# Patient Record
Sex: FEMALE | Race: WHITE | NOT HISPANIC OR LATINO | Employment: OTHER | URBAN - METROPOLITAN AREA
[De-identification: names, ages, dates, MRNs, and addresses within clinical notes are randomized per-mention and may not be internally consistent; named-entity substitution may affect disease eponyms.]

---

## 2017-01-12 ENCOUNTER — GENERIC CONVERSION - ENCOUNTER (OUTPATIENT)
Dept: OTHER | Facility: OTHER | Age: 77
End: 2017-01-12

## 2017-04-05 ENCOUNTER — ALLSCRIPTS OFFICE VISIT (OUTPATIENT)
Dept: FAMILY MEDICINE CLINIC | Facility: CLINIC | Age: 77
End: 2017-04-05
Payer: MEDICARE

## 2017-04-05 ENCOUNTER — APPOINTMENT (OUTPATIENT)
Dept: LAB | Facility: HOSPITAL | Age: 77
End: 2017-04-05
Payer: MEDICARE

## 2017-04-05 DIAGNOSIS — E78.5 HYPERLIPIDEMIA: ICD-10-CM

## 2017-04-05 DIAGNOSIS — I10 ESSENTIAL (PRIMARY) HYPERTENSION: ICD-10-CM

## 2017-04-05 DIAGNOSIS — E11.9 TYPE 2 DIABETES MELLITUS WITHOUT COMPLICATIONS (HCC): ICD-10-CM

## 2017-04-05 DIAGNOSIS — G62.9 POLYNEUROPATHY: ICD-10-CM

## 2017-04-05 LAB
ALBUMIN SERPL BCP-MCNC: 4.2 G/DL (ref 3.5–5)
ALP SERPL-CCNC: 85 U/L (ref 46–116)
ALT SERPL W P-5'-P-CCNC: 31 U/L (ref 12–78)
ANION GAP SERPL CALCULATED.3IONS-SCNC: 6 MMOL/L (ref 4–13)
AST SERPL W P-5'-P-CCNC: 12 U/L (ref 5–45)
BASOPHILS # BLD AUTO: 0.06 THOUSANDS/ΜL (ref 0–0.1)
BASOPHILS NFR BLD AUTO: 1 % (ref 0–1)
BILIRUB SERPL-MCNC: 0.56 MG/DL (ref 0.2–1)
BUN SERPL-MCNC: 14 MG/DL (ref 5–25)
CALCIUM SERPL-MCNC: 10 MG/DL (ref 8.3–10.1)
CHLORIDE SERPL-SCNC: 101 MMOL/L (ref 100–108)
CHOLEST SERPL-MCNC: 186 MG/DL (ref 50–200)
CO2 SERPL-SCNC: 30 MMOL/L (ref 21–32)
CREAT SERPL-MCNC: 0.79 MG/DL (ref 0.6–1.3)
CREAT UR-MCNC: 79.9 MG/DL
EOSINOPHIL # BLD AUTO: 0.23 THOUSAND/ΜL (ref 0–0.61)
EOSINOPHIL NFR BLD AUTO: 2 % (ref 0–6)
ERYTHROCYTE [DISTWIDTH] IN BLOOD BY AUTOMATED COUNT: 13.7 % (ref 11.6–15.1)
GFR SERPL CREATININE-BSD FRML MDRD: >60 ML/MIN/1.73SQ M
GLUCOSE P FAST SERPL-MCNC: 97 MG/DL (ref 65–99)
GLUCOSE SERPL-MCNC: 124 MG/DL
GLUCOSE SERPL-MCNC: 124 MG/DL (ref 65–140)
HBA1C MFR BLD HPLC: 6.5 %
HCT VFR BLD AUTO: 45.5 % (ref 34.8–46.1)
HDLC SERPL-MCNC: 54 MG/DL (ref 40–60)
HGB BLD-MCNC: 14.7 G/DL (ref 11.5–15.4)
LDLC SERPL CALC-MCNC: 88 MG/DL (ref 0–100)
LYMPHOCYTES # BLD AUTO: 3.41 THOUSANDS/ΜL (ref 0.6–4.47)
LYMPHOCYTES NFR BLD AUTO: 34 % (ref 14–44)
MCH RBC QN AUTO: 28.5 PG (ref 26.8–34.3)
MCHC RBC AUTO-ENTMCNC: 32.3 G/DL (ref 31.4–37.4)
MCV RBC AUTO: 88 FL (ref 82–98)
MICROALBUMIN UR-MCNC: <5 MG/L (ref 0–20)
MICROALBUMIN/CREAT 24H UR: <6 MG/G CREATININE (ref 0–30)
MONOCYTES # BLD AUTO: 0.83 THOUSAND/ΜL (ref 0.17–1.22)
MONOCYTES NFR BLD AUTO: 8 % (ref 4–12)
NEUTROPHILS # BLD AUTO: 5.5 THOUSANDS/ΜL (ref 1.85–7.62)
NEUTS SEG NFR BLD AUTO: 55 % (ref 43–75)
NRBC BLD AUTO-RTO: 0 /100 WBCS
PLATELET # BLD AUTO: 258 THOUSANDS/UL (ref 149–390)
PMV BLD AUTO: 12.1 FL (ref 8.9–12.7)
POTASSIUM SERPL-SCNC: 5 MMOL/L (ref 3.5–5.3)
PROT SERPL-MCNC: 7.5 G/DL (ref 6.4–8.2)
RBC # BLD AUTO: 5.16 MILLION/UL (ref 3.81–5.12)
SODIUM SERPL-SCNC: 137 MMOL/L (ref 136–145)
TRIGL SERPL-MCNC: 221 MG/DL
TSH SERPL DL<=0.05 MIU/L-ACNC: 2.45 UIU/ML (ref 0.36–3.74)
WBC # BLD AUTO: 10.05 THOUSAND/UL (ref 4.31–10.16)

## 2017-04-05 PROCEDURE — 82948 REAGENT STRIP/BLOOD GLUCOSE: CPT | Performed by: PHYSICIAN ASSISTANT

## 2017-04-05 PROCEDURE — 36415 COLL VENOUS BLD VENIPUNCTURE: CPT

## 2017-04-05 PROCEDURE — 83036 HEMOGLOBIN GLYCOSYLATED A1C: CPT | Performed by: PHYSICIAN ASSISTANT

## 2017-04-05 PROCEDURE — 82570 ASSAY OF URINE CREATININE: CPT

## 2017-04-05 PROCEDURE — 80061 LIPID PANEL: CPT

## 2017-04-05 PROCEDURE — 80053 COMPREHEN METABOLIC PANEL: CPT

## 2017-04-05 PROCEDURE — 84443 ASSAY THYROID STIM HORMONE: CPT

## 2017-04-05 PROCEDURE — 82043 UR ALBUMIN QUANTITATIVE: CPT

## 2017-04-05 PROCEDURE — 85025 COMPLETE CBC W/AUTO DIFF WBC: CPT

## 2017-04-05 PROCEDURE — 99213 OFFICE O/P EST LOW 20 MIN: CPT | Performed by: PHYSICIAN ASSISTANT

## 2017-06-12 ENCOUNTER — OFFICE VISIT (OUTPATIENT)
Dept: URGENT CARE | Facility: CLINIC | Age: 77
End: 2017-06-12
Payer: MEDICARE

## 2017-06-12 ENCOUNTER — OFFICE VISIT (OUTPATIENT)
Dept: URGENT CARE | Facility: CLINIC | Age: 77
End: 2017-06-12
Attending: EMERGENCY MEDICINE
Payer: MEDICARE

## 2017-06-12 ENCOUNTER — TRANSCRIBE ORDERS (OUTPATIENT)
Dept: URGENT CARE | Facility: CLINIC | Age: 77
End: 2017-06-12

## 2017-06-12 DIAGNOSIS — R42 DIZZINESS AND GIDDINESS: Primary | ICD-10-CM

## 2017-06-12 DIAGNOSIS — R42 DIZZINESS AND GIDDINESS: ICD-10-CM

## 2017-06-12 LAB
ATRIAL RATE: 87 BPM
P AXIS: 75 DEGREES
PR INTERVAL: 154 MS
QRS AXIS: 11 DEGREES
QRSD INTERVAL: 72 MS
QT INTERVAL: 378 MS
QTC INTERVAL: 454 MS
T WAVE AXIS: 68 DEGREES
VENTRICULAR RATE: 87 BPM

## 2017-06-12 PROCEDURE — 93005 ELECTROCARDIOGRAM TRACING: CPT

## 2017-06-12 PROCEDURE — G0463 HOSPITAL OUTPT CLINIC VISIT: HCPCS

## 2017-06-12 PROCEDURE — 99213 OFFICE O/P EST LOW 20 MIN: CPT

## 2017-07-13 ENCOUNTER — ALLSCRIPTS OFFICE VISIT (OUTPATIENT)
Dept: FAMILY MEDICINE CLINIC | Facility: CLINIC | Age: 77
End: 2017-07-13
Payer: MEDICARE

## 2017-07-13 LAB
GLUCOSE SERPL-MCNC: 121 MG/DL
GLUCOSE SERPL-MCNC: 121 MG/DL (ref 65–140)
HBA1C MFR BLD HPLC: 6.6 %

## 2017-07-13 PROCEDURE — 82948 REAGENT STRIP/BLOOD GLUCOSE: CPT | Performed by: PHYSICIAN ASSISTANT

## 2017-07-13 PROCEDURE — 99214 OFFICE O/P EST MOD 30 MIN: CPT | Performed by: PHYSICIAN ASSISTANT

## 2017-07-13 PROCEDURE — 83036 HEMOGLOBIN GLYCOSYLATED A1C: CPT | Performed by: PHYSICIAN ASSISTANT

## 2017-09-20 ENCOUNTER — ALLSCRIPTS OFFICE VISIT (OUTPATIENT)
Dept: FAMILY MEDICINE CLINIC | Facility: CLINIC | Age: 77
End: 2017-09-20
Payer: MEDICARE

## 2017-09-20 PROCEDURE — 99214 OFFICE O/P EST MOD 30 MIN: CPT | Performed by: PHYSICIAN ASSISTANT

## 2017-09-20 PROCEDURE — 93005 ELECTROCARDIOGRAM TRACING: CPT | Performed by: PHYSICIAN ASSISTANT

## 2017-11-30 ENCOUNTER — ALLSCRIPTS OFFICE VISIT (OUTPATIENT)
Dept: FAMILY MEDICINE CLINIC | Facility: CLINIC | Age: 77
End: 2017-11-30
Payer: MEDICARE

## 2017-11-30 ENCOUNTER — APPOINTMENT (OUTPATIENT)
Dept: LAB | Facility: HOSPITAL | Age: 77
End: 2017-11-30
Payer: MEDICARE

## 2017-11-30 DIAGNOSIS — E78.5 HYPERLIPIDEMIA: ICD-10-CM

## 2017-11-30 DIAGNOSIS — I10 ESSENTIAL (PRIMARY) HYPERTENSION: ICD-10-CM

## 2017-11-30 DIAGNOSIS — E11.9 TYPE 2 DIABETES MELLITUS WITHOUT COMPLICATIONS (HCC): ICD-10-CM

## 2017-11-30 DIAGNOSIS — E55.9 VITAMIN D DEFICIENCY: ICD-10-CM

## 2017-11-30 LAB
25(OH)D3 SERPL-MCNC: 33.5 NG/ML (ref 30–100)
ALBUMIN SERPL BCP-MCNC: 4.1 G/DL (ref 3.5–5)
ALP SERPL-CCNC: 83 U/L (ref 46–116)
ALT SERPL W P-5'-P-CCNC: 38 U/L (ref 12–78)
ANION GAP SERPL CALCULATED.3IONS-SCNC: 7 MMOL/L (ref 4–13)
AST SERPL W P-5'-P-CCNC: 20 U/L (ref 5–45)
BASOPHILS # BLD AUTO: 0.07 THOUSANDS/ΜL (ref 0–0.1)
BASOPHILS NFR BLD AUTO: 1 % (ref 0–1)
BILIRUB SERPL-MCNC: 0.71 MG/DL (ref 0.2–1)
BUN SERPL-MCNC: 10 MG/DL (ref 5–25)
CALCIUM SERPL-MCNC: 9.6 MG/DL (ref 8.3–10.1)
CHLORIDE SERPL-SCNC: 104 MMOL/L (ref 100–108)
CHOLEST SERPL-MCNC: 187 MG/DL (ref 50–200)
CO2 SERPL-SCNC: 31 MMOL/L (ref 21–32)
CREAT SERPL-MCNC: 0.81 MG/DL (ref 0.6–1.3)
CREAT UR-MCNC: 127 MG/DL
EOSINOPHIL # BLD AUTO: 0.26 THOUSAND/ΜL (ref 0–0.61)
EOSINOPHIL NFR BLD AUTO: 3 % (ref 0–6)
ERYTHROCYTE [DISTWIDTH] IN BLOOD BY AUTOMATED COUNT: 13.6 % (ref 11.6–15.1)
GFR SERPL CREATININE-BSD FRML MDRD: 70 ML/MIN/1.73SQ M
GLUCOSE P FAST SERPL-MCNC: 96 MG/DL (ref 65–99)
GLUCOSE SERPL-MCNC: 96 MG/DL
GLUCOSE SERPL-MCNC: 96 MG/DL (ref 65–140)
HBA1C MFR BLD HPLC: 6.9 %
HCT VFR BLD AUTO: 44.2 % (ref 34.8–46.1)
HDLC SERPL-MCNC: 53 MG/DL (ref 40–60)
HGB BLD-MCNC: 14.5 G/DL (ref 11.5–15.4)
LDLC SERPL CALC-MCNC: 91 MG/DL (ref 0–100)
LYMPHOCYTES # BLD AUTO: 3.1 THOUSANDS/ΜL (ref 0.6–4.47)
LYMPHOCYTES NFR BLD AUTO: 35 % (ref 14–44)
MCH RBC QN AUTO: 28.7 PG (ref 26.8–34.3)
MCHC RBC AUTO-ENTMCNC: 32.8 G/DL (ref 31.4–37.4)
MCV RBC AUTO: 88 FL (ref 82–98)
MICROALBUMIN UR-MCNC: 5.3 MG/L (ref 0–20)
MICROALBUMIN/CREAT 24H UR: 4 MG/G CREATININE (ref 0–30)
MONOCYTES # BLD AUTO: 0.8 THOUSAND/ΜL (ref 0.17–1.22)
MONOCYTES NFR BLD AUTO: 9 % (ref 4–12)
NEUTROPHILS # BLD AUTO: 4.7 THOUSANDS/ΜL (ref 1.85–7.62)
NEUTS SEG NFR BLD AUTO: 52 % (ref 43–75)
NRBC BLD AUTO-RTO: 0 /100 WBCS
PLATELET # BLD AUTO: 249 THOUSANDS/UL (ref 149–390)
PMV BLD AUTO: 11.2 FL (ref 8.9–12.7)
POTASSIUM SERPL-SCNC: 4.7 MMOL/L (ref 3.5–5.3)
PROT SERPL-MCNC: 7.4 G/DL (ref 6.4–8.2)
RBC # BLD AUTO: 5.05 MILLION/UL (ref 3.81–5.12)
SODIUM SERPL-SCNC: 142 MMOL/L (ref 136–145)
TRIGL SERPL-MCNC: 216 MG/DL
TSH SERPL DL<=0.05 MIU/L-ACNC: 2.15 UIU/ML (ref 0.36–3.74)
WBC # BLD AUTO: 8.95 THOUSAND/UL (ref 4.31–10.16)

## 2017-11-30 PROCEDURE — 82306 VITAMIN D 25 HYDROXY: CPT

## 2017-11-30 PROCEDURE — 82570 ASSAY OF URINE CREATININE: CPT

## 2017-11-30 PROCEDURE — 85025 COMPLETE CBC W/AUTO DIFF WBC: CPT

## 2017-11-30 PROCEDURE — 82043 UR ALBUMIN QUANTITATIVE: CPT

## 2017-11-30 PROCEDURE — 80053 COMPREHEN METABOLIC PANEL: CPT

## 2017-11-30 PROCEDURE — 99214 OFFICE O/P EST MOD 30 MIN: CPT | Performed by: FAMILY MEDICINE

## 2017-11-30 PROCEDURE — 83036 HEMOGLOBIN GLYCOSYLATED A1C: CPT | Performed by: FAMILY MEDICINE

## 2017-11-30 PROCEDURE — 82948 REAGENT STRIP/BLOOD GLUCOSE: CPT | Performed by: PHYSICIAN ASSISTANT

## 2017-11-30 PROCEDURE — 84443 ASSAY THYROID STIM HORMONE: CPT

## 2017-11-30 PROCEDURE — 90686 IIV4 VACC NO PRSV 0.5 ML IM: CPT

## 2017-11-30 PROCEDURE — 36415 COLL VENOUS BLD VENIPUNCTURE: CPT

## 2017-11-30 PROCEDURE — 80061 LIPID PANEL: CPT

## 2017-11-30 PROCEDURE — G0008 ADMIN INFLUENZA VIRUS VAC: HCPCS | Performed by: FAMILY MEDICINE

## 2017-12-05 NOTE — PROGRESS NOTES
Assessment    1  Vitamin D deficiency (268 9) (E55 9)   2  Diabetes mellitus (250 00) (E11 9)   3  Hyperlipidemia, unspecified (272 4) (E78 5)   4  Hypertension (401 9) (I10)    Plan  Diabetes mellitus    · (1) CBC/PLT/DIFF; Status:Active; Requested for:30Nov2017;    · Blood Glucose- POC; Status:Complete;   Done: 03IYZ5268 10:41AM  Fasting (Y/N) : Yes - Y   · Hemoglobin A1c- POC; Status:Complete;   Done: 04BQQ7666 10:45AM  Diabetes mellitus, Hyperlipidemia, unspecified    · (1) LIPID PANEL, FASTING; Status:Active; Requested for:30Nov2017;   Diabetes mellitus, Hyperlipidemia, unspecified, Hypertension    · (1) COMPREHENSIVE METABOLIC PANEL; Status:Active; Requested for:30Nov2017;    · (1) MICROALBUMIN CREATININE RATIO, RANDOM URINE; Status:Active; Requested  for:30Nov2017;    · (1) TSH; Status:Active; Requested for:30Nov2017;   Need for influenza vaccination    · Influenza  Vitamin D deficiency    · (1) VITAMIN D 25-HYDROXY; Status:Active; Requested for:30Nov2017;     Discussion/Summary    Will get labs and also flu shot and no med changes today  The patient was counseled regarding  Chief Complaint  pt here for 3 month dm check up      History of Present Illness  Pt is here for [place] and she is feeling well and appetite is good and sleeps ok and abm are ok      Review of Systems    Constitutional: No fever, no chills, feels well, no tiredness, no recent weight gain or weight loss  Eyes: No complaints of eye pain, no red eyes, no eyesight problems, no discharge, no dry eyes, no itching of eyes  ENT: no complaints of earache, no loss of hearing, no nose bleeds, no nasal discharge, no sore throat, no hoarseness  Cardiovascular: No complaints of slow heart rate, no fast heart rate, no chest pain, no palpitations, no leg claudication, no lower extremity edema  Respiratory: No complaints of shortness of breath, no wheezing, no cough, no SOB on exertion, no orthopnea, no PND     Gastrointestinal: No complaints of abdominal pain, no constipation, no nausea or vomiting, no diarrhea, no bloody stools  Genitourinary: No complaints of dysuria, no incontinence, no pelvic pain, no dysmenorrhea, no vaginal discharge or bleeding  Musculoskeletal: No complaints of arthralgias, no myalgias, no joint swelling or stiffness, no limb pain or swelling  Integumentary: No complaints of skin rash or lesions, no itching, no skin wounds, no breast pain or lump  Neurological: No complaints of headache, no confusion, no convulsions, no numbness, no dizziness or fainting, no tingling, no limb weakness, no difficulty walking  Psychiatric: Not suicidal, no sleep disturbance, no anxiety or depression, no change in personality, no emotional problems  Endocrine: No complaints of proptosis, no hot flashes, no muscle weakness, no deepening of the voice, no feelings of weakness  Hematologic/Lymphatic: No complaints of swollen glands, no swollen glands in the neck, does not bleed easily, does not bruise easily  ROS reviewed  Active Problems    1  Acid reflux disease (530 81) (K21 9)   2  Ambulatory dysfunction (719 7) (R26 2)   3  Asthma, mild intermittent (493 90) (J45 20)   4  Cataract (366 9) (H26 9)   5  Cough (786 2) (R05)   6  Diabetes mellitus (250 00) (E11 9)   7  Dizziness (780 4) (R42)   8  Hyperlipidemia, unspecified (272 4) (E78 5)   9  Hypertension (401 9) (I10)   10  Neuropathy (355 9) (G62 9)   11  Urinary frequency (788 41) (R35 0)    Surgical History    1  History of Cholecystectomy    The surgical history was reviewed and updated today  Family History  Father    1  Family history of lung cancer (V16 1) (Z80 1)    The family history was reviewed and updated today  Social History    · Daily caffeine consumption   · Former smoker (O89 88) (W00 085)   · No alcohol use  The social history was reviewed and updated today  Current Meds   1   Atorvastatin Calcium 20 MG Oral Tablet; take 1 tablet by mouth once daily; Therapy: 78SRT1333 to (Evaluate:01Mar2018)  Requested for: 01DLN3267; Last   Rx:01Nov2017 Ordered   2  Gabapentin 300 MG Oral Capsule; take 1 capsule by mouth once daily; Therapy: 99Hio6897 to (Evaluate:07Wfw7500)  Requested for: 88Nmq9360; Last   Rx:78Rlu9320 Ordered   3  Lisinopril 5 MG Oral Tablet; take 1 tablet by mouth once daily; Therapy: 19Rvm2249 to (Evaluate:40Fbu7637)  Requested for: 02Syv9597; Last   Rx:48Kwd6502 Ordered   4  Multiple Vitamin TABS; Therapy: (Recorded:08Qib2703) to Recorded   5  Omeprazole 20 MG Oral Capsule Delayed Release; TAKE 1 CAPSULE BY MOUTH   TWICE DAILY; Therapy: 45BOX5750 to (Valery Ralph)  Requested for: 45SWK9901; Last   Rx:06Nov2017 Ordered   6  Omeprazole 20 MG Oral Tablet Delayed Release; 1po bid  Requested for: 57ACD6706;   Last Rx:60Ocs5335 Ordered   7  ProAir  (90 Base) MCG/ACT Inhalation Aerosol Solution; INHALE 1-2 PUFFS   EVERY 4-6 HOURS AS NEEDED AND AS DIRECTED; Therapy: 42RYE5659 to (Last Rx:94Yrq5405) Ordered   8  Spiriva HandiHaler 18 MCG Inhalation Capsule; inhale the contents of one capsule in   the handihaler once daily; Therapy: 97Psc9936 to (Evaluate:27Mar2018)  Requested for: 96FEK2892; Last   Rx:27Nov2017 Ordered   9  Tolterodine Tartrate ER 4 MG Oral Capsule Extended Release 24 Hour; TAKE ONE   CAPSULE BY MOUTH DAILY AT BEDTIME (OVERACTIVE BLADDER); Therapy: 96KHK8220 to (Last Lyndal Sicard)  Requested for: 84SKW6044 Ordered   10  Vitamin D TABS; Therapy: (Recorded:82Vuo3236) to Recorded    The medication list was reviewed and updated today  Allergies    1   No Known Drug Allergies    Vitals  Vital Signs    Recorded: 69SLD3253 10:36AM   Temperature 97 4 F   Heart Rate 74   Respiration 18   Systolic 102   Diastolic 62   Weight 081 lb    BMI Calculated 35 54   BSA Calculated 1 79   O2 Saturation 97     Physical Exam    Constitutional   General appearance: No acute distress, well appearing and well nourished  Eyes   Conjunctiva and lids: No swelling, erythema or discharge  Pupils and irises: Equal, round and reactive to light  Ears, Nose, Mouth, and Throat   External inspection of ears and nose: Normal     Otoscopic examination: Tympanic membranes translucent with normal light reflex  Canals patent without erythema  Nasal mucosa, septum, and turbinates: Normal without edema or erythema  Oropharynx: Normal with no erythema, edema, exudate or lesions  Pulmonary   Respiratory effort: No increased work of breathing or signs of respiratory distress  Auscultation of lungs: Clear to auscultation  Cardiovascular   Palpation of heart: Normal PMI, no thrills  Auscultation of heart: Normal rate and rhythm, normal S1 and S2, without murmurs  Examination of extremities for edema and/or varicosities: Normal     Carotid pulses: Normal     Abdomen   Abdomen: Non-tender, no masses  Liver and spleen: No hepatomegaly or splenomegaly  Lymphatic   Palpation of lymph nodes in neck: No lymphadenopathy  Musculoskeletal   Gait and station: Normal     Digits and nails: Normal without clubbing or cyanosis  Inspection/palpation of joints, bones, and muscles: Normal     Skin   Skin and subcutaneous tissue: Normal without rashes or lesions  Neurologic   Cranial nerves: Cranial nerves 2-12 intact  Reflexes: 2+ and symmetric  Sensation: No sensory loss      Psychiatric   Orientation to person, place, and time: Normal     Mood and affect: Normal          Results/Data  Hemoglobin A1c- POC 42NLT9070 10:45AM Bria Nathan     Test Name Result Flag Reference   HEMOGLOBIN A1C 6 9       Blood Glucose- POC 54VAL6945 10:41AM Bria Nathan     Test Name Result Flag Reference   Glucose Finger Stick 96         Future Appointments    Date/Time Provider Specialty Site   03/14/2018 10:30 AM Bria Evans, [facility]     Signatures   Electronically signed by : Meli Mcintyre, [facility]; Nov 30 2017 10:55AM EST                       (Author)    Electronically signed by : Briseida Peoples MD; Nov 30 2017  2:15PM EST                       (Author)

## 2018-01-12 VITALS
BODY MASS INDEX: 32.24 KG/M2 | RESPIRATION RATE: 18 BRPM | DIASTOLIC BLOOD PRESSURE: 62 MMHG | TEMPERATURE: 97.4 F | HEART RATE: 74 BPM | OXYGEN SATURATION: 97 % | SYSTOLIC BLOOD PRESSURE: 118 MMHG | WEIGHT: 182 LBS

## 2018-01-13 VITALS
TEMPERATURE: 98.2 F | HEART RATE: 86 BPM | SYSTOLIC BLOOD PRESSURE: 132 MMHG | WEIGHT: 178 LBS | DIASTOLIC BLOOD PRESSURE: 82 MMHG | OXYGEN SATURATION: 92 % | HEIGHT: 63 IN | RESPIRATION RATE: 16 BRPM | BODY MASS INDEX: 31.54 KG/M2

## 2018-01-14 VITALS
BODY MASS INDEX: 35.34 KG/M2 | OXYGEN SATURATION: 95 % | HEART RATE: 80 BPM | HEIGHT: 60 IN | RESPIRATION RATE: 16 BRPM | TEMPERATURE: 97.8 F | WEIGHT: 180 LBS | DIASTOLIC BLOOD PRESSURE: 78 MMHG | SYSTOLIC BLOOD PRESSURE: 120 MMHG

## 2018-01-14 VITALS
WEIGHT: 180 LBS | OXYGEN SATURATION: 94 % | BODY MASS INDEX: 31.89 KG/M2 | RESPIRATION RATE: 16 BRPM | DIASTOLIC BLOOD PRESSURE: 84 MMHG | HEART RATE: 76 BPM | SYSTOLIC BLOOD PRESSURE: 124 MMHG | HEIGHT: 63 IN | TEMPERATURE: 98.4 F

## 2018-04-16 ENCOUNTER — OFFICE VISIT (OUTPATIENT)
Dept: FAMILY MEDICINE CLINIC | Facility: CLINIC | Age: 78
End: 2018-04-16
Payer: MEDICARE

## 2018-04-16 VITALS
HEART RATE: 63 BPM | BODY MASS INDEX: 32.78 KG/M2 | HEIGHT: 63 IN | OXYGEN SATURATION: 95 % | DIASTOLIC BLOOD PRESSURE: 80 MMHG | WEIGHT: 185 LBS | SYSTOLIC BLOOD PRESSURE: 130 MMHG

## 2018-04-16 DIAGNOSIS — E11.9 TYPE 2 DIABETES MELLITUS WITHOUT COMPLICATION, WITHOUT LONG-TERM CURRENT USE OF INSULIN (HCC): Primary | ICD-10-CM

## 2018-04-16 DIAGNOSIS — Z12.31 ENCOUNTER FOR SCREENING MAMMOGRAM FOR BREAST CANCER: ICD-10-CM

## 2018-04-16 DIAGNOSIS — E78.5 HYPERLIPIDEMIA, UNSPECIFIED HYPERLIPIDEMIA TYPE: ICD-10-CM

## 2018-04-16 DIAGNOSIS — B37.2 YEAST DERMATITIS: ICD-10-CM

## 2018-04-16 DIAGNOSIS — E55.9 VITAMIN D DEFICIENCY: ICD-10-CM

## 2018-04-16 PROBLEM — R42 DIZZINESS: Status: ACTIVE | Noted: 2017-06-12

## 2018-04-16 PROBLEM — R35.0 INCREASED FREQUENCY OF URINATION: Status: ACTIVE | Noted: 2017-07-13

## 2018-04-16 PROBLEM — H26.9 CATARACT: Status: ACTIVE | Noted: 2017-07-13

## 2018-04-16 PROBLEM — R26.2 AMBULATORY DYSFUNCTION: Status: ACTIVE | Noted: 2017-01-25

## 2018-04-16 LAB — SL AMB POCT HEMOGLOBIN AIC: 7

## 2018-04-16 PROCEDURE — 83036 HEMOGLOBIN GLYCOSYLATED A1C: CPT | Performed by: FAMILY MEDICINE

## 2018-04-16 PROCEDURE — 99213 OFFICE O/P EST LOW 20 MIN: CPT | Performed by: FAMILY MEDICINE

## 2018-04-16 PROCEDURE — 82043 UR ALBUMIN QUANTITATIVE: CPT | Performed by: FAMILY MEDICINE

## 2018-04-16 PROCEDURE — 82570 ASSAY OF URINE CREATININE: CPT | Performed by: FAMILY MEDICINE

## 2018-04-16 RX ORDER — NYSTATIN 100000 U/G
OINTMENT TOPICAL 2 TIMES DAILY
Qty: 30 G | Refills: 2 | Status: SHIPPED | OUTPATIENT
Start: 2018-04-16 | End: 2018-09-11

## 2018-04-16 RX ORDER — LISINOPRIL 5 MG/1
1 TABLET ORAL DAILY
COMMUNITY
Start: 2016-04-27 | End: 2018-05-04 | Stop reason: SDUPTHER

## 2018-04-16 RX ORDER — CETIRIZINE HYDROCHLORIDE 10 MG/1
10 TABLET ORAL
COMMUNITY
Start: 2012-06-06 | End: 2018-09-11

## 2018-04-16 RX ORDER — GABAPENTIN 300 MG/1
300 CAPSULE ORAL DAILY
Refills: 0 | COMMUNITY
Start: 2018-03-27 | End: 2018-05-04 | Stop reason: SDUPTHER

## 2018-04-16 RX ORDER — CHOLECALCIFEROL (VITAMIN D3) 125 MCG
2000 TABLET ORAL
COMMUNITY

## 2018-04-16 RX ORDER — OMEPRAZOLE 20 MG/1
1 CAPSULE, DELAYED RELEASE ORAL 2 TIMES DAILY
COMMUNITY
Start: 2017-11-06 | End: 2018-06-27 | Stop reason: SDUPTHER

## 2018-04-16 RX ORDER — ATORVASTATIN CALCIUM 20 MG/1
1 TABLET, FILM COATED ORAL DAILY
COMMUNITY
Start: 2016-03-15 | End: 2018-10-18 | Stop reason: SDUPTHER

## 2018-04-16 NOTE — PROGRESS NOTES
Assessment/Plan:    No problem-specific Assessment & Plan notes found for this encounter  Diagnoses and all orders for this visit:    Type 2 diabetes mellitus without complication, without long-term current use of insulin (HCC)  -     POCT hemoglobin A1c  -     CBC and differential; Future  -     Comprehensive metabolic panel; Future  -     Microalbumin / creatinine urine ratio    Yeast dermatitis  -     nystatin (MYCOSTATIN) ointment; Apply topically 2 (two) times a day    Vitamin D deficiency  -     Vitamin D 1,25 dihydroxy; Future    Hyperlipidemia, unspecified hyperlipidemia type  -     TSH, 3rd generation with T4 reflex; Future  -     Lipid Panel with Direct LDL reflex; Future    Encounter for screening mammogram for breast cancer  -     Mammo screening bilateral w 3d & cad; Future    Other orders  -     atorvastatin (LIPITOR) 20 mg tablet; Take 1 tablet by mouth daily  -     cetirizine (ZyrTEC) 10 mg tablet; Take 10 mg by mouth  -     Ergocalciferol (VITAMIN D2) 2000 units TABS; Take 2,000 mg by mouth  -     gabapentin (NEURONTIN) 300 mg capsule; Take 300 mg by mouth daily  -     lisinopril (ZESTRIL) 5 mg tablet; Take 1 tablet by mouth daily  -     omeprazole (PriLOSEC) 20 mg delayed release capsule; Take 1 capsule by mouth 2 (two) times a day  -     tiotropium (SPIRIVA HANDIHALER) 18 mcg inhalation capsule; Place 1 capsule into inhaler and inhale daily        Discussion/Plan:  1  Type 2 diabetes - A1C 7 0, up from 6 9 in 11/17  Advised healthy diet and exercise  Recheck A1C in 3 months  2  Yeast dermatitis - Nystatin ointment sent to pharmacy  F/U if not better  Bloodwork and mammogram ordered    F/U in 3 months for A1C and evaluation or sooner if needed  Subjective:      Patient ID: Yonas Ivory is a 66 y o  female  Patient presents to the office today for diabetic f/u and rash  She reports that her sugars are running around 130, and that she needs a new meter       She reports rash under breasts, along with vaginal itching around the outside  She states rash is itchy and hurts sometimes  The following portions of the patient's history were reviewed and updated as appropriate: allergies, current medications, past family history, past medical history, past social history, past surgical history and problem list     Review of Systems   Respiratory: Negative  Cardiovascular: Negative  Skin: Positive for rash  Neurological: Negative  Objective:      /80 (BP Location: Left arm, Patient Position: Sitting, Cuff Size: Large)   Pulse 63   Ht 5' 3" (1 6 m)   Wt 83 9 kg (185 lb)   SpO2 95%   BMI 32 77 kg/m²          Physical Exam   Constitutional: She is oriented to person, place, and time  She appears well-developed and well-nourished  HENT:   Head: Normocephalic and atraumatic  Mouth/Throat: Oropharynx is clear and moist    Eyes: Conjunctivae are normal  Pupils are equal, round, and reactive to light  Neck: Neck supple  Cardiovascular: Normal rate and regular rhythm  Pulmonary/Chest: Effort normal  She has decreased breath sounds  Neurological: She is alert and oriented to person, place, and time  Skin: Skin is warm and dry  Rash noted  (+) erythematous plaque noted under breasts bilaterally  Psychiatric: She has a normal mood and affect   Her behavior is normal

## 2018-04-27 DIAGNOSIS — E13.8 DIABETES MELLITUS OF OTHER TYPE WITH COMPLICATION, UNSPECIFIED WHETHER LONG TERM INSULIN USE: Primary | ICD-10-CM

## 2018-04-27 RX ORDER — GABAPENTIN 300 MG/1
CAPSULE ORAL
Qty: 30 CAPSULE | Refills: 3 | Status: CANCELLED | OUTPATIENT
Start: 2018-04-27

## 2018-04-27 RX ORDER — LISINOPRIL 5 MG/1
TABLET ORAL
Qty: 30 TABLET | Refills: 3 | Status: CANCELLED | OUTPATIENT
Start: 2018-04-27

## 2018-04-27 RX ORDER — BLOOD-GLUCOSE METER
EACH MISCELLANEOUS
Qty: 1 KIT | Refills: 0 | Status: SHIPPED | OUTPATIENT
Start: 2018-04-27 | End: 2019-12-17 | Stop reason: ALTCHOICE

## 2018-05-04 DIAGNOSIS — I10 HYPERTENSION, UNSPECIFIED TYPE: Primary | ICD-10-CM

## 2018-05-04 DIAGNOSIS — M79.2 NEUROPATHIC PAIN: ICD-10-CM

## 2018-05-04 RX ORDER — LISINOPRIL 5 MG/1
5 TABLET ORAL DAILY
Qty: 90 TABLET | Refills: 1 | Status: SHIPPED | OUTPATIENT
Start: 2018-05-04 | End: 2018-12-10 | Stop reason: SDUPTHER

## 2018-05-04 RX ORDER — GABAPENTIN 300 MG/1
300 CAPSULE ORAL DAILY
Qty: 90 CAPSULE | Refills: 1 | Status: SHIPPED | OUTPATIENT
Start: 2018-05-04 | End: 2018-10-18 | Stop reason: SDUPTHER

## 2018-06-12 ENCOUNTER — APPOINTMENT (OUTPATIENT)
Dept: LAB | Facility: CLINIC | Age: 78
End: 2018-06-12
Payer: MEDICARE

## 2018-06-12 DIAGNOSIS — E55.9 VITAMIN D DEFICIENCY: ICD-10-CM

## 2018-06-12 DIAGNOSIS — E78.5 HYPERLIPIDEMIA, UNSPECIFIED HYPERLIPIDEMIA TYPE: ICD-10-CM

## 2018-06-12 DIAGNOSIS — E11.9 TYPE 2 DIABETES MELLITUS WITHOUT COMPLICATION, WITHOUT LONG-TERM CURRENT USE OF INSULIN (HCC): ICD-10-CM

## 2018-06-12 LAB
ALBUMIN SERPL BCP-MCNC: 4 G/DL (ref 3.5–5)
ALP SERPL-CCNC: 78 U/L (ref 46–116)
ALT SERPL W P-5'-P-CCNC: 43 U/L (ref 12–78)
ANION GAP SERPL CALCULATED.3IONS-SCNC: 6 MMOL/L (ref 4–13)
AST SERPL W P-5'-P-CCNC: 19 U/L (ref 5–45)
BASOPHILS # BLD AUTO: 0.1 THOUSANDS/ΜL (ref 0–0.1)
BASOPHILS NFR BLD AUTO: 1 % (ref 0–1)
BILIRUB SERPL-MCNC: 0.72 MG/DL (ref 0.2–1)
BUN SERPL-MCNC: 13 MG/DL (ref 5–25)
CALCIUM SERPL-MCNC: 9.3 MG/DL (ref 8.3–10.1)
CHLORIDE SERPL-SCNC: 101 MMOL/L (ref 100–108)
CHOLEST SERPL-MCNC: 174 MG/DL (ref 50–200)
CO2 SERPL-SCNC: 30 MMOL/L (ref 21–32)
CREAT SERPL-MCNC: 0.96 MG/DL (ref 0.6–1.3)
CREAT UR-MCNC: 40.5 MG/DL
EOSINOPHIL # BLD AUTO: 0.2 THOUSAND/ΜL (ref 0–0.61)
EOSINOPHIL NFR BLD AUTO: 2 % (ref 0–6)
ERYTHROCYTE [DISTWIDTH] IN BLOOD BY AUTOMATED COUNT: 13.1 % (ref 11.6–15.1)
GFR SERPL CREATININE-BSD FRML MDRD: 57 ML/MIN/1.73SQ M
GLUCOSE P FAST SERPL-MCNC: 152 MG/DL (ref 65–99)
HCT VFR BLD AUTO: 46.4 % (ref 34.8–46.1)
HDLC SERPL-MCNC: 47 MG/DL (ref 40–60)
HGB BLD-MCNC: 14.7 G/DL (ref 11.5–15.4)
IMM GRANULOCYTES # BLD AUTO: 0.03 THOUSAND/UL (ref 0–0.2)
IMM GRANULOCYTES NFR BLD AUTO: 0 % (ref 0–2)
LDLC SERPL CALC-MCNC: 82 MG/DL (ref 0–100)
LYMPHOCYTES # BLD AUTO: 2.99 THOUSANDS/ΜL (ref 0.6–4.47)
LYMPHOCYTES NFR BLD AUTO: 32 % (ref 14–44)
MCH RBC QN AUTO: 28.8 PG (ref 26.8–34.3)
MCHC RBC AUTO-ENTMCNC: 31.7 G/DL (ref 31.4–37.4)
MCV RBC AUTO: 91 FL (ref 82–98)
MICROALBUMIN UR-MCNC: 12.6 MG/L (ref 0–20)
MICROALBUMIN/CREAT 24H UR: 31 MG/G CREATININE (ref 0–30)
MONOCYTES # BLD AUTO: 0.66 THOUSAND/ΜL (ref 0.17–1.22)
MONOCYTES NFR BLD AUTO: 7 % (ref 4–12)
NEUTROPHILS # BLD AUTO: 5.45 THOUSANDS/ΜL (ref 1.85–7.62)
NEUTS SEG NFR BLD AUTO: 58 % (ref 43–75)
NRBC BLD AUTO-RTO: 0 /100 WBCS
PLATELET # BLD AUTO: 283 THOUSANDS/UL (ref 149–390)
PMV BLD AUTO: 11.6 FL (ref 8.9–12.7)
POTASSIUM SERPL-SCNC: 4.2 MMOL/L (ref 3.5–5.3)
PROT SERPL-MCNC: 7.3 G/DL (ref 6.4–8.2)
RBC # BLD AUTO: 5.11 MILLION/UL (ref 3.81–5.12)
SODIUM SERPL-SCNC: 137 MMOL/L (ref 136–145)
TRIGL SERPL-MCNC: 225 MG/DL
TSH SERPL DL<=0.05 MIU/L-ACNC: 3.57 UIU/ML (ref 0.36–3.74)
WBC # BLD AUTO: 9.43 THOUSAND/UL (ref 4.31–10.16)

## 2018-06-12 PROCEDURE — 85025 COMPLETE CBC W/AUTO DIFF WBC: CPT

## 2018-06-12 PROCEDURE — 82652 VIT D 1 25-DIHYDROXY: CPT

## 2018-06-12 PROCEDURE — 36415 COLL VENOUS BLD VENIPUNCTURE: CPT

## 2018-06-12 PROCEDURE — 80061 LIPID PANEL: CPT

## 2018-06-12 PROCEDURE — 80053 COMPREHEN METABOLIC PANEL: CPT

## 2018-06-12 PROCEDURE — 84443 ASSAY THYROID STIM HORMONE: CPT

## 2018-06-13 LAB — 1,25(OH)2D3 SERPL-MCNC: 52.4 PG/ML (ref 19.9–79.3)

## 2018-06-18 DIAGNOSIS — E13.8 DIABETES MELLITUS OF OTHER TYPE WITH COMPLICATION, UNSPECIFIED WHETHER LONG TERM INSULIN USE: ICD-10-CM

## 2018-06-27 DIAGNOSIS — K21.9 GASTROESOPHAGEAL REFLUX DISEASE WITHOUT ESOPHAGITIS: Primary | ICD-10-CM

## 2018-06-28 RX ORDER — OMEPRAZOLE 20 MG/1
20 CAPSULE, DELAYED RELEASE ORAL 2 TIMES DAILY
Qty: 60 CAPSULE | Refills: 3 | Status: SHIPPED | OUTPATIENT
Start: 2018-06-28 | End: 2018-12-10 | Stop reason: SDUPTHER

## 2018-07-13 DIAGNOSIS — J45.909 UNCOMPLICATED ASTHMA, UNSPECIFIED ASTHMA SEVERITY, UNSPECIFIED WHETHER PERSISTENT: Primary | ICD-10-CM

## 2018-09-11 ENCOUNTER — OFFICE VISIT (OUTPATIENT)
Dept: INTERNAL MEDICINE CLINIC | Facility: CLINIC | Age: 78
End: 2018-09-11
Payer: MEDICARE

## 2018-09-11 VITALS
HEIGHT: 63 IN | SYSTOLIC BLOOD PRESSURE: 110 MMHG | BODY MASS INDEX: 33.03 KG/M2 | OXYGEN SATURATION: 94 % | DIASTOLIC BLOOD PRESSURE: 70 MMHG | HEART RATE: 110 BPM | RESPIRATION RATE: 16 BRPM | TEMPERATURE: 99.6 F | WEIGHT: 186.4 LBS

## 2018-09-11 DIAGNOSIS — Z78.0 ASYMPTOMATIC POSTMENOPAUSAL ESTROGEN DEFICIENCY: ICD-10-CM

## 2018-09-11 DIAGNOSIS — L30.4 INTERTRIGO: ICD-10-CM

## 2018-09-11 DIAGNOSIS — R05.9 COUGH: ICD-10-CM

## 2018-09-11 DIAGNOSIS — Z23 NEED FOR PNEUMOCOCCAL VACCINATION: ICD-10-CM

## 2018-09-11 DIAGNOSIS — E11.41 DIABETIC MONONEUROPATHY ASSOCIATED WITH TYPE 2 DIABETES MELLITUS (HCC): ICD-10-CM

## 2018-09-11 DIAGNOSIS — E11.65 TYPE 2 DIABETES MELLITUS WITH HYPERGLYCEMIA, WITHOUT LONG-TERM CURRENT USE OF INSULIN (HCC): Primary | ICD-10-CM

## 2018-09-11 DIAGNOSIS — I10 ESSENTIAL HYPERTENSION: ICD-10-CM

## 2018-09-11 DIAGNOSIS — Z12.11 SCREENING FOR COLON CANCER: ICD-10-CM

## 2018-09-11 DIAGNOSIS — E13.8 DIABETES MELLITUS OF OTHER TYPE WITH COMPLICATION, UNSPECIFIED WHETHER LONG TERM INSULIN USE: ICD-10-CM

## 2018-09-11 PROBLEM — R35.0 INCREASED FREQUENCY OF URINATION: Status: RESOLVED | Noted: 2017-07-13 | Resolved: 2018-09-11

## 2018-09-11 PROBLEM — H26.9 CATARACT: Status: RESOLVED | Noted: 2017-07-13 | Resolved: 2018-09-11

## 2018-09-11 PROBLEM — R26.2 AMBULATORY DYSFUNCTION: Status: RESOLVED | Noted: 2017-01-25 | Resolved: 2018-09-11

## 2018-09-11 PROBLEM — R42 DIZZINESS: Status: RESOLVED | Noted: 2017-06-12 | Resolved: 2018-09-11

## 2018-09-11 LAB — SL AMB POCT HEMOGLOBIN AIC: ABNORMAL

## 2018-09-11 PROCEDURE — 36416 COLLJ CAPILLARY BLOOD SPEC: CPT | Performed by: PHYSICIAN ASSISTANT

## 2018-09-11 PROCEDURE — G0009 ADMIN PNEUMOCOCCAL VACCINE: HCPCS | Performed by: PHYSICIAN ASSISTANT

## 2018-09-11 PROCEDURE — 83036 HEMOGLOBIN GLYCOSYLATED A1C: CPT | Performed by: PHYSICIAN ASSISTANT

## 2018-09-11 PROCEDURE — 2028F FOOT EXAM PERFORMED: CPT | Performed by: PHYSICIAN ASSISTANT

## 2018-09-11 PROCEDURE — 90670 PCV13 VACCINE IM: CPT

## 2018-09-11 PROCEDURE — 99214 OFFICE O/P EST MOD 30 MIN: CPT | Performed by: PHYSICIAN ASSISTANT

## 2018-09-11 RX ORDER — FLUCONAZOLE 150 MG/1
150 TABLET ORAL ONCE
Qty: 1 TABLET | Refills: 0 | Status: SHIPPED | OUTPATIENT
Start: 2018-09-11 | End: 2018-09-11

## 2018-09-11 RX ORDER — CLOTRIMAZOLE AND BETAMETHASONE DIPROPIONATE 10; .64 MG/G; MG/G
CREAM TOPICAL 2 TIMES DAILY
Qty: 30 G | Refills: 0 | Status: SHIPPED | OUTPATIENT
Start: 2018-09-11 | End: 2019-02-12 | Stop reason: SDUPTHER

## 2018-09-11 RX ORDER — FLUTICASONE PROPIONATE 50 MCG
1 SPRAY, SUSPENSION (ML) NASAL AS NEEDED
COMMUNITY
End: 2019-10-23 | Stop reason: ALTCHOICE

## 2018-09-11 NOTE — PROGRESS NOTES
Assessment/Plan:    Type 2 diabetes mellitus with hyperglycemia, without long-term current use of insulin ([facility])  Lab Results   Component Value Date    HGBA1C 7 6% 09/11/2018       No results for input(s): POCGLU in the last 72 hours  Blood Sugar Average: Last 72 hrs: Will start metformin to improve A1C  Continue ace and statin      Hypertension  Pts symptoms are stable with current regime  No changes at present  Intertrigo  Will give diflucan and lotrisone  The lotrisone will help treat the fungus as well as soothe the affected area     Cough  Likely secondary to allergies  Will have pt continue flonase and add claritin daily x 2 weeks       Diagnoses and all orders for this visit:    Type 2 diabetes mellitus with hyperglycemia, without long-term current use of insulin ([facility])    Diabetic mononeuropathy associated with type 2 diabetes mellitus ([facility] )  -     POCT hemoglobin A1c    Screening for colon cancer  -     Ambulatory referral to Colorectal Surgery    Asymptomatic postmenopausal estrogen deficiency  -     DXA bone density spine hip and pelvis; Future    Diabetes mellitus of other type with complication, unspecified whether long term insulin use ([facility])  -     glucose blood (ONETOUCH VERIO) test strip; Use as instructed TEST TWO TIMES DAILY  -     metFORMIN (GLUCOPHAGE) 500 mg tablet; Take 1 tablet (500 mg total) by mouth 2 (two) times a day with meals    Intertrigo  -     fluconazole (DIFLUCAN) 150 mg tablet; Take 1 tablet (150 mg total) by mouth once for 1 dose  -     clotrimazole-betamethasone (LOTRISONE) 1-0 05 % cream; Apply topically 2 (two) times a day    Need for pneumococcal vaccination  -     PNEUMOCOCCAL CONJUGATE VACCINE 13-VALENT GREATER THAN 6 MONTHS    Essential hypertension    Cough    Other orders  -     Multiple Vitamin (MULTIVITAMINS PO);  Take by mouth daily  -     fluticasone (FLONASE) 50 mcg/act nasal spray; 1 spray into each nostril daily          Subjective:      Patient ID: Camjay Olguin Awa Pat is a 66 y o  female  Pt presents to establish care as a transfer from [facility]  PMHx includes pre-diabetes, hyperlipidemia, asthma, neuropathy, htn, acid reflux and vitamin d deficiency  PSurgHx includes cholecystectomy and bilateral cataracts  NKDA  Medications are noted in the chart  She is due for dexa scan, A1C, and colonoscopy  She is also due for prevnar 13 and foot exam  Mammogram and routine labs are up to date  Depression screen is positive but pt states it is situational and defers any medications to help with this  She feels she te appropriately  She also notes a recurrent vulvar itch and irritation  She denies discharge  She notes the symptoms are all on the external genitalia and not internal  She was given rx for nystatin cream without improvement  She also complains of a dry cough x weeks  It is worse when laying flat and when she first awakens in the morning  She denies CP, wheeze, or SOB  She does have seasonal allergies  A1C in the office is 7 6  This is higher than previous of 7  We discussed that both values are consistent with diabetes and not pre diabetes  She is on an ace and statin but nothing for her sugar  The following portions of the patient's history were reviewed and updated as appropriate:   She  has a past medical history of Allergic; Asthma; COPD (chronic obstructive pulmonary disease) ([address] ); Diabetes mellitus ([address] ); GERD (gastroesophageal reflux disease); Hyperlipidemia; Hypertension; and Vitamin D deficiency    She   Patient Active Problem List    Diagnosis Date Noted    Intertrigo 09/11/2018    Cough 09/11/2018    Vitamin D deficiency 11/30/2017    Asthma, mild intermittent 05/23/2016    Neuropathy 04/27/2016    Acid reflux disease 02/18/2016    Type 2 diabetes mellitus with hyperglycemia, without long-term current use of insulin ([address] ) 01/21/2016    Hyperlipidemia, unspecified 01/21/2016    Hypertension 01/21/2016    Spinal stenosis of lumbar region without neurogenic claudication 01/13/2016    Chronic airway obstruction, not elsewhere classified 06/04/2013     She  has a past surgical history that includes Cholecystectomy (1984) and Eye surgery (Bilateral)  Her family history includes Heart disease in her mother; Lung cancer in her father  She  reports that she has quit smoking  She has never used smokeless tobacco  She reports that she does not drink alcohol or use drugs  Current Outpatient Prescriptions   Medication Sig Dispense Refill    atorvastatin (LIPITOR) 20 mg tablet Take 1 tablet by mouth daily      Blood Glucose Monitoring Suppl (ONETOUCH VERIO) w/Device KIT USE AS DIRECTED 1 kit 0    Ergocalciferol (VITAMIN D2) 2000 units TABS Take 2,000 mg by mouth      fluticasone (FLONASE) 50 mcg/act nasal spray 1 spray into each nostril daily      gabapentin (NEURONTIN) 300 mg capsule Take 1 capsule (300 mg total) by mouth daily 90 capsule 1    glucose blood (ONETOUCH VERIO) test strip Use as instructed TEST TWO TIMES DAILY 200 each 3    lisinopril (ZESTRIL) 5 mg tablet Take 1 tablet (5 mg total) by mouth daily 90 tablet 1    Multiple Vitamin (MULTIVITAMINS PO) Take by mouth daily      omeprazole (PriLOSEC) 20 mg delayed release capsule Take 1 capsule (20 mg total) by mouth 2 (two) times a day 60 capsule 3    ONETOUCH DELICA LANCETS FINE MISC USE AS DIRECTED THREE TIMES DAILY 100 each 2    tiotropium (SPIRIVA HANDIHALER) 18 mcg inhalation capsule Place 1 capsule (18 mcg total) into inhaler and inhale daily 30 capsule 5    clotrimazole-betamethasone (LOTRISONE) 1-0 05 % cream Apply topically 2 (two) times a day 30 g 0    fluconazole (DIFLUCAN) 150 mg tablet Take 1 tablet (150 mg total) by mouth once for 1 dose 1 tablet 0    metFORMIN (GLUCOPHAGE) 500 mg tablet Take 1 tablet (500 mg total) by mouth 2 (two) times a day with meals 60 tablet 3     No current facility-administered medications for this visit        Current Outpatient Prescriptions on File Prior to Visit   Medication Sig    atorvastatin (LIPITOR) 20 mg tablet Take 1 tablet by mouth daily    Blood Glucose Monitoring Suppl (ONETOUCH VERIO) w/Device KIT USE AS DIRECTED    Ergocalciferol (VITAMIN D2) 2000 units TABS Take 2,000 mg by mouth    gabapentin (NEURONTIN) 300 mg capsule Take 1 capsule (300 mg total) by mouth daily    lisinopril (ZESTRIL) 5 mg tablet Take 1 tablet (5 mg total) by mouth daily    omeprazole (PriLOSEC) 20 mg delayed release capsule Take 1 capsule (20 mg total) by mouth 2 (two) times a day    ONETOUCH DELICA LANCETS FINE MISC USE AS DIRECTED THREE TIMES DAILY    tiotropium (SPIRIVA HANDIHALER) 18 mcg inhalation capsule Place 1 capsule (18 mcg total) into inhaler and inhale daily    [DISCONTINUED] glucose blood (ONETOUCH VERIO) test strip Use as instructed TEST THREE TIMES DAILY    [DISCONTINUED] cetirizine (ZyrTEC) 10 mg tablet Take 10 mg by mouth    [DISCONTINUED] nystatin (MYCOSTATIN) ointment Apply topically 2 (two) times a day (Patient not taking: Reported on 9/11/2018 )     No current facility-administered medications on file prior to visit  She is allergic to other       Review of Systems   Constitutional: Negative for chills and fever  HENT: Negative for congestion, ear pain, hearing loss, postnasal drip, rhinorrhea, sinus pain, sinus pressure, sore throat and trouble swallowing  Eyes: Negative for pain and visual disturbance  Respiratory: Positive for cough  Negative for chest tightness, shortness of breath and wheezing  Cardiovascular: Negative  Negative for chest pain, palpitations and leg swelling  Gastrointestinal: Negative for abdominal pain, blood in stool, constipation, diarrhea, nausea and vomiting  Endocrine: Negative for cold intolerance, heat intolerance, polydipsia, polyphagia and polyuria  Genitourinary: Negative for difficulty urinating, dysuria, flank pain and urgency          Vaginal itch     Musculoskeletal: Negative for arthralgias, back pain, gait problem and myalgias  Skin: Negative for rash  Allergic/Immunologic: Negative  Neurological: Negative for dizziness, weakness, light-headedness and headaches  Hematological: Negative  Psychiatric/Behavioral: Negative for behavioral problems, dysphoric mood and sleep disturbance  The patient is not nervous/anxious  Objective:      /70 (BP Location: Left arm, Patient Position: Sitting, Cuff Size: Large)   Pulse (!) 110   Temp 99 6 °F (37 6 °C)   Resp 16   Ht 5' 3" (1 6 m)   Wt 84 6 kg (186 lb 6 4 oz)   SpO2 94%   BMI 33 02 kg/m²          Physical Exam   Constitutional: She is oriented to person, place, and time  She appears well-developed and well-nourished  No distress  HENT:   Head: Normocephalic and atraumatic  Right Ear: External ear normal    Left Ear: External ear normal    Nose: Nose normal    Mouth/Throat: Oropharynx is clear and moist  No oropharyngeal exudate  Eyes: Conjunctivae and EOM are normal  Pupils are equal, round, and reactive to light  Right eye exhibits no discharge  Left eye exhibits no discharge  No scleral icterus  Neck: Normal range of motion  Neck supple  No thyromegaly present  Cardiovascular: Normal rate, regular rhythm and normal heart sounds  Exam reveals no gallop and no friction rub  Pulses are no weak pulses  No murmur heard  Pulses:       Dorsalis pedis pulses are 2+ on the right side, and 2+ on the left side  Posterior tibial pulses are 2+ on the right side, and 2+ on the left side  Pulmonary/Chest: Effort normal and breath sounds normal  No respiratory distress  She has no wheezes  She has no rales  Abdominal: Soft  Bowel sounds are normal  She exhibits no distension  There is no tenderness  Musculoskeletal: Normal range of motion  She exhibits no edema, tenderness or deformity     Feet:   Right Foot:   Skin Integrity: Negative for ulcer, skin breakdown, erythema, warmth, callus or dry skin  Left Foot:   Skin Integrity: Negative for ulcer, skin breakdown, erythema, warmth, callus or dry skin  Neurological: She is alert and oriented to person, place, and time  No cranial nerve deficit  Skin: Skin is warm and dry  She is not diaphoretic  Psychiatric: She has a normal mood and affect  Her behavior is normal  Judgment and thought content normal        Patient's shoes and socks removed  Right Foot/Ankle   Right Foot Inspection  Skin Exam: skin normal and skin intact no dry skin, no warmth, no callus, no erythema, no maceration, no abnormal color, no pre-ulcer, no ulcer and no callus                          Toe Exam: ROM and strength within normal limits  Sensory   Vibration: intact  Proprioception: intact   Monofilament testing: intact  Vascular  Capillary refills: < 3 seconds  The right DP pulse is 2+  The right PT pulse is 2+  Left Foot/Ankle  Left Foot Inspection  Skin Exam: skin normal and skin intactno dry skin, no warmth, no erythema, no maceration, normal color, no pre-ulcer, no ulcer and no callus                         Toe Exam: ROM and strength within normal limits                   Sensory   Vibration: intact  Proprioception: intact  Monofilament: intact  Vascular  Capillary refills: < 3 seconds  The left DP pulse is 2+  The left PT pulse is 2+  Assign Risk Category:  No deformity present; No loss of protective sensation;  No weak pulses       Risk: 0

## 2018-09-27 DIAGNOSIS — J45.909 UNCOMPLICATED ASTHMA, UNSPECIFIED ASTHMA SEVERITY, UNSPECIFIED WHETHER PERSISTENT: ICD-10-CM

## 2018-09-27 DIAGNOSIS — E13.8 DIABETES MELLITUS OF OTHER TYPE WITH COMPLICATION, UNSPECIFIED WHETHER LONG TERM INSULIN USE: ICD-10-CM

## 2018-10-01 DIAGNOSIS — E13.8 DIABETES MELLITUS OF OTHER TYPE WITH COMPLICATION, UNSPECIFIED WHETHER LONG TERM INSULIN USE: ICD-10-CM

## 2018-10-18 ENCOUNTER — TELEPHONE (OUTPATIENT)
Dept: INTERNAL MEDICINE CLINIC | Facility: CLINIC | Age: 78
End: 2018-10-18

## 2018-10-18 ENCOUNTER — IMMUNIZATION (OUTPATIENT)
Dept: INTERNAL MEDICINE CLINIC | Facility: CLINIC | Age: 78
End: 2018-10-18
Payer: MEDICARE

## 2018-10-18 DIAGNOSIS — E78.5 HYPERLIPIDEMIA, UNSPECIFIED HYPERLIPIDEMIA TYPE: Primary | ICD-10-CM

## 2018-10-18 DIAGNOSIS — Z23 ENCOUNTER FOR IMMUNIZATION: ICD-10-CM

## 2018-10-18 DIAGNOSIS — E13.8 DIABETES MELLITUS OF OTHER TYPE WITH COMPLICATION, UNSPECIFIED WHETHER LONG TERM INSULIN USE: ICD-10-CM

## 2018-10-18 DIAGNOSIS — M79.2 NEUROPATHIC PAIN: ICD-10-CM

## 2018-10-18 DIAGNOSIS — E78.5 HYPERLIPIDEMIA, UNSPECIFIED HYPERLIPIDEMIA TYPE: ICD-10-CM

## 2018-10-18 PROCEDURE — 90662 IIV NO PRSV INCREASED AG IM: CPT

## 2018-10-18 PROCEDURE — G0008 ADMIN INFLUENZA VIRUS VAC: HCPCS

## 2018-10-18 RX ORDER — GABAPENTIN 300 MG/1
300 CAPSULE ORAL DAILY
Qty: 14 CAPSULE | Refills: 0 | Status: SHIPPED | OUTPATIENT
Start: 2018-10-18 | End: 2018-10-18 | Stop reason: SDUPTHER

## 2018-10-18 RX ORDER — GABAPENTIN 300 MG/1
300 CAPSULE ORAL DAILY
Qty: 90 CAPSULE | Refills: 3 | Status: SHIPPED | OUTPATIENT
Start: 2018-10-18 | End: 2019-11-21 | Stop reason: SDUPTHER

## 2018-10-18 RX ORDER — ATORVASTATIN CALCIUM 20 MG/1
20 TABLET, FILM COATED ORAL DAILY
Qty: 14 TABLET | Refills: 0 | Status: SHIPPED | OUTPATIENT
Start: 2018-10-18 | End: 2018-10-18 | Stop reason: SDUPTHER

## 2018-10-18 RX ORDER — ATORVASTATIN CALCIUM 20 MG/1
20 TABLET, FILM COATED ORAL DAILY
Qty: 90 TABLET | Refills: 3 | Status: SHIPPED | OUTPATIENT
Start: 2018-10-18 | End: 2019-09-03 | Stop reason: SDUPTHER

## 2018-10-18 NOTE — TELEPHONE ENCOUNTER
Pt needs refills on gabapentin 300mg,  Atorvastatin 20mg, needs 2 wk supply sent to [email or web address], also needs 90 day sent to mail order, also need 2 wk supply of one touch test strips, tests bid, pls send to [email or web address] also needs 90 day sent to mail order

## 2018-11-29 ENCOUNTER — OFFICE VISIT (OUTPATIENT)
Dept: URGENT CARE | Facility: CLINIC | Age: 78
End: 2018-11-29
Payer: MEDICARE

## 2018-11-29 VITALS
OXYGEN SATURATION: 93 % | HEIGHT: 60 IN | WEIGHT: 179 LBS | HEART RATE: 107 BPM | BODY MASS INDEX: 35.14 KG/M2 | RESPIRATION RATE: 18 BRPM | TEMPERATURE: 99.6 F | DIASTOLIC BLOOD PRESSURE: 80 MMHG | SYSTOLIC BLOOD PRESSURE: 139 MMHG

## 2018-11-29 DIAGNOSIS — J20.9 ACUTE BRONCHITIS, UNSPECIFIED ORGANISM: Primary | ICD-10-CM

## 2018-11-29 PROCEDURE — 99213 OFFICE O/P EST LOW 20 MIN: CPT | Performed by: PHYSICIAN ASSISTANT

## 2018-11-29 PROCEDURE — G0463 HOSPITAL OUTPT CLINIC VISIT: HCPCS | Performed by: PHYSICIAN ASSISTANT

## 2018-11-29 RX ORDER — ALBUTEROL SULFATE 90 UG/1
2 AEROSOL, METERED RESPIRATORY (INHALATION) EVERY 4 HOURS PRN
Qty: 1 INHALER | Refills: 0 | Status: SHIPPED | OUTPATIENT
Start: 2018-11-29 | End: 2018-12-29

## 2018-11-29 RX ORDER — AZITHROMYCIN 250 MG/1
TABLET, FILM COATED ORAL
Qty: 6 TABLET | Refills: 0 | Status: SHIPPED | OUTPATIENT
Start: 2018-11-29 | End: 2018-12-07 | Stop reason: HOSPADM

## 2018-11-29 NOTE — PATIENT INSTRUCTIONS

## 2018-11-29 NOTE — PROGRESS NOTES
[address]        NAME: Karthik Taylor is a 66 y o  female  : 1940    MRN: [id number]  DATE: 2018  TIME: 1:00 PM    Assessment and Plan   Acute bronchitis, unspecified organism [J20 9]  1  Acute bronchitis, unspecified organism  azithromycin (ZITHROMAX) 250 mg tablet    albuterol (PROVENTIL HFA,VENTOLIN HFA) 90 mcg/act inhaler         Patient Instructions       Follow up with PCP in 3-5 days  Proceed to  ER if symptoms worsen  Chief Complaint     Chief Complaint   Patient presents with    Cough     Pt c/o chest congestion and a productive cough x 1 week  History of Present Illness       Patient presents with sore throat chest congestion a productive cough for the past week  She states she is now running a low-grade fever  He does feel slightly winded when walking in the cough is productive of green colored mucus  Review of Systems   Review of Systems   Constitutional: Positive for fever  Negative for chills  HENT: Positive for congestion and sore throat  Negative for ear pain, postnasal drip, rhinorrhea and trouble swallowing  Eyes: Negative for redness  Respiratory: Positive for cough, chest tightness and shortness of breath (On exertion)  Negative for wheezing  Cardiovascular: Positive for chest pain (Burning in chest with coughing only)  Gastrointestinal: Negative for abdominal pain, diarrhea, nausea and vomiting  Musculoskeletal: Negative for myalgias  Skin: Negative for rash  Neurological: Negative for dizziness and headaches  Hematological: Negative for adenopathy           Current Medications       Current Outpatient Prescriptions:     albuterol (PROVENTIL HFA,VENTOLIN HFA) 90 mcg/act inhaler, Inhale 2 puffs every 4 (four) hours as needed for wheezing or shortness of breath for up to 30 days, Disp: 1 Inhaler, Rfl: 0    atorvastatin (LIPITOR) 20 mg tablet, Take 1 tablet (20 mg total) by mouth daily, Disp: 90 tablet, Rfl: 3   azithromycin (ZITHROMAX) 250 mg tablet, Take 2 tablets today then 1 tablet daily x 4 days, Disp: 6 tablet, Rfl: 0    Blood Glucose Monitoring Suppl (Glorine Finger) w/Device KIT, USE AS DIRECTED, Disp: 1 kit, Rfl: 0    clotrimazole-betamethasone (LOTRISONE) 1-0 05 % cream, Apply topically 2 (two) times a day, Disp: 30 g, Rfl: 0    Ergocalciferol (VITAMIN D2) 2000 units TABS, Take 2,000 mg by mouth, Disp: , Rfl:     fluticasone (FLONASE) 50 mcg/act nasal spray, 1 spray into each nostril daily, Disp: , Rfl:     gabapentin (NEURONTIN) 300 mg capsule, Take 1 capsule (300 mg total) by mouth daily, Disp: 90 capsule, Rfl: 3    glucose blood (ONETOUCH VERIO) test strip, Use as instructed TEST TWO TIMES DAILY e11 65, Disp: 300 each, Rfl: 3    lisinopril (ZESTRIL) 5 mg tablet, Take 1 tablet (5 mg total) by mouth daily, Disp: 90 tablet, Rfl: 1    metFORMIN (GLUCOPHAGE) 500 mg tablet, Take 1 tablet (500 mg total) by mouth 2 (two) times a day with meals, Disp: 180 tablet, Rfl: 3    Multiple Vitamin (MULTIVITAMINS PO), Take by mouth daily, Disp: , Rfl:     omeprazole (PriLOSEC) 20 mg delayed release capsule, Take 1 capsule (20 mg total) by mouth 2 (two) times a day, Disp: 60 capsule, Rfl: 3    ONETOUCH DELICA LANCETS FINE MISC, USE AS DIRECTED THREE TIMES DAILY, Disp: 100 each, Rfl: 2    tiotropium (SPIRIVA HANDIHALER) 18 mcg inhalation capsule, Place 1 capsule (18 mcg total) into inhaler and inhale daily, Disp: 3 each, Rfl: 3    Current Allergies     Allergies as of 11/29/2018 - Reviewed 11/29/2018   Allergen Reaction Noted    Other  09/11/2018            The following portions of the patient's history were reviewed and updated as appropriate: allergies, current medications, past family history, past medical history, past social history, past surgical history and problem list      Past Medical History:   Diagnosis Date    Allergic     Asthma     [facility] obstructive [facility]) [facility] [address] [facility]     Diabetes mellitus ([address] )     GERD (gastroesophageal reflux disease)     Hyperlipidemia     Hypertension     Vitamin D deficiency        Past Surgical History:   Procedure Laterality Date    CHOLECYSTECTOMY  1984    EYE SURGERY Bilateral     CATARACT       Family History   Problem Relation Age of Onset    Lung cancer Father     Heart disease Mother          Medications have been verified  Objective   /80   Pulse (!) 107   Temp 99 6 °F (37 6 °C)   Resp 18   Ht 5' (1 524 m)   Wt 81 2 kg (179 lb)   SpO2 93%   BMI 34 96 kg/m²        Physical Exam     Physical Exam   Constitutional: She is oriented to person, place, and time  She appears well-developed and well-nourished  HENT:   Head: Normocephalic and atraumatic  Right Ear: External ear normal    Left Ear: External ear normal    Nose: Nose normal    Mild posterior pharyngeal erythema no exudates airway patent  Eyes: Conjunctivae are normal    Neck: Neck supple  Cardiovascular: Normal rate, regular rhythm and normal heart sounds  Pulmonary/Chest: Effort normal and breath sounds normal    Lymphadenopathy:     She has no cervical adenopathy  Neurological: She is alert and oriented to person, place, and time  Skin: Skin is warm and dry  No rash noted  Psychiatric: She has a normal mood and affect  Her behavior is normal  Judgment and thought content normal    Nursing note and vitals reviewed

## 2018-12-03 ENCOUNTER — APPOINTMENT (INPATIENT)
Dept: NON INVASIVE DIAGNOSTICS | Facility: HOSPITAL | Age: 78
DRG: 871 | End: 2018-12-03
Payer: MEDICARE

## 2018-12-03 ENCOUNTER — HOSPITAL ENCOUNTER (INPATIENT)
Facility: HOSPITAL | Age: 78
LOS: 4 days | Discharge: HOME | DRG: 871 | End: 2018-12-07
Attending: EMERGENCY MEDICINE | Admitting: HOSPITALIST
Payer: MEDICARE

## 2018-12-03 ENCOUNTER — APPOINTMENT (EMERGENCY)
Dept: RADIOLOGY | Facility: HOSPITAL | Age: 78
DRG: 871 | End: 2018-12-03
Payer: MEDICARE

## 2018-12-03 ENCOUNTER — APPOINTMENT (INPATIENT)
Dept: CT IMAGING | Facility: HOSPITAL | Age: 78
DRG: 871 | End: 2018-12-03
Payer: MEDICARE

## 2018-12-03 DIAGNOSIS — I50.9 CONGESTIVE HEART FAILURE (CHF) (HCC): ICD-10-CM

## 2018-12-03 DIAGNOSIS — E11.65 TYPE 2 DIABETES MELLITUS WITH HYPERGLYCEMIA, WITHOUT LONG-TERM CURRENT USE OF INSULIN (HCC): ICD-10-CM

## 2018-12-03 DIAGNOSIS — J44.1 COPD WITH ACUTE EXACERBATION (HCC): Primary | ICD-10-CM

## 2018-12-03 DIAGNOSIS — R77.8 ELEVATED TROPONIN: ICD-10-CM

## 2018-12-03 DIAGNOSIS — J18.9 PNEUMONIA OF RIGHT LOWER LOBE DUE TO INFECTIOUS ORGANISM: ICD-10-CM

## 2018-12-03 DIAGNOSIS — I24.9 ACUTE ISCHEMIC HEART DISEASE (HCC): ICD-10-CM

## 2018-12-03 DIAGNOSIS — J40 BRONCHITIS: ICD-10-CM

## 2018-12-03 PROBLEM — R79.89 ELEVATED D-DIMER: Status: ACTIVE | Noted: 2018-12-03

## 2018-12-03 PROBLEM — J96.01 ACUTE RESPIRATORY FAILURE WITH HYPOXIA (HCC): Status: ACTIVE | Noted: 2018-12-03

## 2018-12-03 PROBLEM — R79.89 ELEVATED TROPONIN: Status: ACTIVE | Noted: 2018-12-03

## 2018-12-03 PROBLEM — A41.9 SEPSIS (HCC): Status: ACTIVE | Noted: 2018-12-03

## 2018-12-03 LAB
ALBUMIN SERPL BCP-MCNC: 3.8 G/DL (ref 3.5–5.7)
ALP SERPL-CCNC: 65 U/L (ref 55–165)
ALT SERPL W P-5'-P-CCNC: 46 U/L (ref 7–52)
ANION GAP SERPL CALCULATED.3IONS-SCNC: 10 MMOL/L (ref 4–13)
APTT PPP: 40 SECONDS (ref 26–38)
AST SERPL W P-5'-P-CCNC: 25 U/L (ref 13–39)
ATRIAL RATE: 105 BPM
BASOPHILS # BLD AUTO: 0.1 THOUSANDS/ΜL (ref 0–0.1)
BASOPHILS NFR BLD AUTO: 1 % (ref 0–2)
BILIRUB SERPL-MCNC: 0.6 MG/DL (ref 0.2–1)
BILIRUB UR QL STRIP: NEGATIVE
BNP SERPL-MCNC: 199 PG/ML (ref 1–100)
BUN SERPL-MCNC: 10 MG/DL (ref 7–25)
CALCIUM SERPL-MCNC: 8.9 MG/DL (ref 8.6–10.5)
CHLORIDE SERPL-SCNC: 98 MMOL/L (ref 98–107)
CLARITY UR: CLEAR
CO2 SERPL-SCNC: 31 MMOL/L (ref 21–31)
COLOR UR: YELLOW
CREAT SERPL-MCNC: 0.7 MG/DL (ref 0.6–1.2)
DEPRECATED D DIMER PPP: 276 NG/ML (FEU)
EOSINOPHIL # BLD AUTO: 0.1 THOUSAND/ΜL (ref 0–0.61)
EOSINOPHIL NFR BLD AUTO: 1 % (ref 0–5)
ERYTHROCYTE [DISTWIDTH] IN BLOOD BY AUTOMATED COUNT: 13.9 % (ref 11.5–14.5)
FLUAV AG SPEC QL IA: NEGATIVE
FLUAV AG SPEC QL: NORMAL
FLUBV AG SPEC QL IA: NEGATIVE
FLUBV AG SPEC QL: NORMAL
GFR SERPL CREATININE-BSD FRML MDRD: 83 ML/MIN/1.73SQ M
GLUCOSE SERPL-MCNC: 191 MG/DL (ref 65–99)
GLUCOSE SERPL-MCNC: 239 MG/DL (ref 65–140)
GLUCOSE SERPL-MCNC: 242 MG/DL (ref 65–140)
GLUCOSE SERPL-MCNC: 289 MG/DL (ref 65–140)
GLUCOSE UR STRIP-MCNC: NEGATIVE MG/DL
HCT VFR BLD AUTO: 37.4 % (ref 34.8–46.1)
HGB BLD-MCNC: 12.3 G/DL (ref 12–16)
HGB UR QL STRIP.AUTO: NEGATIVE
INR PPP: 1.33 (ref 0.9–1.5)
KETONES UR STRIP-MCNC: NEGATIVE MG/DL
LACTATE SERPL-SCNC: 1.2 MMOL/L (ref 0.5–2)
LEUKOCYTE ESTERASE UR QL STRIP: NEGATIVE
LYMPHOCYTES # BLD AUTO: 1.3 THOUSANDS/ΜL (ref 0.6–4.47)
LYMPHOCYTES NFR BLD AUTO: 9 % (ref 21–51)
MCH RBC QN AUTO: 27.8 PG (ref 26–34)
MCHC RBC AUTO-ENTMCNC: 33 G/DL (ref 31–37)
MCV RBC AUTO: 84 FL (ref 81–99)
MONOCYTES # BLD AUTO: 1.2 THOUSAND/ΜL (ref 0.17–1.22)
MONOCYTES NFR BLD AUTO: 8 % (ref 2–12)
NEUTROPHILS # BLD AUTO: 12.2 THOUSANDS/ΜL (ref 1.4–6.5)
NEUTS SEG NFR BLD AUTO: 82 % (ref 42–75)
NITRITE UR QL STRIP: NEGATIVE
NRBC BLD AUTO-RTO: 0 /100 WBCS
P AXIS: 62 DEGREES
PH UR STRIP.AUTO: 5.5 [PH] (ref 5–8)
PLATELET # BLD AUTO: 323 THOUSANDS/UL (ref 149–390)
PMV BLD AUTO: 8.6 FL (ref 8.6–11.7)
POTASSIUM SERPL-SCNC: 3.6 MMOL/L (ref 3.5–5.5)
PR INTERVAL: 142 MS
PROT SERPL-MCNC: 6.9 G/DL (ref 6.4–8.9)
PROT UR STRIP-MCNC: NEGATIVE MG/DL
PROTHROMBIN TIME: 15.5 SECONDS (ref 10.2–13)
QRS AXIS: 10 DEGREES
QRSD INTERVAL: 80 MS
QT INTERVAL: 348 MS
QTC INTERVAL: 459 MS
RBC # BLD AUTO: 4.43 MILLION/UL (ref 3.9–5.2)
RSV B RNA SPEC QL NAA+PROBE: NORMAL
SODIUM SERPL-SCNC: 139 MMOL/L (ref 134–143)
SP GR UR STRIP.AUTO: 1.02 (ref 1–1.03)
T WAVE AXIS: 70 DEGREES
TROPONIN I SERPL-MCNC: 0.06 NG/ML
TROPONIN I SERPL-MCNC: 0.08 NG/ML
TROPONIN I SERPL-MCNC: 0.12 NG/ML
TROPONIN I SERPL-MCNC: 0.15 NG/ML
UROBILINOGEN UR QL STRIP.AUTO: 0.2 E.U./DL
VENTRICULAR RATE: 105 BPM
WBC # BLD AUTO: 14.9 THOUSAND/UL (ref 4.8–10.8)

## 2018-12-03 PROCEDURE — 93306 TTE W/DOPPLER COMPLETE: CPT

## 2018-12-03 PROCEDURE — 36415 COLL VENOUS BLD VENIPUNCTURE: CPT | Performed by: EMERGENCY MEDICINE

## 2018-12-03 PROCEDURE — 85610 PROTHROMBIN TIME: CPT | Performed by: EMERGENCY MEDICINE

## 2018-12-03 PROCEDURE — 99222 1ST HOSP IP/OBS MODERATE 55: CPT | Performed by: NURSE PRACTITIONER

## 2018-12-03 PROCEDURE — 85025 COMPLETE CBC W/AUTO DIFF WBC: CPT | Performed by: EMERGENCY MEDICINE

## 2018-12-03 PROCEDURE — 81003 URINALYSIS AUTO W/O SCOPE: CPT | Performed by: EMERGENCY MEDICINE

## 2018-12-03 PROCEDURE — 82948 REAGENT STRIP/BLOOD GLUCOSE: CPT

## 2018-12-03 PROCEDURE — 94640 AIRWAY INHALATION TREATMENT: CPT

## 2018-12-03 PROCEDURE — 96374 THER/PROPH/DIAG INJ IV PUSH: CPT

## 2018-12-03 PROCEDURE — 87040 BLOOD CULTURE FOR BACTERIA: CPT | Performed by: EMERGENCY MEDICINE

## 2018-12-03 PROCEDURE — 83880 ASSAY OF NATRIURETIC PEPTIDE: CPT | Performed by: EMERGENCY MEDICINE

## 2018-12-03 PROCEDURE — 94664 DEMO&/EVAL PT USE INHALER: CPT

## 2018-12-03 PROCEDURE — 85379 FIBRIN DEGRADATION QUANT: CPT | Performed by: EMERGENCY MEDICINE

## 2018-12-03 PROCEDURE — 94760 N-INVAS EAR/PLS OXIMETRY 1: CPT

## 2018-12-03 PROCEDURE — 93306 TTE W/DOPPLER COMPLETE: CPT | Performed by: INTERNAL MEDICINE

## 2018-12-03 PROCEDURE — 96375 TX/PRO/DX INJ NEW DRUG ADDON: CPT

## 2018-12-03 PROCEDURE — 87631 RESP VIRUS 3-5 TARGETS: CPT | Performed by: EMERGENCY MEDICINE

## 2018-12-03 PROCEDURE — 80053 COMPREHEN METABOLIC PANEL: CPT | Performed by: EMERGENCY MEDICINE

## 2018-12-03 PROCEDURE — 96365 THER/PROPH/DIAG IV INF INIT: CPT

## 2018-12-03 PROCEDURE — 87070 CULTURE OTHR SPECIMN AEROBIC: CPT | Performed by: NURSE PRACTITIONER

## 2018-12-03 PROCEDURE — 1124F ACP DISCUSS-NO DSCNMKR DOCD: CPT | Performed by: INTERNAL MEDICINE

## 2018-12-03 PROCEDURE — 93005 ELECTROCARDIOGRAM TRACING: CPT

## 2018-12-03 PROCEDURE — 87205 SMEAR GRAM STAIN: CPT | Performed by: NURSE PRACTITIONER

## 2018-12-03 PROCEDURE — 71045 X-RAY EXAM CHEST 1 VIEW: CPT

## 2018-12-03 PROCEDURE — 85730 THROMBOPLASTIN TIME PARTIAL: CPT | Performed by: EMERGENCY MEDICINE

## 2018-12-03 PROCEDURE — 99285 EMERGENCY DEPT VISIT HI MDM: CPT

## 2018-12-03 PROCEDURE — 71275 CT ANGIOGRAPHY CHEST: CPT

## 2018-12-03 PROCEDURE — 84484 ASSAY OF TROPONIN QUANT: CPT | Performed by: EMERGENCY MEDICINE

## 2018-12-03 PROCEDURE — 94644 CONT INHLJ TX 1ST HOUR: CPT

## 2018-12-03 PROCEDURE — 93010 ELECTROCARDIOGRAM REPORT: CPT | Performed by: INTERNAL MEDICINE

## 2018-12-03 PROCEDURE — 84484 ASSAY OF TROPONIN QUANT: CPT | Performed by: NURSE PRACTITIONER

## 2018-12-03 PROCEDURE — 83605 ASSAY OF LACTIC ACID: CPT | Performed by: EMERGENCY MEDICINE

## 2018-12-03 RX ORDER — LEVOFLOXACIN 5 MG/ML
750 INJECTION, SOLUTION INTRAVENOUS EVERY 24 HOURS
Status: DISCONTINUED | OUTPATIENT
Start: 2018-12-04 | End: 2018-12-05

## 2018-12-03 RX ORDER — LEVOFLOXACIN 5 MG/ML
750 INJECTION, SOLUTION INTRAVENOUS ONCE
Status: COMPLETED | OUTPATIENT
Start: 2018-12-03 | End: 2018-12-03

## 2018-12-03 RX ORDER — METHYLPREDNISOLONE SODIUM SUCCINATE 40 MG/ML
40 INJECTION, POWDER, LYOPHILIZED, FOR SOLUTION INTRAMUSCULAR; INTRAVENOUS EVERY 12 HOURS SCHEDULED
Status: DISCONTINUED | OUTPATIENT
Start: 2018-12-04 | End: 2018-12-04

## 2018-12-03 RX ORDER — GUAIFENESIN 600 MG
600 TABLET, EXTENDED RELEASE 12 HR ORAL 2 TIMES DAILY
Status: DISCONTINUED | OUTPATIENT
Start: 2018-12-03 | End: 2018-12-07 | Stop reason: HOSPADM

## 2018-12-03 RX ORDER — FLUTICASONE PROPIONATE 50 MCG
1 SPRAY, SUSPENSION (ML) NASAL DAILY
Status: DISCONTINUED | OUTPATIENT
Start: 2018-12-03 | End: 2018-12-07 | Stop reason: HOSPADM

## 2018-12-03 RX ORDER — ATORVASTATIN CALCIUM 20 MG/1
20 TABLET, FILM COATED ORAL DAILY
Status: DISCONTINUED | OUTPATIENT
Start: 2018-12-03 | End: 2018-12-07 | Stop reason: HOSPADM

## 2018-12-03 RX ORDER — ALBUTEROL SULFATE 2.5 MG/3ML
2.5 SOLUTION RESPIRATORY (INHALATION) EVERY 4 HOURS PRN
Status: DISCONTINUED | OUTPATIENT
Start: 2018-12-03 | End: 2018-12-07 | Stop reason: HOSPADM

## 2018-12-03 RX ORDER — METHYLPREDNISOLONE SODIUM SUCCINATE 125 MG/2ML
125 INJECTION, POWDER, LYOPHILIZED, FOR SOLUTION INTRAMUSCULAR; INTRAVENOUS ONCE
Status: COMPLETED | OUTPATIENT
Start: 2018-12-03 | End: 2018-12-03

## 2018-12-03 RX ORDER — GABAPENTIN 300 MG/1
300 CAPSULE ORAL DAILY
Status: DISCONTINUED | OUTPATIENT
Start: 2018-12-03 | End: 2018-12-07 | Stop reason: HOSPADM

## 2018-12-03 RX ORDER — ASPIRIN 81 MG/1
81 TABLET, CHEWABLE ORAL DAILY
Status: DISCONTINUED | OUTPATIENT
Start: 2018-12-04 | End: 2018-12-07 | Stop reason: HOSPADM

## 2018-12-03 RX ORDER — FUROSEMIDE 10 MG/ML
20 INJECTION INTRAMUSCULAR; INTRAVENOUS ONCE
Status: COMPLETED | OUTPATIENT
Start: 2018-12-03 | End: 2018-12-03

## 2018-12-03 RX ORDER — LISINOPRIL 5 MG/1
5 TABLET ORAL DAILY
Status: DISCONTINUED | OUTPATIENT
Start: 2018-12-03 | End: 2018-12-07 | Stop reason: HOSPADM

## 2018-12-03 RX ORDER — FUROSEMIDE 20 MG/1
20 TABLET ORAL DAILY
Status: DISCONTINUED | OUTPATIENT
Start: 2018-12-04 | End: 2018-12-07 | Stop reason: HOSPADM

## 2018-12-03 RX ORDER — METHYLPREDNISOLONE SODIUM SUCCINATE 40 MG/ML
40 INJECTION, POWDER, LYOPHILIZED, FOR SOLUTION INTRAMUSCULAR; INTRAVENOUS EVERY 8 HOURS SCHEDULED
Status: COMPLETED | OUTPATIENT
Start: 2018-12-03 | End: 2018-12-04

## 2018-12-03 RX ORDER — ONDANSETRON 2 MG/ML
4 INJECTION INTRAMUSCULAR; INTRAVENOUS EVERY 4 HOURS PRN
Status: DISCONTINUED | OUTPATIENT
Start: 2018-12-03 | End: 2018-12-07 | Stop reason: HOSPADM

## 2018-12-03 RX ORDER — IPRATROPIUM BROMIDE AND ALBUTEROL SULFATE 2.5; .5 MG/3ML; MG/3ML
3 SOLUTION RESPIRATORY (INHALATION)
Status: DISCONTINUED | OUTPATIENT
Start: 2018-12-03 | End: 2018-12-07 | Stop reason: HOSPADM

## 2018-12-03 RX ORDER — SODIUM CHLORIDE 9 MG/ML
75 INJECTION, SOLUTION INTRAVENOUS CONTINUOUS
Status: DISCONTINUED | OUTPATIENT
Start: 2018-12-03 | End: 2018-12-03

## 2018-12-03 RX ORDER — ASPIRIN 81 MG/1
162 TABLET, CHEWABLE ORAL ONCE
Status: COMPLETED | OUTPATIENT
Start: 2018-12-03 | End: 2018-12-03

## 2018-12-03 RX ORDER — ACETAMINOPHEN 325 MG/1
650 TABLET ORAL 4 TIMES DAILY PRN
Status: DISCONTINUED | OUTPATIENT
Start: 2018-12-03 | End: 2018-12-07 | Stop reason: HOSPADM

## 2018-12-03 RX ORDER — CLOTRIMAZOLE AND BETAMETHASONE DIPROPIONATE 10; .64 MG/G; MG/G
CREAM TOPICAL 2 TIMES DAILY
Status: DISCONTINUED | OUTPATIENT
Start: 2018-12-03 | End: 2018-12-07 | Stop reason: HOSPADM

## 2018-12-03 RX ORDER — PANTOPRAZOLE SODIUM 40 MG/1
40 TABLET, DELAYED RELEASE ORAL
Status: DISCONTINUED | OUTPATIENT
Start: 2018-12-03 | End: 2018-12-07 | Stop reason: HOSPADM

## 2018-12-03 RX ADMIN — IPRATROPIUM BROMIDE AND ALBUTEROL SULFATE 3 ML: 2.5; .5 SOLUTION RESPIRATORY (INHALATION) at 19:44

## 2018-12-03 RX ADMIN — METHYLPREDNISOLONE SODIUM SUCCINATE 125 MG: 125 INJECTION, POWDER, FOR SOLUTION INTRAMUSCULAR; INTRAVENOUS at 08:46

## 2018-12-03 RX ADMIN — FUROSEMIDE 20 MG: 10 INJECTION, SOLUTION INTRAVENOUS at 09:51

## 2018-12-03 RX ADMIN — LEVOFLOXACIN 750 MG: 5 INJECTION, SOLUTION INTRAVENOUS at 08:45

## 2018-12-03 RX ADMIN — ASPIRIN 81 MG 162 MG: 81 TABLET ORAL at 09:28

## 2018-12-03 RX ADMIN — GABAPENTIN 300 MG: 300 CAPSULE ORAL at 13:14

## 2018-12-03 RX ADMIN — GUAIFENESIN 600 MG: 600 TABLET, EXTENDED RELEASE ORAL at 13:14

## 2018-12-03 RX ADMIN — ALBUTEROL SULFATE 10 MG: 2.5 SOLUTION RESPIRATORY (INHALATION) at 08:16

## 2018-12-03 RX ADMIN — SODIUM CHLORIDE 1000 ML: 0.9 INJECTION, SOLUTION INTRAVENOUS at 08:33

## 2018-12-03 RX ADMIN — GUAIFENESIN 600 MG: 600 TABLET, EXTENDED RELEASE ORAL at 17:58

## 2018-12-03 RX ADMIN — ATORVASTATIN CALCIUM 20 MG: 20 TABLET, FILM COATED ORAL at 13:14

## 2018-12-03 RX ADMIN — METHYLPREDNISOLONE SODIUM SUCCINATE 40 MG: 40 INJECTION, POWDER, FOR SOLUTION INTRAMUSCULAR; INTRAVENOUS at 21:55

## 2018-12-03 RX ADMIN — MULTIPLE VITAMINS W/ MINERALS TAB 1 TABLET: TAB at 13:14

## 2018-12-03 RX ADMIN — IOHEXOL 85 ML: 350 INJECTION, SOLUTION INTRAVENOUS at 14:53

## 2018-12-03 RX ADMIN — IPRATROPIUM BROMIDE AND ALBUTEROL SULFATE 3 ML: 2.5; .5 SOLUTION RESPIRATORY (INHALATION) at 14:24

## 2018-12-03 RX ADMIN — INSULIN LISPRO 2 UNITS: 100 INJECTION, SOLUTION INTRAVENOUS; SUBCUTANEOUS at 21:55

## 2018-12-03 RX ADMIN — METHYLPREDNISOLONE SODIUM SUCCINATE 40 MG: 40 INJECTION, POWDER, FOR SOLUTION INTRAMUSCULAR; INTRAVENOUS at 13:16

## 2018-12-03 RX ADMIN — ENOXAPARIN SODIUM 40 MG: 40 INJECTION SUBCUTANEOUS at 13:15

## 2018-12-03 RX ADMIN — PANTOPRAZOLE SODIUM 40 MG: 40 TABLET, DELAYED RELEASE ORAL at 13:14

## 2018-12-03 RX ADMIN — LISINOPRIL 5 MG: 5 TABLET ORAL at 13:14

## 2018-12-03 NOTE — H&P
H&P- Calli Partida 1940, 66 y o  female MRN: [id number]    Unit/Bed#: -02 Encounter: [id number]    Primary Care Provider: Amol Sorto PA-C   Date and time admitted to hospital: 12/3/2018  7:58 AM        * COPD with acute exacerbation ([facility] [address] )   Assessment & Plan    · Will admit patient to med-surg tele  · Patient states that she has no prior formal diagnosis of COPD but patient has h/o smoking 1 PPD for more than 50 years  · Will treat as COPD exacerbation suspected to be secondary to bacteria bronchitis   · Influenza negative   · Will continue with Levaquin, nebulizer, solu-medrol   · Oxygen supplement to keep pulse ox greater than 92%     Sepsis ([facility] )   Assessment & Plan    · Patient meets criteria for sepsis with tachycardia, tachypnea and leukocytosis likely secondary to acute bronchitis  · Will continue treatment stated above  · Pending blood cultures   · Influenza is negative      Elevated troponin   Assessment & Plan    · Likely NSTEMI type 2 secondary to acute infection  · Asymptomatic   · Will trend troponin   · Consult cardiology   · Check ECHO   · Continue with statin  Will add ASA  Elevated d-dimer   Assessment & Plan    · Patient with remote h/o DVT  · Low-risk   · Will check CTA chest for completeness   · lovenox for DVT prophylaxis      Acute respiratory failure with hypoxia (HCC)   Assessment & Plan    · This is secondary to acute bronchitis and COPD exacerbation  · Patient was found to have pulse ox in low 80s% on RA on admission   · Will continue with treatment stated above  · Continue with oxygen supplement and titrate off as able  Keep pulse ox greater than 92%  Bronchitis   Assessment & Plan    · Suspected bacterial bronchitis   · Will continue treatment stated above  Essential hypertension   Assessment & Plan    · Continue with lisinopril        Type 2 diabetes mellitus with hyperglycemia, without long-term current use of insulin [facility])   Assessment & Plan    Lab Mental Status     Sleepy but easily arousable and moving all extremities    Safety     Fall Risk    Mobility    Uses Assistive Devices:  Wheelchair    Protocols  None      ISAR          Isolation  Contact   Results   Component Value Date    HGBA1C 7 6% 09/11/2018       Recent Labs      12/03/18   1102   POCGLU  239*       Blood Sugar Average: Last 72 hrs:  (P) 239   · Will hold off metformin  · Use insulin sliding scale while in hospital        VTE Prophylaxis: Enoxaparin (Lovenox)  Code Status: Full code   POLST: There is no POLST form on file for this patient (pre-hospital)  Discussion with family:daughter     Anticipated Length of Stay:  Patient will be admitted on an Inpatient basis with an anticipated length of stay of  > 2 midnights  Justification for Hospital Stay:  COPD exacerbation     Total Time for Visit, including Counseling / Coordination of Care: 30 minutes  Greater than 50% of this total time spent on direct patient counseling and coordination of care  Chief Complaint:   Shortness of breath and coughing     History of Present Illness:    Sandra Olvera is a 66 y o  female who presents with shortness of breath with productive cough that has been progressively worse over the past week  Patient went to an urgent care last week and was given Z-alma delia  However, patient has not felt better despite the antibiotic and inhaler  Today, she states that the shortness of breath has progressive worse with increase wheezing at home; hence, presented to ED for further evaluation  She complains of chills  No chest pain or palpitation  Patient has a h/o of smoking 1 PPD for the 50+years  She quite smoking 4 years ago  She does not use O2 at home  No recent sick contact or travelling  Review of Systems:  Review of Systems   Respiratory: Positive for cough (productive cough- greenish ), shortness of breath and wheezing  Negative for chest tightness  Cardiovascular: Negative for chest pain, palpitations and leg swelling  All other systems reviewed and are negative        Past Medical and Surgical History:   Past Medical History:   Diagnosis Date    Allergic     COPD [facility]chronic obstructive pulmonary disease) ([facility] [address] )  Diabetes mellitus ([facility] [address] )     GERD (gastroesophageal reflux disease)     Hyperlipidemia     Hypertension     Vitamin D deficiency        Past Surgical History:   Procedure Laterality Date    CHOLECYSTECTOMY  1984    EYE SURGERY Bilateral     CATARACT       Meds/Allergies:  Prior to Admission medications    Medication Sig Start Date End Date Taking?  Authorizing Provider   albuterol (PROVENTIL HFA,VENTOLIN HFA) 90 mcg/act inhaler Inhale 2 puffs every 4 (four) hours as needed for wheezing or shortness of breath for up to 30 days 11/29/18 12/29/18 Yes Destini Amezquita PA-C   atorvastatin (LIPITOR) 20 mg tablet Take 1 tablet (20 mg total) by mouth daily 10/18/18  Yes Wei Kay PA-C   azithromycin (ZITHROMAX) 250 mg tablet Take 2 tablets today then 1 tablet daily x 4 days 11/29/18 12/3/18 Yes Destini Amezquita PA-C   Blood Glucose Monitoring Suppl (ONETOUCH VERIO) w/Device KIT USE AS DIRECTED 4/27/18  Yes Deborah Tian PA-C   clotrimazole-betamethasone (LOTRISONE) 1-0 05 % cream Apply topically 2 (two) times a day 9/11/18  Yes Mary Kay Oliveira PA-C   Ergocalciferol (VITAMIN D2) 2000 units TABS Take 2,000 mg by mouth   Yes Historical Provider, MD   fluticasone (FLONASE) 50 mcg/act nasal spray 1 spray into each nostril daily   Yes Historical Provider, MD   gabapentin (NEURONTIN) 300 mg capsule Take 1 capsule (300 mg total) by mouth daily 10/18/18  Yes Wei Kay PA-C   glucose blood (ONETOUCH VERIO) test strip Use as instructed TEST TWO TIMES DAILY e11 65 10/18/18  Yes Wei Kay PA-C   lisinopril (ZESTRIL) 5 mg tablet Take 1 tablet (5 mg total) by mouth daily 5/4/18  Yes Deborah Morris PA-C   metFORMIN (GLUCOPHAGE) 500 mg tablet Take 1 tablet (500 mg total) by mouth 2 (two) times a day with meals 9/27/18  Yes Mary Kay Oliveira PA-C   Multiple Vitamin (MULTIVITAMINS PO) Take by mouth daily   Yes Historical Provider, MD   omeprazole (PriLOSEC) 20 mg delayed release capsule Take 1 capsule (20 mg total) by mouth 2 (two) times a day 6/28/18  Yes Deborah Morris PA-C   ONETOUCH DELICA LANCETS FINE MISC USE AS DIRECTED THREE TIMES DAILY 4/27/18  Yes Hiro Garcia PA-C   tiotropium (SPIRIVA HANDIHALER) 18 mcg inhalation capsule Place 1 capsule (18 mcg total) into inhaler and inhale daily 9/27/18  Yes Mary Kay Oliveira PA-C     I have reviewed home medications with patient personally  Allergies: Allergies   Allergen Reactions    Other      FLOWERS/GRASS       Social History:  Marital Status:    Occupation: retired   Patient Pre-hospital Living Situation: family   Patient Pre-hospital Level of Mobility: independence   Patient Pre-hospital Diet Restrictions: diabetic   Substance Use History:     History   Alcohol Use No     History   Smoking Status    Former Smoker    Years: 50 00    Types: Cigarettes   Smokeless Tobacco    Never Used     History   Drug Use No       Family History:  I have reviewed the patients family history    Physical Exam:   Vitals:   Blood Pressure: 121/69 (12/03/18 1059)  Pulse: (!) 108 (12/03/18 1059)  Temperature: 98 9 °F (37 2 °C) (12/03/18 1059)  Temp Source: Tympanic (12/03/18 1059)  Respirations: (!) 25 (12/03/18 1059)  Height: 5' 1" (154 9 cm) (12/03/18 1059)  Weight - Scale: 80 3 kg (177 lb) (12/03/18 1059)  SpO2: 90 % (12/03/18 1059)    Physical Exam   Constitutional: She is oriented to person, place, and time  She appears well-developed and well-nourished  No distress  HENT:   Head: Normocephalic and atraumatic  Mouth/Throat: Oropharynx is clear and moist    Eyes: Pupils are equal, round, and reactive to light  Conjunctivae and EOM are normal    Neck: Normal range of motion  Neck supple  Cardiovascular: Normal rate, regular rhythm and normal heart sounds  Exam reveals no gallop and no friction rub  No murmur heard  Pulmonary/Chest: Effort normal  She has wheezes (mild scattered expiratory wheezing in upper lobes)  She has no rales     Very decreased breath sounds throughout     Abdominal: Soft  Bowel sounds are normal  She exhibits no distension  There is no tenderness  There is no rebound  Musculoskeletal: Normal range of motion  She exhibits no edema, tenderness or deformity  Neurological: She is alert and oriented to person, place, and time  No cranial nerve deficit  Skin: Skin is warm and dry  No rash noted  No erythema  Psychiatric: She has a normal mood and affect  Her behavior is normal  Thought content normal    Vitals reviewed  Additional Data:   Lab Results: I have personally reviewed pertinent reports  Results from last 7 days  Lab Units 12/03/18  0827   WBC Thousand/uL 14 90*   HEMOGLOBIN g/dL 12 3   HEMATOCRIT % 37 4   PLATELETS Thousands/uL 323   NEUTROS PCT % 82*   LYMPHS PCT % 9*   MONOS PCT % 8   EOS PCT % 1       Results from last 7 days  Lab Units 12/03/18  0826   POTASSIUM mmol/L 3 6   CHLORIDE mmol/L 98   CO2 mmol/L 31   BUN mg/dL 10   CREATININE mg/dL 0 70   CALCIUM mg/dL 8 9   ALK PHOS U/L 65   ALT U/L 46   AST U/L 25       Results from last 7 days  Lab Units 12/03/18  0826   INR  1 33       Results from last 7 days  Lab Units 12/03/18  1102   POC GLUCOSE mg/dl 239*           Imaging: I have personally reviewed pertinent reports  XR chest 1 view portable   Final Result by Interface, Radiology Results In (12/03 1316)   Dramatically  Bibasilar vascular pulmonary edema  Evolving   fluid overload  Consider short-term reevaluation            Signed by Bea Benoit MD      CTA chest pe study    (Results Pending)       EKG, Pathology, and Other Studies Reviewed on Admission:   · EKG: sinus tachycardia  with nonspecific ST and T wave abnormality  [facility] Records Reviewed: Yes     ** Please Note: This note has been constructed using a voice recognition system   **

## 2018-12-03 NOTE — ED NOTES
Per Dr Amy Varma hold IV fluids at this time do to CXR reading        Suzanne Bowens RN  12/03/18 [id number]

## 2018-12-03 NOTE — ASSESSMENT & PLAN NOTE
· Will admit patient to med-surg tele  · Patient states that she has no prior formal diagnosis of COPD but patient has h/o smoking 1 PPD for more than 50 years  · Will treat as COPD exacerbation suspected to be secondary to bacteria bronchitis   · Influenza negative   · Will continue with Levaquin, nebulizer, solu-medrol   · Oxygen supplement to keep pulse ox greater than 92%

## 2018-12-03 NOTE — ASSESSMENT & PLAN NOTE
· Patient with remote h/o DVT  · Low-risk   · Will check CTA chest for completeness   · lovenox for DVT prophylaxis

## 2018-12-03 NOTE — ASSESSMENT & PLAN NOTE
Lab Results   Component Value Date    HGBA1C 7 6% 09/11/2018       Recent Labs      12/03/18   1102   POCGLU  239*       Blood Sugar Average: Last 72 hrs:  (P) 239   · Will hold off metformin  · Use insulin sliding scale while in hospital

## 2018-12-03 NOTE — ASSESSMENT & PLAN NOTE
· Likely NSTEMI type 2 secondary to acute infection  · Asymptomatic   · Will trend troponin   · Consult cardiology   · Check ECHO   · Continue with statin  Will add ASA

## 2018-12-03 NOTE — ASSESSMENT & PLAN NOTE
· This is secondary to acute bronchitis and COPD exacerbation  · Patient was found to have pulse ox in low 80s% on RA on admission   · Will continue with treatment stated above  · Continue with oxygen supplement and titrate off as able  Keep pulse ox greater than 92%

## 2018-12-03 NOTE — ASSESSMENT & PLAN NOTE
· Patient meets criteria for sepsis with tachycardia, tachypnea and leukocytosis likely secondary to acute bronchitis  · Will continue treatment stated above  · Pending blood cultures   · Influenza is negative

## 2018-12-03 NOTE — ED PROVIDER NOTES
History  Chief Complaint   Patient presents with    Cough     Patient presented to urgent care last weak for cough now is not improving      [de-identified] year female with complaints of cough productive greenish sputum x1 week with nasal congestion, now with shortness of breath and wheeze the past several days  No sore throat  No fever chills  No chest pain  Patient was placed on a Z-[place] without change  Patient is using her inhalers without relief  No abdominal pain, nausea vomiting or diarrhea        History provided by:  Patient  Cough   Cough characteristics:  Productive  Severity:  Moderate  Duration:  1 week  Timing:  Constant  Progression:  Worsening  Relieved by:  Nothing  Worsened by:  Nothing  Associated symptoms: rhinorrhea, shortness of breath and wheezing    Associated symptoms: no chest pain, no chills, no diaphoresis, no fever, no headaches, no myalgias, no rash and no sore throat        Prior to Admission Medications   Prescriptions Last Dose Informant Patient Reported? Taking?    Blood Glucose Monitoring Suppl Reyna Dominguez) w/Device KIT 12/3/2018 at Unknown time  No Yes   Sig: USE AS DIRECTED   Ergocalciferol (VITAMIN D2) 2000 units TABS 12/2/2018 at Unknown time  Yes Yes   Sig: Take 2,000 mg by mouth   Multiple Vitamin (MULTIVITAMINS PO) 12/2/2018 at Unknown time  Yes Yes   Sig: Take by mouth daily   ONETOUCH DELICA LANCETS FINE MISC 12/2/2018 at Unknown time  No Yes   Sig: USE AS DIRECTED THREE TIMES DAILY   albuterol (PROVENTIL HFA,VENTOLIN HFA) 90 mcg/act inhaler 12/3/2018 at Unknown time  No Yes   Sig: Inhale 2 puffs every 4 (four) hours as needed for wheezing or shortness of breath for up to 30 days   atorvastatin (LIPITOR) 20 mg tablet 12/2/2018 at Unknown time  No Yes   Sig: Take 1 tablet (20 mg total) by mouth daily   azithromycin (ZITHROMAX) 250 mg tablet 12/3/2018 at Unknown time  No Yes   Sig: Take 2 tablets today then 1 tablet daily x 4 days   clotrimazole-betamethasone (LOTRISONE) 1-0 05 % cream 2018 at Unknown time  No Yes   Sig: Apply topically 2 (two) times a day   fluticasone (FLONASE) 50 mcg/act nasal spray 2018 at Unknown time  Yes Yes   Si spray into each nostril daily   gabapentin (NEURONTIN) 300 mg capsule 2018 at Unknown time  No Yes   Sig: Take 1 capsule (300 mg total) by mouth daily   glucose blood (ONETOUCH VERIO) test strip 2018 at Unknown time  No Yes   Sig: Use as instructed TEST TWO TIMES DAILY e11 65   lisinopril (ZESTRIL) 5 mg tablet 2018 at Unknown time  No Yes   Sig: Take 1 tablet (5 mg total) by mouth daily   metFORMIN (GLUCOPHAGE) 500 mg tablet 2018 at Unknown time  No Yes   Sig: Take 1 tablet (500 mg total) by mouth 2 (two) times a day with meals   omeprazole (PriLOSEC) 20 mg delayed release capsule 2018 at Unknown time  No Yes   Sig: Take 1 capsule (20 mg total) by mouth 2 (two) times a day   tiotropium (SPIRIVA HANDIHALER) 18 mcg inhalation capsule 2018 at Unknown time  No Yes   Sig: Place 1 capsule (18 mcg total) into inhaler and inhale daily      Facility-Administered Medications: None       Past Medical History:   Diagnosis Date    Allergic     [facility]     Diabetes mellitus ([facility] )     GERD (gastroesophageal reflux disease)     Hyperlipidemia     Hypertension     Vitamin D deficiency        Past Surgical History:   Procedure Laterality Date    CHOLECYSTECTOMY  1984    EYE SURGERY Bilateral     CATARACT       Family History   Problem Relation Age of Onset    Lung cancer Father     Heart disease Mother      I have reviewed and agree with the history as documented  Social History   Substance Use Topics    Smoking status: Former Smoker    Smokeless tobacco: Never Used    Alcohol use No        Review of Systems   Constitutional: Negative for chills, diaphoresis and fever  HENT: Positive for rhinorrhea  Negative for sore throat      Eyes: Negative for visual disturbance  Respiratory: Positive for cough, shortness of breath and wheezing  Cardiovascular: Negative for chest pain and leg swelling  Gastrointestinal: Negative for abdominal pain, diarrhea, nausea and vomiting  Genitourinary: Negative for dysuria  Musculoskeletal: Negative for back pain and myalgias  Skin: Negative for rash  Neurological: Negative for dizziness and headaches  Psychiatric/Behavioral: Negative for confusion  All other systems reviewed and are negative  Physical Exam  Physical Exam   Constitutional: She is oriented to person, place, and time  She appears well-developed and well-nourished  HENT:   Nose: Nose normal    Mouth/Throat: Oropharynx is clear and moist  No oropharyngeal exudate  Eyes: Pupils are equal, round, and reactive to light  Conjunctivae and EOM are normal  No scleral icterus  Neck: Normal range of motion  Neck supple  No JVD present  No tracheal deviation present  Cardiovascular: Regular rhythm and normal heart sounds  No murmur heard  Tachycardic   Pulmonary/Chest: She is in respiratory distress (Mild tachypnea with some accessory muscles but move significant distress)  She has wheezes (Positive expiratory wheeze bilaterally with decreased breath sounds)  She has rales (Fine rales right base)  Abdominal: Soft  Bowel sounds are normal  There is no tenderness  There is no guarding  Musculoskeletal: Normal range of motion  She exhibits no edema or tenderness  Neurological: She is alert and oriented to person, place, and time  No cranial nerve deficit or sensory deficit  She exhibits normal muscle tone  5/5 motor, nl sens   Skin: Skin is warm and dry  Psychiatric: She has a normal mood and affect  Her behavior is normal    Nursing note and vitals reviewed        Vital Signs  ED Triage Vitals [12/03/18 0758]   Temperature Pulse Respirations Blood Pressure SpO2   97 8 °F (36 6 °C) (!) 116 22 158/71 (!) 82 %      Temp Source Heart Rate Source Patient Position - Orthostatic VS BP Location FiO2 (%)   Temporal Monitor Sitting Left arm --      Pain Score       No Pain           Vitals:    12/03/18 0915 12/03/18 0930 12/03/18 0945 12/03/18 1000   BP:  127/76  138/56   Pulse: (!) 111 (!) 116 (!) 117 105   Patient Position - Orthostatic VS:           Visual Acuity      ED Medications  Medications   albuterol inhalation solution 10 mg (10 mg Nebulization Given 12/3/18 0816)   methylPREDNISolone sodium succinate (Solu-MEDROL) injection 125 mg (125 mg Intravenous Given 12/3/18 0846)   levofloxacin (LEVAQUIN) IVPB (premix) 750 mg (750 mg Intravenous New Bag 12/3/18 0845)   sodium chloride 0 9 % bolus 1,000 mL (0 mL Intravenous Stopped 12/3/18 0910)   aspirin chewable tablet 162 mg (162 mg Oral Given 12/3/18 0928)   furosemide (LASIX) injection 20 mg (20 mg Intravenous Given 12/3/18 0951)       Diagnostic Studies  Results Reviewed     Procedure Component Value Units Date/Time    D-Dimer [[id number]]  (Abnormal) Collected:  12/03/18 0953    Lab Status:  Final result Specimen:  Blood from Arm, Left Updated:  12/03/18 1011     D-Dimer, Quant 276 (H) ng/ml (FEU)     B-Type Natriuretic Peptide [facility] ONLY) [[id number]]  (Abnormal) Collected:  12/03/18 0840    Lab Status:  Final result Specimen:  Blood from Arm, Left Updated:  12/03/18 0937      (H) pg/mL     Troponin I [[id number]]  (Abnormal) Collected:  12/03/18 0840    Lab Status:  Final result Specimen:  Blood from Arm, Left Updated:  12/03/18 0911     Troponin I 0 15 (H) ng/mL     Rapid Influenza Screen with Reflex PCR [[id number]]  (Normal) Collected:  12/03/18 0836    Lab Status:  Final result Specimen:  Nasopharyngeal from Nasopharyngeal Swab Updated:  12/03/18 0905     Rapid Influenza A Ag Negative     Rapid Influenza B Ag Negative    Influenza A/B and RSV by PCR [[id number]] Collected:  12/03/18 0836    Lab Status:   In process Specimen:  Nasopharyngeal from Nasopharyngeal Swab Updated:  12/03/18 4434    Lactic Acid x2 [[id number]]  (Normal) Collected:  12/03/18 0827    Lab Status:  Final result Specimen:  Blood from Arm, Right Updated:  12/03/18 0902     LACTIC ACID 1 2 mmol/L     Narrative:         Result may be elevated if tourniquet was used during collection  Protime-INR [[id number]]  (Abnormal) Collected:  12/03/18 0826    Lab Status:  Final result Specimen:  Blood from Arm, Right Updated:  12/03/18 0856     Protime 15 5 (H) seconds      INR 1 33    APTT [[id number]]  (Abnormal) Collected:  12/03/18 0826    Lab Status:  Final result Specimen:  Blood from Arm, Right Updated:  12/03/18 0856     PTT 40 (H) seconds     Comprehensive metabolic panel [[id number]]  (Abnormal) Collected:  12/03/18 0826    Lab Status:  Final result Specimen:  Blood from Arm, Right Updated:  12/03/18 0853     Sodium 139 mmol/L      Potassium 3 6 mmol/L      Chloride 98 mmol/L      CO2 31 mmol/L      ANION GAP 10 mmol/L      BUN 10 mg/dL      Creatinine 0 70 mg/dL      Glucose 191 (H) mg/dL      Calcium 8 9 mg/dL      AST 25 U/L      ALT 46 U/L      Alkaline Phosphatase 65 U/L      Total Protein 6 9 g/dL      Albumin 3 8 g/dL      Total Bilirubin 0 60 mg/dL      eGFR 83 ml/min/1 73sq m     Narrative:         National Kidney Disease Education Program recommendations are as follows:  GFR calculation is accurate only with a steady state creatinine  Chronic Kidney disease less than 60 ml/min/1 73 sq  meters  Kidney failure less than 15 ml/min/1 73 sq  meters      CBC and differential [[id number]]  (Abnormal) Collected:  12/03/18 0827    Lab Status:  Final result Specimen:  Blood from Arm, Right Updated:  12/03/18 0845     WBC 14 90 (H) Thousand/uL      RBC 4 43 Million/uL      Hemoglobin 12 3 g/dL      Hematocrit 37 4 %      MCV 84 fL      MCH 27 8 pg      MCHC 33 0 g/dL      RDW 13 9 %      MPV 8 6 fL      Platelets 283 Thousands/uL      nRBC 0 /100 WBCs      Neutrophils Relative 82 (H) %      Lymphocytes Relative 9 (L) % Monocytes Relative 8 %      Eosinophils Relative 1 %      Basophils Relative 1 %      Neutrophils Absolute 12 20 (H) Thousands/µL      Lymphocytes Absolute 1 30 Thousands/µL      Monocytes Absolute 1 20 Thousand/µL      Eosinophils Absolute 0 10 Thousand/µL      Basophils Absolute 0 10 Thousands/µL     Blood culture #1 [[id number]] Collected:  12/03/18 0839    Lab Status: In process Specimen:  Blood from Arm, Left Updated:  12/03/18 0844    Blood culture #2 [[id number]] Collected:  12/03/18 0827    Lab Status: In process Specimen:  Blood from Arm, Right Updated:  12/03/18 0837    Lactic Acid x2 [[id number]]     Lab Status:  No result Specimen:  Blood     UA w Reflex to Microscopic w Reflex to Culture [[id number]]     Lab Status:  No result Specimen:  Urine                  XR chest 1 view portable   Final Result by Interface, Radiology Results In (12/03 3290)   Dramatically  Bibasilar vascular pulmonary edema  Evolving   fluid overload  Consider short-term reevaluation            Signed by Kellie Watters MD                 Procedures  Procedures       Phone Contacts  ED Phone Contact    ED Course  ED Course as of Dec 03 1041   Mon Dec 03, 2018   6342 EKG ST 105bpm, nl QRS, NSSTTW changes    0941 Pt feels better after nebulizer Txs  CXR read as pumonary edema,  - low dose lasix given  Troponin elevated at 0 15 - ASA given  Pt denies CP except with cough    0943 D-dimer added -     0954 D/w Dr Debbi Garcia - accepts to tele inpatient      10:40am - D-dimer 276 - Pt upstairs in room    D/w Dr Yaya Membreno who will follow-up                            MDM  CritCare Time    Disposition  Final diagnoses:   COPD with acute exacerbation ([facility] )   Bronchitis   Elevated troponin   Congestive heart failure (CHF) ([facility] )     Time reflects when diagnosis was documented in both MDM as applicable and the Disposition within this note     Time User Action Codes Description Comment    12/3/2018  9:43 AM Charisse Moment A Add [J44 1] COPD with acute exacerbation ([facility] )     12/3/2018  9:43 AM Alannah Stable A Add [J40] Bronchitis     12/3/2018  9:43 AM Alannah Stable A Add [R74 8] Elevated troponin     12/3/2018  9:43 AM Alannah Stable A Add [I50 9] Congestive heart failure (CHF) [facility])       ED Disposition     ED Disposition Condition Comment    Admit  Case was discussed with Dr Lan Carrington and the patient's admission status was agreed to be Admission Status: inpatient status to the service of Dr Lan Carrington           Follow-up Information    None         Current Discharge Medication List      CONTINUE these medications which have NOT CHANGED    Details   albuterol (PROVENTIL HFA,VENTOLIN HFA) 90 mcg/act inhaler Inhale 2 puffs every 4 (four) hours as needed for wheezing or shortness of breath for up to 30 days  Qty: 1 Inhaler, Refills: 0    Associated Diagnoses: Acute bronchitis, unspecified organism      atorvastatin (LIPITOR) 20 mg tablet Take 1 tablet (20 mg total) by mouth daily  Qty: 90 tablet, Refills: 3    Associated Diagnoses: Hyperlipidemia, unspecified hyperlipidemia type      azithromycin (ZITHROMAX) 250 mg tablet Take 2 tablets today then 1 tablet daily x 4 days  Qty: 6 tablet, Refills: 0    Associated Diagnoses: Acute bronchitis, unspecified organism      Blood Glucose Monitoring Suppl (ONETOUCH VERIO) w/Device KIT USE AS DIRECTED  Qty: 1 kit, Refills: 0    Associated Diagnoses: Diabetes mellitus of other type with complication, unspecified whether long term insulin use (HCC)      clotrimazole-betamethasone (LOTRISONE) 1-0 05 % cream Apply topically 2 (two) times a day  Qty: 30 g, Refills: 0    Associated Diagnoses: Intertrigo      Ergocalciferol (VITAMIN D2) 2000 units TABS Take 2,000 mg by mouth      fluticasone (FLONASE) 50 mcg/act nasal spray 1 spray into each nostril daily      gabapentin (NEURONTIN) 300 mg capsule Take 1 capsule (300 mg total) by mouth daily  Qty: 90 capsule, Refills: 3    Associated Diagnoses: Neuropathic pain      glucose blood (ONETOUCH VERIO) test strip Use as instructed TEST TWO TIMES DAILY e11 65  Qty: 300 each, Refills: 3    Associated Diagnoses: Diabetes mellitus of other type with complication, unspecified whether long term insulin use (HCC)      lisinopril (ZESTRIL) 5 mg tablet Take 1 tablet (5 mg total) by mouth daily  Qty: 90 tablet, Refills: 1    Associated Diagnoses: Hypertension, unspecified type      metFORMIN (GLUCOPHAGE) 500 mg tablet Take 1 tablet (500 mg total) by mouth 2 (two) times a day with meals  Qty: 180 tablet, Refills: 3    Associated Diagnoses: Diabetes mellitus of other type with complication, unspecified whether long term insulin use (HCC)      Multiple Vitamin (MULTIVITAMINS PO) Take by mouth daily      omeprazole (PriLOSEC) 20 mg delayed release capsule Take 1 capsule (20 mg total) by mouth 2 (two) times a day  Qty: 60 capsule, Refills: 3    Associated Diagnoses: Gastroesophageal reflux disease without esophagitis      ONETOUCH DELICA LANCETS FINE MISC USE AS DIRECTED THREE TIMES DAILY  Qty: 100 each, Refills: 2    Associated Diagnoses: Diabetes mellitus of other type with complication, unspecified whether long term insulin use (HCC)      tiotropium (SPIRIVA HANDIHALER) 18 mcg inhalation capsule Place 1 capsule (18 mcg total) into inhaler and inhale daily  Qty: 3 each, Refills: 3    Associated Diagnoses: Uncomplicated asthma, unspecified asthma severity, unspecified whether persistent           No discharge procedures on file      ED Provider  Electronically Signed by           Maddy Salgado MD  12/03/18 9465

## 2018-12-04 PROBLEM — J18.9 PNEUMONIA OF RIGHT LOWER LOBE DUE TO INFECTIOUS ORGANISM: Status: ACTIVE | Noted: 2018-12-04

## 2018-12-04 LAB
ALBUMIN SERPL BCP-MCNC: 3.5 G/DL (ref 3.5–5.7)
ALP SERPL-CCNC: 58 U/L (ref 55–165)
ALT SERPL W P-5'-P-CCNC: 57 U/L (ref 7–52)
ANION GAP SERPL CALCULATED.3IONS-SCNC: 8 MMOL/L (ref 4–13)
AST SERPL W P-5'-P-CCNC: 32 U/L (ref 13–39)
ATRIAL RATE: 102 BPM
BILIRUB SERPL-MCNC: 0.4 MG/DL (ref 0.2–1)
BUN SERPL-MCNC: 14 MG/DL (ref 7–25)
CALCIUM SERPL-MCNC: 8.3 MG/DL (ref 8.6–10.5)
CHLORIDE SERPL-SCNC: 99 MMOL/L (ref 98–107)
CHOLEST SERPL-MCNC: 117 MG/DL (ref 0–200)
CO2 SERPL-SCNC: 32 MMOL/L (ref 21–31)
CREAT SERPL-MCNC: 0.71 MG/DL (ref 0.6–1.2)
ERYTHROCYTE [DISTWIDTH] IN BLOOD BY AUTOMATED COUNT: 14.1 % (ref 11.5–14.5)
GFR SERPL CREATININE-BSD FRML MDRD: 82 ML/MIN/1.73SQ M
GLUCOSE SERPL-MCNC: 203 MG/DL (ref 65–140)
GLUCOSE SERPL-MCNC: 215 MG/DL (ref 65–140)
GLUCOSE SERPL-MCNC: 242 MG/DL (ref 65–99)
GLUCOSE SERPL-MCNC: 275 MG/DL (ref 65–140)
GLUCOSE SERPL-MCNC: 278 MG/DL (ref 65–140)
HCT VFR BLD AUTO: 33.5 % (ref 34.8–46.1)
HDLC SERPL-MCNC: 39 MG/DL (ref 40–60)
HGB BLD-MCNC: 11.1 G/DL (ref 12–16)
LDLC SERPL CALC-MCNC: 60 MG/DL (ref 75–193)
MCH RBC QN AUTO: 28.2 PG (ref 26–34)
MCHC RBC AUTO-ENTMCNC: 33.1 G/DL (ref 31–37)
MCV RBC AUTO: 85 FL (ref 81–99)
P AXIS: 77 DEGREES
PLATELET # BLD AUTO: 278 THOUSANDS/UL (ref 149–390)
PMV BLD AUTO: 8.9 FL (ref 8.6–11.7)
POTASSIUM SERPL-SCNC: 3.7 MMOL/L (ref 3.5–5.5)
PROT SERPL-MCNC: 6.6 G/DL (ref 6.4–8.9)
QRS AXIS: 46 DEGREES
QRSD INTERVAL: 78 MS
QT INTERVAL: 354 MS
QTC INTERVAL: 461 MS
RBC # BLD AUTO: 3.94 MILLION/UL (ref 3.9–5.2)
SODIUM SERPL-SCNC: 139 MMOL/L (ref 134–143)
T WAVE AXIS: 51 DEGREES
TRIGL SERPL-MCNC: 91 MG/DL (ref 44–166)
VENTRICULAR RATE: 102 BPM
WBC # BLD AUTO: 13.4 THOUSAND/UL (ref 4.8–10.8)

## 2018-12-04 PROCEDURE — 94760 N-INVAS EAR/PLS OXIMETRY 1: CPT

## 2018-12-04 PROCEDURE — 80053 COMPREHEN METABOLIC PANEL: CPT | Performed by: NURSE PRACTITIONER

## 2018-12-04 PROCEDURE — 99221 1ST HOSP IP/OBS SF/LOW 40: CPT | Performed by: INTERNAL MEDICINE

## 2018-12-04 PROCEDURE — 80061 LIPID PANEL: CPT | Performed by: NURSE PRACTITIONER

## 2018-12-04 PROCEDURE — 93005 ELECTROCARDIOGRAM TRACING: CPT

## 2018-12-04 PROCEDURE — 82948 REAGENT STRIP/BLOOD GLUCOSE: CPT

## 2018-12-04 PROCEDURE — 99232 SBSQ HOSP IP/OBS MODERATE 35: CPT | Performed by: HOSPITALIST

## 2018-12-04 PROCEDURE — 85027 COMPLETE CBC AUTOMATED: CPT | Performed by: NURSE PRACTITIONER

## 2018-12-04 PROCEDURE — 93010 ELECTROCARDIOGRAM REPORT: CPT | Performed by: INTERNAL MEDICINE

## 2018-12-04 PROCEDURE — 94640 AIRWAY INHALATION TREATMENT: CPT

## 2018-12-04 PROCEDURE — 94664 DEMO&/EVAL PT USE INHALER: CPT

## 2018-12-04 RX ORDER — METHYLPREDNISOLONE SODIUM SUCCINATE 40 MG/ML
40 INJECTION, POWDER, LYOPHILIZED, FOR SOLUTION INTRAMUSCULAR; INTRAVENOUS EVERY 8 HOURS SCHEDULED
Status: DISCONTINUED | OUTPATIENT
Start: 2018-12-04 | End: 2018-12-05

## 2018-12-04 RX ADMIN — IPRATROPIUM BROMIDE AND ALBUTEROL SULFATE 3 ML: 2.5; .5 SOLUTION RESPIRATORY (INHALATION) at 14:31

## 2018-12-04 RX ADMIN — LEVOFLOXACIN 750 MG: 5 INJECTION, SOLUTION INTRAVENOUS at 09:11

## 2018-12-04 RX ADMIN — ASPIRIN 81 MG 81 MG: 81 TABLET ORAL at 09:11

## 2018-12-04 RX ADMIN — METHYLPREDNISOLONE SODIUM SUCCINATE 40 MG: 40 INJECTION, POWDER, FOR SOLUTION INTRAMUSCULAR; INTRAVENOUS at 21:12

## 2018-12-04 RX ADMIN — INSULIN LISPRO 2 UNITS: 100 INJECTION, SOLUTION INTRAVENOUS; SUBCUTANEOUS at 21:12

## 2018-12-04 RX ADMIN — GABAPENTIN 300 MG: 300 CAPSULE ORAL at 09:11

## 2018-12-04 RX ADMIN — GUAIFENESIN 600 MG: 600 TABLET, EXTENDED RELEASE ORAL at 18:05

## 2018-12-04 RX ADMIN — LISINOPRIL 5 MG: 5 TABLET ORAL at 09:11

## 2018-12-04 RX ADMIN — INSULIN LISPRO 4 UNITS: 100 INJECTION, SOLUTION INTRAVENOUS; SUBCUTANEOUS at 12:15

## 2018-12-04 RX ADMIN — ENOXAPARIN SODIUM 40 MG: 40 INJECTION SUBCUTANEOUS at 09:10

## 2018-12-04 RX ADMIN — IPRATROPIUM BROMIDE AND ALBUTEROL SULFATE 3 ML: 2.5; .5 SOLUTION RESPIRATORY (INHALATION) at 07:19

## 2018-12-04 RX ADMIN — FLUTICASONE PROPIONATE 1 SPRAY: 50 SPRAY, METERED NASAL at 09:13

## 2018-12-04 RX ADMIN — INSULIN LISPRO 2 UNITS: 100 INJECTION, SOLUTION INTRAVENOUS; SUBCUTANEOUS at 09:10

## 2018-12-04 RX ADMIN — GUAIFENESIN 600 MG: 600 TABLET, EXTENDED RELEASE ORAL at 09:11

## 2018-12-04 RX ADMIN — INSULIN LISPRO 2 UNITS: 100 INJECTION, SOLUTION INTRAVENOUS; SUBCUTANEOUS at 17:07

## 2018-12-04 RX ADMIN — IPRATROPIUM BROMIDE AND ALBUTEROL SULFATE 3 ML: 2.5; .5 SOLUTION RESPIRATORY (INHALATION) at 20:08

## 2018-12-04 RX ADMIN — PANTOPRAZOLE SODIUM 40 MG: 40 TABLET, DELAYED RELEASE ORAL at 05:33

## 2018-12-04 RX ADMIN — METHYLPREDNISOLONE SODIUM SUCCINATE 40 MG: 40 INJECTION, POWDER, FOR SOLUTION INTRAMUSCULAR; INTRAVENOUS at 14:22

## 2018-12-04 RX ADMIN — MULTIPLE VITAMINS W/ MINERALS TAB 1 TABLET: TAB at 09:11

## 2018-12-04 RX ADMIN — METHYLPREDNISOLONE SODIUM SUCCINATE 40 MG: 40 INJECTION, POWDER, FOR SOLUTION INTRAMUSCULAR; INTRAVENOUS at 05:33

## 2018-12-04 RX ADMIN — ATORVASTATIN CALCIUM 20 MG: 20 TABLET, FILM COATED ORAL at 09:11

## 2018-12-04 RX ADMIN — FUROSEMIDE 20 MG: 20 TABLET ORAL at 09:11

## 2018-12-04 NOTE — UTILIZATION REVIEW
Initial Clinical Review    Admission: Date/Time/Statement: 12/3/18 @ 0956     Orders Placed This Encounter   Procedures    Inpatient Admission (expected length of stay for this patient is greater than two midnights)     Standing Status:   Standing     Number of Occurrences:   1     Order Specific Question:   Admitting Physician     Answer:   Miguel Drummond [[id number]]     Order Specific Question:   Level of Care     Answer:   Med Surg [[id number]]     Order Specific Question:   Bed request comments     Answer:   tele     Order Specific Question:   Estimated length of stay     Answer:   More than 2 Midnights     Order Specific Question:   Certification     Answer:   I certify that inpatient services are medically necessary for this patient for a duration of greater than two midnights  See H&P and MD Progress Notes for additional information about the patient's course of treatment  ED: Date/Time/Mode of Arrival:   ED Arrival Information     Expected Arrival Acuity Means of Arrival Escorted By Service Admission Type    - 12/3/2018 07:54 Emergent Wheelchair Family Member General Medicine Emergency    Arrival Complaint    shortness of breath          Chief Complaint:   Chief Complaint   Patient presents with    Cough     Patient presented to urgent care last weak for cough now is not improving        History of Illness:  66 y o  female who presents with shortness of breath with productive cough that has been progressively worse over the past week  Patient went to an urgent care last week and was given Z-alma delia  However, patient has not felt better despite the antibiotic and inhaler  Today, she states that the shortness of breath has progressive worse with increase wheezing at home; hence, presented to ED for further evaluation        ED Vital Signs:   ED Triage Vitals [12/03/18 0758]   Temperature Pulse Respirations Blood Pressure SpO2   97 8 °F (36 6 °C) (!) 116 22 158/71 (!) 82 %      Temp Source Heart Rate Source Patient Position - Orthostatic VS BP Location FiO2 (%)   Temporal Monitor Sitting Left arm --      Pain Score       No Pain        Wt Readings from Last 1 Encounters:   12/04/18 81 1 kg (178 lb 12 7 oz)       Vital Signs (abnormal):   Date and Time Temp Pulse SpO2 Resp BP   12/03/18 1000 -- 105 91 %  28 138/56   12/03/18 0945 --  117 90 % 22 --   12/03/18 0930 --  116 93 % 22 127/76   12/03/18 0915 --  111 95 %  33 --   12/03/18 0900 --  106 93 %  23 145/76   12/03/18 0845 -- 103 95 %  29 --   12/03/18 0830 -- 104 93 %  28 131/60   12/03/18 0815 --  107 93 %  34 128/84   12/03/18 0800 --  112 91 %  43 --         Abnormal Labs/Diagnostic Test Results: D-Dimer 276, , Troponin 0 15, Glucose 191, WBC 14 90, Neutro 82%    CXR: Bibasilar vascular pulmonary edema   Evolving fluid overload       EKG: Sinus Tachycardia, Nonspecific ST and T wave abnormality     ED Treatment:   Medication Administration from 12/03/2018 0754 to 12/03/2018 1020       Date/Time Order Dose Route Action Action by Comments     12/03/2018 0816 albuterol inhalation solution 10 mg 10 mg Nebulization Given Quyen Kenney, RT      12/03/2018 0846 methylPREDNISolone sodium succinate (Solu-MEDROL) injection 125 mg 125 mg Intravenous Given Aislinn Escalante RN      12/03/2018 0845 levofloxacin (LEVAQUIN) IVPB (premix) 750 mg 750 mg Intravenous [address] Aislinn Escalante RN      12/03/2018 6695 sodium chloride 0 9 % bolus 1,000 mL 1,000 mL Intravenous Barbara Escalante RN      12/03/2018 0520 aspirin chewable tablet 162 mg 162 mg Oral Given Aislinn Escalante RN      12/03/2018 0951 furosemide (LASIX) injection 20 mg 20 mg Intravenous Given Meryle Mass McArdle, RN           Past Medical/Surgical History:   Diagnosis    Allergic    [email or web address]    Diabetes mellitus ([email or web address]    GERD (gastroesophageal reflux disease)    Hyperlipidemia    Hypertension    Vitamin D deficiency       Admitting Diagnosis: Cough [R05]  Bronchitis [J40]  Elevated troponin [R74 8]  COPD with acute exacerbation (HCC) [J44 1]  Congestive heart failure (CHF) (HCC) [I50 9]    Age/Sex: 66 y o  female    Assessment/Plan:     * COPD with acute exacerbation (HCC)   Assessment & Plan     · Will admit patient to med-surg tele  · Patient states that she has no prior formal diagnosis of COPD but patient has h/o smoking 1 PPD for more than 50 years  · Will treat as COPD exacerbation suspected to be secondary to bacteria bronchitis   ? Influenza negative   · Will continue with Levaquin, nebulizer, solu-medrol   · Oxygen supplement to keep pulse ox greater than 92%      Sepsis ([facility] )   Assessment & Plan     · Patient meets criteria for sepsis with tachycardia, tachypnea and leukocytosis likely secondary to acute bronchitis  · Will continue treatment stated above  · Pending blood cultures   · Influenza is negative       Elevated troponin   Assessment & Plan     · Likely NSTEMI type 2 secondary to acute infection  · Asymptomatic   · Will trend troponin   · Consult cardiology   · Check ECHO   · Continue with statin  Will add ASA        Elevated d-dimer   Assessment & Plan     · Patient with remote h/o DVT  · Low-risk   · Will check CTA chest for completeness   · lovenox for DVT prophylaxis       Acute respiratory failure with hypoxia (HCC)   Assessment & Plan     · This is secondary to acute bronchitis and COPD exacerbation  · Patient was found to have pulse ox in low 80s% on RA on admission   · Will continue with treatment stated above  · Continue with oxygen supplement and titrate off as able   Keep pulse ox greater than 92%        Bronchitis   Assessment & Plan     · Suspected bacterial bronchitis   · Will continue treatment stated above        Essential hypertension   Assessment & Plan     · Continue with lisinopril        Type 2 diabetes mellitus with hyperglycemia, without long-term current use of insulin (HCC)   Assessment & Plan             Lab Results   Component Value Date     HGBA1C 7 6% 09/11/2018             Recent Labs      12/03/18   1102   POCGLU  239*         Blood Sugar Average: Last 72 hrs:  (P) 239   · Will hold off metformin  · Use insulin sliding scale while in hospital          VTE Prophylaxis: Enoxaparin (Lovenox)  Code Status: Full code   POLST: There is no POLST form on file for this patient (pre-hospital)  Discussion with family:daughter      Anticipated Length of Stay:  Patient will be admitted on an Inpatient basis with an anticipated length of stay of  > 2 midnights     Justification for Hospital Stay:  COPD exacerbation        Admission Orders:  Inpatient/Tele  Continuous Cardiac Monitoring  Serial Cardiac Enzymes q3h x 3  Consult Cardiology r/e elevated troponin  Echocardiogram  Myocardial Perfusion Stress Test   Bilateral Sequential Compression Device  Blood Cultures Pending  Nasal O2 @ 3L  SSI    Scheduled Meds:   Current Facility-Administered Medications:  aspirin 81 mg Oral Daily   atorvastatin 20 mg Oral Daily   clotrimazole-betamethasone  Topical BID   enoxaparin 40 mg Subcutaneous Daily   fluticasone 1 spray Nasal Daily   furosemide 20 mg Oral Daily   gabapentin 300 mg Oral Daily   guaiFENesin 600 mg Oral BID   insulin lispro 1-5 Units Subcutaneous HS   insulin lispro 1-6 Units Subcutaneous TID AC   ipratropium-albuterol 3 mL Nebulization TID   levofloxacin 750 mg Intravenous Q24H   lisinopril 5 mg Oral Daily   methylPREDNISolone sodium succinate 40 mg Intravenous Q12H [address]   multivitamin-minerals 1 tablet Oral Daily   pantoprazole 40 mg Oral Early Morning     PRN Meds:   acetaminophen    albuterol    ondansetron

## 2018-12-04 NOTE — ASSESSMENT & PLAN NOTE
Lab Results   Component Value Date    HGBA1C 7 6% 09/11/2018       Recent Labs      12/03/18   1556  12/03/18 2027  12/04/18   0611  12/04/18   1112   POCGLU  242*  289*  215*  275*       Blood Sugar Average: Last 72 hrs:  (P) 252   · Will hold off metformin  · Use insulin sliding scale while in hospital

## 2018-12-04 NOTE — ASSESSMENT & PLAN NOTE
· Patient meets criteria for sepsis with tachycardia, tachypnea and leukocytosis likely secondary to acute bronchitis  · Will continue treatment stated above  · Pending blood cultures   · Influenza is negative   · White blood cell count has improved however can not entirely exclude a component of steroid induced leukocytosis now  · Monitor daily CBCs

## 2018-12-04 NOTE — CONSULTS
Consult- Calli Partida 1940, 66 y o  female MRN: [id number]    Unit/Bed#: -02 Encounter: [id number]    Primary Care Provider: Amol Sorto PA-C   Date and time admitted to hospital: 12/3/2018  7:58 AM      Inpatient consult to Cardiology  Consult performed by: Chani Rand ordered by: Jonathon John        Elevated troponin   Assessment & Plan    0 15, 0 12, 0 08, 0 06 - mild elevation and trending down  Likely due to relative hypoxia  Pulse ox was 82% on room air upon presentation to the ED  No acute ischemic changes on EKG  Essential hypertension   Assessment & Plan    Adequately controlled  Hyperlipidemia, unspecified   Assessment & Plan    Continue statin therapy  Other summary comments:   Elevated troponin unlikely representative of ACS  Most likely due to relative hypoxia secondary to respiratory infection  Still, there may be underlying CAD and outpatient assessment when pulmonary status is improved will be arranged  HPI: Calli Partida is a 66y o  year old female who presented with shortness of breath and productive cough for 1 week  She has a history of hypertension, hyperlipidemia, and diabetes type 2  No known cardiac history  She reports that this all started the weekend after Thanksgiving  Last Thursday, 11/29, She was prescribed azithromycin and an inhaler at an urgent care, but has not had any improvement  Her dyspnea has gotten progressively worse with increased wheezing, prompting her to come to the hospital  She meets sepsis criteria on the basis of leukocytosis, tachypnea, and tachycardia  Currently she states that the nebulizer treatments that she has been receiving here are helping and she feels that her cough is improving and she is wheezing less  She denies chest pain, palpitations, edema, orthopnea, near syncope, and syncope  She quit smoking 4-5 years ago, but has a history of 1ppd for >50 years      EKG:   Sinus tachycardia, nonspecific ST changes  MOST  RECENT CARDIAC IMAGING:   Echo from yesterday:  Normal LV systolic function, EF 49-01%  Mild LVH  Mild TR  Chest x-ray:  Bibasilar vascular pulmonary edema  Review of Systems: a 10 point review of systems was conducted and is negative except for as mentioned in the HPI or as below  Historical Information   Past Medical History:   Diagnosis Date    Allergic     COPD (chronic obstructive pulmonary disease) ([address] )     Diabetes mellitus (HCC)     GERD (gastroesophageal reflux disease)     Hyperlipidemia     Hypertension     Vitamin D deficiency      Past Surgical History:   Procedure Laterality Date    CHOLECYSTECTOMY      EYE SURGERY Bilateral     CATARACT     History   Alcohol Use No     History   Drug Use No     History   Smoking Status    Former Smoker    Years: 50 00    Types: Cigarettes   Smokeless Tobacco    Never Used       Family History:   No longer relevant      Meds/Allergies   all current active meds have been reviewed  Prescriptions Prior to Admission   Medication    albuterol (PROVENTIL HFA,VENTOLIN HFA) 90 mcg/act inhaler    atorvastatin (LIPITOR) 20 mg tablet    [] azithromycin (ZITHROMAX) 250 mg tablet    Blood Glucose Monitoring Suppl (ONETOUCH VERIO) w/Device KIT    clotrimazole-betamethasone (LOTRISONE) 1-0 05 % cream    Ergocalciferol (VITAMIN D2) 2000 units TABS    fluticasone (FLONASE) 50 mcg/act nasal spray    gabapentin (NEURONTIN) 300 mg capsule    glucose blood (ONETOUCH VERIO) test strip    lisinopril (ZESTRIL) 5 mg tablet    metFORMIN (GLUCOPHAGE) 500 mg tablet    Multiple Vitamin (MULTIVITAMINS PO)    omeprazole (PriLOSEC) 20 mg delayed release capsule    [facility] DELICA LANCETS FINE MISC    tiotropium (SPIRIVA HANDIHALER) 18 mcg inhalation capsule       Allergies   Allergen Reactions    Other      FLOWERS/GRASS       Objective   Vitals: Blood pressure (!) 109/45, pulse 84, temperature 98 °F (36 7 °C), temperature source Tympanic, resp  rate 18, height 5' 1" (1 549 m), weight 81 1 kg (178 lb 12 7 oz), SpO2 92 %  , Body mass index is 33 78 kg/m² ,   Orthostatic Blood Pressures      Most Recent Value   Blood Pressure   109/45 filed at 12/04/2018 9190   Patient Position - Orthostatic VS  Lying filed at 12/04/2018 9813          Systolic (91NDY), HCH:472 , Min:108 , BVE:213     Diastolic (32UNV), SLZ:76, Min:45, Max:76    Physical Exam:    General:  Normal appearance in no distress  Eyes:  Anicteric  Oral mucosa:  Moist   Neck:  No JVD  Carotid upstrokes are brisk without bruits  No masses  Chest:  Diffuse expiratory wheezes in all lung fields  Cardiac:  Normal PMI  Normal S1 and S2  No murmur gallop or rub  Abdomen:  Soft and nontender  No palpable organomegaly or aortic enlargement  Extremities:  No peripheral edema  Musculoskeletal:  Symmetric  Vascular:  Femoral pulses are brisk without bruits  Popliteal  pulses are intact bilaterally  Pedal pulses are intact  Neuro:  Grossly symmetric  Psych:  Alert and oriented x3          Lab Results:     Troponins:     Results from last 7 days  Lab Units 12/03/18  2034 12/03/18  1726 12/03/18  1320   TROPONIN I ng/mL 0 06* 0 08* 0 12*     BNP:     Results from last 6 Months  Lab Units 12/03/18  0840   BNP pg/mL 199*       CBC :     Results from last 7 days  Lab Units 12/04/18  0438 12/03/18  0827   WBC Thousand/uL 13 40* 14 90*   HEMOGLOBIN g/dL 11 1* 12 3   HEMATOCRIT % 33 5* 37 4   MCV fL 85 84   PLATELETS Thousands/uL 278 323     TSH:     CMP:     Results from last 7 days  Lab Units 12/04/18  0439 12/03/18  0826   POTASSIUM mmol/L 3 7 3 6   CHLORIDE mmol/L 99 98   CO2 mmol/L 32* 31   BUN mg/dL 14 10   CREATININE mg/dL 0 71 0 70   AST U/L 32 25   ALT U/L 57* 46   EGFR ml/min/1 73sq m 82 83     Lipid Profile:     Results from last 7 days  Lab Units 12/04/18  0439   TRIGLYCERIDES mg/dL 91   HDL mg/dL 39*     Coags:     Results from last 7 days  Lab Units 12/03/18  0826   INR  1 33

## 2018-12-04 NOTE — ASSESSMENT & PLAN NOTE
· This was confirmed in the CTA of the chest  · Initial concerns included the possibility of an acute bacterial bronchitis  · Continue Levaquin at 750 mg IV day 2  · Patient will need to complete a total of a 7 to ten-day course of antibiotics  · Will follow up on the blood and sputum cultures

## 2018-12-04 NOTE — PROGRESS NOTES
Progress Note - Jame Morrison 1940, 66 y o  female MRN: [id number]    Unit/Bed#: -02 Encounter: [id number]    Primary Care Provider: Dereje Eric PA-C   Date and time admitted to hospital: 12/3/2018  7:58 AM        Acute respiratory failure with hypoxia ([address] )   Assessment & Plan    · Has significantly improved  · This is secondary to an acute COPD exacerbation caused by a a right lower lobe community-acquired pneumonia  · Continue IV Solu-Medrol at 40 mg IV t i d   · Continue IV Levaquin at 750 mg daily  · Follow-up on blood cultures and sputum cultures     * COPD with [facility] exacerbation [facility] )   Assessment & Plan    · Once again patient does not carry a formal diagnosis of COPD as outlined in the H&P  · Patient has a greater than 50 pack year smoking history  · Continue IV steroids for now  · Will need to establish care with Pulmonary as an outpatient for formal pulmonary function testing     Elevated d-dimer   Assessment & Plan    · CTA of the chest PE protocol was completed yesterday  · PE was ruled out  · Pneumonia was ruled in  · COPD changes were also noted  · This is the final report:No evidence of pulmonary embolism  COPD changes  Right lower lobe peribronchial thickening with patchy infiltrate  Recurrent low-grade would need to be considered in this age group       Sepsis ([facility] )   Assessment & Plan    · Patient meets criteria for sepsis with tachycardia, tachypnea and leukocytosis likely secondary to acute bronchitis  · Will continue treatment stated above  · Pending blood cultures   · Influenza is negative   · White blood cell count has improved however can not entirely exclude a component of steroid induced leukocytosis now  · Monitor daily CBCs     Elevated troponin   Assessment & Plan    · Patient denies any chest pain  · Troponins are equivocal  2D echocardiogram was performed this morning with the following result:  Systolic function was normal  Ejection fraction was estimated in the range of 50 % to 70 %  Although no diagnostic regional wall motion abnormality was identified, this possibility cannot be completely excluded on the basis of this study  Wall thickness was mildly increased  There was mild concentric hypertrophy  Doppler parameters were consistent with abnormal left ventricular relaxation (grade 1 diastolic dysfunction)  · Continue current Cardiology based medications which include aspirin, lisinopril and Lipitor  ·  Patient will need an eventual cardiac stress test  · Patient is status post a Cardiology evaluation  · Further management as per them     Essential hypertension   Assessment & Plan    · Continue with lisinopril  Hyperlipidemia, unspecified   Assessment & Plan    · Continue statin     Type 2 diabetes mellitus with hyperglycemia, without long-term current use of insulin [facility])   Assessment & Plan    Lab Results   Component Value Date    HGBA1C 7 6% 09/11/2018       Recent Labs      12/03/18   1556  12/03/18   2027  12/04/18   0611  12/04/18   1112   POCGLU  242*  289*  215*  275*       Blood Sugar Average: Last 72 hrs:  (P) 252   · Will hold off metformin  · Use insulin sliding scale while in hospital      Pneumonia of right lower lobe due to infectious organism [facility])   Assessment & Plan    · This was confirmed in the CTA of the chest  · Initial concerns included the possibility of an acute bacterial bronchitis  · Continue Levaquin at 750 mg IV day 2  · Patient will need to complete a total of a 7 to ten-day course of antibiotics  · Will follow up on the blood and sputum cultures         VTE Pharmacologic Prophylaxis: Pharmacologic: Enoxaparin (Lovenox)    Patient Centered Rounds: I have performed bedside rounds with nursing staff today      Discussions with Specialists or Other Care Team Provider:   Cardiology, nursing, pharmacy and case management  Education and Discussions with Family / Patient:   Patient was brought up to par with the plan of action for today, all questions answered to her satisfaction    Time Spent for Care: 20 minutes  More than 50% of total time spent on counseling and coordination of care as described above  Current Length of Stay: 1 day(s)    Current Patient Status: Inpatient   Certification Statement: The patient will continue to require additional inpatient hospital stay due to The need for continued IV steroids    Discharge Plan:   Hopeful discharge planning in 24-48 hours    Code Status: Level 1 - Full Code    Subjective:   Patient seen and examined  Patient reports improvement overall since prior to coming in  She denies any pain or discomfort  She continues to have shortness of breath with minimal exertion such as walking to the bathroom  Objective:     Vitals:   Temp (24hrs), Av 3 °F (36 8 °C), Min:97 5 °F (36 4 °C), Max:99 3 °F (37 4 °C)    Temp:  [97 5 °F (36 4 °C)-99 3 °F (37 4 °C)] 97 5 °F (36 4 °C)  HR:  [84-94] 87  Resp:  [18-22] 18  BP: (104-127)/(40-64) 104/40  SpO2:  [91 %-98 %] 93 %  Body mass index is 33 78 kg/m²  Input and Output Summary (last 24 hours): Intake/Output Summary (Last 24 hours) at 18 1433  Last data filed at 18 0815   Gross per 24 hour   Intake              600 ml   Output                0 ml   Net              600 ml       Physical Exam:     Physical Exam   Constitutional: She is oriented to person, place, and time  She appears well-developed and well-nourished  HENT:   Head: Normocephalic and atraumatic  Nose: Nose normal    Mouth/Throat: Oropharynx is clear and moist    Eyes: Pupils are equal, round, and reactive to light  Conjunctivae and EOM are normal    Neck: Normal range of motion  Neck supple  No JVD present  No thyromegaly present  Cardiovascular: Normal rate, regular rhythm and intact distal pulses  Exam reveals no gallop and no friction rub  No murmur heard  Pulmonary/Chest: Effort normal and breath sounds normal  No respiratory distress  Decreased breath sounds bilaterally at the bases  Bilateral expiratory wheezing appreciated in all fields   Abdominal: Soft  Bowel sounds are normal  She exhibits no distension and no mass  There is no tenderness  There is no guarding  Musculoskeletal: Normal range of motion  She exhibits no edema  Lymphadenopathy:     She has no cervical adenopathy  Neurological: She is alert and oriented to person, place, and time  No cranial nerve deficit  Skin: Skin is warm  No rash noted  No erythema  Psychiatric: She has a normal mood and affect  Her behavior is normal    Vitals reviewed  Additional Data:     Labs:      Results from last 7 days  Lab Units 12/04/18  0438 12/03/18  0827   WBC Thousand/uL 13 40* 14 90*   HEMOGLOBIN g/dL 11 1* 12 3   HEMATOCRIT % 33 5* 37 4   PLATELETS Thousands/uL 278 323   NEUTROS PCT %  --  82*   LYMPHS PCT %  --  9*   MONOS PCT %  --  8   EOS PCT %  --  1       Results from last 7 days  Lab Units 12/04/18  0439   POTASSIUM mmol/L 3 7   CHLORIDE mmol/L 99   CO2 mmol/L 32*   BUN mg/dL 14   CREATININE mg/dL 0 71   CALCIUM mg/dL 8 3*   ALK PHOS U/L 58   ALT U/L 57*   AST U/L 32       Results from last 7 days  Lab Units 12/03/18  0826   INR  1 33       Results from last 7 days  Lab Units 12/04/18  1112 12/04/18  0611 12/03/18  2027 12/03/18  1556 12/03/18  1102   POC GLUCOSE mg/dl 275* 215* 289* 242* 239*           * I Have Reviewed All Lab Data Listed Above  * Additional Pertinent Lab Tests Reviewed:  [facility]  Reviewed    Imaging:  Imaging Reports Reviewed Today Include:   CTA chest PE protocol    Recent Cultures (last 7 days):       Results from last 7 days  Lab Units 12/03/18  1413 12/03/18  0836   SPUTUM CULTURE  Culture too young- will reincubate  --    GRAM STAIN RESULT  Rare Epithelial cells per low power field  2+ Polys  Rare Gram positive cocci in pairs  --    INFLUENZA B PCR   --  None Detected   RSV PCR   --  None Detected       Last 24 Hours Medication List:     Current Facility-Administered Medications:  acetaminophen 650 mg Oral 4x Daily PRN Molly Williford, CRNP    albuterol 2 5 mg Nebulization Q4H PRN Molly Williford, CRNP    aspirin 81 mg Oral Daily Molly Williford, CRNP    atorvastatin 20 mg Oral Daily Molly Williford, CRNP    clotrimazole-betamethasone  Topical BID Molly Williford, CRNP    enoxaparin 40 mg Subcutaneous Daily Molly Williford, CRNP    fluticasone 1 spray Nasal Daily Molly Williford, CRNP    furosemide 20 mg Oral Daily Molly Williford, CRNP    gabapentin 300 mg Oral Daily Molly Williford, CRNP    guaiFENesin 600 mg Oral BID Molly Williford, CRNP    insulin lispro 1-5 Units Subcutaneous HS Molly Williford, CRNP    insulin lispro 1-6 Units Subcutaneous TID AC Molly Williford, CRNP    ipratropium-albuterol 3 mL Nebulization TID Molly Williford, CRNP    levofloxacin 750 mg Intravenous Q24H Molly Carver, AMARA Last Rate: 750 mg (12/04/18 0911)   lisinopril 5 mg Oral Daily Molly Williford, CRNP    methylPREDNISolone sodium succinate 40 mg Intravenous Replaced by [facility] Sandor Umanzor MD    multivitamin-minerals 1 tablet Oral Daily Molly Williford, AMARA    ondansetron 4 mg Intravenous Q4H PRN Molly Williford, CRNP    pantoprazole 40 mg Oral Early Morning Molly Wen, CRNP         Today, Patient Was Seen By: Sandor Umanzor MD    ** Please Note: Dictation voice to text software may have been used in the creation of this document   **

## 2018-12-04 NOTE — SOCIAL WORK
Chart reviewed by case management, assessment completed at the bedside, pt lives with her daughter  In a 1 story home with 4 steps outside and 12 basement steps, pt nichols snot use the basement, pt was driving until recently she chose to give up driving,pt has a rx plan at [facility] [place] , pt denies any d/c needs, pt is on oxygen at this time and she does not have home oxygen, I spoke with her daughter @0459 today to discuss d/c needs and d/c plan, pt will need to be assessed for home oxygen before d/c , d/c plan will be discussed at care coordination rounds today, cm will continue to follow and assess for any additional d/c needs, CM reviewed d/c planning process including the following: identifying help at home, patient preference for d/c planning needs, availability of treatment team to discuss questions or concerns patient and/or family may have regarding understanding medications and recognizing signs and symptoms once discharged  CM also encouraged patient to follow up with all recommended appointments after discharge  Patient advised of importance for patient and family to participate in managing patients medical well being

## 2018-12-04 NOTE — ASSESSMENT & PLAN NOTE
· Patient denies any chest pain  · Troponins are equivocal  2D echocardiogram was performed this morning with the following result:  Systolic function was normal  Ejection fraction was estimated in the range of 50 % to 70 %  Although no diagnostic regional wall motion abnormality was identified, this possibility cannot be completely excluded on the basis of this study  Wall thickness was mildly increased  There was mild concentric hypertrophy  Doppler parameters were consistent with abnormal left ventricular relaxation (grade 1 diastolic dysfunction)    · Continue current Cardiology based medications which include aspirin, lisinopril and Lipitor  ·  Patient will need an eventual cardiac stress test  · Patient is status post a Cardiology evaluation  · Further management as per them

## 2018-12-04 NOTE — ASSESSMENT & PLAN NOTE
· CTA of the chest PE protocol was completed yesterday  · PE was ruled out  · Pneumonia was ruled in  · COPD changes were also noted  · This is the final report:No evidence of pulmonary embolism  COPD changes  Right lower lobe peribronchial thickening with patchy infiltrate  Recurrent low-grade would need to be considered in this age group

## 2018-12-04 NOTE — ASSESSMENT & PLAN NOTE
· Has significantly improved  · This is secondary to an acute COPD exacerbation caused by a a right lower lobe community-acquired pneumonia  · Continue IV Solu-Medrol at 40 mg IV t i d   · Continue IV Levaquin at 750 mg daily  · Follow-up on blood cultures and sputum cultures

## 2018-12-04 NOTE — ASSESSMENT & PLAN NOTE
· Once again patient does not carry a formal diagnosis of COPD as outlined in the H&P  · Patient has a greater than 50 pack year smoking history  · Continue IV steroids for now  · Will need to establish care with Pulmonary as an outpatient for formal pulmonary function testing

## 2018-12-04 NOTE — ASSESSMENT & PLAN NOTE
0 15, 0 12, 0 08, 0 06 - mild elevation and trending down  Likely due to relative hypoxia  Pulse ox was 82% on room air upon presentation to the ED  No acute ischemic changes on EKG

## 2018-12-05 LAB
ANION GAP SERPL CALCULATED.3IONS-SCNC: 8 MMOL/L (ref 4–13)
BACTERIA SPT RESP CULT: NORMAL
BUN SERPL-MCNC: 16 MG/DL (ref 7–25)
CALCIUM SERPL-MCNC: 9 MG/DL (ref 8.6–10.5)
CHLORIDE SERPL-SCNC: 100 MMOL/L (ref 98–107)
CO2 SERPL-SCNC: 34 MMOL/L (ref 21–31)
CREAT SERPL-MCNC: 0.65 MG/DL (ref 0.6–1.2)
ERYTHROCYTE [DISTWIDTH] IN BLOOD BY AUTOMATED COUNT: 13.9 % (ref 11.5–14.5)
GFR SERPL CREATININE-BSD FRML MDRD: 85 ML/MIN/1.73SQ M
GIANT PLATELETS BLD QL SMEAR: PRESENT
GLUCOSE SERPL-MCNC: 192 MG/DL (ref 65–140)
GLUCOSE SERPL-MCNC: 217 MG/DL (ref 65–140)
GLUCOSE SERPL-MCNC: 245 MG/DL (ref 65–99)
GLUCOSE SERPL-MCNC: 274 MG/DL (ref 65–140)
GLUCOSE SERPL-MCNC: 307 MG/DL (ref 65–140)
GRAM STN SPEC: NORMAL
HCT VFR BLD AUTO: 33.4 % (ref 34.8–46.1)
HGB BLD-MCNC: 11.3 G/DL (ref 12–16)
LYMPHOCYTES # BLD AUTO: 0.5 THOUSAND/UL (ref 0.6–4.47)
LYMPHOCYTES # BLD AUTO: 3 % (ref 20–51)
MCH RBC QN AUTO: 28.8 PG (ref 26–34)
MCHC RBC AUTO-ENTMCNC: 33.9 G/DL (ref 31–37)
MCV RBC AUTO: 85 FL (ref 81–99)
MONOCYTES # BLD AUTO: 0.5 THOUSAND/UL (ref 0–1.22)
MONOCYTES NFR BLD AUTO: 3 % (ref 4–12)
NEUTS SEG # BLD: 15.7 THOUSAND/UL (ref 1.81–6.82)
NEUTS SEG NFR BLD AUTO: 94 % (ref 42–75)
NRBC BLD AUTO-RTO: 0 /100 WBCS
PLATELET # BLD AUTO: 312 THOUSANDS/UL (ref 149–390)
PLATELET BLD QL SMEAR: ADEQUATE
PMV BLD AUTO: 8.8 FL (ref 8.6–11.7)
POTASSIUM SERPL-SCNC: 4.3 MMOL/L (ref 3.5–5.5)
RBC # BLD AUTO: 3.93 MILLION/UL (ref 3.9–5.2)
RBC MORPH BLD: NORMAL
SODIUM SERPL-SCNC: 142 MMOL/L (ref 134–143)
TOTAL CELLS COUNTED SPEC: 100
WBC # BLD AUTO: 16.7 THOUSAND/UL (ref 4.8–10.8)

## 2018-12-05 PROCEDURE — 82948 REAGENT STRIP/BLOOD GLUCOSE: CPT

## 2018-12-05 PROCEDURE — 94640 AIRWAY INHALATION TREATMENT: CPT

## 2018-12-05 PROCEDURE — 94760 N-INVAS EAR/PLS OXIMETRY 1: CPT

## 2018-12-05 PROCEDURE — 85007 BL SMEAR W/DIFF WBC COUNT: CPT | Performed by: HOSPITALIST

## 2018-12-05 PROCEDURE — 80048 BASIC METABOLIC PNL TOTAL CA: CPT | Performed by: HOSPITALIST

## 2018-12-05 PROCEDURE — 99232 SBSQ HOSP IP/OBS MODERATE 35: CPT | Performed by: HOSPITALIST

## 2018-12-05 PROCEDURE — 85027 COMPLETE CBC AUTOMATED: CPT | Performed by: HOSPITALIST

## 2018-12-05 RX ORDER — LEVOFLOXACIN 750 MG/1
750 TABLET ORAL EVERY 24 HOURS
Status: DISCONTINUED | OUTPATIENT
Start: 2018-12-06 | End: 2018-12-07 | Stop reason: HOSPADM

## 2018-12-05 RX ORDER — INSULIN GLARGINE 100 [IU]/ML
20 INJECTION, SOLUTION SUBCUTANEOUS ONCE
Status: COMPLETED | OUTPATIENT
Start: 2018-12-05 | End: 2018-12-05

## 2018-12-05 RX ADMIN — ENOXAPARIN SODIUM 40 MG: 40 INJECTION SUBCUTANEOUS at 08:47

## 2018-12-05 RX ADMIN — FUROSEMIDE 20 MG: 20 TABLET ORAL at 08:47

## 2018-12-05 RX ADMIN — INSULIN LISPRO 4 UNITS: 100 INJECTION, SOLUTION INTRAVENOUS; SUBCUTANEOUS at 11:37

## 2018-12-05 RX ADMIN — LISINOPRIL 5 MG: 5 TABLET ORAL at 08:47

## 2018-12-05 RX ADMIN — IPRATROPIUM BROMIDE AND ALBUTEROL SULFATE 3 ML: 2.5; .5 SOLUTION RESPIRATORY (INHALATION) at 20:17

## 2018-12-05 RX ADMIN — LEVOFLOXACIN 750 MG: 5 INJECTION, SOLUTION INTRAVENOUS at 10:29

## 2018-12-05 RX ADMIN — IPRATROPIUM BROMIDE AND ALBUTEROL SULFATE 3 ML: 2.5; .5 SOLUTION RESPIRATORY (INHALATION) at 08:18

## 2018-12-05 RX ADMIN — ASPIRIN 81 MG 81 MG: 81 TABLET ORAL at 08:47

## 2018-12-05 RX ADMIN — INSULIN GLARGINE 20 UNITS: 100 INJECTION, SOLUTION SUBCUTANEOUS at 14:45

## 2018-12-05 RX ADMIN — GABAPENTIN 300 MG: 300 CAPSULE ORAL at 08:47

## 2018-12-05 RX ADMIN — ATORVASTATIN CALCIUM 20 MG: 20 TABLET, FILM COATED ORAL at 08:47

## 2018-12-05 RX ADMIN — PREDNISONE 50 MG: 20 TABLET ORAL at 14:45

## 2018-12-05 RX ADMIN — GUAIFENESIN 600 MG: 600 TABLET, EXTENDED RELEASE ORAL at 17:21

## 2018-12-05 RX ADMIN — ACETAMINOPHEN 650 MG: 325 TABLET ORAL at 06:12

## 2018-12-05 RX ADMIN — INSULIN LISPRO 2 UNITS: 100 INJECTION, SOLUTION INTRAVENOUS; SUBCUTANEOUS at 21:39

## 2018-12-05 RX ADMIN — METHYLPREDNISOLONE SODIUM SUCCINATE 40 MG: 40 INJECTION, POWDER, FOR SOLUTION INTRAMUSCULAR; INTRAVENOUS at 05:55

## 2018-12-05 RX ADMIN — MULTIPLE VITAMINS W/ MINERALS TAB 1 TABLET: TAB at 08:47

## 2018-12-05 RX ADMIN — METHYLPREDNISOLONE SODIUM SUCCINATE 40 MG: 40 INJECTION, POWDER, FOR SOLUTION INTRAMUSCULAR; INTRAVENOUS at 13:00

## 2018-12-05 RX ADMIN — INSULIN LISPRO 2 UNITS: 100 INJECTION, SOLUTION INTRAVENOUS; SUBCUTANEOUS at 16:19

## 2018-12-05 RX ADMIN — PANTOPRAZOLE SODIUM 40 MG: 40 TABLET, DELAYED RELEASE ORAL at 05:55

## 2018-12-05 RX ADMIN — INSULIN LISPRO 2 UNITS: 100 INJECTION, SOLUTION INTRAVENOUS; SUBCUTANEOUS at 07:54

## 2018-12-05 RX ADMIN — GUAIFENESIN 600 MG: 600 TABLET, EXTENDED RELEASE ORAL at 08:47

## 2018-12-05 NOTE — ASSESSMENT & PLAN NOTE
· Has significantly improved  · This is secondary to an acute COPD exacerbation caused by a a right lower lobe community-acquired pneumonia  · DC IV steroids start p o  prednisone 50 mg daily p o    · DC IV antibiotics start p o  levofloxacin in anticipation of discharge planning in about 24 hrs

## 2018-12-05 NOTE — ASSESSMENT & PLAN NOTE
· Patient meets criteria for sepsis with tachycardia, tachypnea and leukocytosis likely secondary to a right lower lobe pneumonia  · Will continue treatment stated above  · Blood cultures negative thus far  · Influenza testing negative thus far  · Steroid induced leukocytosis noted  · Change IV Levaquin to PO

## 2018-12-05 NOTE — ASSESSMENT & PLAN NOTE
· Once again patient does not carry a formal diagnosis of COPD as outlined in the H&P  · Patient has a greater than 50 pack year smoking history  · Change IV steroids to p o  prednisone  · Will need to establish care with Pulmonary as an outpatient for formal pulmonary function testing

## 2018-12-05 NOTE — ASSESSMENT & PLAN NOTE
Lab Results   Component Value Date    HGBA1C 7 6% 09/11/2018       Recent Labs      12/04/18   1631  12/04/18   2043  12/05/18   0613  12/05/18   1113   POCGLU  203*  278*  192*  307*       Blood Sugar Average: Last 72 hrs:  (P) 597 [id number]   · Will hold off metformin  · Use insulin sliding scale while in hospital   · Steroid induced hyperglycemia is worsening her diabetes  · Will add basal Lantus dosing while she is in-house

## 2018-12-05 NOTE — ASSESSMENT & PLAN NOTE
· This was confirmed in the CTA of the chest  · Initial concerns included the possibility of an acute bacterial bronchitis  · Continue Levaquin at 750 mg p o  day 3  · Patient will need to complete a total of a 7 to ten-day course of antibiotics  · All cultures are negative thus far  · Sputum cultures are pending

## 2018-12-05 NOTE — PROGRESS NOTES
Progress Note Noam Miller 1940, 66 y o  female MRN: [id number]    Unit/Bed#: -02 Encounter: [id number]    Primary Care Provider: Alfredito Cardona PA-C   Date and time admitted to hospital: 12/3/2018  7:58 AM        Acute respiratory failure with hypoxia ([facility] [address] )   Assessment & Plan    · Has significantly improved  · This is secondary to an acute COPD exacerbation caused by a a right lower lobe community-acquired pneumonia  · DC IV steroids start p o  prednisone 50 mg daily p o  · DC IV antibiotics start p o  levofloxacin in anticipation of discharge planning in about 24 hrs     * COPD with acute exacerbation ([facility] [address] )   Assessment & Plan    · Once again patient does not carry a formal diagnosis of COPD as outlined in the H&P  · Patient has a greater than 50 pack year smoking history  · Change IV steroids to p o  prednisone  · Will need to establish care with Pulmonary as an outpatient for formal pulmonary function testing     Elevated d-dimer   Assessment & Plan    · CTA of the chest PE protocol was completed yesterday  · PE was ruled out  · Pneumonia was ruled in  · COPD changes were also noted  · This is the final report:No evidence of pulmonary embolism  COPD changes  Right lower lobe peribronchial thickening with patchy infiltrate  Recurrent low-grade would need to be considered in this age group       Sepsis ([facility] [address] )   Assessment & Plan    · Patient meets criteria for sepsis with tachycardia, tachypnea and leukocytosis likely secondary to a right lower lobe pneumonia  · Will continue treatment stated above  · Blood cultures negative thus far  · Influenza testing negative thus far  · Steroid induced leukocytosis noted  · Change IV Levaquin to PO     Elevated troponin   Assessment & Plan    · Patient denies any chest pain  · Troponins are equivocal  2D echocardiogram was performed this morning with the following result:  Systolic function was normal  Ejection fraction was estimated in the range of 50 % to 70 %  Although no diagnostic regional wall motion abnormality was identified, this possibility cannot be completely excluded on the basis of this study  Wall thickness was mildly increased  There was mild concentric hypertrophy  Doppler parameters were consistent with abnormal left ventricular relaxation (grade 1 diastolic dysfunction)  · Continue current Cardiology based medications which include aspirin, lisinopril and Lipitor  ·  Patient will need an eventual cardiac stress test  · Patient is status post a Cardiology evaluation  · Further management as per them     Essential hypertension   Assessment & Plan    · Continue with lisinopril  Hyperlipidemia, unspecified   Assessment & Plan    · Continue statin     Type 2 diabetes mellitus with hyperglycemia, without long-term current use of insulin [facility])   Assessment & Plan    Lab Results   Component Value Date    HGBA1C 7 6% 09/11/2018       Recent Labs      12/04/18   1631  12/04/18   2043  12/05/18   0613  12/05/18   1113   POCGLU  203*  278*  192*  307*       Blood Sugar Average: Last 72 hrs:  (P) 519 [id number]   · Will hold off metformin  · Use insulin sliding scale while in hospital   · Steroid induced hyperglycemia is worsening her diabetes  · Will add basal Lantus dosing while she is in-house     Pneumonia of right lower lobe due to infectious organism [facility])   Assessment & Plan    · This was confirmed in the CTA of the chest  · Initial concerns included the possibility of an acute bacterial bronchitis  · Continue Levaquin at 750 mg p o  day 3  · Patient will need to complete a total of a 7 to ten-day course of antibiotics  · All cultures are negative thus far  · Sputum cultures are pending         VTE Pharmacologic Prophylaxis: Pharmacologic: Enoxaparin (Lovenox)    Patient Centered Rounds: I have performed bedside rounds with nursing staff today      Discussions with Specialists or Other Care Team Provider:   Case discussed with Cardiology, case management  Education and Discussions with Family / Patient:     Patient was brought up par with the plan of care for today, all questions answered to her satisfaction    Time Spent for Care: 30 minutes  More than 50% of total time spent on counseling and coordination of care as described above  Current Length of Stay: 2 day(s)    Current Patient Status: Inpatient   Certification Statement: The patient will continue to require additional inpatient hospital stay due to The need for continued steroids and antibiotics    Discharge Plan:   Hopeful discharge planning in 24 hr    Code Status: Level 1 - Full Code    Subjective:   Patient seen and examined  Feels better  Still feels like she has no control over her cough  Breathing feels better overall  Patient denies any pain or discomfort  Objective:     Vitals:   Temp (24hrs), Av 2 °F (36 8 °C), Min:97 8 °F (36 6 °C), Max:99 1 °F (37 3 °C)    Temp:  [97 8 °F (36 6 °C)-99 1 °F (37 3 °C)] 98 °F (36 7 °C)  HR:  [] 104  Resp:  [16-19] 19  BP: (102-145)/(60-77) 127/72  SpO2:  [90 %-94 %] 91 %  Body mass index is 33 95 kg/m²  Input and Output Summary (last 24 hours): Intake/Output Summary (Last 24 hours) at 18 1414  Last data filed at 18 2101   Gross per 24 hour   Intake              300 ml   Output                0 ml   Net              300 ml       Physical Exam:     Physical Exam   Constitutional: She is oriented to person, place, and time  She appears well-developed and well-nourished  HENT:   Head: Normocephalic and atraumatic  Nose: Nose normal    Mouth/Throat: Oropharynx is clear and moist    Eyes: Pupils are equal, round, and reactive to light  Conjunctivae and EOM are normal    Neck: Normal range of motion  Neck supple  No JVD present  No thyromegaly present  Cardiovascular: Normal rate, regular rhythm and intact distal pulses  Exam reveals no gallop and no friction rub      No murmur heard   Pulmonary/Chest: Effort normal and breath sounds normal  No respiratory distress  Faint bilateral expiratory wheezes are appreciated in all fields   Abdominal: Soft  Bowel sounds are normal  She exhibits no distension and no mass  There is no tenderness  There is no guarding  Musculoskeletal: Normal range of motion  She exhibits no edema  Lymphadenopathy:     She has no cervical adenopathy  Neurological: She is alert and oriented to person, place, and time  No cranial nerve deficit  Skin: Skin is warm  No rash noted  No erythema  Psychiatric: She has a normal mood and affect  Her behavior is normal    Vitals reviewed  Additional Data:     Labs:      Results from last 7 days  Lab Units 12/05/18  0446  12/03/18  0827   WBC Thousand/uL 16 70*  < > 14 90*   HEMOGLOBIN g/dL 11 3*  < > 12 3   HEMATOCRIT % 33 4*  < > 37 4   PLATELETS Thousands/uL 312  < > 323   NEUTROS PCT %  --   --  82*   LYMPHS PCT %  --   --  9*   LYMPHO PCT % 3*  --   --    MONOS PCT %  --   --  8   MONO PCT % 3*  --   --    EOS PCT %  --   --  1   < > = values in this interval not displayed  Results from last 7 days  Lab Units 12/05/18  0446 12/04/18  0439   POTASSIUM mmol/L 4 3 3 7   CHLORIDE mmol/L 100 99   CO2 mmol/L 34* 32*   BUN mg/dL 16 14   CREATININE mg/dL 0 65 0 71   CALCIUM mg/dL 9 0 8 3*   ALK PHOS U/L  --  58   ALT U/L  --  57*   AST U/L  --  32       Results from last 7 days  Lab Units 12/03/18  0826   INR  1 33       Results from last 7 days  Lab Units 12/05/18  1113 12/05/18  0613 12/04/18  2043 12/04/18  1631 12/04/18  1112 12/04/18  0611 12/03/18  2027 12/03/18  1556 12/03/18  1102   POC GLUCOSE mg/dl 307* 192* 278* 203* 275* 215* 289* 242* 239*           * I Have Reviewed All Lab Data Listed Above  * Additional Pertinent Lab Tests Reviewed:  [facility] [address] Admission  Reviewed    Imaging:  Imaging Reports Reviewed Today Include:   None    Recent Cultures (last 7 days):       Results from last 7 days  Lab Units 12/03/18  1413 12/03/18  0839 12/03/18  0836 12/03/18  0827   BLOOD CULTURE   --  No Growth at 24 hrs   --  No Growth at 24 hrs  SPUTUM CULTURE  2+ Growth of   --   --   --    GRAM STAIN RESULT  Rare Epithelial cells per low power field  2+ Polys  Rare Gram positive cocci in pairs  --   --   --    INFLUENZA B PCR   --   --  None Detected  --    RSV PCR   --   --  None Detected  --        Last 24 Hours Medication List:     Current Facility-Administered Medications:  acetaminophen 650 mg Oral 4x Daily PRN [facility], CRNP   albuterol 2 5 mg Nebulization Q4H PRN [facility], CRNP   aspirin 81 mg Oral Daily [facility], CRNP   atorvastatin 20 mg Oral Daily [facility], CRNP   clotrimazole-betamethasone  Topical BID [facility], CRNP   enoxaparin 40 mg Subcutaneous Daily [facility], CRNP   fluticasone 1 spray Nasal Daily [facility], CRNP   furosemide 20 mg Oral Daily [facility], CRNP   gabapentin 300 mg Oral Daily [facility], CRNP   guaiFENesin 600 mg Oral BID [facility], CRNP   insulin lispro 1-5 Units Subcutaneous HS [facility], CRNP   insulin lispro 1-6 Units Subcutaneous TID AC [facility], CRNP   ipratropium-albuterol 3 mL Nebulization TID [facility], CRNP   levofloxacin 750 mg Oral Q24H Charlene Quintero MD   lisinopril 5 mg Oral Daily [facility], CRNP   multivitamin-minerals 1 tablet Oral Daily [facility], CRNP   ondansetron 4 mg Intravenous Q4H PRN [facility], CRNP   pantoprazole 40 mg Oral Early Morning [facility], CRNP   predniSONE 50 mg Oral Daily Charlene Quintero MD        Today, Patient Was Seen By: Charlene Quintero MD    ** Please Note: Dictation voice to text software may have been used in the creation of this document   **

## 2018-12-06 LAB
ANION GAP SERPL CALCULATED.3IONS-SCNC: 5 MMOL/L (ref 4–13)
BASOPHILS # BLD AUTO: 0 THOUSANDS/ΜL (ref 0–0.1)
BASOPHILS NFR BLD AUTO: 0 % (ref 0–2)
BUN SERPL-MCNC: 18 MG/DL (ref 7–25)
CALCIUM SERPL-MCNC: 9.2 MG/DL (ref 8.6–10.5)
CHLORIDE SERPL-SCNC: 99 MMOL/L (ref 98–107)
CO2 SERPL-SCNC: 37 MMOL/L (ref 21–31)
CREAT SERPL-MCNC: 0.64 MG/DL (ref 0.6–1.2)
EOSINOPHIL # BLD AUTO: 0 THOUSAND/ΜL (ref 0–0.61)
EOSINOPHIL NFR BLD AUTO: 0 % (ref 0–5)
ERYTHROCYTE [DISTWIDTH] IN BLOOD BY AUTOMATED COUNT: 13.8 % (ref 11.5–14.5)
GFR SERPL CREATININE-BSD FRML MDRD: 86 ML/MIN/1.73SQ M
GLUCOSE SERPL-MCNC: 166 MG/DL (ref 65–140)
GLUCOSE SERPL-MCNC: 203 MG/DL (ref 65–140)
GLUCOSE SERPL-MCNC: 216 MG/DL (ref 65–99)
GLUCOSE SERPL-MCNC: 276 MG/DL (ref 65–140)
GLUCOSE SERPL-MCNC: 322 MG/DL (ref 65–140)
HCT VFR BLD AUTO: 36.8 % (ref 34.8–46.1)
HGB BLD-MCNC: 11.9 G/DL (ref 12–16)
LYMPHOCYTES # BLD AUTO: 1 THOUSANDS/ΜL (ref 0.6–4.47)
LYMPHOCYTES NFR BLD AUTO: 7 % (ref 21–51)
MCH RBC QN AUTO: 27.7 PG (ref 26–34)
MCHC RBC AUTO-ENTMCNC: 32.5 G/DL (ref 31–37)
MCV RBC AUTO: 85 FL (ref 81–99)
MONOCYTES # BLD AUTO: 0.9 THOUSAND/ΜL (ref 0.17–1.22)
MONOCYTES NFR BLD AUTO: 6 % (ref 2–12)
NEUTROPHILS # BLD AUTO: 12.9 THOUSANDS/ΜL (ref 1.4–6.5)
NEUTS SEG NFR BLD AUTO: 87 % (ref 42–75)
NRBC BLD AUTO-RTO: 0 /100 WBCS
PLATELET # BLD AUTO: 319 THOUSANDS/UL (ref 149–390)
PMV BLD AUTO: 8.3 FL (ref 8.6–11.7)
POTASSIUM SERPL-SCNC: 4.2 MMOL/L (ref 3.5–5.5)
RBC # BLD AUTO: 4.3 MILLION/UL (ref 3.9–5.2)
SODIUM SERPL-SCNC: 141 MMOL/L (ref 134–143)
WBC # BLD AUTO: 14.7 THOUSAND/UL (ref 4.8–10.8)

## 2018-12-06 PROCEDURE — 99232 SBSQ HOSP IP/OBS MODERATE 35: CPT | Performed by: HOSPITALIST

## 2018-12-06 PROCEDURE — 85025 COMPLETE CBC W/AUTO DIFF WBC: CPT | Performed by: HOSPITALIST

## 2018-12-06 PROCEDURE — 80048 BASIC METABOLIC PNL TOTAL CA: CPT | Performed by: HOSPITALIST

## 2018-12-06 PROCEDURE — 94760 N-INVAS EAR/PLS OXIMETRY 1: CPT

## 2018-12-06 PROCEDURE — 82948 REAGENT STRIP/BLOOD GLUCOSE: CPT

## 2018-12-06 PROCEDURE — 94640 AIRWAY INHALATION TREATMENT: CPT

## 2018-12-06 RX ADMIN — INSULIN LISPRO 2 UNITS: 100 INJECTION, SOLUTION INTRAVENOUS; SUBCUTANEOUS at 11:41

## 2018-12-06 RX ADMIN — LEVOFLOXACIN 750 MG: 750 TABLET, FILM COATED ORAL at 10:33

## 2018-12-06 RX ADMIN — IPRATROPIUM BROMIDE AND ALBUTEROL SULFATE 3 ML: 2.5; .5 SOLUTION RESPIRATORY (INHALATION) at 13:44

## 2018-12-06 RX ADMIN — ENOXAPARIN SODIUM 40 MG: 40 INJECTION SUBCUTANEOUS at 08:33

## 2018-12-06 RX ADMIN — FUROSEMIDE 20 MG: 20 TABLET ORAL at 08:32

## 2018-12-06 RX ADMIN — LISINOPRIL 5 MG: 5 TABLET ORAL at 08:32

## 2018-12-06 RX ADMIN — GABAPENTIN 300 MG: 300 CAPSULE ORAL at 08:32

## 2018-12-06 RX ADMIN — ASPIRIN 81 MG 81 MG: 81 TABLET ORAL at 08:32

## 2018-12-06 RX ADMIN — INSULIN LISPRO 3 UNITS: 100 INJECTION, SOLUTION INTRAVENOUS; SUBCUTANEOUS at 21:33

## 2018-12-06 RX ADMIN — INSULIN LISPRO 1 UNITS: 100 INJECTION, SOLUTION INTRAVENOUS; SUBCUTANEOUS at 08:33

## 2018-12-06 RX ADMIN — PANTOPRAZOLE SODIUM 40 MG: 40 TABLET, DELAYED RELEASE ORAL at 05:14

## 2018-12-06 RX ADMIN — IPRATROPIUM BROMIDE AND ALBUTEROL SULFATE 3 ML: 2.5; .5 SOLUTION RESPIRATORY (INHALATION) at 07:39

## 2018-12-06 RX ADMIN — PREDNISONE 50 MG: 20 TABLET ORAL at 08:32

## 2018-12-06 RX ADMIN — ATORVASTATIN CALCIUM 20 MG: 20 TABLET, FILM COATED ORAL at 08:32

## 2018-12-06 RX ADMIN — MULTIPLE VITAMINS W/ MINERALS TAB 1 TABLET: TAB at 08:41

## 2018-12-06 RX ADMIN — INSULIN LISPRO 4 UNITS: 100 INJECTION, SOLUTION INTRAVENOUS; SUBCUTANEOUS at 16:44

## 2018-12-06 RX ADMIN — IPRATROPIUM BROMIDE AND ALBUTEROL SULFATE 3 ML: 2.5; .5 SOLUTION RESPIRATORY (INHALATION) at 19:42

## 2018-12-06 RX ADMIN — GUAIFENESIN 600 MG: 600 TABLET, EXTENDED RELEASE ORAL at 08:41

## 2018-12-06 RX ADMIN — GUAIFENESIN 600 MG: 600 TABLET, EXTENDED RELEASE ORAL at 18:27

## 2018-12-06 NOTE — ASSESSMENT & PLAN NOTE
· Patient meets criteria for sepsis with tachycardia, tachypnea and leukocytosis likely secondary to a right lower lobe pneumonia  · Blood cultures negative thus far  · Influenza testing negative thus far  · Steroid induced leukocytosis noted  · Change IV Levaquin to PO  · Hopeful discharge planning 1st thing tomorrow morning

## 2018-12-06 NOTE — ASSESSMENT & PLAN NOTE
Lab Results   Component Value Date    HGBA1C 7 6% 09/11/2018       Recent Labs      12/05/18   1556  12/05/18   2030  12/06/18   0632  12/06/18   1117   POCGLU  217*  274*  166*  203*       Blood Sugar Average: Last 72 hrs:  (P) 224 [id number]   · Will hold off metformin  · Use insulin sliding scale while in hospital   · Steroid induced hyperglycemia is worsening her diabetes  · Will add basal Lantus dosing while she is in-house  · Better glycemic control noted since Lantus was added

## 2018-12-06 NOTE — PROGRESS NOTES
Progress Note - Lashawn Sánchez 1940, 66 y o  female MRN: [id number]    Unit/Bed#: -02 Encounter: [id number]    Primary Care Provider: Alison Madrigal PA-C   Date and time admitted to hospital: 12/3/2018  7:58 AM        Pneumonia of right lower lobe due to infectious organism [facility])   Assessment & Plan    · This was confirmed in the CTA of the chest  · Initial concerns included the possibility of an acute bacterial bronchitis  · Continue Levaquin at 750 mg p o  day 4  · Patient will need to complete a total of a 7 to ten-day course of antibiotics  · All cultures are negative thus far  · Sputum cultures are pending     Acute respiratory failure with hypoxia ([facility] [address] )   Assessment & Plan    · Has significantly improved  · This is secondary to an acute COPD exacerbation caused by a a right lower lobe community-acquired pneumonia  · DC IV steroids start p o  prednisone 50 mg daily p o   · In view of the patient's hypoxia - will hold discharge for today and re-evaluate in the a m  · Will order desaturation study to evaluate for the need for home oxygen     * COPD with acute exacerbation ([facility] [address] )   Assessment & Plan    · Once again patient does not carry a formal diagnosis of COPD as outlined in the H&P  · Patient has a greater than 50 pack year smoking history  · Change IV steroids to p o  prednisone  · Will need to establish care with Pulmonary as an outpatient for formal pulmonary function testing  · Today on 12/06/2018 patient remains hypoxic on room air with an O2 sat of 90%  She feels short of breath with minimal exertion  Will continue present management in-house overnight and if need be will send her home on oxygen tomorrow  Elevated d-dimer   Assessment & Plan    · CTA of the chest PE protocol was completed yesterday  · PE was ruled out  · Pneumonia was ruled in  · COPD changes were also noted  · This is the final report:No evidence of pulmonary embolism  COPD changes    Right lower lobe peribronchial thickening with patchy infiltrate  Recurrent low-grade would need to be considered in this age group  Sepsis ([facility] [address] )   Assessment & Plan    · Patient meets criteria for sepsis with tachycardia, tachypnea and leukocytosis likely secondary to a right lower lobe pneumonia  · Blood cultures negative thus far  · Influenza testing negative thus far  · Steroid induced leukocytosis noted  · Change IV Levaquin to PO  · Hopeful discharge planning 1st thing tomorrow morning     Elevated troponin   Assessment & Plan    · Patient denies any chest pain  · Troponins are equivocal  2D echocardiogram was performed this morning with the following result:  Systolic function was normal  Ejection fraction was estimated in the range of 50 % to 70 %  Although no diagnostic regional wall motion abnormality was identified, this possibility cannot be completely excluded on the basis of this study  Wall thickness was mildly increased  There was mild concentric hypertrophy  Doppler parameters were consistent with abnormal left ventricular relaxation (grade 1 diastolic dysfunction)  · Continue current Cardiology based medications which include aspirin, lisinopril and Lipitor  ·  Patient will need an eventual cardiac stress test  · Patient is status post a Cardiology evaluation  · Further management as per them     Essential hypertension   Assessment & Plan    · Continue with lisinopril        Hyperlipidemia, unspecified   Assessment & Plan    · Continue statin     Type 2 diabetes mellitus with hyperglycemia, without long-term current use of insulin [facility])   Assessment & Plan    Lab Results   Component Value Date    HGBA1C 7 6% 09/11/2018       Recent Labs      12/05/18   1556  12/05/18   2030  12/06/18   0632  12/06/18   1117   POCGLU  217*  274*  166*  203*       Blood Sugar Average: Last 72 hrs:  (P) 264 [id number]   · Will hold off metformin  · Use insulin sliding scale while in hospital   · Steroid induced hyperglycemia is worsening her diabetes  · Will add basal Lantus dosing while she is in-house  · Better glycemic control noted since Lantus was added         VTE Pharmacologic Prophylaxis: Pharmacologic: Enoxaparin (Lovenox)    Patient Centered Rounds: I have performed bedside rounds with nursing staff today  Discussions with Specialists or Other Care Team Provider:   Cardiology, case management, nursing  Education and Discussions with Family / Patient:   Patient brought up par with the plan of care for today    Time Spent for Care: 20 minutes  More than 50% of total time spent on counseling and coordination of care as described above  Current Length of Stay: 3 day(s)    Current Patient Status: Inpatient   Certification Statement: The patient will continue to require additional inpatient hospital stay due to Persistent hypoxia of 90% on room air    Discharge Plan:   Hopeful discharge planning tomorrow morning    Code Status: Level 1 - Full Code    Subjective:   Patient seen examined  Feels tired  Feels short of breath with minimal exertion such as walking from her bed to the bathroom which is within the same room  Denies pain or discomfort    Objective:     Vitals:   Temp (24hrs), Av 1 °F (36 7 °C), Min:97 8 °F (36 6 °C), Max:98 3 °F (36 8 °C)    Temp:  [97 8 °F (36 6 °C)-98 3 °F (36 8 °C)] 98 3 °F (36 8 °C)  HR:  [] 84  Resp:  [18] 18  BP: (116-135)/(49-62) 116/49  SpO2:  [90 %-96 %] 90 %  Body mass index is 33 95 kg/m²  Input and Output Summary (last 24 hours):     No intake or output data in the 24 hours ending 18 5244    Physical Exam:     Physical Exam   Constitutional: She is oriented to person, place, and time  She appears well-developed and well-nourished  HENT:   Head: Normocephalic and atraumatic  Nose: Nose normal    Mouth/Throat: Oropharynx is clear and moist    Eyes: Pupils are equal, round, and reactive to light   Conjunctivae and EOM are normal    Neck: Normal range of motion  Neck supple  No JVD present  No thyromegaly present  Cardiovascular: Normal rate, regular rhythm and intact distal pulses  Exam reveals no gallop and no friction rub  No murmur heard  Pulmonary/Chest: Effort normal and breath sounds normal  No respiratory distress  Faint bilateral expiratory wheezing noted all fields   Abdominal: Soft  Bowel sounds are normal  She exhibits no distension and no mass  There is no tenderness  There is no guarding  Musculoskeletal: Normal range of motion  She exhibits no edema  Lymphadenopathy:     She has no cervical adenopathy  Neurological: She is alert and oriented to person, place, and time  No cranial nerve deficit  Skin: Skin is warm  No rash noted  No erythema  Psychiatric: She has a normal mood and affect  Her behavior is normal    Vitals reviewed  Additional Data:     Labs:      Results from last 7 days  Lab Units 12/06/18  0515   WBC Thousand/uL 14 70*   HEMOGLOBIN g/dL 11 9*   HEMATOCRIT % 36 8   PLATELETS Thousands/uL 319   NEUTROS PCT % 87*   LYMPHS PCT % 7*   MONOS PCT % 6   EOS PCT % 0       Results from last 7 days  Lab Units 12/06/18  0515  12/04/18  0439   POTASSIUM mmol/L 4 2  < > 3 7   CHLORIDE mmol/L 99  < > 99   CO2 mmol/L 37*  < > 32*   BUN mg/dL 18  < > 14   CREATININE mg/dL 0 64  < > 0 71   CALCIUM mg/dL 9 2  < > 8 3*   ALK PHOS U/L  --   --  58   ALT U/L  --   --  57*   AST U/L  --   --  32   < > = values in this interval not displayed  Results from last 7 days  Lab Units 12/03/18  0826   INR  1 33       Results from last 7 days  Lab Units 12/06/18  1117 12/06/18  0632 12/05/18  2030 12/05/18  1556 12/05/18  1113 12/05/18  0613 12/04/18  2043 12/04/18  1631 12/04/18  1112 12/04/18  0611 12/03/18  2027 12/03/18  1556   POC GLUCOSE mg/dl 203* 166* 274* 217* 307* 192* 278* 203* 275* 215* 289* 242*           * I Have Reviewed All Lab Data Listed Above  * Additional Pertinent Lab Tests Reviewed:  [facility] Admission  Reviewed    Imaging:  Imaging Reports Reviewed Today Include:   None    Recent Cultures (last 7 days):       Results from last 7 days  Lab Units 12/03/18  1413 12/03/18  0839 12/03/18  0836 12/03/18  0827   BLOOD CULTURE   --  No Growth at 48 hrs  --  No Growth at 48 hrs  SPUTUM CULTURE  2+ Growth of   --   --   --    GRAM STAIN RESULT  Rare Epithelial cells per low power field  2+ Polys  Rare Gram positive cocci in pairs  --   --   --    INFLUENZA B PCR   --   --  None Detected  --    RSV PCR   --   --  None Detected  --        Last 24 Hours Medication List:     Current Facility-Administered Medications:  acetaminophen 650 mg Oral 4x Daily PRN Lashawn Commander, CRNP   albuterol 2 5 mg Nebulization Q4H PRN Lashawn Commander, CRNP   aspirin 81 mg Oral Daily Lashawn Commander, CRNP   atorvastatin 20 mg Oral Daily Lashawn Commander, CRNP   clotrimazole-betamethasone  Topical BID Lashawn Commander, CRNP   enoxaparin 40 mg Subcutaneous Daily Lashawn Commander, CRNP   fluticasone 1 spray Nasal Daily Lashawn Commander, CRNP   furosemide 20 mg Oral Daily Lashawn Commander, CRNP   gabapentin 300 mg Oral Daily Lashawn Commander, CRNP   guaiFENesin 600 mg Oral BID Lashawn Commander, CRNP   insulin lispro 1-5 Units Subcutaneous HS Lashawn Commander, CRNP   insulin lispro 1-6 Units Subcutaneous TID AC Lashawn Commander, CRNP   ipratropium-albuterol 3 mL Nebulization TID Lashawn Commander, CRNP   levofloxacin 750 mg Oral Q24H Aki Ibarra MD   lisinopril 5 mg Oral Daily Lashawn Commander, CRNP   multivitamin-minerals 1 tablet Oral Daily Lashawn Commander, CRNP   ondansetron 4 mg Intravenous Q4H PRN Lashawn Commander, CRNP   pantoprazole 40 mg Oral Early Morning Lashawn Commander, CRNP   predniSONE 50 mg Oral Daily Aki Ibarra MD        Today, Patient Was Seen By: Aki Ibarra MD    ** Please Note: Dictation voice to text software may have been used in the creation of this document   **

## 2018-12-06 NOTE — ASSESSMENT & PLAN NOTE
· Has significantly improved  · This is secondary to an acute COPD exacerbation caused by a a right lower lobe community-acquired pneumonia  · DC IV steroids start p o  prednisone 50 mg daily p o   · In view of the patient's hypoxia - will hold discharge for today and re-evaluate in the a m    · Will order desaturation study to evaluate for the need for home oxygen

## 2018-12-06 NOTE — ASSESSMENT & PLAN NOTE
· Once again patient does not carry a formal diagnosis of COPD as outlined in the H&P  · Patient has a greater than 50 pack year smoking history  · Change IV steroids to p o  prednisone  · Will need to establish care with Pulmonary as an outpatient for formal pulmonary function testing  · Today on 12/06/2018 patient remains hypoxic on room air with an O2 sat of 90%  She feels short of breath with minimal exertion  Will continue present management in-house overnight and if need be will send her home on oxygen tomorrow

## 2018-12-07 ENCOUNTER — TRANSITIONAL CARE MANAGEMENT (OUTPATIENT)
Dept: INTERNAL MEDICINE CLINIC | Facility: CLINIC | Age: 78
End: 2018-12-07

## 2018-12-07 VITALS
SYSTOLIC BLOOD PRESSURE: 107 MMHG | TEMPERATURE: 98.7 F | BODY MASS INDEX: 33.62 KG/M2 | DIASTOLIC BLOOD PRESSURE: 60 MMHG | OXYGEN SATURATION: 94 % | HEART RATE: 109 BPM | HEIGHT: 61 IN | WEIGHT: 178.1 LBS | RESPIRATION RATE: 18 BRPM

## 2018-12-07 LAB
GLUCOSE SERPL-MCNC: 141 MG/DL (ref 65–140)
GLUCOSE SERPL-MCNC: 171 MG/DL (ref 65–140)

## 2018-12-07 PROCEDURE — 99239 HOSP IP/OBS DSCHRG MGMT >30: CPT | Performed by: HOSPITALIST

## 2018-12-07 PROCEDURE — 82948 REAGENT STRIP/BLOOD GLUCOSE: CPT

## 2018-12-07 PROCEDURE — 94640 AIRWAY INHALATION TREATMENT: CPT

## 2018-12-07 PROCEDURE — 94760 N-INVAS EAR/PLS OXIMETRY 1: CPT

## 2018-12-07 RX ORDER — PREDNISONE 20 MG/1
40 TABLET ORAL DAILY
Qty: 9 TABLET | Refills: 0 | Status: SHIPPED | OUTPATIENT
Start: 2018-12-07 | End: 2019-01-15

## 2018-12-07 RX ORDER — PREDNISONE 20 MG/1
20 TABLET ORAL DAILY
Status: DISCONTINUED | OUTPATIENT
Start: 2018-12-10 | End: 2018-12-07 | Stop reason: HOSPADM

## 2018-12-07 RX ORDER — PREDNISONE 20 MG/1
40 TABLET ORAL DAILY
Status: DISCONTINUED | OUTPATIENT
Start: 2018-12-07 | End: 2018-12-07 | Stop reason: HOSPADM

## 2018-12-07 RX ORDER — LEVOFLOXACIN 750 MG/1
750 TABLET ORAL EVERY 24 HOURS
Qty: 5 TABLET | Refills: 0 | Status: SHIPPED | OUTPATIENT
Start: 2018-12-08 | End: 2018-12-13

## 2018-12-07 RX ADMIN — GABAPENTIN 300 MG: 300 CAPSULE ORAL at 08:59

## 2018-12-07 RX ADMIN — PREDNISONE 50 MG: 20 TABLET ORAL at 08:58

## 2018-12-07 RX ADMIN — ENOXAPARIN SODIUM 40 MG: 40 INJECTION SUBCUTANEOUS at 08:59

## 2018-12-07 RX ADMIN — IPRATROPIUM BROMIDE AND ALBUTEROL SULFATE 3 ML: 2.5; .5 SOLUTION RESPIRATORY (INHALATION) at 07:23

## 2018-12-07 RX ADMIN — FUROSEMIDE 20 MG: 20 TABLET ORAL at 08:59

## 2018-12-07 RX ADMIN — LISINOPRIL 5 MG: 5 TABLET ORAL at 08:58

## 2018-12-07 RX ADMIN — FLUTICASONE PROPIONATE 1 SPRAY: 50 SPRAY, METERED NASAL at 09:06

## 2018-12-07 RX ADMIN — ALBUTEROL SULFATE 2.5 MG: 2.5 SOLUTION RESPIRATORY (INHALATION) at 10:17

## 2018-12-07 RX ADMIN — IPRATROPIUM BROMIDE AND ALBUTEROL SULFATE 3 ML: 2.5; .5 SOLUTION RESPIRATORY (INHALATION) at 13:32

## 2018-12-07 RX ADMIN — ATORVASTATIN CALCIUM 20 MG: 20 TABLET, FILM COATED ORAL at 08:59

## 2018-12-07 RX ADMIN — INSULIN LISPRO 1 UNITS: 100 INJECTION, SOLUTION INTRAVENOUS; SUBCUTANEOUS at 13:59

## 2018-12-07 RX ADMIN — MULTIPLE VITAMINS W/ MINERALS TAB 1 TABLET: TAB at 08:59

## 2018-12-07 RX ADMIN — GUAIFENESIN 600 MG: 600 TABLET, EXTENDED RELEASE ORAL at 08:59

## 2018-12-07 RX ADMIN — PANTOPRAZOLE SODIUM 40 MG: 40 TABLET, DELAYED RELEASE ORAL at 05:29

## 2018-12-07 RX ADMIN — ASPIRIN 81 MG 81 MG: 81 TABLET ORAL at 08:59

## 2018-12-07 RX ADMIN — LEVOFLOXACIN 750 MG: 750 TABLET, FILM COATED ORAL at 09:00

## 2018-12-07 NOTE — ASSESSMENT & PLAN NOTE
· Once again patient does not carry a formal diagnosis of COPD as outlined in the H&P  · Patient has a greater than 50 pack year smoking history  · DC on prednisone taper, antibiotics and supplemental oxygen as outlined above

## 2018-12-07 NOTE — NURSING NOTE
Pt alert and orientated, pleasant and coopertive  Lungs are decreased with exp wheezes, on 1L, does not use home O2  Currently resting comfortably in bed, denies pain, no s/s of distress  Call bell is within reach

## 2018-12-07 NOTE — ASSESSMENT & PLAN NOTE
· Has significantly improved  · This is secondary to an acute COPD exacerbation caused by a a right lower lobe community-acquired pneumonia  · DC home on a prednisone taper  · Prescription provided 40 mg daily by mouth then 20 mg daily by mouth, both for 3 days, and then to stop  · Prescription provided for 5 more days worth of Levaquin  · Outpatient follow-up with her PCP

## 2018-12-07 NOTE — ASSESSMENT & PLAN NOTE
· Patient meets criteria for sepsis with tachycardia, tachypnea and leukocytosis likely secondary to a right lower lobe pneumonia  · Blood cultures negative thus far  · Influenza testing negative thus far  · Steroid induced leukocytosis noted  · DC home on 5 more days of Levaquin

## 2018-12-07 NOTE — SOCIAL WORK
Pt discussed in care coordination rounds  Pt for discharge home today  Pt qualifies for home oxygen as per respiratory  Script for home oxygen signed by Dr Claudette Bloomer and sent to [facility]  CM received conformation from [employer] that they are able to deliver oxygen to pt home this evening  Pt given portable [employer] tank to go home  Tank will last between 3-4 hours at 2L  Per [facility] they can deliver this evening and within 3-4 hour timferame  Pt declined the need for home health care  Daughter to transport  No further needs

## 2018-12-07 NOTE — ASSESSMENT & PLAN NOTE
· This was confirmed in the CTA of the chest  · Initial concerns included the possibility of an acute bacterial bronchitis  · Patient is status post 5 days of Levaquin therapy will need 5 more  · Patient is stable for discharge  · Will need supplemental oxygen that case management has arranged  · All cultures negative to date thus far  · Patient will follow up with her outpatient primary care physician

## 2018-12-07 NOTE — PLAN OF CARE
DISCHARGE PLANNING     Discharge to home or other facility with appropriate resources Completed        DISCHARGE PLANNING - CARE MANAGEMENT     Discharge to post-acute care or home with appropriate resources Completed        INFECTION - ADULT     Absence or prevention of progression during hospitalization Completed     Absence of fever/infection during neutropenic period Completed        Knowledge Deficit     Patient/family/caregiver demonstrates understanding of disease process, treatment plan, medications, and discharge instructions Completed        PAIN - ADULT     Verbalizes/displays adequate comfort level or baseline comfort level Completed        Potential for Falls     Patient will remain free of falls Completed        Prexisting or High Potential for Compromised Skin Integrity     Skin integrity is maintained or improved Completed        RESPIRATORY - ADULT     Achieves optimal ventilation and oxygenation Completed        SAFETY ADULT     Maintain or return to baseline ADL function Completed     Maintain or return mobility status to optimal level Completed

## 2018-12-07 NOTE — ASSESSMENT & PLAN NOTE
Lab Results   Component Value Date    HGBA1C 7 6% 09/11/2018       Recent Labs      12/06/18   1637  12/06/18   2041  12/07/18   0622  12/07/18   1051   POCGLU  276*  322*  141*  171*       Blood Sugar Average: Last 72 hrs:  (P) 231 [id number]   · DC home on pre-admit meds at pre-admit dosages

## 2018-12-07 NOTE — NURSING NOTE
Patient DC home today, IV's and tele removed, Dc instructions reviewed with patient, patient hooked up to portable oxygen at 2 L, Patient instructed to follow up with all MD appointments and to continue to take all med's as prescribed, pt verbalized understanding

## 2018-12-07 NOTE — DISCHARGE SUMMARY
Discharge- Carina Mcduffie 1940, 66 y o  female MRN: [id number]    Unit/Bed#: -02 Encounter: [id number]    Primary Care Provider: Juan Thapa PA-C   Date and time admitted to hospital: 12/3/2018  7:58 AM        Pneumonia of right lower lobe due to infectious organism [facility])   Assessment & Plan    · This was confirmed in the CTA of the chest  · Initial concerns included the possibility of an acute bacterial bronchitis  · Patient is status post 5 days of Levaquin therapy will need 5 more  · Patient is stable for discharge  · Will need supplemental oxygen that case management has arranged  · All cultures negative to date thus far  · Patient will follow up with her outpatient primary care physician     Acute respiratory failure with hypoxia ([facility] )   Assessment & Plan    · Has significantly improved  · This is secondary to an acute COPD exacerbation caused by a a right lower lobe community-acquired pneumonia  · DC home on a prednisone taper  · Prescription provided 40 mg daily by mouth then 20 mg daily by mouth, both for 3 days, and then to stop  · Prescription provided for 5 more days worth of Levaquin  · Outpatient follow-up with her PCP     * COPD with acute exacerbation ([facility] )   Assessment & Plan    · Once again patient does not carry a formal diagnosis of COPD as outlined in the H&P  · Patient has a greater than 50 pack year smoking history  · DC on prednisone taper, antibiotics and supplemental oxygen as outlined above     Elevated d-dimer   Assessment & Plan    · CTA of the chest PE protocol was completed yesterday  · PE was ruled out  · Pneumonia was ruled in  · COPD changes were also noted  · This is the final report:No evidence of pulmonary embolism  COPD changes  Right lower lobe peribronchial thickening with patchy infiltrate  Recurrent low-grade would need to be considered in this age group       Sepsis [facility])   Assessment & Plan    · Patient meets criteria for sepsis with tachycardia, tachypnea and leukocytosis likely secondary to a right lower lobe pneumonia  · Blood cultures negative thus far  · Influenza testing negative thus far  · Steroid induced leukocytosis noted  · DC home on 5 more days of Levaquin     Elevated troponin   Assessment & Plan    · Patient denies any chest pain  · Troponins are equivocal  2D echocardiogram was performed this morning with the following result:  Systolic function was normal  Ejection fraction was estimated in the range of 50 % to 70 %  Although no diagnostic regional wall motion abnormality was identified, this possibility cannot be completely excluded on the basis of this study  Wall thickness was mildly increased  There was mild concentric hypertrophy  Doppler parameters were consistent with abnormal left ventricular relaxation (grade 1 diastolic dysfunction)  · Continue current Cardiology based medications which include aspirin, lisinopril and Lipitor  ·  Patient will need an eventual cardiac stress test  · Patient is status post a Cardiology evaluation  · Outpatient Cardiology follow-up     Essential hypertension   Assessment & Plan    · Continue with lisinopril        Hyperlipidemia, unspecified   Assessment & Plan    · Continue statin     Type 2 diabetes mellitus with hyperglycemia, without long-term current use of insulin [facility])   Assessment & Plan    Lab Results   Component Value Date    HGBA1C 7 6% 09/11/2018       Recent Labs      12/06/18   1637  12/06/18   2041  12/07/18   0622  12/07/18   1051   POCGLU  276*  322*  141*  171*       Blood Sugar Average: Last 72 hrs:  (P) 231 [id number]   · DC home on pre-admit meds at pre-admit dosages           Discharging Physician / Practitioner: Aki Ibarra MD  PCP: Dereje Eric PA-C  Admission Date:   Admission Orders     Ordered        12/03/18 0956  Inpatient Admission (expected length of stay for this patient is greater than two midnights)  Once             Discharge Date: 12/07/18    Resolved Problems  Date Reviewed: 12/3/2018    None          Consultations During Hospital Stay:  · Cardiology    Procedures Performed:   · None    Significant Findings / Test Results:   · CTA chest PE protocol-No evidence of pulmonary embolism  COPD changes  Right lower lobe peribronchial thickening with patchy infiltrate  Recurrent low-grade would need to be considered in this age group  · Chest x-ray-   Bibasilar vascular pulmonary edema   Evolving  fluid overload     · 2D echocardiogram-Systolic function was normal  Ejection fraction was estimated in the range of 50 % to 70 %  Although no diagnostic regional wall motion abnormality was identified, this possibility cannot be completely excluded on the basis of this study  Wall thickness was mildly increased  There was mild concentric hypertrophy  Doppler parameters were consistent with abnormal left ventricular relaxation (grade 1 diastolic dysfunction)       Incidental Findings:   · None     Test Results Pending at Discharge (will require follow up): · None     Outpatient Tests Requested:  · Stress test by a cardiology    Complications:  None    Reason for Admission:   Shortness of breath/pneumonia    Hospital Course:     Heladio Walker is a 66 y o  female patient who originally presented to the hospital on 12/3/2018 due to shortness of breath  Please refer to the initial history and physical examination completed by Claude Peel for the initial presenting features and complaints  In brief patient is a 43-year-old female who was admitted back on day of admission after she came with shortness of breath  She was diagnosed with an acute exacerbation of COPD  She was admitted to [facility] surge  She was started on IV steroids as well as IV antibiotics  She had a CT scan of the chest PE protocol ordered to rule out the possibility of PE    PE was ruled out  A right lower lobe pneumonia was ruled in     Additionally patient had minimally elevated troponins here on this admission  Patient was seen by Cardiology  2D echocardiogram was performed as outlined above  This was a non ST segment elevation myocardial infarction type 2  Cardiology will follow up in the outpatient with a stress test    Patient was tapered from IV steroids to p o  Prednisone  She will be discharged on p o  prednisone at 40 mg daily p  o  x3 days then 20 mg daily p  o  x3 days and then has been advised to stop  She was additionally prescribed 5 more days of levofloxacin to complete a total of a 10 day course  Patient met criteria for oxygen at time of discharge  Case management set up oxygen  Patient will need to establish care with a pulmonologist as an outpatient for formal pulmonary function testing  She will additionally follow up with her primary care physician  She was discharged home on all of her other pre-admission medications at the preadmission dosages  Please see above list of diagnoses and related plan for additional information  Condition at Discharge: fair     Discharge Day Visit / Exam:     Subjective:  Patient seen and examined, no new complaints no distress is happy about having the ability to call her daughter so that she can go home  Vitals: Blood Pressure: 107/60 (12/07/18 1101)  Pulse: (!) 109 (12/07/18 1101)  Temperature: 98 7 °F (37 1 °C) (12/07/18 1101)  Temp Source: Tympanic (12/07/18 1101)  Respirations: 18 (12/07/18 1101)  Height: 5' 1" (154 9 cm) (12/03/18 1059)  Weight - Scale: 80 8 kg (178 lb 1 6 oz) (12/07/18 0600)  SpO2: 92 % (12/07/18 1101)  Exam:   Physical Exam   Constitutional: She is oriented to person, place, and time  She appears well-developed and well-nourished  HENT:   Head: Normocephalic and atraumatic  Nose: Nose normal    Mouth/Throat: Oropharynx is clear and moist    Eyes: Pupils are equal, round, and reactive to light  Conjunctivae and EOM are normal    Neck: Normal range of motion  Neck supple   No JVD present  No thyromegaly present  Cardiovascular: Normal rate, regular rhythm and intact distal pulses  Exam reveals no gallop and no friction rub  No murmur heard  Pulmonary/Chest: Effort normal and breath sounds normal  No respiratory distress  Improved bilateral wheezing is now minimal to faint   Abdominal: Soft  Bowel sounds are normal  She exhibits no distension and no mass  There is no tenderness  There is no guarding  Musculoskeletal: Normal range of motion  She exhibits no edema  Lymphadenopathy:     She has no cervical adenopathy  Neurological: She is alert and oriented to person, place, and time  No cranial nerve deficit  Skin: Skin is warm  No rash noted  No erythema  Psychiatric: She has a normal mood and affect  Her behavior is normal    Vitals reviewed  Discussion with Family:   No family was present at the time of my discharge evaluation    Discharge instructions/Information to patient and family:   See after visit summary for information provided to patient and family  Provisions for Follow-Up Care:  See after visit summary for information related to follow-up care and any pertinent home health orders  Disposition:     Home    For Discharges to [facility]:   · Not Applicable to this Patient - Not Applicable to this Patient    Planned Readmission:   None     Discharge Statement:  I spent 40 minutes discharging the patient  This time was spent on the day of discharge  I had direct contact with the patient on the day of discharge  Greater than 50% of the total time was spent examining patient, answering all patient questions, arranging and discussing plan of care with patient as well as directly providing post-discharge instructions  Additional time then spent on discharge activities  Discharge Medications:  See after visit summary for reconciled discharge medications provided to patient and family        ** Please Note: This note has been constructed using a voice recognition system **

## 2018-12-07 NOTE — ASSESSMENT & PLAN NOTE
· Patient denies any chest pain  · Troponins are equivocal  2D echocardiogram was performed this morning with the following result:  Systolic function was normal  Ejection fraction was estimated in the range of 50 % to 70 %  Although no diagnostic regional wall motion abnormality was identified, this possibility cannot be completely excluded on the basis of this study  Wall thickness was mildly increased  There was mild concentric hypertrophy  Doppler parameters were consistent with abnormal left ventricular relaxation (grade 1 diastolic dysfunction)    · Continue current Cardiology based medications which include aspirin, lisinopril and Lipitor  ·  Patient will need an eventual cardiac stress test  · Patient is status post a Cardiology evaluation  · Outpatient Cardiology follow-up

## 2018-12-08 LAB
BACTERIA BLD CULT: NORMAL
BACTERIA BLD CULT: NORMAL

## 2018-12-10 DIAGNOSIS — E13.8 DIABETES MELLITUS OF OTHER TYPE WITH COMPLICATION, UNSPECIFIED WHETHER LONG TERM INSULIN USE: ICD-10-CM

## 2018-12-10 DIAGNOSIS — I10 HYPERTENSION, UNSPECIFIED TYPE: ICD-10-CM

## 2018-12-10 DIAGNOSIS — K21.9 GASTROESOPHAGEAL REFLUX DISEASE WITHOUT ESOPHAGITIS: ICD-10-CM

## 2018-12-10 RX ORDER — LISINOPRIL 5 MG/1
5 TABLET ORAL DAILY
Qty: 90 TABLET | Refills: 1 | Status: SHIPPED | OUTPATIENT
Start: 2018-12-10 | End: 2019-02-26 | Stop reason: SDUPTHER

## 2018-12-10 RX ORDER — OMEPRAZOLE 20 MG/1
20 CAPSULE, DELAYED RELEASE ORAL 2 TIMES DAILY
Qty: 180 CAPSULE | Refills: 1 | Status: SHIPPED | OUTPATIENT
Start: 2018-12-10 | End: 2019-02-26 | Stop reason: SDUPTHER

## 2018-12-11 DIAGNOSIS — E13.8 DIABETES MELLITUS OF OTHER TYPE WITH COMPLICATION, UNSPECIFIED WHETHER LONG TERM INSULIN USE: ICD-10-CM

## 2018-12-12 ENCOUNTER — APPOINTMENT (OUTPATIENT)
Dept: RADIOLOGY | Facility: CLINIC | Age: 78
End: 2018-12-12
Payer: MEDICARE

## 2018-12-12 ENCOUNTER — TRANSCRIBE ORDERS (OUTPATIENT)
Dept: ADMINISTRATIVE | Facility: HOSPITAL | Age: 78
End: 2018-12-12

## 2018-12-12 ENCOUNTER — OFFICE VISIT (OUTPATIENT)
Dept: INTERNAL MEDICINE CLINIC | Facility: CLINIC | Age: 78
End: 2018-12-12
Payer: MEDICARE

## 2018-12-12 ENCOUNTER — TRANSCRIBE ORDERS (OUTPATIENT)
Dept: RADIOLOGY | Facility: CLINIC | Age: 78
End: 2018-12-12

## 2018-12-12 ENCOUNTER — APPOINTMENT (OUTPATIENT)
Dept: LAB | Facility: CLINIC | Age: 78
End: 2018-12-12
Payer: MEDICARE

## 2018-12-12 VITALS
HEIGHT: 61 IN | DIASTOLIC BLOOD PRESSURE: 68 MMHG | BODY MASS INDEX: 33.42 KG/M2 | OXYGEN SATURATION: 96 % | SYSTOLIC BLOOD PRESSURE: 116 MMHG | HEART RATE: 115 BPM | WEIGHT: 177 LBS | RESPIRATION RATE: 22 BRPM | TEMPERATURE: 98.9 F

## 2018-12-12 DIAGNOSIS — J44.1 CHRONIC OBSTRUCTIVE PULMONARY DISEASE WITH ACUTE EXACERBATION (HCC): ICD-10-CM

## 2018-12-12 DIAGNOSIS — E13.8 DIABETES MELLITUS OF OTHER TYPE WITH COMPLICATION, UNSPECIFIED WHETHER LONG TERM INSULIN USE: ICD-10-CM

## 2018-12-12 DIAGNOSIS — J45.909 UNCOMPLICATED ASTHMA, UNSPECIFIED ASTHMA SEVERITY, UNSPECIFIED WHETHER PERSISTENT: ICD-10-CM

## 2018-12-12 DIAGNOSIS — E11.65 TYPE 2 DIABETES MELLITUS WITH HYPERGLYCEMIA, WITHOUT LONG-TERM CURRENT USE OF INSULIN (HCC): ICD-10-CM

## 2018-12-12 DIAGNOSIS — J18.9 PNEUMONIA OF RIGHT LOWER LOBE DUE TO INFECTIOUS ORGANISM: ICD-10-CM

## 2018-12-12 DIAGNOSIS — G62.9 NEUROPATHY: ICD-10-CM

## 2018-12-12 DIAGNOSIS — I21.4 SUBENDOCARDIAL MI SUBSEQUENT EPISODE CARE (HCC): Primary | ICD-10-CM

## 2018-12-12 DIAGNOSIS — E55.9 VITAMIN D DEFICIENCY: ICD-10-CM

## 2018-12-12 DIAGNOSIS — R74.8 ACID PHOSPHATASE ELEVATED: ICD-10-CM

## 2018-12-12 DIAGNOSIS — I10 ESSENTIAL HYPERTENSION: ICD-10-CM

## 2018-12-12 DIAGNOSIS — I24.9 ACUTE CORONARY SYNDROME (HCC): Primary | ICD-10-CM

## 2018-12-12 DIAGNOSIS — K21.9 GASTROESOPHAGEAL REFLUX DISEASE WITHOUT ESOPHAGITIS: ICD-10-CM

## 2018-12-12 DIAGNOSIS — J18.9 PNEUMONIA OF RIGHT LOWER LOBE DUE TO INFECTIOUS ORGANISM: Primary | ICD-10-CM

## 2018-12-12 LAB
25(OH)D3 SERPL-MCNC: 45.9 NG/ML (ref 30–100)
BASOPHILS # BLD AUTO: 0.04 THOUSANDS/ΜL (ref 0–0.1)
BASOPHILS NFR BLD AUTO: 0 % (ref 0–1)
CREAT UR-MCNC: 148 MG/DL
EOSINOPHIL # BLD AUTO: 0.09 THOUSAND/ΜL (ref 0–0.61)
EOSINOPHIL NFR BLD AUTO: 1 % (ref 0–6)
ERYTHROCYTE [DISTWIDTH] IN BLOOD BY AUTOMATED COUNT: 13.8 % (ref 11.6–15.1)
EST. AVERAGE GLUCOSE BLD GHB EST-MCNC: 180 MG/DL
HBA1C MFR BLD: 7.9 % (ref 4.2–6.3)
HCT VFR BLD AUTO: 46.8 % (ref 34.8–46.1)
HGB BLD-MCNC: 14 G/DL (ref 11.5–15.4)
IMM GRANULOCYTES # BLD AUTO: 0.16 THOUSAND/UL (ref 0–0.2)
IMM GRANULOCYTES NFR BLD AUTO: 1 % (ref 0–2)
LYMPHOCYTES # BLD AUTO: 1.49 THOUSANDS/ΜL (ref 0.6–4.47)
LYMPHOCYTES NFR BLD AUTO: 9 % (ref 14–44)
MCH RBC QN AUTO: 27.5 PG (ref 26.8–34.3)
MCHC RBC AUTO-ENTMCNC: 29.9 G/DL (ref 31.4–37.4)
MCV RBC AUTO: 92 FL (ref 82–98)
MICROALBUMIN UR-MCNC: 26.3 MG/L (ref 0–20)
MICROALBUMIN/CREAT 24H UR: 18 MG/G CREATININE (ref 0–30)
MONOCYTES # BLD AUTO: 0.8 THOUSAND/ΜL (ref 0.17–1.22)
MONOCYTES NFR BLD AUTO: 5 % (ref 4–12)
NEUTROPHILS # BLD AUTO: 13.45 THOUSANDS/ΜL (ref 1.85–7.62)
NEUTS SEG NFR BLD AUTO: 84 % (ref 43–75)
NRBC BLD AUTO-RTO: 0 /100 WBCS
PLATELET # BLD AUTO: 320 THOUSANDS/UL (ref 149–390)
PMV BLD AUTO: 10.8 FL (ref 8.9–12.7)
RBC # BLD AUTO: 5.09 MILLION/UL (ref 3.81–5.12)
WBC # BLD AUTO: 16.03 THOUSAND/UL (ref 4.31–10.16)

## 2018-12-12 PROCEDURE — 85025 COMPLETE CBC W/AUTO DIFF WBC: CPT | Performed by: PHYSICIAN ASSISTANT

## 2018-12-12 PROCEDURE — 83036 HEMOGLOBIN GLYCOSYLATED A1C: CPT | Performed by: PHYSICIAN ASSISTANT

## 2018-12-12 PROCEDURE — 36415 COLL VENOUS BLD VENIPUNCTURE: CPT | Performed by: PHYSICIAN ASSISTANT

## 2018-12-12 PROCEDURE — 82306 VITAMIN D 25 HYDROXY: CPT | Performed by: PHYSICIAN ASSISTANT

## 2018-12-12 PROCEDURE — 82043 UR ALBUMIN QUANTITATIVE: CPT | Performed by: PHYSICIAN ASSISTANT

## 2018-12-12 PROCEDURE — 99496 TRANSJ CARE MGMT HIGH F2F 7D: CPT | Performed by: PHYSICIAN ASSISTANT

## 2018-12-12 PROCEDURE — 71046 X-RAY EXAM CHEST 2 VIEWS: CPT

## 2018-12-12 PROCEDURE — 82570 ASSAY OF URINE CREATININE: CPT | Performed by: PHYSICIAN ASSISTANT

## 2018-12-12 NOTE — PROGRESS NOTES
Transition of Care  Follow-up After Hospitalization    Lizette Martinez 66 y o  female   Date:  12/12/2018    TCM Call (since 11/11/2018)     Date and time call was made  12/7/2018  4:43 PM    Hospital care reviewed  Records reviewed; Other diagnostic studies pending    Patient was hospitialized at  [address] [place] [address]    Date of Admission  12/03/18    Date of discharge  12/07/18    Diagnosis  copd with acute exacerbation    Disposition  Home    Were the patients medications reviewed and updated  Yes    Current Symptoms  None      TCM Call (since 11/11/2018)     Post hospital issues  None    Should patient be enrolled in anticoag monitoring? No    Scheduled for follow up? Yes    Referrals needed  pulmonologist     Did you obtain your prescribed medications  Yes    Do you need help managing your prescriptions or medications  No    Is transportation to your appointment needed  No    I have advised the patient to call PCP with any new or worsening symptoms  darrius/am    Are you recieving any outpatient services  No    Are you recieving home care services  No    Are you using any community resources  No    Current waiver services  No    Have you fallen in the last 12 months  No    Interperter language line needed  No    Counseling  Patient    Counseling topics  Prognosis; instructions for management; Activities of daily living; Risk factor reduction        Admit Date: 12/3/18  Discharge Date: 12/7/18  Diagnosis: RLL pneumonia, COPD exacerbation and NSTEMI  Location[facility] were reviewed  Medications upon discharge reviewed/updated  Medication Changes: Oral abx and steroid taper  Imaging: CTA chest, 2-D echo  Consults: Cardiology  Discharge Disposition:  home  Follow up visits with other specialists: cardiology      Assessment and Plan:    Tracie Nunez was seen today for transition of care management      Diagnoses and all orders for this visit:    Gastroesophageal reflux disease without esophagitis  - CBC and differential    Type 2 diabetes mellitus with hyperglycemia, without long-term current use of insulin (HCC)  -     HEMOGLOBIN A1C W/ EAG ESTIMATION  -     Microalbumin / creatinine urine ratio    Uncomplicated asthma, unspecified asthma severity, unspecified whether persistent  -     CBC and differential    Chronic obstructive pulmonary disease with acute exacerbation (HCC)  -     CBC and differential    Essential hypertension  -     CBC and differential  -     Microalbumin / creatinine urine ratio    Neuropathy  -     CBC and differential    Vitamin D deficiency  -     Vitamin D 25 hydroxy    Diabetes mellitus of other type with complication, unspecified whether long term insulin use (HCC)  -     glucose blood (ONETOUCH VERIO) test strip; 1 each by Other route 2 (two) times a day Test two times daily    Pneumonia of right lower lobe due to infectious organism (HCC)  -     XR chest pa & lateral; Future            HPI:  Pt presents for TASNEEM  She was evaluated in urgent care on 11/29/18 and dx with bronchitis and given inhaler and zithromax  Unfortunately her symptoms worsened so she was evaluated at [facility] ER on 12/3 and subsequently admitted  She underwent CTA chest, labs, EKG  Labs revealed mildly elevated troponins and elevated d-dimer  She then underwent CTA chest which was negative for PE but did reveal RLL pneumonia  She underwent 2-D echo and will continue with cardiology for a nuclear stress test as an outpt  She was treated with a steroid taper and levaquin for her pneumonia and discharged home on 12/7  Yesterday was her last dose of medication  She was also sent home with O2 which has helped slightly  She is feeling much improved but not yet 100%  ROS: Review of Systems   Constitutional: Negative for chills and fever  HENT: Negative for congestion, ear pain, hearing loss, postnasal drip, rhinorrhea, sinus pain, sinus pressure, sore throat and trouble swallowing      Eyes: Negative for pain and visual disturbance  Respiratory: Positive for cough and shortness of breath  Negative for chest tightness and wheezing  Cardiovascular: Negative  Negative for chest pain, palpitations and leg swelling  Gastrointestinal: Negative for abdominal pain, blood in stool, constipation, diarrhea, nausea and vomiting  Endocrine: Negative for cold intolerance, heat intolerance, polydipsia, polyphagia and polyuria  Genitourinary: Negative for difficulty urinating, dysuria, flank pain and urgency  Musculoskeletal: Negative for arthralgias, back pain, gait problem and myalgias  Skin: Negative for rash  Allergic/Immunologic: Negative  Neurological: Negative for dizziness, weakness, light-headedness and headaches  Hematological: Negative  Psychiatric/Behavioral: Negative for behavioral problems, dysphoric mood and sleep disturbance  The patient is not nervous/anxious  Past Medical History:   Diagnosis Date    Allergic     COPD [facility]ch[facility] obstructive pulmonary disease) ([facility] )     Diabetes mellitus (HCC)     GERD (gastroesophageal reflux disease)     Hyperlipidemia     Hypertension     Pneumonia     Pneumonia of right lower lobe due to infectious organism ([facility] ) 12/4/2018    Vitamin D deficiency        Past Surgical History:   Procedure Laterality Date    CHOLECYSTECTOMY  1984    EYE SURGERY Bilateral     CATARACT       Social History     Social History    Marital status:       Spouse name: N/A    Number of children: N/A    Years of education: N/A     Social History Main Topics    Smoking status: Former Smoker     Years: 50 00     Types: Cigarettes    Smokeless tobacco: Never Used    Alcohol use No    Drug use: No    Sexual activity: Not Asked     Other Topics Concern    None     Social History Narrative    Daily caffeine consumption           Family History   Problem Relation Age of Onset    Lung cancer Father     Heart disease Mother     Lung cancer Sister     Lung cancer Brother        Allergies   Allergen Reactions    Other      FLOWERS/GRASS         Current Outpatient Prescriptions:     albuterol (PROVENTIL HFA,VENTOLIN HFA) 90 mcg/act inhaler, Inhale 2 puffs every 4 (four) hours as needed for wheezing or shortness of breath for up to 30 days, Disp: 1 Inhaler, Rfl: 0    atorvastatin (LIPITOR) 20 mg tablet, Take 1 tablet (20 mg total) by mouth daily, Disp: 90 tablet, Rfl: 3    Blood Glucose Monitoring Suppl (ONETOUCH VERIO) w/Device KIT, USE AS DIRECTED, Disp: 1 kit, Rfl: 0    clotrimazole-betamethasone (LOTRISONE) 1-0 05 % cream, Apply topically 2 (two) times a day, Disp: 30 g, Rfl: 0    Ergocalciferol (VITAMIN D2) 2000 units TABS, Take 2,000 mg by mouth, Disp: , Rfl:     fluticasone (FLONASE) 50 mcg/act nasal spray, 1 spray into each nostril daily, Disp: , Rfl:     gabapentin (NEURONTIN) 300 mg capsule, Take 1 capsule (300 mg total) by mouth daily, Disp: 90 capsule, Rfl: 3    glucose blood (ONETOUCH VERIO) test strip, Use as instructed TEST TWO TIMES DAILY e11 [id number], Disp: 300 each, Rfl: 3    glucose blood (ONETOUCH VERIO) test strip, 1 each by Other route 2 (two) times a day Test two times daily, Disp: 300 each, Rfl: 3    levofloxacin (LEVAQUIN) 750 mg tablet, Take 1 tablet (750 mg total) by mouth every 24 hours for 5 days, Disp: 5 tablet, Rfl: 0    lisinopril (ZESTRIL) 5 mg tablet, Take 1 tablet (5 mg total) by mouth daily, Disp: 90 tablet, Rfl: 1    metFORMIN (GLUCOPHAGE) 500 mg tablet, Take 1 tablet (500 mg total) by mouth 2 (two) times a day with meals, Disp: 180 tablet, Rfl: 3    Multiple Vitamin (MULTIVITAMINS PO), Take by mouth daily, Disp: , Rfl:     omeprazole (PriLOSEC) 20 mg delayed release capsule, Take 1 capsule (20 mg total) by mouth 2 (two) times a day, Disp: 180 capsule, Rfl: 1    ONETOUCH DELICA LANCETS FINE MISC, USE AS DIRECTED THREE TIMES DAILY, Disp: 100 each, Rfl: 2    predniSONE 20 mg tablet, Take 2 tablets (40 mg total) by mouth daily Dispense 20 mg tablets 40 mg daily p  o  x3 days then 20 mg daily p  o  x3 days Dispense 9 tablets, Disp: 9 tablet, Rfl: 0    tiotropium (SPIRIVA HANDIHALER) 18 mcg inhalation capsule, Place 1 capsule (18 mcg total) into inhaler and inhale daily, Disp: 3 each, Rfl: 3      Physical Exam:  /68 (BP Location: Left arm, Patient Position: Sitting, Cuff Size: Adult)   Pulse (!) 115   Temp 98 9 °F (37 2 °C) (Tympanic)   Resp 22   Ht 5' 1" (1 549 m)   Wt 80 3 kg (177 lb)   SpO2 96% Comment: on 2L/M  BMI 33 44 kg/m²     Physical Exam   Constitutional: She is oriented to person, place, and time  She appears well-developed and well-nourished  No distress  HENT:   Head: Normocephalic and atraumatic  Right Ear: External ear normal    Left Ear: External ear normal    Nose: Nose normal    Mouth/Throat: Oropharynx is clear and moist  No oropharyngeal exudate  Eyes: Pupils are equal, round, and reactive to light  Conjunctivae and EOM are normal  Right eye exhibits no discharge  Left eye exhibits no discharge  No scleral icterus  Neck: Normal range of motion  Neck supple  No thyromegaly present  Cardiovascular: Normal rate, regular rhythm and normal heart sounds  Exam reveals no gallop and no friction rub  No murmur heard  Pulmonary/Chest: Effort normal  No respiratory distress  She has wheezes  She has no rales  Abdominal: Soft  Bowel sounds are normal  She exhibits no distension  There is no tenderness  Musculoskeletal: Normal range of motion  She exhibits no edema, tenderness or deformity  Neurological: She is alert and oriented to person, place, and time  No cranial nerve deficit  Skin: Skin is warm and dry  She is not diaphoretic  Psychiatric: She has a normal mood and affect   Her behavior is normal  Judgment and thought content normal            Labs:  Lab Results   Component Value Date    WBC 14 70 (H) 12/06/2018    HGB 11 9 (L) 12/06/2018    HCT 36 8 12/06/2018    MCV 85 12/06/2018    PLT 319 12/06/2018     Lab Results   Component Value Date    K 4 2 12/06/2018    CL 99 12/06/2018    CO2 37 (H) 12/06/2018    BUN 18 12/06/2018    CREATININE 0 64 12/06/2018    GLUF 152 (H) 06/12/2018    CALCIUM 9 2 12/06/2018    AST 32 12/04/2018    ALT 57 (H) 12/04/2018    ALKPHOS 58 12/04/2018    EGFR 86 12/06/2018         A/P  1  RLL pneumonia:  Pt has finished course of steroids and abx  Symptoms improved but not completely resolved  Will get repeat CXR to ensure resolution of pneumonia  Continue O2 for now but this will likely not be needed long term for her  2  NSTEMI: She should continue her aspirin, ACE, and statin  Schedule nuclear stress test that she has an order for  She denies chest pain  3  Hx of COPD: Baseline breathing is good  She is a former 50 pack year smoker and quit 5 years ago

## 2018-12-14 ENCOUNTER — PATIENT OUTREACH (OUTPATIENT)
Dept: CASE MANAGEMENT | Facility: OTHER | Age: 78
End: 2018-12-14

## 2018-12-14 ENCOUNTER — PATIENT OUTREACH (OUTPATIENT)
Dept: INTERNAL MEDICINE CLINIC | Facility: CLINIC | Age: 78
End: 2018-12-14

## 2018-12-14 NOTE — PROGRESS NOTES
Outpatient Care Management Note:  Reached out to introduce myself and my role to Sultana Vazquez  She is doing well at home  Continues to have an occasional cough, sputum os now clear  Has not needed her rescue inhaler since she has been home  Using oxygen at 2 liters  She has had her first PCP appointment and is scheduled for her nuclear stress test   Discussed what symptoms to contact myself or her PCP with  Verbalized understanding of same  She has a walker at home if needed but rarely uses it "Only when my legs are bad from the neuropathy "  Blood sugars have been around 100 since she has been home  She has completed her antibiotics and steroids  Has no additional needs at this time  Provided my contact information    Encouraged to call with any change in symptoms, questions or concerns,

## 2018-12-20 ENCOUNTER — HOSPITAL ENCOUNTER (OUTPATIENT)
Dept: NON INVASIVE DIAGNOSTICS | Facility: CLINIC | Age: 78
Discharge: HOME | End: 2018-12-20
Payer: MEDICARE

## 2018-12-20 DIAGNOSIS — R74.8 ACID PHOSPHATASE ELEVATED: ICD-10-CM

## 2018-12-20 DIAGNOSIS — I24.9 ACUTE CORONARY SYNDROME (HCC): ICD-10-CM

## 2018-12-20 PROCEDURE — 93016 CV STRESS TEST SUPVJ ONLY: CPT | Performed by: INTERNAL MEDICINE

## 2018-12-20 PROCEDURE — 93018 CV STRESS TEST I&R ONLY: CPT | Performed by: INTERNAL MEDICINE

## 2018-12-27 DIAGNOSIS — E13.8 DIABETES MELLITUS OF OTHER TYPE WITH COMPLICATION, UNSPECIFIED WHETHER LONG TERM INSULIN USE: ICD-10-CM

## 2018-12-28 ENCOUNTER — PATIENT OUTREACH (OUTPATIENT)
Dept: INTERNAL MEDICINE CLINIC | Facility: CLINIC | Age: 78
End: 2018-12-28

## 2018-12-28 NOTE — PROGRESS NOTES
Outpatient Care Management Note:  Hair Matthew is doing well at home  Continues to have a cough but it is now non productive, using her inhaler as needed  No fever or chills  Using her oxygen continuously, she did try without but her cough became worse  Encouraged to call PCP or go to urgent care  if she developed a fever or began coughing anything up  Verbalized understanding of same  Remains independent with her ADL's and has no needs at this time  Verified that she has my contact information  Encouraged to call with any change in symptoms, questions or concerns

## 2019-01-03 ENCOUNTER — HOSPITAL ENCOUNTER (OUTPATIENT)
Dept: NON INVASIVE DIAGNOSTICS | Facility: CLINIC | Age: 79
Discharge: HOME | End: 2019-01-03
Payer: MEDICARE

## 2019-01-03 PROCEDURE — 93017 CV STRESS TEST TRACING ONLY: CPT

## 2019-01-03 PROCEDURE — A9502 TC99M TETROFOSMIN: HCPCS

## 2019-01-03 PROCEDURE — 78452 HT MUSCLE IMAGE SPECT MULT: CPT | Performed by: INTERNAL MEDICINE

## 2019-01-03 PROCEDURE — 78452 HT MUSCLE IMAGE SPECT MULT: CPT

## 2019-01-03 RX ADMIN — REGADENOSON 0.4 MG: 0.08 INJECTION, SOLUTION INTRAVENOUS at 09:50

## 2019-01-04 LAB
CHEST PAIN STATEMENT: NORMAL
MAX DIASTOLIC BP: 80 MMHG
MAX HEART RATE: 122 BPM
MAX PREDICTED HEART RATE: 142 BPM
MAX. SYSTOLIC BP: 130 MMHG
PROTOCOL NAME: NORMAL
REASON FOR TERMINATION: NORMAL
TARGET HR FORMULA: NORMAL
TEST INDICATION: NORMAL
TIME IN EXERCISE PHASE: NORMAL

## 2019-01-15 ENCOUNTER — OFFICE VISIT (OUTPATIENT)
Dept: INTERNAL MEDICINE CLINIC | Facility: CLINIC | Age: 79
End: 2019-01-15
Payer: MEDICARE

## 2019-01-15 VITALS
WEIGHT: 178.6 LBS | HEART RATE: 105 BPM | HEIGHT: 61 IN | DIASTOLIC BLOOD PRESSURE: 84 MMHG | BODY MASS INDEX: 33.72 KG/M2 | SYSTOLIC BLOOD PRESSURE: 132 MMHG | OXYGEN SATURATION: 96 % | TEMPERATURE: 99.8 F

## 2019-01-15 DIAGNOSIS — I10 ESSENTIAL HYPERTENSION: ICD-10-CM

## 2019-01-15 DIAGNOSIS — Z00.00 MEDICARE ANNUAL WELLNESS VISIT, INITIAL: ICD-10-CM

## 2019-01-15 DIAGNOSIS — J18.9 PNEUMONIA OF RIGHT LOWER LOBE DUE TO INFECTIOUS ORGANISM: Primary | ICD-10-CM

## 2019-01-15 PROBLEM — J40 BRONCHITIS: Status: RESOLVED | Noted: 2018-12-03 | Resolved: 2019-01-15

## 2019-01-15 PROBLEM — R05.9 COUGH: Status: RESOLVED | Noted: 2018-09-11 | Resolved: 2019-01-15

## 2019-01-15 PROBLEM — R77.8 ELEVATED TROPONIN: Status: RESOLVED | Noted: 2018-12-03 | Resolved: 2019-01-15

## 2019-01-15 PROBLEM — A41.9 SEPSIS (HCC): Status: RESOLVED | Noted: 2018-12-03 | Resolved: 2019-01-15

## 2019-01-15 PROBLEM — R79.89 ELEVATED D-DIMER: Status: RESOLVED | Noted: 2018-12-03 | Resolved: 2019-01-15

## 2019-01-15 PROBLEM — J96.01 ACUTE RESPIRATORY FAILURE WITH HYPOXIA (HCC): Status: RESOLVED | Noted: 2018-12-03 | Resolved: 2019-01-15

## 2019-01-15 PROBLEM — R79.89 ELEVATED TROPONIN: Status: RESOLVED | Noted: 2018-12-03 | Resolved: 2019-01-15

## 2019-01-15 PROBLEM — L30.4 INTERTRIGO: Status: RESOLVED | Noted: 2018-09-11 | Resolved: 2019-01-15

## 2019-01-15 PROCEDURE — G0438 PPPS, INITIAL VISIT: HCPCS | Performed by: PHYSICIAN ASSISTANT

## 2019-01-15 PROCEDURE — 99213 OFFICE O/P EST LOW 20 MIN: CPT | Performed by: PHYSICIAN ASSISTANT

## 2019-01-15 RX ORDER — ALBUTEROL SULFATE 90 UG/1
2 AEROSOL, METERED RESPIRATORY (INHALATION) EVERY 6 HOURS PRN
COMMUNITY
End: 2019-04-05 | Stop reason: SDUPTHER

## 2019-01-15 NOTE — PROGRESS NOTES
Assessment/Plan:    Pneumonia of right lower lobe due to infectious organism ([facility] )  Resolved  Pt is much improved  Essential hypertension  Pts symptoms are stable with current regime  No changes at present  Diagnoses and all orders for this visit:    Pneumonia of right lower lobe due to infectious organism [facility])    Essential hypertension    Medicare annual wellness visit, initial    Other orders  -     albuterol (PROVENTIL HFA,VENTOLIN HFA) 90 mcg/act inhaler; Inhale 2 puffs every 6 (six) hours as needed for wheezing          Subjective:      Patient ID: Lizette Martinez is a 66 y o  female  Pt presents for routine visit  Initial AQV performed as well  She is doing well overall  She has made a nice recovery from her pneumonia late in 2018  She has some episodic SOB but again, is much improved overall  BP is well controlled  She denies any new complaints or concerns  The following portions of the patient's history were reviewed and updated as appropriate: She  has a past medical history of Allergic; COPD (chronic obstructive pulmonary disease) ([facility] ); Diabetes mellitus ([facility] ); GERD (gastroesophageal reflux disease); Hyperlipidemia; Hypertension; Pneumonia; Pneumonia of right lower lobe due to infectious organism ([facility] ) (12/4/2018); and Vitamin D deficiency    She   Patient Active Problem List    Diagnosis Date Noted    Pneumonia of right lower lobe due to infectious organism ([facility] ) 12/04/2018    Vitamin D deficiency 11/30/2017    Asthma, mild intermittent 05/23/2016    Neuropathy 04/27/2016    Acid reflux disease 02/18/2016    Type 2 diabetes mellitus with hyperglycemia, without long-term current use of insulin ([facility] ) 01/21/2016    Hyperlipidemia, unspecified 01/21/2016    Essential hypertension 01/21/2016    Spinal stenosis of lumbar region without neurogenic claudication 01/13/2016    COPD with acute exacerbation ([facility] ) 06/04/2013     She  has a past surgical history that includes Cholecystectomy (1984) and Eye surgery (Bilateral)  Her family history includes Heart disease in her mother; Lung cancer in her brother, father, and sister  She  reports that she has quit smoking  Her smoking use included Cigarettes  She quit after 50 00 years of use  She has never used smokeless tobacco  She reports that she does not drink alcohol or use drugs    Current Outpatient Prescriptions   Medication Sig Dispense Refill    albuterol (PROVENTIL HFA,VENTOLIN HFA) 90 mcg/act inhaler Inhale 2 puffs every 6 (six) hours as needed for wheezing      atorvastatin (LIPITOR) 20 mg tablet Take 1 tablet (20 mg total) by mouth daily 90 tablet 3    Blood Glucose Monitoring Suppl (ONETOUCH VERIO) w/Device KIT USE AS DIRECTED 1 kit 0    clotrimazole-betamethasone (LOTRISONE) 1-0 05 % cream Apply topically 2 (two) times a day 30 g 0    Ergocalciferol (VITAMIN D2) 2000 units TABS Take 2,000 mg by mouth      fluticasone (FLONASE) 50 mcg/act nasal spray 1 spray into each nostril as needed        gabapentin (NEURONTIN) 300 mg capsule Take 1 capsule (300 mg total) by mouth daily 90 capsule 3    glucose blood (ONETOUCH VERIO) test strip Use as instructed TEST TWO TIMES DAILY e11 65 300 each 3    glucose blood (ONETOUCH VERIO) test strip 1 each by Other route 2 (two) times a day Test two times daily 300 each 1    lisinopril (ZESTRIL) 5 mg tablet Take 1 tablet (5 mg total) by mouth daily 90 tablet 1    metFORMIN (GLUCOPHAGE) 500 mg tablet Take 1 tablet (500 mg total) by mouth 2 (two) times a day with meals 180 tablet 3    Multiple Vitamin (MULTIVITAMINS PO) Take by mouth daily      omeprazole (PriLOSEC) 20 mg delayed release capsule Take 1 capsule (20 mg total) by mouth 2 (two) times a day 180 capsule 1    ONETOUCH DELICA LANCETS FINE MISC USE AS DIRECTED THREE TIMES DAILY 100 each 2    tiotropium (SPIRIVA HANDIHALER) 18 mcg inhalation capsule Place 1 capsule (18 mcg total) into inhaler and inhale daily 3 each 3 No current facility-administered medications for this visit  Current Outpatient Prescriptions on File Prior to Visit   Medication Sig    atorvastatin (LIPITOR) 20 mg tablet Take 1 tablet (20 mg total) by mouth daily    Blood Glucose Monitoring Suppl (ONETOUCH VERIO) w/Device KIT USE AS DIRECTED    clotrimazole-betamethasone (LOTRISONE) 1-0 05 % cream Apply topically 2 (two) times a day    Ergocalciferol (VITAMIN D2) 2000 units TABS Take 2,000 mg by mouth    fluticasone (FLONASE) 50 mcg/act nasal spray 1 spray into each nostril as needed      gabapentin (NEURONTIN) 300 mg capsule Take 1 capsule (300 mg total) by mouth daily    glucose blood (ONETOUCH VERIO) test strip Use as instructed TEST TWO TIMES DAILY e11 65    glucose blood (ONETOUCH VERIO) test strip 1 each by Other route 2 (two) times a day Test two times daily    lisinopril (ZESTRIL) 5 mg tablet Take 1 tablet (5 mg total) by mouth daily    metFORMIN (GLUCOPHAGE) 500 mg tablet Take 1 tablet (500 mg total) by mouth 2 (two) times a day with meals    Multiple Vitamin (MULTIVITAMINS PO) Take by mouth daily    omeprazole (PriLOSEC) 20 mg delayed release capsule Take 1 capsule (20 mg total) by mouth 2 (two) times a day    ONETOUCH DELICA LANCETS FINE MISC USE AS DIRECTED THREE TIMES DAILY    tiotropium (SPIRIVA HANDIHALER) 18 mcg inhalation capsule Place 1 capsule (18 mcg total) into inhaler and inhale daily    [DISCONTINUED] predniSONE 20 mg tablet Take 2 tablets (40 mg total) by mouth daily Dispense 20 mg tablets  40 mg daily p  o  x3 days then 20 mg daily p  o  x3 days  Dispense 9 tablets (Patient not taking: Reported on 12/14/2018 )     No current facility-administered medications on file prior to visit  She is allergic to other       Review of Systems   Constitutional: Negative for chills and fever     HENT: Negative for congestion, ear pain, hearing loss, postnasal drip, rhinorrhea, sinus pain, sinus pressure, sore throat and trouble swallowing  Eyes: Negative for pain and visual disturbance  Respiratory: Positive for shortness of breath  Negative for cough, chest tightness and wheezing  Cardiovascular: Negative  Negative for chest pain, palpitations and leg swelling  Gastrointestinal: Negative for abdominal pain, blood in stool, constipation, diarrhea, nausea and vomiting  Endocrine: Negative for cold intolerance, heat intolerance, polydipsia, polyphagia and polyuria  Genitourinary: Negative for difficulty urinating, dysuria, flank pain and urgency  Musculoskeletal: Negative for arthralgias, back pain, gait problem and myalgias  Skin: Negative for rash  Allergic/Immunologic: Negative  Neurological: Negative for dizziness, weakness, light-headedness and headaches  Hematological: Negative  Psychiatric/Behavioral: Negative for behavioral problems, dysphoric mood and sleep disturbance  The patient is not nervous/anxious  Objective:      /84 (BP Location: Right arm, Patient Position: Sitting, Cuff Size: Standard)   Pulse 105   Temp 99 8 °F (37 7 °C)   Ht 5' 1" (1 549 m)   Wt 81 kg (178 lb 9 6 oz)   SpO2 96%   BMI 33 75 kg/m²          Physical Exam   Constitutional: She is oriented to person, place, and time  She appears well-developed and well-nourished  No distress  HENT:   Head: Normocephalic and atraumatic  Right Ear: External ear normal    Left Ear: External ear normal    Nose: Nose normal    Mouth/Throat: Oropharynx is clear and moist  No oropharyngeal exudate  Eyes: Pupils are equal, round, and reactive to light  Conjunctivae and EOM are normal  Right eye exhibits no discharge  Left eye exhibits no discharge  No scleral icterus  Neck: Normal range of motion  Neck supple  No thyromegaly present  Cardiovascular: Normal rate, regular rhythm and normal heart sounds  Exam reveals no gallop and no friction rub  No murmur heard    Pulmonary/Chest: Effort normal and breath sounds normal  No respiratory distress  She has no wheezes  She has no rales  Abdominal: Soft  Bowel sounds are normal  She exhibits no distension  There is no tenderness  Musculoskeletal: Normal range of motion  She exhibits no edema, tenderness or deformity  Neurological: She is alert and oriented to person, place, and time  No cranial nerve deficit  Skin: Skin is warm and dry  She is not diaphoretic  Psychiatric: She has a normal mood and affect   Her behavior is normal  Judgment and thought content normal

## 2019-01-15 NOTE — PROGRESS NOTES
Assessment and Plan:    Problem List Items Addressed This Visit     None        Health Maintenance Due   Topic Date Due    Medicare Annual Wellness Visit (AWV)  1940    DM Eye Exam  03/22/1950         HPI:  Jame Morrison is a 66 y o  female here for her Initial Wellness Visit      Patient Active Problem List   Diagnosis    Acid reflux disease    Asthma, mild intermittent    COPD with acute exacerbation (HCC)    Type 2 diabetes mellitus with hyperglycemia, without long-term current use of insulin ([facility] )    Hyperlipidemia, unspecified    Essential hypertension    Neuropathy    Spinal stenosis of lumbar region without neurogenic claudication    Vitamin D deficiency    Intertrigo    Cough    Elevated troponin    Bronchitis    Sepsis ([facility] )    Acute respiratory failure with hypoxia (HCC)    Elevated d-dimer    Pneumonia of right lower lobe due to infectious organism [facility])     Past Medical History:   Diagnosis Date    Allergic     COPD (chronic obstructive pulmonary disease) ([facility] )     Diabetes mellitus (HCC)     GERD (gastroesophageal reflux disease)     Hyperlipidemia     Hypertension     Pneumonia     Pneumonia of right lower lobe due to infectious organism ([facility] ) 12/4/2018    Vitamin D deficiency      Past Surgical History:   Procedure Laterality Date    CHOLECYSTECTOMY  1984    EYE SURGERY Bilateral     CATARACT     Family History   Problem Relation Age of Onset    Lung cancer Father     Heart disease Mother     Lung cancer Sister     Lung cancer Brother      History   Smoking Status    Former Smoker    Years: 50 00    Types: Cigarettes   Smokeless Tobacco    Never Used     History   Alcohol Use No      History   Drug Use No       Current Outpatient Prescriptions   Medication Sig Dispense Refill    albuterol (PROVENTIL HFA,VENTOLIN HFA) 90 mcg/act inhaler Inhale 2 puffs every 6 (six) hours as needed for wheezing      atorvastatin (LIPITOR) 20 mg tablet Take 1 tablet (20 mg total) by mouth daily 90 tablet 3    Blood Glucose Monitoring Suppl (ONETOUCH VERIO) w/Device KIT USE AS DIRECTED 1 kit 0    clotrimazole-betamethasone (LOTRISONE) 1-0 05 % cream Apply topically 2 (two) times a day 30 g 0    Ergocalciferol (VITAMIN D2) 2000 units TABS Take 2,000 mg by mouth      fluticasone (FLONASE) 50 mcg/act nasal spray 1 spray into each nostril as needed        gabapentin (NEURONTIN) 300 mg capsule Take 1 capsule (300 mg total) by mouth daily 90 capsule 3    glucose blood (ONETOUCH VERIO) test strip Use as instructed TEST TWO TIMES DAILY e11 65 300 each 3    glucose blood (ONETOUCH VERIO) test strip 1 each by Other route 2 (two) times a day Test two times daily 300 each 1    lisinopril (ZESTRIL) 5 mg tablet Take 1 tablet (5 mg total) by mouth daily 90 tablet 1    metFORMIN (GLUCOPHAGE) 500 mg tablet Take 1 tablet (500 mg total) by mouth 2 (two) times a day with meals 180 tablet 3    Multiple Vitamin (MULTIVITAMINS PO) Take by mouth daily      omeprazole (PriLOSEC) 20 mg delayed release capsule Take 1 capsule (20 mg total) by mouth 2 (two) times a day 180 capsule 1    ONETOUCH DELICA LANCETS FINE MISC USE AS DIRECTED THREE TIMES DAILY 100 each 2    tiotropium (SPIRIVA HANDIHALER) 18 mcg inhalation capsule Place 1 capsule (18 mcg total) into inhaler and inhale daily 3 each 3     No current facility-administered medications for this visit        Allergies   Allergen Reactions    Other      FLOWERS/GRASS     Immunization History   Administered Date(s) Administered    Influenza 12/05/2005, 10/15/2006, 10/15/2007, 10/05/2009, 12/06/2010, 11/21/2011, 12/03/2012, 10/07/2013, 09/10/2014, 09/17/2015, 10/12/2016    Influenza TIV (IM) 11/30/2017    Influenza, high dose seasonal 0 5 mL 10/18/2018    Pneumococcal Conjugate 13-Valent 09/11/2018    Pneumococcal Polysaccharide PPV23 11/21/2002, 10/15/2007    Tdap 09/17/2015       Patient Care Team:  Kirkwood Dandy, PA-C as PCP - General (Internal Medicine)  FELIPE Leo RN as Lead OP Care Mgr (Care Coordination)    Medicare Screening Tests and Risk Assessments:  Lisa Anna is here for her Initial Wellness visit  Health Risk Assessment:  Patient rates overall health as good  Patient feels that their physical health rating is Same  Eyesight was rated as Same  Hearing was rated as Same  Patient feels that their emotional and mental health rating is Same  Pain experienced by patient in the last 7 days has been None  Patient states that she has experienced no weight loss or gain in last 6 months  Emotional/Mental Health:  Patient has been feeling nervous/anxious  PHQ-9 Depression Screening:    Frequency of the following problems over the past two weeks:      1  Little interest or pleasure in doing things: 0 - not at all      2  Feeling down, depressed, or hopeless: 0 - not at all  PHQ-2 Score: 0          Broken Bones/Falls: Fall Risk Assessment:    In the past year, patient has experienced: No history of falling in past year          Bladder/Bowel:  Patient has leaked urine accidently in the last six months  Patient reports no loss of bowel control  Immunizations:  Patient has had a flu vaccination within the last year  Patient has received a pneumonia shot  Patient has not received a shingles shot  Patient has received tetanus/diphtheria shot  Date of tetanus/diphtheria shot: 9/17/2015    Home Safety:  Patient has trouble with stairs inside or outside of their home  Patient currently reports that there are no safety hazards present in home, working smoke alarms, working carbon monoxide detectors        Preventative Screenings:   Breast cancer screening performed, 5/17/2018  colon cancer screen completed, 2/1/2013  cholesterol screen completed, 6/12/2018  glaucoma eye exam completed,     Nutrition:  Current diet: Regular and Limited junk food with servings of the following:    Medications:  Patient is currently taking over-the-counter supplements  List of OTC medications includes: SEE MEDICATION LIST  Patient is able to manage medications  Lifestyle Choices:  Patient reports no tobacco use  Patient has not smoked or used tobacco in the past   Patient reports no alcohol use  Patient drives a vehicle  Patient wears seat belt  Current level of exercise of physical activity described by patient as: LIGHT  Activities of Daily Living:  Can get out of bed by his or her self, able to dress self, able to make own meals, able to do own shopping, able to bathe self, can do own laundry/housekeeping, can manage own money, pay bills and track expenses    Previous Hospitalizations:  Hospitalization or ED visit in past 12 months  Number of hospitalizations within the last year: 1-2  Additional Comments: PNEUMONIA    Advanced Directives:  Patient has not decided on power of   Patient has not completed advanced directive  Additional Comments: 5 WISHES GIVEN    Preventative Screening/Counseling:      Cardiovascular:      General: Screening Current          Diabetes:      General: Screening Current          Colorectal Cancer:      General: Screening Not Indicated          Breast Cancer:      General: Screening Not Indicated          Cervical Cancer:      General: Risks and Benefits Discussed          Osteoporosis:      General: Risks and Benefits Discussed          AAA:      General: Screening Not Indicated          Glaucoma:      General: Screening Current          HIV:      General: Screening Not Indicated          Hepatitis C:      Counseling: has received general HCV counseling        Advanced Directives:   Patient has no living will for healthcare, does not have durable POA for healthcare, patient does not have an advanced directive  Information on ACP and/or AD provided  5 wishes given       Immunizations:      Influenza: Influenza UTD This Year      Pneumococcal: Lifetime Vaccine Completed      Shingrix: Risks & Benefits Discussed      Hepatitis B (Low risk patients): Series Not Indicated

## 2019-01-18 ENCOUNTER — PATIENT OUTREACH (OUTPATIENT)
Dept: INTERNAL MEDICINE CLINIC | Facility: CLINIC | Age: 79
End: 2019-01-18

## 2019-01-18 NOTE — PROGRESS NOTES
Outpatient Care Management Note:  Hayley Whitfield is doing well at home, can go with out her oxygen for several hours at times but needs it if she overdoes  Very rarely has a cough now  Her blood sugars are good  She is waiting for a new date for her colonoscopy, she can't have it done at the outpatient center due to her oxygen  She is hoping to just "get it over with"  Independent with her ADL"S and has no needs at this time  Verified that she has my contact information  Encouraged to call with questions or concerns

## 2019-01-22 DIAGNOSIS — J45.909 UNCOMPLICATED ASTHMA, UNSPECIFIED ASTHMA SEVERITY, UNSPECIFIED WHETHER PERSISTENT: ICD-10-CM

## 2019-01-22 RX ORDER — TIOTROPIUM BROMIDE 18 UG/1
CAPSULE ORAL; RESPIRATORY (INHALATION)
Qty: 30 CAPSULE | Refills: 5 | OUTPATIENT
Start: 2019-01-22

## 2019-01-30 ENCOUNTER — HOSPITAL ENCOUNTER (OUTPATIENT)
Facility: HOSPITAL | Age: 79
Setting detail: OUTPATIENT SURGERY
Discharge: HOME | End: 2019-01-30
Attending: INTERNAL MEDICINE | Admitting: INTERNAL MEDICINE
Payer: MEDICARE

## 2019-01-30 ENCOUNTER — ANESTHESIA EVENT (OUTPATIENT)
Dept: PERIOP | Facility: HOSPITAL | Age: 79
End: 2019-01-30
Payer: MEDICARE

## 2019-01-30 ENCOUNTER — ANESTHESIA (OUTPATIENT)
Dept: PERIOP | Facility: HOSPITAL | Age: 79
End: 2019-01-30
Payer: MEDICARE

## 2019-01-30 VITALS
OXYGEN SATURATION: 91 % | HEART RATE: 90 BPM | RESPIRATION RATE: 18 BRPM | HEIGHT: 63 IN | TEMPERATURE: 99 F | SYSTOLIC BLOOD PRESSURE: 138 MMHG | WEIGHT: 172 LBS | DIASTOLIC BLOOD PRESSURE: 50 MMHG | BODY MASS INDEX: 30.48 KG/M2

## 2019-01-30 DIAGNOSIS — Z86.010 HISTORY OF COLONIC POLYPS: ICD-10-CM

## 2019-01-30 LAB — GLUCOSE SERPL-MCNC: 118 MG/DL (ref 65–140)

## 2019-01-30 PROCEDURE — 88305 TISSUE EXAM BY PATHOLOGIST: CPT | Performed by: PATHOLOGY

## 2019-01-30 PROCEDURE — 82948 REAGENT STRIP/BLOOD GLUCOSE: CPT

## 2019-01-30 RX ORDER — LIDOCAINE HYDROCHLORIDE 20 MG/ML
INJECTION, SOLUTION EPIDURAL; INFILTRATION; INTRACAUDAL; PERINEURAL AS NEEDED
Status: DISCONTINUED | OUTPATIENT
Start: 2019-01-30 | End: 2019-01-30 | Stop reason: SURG

## 2019-01-30 RX ORDER — PROPOFOL 10 MG/ML
INJECTION, EMULSION INTRAVENOUS AS NEEDED
Status: DISCONTINUED | OUTPATIENT
Start: 2019-01-30 | End: 2019-01-30 | Stop reason: SURG

## 2019-01-30 RX ORDER — SODIUM CHLORIDE, SODIUM LACTATE, POTASSIUM CHLORIDE, CALCIUM CHLORIDE 600; 310; 30; 20 MG/100ML; MG/100ML; MG/100ML; MG/100ML
100 INJECTION, SOLUTION INTRAVENOUS CONTINUOUS
Status: CANCELLED | OUTPATIENT
Start: 2019-01-30

## 2019-01-30 RX ORDER — SODIUM CHLORIDE, SODIUM LACTATE, POTASSIUM CHLORIDE, CALCIUM CHLORIDE 600; 310; 30; 20 MG/100ML; MG/100ML; MG/100ML; MG/100ML
125 INJECTION, SOLUTION INTRAVENOUS CONTINUOUS
Status: DISCONTINUED | OUTPATIENT
Start: 2019-01-30 | End: 2019-01-30 | Stop reason: HOSPADM

## 2019-01-30 RX ADMIN — PROPOFOL 30 MG: 10 INJECTION, EMULSION INTRAVENOUS at 11:50

## 2019-01-30 RX ADMIN — PROPOFOL 40 MG: 10 INJECTION, EMULSION INTRAVENOUS at 12:00

## 2019-01-30 RX ADMIN — SODIUM CHLORIDE, SODIUM LACTATE, POTASSIUM CHLORIDE, AND CALCIUM CHLORIDE: .6; .31; .03; .02 INJECTION, SOLUTION INTRAVENOUS at 11:39

## 2019-01-30 RX ADMIN — PROPOFOL 40 MG: 10 INJECTION, EMULSION INTRAVENOUS at 11:45

## 2019-01-30 RX ADMIN — LIDOCAINE HYDROCHLORIDE 40 MG: 20 INJECTION, SOLUTION EPIDURAL; INFILTRATION; INTRACAUDAL; PERINEURAL at 11:45

## 2019-01-30 RX ADMIN — LIDOCAINE HYDROCHLORIDE 60 MG: 20 INJECTION, SOLUTION EPIDURAL; INFILTRATION; INTRACAUDAL; PERINEURAL at 11:40

## 2019-01-30 RX ADMIN — PROPOFOL 90 MG: 10 INJECTION, EMULSION INTRAVENOUS at 11:40

## 2019-01-30 NOTE — ANESTHESIA POSTPROCEDURE EVALUATION
Post-Op Assessment Note      CV Status:  Stable    Hydration Status:  Stable    PONV Controlled:  None    Post Op Vitals Reviewed: Yes          Staff: Anesthesiologist           BP   96/46   Temp   99 2   Pulse  80   Resp   16   SpO2  95

## 2019-01-30 NOTE — OP NOTE
OPERATIVE REPORT  PATIENT NAME: Karthik Taylor    :  1940  MRN: [id number]  Pt Location: [address] [place] [address] GI ROOM 01    SURGERY DATE: 2019    Surgeon(s) and Role:     Sarabjit Barber MD - Primary    Preop Diagnosis:  History of colonic polyps [Z86 010]    Post-Op Diagnosis Codes:     * History of colonic polyps [Z86 010]    Procedure(s) (LRB):  COLONOSCOPY with multiple  polypectomies (N/A)    Specimen(s):  ID Type Source Tests Collected by Time Destination   1 : polyps x 2 Tissue Large Intestine, Sigmoid Colon TISSUE Kerry Donald MD 2019 1154    2 : polyp Tissue Large Intestine, Right/Ascending Colon TISSUE Kerry Donald MD 2019 1200    3 : polyp Tissue Rectum TISSUE EXAM Rodrigo Bashir MD 2019 1207        Estimated Blood Loss:   Minimal    Drains:       Anesthesia Type:   IV Sedation with Anesthesia    Operative Indications:  History of colonic polyps [Z86 010]  History of multiple colonic polyps and polyps around 1 cm  Last colonoscopy 6 years ago     Operative Findings:  Four colonic polyps snared ablated removed from ascending colon 1 polyp, sigmoid colon 2 polyps, and rectum 1 polyp  Extensive diverticulosis seen without diverticulitis  Complications:   None    Procedure and Technique:  The patient was identified in the endoscopy suite  Informed consent was obtained he was placed in left lateral position and a PC of 180 scope was used to examine the entire colon up to cecum  Sedation was provided by anesthesia department  The entire colon was examined upon insertion and slow removal of the scope  Colonoscopy withdrawal time was 12 min from the cecum  Prep was adequate  Findings  One ascending colon, 2 sigmoid colon, and 1 rectal polyp were snared ablated removed  Total of 4 polyps were removed  Extensive diverticulosis seen without diverticulitis    Recommendations    Check pathology  High-fiber diet  Patient does not need repeat screening colonoscopy due to her age    She will only needed if symptomatic     I was present for the entire procedure    Patient Disposition:  APU    SIGNATURE: Jose Herzog MD  DATE: January 30, 2019  TIME: 12:16 PM

## 2019-01-30 NOTE — DISCHARGE INSTRUCTIONS
Colorectal Polyps   WHAT YOU NEED TO KNOW:   Colorectal polyps are small growths of tissue in the lining of the colon and rectum  Most polyps are hyperplastic polyps and are usually benign (noncancerous)  Certain types of polyps, called adenomatous polyps, may turn into cancer  DISCHARGE INSTRUCTIONS:   Follow up with your healthcare provider or gastroenterologist as directed: You may need to return for more tests, such as another colonoscopy  Write down your questions so you remember to ask them during your visits  Reduce your risk for colorectal polyps:   · Eat a variety of healthy foods:  Healthy foods include fruit, vegetables, whole-grain breads, low-fat dairy products, beans, lean meat, and fish  Ask if you need to be on a special diet  · Maintain a healthy weight:  Ask your healthcare provider if you need to lose weight and how much you need to lose  Ask for help with a weight loss program     · Exercise:  Begin to exercise slowly and do more as you get stronger  Talk with your healthcare provider before you start an exercise program      · Limit alcohol:  Your risk for polyps increases the more you drink  · Do not smoke: If you smoke, it is never too late to quit  Ask for information about how to stop  For support and more information:   · Maxine Smith ([facility])  [address] [address]  Phone: [phone]  Web Address: www digestive  niddk nih gov  Contact your healthcare provider or gastroenterologist if:   · You have a fever  · You have chills, a cough, or feel weak and achy  · You have abdominal pain that does not go away or gets worse after you take medicine  · Your abdomen is swollen  · You are losing weight without trying  · You have questions or concerns about your condition or care  Seek care immediately or call 911 if:   · You have sudden shortness of breath       · You have a fast heart rate, fast breathing, or are too dizzy to stand up  · You have severe abdominal pain  · You see blood in your bowel movement  © 2017 [facility] Information is for End User's use only and may not be sold, redistributed or otherwise used for commercial purposes  All illustrations and images included in CareNotes® are the copyrighted property of A D A M , Inc  or [employer]  The above information is an  only  It is not intended as medical advice for individual conditions or treatments  Talk to your doctor, nurse or pharmacist before following any medical regimen to see if it is safe and effective for you

## 2019-01-30 NOTE — ANESTHESIA PREPROCEDURE EVALUATION
Review of Systems/Medical History  Patient summary reviewed  Chart reviewed      Cardiovascular  Hyperlipidemia, Hypertension ,    Pulmonary  Pneumonia (hospitalised two weeks ), COPD , Asthma ,        GI/Hepatic    GERD ,             Endo/Other  Diabetes type 2 ,      GYN       Hematology   Musculoskeletal       Neurology   Psychology           Physical Exam    Airway    Mallampati score: III  TM Distance: <3 FB  Neck ROM: full     Dental       Cardiovascular  Rhythm: regular, Rate: normal,     Pulmonary      Other Findings  Poor dentition      Anesthesia Plan  ASA Score- 3     Anesthesia Type- IV sedation with anesthesia with ASA Monitors  Additional Monitors:   Airway Plan:         Plan Factors-    Induction- intravenous  Postoperative Plan-     Informed Consent- Anesthetic plan and risks discussed with patient

## 2019-02-01 ENCOUNTER — PATIENT OUTREACH (OUTPATIENT)
Dept: INTERNAL MEDICINE CLINIC | Facility: CLINIC | Age: 79
End: 2019-02-01

## 2019-02-01 NOTE — PROGRESS NOTES
Outpatient Care Management Note:  Santiago Alvarado is doing well at home  She had her colonoscopy completed and is glad it is over  No complaints of cough or shortness of breath, no fever or chills  Blood sugars and blood pressure are good  Remains independent with her ADL's  No needs at this time  Verified that she has my contact information  Encouraged to call with questions or concerns

## 2019-02-12 DIAGNOSIS — L30.4 INTERTRIGO: ICD-10-CM

## 2019-02-12 DIAGNOSIS — J45.909 UNCOMPLICATED ASTHMA, UNSPECIFIED ASTHMA SEVERITY, UNSPECIFIED WHETHER PERSISTENT: ICD-10-CM

## 2019-02-13 RX ORDER — CLOTRIMAZOLE AND BETAMETHASONE DIPROPIONATE 10; .64 MG/G; MG/G
CREAM TOPICAL 2 TIMES DAILY
Qty: 30 G | Refills: 2 | Status: SHIPPED | OUTPATIENT
Start: 2019-02-13 | End: 2019-12-17 | Stop reason: ALTCHOICE

## 2019-02-20 ENCOUNTER — PATIENT OUTREACH (OUTPATIENT)
Dept: INTERNAL MEDICINE CLINIC | Facility: CLINIC | Age: 79
End: 2019-02-20

## 2019-02-20 NOTE — PROGRESS NOTES
Outpatient Care Management Note:  Fili Mata continues to do well at home  Denies any cough or shortness of breath  She is aware to call her PCP if symptoms or fever recur  Independent with her ADL's  Denies any needs at this time  Verified that she has my contact information  Encouraged to call with questions or concerns

## 2019-02-26 DIAGNOSIS — I10 HYPERTENSION, UNSPECIFIED TYPE: ICD-10-CM

## 2019-02-26 DIAGNOSIS — K21.9 GASTROESOPHAGEAL REFLUX DISEASE WITHOUT ESOPHAGITIS: ICD-10-CM

## 2019-02-26 RX ORDER — OMEPRAZOLE 20 MG/1
20 CAPSULE, DELAYED RELEASE ORAL 2 TIMES DAILY
Qty: 180 CAPSULE | Refills: 1 | Status: SHIPPED | OUTPATIENT
Start: 2019-02-26 | End: 2019-02-28 | Stop reason: SDUPTHER

## 2019-02-26 RX ORDER — LISINOPRIL 5 MG/1
5 TABLET ORAL DAILY
Qty: 90 TABLET | Refills: 1 | Status: SHIPPED | OUTPATIENT
Start: 2019-02-26 | End: 2019-02-28 | Stop reason: SDUPTHER

## 2019-02-28 DIAGNOSIS — I10 HYPERTENSION, UNSPECIFIED TYPE: ICD-10-CM

## 2019-02-28 DIAGNOSIS — K21.9 GASTROESOPHAGEAL REFLUX DISEASE WITHOUT ESOPHAGITIS: ICD-10-CM

## 2019-02-28 RX ORDER — OMEPRAZOLE 20 MG/1
20 CAPSULE, DELAYED RELEASE ORAL 2 TIMES DAILY
Qty: 180 CAPSULE | Refills: 3 | Status: SHIPPED | OUTPATIENT
Start: 2019-02-28 | End: 2020-01-29 | Stop reason: SDUPTHER

## 2019-02-28 RX ORDER — LISINOPRIL 5 MG/1
5 TABLET ORAL DAILY
Qty: 90 TABLET | Refills: 3 | Status: SHIPPED | OUTPATIENT
Start: 2019-02-28 | End: 2020-02-24 | Stop reason: SDUPTHER

## 2019-03-06 ENCOUNTER — PATIENT OUTREACH (OUTPATIENT)
Dept: INTERNAL MEDICINE CLINIC | Facility: CLINIC | Age: 79
End: 2019-03-06

## 2019-03-06 NOTE — PROGRESS NOTES
Outpatient Care Management Note:  Anna Barber continues to do well  She is independent with her ADL's and has no needs  Aware that her follow up is ending but encouraged to call if she has any needs in the future

## 2019-04-05 DIAGNOSIS — J45.20 MILD INTERMITTENT ASTHMA, UNSPECIFIED WHETHER COMPLICATED: Primary | ICD-10-CM

## 2019-04-05 RX ORDER — ALBUTEROL SULFATE 90 UG/1
2 AEROSOL, METERED RESPIRATORY (INHALATION) EVERY 6 HOURS PRN
Qty: 1 INHALER | Refills: 5 | Status: SHIPPED | OUTPATIENT
Start: 2019-04-05 | End: 2019-12-17 | Stop reason: SDUPTHER

## 2019-04-22 ENCOUNTER — OFFICE VISIT (OUTPATIENT)
Dept: URGENT CARE | Facility: CLINIC | Age: 79
End: 2019-04-22
Payer: MEDICARE

## 2019-04-22 VITALS
SYSTOLIC BLOOD PRESSURE: 129 MMHG | OXYGEN SATURATION: 94 % | BODY MASS INDEX: 32.2 KG/M2 | DIASTOLIC BLOOD PRESSURE: 61 MMHG | WEIGHT: 175 LBS | TEMPERATURE: 98.3 F | HEART RATE: 98 BPM | RESPIRATION RATE: 18 BRPM | HEIGHT: 62 IN

## 2019-04-22 DIAGNOSIS — B96.89 ACUTE BACTERIAL BRONCHITIS: Primary | ICD-10-CM

## 2019-04-22 DIAGNOSIS — J20.8 ACUTE BACTERIAL BRONCHITIS: Primary | ICD-10-CM

## 2019-04-22 PROCEDURE — G0463 HOSPITAL OUTPT CLINIC VISIT: HCPCS | Performed by: PHYSICIAN ASSISTANT

## 2019-04-22 PROCEDURE — 99213 OFFICE O/P EST LOW 20 MIN: CPT | Performed by: PHYSICIAN ASSISTANT

## 2019-04-22 RX ORDER — AZITHROMYCIN 250 MG/1
TABLET, FILM COATED ORAL
Qty: 6 TABLET | Refills: 0 | Status: SHIPPED | OUTPATIENT
Start: 2019-04-22 | End: 2019-04-27

## 2019-04-22 RX ORDER — BENZONATATE 100 MG/1
100 CAPSULE ORAL 3 TIMES DAILY PRN
Qty: 30 CAPSULE | Refills: 0 | Status: SHIPPED | OUTPATIENT
Start: 2019-04-22 | End: 2019-07-11

## 2019-04-22 RX ORDER — METHYLPREDNISOLONE 4 MG/1
TABLET ORAL
Qty: 1 EACH | Refills: 0 | Status: SHIPPED | OUTPATIENT
Start: 2019-04-22 | End: 2019-07-11

## 2019-06-01 LAB
LEFT EYE DIABETIC RETINOPATHY: NORMAL
RIGHT EYE DIABETIC RETINOPATHY: NORMAL

## 2019-07-11 ENCOUNTER — OFFICE VISIT (OUTPATIENT)
Dept: INTERNAL MEDICINE CLINIC | Facility: CLINIC | Age: 79
End: 2019-07-11
Payer: MEDICARE

## 2019-07-11 VITALS
HEIGHT: 62 IN | SYSTOLIC BLOOD PRESSURE: 114 MMHG | HEART RATE: 101 BPM | DIASTOLIC BLOOD PRESSURE: 68 MMHG | TEMPERATURE: 99.8 F | WEIGHT: 176.8 LBS | RESPIRATION RATE: 18 BRPM | BODY MASS INDEX: 32.54 KG/M2 | OXYGEN SATURATION: 96 %

## 2019-07-11 DIAGNOSIS — E11.65 TYPE 2 DIABETES MELLITUS WITH HYPERGLYCEMIA, WITHOUT LONG-TERM CURRENT USE OF INSULIN (HCC): ICD-10-CM

## 2019-07-11 DIAGNOSIS — J44.1 CHRONIC OBSTRUCTIVE PULMONARY DISEASE WITH ACUTE EXACERBATION (HCC): Primary | ICD-10-CM

## 2019-07-11 DIAGNOSIS — J44.9 CHRONIC OBSTRUCTIVE PULMONARY DISEASE, UNSPECIFIED COPD TYPE (HCC): ICD-10-CM

## 2019-07-11 DIAGNOSIS — I50.9 CONGESTIVE HEART FAILURE, UNSPECIFIED HF CHRONICITY, UNSPECIFIED HEART FAILURE TYPE (HCC): ICD-10-CM

## 2019-07-11 PROBLEM — J18.9 PNEUMONIA OF RIGHT LOWER LOBE DUE TO INFECTIOUS ORGANISM: Status: RESOLVED | Noted: 2018-12-04 | Resolved: 2019-07-11

## 2019-07-11 LAB — SL AMB POCT HEMOGLOBIN AIC: 6.5 (ref ?–6.5)

## 2019-07-11 PROCEDURE — 83036 HEMOGLOBIN GLYCOSYLATED A1C: CPT | Performed by: PHYSICIAN ASSISTANT

## 2019-07-11 PROCEDURE — 1124F ACP DISCUSS-NO DSCNMKR DOCD: CPT | Performed by: PHYSICIAN ASSISTANT

## 2019-07-11 PROCEDURE — 99214 OFFICE O/P EST MOD 30 MIN: CPT | Performed by: PHYSICIAN ASSISTANT

## 2019-07-11 RX ORDER — FLUTICASONE FUROATE AND VILANTEROL 100; 25 UG/1; UG/1
1 POWDER RESPIRATORY (INHALATION) DAILY
Qty: 3 INHALER | Refills: 2 | Status: SHIPPED | OUTPATIENT
Start: 2019-07-11 | End: 2019-10-23

## 2019-07-11 NOTE — PROGRESS NOTES
Assessment/Plan:  Problem List Items Addressed This Visit        Endocrine    Type 2 diabetes mellitus with hyperglycemia, without long-term current use of insulin ([facility])    Relevant Orders    POCT hemoglobin A1c (Completed)       Respiratory    COPD (chronic obstructive pulmonary disease) ([place] [address] ) - Primary     Pt currently on albuterol and spiriva and O2  Will add breo  Goal of using albuterol less than 4 times per week  Consider pulmonary evaluation if she does not respond well to breo  Oxygen condenser ordered  Relevant Medications    fluticasone-vilanterol (BREO ELLIPTA) 100-25 mcg/inh inhaler    Other Relevant Orders    Oxygen Supplies       Cardiovascular and Mediastinum    Congestive heart failure ([place] [address] )           Diagnoses and all orders for this visit:    Chronic obstructive pulmonary disease with acute exacerbation (HCC)  -     Oxygen Supplies  -     fluticasone-vilanterol (BREO ELLIPTA) 100-25 mcg/inh inhaler; Inhale 1 puff daily Rinse mouth after use  Type 2 diabetes mellitus with hyperglycemia, without long-term current use of insulin (HCC)  -     POCT hemoglobin A1c    Congestive heart failure, unspecified HF chronicity, unspecified heart failure type ([place] [address] )    Chronic obstructive pulmonary disease, unspecified COPD type ([place] [address] )    Other orders  -     Cancel: Comprehensive metabolic panel; Future  -     Cancel: Lipid Panel with Direct LDL reflex; Future        COPD (chronic obstructive pulmonary disease) ([facility])  Pt currently on albuterol and spiriva and O2  Will add breo  Goal of using albuterol less than 4 times per week  Consider pulmonary evaluation if she does not respond well to breo  Oxygen condenser ordered  Subjective:      Patient ID: Mina Carmona is a 78 y o  female  Pt presents for routine visit  She is still using O2 and still gets markedly SOB with little exertion  This has been since she had pneumonia 12/2018   Repeat imaging showed this resolved but her breathing never went back to her baseline  She desires an oxygen condenser as she has to bring a large tank to her appts  She is on albuterol and spiriva  She is compliant with these and states she has to use the albuterol approx 2 times per day  A1C completed today and normal at 6 5  The following portions of the patient's history were reviewed and updated as appropriate:   She has a past medical history of Allergic, COPD (chronic obstructive pulmonary disease) ([facility] ), Diabetes mellitus ([facility] ), GERD (gastroesophageal reflux disease), Hyperlipidemia, Hypertension, Pneumonia, Pneumonia of right lower lobe due to infectious organism ([facility] [address] ) (12/4/2018), and Vitamin D deficiency  ,  does not have any pertinent problems on file  ,   has a past surgical history that includes Cholecystectomy (1984); Eye surgery (Bilateral); and Colonoscopy (N/A, 1/30/2019)  ,  family history includes Heart disease in her mother; Lung cancer in her brother, father, and sister  ,   reports that she has quit smoking  Her smoking use included cigarettes  She quit after 50 00 years of use  She has never used smokeless tobacco  She reports that she does not drink alcohol or use drugs  ,  is allergic to other     Current Outpatient Medications   Medication Sig Dispense Refill    albuterol (PROVENTIL HFA,VENTOLIN HFA) 90 mcg/act inhaler Inhale 2 puffs every 6 (six) hours as needed for wheezing or shortness of breath 1 Inhaler 5    atorvastatin (LIPITOR) 20 mg tablet Take 1 tablet (20 mg total) by mouth daily 90 tablet 3    Blood Glucose Monitoring Suppl (ONETOUCH VERIO) w/Device KIT USE AS DIRECTED 1 kit 0    clotrimazole-betamethasone (LOTRISONE) 1-0 05 % cream Apply topically 2 (two) times a day 30 g 2    Ergocalciferol (VITAMIN D2) 2000 units TABS Take 2,000 mg by mouth      fluticasone (FLONASE) 50 mcg/act nasal spray 1 spray into each nostril as needed        gabapentin (NEURONTIN) 300 mg capsule Take 1 capsule (300 mg total) by mouth daily 90 capsule 3    glucose blood (ONETOUCH VERIO) test strip Use as instructed TEST TWO TIMES DAILY e11 [id number] 300 each 3    glucose blood (ONETOUCH VERIO) test strip 1 each by Other route 2 (two) times a day Test two times daily 300 each 1    lisinopril (ZESTRIL) 5 mg tablet Take 1 tablet (5 mg total) by mouth daily 90 tablet 3    metFORMIN (GLUCOPHAGE) 500 mg tablet Take 1 tablet (500 mg total) by mouth 2 (two) times a day with meals 180 tablet 3    Multiple Vitamin (MULTIVITAMINS PO) Take by mouth daily      omeprazole (PriLOSEC) 20 mg delayed release capsule Take 1 capsule (20 mg total) by mouth 2 (two) times a day 180 capsule 3    ONETOUCH DELICA LANCETS FINE MISC USE AS DIRECTED THREE TIMES DAILY 100 each 2    tiotropium (SPIRIVA HANDIHALER) 18 mcg inhalation capsule Place 1 capsule (18 mcg total) into inhaler and inhale daily 3 each 3    fluticasone-vilanterol (BREO ELLIPTA) 100-25 mcg/inh inhaler Inhale 1 puff daily Rinse mouth after use  3 Inhaler 2     No current facility-administered medications for this visit  Review of Systems   Constitutional: Negative for chills and fever  HENT: Negative for congestion, ear pain, hearing loss, postnasal drip, rhinorrhea, sinus pressure, sinus pain, sore throat and trouble swallowing  Eyes: Negative for pain and visual disturbance  Respiratory: Positive for shortness of breath  Negative for cough, chest tightness and wheezing  Cardiovascular: Negative  Negative for chest pain, palpitations and leg swelling  Gastrointestinal: Negative for abdominal pain, blood in stool, constipation, diarrhea, nausea and vomiting  Endocrine: Negative for cold intolerance, heat intolerance, polydipsia, polyphagia and polyuria  Genitourinary: Negative for difficulty urinating, dysuria, flank pain and urgency  Musculoskeletal: Negative for arthralgias, back pain, gait problem and myalgias  Skin: Negative for rash  Allergic/Immunologic: Negative      Neurological: Negative for dizziness, weakness, light-headedness and headaches  Hematological: Negative  Psychiatric/Behavioral: Negative for behavioral problems, dysphoric mood and sleep disturbance  The patient is not nervous/anxious  Objective:  Vitals:    07/11/19 1326   BP: 114/68   BP Location: Left arm   Patient Position: Sitting   Cuff Size: Large   Pulse: 101   Resp: 18   Temp: 99 8 °F (37 7 °C)   SpO2: 96%   Weight: 80 2 kg (176 lb 12 8 oz)   Height: 5' 2" (1 575 m)     Body mass index is 32 34 kg/m²  Physical Exam   Constitutional: She is oriented to person, place, and time  She appears well-developed and well-nourished  No distress  HENT:   Head: Normocephalic and atraumatic  Right Ear: External ear normal    Left Ear: External ear normal    Nose: Nose normal    Mouth/Throat: Oropharynx is clear and moist  No oropharyngeal exudate  Eyes: Pupils are equal, round, and reactive to light  Conjunctivae and EOM are normal  Right eye exhibits no discharge  Left eye exhibits no discharge  No scleral icterus  Neck: Normal range of motion  Neck supple  No thyromegaly present  Cardiovascular: Normal rate, regular rhythm and normal heart sounds  Exam reveals no gallop and no friction rub  No murmur heard  Pulmonary/Chest: Effort normal  No respiratory distress  She has decreased breath sounds  She has no wheezes  She has no rales  Abdominal: Soft  Bowel sounds are normal  She exhibits no distension  There is no tenderness  Musculoskeletal: Normal range of motion  She exhibits no edema, tenderness or deformity  Neurological: She is alert and oriented to person, place, and time  No cranial nerve deficit  Skin: Skin is warm and dry  She is not diaphoretic  Psychiatric: She has a normal mood and affect   Her behavior is normal  Judgment and thought content normal

## 2019-07-11 NOTE — ASSESSMENT & PLAN NOTE
Pt currently on albuterol and spiriva and O2  Will add breo  Goal of using albuterol less than 4 times per week  Consider pulmonary evaluation if she does not respond well to breo  Oxygen condenser ordered

## 2019-07-22 ENCOUNTER — EVALUATION (OUTPATIENT)
Dept: PHYSICAL THERAPY | Facility: HOME HEALTHCARE | Age: 79
End: 2019-07-22
Payer: MEDICARE

## 2019-07-22 ENCOUNTER — TRANSCRIBE ORDERS (OUTPATIENT)
Dept: PHYSICAL THERAPY | Facility: HOME HEALTHCARE | Age: 79
End: 2019-07-22

## 2019-07-22 DIAGNOSIS — M48.00 SPINAL STENOSIS, UNSPECIFIED SPINAL REGION: Primary | ICD-10-CM

## 2019-07-22 PROCEDURE — 97535 SELF CARE MNGMENT TRAINING: CPT | Performed by: PHYSICAL THERAPIST

## 2019-07-22 PROCEDURE — 97162 PT EVAL MOD COMPLEX 30 MIN: CPT | Performed by: PHYSICAL THERAPIST

## 2019-07-22 NOTE — PROGRESS NOTES
PT Evaluation     Today's date: 2019  Patient name: Treva Membreno  : 1940  MRN: [id number]  Referring provider: Luigi Li DPM  Dx:   Encounter Diagnosis     ICD-10-CM    1  Spinal stenosis, unspecified spinal region M48 00                   Assessment  Assessment details: Pt Haile King is a 78 y o  who presents to OPPT with s/s consistent with L/S spinal stenosis  PT notes pt with limitations in standing/walking < 15 minutes due to onset of pain as well as feeling unsteady on her feet  She is using rollator walker for ambulation due to fear of falling  Significant L/S ROM deficits and B LE weakness is observed and contributing to limitations with gait, stair negotiation, balance, and tolerance to ADLs  She notes that rest/sitting will help to decrease her pain  Pt would benefit from skilled therapy services to address outlined impairments, work towards goals, and restore pts PLOF  Thank you!    Impairments: abnormal or restricted ROM, activity intolerance, impaired balance, impaired physical strength, lacks appropriate home exercise program, pain with function, safety issue and poor posture   Understanding of Dx/Px/POC: good   Prognosis: good    Goals  STG: to be achieved within 4 weeks   Decrease pain 25-50%  Improve ROM by 5-10 degrees  Improve strength by 1/2 grade     LTG: to be achieved within 8 weeks   Independent with HEP   ADL function returned to PLOF  Ambulation with rollator walker > 20 minutes   Pt able to complete household chores with minimal difficulty/increase in pain     Plan  Plan details: PT provided pt with detailed HEP program; pt verbalized understanding of program    Patient would benefit from: skilled physical therapy  Planned modality interventions: cryotherapy, thermotherapy: hydrocollator packs and TENS  Planned therapy interventions: abdominal trunk stabilization, manual therapy, neuromuscular re-education, balance, home exercise program, therapeutic exercise, stretching, strengthening, self care, graded exercise, functional ROM exercises, flexibility and gait training  Frequency: 3x week  Treatment plan discussed with: patient        Subjective Evaluation    History of Present Illness  Mechanism of injury: Pt reporting that she has been having back pain for several years  She is currently see a podiatrist for swelling in the feet and mentioned the back pain and leg weakness she has been having  MD is referred to OPPT for conservative management of s/s  She has not had recent imaging completed on the L/S but does state injections tried previously with little relief  She will go back to see podiatrist again on 19     Quality of life: good    Pain  At best pain ratin  At worst pain ratin  Quality: dull ache (N/T in the R foot )  Relieving factors: rest (sitting)  Aggravating factors: lifting and walking  Progression: worsening    Social Support  Steps to enter house: no  Stairs in house: no   Lives in: apartment  Lives with: alone    Treatments  Previous treatment: injection treatment  Current treatment: physical therapy  Patient Goals  Patient goals for therapy: increased strength, independence with ADLs/IADLs, increased motion, improved balance, decreased pain and decreased edema          Objective     Postural Observations  Seated posture: fair  Standing posture: fair        Neurological Testing     Sensation     Lumbar   Left   Intact: light touch    Right   Intact: light touch    Active Range of Motion     Lumbar   Flexion:  Restriction level: moderate  Extension:  Restriction level: maximal  Left lateral flexion:  Restriction level: moderate  Right lateral flexion:  Restriction level: minimal    Additional Active Range of Motion Details  Pt unable to tolerate supine positioning for ROM assessment     Strength/Myotome Testing     Left Hip   Planes of Motion   Flexion: 3  Abduction: 3+  Adduction: 3+    Right Hip   Planes of Motion   Flexion: 3+  Abduction: 3+  Adduction: 3+    Left Knee   Flexion: 4  Extension: 4-    Right Knee   Flexion: 4  Extension: 4-    Ambulation   Weight-Bearing Status   Assistive device used: rollator walker    Additional Weight-Bearing Status Details  Use of Rollator walker for household and community ambulation   Tolerance x 15 minutes     Ambulation: Stairs   Pattern: reciprocal  Railings: one rail  Pattern: reciprocal  Railings: one rail    General Comments:      Lumbar Comments  Romberg: Firm EO x 30"     Tadem: Firm EO L back: 13"   Tadem: Firm EO R back: 5"              Precautions: None  RE Due: 8/22/19  Specialty Daily Treatment Diary     Manual         Check SpO2 prn                            Exercise Diary         NuStep         SLR x 3         HR/TR        Squats         Step-ups         SBB        PPT        PPT with marches        SLR        S/L SLR        Clamshells        Hip abd        Hip add        LTR        SKTC        Heel walk-outs        Quad alt UE        Quad alt LE         MTP/LTP            Modalities        MHP/CP prn

## 2019-07-22 NOTE — LETTER
2019    Oralia Merrill DPM  [facility] [address]    Patient: Kylie Gaines   YOB: 1940   Date of Visit: 2019     Encounter Diagnosis     ICD-10-CM    1  Spinal stenosis, unspecified spinal region M48 00        Dear Dr Mclaughlin Render:    Please review the attached Plan of Care from [address] [place] [address] recent visit  Please verify that you agree therapy should continue by signing the attached document and sending it back to our office  If you have any questions or concerns, please don't hesitate to call  Sincerely,    Nora Justice, PT      Referring Provider:      I certify that I have read the below Plan of Care and certify the need for these services furnished under this plan of treatment while under my care  Oralia Merrill DPM  [address]  [place]          PT Evaluation     Today's date: 2019  Patient name: Kylie Gaines  : 1940  MRN: [id number]  Referring provider: Manolo Kruse DPM  Dx:   Encounter Diagnosis     ICD-10-CM    1  Spinal stenosis, unspecified spinal region M48 00                   Assessment  Assessment details: Pt Tresa Casillas is a 78 y o  who presents to OPPT with s/s consistent with L/S spinal stenosis  PT notes pt with limitations in standing/walking < 15 minutes due to onset of pain as well as feeling unsteady on her feet  She is using rollator walker for ambulation due to fear of falling  Significant L/S ROM deficits and B LE weakness is observed and contributing to limitations with gait, stair negotiation, balance, and tolerance to ADLs  She notes that rest/sitting will help to decrease her pain  Pt would benefit from skilled therapy services to address outlined impairments, work towards goals, and restore pts PLOF  Thank you!    Impairments: abnormal or restricted ROM, activity intolerance, impaired balance, impaired physical strength, lacks appropriate home exercise program, pain with function, safety issue and poor posture   Understanding of Dx/Px/POC: good   Prognosis: good    Goals  STG: to be achieved within 4 weeks   Decrease pain 25-50%  Improve ROM by 5-10 degrees  Improve strength by 1/2 grade     LTG: to be achieved within 8 weeks   Independent with HEP   ADL function returned to PLOF  Ambulation with rollator walker > 20 minutes   Pt able to complete household chores with minimal difficulty/increase in pain     Plan  Plan details: PT provided pt with detailed HEP program; pt verbalized understanding of program    Patient would benefit from: skilled physical therapy  Planned modality interventions: cryotherapy, thermotherapy: hydrocollator packs and TENS  Planned therapy interventions: abdominal trunk stabilization, manual therapy, neuromuscular re-education, balance, home exercise program, therapeutic exercise, stretching, strengthening, self care, graded exercise, functional ROM exercises, flexibility and gait training  Frequency: 3x week  Treatment plan discussed with: patient        Subjective Evaluation    History of Present Illness  Mechanism of injury: Pt reporting that she has been having back pain for several years  She is currently see a podiatrist for swelling in the feet and mentioned the back pain and leg weakness she has been having  MD is referred to OPPT for conservative management of s/s  She has not had recent imaging completed on the L/S but does state injections tried previously with little relief  She will go back to see podiatrist again on 19     Quality of life: good    Pain  At best pain ratin  At worst pain ratin  Quality: dull ache (N/T in the R foot )  Relieving factors: rest (sitting)  Aggravating factors: lifting and walking  Progression: worsening    Social Support  Steps to enter house: no  Stairs in house: no   Lives in: apartment  Lives with: alone    Treatments  Previous treatment: injection treatment  Current treatment: physical therapy  Patient Goals  Patient goals for therapy: increased strength, independence with ADLs/IADLs, increased motion, improved balance, decreased pain and decreased edema          Objective     Postural Observations  Seated posture: fair  Standing posture: fair        Neurological Testing     Sensation     Lumbar   Left   Intact: light touch    Right   Intact: light touch    Active Range of Motion     Lumbar   Flexion:  Restriction level: moderate  Extension:  Restriction level: maximal  Left lateral flexion:  Restriction level: moderate  Right lateral flexion:  Restriction level: minimal    Additional Active Range of Motion Details  Pt unable to tolerate supine positioning for ROM assessment     Strength/Myotome Testing     Left Hip   Planes of Motion   Flexion: 3  Abduction: 3+  Adduction: 3+    Right Hip   Planes of Motion   Flexion: 3+  Abduction: 3+  Adduction: 3+    Left Knee   Flexion: 4  Extension: 4-    Right Knee   Flexion: 4  Extension: 4-    Ambulation   Weight-Bearing Status   Assistive device used: rollator walker    Additional Weight-Bearing Status Details  Use of Rollator walker for household and community ambulation   Tolerance x 15 minutes     Ambulation: Stairs   Pattern: reciprocal  Railings: one rail  Pattern: reciprocal  Railings: one rail    General Comments:      Lumbar Comments  Romberg: Firm EO x 30"     Tadem: Firm EO L back: 13"   Tadem: Firm EO R back: 5"              Precautions: None  RE Due: 8/22/19  Specialty Daily Treatment Diary     Manual         Check SpO2 prn                            Exercise Diary         NuStep         SLR x 3         HR/TR        Squats         Step-ups         SBB        PPT        PPT with marches        SLR        S/L SLR        Clamshells        Hip abd        Hip add        LTR        SKTC        Heel walk-outs        Quad alt UE        Quad alt LE         MTP/LTP            Modalities        MHP/CP prn

## 2019-07-24 ENCOUNTER — OFFICE VISIT (OUTPATIENT)
Dept: PHYSICAL THERAPY | Facility: HOME HEALTHCARE | Age: 79
End: 2019-07-24
Payer: MEDICARE

## 2019-07-24 DIAGNOSIS — M48.00 SPINAL STENOSIS, UNSPECIFIED SPINAL REGION: Primary | ICD-10-CM

## 2019-07-24 PROCEDURE — 97110 THERAPEUTIC EXERCISES: CPT

## 2019-07-24 NOTE — PROGRESS NOTES
Daily Note     Today's date: 2019  Patient name: Trice Diaz  : 1940  MRN: [id number]  Referring provider: Ambika Aevlar DPM  Dx:   Encounter Diagnosis     ICD-10-CM    1  Spinal stenosis, unspecified spinal region M48 00               Subjective: Pt reports she is doing her exercises at home and doesn't feel too bad today  Objective: See treatment diary below      Assessment: Tolerated treatment fairly well  Verbal cues needed t/o TE to perform correctly  Pt reported feeling a little winded with standing TE with one seated rest break needed and symptoms subsided  Core weakness noted with exercise  No increased pain reported t/o session  Patient would benefit from continued PT      Plan: Continue per plan of care        Precautions: None  RE Due: 19  Specialty Daily Treatment Diary     Manual  19       Check SpO2 prn Pre session 93%    During Ex 93%    Post session 93%                           Exercise Diary  19       NuStep  Level 1   10 min       SLR x 3  1 x 10 ea Ward       HR/TR 1 x 10 ea        Squats  1 x 10       Step-ups         SBB 5" x 10       PPT 5"  1 x 10       PPT with marches        SLR 1 x 10 Ward       S/L SLR        Clamshells        Hip abd 1 x 10 5"       Hip add Red 1 x 10       LTR 5" x 10       SKTC 5" x 5 Ward       Heel walk-outs 1 x 5        Quad alt UE        Quad alt LE         MTP/LTP            Modalities 19       MHP/CP prn  MHP seated 10 min

## 2019-07-26 ENCOUNTER — OFFICE VISIT (OUTPATIENT)
Dept: PHYSICAL THERAPY | Facility: HOME HEALTHCARE | Age: 79
End: 2019-07-26
Payer: MEDICARE

## 2019-07-26 DIAGNOSIS — M48.00 SPINAL STENOSIS, UNSPECIFIED SPINAL REGION: Primary | ICD-10-CM

## 2019-07-26 PROCEDURE — 97110 THERAPEUTIC EXERCISES: CPT

## 2019-07-26 NOTE — PROGRESS NOTES
Daily Note     Today's date: 2019  Patient name: Guadalupe Tillman  : 1940  MRN: [id number]  Referring provider: Yohan Betts DPM  Dx:   Encounter Diagnosis     ICD-10-CM    1  Spinal stenosis, unspecified spinal region M48 00               Subjective: Pt reports her legs are sore today  Objective: See treatment diary below      Assessment: Tolerated treatment fairly well  Added clamshells to program today  Increased reps with hip add and abd  Soreness but no increased pain reported t/o session  Monitored Spo2 t/o session  Patient would benefit from continued PT      Plan: Continue per plan of care        Precautions: None  RE Due: 19  Specialty Daily Treatment Diary     Manual  19      Check SpO2 prn Pre session 93%    During Ex 93%    Post session 93% Pre session   93%    During Ex 94%    Post session 94%                          Exercise Diary  19      NuStep  Level 1   10 min Level 1   10 min      SLR x 3  1 x 10 ea Ward 1 x 10 ea Ward      HR/TR 1 x 10 ea  1 x 10 ea       Squats  1 x 10 1 x 10      Step-ups         SBB 5" x 10 5" x 10      PPT 5"  1 x 10 5" 1 x 10      PPT with marches        SLR 1 x 10 Ward 1 x 10 Ward      S/L SLR        Clamshells  1 x 10 Ward      Hip abd 1 x 10 5" 1 x 15 5"      Hip add Red 1 x 10 Red 1 x 15      LTR 5" x 10 5"x 10      SKTC 5" x 5 Ward 5"x 5 Ward      Heel walk-outs 1 x 5  1 x 5       Quad alt UE        Quad alt LE         MTP/LTP            Modalities 19      MHP/CP prn  MHP seated 10 min  MHP seated   10 min

## 2019-07-29 ENCOUNTER — APPOINTMENT (OUTPATIENT)
Dept: PHYSICAL THERAPY | Facility: HOME HEALTHCARE | Age: 79
End: 2019-07-29
Payer: MEDICARE

## 2019-07-31 ENCOUNTER — OFFICE VISIT (OUTPATIENT)
Dept: PHYSICAL THERAPY | Facility: HOME HEALTHCARE | Age: 79
End: 2019-07-31
Payer: MEDICARE

## 2019-07-31 DIAGNOSIS — M48.00 SPINAL STENOSIS, UNSPECIFIED SPINAL REGION: Primary | ICD-10-CM

## 2019-07-31 PROCEDURE — 97110 THERAPEUTIC EXERCISES: CPT

## 2019-07-31 NOTE — PROGRESS NOTES
Daily Note     Today's date: 2019  Patient name: Cory Luque  : 1940  MRN: [id number]  Referring provider: Eloy Medina DPM  Dx:   Encounter Diagnosis     ICD-10-CM    1  Spinal stenosis, unspecified spinal region M48 00                   Subjective: Pt reports her back is pretty good today, not too much pain  Objective: See treatment diary below      Assessment: Tolerated treatment well  No increased pain reported t/o session  Verbal cues needed to perform exercises correctly  Monitored Spo2  t/o session  Pt reports feeling pretty good at end of session  Patient would benefit from continued PT      Plan: Continue per plan of care        Precautions: None  RE Due: 19  Specialty Daily Treatment Diary     Manual  19     Check SpO2 prn Pre session 93%    During Ex 93%    Post session 93% Pre session   93%    During Ex 94%    Post session 94% Pre session  94%    During Ex 95%    Post session   95%                           Exercise Diary  19     NuStep  Level 1   10 min Level 1   10 min Level 1   10 min     SLR x 3  1 x 10 ea Ward 1 x 10 ea Ward 1 x 10 ea Ward     HR/TR 1 x 10 ea  1 x 10 ea  1 x 10 ea      Squats  1 x 10 1 x 10 1 x 10     Step-ups         SBB 5" x 10 5" x 10 5" x 10     PPT 5"  1 x 10 5" 1 x 10 5" x 15     PPT with marches        SLR 1 x 10 Ward 1 x 10 Ward 1 x 15 Ward     S/L SLR        Clamshells  1 x 10 Ward 1 x 10 Ward     Hip abd 1 x 10 5" 1 x 15 5" 1 x 15 5"     Hip add Red 1 x 10 Red 1 x 15 Red 1 x 15     LTR 5" x 10 5"x 10 5" x 10     SKTC 5" x 5 Ward 5"x 5 Ward 5" x 5 Ward     Heel walk-outs 1 x 5  1 x 5  1 x 5      Quad alt UE        Quad alt LE         MTP/LTP            Modalities 19     MHP/CP prn  MHP seated 10 min  MHP seated   10 min MHP seated   10 min

## 2019-08-02 ENCOUNTER — OFFICE VISIT (OUTPATIENT)
Dept: PHYSICAL THERAPY | Facility: HOME HEALTHCARE | Age: 79
End: 2019-08-02
Payer: MEDICARE

## 2019-08-02 DIAGNOSIS — M48.00 SPINAL STENOSIS, UNSPECIFIED SPINAL REGION: Primary | ICD-10-CM

## 2019-08-02 PROCEDURE — 97110 THERAPEUTIC EXERCISES: CPT

## 2019-08-02 NOTE — PROGRESS NOTES
Daily Note     Today's date: 2019  Patient name: Cory Luque  : 1940  MRN: [id number]  Referring provider: Eloy Medina DPM  Dx:   Encounter Diagnosis     ICD-10-CM    1  Spinal stenosis, unspecified spinal region M48 00                   Subjective: Pain of 7/10 at LB  I think I over did it yesterday  I added 5 reps to HEP and did wash and some cleaning  My L knee always bothers me also  Objective: See treatment diary below    Assessment: Tolerated treatment well  No advancements with program today 2* report of soreness from yesterdays HEP and ADL's  Patient would benefit from continued PT    Plan: Continue per plan of care        Precautions: None  RE Due: 19  Specialty Daily Treatment Diary     Manual  19    Check SpO2 prn Pre session 93%    During Ex 93%    Post session 93% Pre session   93%    During Ex 94%    Post session 94% Pre session  94%    During Ex 95%    Post session   95%   Pre session  93%    During ex  94%    Post session  97%                        Exercise Diary  19    NuStep  Level 1   10 min Level 1   10 min Level 1   10 min L 1  10'    SLR x 3  1 x 10 ea Ward 1 x 10 ea Ward 1 x 10 ea Ward 1 x 10 ea Ward    HR/TR 1 x 10 ea  1 x 10 ea  1 x 10 ea  1 x 10    Squats  1 x 10 1 x 10 1 x 10 1 x 10    Step-ups         SBB 5" x 10 5" x 10 5" x 10 5" x 10    PPT 5"  1 x 10 5" 1 x 10 5" x 15 5" x 15    PPT with marches        SLR 1 x 10 Ward 1 x 10 Ward 1 x 15 Ward 1 x 15 Ward    S/L SLR        Clamshells  1 x 10 Ward 1 x 10 Ward 1 x 10 Ward    Hip abd 1 x 10 5" 1 x 15 5" 1 x 15 5" 1 x 15 5"    Hip add Red 1 x 10 Red 1 x 15 Red 1 x 15 Red 1 x 15    LTR 5" x 10 5"x 10 5" x 10 5" x 10    SKTC 5" x 5 Ward 5"x 5 Ward 5" x 5 Ward 5" x 5 Ward    Heel walk-outs 1 x 5  1 x 5  1 x 5  1 x 5    Quad alt UE        Quad alt LE         MTP/LTP            Modalities 19    MHP/CP prn  MHP seated 10 min  MHP seated   10 min MHP seated   10 min P seated 10'

## 2019-08-05 ENCOUNTER — OFFICE VISIT (OUTPATIENT)
Dept: PHYSICAL THERAPY | Facility: HOME HEALTHCARE | Age: 79
End: 2019-08-05
Payer: MEDICARE

## 2019-08-05 DIAGNOSIS — M48.00 SPINAL STENOSIS, UNSPECIFIED SPINAL REGION: Primary | ICD-10-CM

## 2019-08-05 PROCEDURE — 97110 THERAPEUTIC EXERCISES: CPT

## 2019-08-05 NOTE — PROGRESS NOTES
Daily Note     Today's date: 2019  Patient name: mEely Peck  : 1940  MRN: [id number]  Referring provider: Ayaan Gage DPM  Dx:   Encounter Diagnosis     ICD-10-CM    1  Spinal stenosis, unspecified spinal region M48 00               Subjective: Pt reports she feels better today than Saturday  Pt reports she was sore on Saturday  Objective: See treatment diary below      Assessment: Tolerated treatment fairly well  Verbal cues needed t/o TE to perform correctly  Pt reports some soreness in her back with supine position  Pt reports some relief at end of session after MHP  Patient would benefit from continued PT      Plan: Continue per plan of care        Precautions: None  RE Due: 19  Specialty Daily Treatment Diary     Manual  19   Check SpO2 prn Pre session 93%    During Ex 93%    Post session 93% Pre session   93%    During Ex 94%    Post session 94% Pre session  94%    During Ex 95%    Post session   95%   Pre session  93%    During ex  94%    Post session  97% Pre session 93%    During Ex 95%    Post session 96%                       Exercise Diary  19   NuStep  Level 1   10 min Level 1   10 min Level 1   10 min L 1  10' Level 1 10 min    SLR x 3  1 x 10 ea Ward 1 x 10 ea Ward 1 x 10 ea Ward 1 x 10 ea Ward 1 x 10 ea Ward   HR/TR 1 x 10 ea  1 x 10 ea  1 x 10 ea  1 x 10 1 x 10 ea    Squats  1 x 10 1 x 10 1 x 10 1 x 10 1 x 10   Step-ups         SBB 5" x 10 5" x 10 5" x 10 5" x 10 5" x 10   PPT 5"  1 x 10 5" 1 x 10 5" x 15 5" x 15 5" x 15   PPT with marches        SLR 1 x 10 Ward 1 x 10 Ward 1 x 15 Ward 1 x 15 Ward 1 x 15 Ward   S/L SLR        Clamshells  1 x 10 Ward 1 x 10 Ward 1 x 10 Ward 1 x 10 Ward   Hip abd 1 x 10 5" 1 x 15 5" 1 x 15 5" 1 x 15 5" 1 x 15 5"   Hip add Red 1 x 10 Red 1 x 15 Red 1 x 15 Red 1 x 15 Red 1 x 15   LTR 5" x 10 5"x 10 5" x 10 5" x 10 5" x 10   SKTC 5" x 5 Ward 5"x 5 Ward 5" x 5 Ward 5" x 5 Ward 5" x 5 Ward Heel walk-outs 1 x 5  1 x 5  1 x 5  1 x 5 1 x 5    Quad alt UE        Quad alt LE         MTP/LTP            Modalities 7/24/19 7/26/19 7/31/19 8-2-19 8/5/19   MHP/CP prn  MHP seated 10 min  MHP seated   10 min MHP seated   10 min MHP seated 10' MHP seated 10 min

## 2019-08-07 ENCOUNTER — OFFICE VISIT (OUTPATIENT)
Dept: PHYSICAL THERAPY | Facility: HOME HEALTHCARE | Age: 79
End: 2019-08-07
Payer: MEDICARE

## 2019-08-07 DIAGNOSIS — M48.00 SPINAL STENOSIS, UNSPECIFIED SPINAL REGION: Primary | ICD-10-CM

## 2019-08-07 PROCEDURE — 97110 THERAPEUTIC EXERCISES: CPT

## 2019-08-07 NOTE — PROGRESS NOTES
Daily Note     Today's date: 2019  Patient name: Jace Lenz  : 1940  MRN: [id number]  Referring provider: Jose Manuel Patel DPM  Dx:   Encounter Diagnosis     ICD-10-CM    1  Spinal stenosis, unspecified spinal region M48 00               Subjective: Pt reports she is ok right now, just tired  Objective: See treatment diary below      Assessment: Tolerated treatment fairly well  Verbal cues needed t/o TE to perform correctly  Added MTP/LTP to program today  Increased reps with most TE today  Mild fatigue noted with standing TE  Patient would benefit from continued PT      Plan: Continue per plan of care        Precautions: None  RE Due: 19  Specialty Daily Treatment Diary     Manual  19   Check SpO2 prn Pre session 94%    During Ex 93%    Post session 96% Pre session   93%    During Ex 94%    Post session 94% Pre session  94%    During Ex 95%    Post session   95%   Pre session  93%    During ex  94%    Post session  97% Pre session 93%    During Ex 95%    Post session 96%                       Exercise Diary  19   NuStep  Level 1   10 min Level 1   10 min Level 1   10 min L 1  10' Level 1 10 min    SLR x 3  1 x 15 ea Ward 1 x 10 ea Ward 1 x 10 ea Ward 1 x 10 ea Ward 1 x 10 ea Ward   HR/TR 1 x 15 ea  1 x 10 ea  1 x 10 ea  1 x 10 1 x 10 ea    Squats  1 x 15 1 x 10 1 x 10 1 x 10 1 x 10   Step-ups         SBB 5" x 10 5" x 10 5" x 10 5" x 10 5" x 10   PPT 5"  1 x 15 5" 1 x 10 5" x 15 5" x 15 5" x 15   PPT with marches        SLR 1 x 15 Ward 1 x 10 Ward 1 x 15 Ward 1 x 15 Ward 1 x 15 Ward   S/L SLR        Clamshells 1 x 15 Ward 1 x 10 Ward 1 x 10 Ward 1 x 10 Ward 1 x 10 Ward   Hip abd 1 x 15 5" 1 x 15 5" 1 x 15 5" 1 x 15 5" 1 x 15 5"   Hip add Red 1 x 15 Red 1 x 15 Red 1 x 15 Red 1 x 15 Red 1 x 15   LTR 5" x 10 5"x 10 5" x 10 5" x 10 5" x 10   SKTC 5" x 5 Ward 5"x 5 Ward 5" x 5 Ward 5" x 5 Ward 5" x 5 Ward   Heel walk-outs 1 x 5  1 x 5  1 x 5  1 x 5 1 x 5    Quad alt UE        Quad alt LE         MTP/LTP Red 1 x 15 ea            Modalities 8/7/19 7/26/19 7/31/19 8-2-19 8/5/19   MHP/CP prn  MHP seated 10 min  MHP seated   10 min MHP seated   10 min MHP seated 10' MHP seated 10 min

## 2019-08-09 ENCOUNTER — OFFICE VISIT (OUTPATIENT)
Dept: PHYSICAL THERAPY | Facility: HOME HEALTHCARE | Age: 79
End: 2019-08-09
Payer: MEDICARE

## 2019-08-09 DIAGNOSIS — M48.00 SPINAL STENOSIS, UNSPECIFIED SPINAL REGION: Primary | ICD-10-CM

## 2019-08-09 PROCEDURE — 97110 THERAPEUTIC EXERCISES: CPT

## 2019-08-09 NOTE — PROGRESS NOTES
Daily Note     Today's date: 2019  Patient name: Mina Carmona  : 1940  MRN: [id number]  Referring provider: Leticia Lazar DPM  Dx:   Encounter Diagnosis     ICD-10-CM    1  Spinal stenosis, unspecified spinal region M48 00               Subjective: Pt reports she feels pretty good today, no pain in her back  Objective: See treatment diary below      Assessment: Tolerated treatment fairly well  Verbal cues needed t/o TE to perform correctly  Added step ups and sidelying abd  to program  Pt reports some pain in her back with supine TE  Patient would benefit from continued PT      Plan: Continue per plan of care        Precautions: None  RE Due: 19  Specialty Daily Treatment Diary     Manual  19   Check SpO2 prn Pre session 94%    During Ex 93%    Post session 96% Pre session   95%    During Ex 94%    Post session 96% Pre session  94%    During Ex 95%    Post session   95%   Pre session  93%    During ex  94%    Post session  97% Pre session 93%    During Ex 95%    Post session 96%                       Exercise Diary  19   NuStep  Level 1   10 min Level 1   10 min Level 1   10 min L 1  10' Level 1 10 min    SLR x 3  1 x 15 ea Ward 1 x 15 ea Ward 1 x 10 ea Ward 1 x 10 ea Ward 1 x 10 ea Ward   HR/TR 1 x 15 ea  1 x 15 ea  1 x 10 ea  1 x 10 1 x 10 ea    Squats  1 x 15 1 x 15 1 x 10 1 x 10 1 x 10   Step-ups   Fwd C 1 x 10      SBB 5" x 10 5" x 10 5" x 10 5" x 10 5" x 10   PPT 5"  1 x 15 5" 1 x 15 5" x 15 5" x 15 5" x 15   PPT with marches        SLR 1 x 15 Ward 1 x 15 Ward 1 x 15 Ward 1 x 15 Ward 1 x 15 Ward   S/L SLR        Clamshells 1 x 15 Ward 1 x 15 Ward 1 x 10 Ward 1 x 10 Ward 1 x 10 Ward   Hip abd 1 x 15 5" 1 x 15 5" 1 x 15 5" 1 x 15 5" 1 x 15 5"   Hip add Red 1 x 15 Red 1 x 15 Red 1 x 15 Red 1 x 15 Red 1 x 15   LTR 5" x 10 5"x 10 5" x 10 5" x 10 5" x 10   SKTC 5" x 5 Ward 5"x 5 Ward 5" x 5 Ward 5" x 5 Ward 5" x 5 Ward   Heel walk-outs 1 x 5 1 x 5  1 x 5  1 x 5 1 x 5    Quad alt UE        Quad alt LE         MTP/LTP Red 1 x 15 ea  Red 1 x 15 ea           Modalities 8/7/19 8/9/19 7/31/19 8-2-19 8/5/19   MHP/CP prn  MHP seated 10 min  MHP seated   10 min MHP seated   10 min MHP seated 10' MHP seated 10 min

## 2019-08-13 ENCOUNTER — OFFICE VISIT (OUTPATIENT)
Dept: PHYSICAL THERAPY | Facility: HOME HEALTHCARE | Age: 79
End: 2019-08-13
Payer: MEDICARE

## 2019-08-13 DIAGNOSIS — M48.00 SPINAL STENOSIS, UNSPECIFIED SPINAL REGION: Primary | ICD-10-CM

## 2019-08-13 PROCEDURE — 97150 GROUP THERAPEUTIC PROCEDURES: CPT

## 2019-08-13 PROCEDURE — 97110 THERAPEUTIC EXERCISES: CPT

## 2019-08-13 NOTE — PROGRESS NOTES
Daily Note     Today's date: 2019  Patient name: Sheila Hallman  : 1940  MRN: [id number]  Referring provider: Levon Anderson DPM  Dx:   Encounter Diagnosis     ICD-10-CM    1  Spinal stenosis, unspecified spinal region M48 00               Subjective: Pt reports she doesn't really have any pain in her back but her R leg is bothering her today  Objective: See treatment diary below      Assessment: Tolerated treatment fairly well  Verbal cues needed t/o TE to perform correctly  Increased to green theraband today  Pt reports soreness in her back with TE  Some relief noted at end of session after MHP  Patient would benefit from continued PT      Plan: Continue per plan of care        Precautions: None  RE Due: 19  Specialty Daily Treatment Diary     Manual  19   Check SpO2 prn Pre session 94%    During Ex 93%    Post session 96% Pre session   95%    During Ex 94%    Post session 96% Pre session  93%    During Ex 93%    Post session   94%   Pre session  93%    During ex  94%    Post session  97% Pre session 93%    During Ex 95%    Post session 96%                       Exercise Diary  19   NuStep  Level 1   10 min Level 1   10 min Level 1   10 min L 1  10' Level 1 10 min    SLR x 3  1 x 15 ea Ward 1 x 15 ea Ward 1 x 15 ea Ward 1 x 10 ea Ward 1 x 10 ea Ward   HR/TR 1 x 15 ea  1 x 15 ea  1 x 15ea  1 x 10 1 x 10 ea    Squats  1 x 15 1 x 15 1 x 15 1 x 10 1 x 10   Step-ups   Fwd C 1 x 10 Fwd C 1 x 10     SBB 5" x 10 5" x 10 5" x 10 5" x 10 5" x 10   PPT 5"  1 x 15 5" 1 x 15 5" x 15 5" x 15 5" x 15   PPT with marches        SLR 1 x 15 Ward 1 x 15 Ward 1 x 15 Ward 1 x 15 Ward 1 x 15 Ward   S/L SLR        Clamshells 1 x 15 Ward 1 x 15 Ward 1 x 15 Ward 1 x 10 Ward 1 x 10 Ward   Hip abd 1 x 15 5" 1 x 15 5" 1 x 15 5" 1 x 15 5" 1 x 15 5"   Hip add Red 1 x 15 Red 1 x 15 Red 1 x 15 Red 1 x 15 Red 1 x 15   LTR 5" x 10 5"x 10 5" x 10 5" x 10 5" x 10   SKTC 5" x 5 Ward 5"x 5 Ward 5" x 5 Ward 5" x 5 Ward 5" x 5 Ward   Heel walk-outs 1 x 5  1 x 5  1 x 5  1 x 5 1 x 5    Quad alt UE        Quad alt LE         MTP/LTP Red 1 x 15 ea  Red 1 x 15 ea  Red 1 x 15 ea          Modalities 8/7/19 8/9/19 8/13/19 8-2-19 8/5/19   MHP/CP prn  MHP seated 10 min  MHP seated   10 min MHP seated   10 min MHP seated 10' MHP seated 10 min

## 2019-08-14 ENCOUNTER — OFFICE VISIT (OUTPATIENT)
Dept: PHYSICAL THERAPY | Facility: HOME HEALTHCARE | Age: 79
End: 2019-08-14
Payer: MEDICARE

## 2019-08-14 DIAGNOSIS — M48.00 SPINAL STENOSIS, UNSPECIFIED SPINAL REGION: Primary | ICD-10-CM

## 2019-08-14 PROCEDURE — 97110 THERAPEUTIC EXERCISES: CPT

## 2019-08-14 NOTE — PROGRESS NOTES
Daily Note     Today's date: 2019  Patient name: Ana Monreal  : 1940  MRN: [id number]  Referring provider: Kody Womack DPM  Dx:   Encounter Diagnosis     ICD-10-CM    1  Spinal stenosis, unspecified spinal region M48 00               Subjective: Pt reports she has a little pain and soreness in her LB today  Objective: See treatment diary below      Assessment: Tolerated treatment fairly well  Verbal cues needed t/o TE to perform correctly  Increased to level 2 on Nustep today  Pt reports no increased pain t/o session  Some relief noted at end of session after MHP  Patient would benefit from continued PT      Plan: Continue per plan of care        Precautions: None  RE Due: 19  Specialty Daily Treatment Diary     Manual  19   Check SpO2 prn Pre session 94%    During Ex 93%    Post session 96% Pre session   95%    During Ex 94%    Post session 96% Pre session  93%    During Ex 93%    Post session   94%   Pre session  93%    During ex  93%    Post session  95% Pre session 93%    During Ex 95%    Post session 96%                       Exercise Diary  19   NuStep  Level 1   10 min Level 1   10 min Level 1   10 min L 2  10 min Level 1 10 min    SLR x 3  1 x 15 ea Ward 1 x 15 ea Ward 1 x 15 ea Ward 1 x 15 ea Ward 1 x 10 ea Ward   HR/TR 1 x 15 ea  1 x 15 ea  1 x 15ea  1 x 15 ea  1 x 10 ea    Squats  1 x 15 1 x 15 1 x 15 1 x 15 1 x 10   Step-ups   Fwd C 1 x 10 Fwd C 1 x 10 Fwd C 1 x 15    SBB 5" x 10 5" x 10 5" x 10 5" x 10 5" x 10   PPT 5"  1 x 15 5" 1 x 15 5" x 15 5" x 15 5" x 15   PPT with marches        SLR 1 x 15 Ward 1 x 15 Ward 1 x 15 Ward 1 x 15 Ward 1 x 15 Ward   S/L SLR        Clamshells 1 x 15 Ward 1 x 15 Ward 1 x 15 Ward 1 x 15 Ward 1 x 10 Ward   Hip abd 1 x 15 5" 1 x 15 5" 1 x 15 5" 1 x 15 5" 1 x 15 5"   Hip add Red 1 x 15 Red 1 x 15 Red 1 x 15 Green  1 x 15 Red 1 x 15   LTR 5" x 10 5"x 10 5" x 10 5" x 10 5" x 10   SKTC 5" x 5 Ward 5"x 5 Ward 5" x 5 Ward 5" x 5 Ward 5" x 5 Ward   Heel walk-outs 1 x 5  1 x 5  1 x 5  1 x 5 1 x 5    Quad alt UE        Quad alt LE         MTP/LTP Red 1 x 15 ea  Red 1 x 15 ea  Red 1 x 15 ea  Red 1 x 15 ea         Modalities 8/7/19 8/9/19 8/13/19 8/14/19 8/5/19   MHP/CP prn  MHP seated 10 min  MHP seated   10 min MHP seated   10 min MHP seated 10' MHP seated 10 min

## 2019-08-16 ENCOUNTER — OFFICE VISIT (OUTPATIENT)
Dept: PHYSICAL THERAPY | Facility: HOME HEALTHCARE | Age: 79
End: 2019-08-16
Payer: MEDICARE

## 2019-08-16 DIAGNOSIS — M48.00 SPINAL STENOSIS, UNSPECIFIED SPINAL REGION: Primary | ICD-10-CM

## 2019-08-16 PROCEDURE — 97110 THERAPEUTIC EXERCISES: CPT | Performed by: PHYSICAL THERAPIST

## 2019-08-16 NOTE — PROGRESS NOTES
Daily Note     Today's date: 2019  Patient name: Cory Luque  : 1940  MRN: [id number]  Referring provider: Eloy Medina DPM  Dx:   Encounter Diagnosis     ICD-10-CM    1  Spinal stenosis, unspecified spinal region M48 00                   Subjective: "My back hurts today"       Objective: See treatment diary below      Assessment: Pt appeared more SOB this session with SpO2 consistently at 91% with frequent checks  She was given rest periods as needed and recovered from any SOB quickly  SpO2 improved to 94% after sitting with MHP  No changes in LBP with program, mild decrease in stiffness with MHP to end  Plan: Continue per plan of care        Precautions: None  RE Due: 19  Specialty Daily Treatment Diary     Manual  19   Check SpO2 prn Pre session 94%    During Ex 93%    Post session 96% Pre session   95%    During Ex 94%    Post session 96% Pre session  93%    During Ex 93%    Post session   94%   Pre session  93%    During ex  93%    Post session  95% Pre session 91%    During Ex 91%    Post session 94%                       Exercise Diary  19   NuStep  Level 1   10 min Level 1   10 min Level 1   10 min L 2  10 min Level 1  10 min    SLR x 3  1 x 15 ea Ward 1 x 15 ea Ward 1 x 15 ea Ward 1 x 15 ea Ward 1 x 15 ea Ward   HR/TR 1 x 15 ea  1 x 15 ea  1 x 15ea  1 x 15 ea  1 x 15 ea    Squats  1 x 15 1 x 15 1 x 15 1 x 15 1 x 15   Step-ups   Fwd C 1 x 10 Fwd C 1 x 10 Fwd C 1 x 15 Fwd C x 15    SBB 5" x 10 5" x 10 5" x 10 5" x 10 5" x 10   PPT 5"  1 x 15 5" 1 x 15 5" x 15 5" x 15 5" x 15   PPT with marches        SLR 1 x 15 Ward 1 x 15 Ward 1 x 15 Ward 1 x 15 Ward 1 x 15 Ward   S/L SLR        Clamshells 1 x 15 Ward 1 x 15 Ward 1 x 15 Ward 1 x 15 Ward 1 x 15 Ward   Hip abd 1 x 15 5" 1 x 15 5" 1 x 15 5" 1 x 15 5" 1 x 15 5"   Hip add Red 1 x 15 Red 1 x 15 Red 1 x 15 Green  1 x 15 Green 1 x 15   LTR 5" x 10 5"x 10 5" x 10 5" x 10 5" x 10 SKTC 5" x 5 Ward 5"x 5 Ward 5" x 5 Ward 5" x 5 Ward 5" x 5 Ward   Heel walk-outs 1 x 5  1 x 5  1 x 5  1 x 5 1 x 5    Quad alt UE        Quad alt LE         MTP/LTP Red 1 x 15 ea  Red 1 x 15 ea  Red 1 x 15 ea  Red 1 x 15 ea  Red x 15 ea        Modalities 8/7/19 8/9/19 8/13/19 8/14/19 8/16/19   MHP/CP prn  MHP seated 10 min  MHP seated   10 min MHP seated   10 min MHP seated 10' MHP seated 10 min

## 2019-08-19 ENCOUNTER — APPOINTMENT (OUTPATIENT)
Dept: PHYSICAL THERAPY | Facility: HOME HEALTHCARE | Age: 79
End: 2019-08-19
Payer: MEDICARE

## 2019-08-21 ENCOUNTER — EVALUATION (OUTPATIENT)
Dept: PHYSICAL THERAPY | Facility: HOME HEALTHCARE | Age: 79
End: 2019-08-21
Payer: MEDICARE

## 2019-08-21 ENCOUNTER — TRANSCRIBE ORDERS (OUTPATIENT)
Dept: PHYSICAL THERAPY | Facility: HOME HEALTHCARE | Age: 79
End: 2019-08-21

## 2019-08-21 DIAGNOSIS — M48.00 SPINAL STENOSIS, UNSPECIFIED SPINAL REGION: Primary | ICD-10-CM

## 2019-08-21 PROCEDURE — 97110 THERAPEUTIC EXERCISES: CPT | Performed by: PHYSICAL THERAPIST

## 2019-08-21 PROCEDURE — 97164 PT RE-EVAL EST PLAN CARE: CPT | Performed by: PHYSICAL THERAPIST

## 2019-08-21 NOTE — LETTER
2019    Krissy Kat DPM  [facility] [id number]    Patient: Ana Monreal   YOB: 1940   Date of Visit: 2019     Encounter Diagnosis     ICD-10-CM    1  Spinal stenosis, unspecified spinal region M48 00        Dear Dr Jeff Pierson: Thank you for your recent referral of Ana Monreal  Please review the attached evaluation summary from Cr's recent visit  Please verify that you agree with the plan of care by signing the attached order  If you have any questions or concerns, please do not hesitate to call  I sincerely appreciate the opportunity to share in the care of one of your patients and hope to have another opportunity to work with you in the near future  Sincerely,    Burgess Rocha, PT      Referring Provider:      I certify that I have read the below Plan of Care and certify the need for these services furnished under this plan of treatment while under my care  [place] [id number]  [facility] [place]          PT Re-Evaluation     Today's date: 2019  Patient name: Ana Monreal  : 1940  MRN: [id number]  Referring provider: Kody Womack DPM  Dx:   Encounter Diagnosis     ICD-10-CM    1  Spinal stenosis, unspecified spinal region M48 00                   Assessment  Assessment details: Pt Asiya Glaser is a 78 y o  who presents to OPPT with s/s consistent with L/S spinal stenosis  PT notes that pt continues to have episodes of severe pain with over activity (ie scrubbing her kitchen floor), however frequency of higher pain is much decreased since SOC  Pt is now able to ambulate x 20 minutes, using a shopping cart while out at the grocery store  She continues to use rollator walker for longer distance in apartment building and uses elevator as stairs remain challenging   Overall, pt is showing gradual improvement in mobility and strength but with remaining deficits that would benefit from continued therapy to address  Pt will return to see MD again next week  PT to continue POC with progression as tolerable to address remaining impairments  Thank you! Impairments: abnormal or restricted ROM, activity intolerance, impaired balance, impaired physical strength, pain with function and poor posture   Understanding of Dx/Px/POC: good   Prognosis: good    Goals  STG: to be achieved within 4 weeks   Decrease pain 25-50% - partially met  Improve ROM by 5-10 degrees - partially met  Improve strength by 1/2 grade - partially met    LTG: to be achieved within 8 weeks   Independent with HEP   ADL function returned to PLOF  Ambulation with rollator walker > 20 minutes   Pt able to complete household chores with minimal difficulty/increase in pain     Plan  Plan details: PT provided pt with detailed HEP program; pt verbalized understanding of program    Patient would benefit from: skilled physical therapy  Planned modality interventions: cryotherapy, thermotherapy: hydrocollator packs and TENS  Planned therapy interventions: abdominal trunk stabilization, manual therapy, neuromuscular re-education, balance, home exercise program, therapeutic exercise, stretching, strengthening, self care, graded exercise, functional ROM exercises, flexibility and gait training  Frequency: 3x week  Treatment plan discussed with: patient        Subjective Evaluation    History of Present Illness  Mechanism of injury: Pt states that she feels like the therapy is helping and would like to continue  She will see the Dr Tashia Hernandez next week, 19     Quality of life: good    Pain  At best pain ratin  At worst pain rating: 10  Quality: dull ache (N/T in the R foot )  Relieving factors: rest (sitting)  Aggravating factors: lifting and walking  Progression: worsening    Social Support  Steps to enter house: no  Stairs in house: no   Lives in: apartment  Lives with: alone    Treatments  Previous treatment: injection treatment  Current treatment: physical therapy  Patient Goals  Patient goals for therapy: increased strength, independence with ADLs/IADLs, increased motion, improved balance, decreased pain and decreased edema          Objective     Postural Observations  Seated posture: fair  Standing posture: fair        Neurological Testing     Sensation     Lumbar   Left   Intact: light touch    Right   Intact: light touch    Active Range of Motion     Lumbar   Flexion:  Restriction level: moderate  Extension:  Restriction level: moderate  Left lateral flexion:  Restriction level: moderate  Right lateral flexion:  Restriction level: minimal    Additional Active Range of Motion Details  Pt unable to tolerate supine positioning for ROM assessment     Strength/Myotome Testing     Left Hip   Planes of Motion   Flexion: 3+  Abduction: 3+  Adduction: 3+    Right Hip   Planes of Motion   Flexion: 4-  Abduction: 4-  Adduction: 4-    Left Knee   Flexion: 4  Extension: 4-    Right Knee   Flexion: 4  Extension: 4-    Ambulation   Weight-Bearing Status   Assistive device used: rollator walker    Additional Weight-Bearing Status Details  Use of Rollator walker for community ambulation   Tolerance x 20 minutes   Can now walk around the grocery store using the cart to hold onto; previously using electric scooter     Ambulation: Stairs   Pattern: reciprocal  Railings: one rail  Pattern: reciprocal  Railings: one rail    General Comments:      Lumbar Comments  Romberg: Firm EO x 30"     Tadem: Firm EO L back: 13"   Tadem: Firm EO R back: 5"             Precautions: None  RE Due: 9/21/19  Specialty Daily Treatment Diary     Manual  8/21/19 8/9/19 8/13/19 8/14/19 8/16/19   Check SpO2 prn Pre session 92%    During Ex 88%    Post session 94% Pre session   95%    During Ex 94%    Post session 96% Pre session  93%    During Ex 93%    Post session   94%   Pre session  93%    During ex  93%    Post session  95% Pre session 91%    During Ex 91%    Post session 94%                       Exercise Diary  8/21/19 8/9/19 8/13/19 8/14/19 8/16/19   NuStep  Level 2  10 min Level 1   10 min Level 1   10 min L 2  10 min Level 1  10 min    SLR x 3  1 x 20 ea Ward 1 x 15 ea Ward 1 x 15 ea Ward 1 x 15 ea Ward 1 x 15 ea Ward   HR/TR 1 x 20 ea  1 x 15 ea  1 x 15ea  1 x 15 ea  1 x 15 ea    Squats  1 x 15 1 x 15 1 x 15 1 x 15 1 x 15   Step-ups  C Fwd x 15  Fwd C 1 x 10 Fwd C 1 x 10 Fwd C 1 x 15 Fwd C x 15    SBB 5" x 10 5" x 10 5" x 10 5" x 10 5" x 10   PPT 5"  1 x 20 5" 1 x 15 5" x 15 5" x 15 5" x 15   PPT with marches        SLR 1 x 15 Ward 1 x 15 Ward 1 x 15 Ward 1 x 15 Ward 1 x 15 Ward   S/L SLR        Clamshells Red  x 15 Ward 1 x 15 Ward 1 x 15 Ward 1 x 15 Ward 1 x 15 Ward   Hip abd 1 x 20 5" 1 x 15 5" 1 x 15 5" 1 x 15 5" 1 x 15 5"   Hip add Green x 20  Red 1 x 15 Red 1 x 15 Green  1 x 15 Green 1 x 15   LTR 10" x 10 5"x 10 5" x 10 5" x 10 5" x 10   SKTC 5" x 5 Ward 5"x 5 Ward 5" x 5 Ward 5" x 5 Ward 5" x 5 Ward   Heel walk-outs 1 x 10 1 x 5  1 x 5  1 x 5 1 x 5    Quad alt UE        Quad alt LE         MTP/LTP Red 1 x 20 ea   Red 1 x 15 ea  Red 1 x 15 ea  Red 1 x 15 ea  Red x 15 ea        Modalities 8/21/19 8/9/19 8/13/19 8/14/19 8/16/19   MHP/CP prn  MHP seated 10 min  MHP seated   10 min MHP seated   10 min MHP seated 10' MHP seated 10 min

## 2019-08-21 NOTE — PROGRESS NOTES
PT Re-Evaluation     Today's date: 2019  Patient name: Jennifer Haro  : 1940  MRN: [id number]  Referring provider: Brit Burnette DPM  Dx:   Encounter Diagnosis     ICD-10-CM    1  Spinal stenosis, unspecified spinal region M48 00                   Assessment  Assessment details: Pt Marina Rossi is a 78 y o  who presents to OPPT with s/s consistent with L/S spinal stenosis  PT notes that pt continues to have episodes of severe pain with over activity (ie scrubbing her kitchen floor), however frequency of higher pain is much decreased since SOC  Pt is now able to ambulate x 20 minutes, using a shopping cart while out at the grocery store  She continues to use rollator walker for longer distance in apartment building and uses elevator as stairs remain challenging  Overall, pt is showing gradual improvement in mobility and strength but with remaining deficits that would benefit from continued therapy to address  Pt will return to see MD again next week  PT to continue POC with progression as tolerable to address remaining impairments  Thank you!    Impairments: abnormal or restricted ROM, activity intolerance, impaired balance, impaired physical strength, pain with function and poor posture   Understanding of Dx/Px/POC: good   Prognosis: good    Goals  STG: to be achieved within 4 weeks   Decrease pain 25-50% - partially met  Improve ROM by 5-10 degrees - partially met  Improve strength by 1/2 grade - partially met    LTG: to be achieved within 8 weeks   Independent with HEP   ADL function returned to PLOF  Ambulation with rollator walker > 20 minutes   Pt able to complete household chores with minimal difficulty/increase in pain     Plan  Plan details: PT provided pt with detailed HEP program; pt verbalized understanding of program    Patient would benefit from: skilled physical therapy  Planned modality interventions: cryotherapy, thermotherapy: hydrocollator packs and TENS  Planned therapy interventions: abdominal trunk stabilization, manual therapy, neuromuscular re-education, balance, home exercise program, therapeutic exercise, stretching, strengthening, self care, graded exercise, functional ROM exercises, flexibility and gait training  Frequency: 3x week  Treatment plan discussed with: patient        Subjective Evaluation    History of Present Illness  Mechanism of injury: Pt states that she feels like the therapy is helping and would like to continue  She will see the Dr Alexandra Hudson next week, 19     Quality of life: good    Pain  At best pain ratin  At worst pain rating: 10  Quality: dull ache (N/T in the R foot )  Relieving factors: rest (sitting)  Aggravating factors: lifting and walking  Progression: worsening    Social Support  Steps to enter house: no  Stairs in house: no   Lives in: apartment  Lives with: alone    Treatments  Previous treatment: injection treatment  Current treatment: physical therapy  Patient Goals  Patient goals for therapy: increased strength, independence with ADLs/IADLs, increased motion, improved balance, decreased pain and decreased edema          Objective     Postural Observations  Seated posture: fair  Standing posture: fair        Neurological Testing     Sensation     Lumbar   Left   Intact: light touch    Right   Intact: light touch    Active Range of Motion     Lumbar   Flexion:  Restriction level: moderate  Extension:  Restriction level: moderate  Left lateral flexion:  Restriction level: moderate  Right lateral flexion:  Restriction level: minimal    Additional Active Range of Motion Details  Pt unable to tolerate supine positioning for ROM assessment     Strength/Myotome Testing     Left Hip   Planes of Motion   Flexion: 3+  Abduction: 3+  Adduction: 3+    Right Hip   Planes of Motion   Flexion: 4-  Abduction: 4-  Adduction: 4-    Left Knee   Flexion: 4  Extension: 4-    Right Knee   Flexion: 4  Extension: 4-    Ambulation   Weight-Bearing Status Assistive device used: rollator walker    Additional Weight-Bearing Status Details  Use of Rollator walker for community ambulation   Tolerance x 20 minutes   Can now walk around the grocery store using the cart to hold onto; previously using electric scooter     Ambulation: Stairs   Pattern: reciprocal  Railings: one rail  Pattern: reciprocal  Railings: one rail    General Comments:      Lumbar Comments  Romberg: Firm EO x 30"     Tadem: Firm EO L back: 13"   Tadem: Firm EO R back: 5"             Precautions: None  RE Due: 9/21/19  Specialty Daily Treatment Diary     Manual  8/21/19 8/9/19 8/13/19 8/14/19 8/16/19   Check SpO2 prn Pre session 92%    During Ex 88%    Post session 94% Pre session   95%    During Ex 94%    Post session 96% Pre session  93%    During Ex 93%    Post session   94%   Pre session  93%    During ex  93%    Post session  95% Pre session 91%    During Ex 91%    Post session 94%                       Exercise Diary  8/21/19 8/9/19 8/13/19 8/14/19 8/16/19   NuStep  Level 2  10 min Level 1   10 min Level 1   10 min L 2  10 min Level 1  10 min    SLR x 3  1 x 20 ea Ward 1 x 15 ea Ward 1 x 15 ea Ward 1 x 15 ea Ward 1 x 15 ea Ward   HR/TR 1 x 20 ea  1 x 15 ea  1 x 15ea  1 x 15 ea  1 x 15 ea    Squats  1 x 15 1 x 15 1 x 15 1 x 15 1 x 15   Step-ups  C Fwd x 15  Fwd C 1 x 10 Fwd C 1 x 10 Fwd C 1 x 15 Fwd C x 15    SBB 5" x 10 5" x 10 5" x 10 5" x 10 5" x 10   PPT 5"  1 x 20 5" 1 x 15 5" x 15 5" x 15 5" x 15   PPT with marches        SLR 1 x 15 Ward 1 x 15 Ward 1 x 15 Ward 1 x 15 Ward 1 x 15 Ward   S/L SLR        Clamshells Red  x 15 Ward 1 x 15 Ward 1 x 15 Ward 1 x 15 Ward 1 x 15 Ward   Hip abd 1 x 20 5" 1 x 15 5" 1 x 15 5" 1 x 15 5" 1 x 15 5"   Hip add Green x 20  Red 1 x 15 Red 1 x 15 Green  1 x 15 Green 1 x 15   LTR 10" x 10 5"x 10 5" x 10 5" x 10 5" x 10   SKTC 5" x 5 Ward 5"x 5 Ward 5" x 5 Ward 5" x 5 Ward 5" x 5 Ward   Heel walk-outs 1 x 10 1 x 5  1 x 5  1 x 5 1 x 5    Quad alt UE        Quad alt LE MTP/LTP Red 1 x 20 ea   Red 1 x 15 ea  Red 1 x 15 ea  Red 1 x 15 ea  Red x 15 ea        Modalities 8/21/19 8/9/19 8/13/19 8/14/19 8/16/19   MHP/CP prn  MHP seated 10 min  MHP seated   10 min MHP seated   10 min MHP seated 10' MHP seated 10 min

## 2019-08-23 ENCOUNTER — OFFICE VISIT (OUTPATIENT)
Dept: PHYSICAL THERAPY | Facility: HOME HEALTHCARE | Age: 79
End: 2019-08-23
Payer: MEDICARE

## 2019-08-23 DIAGNOSIS — M48.00 SPINAL STENOSIS, UNSPECIFIED SPINAL REGION: Primary | ICD-10-CM

## 2019-08-23 PROCEDURE — 97110 THERAPEUTIC EXERCISES: CPT | Performed by: PHYSICAL THERAPIST

## 2019-08-23 PROCEDURE — 97150 GROUP THERAPEUTIC PROCEDURES: CPT | Performed by: PHYSICAL THERAPIST

## 2019-08-23 NOTE — PROGRESS NOTES
Daily Note     Today's date: 2019  Patient name: Allie Knight  : 1940  MRN: [id number]  Referring provider: Emma Hatch DPM  Dx:   Encounter Diagnosis     ICD-10-CM    1  Spinal stenosis, unspecified spinal region M48 00                   Subjective: Pt reporting that she is tired because she was up late last night; otherwise feeling okay  Objective: See treatment diary below      Assessment: PT had to modify standing program back down to lower reps due to pt feeling more pain in the back of her knees, but pt able to maintain full supine program        Plan: Continue per plan of care        Precautions: None  RE Due: 19  Specialty Daily Treatment Diary     Manual  19   Check SpO2 prn Pre session 92%    During Ex 88%    Post session 94% Pre session   93%    During Ex 91%    Post session 95% Pre session  93%    During Ex 93%    Post session   94%   Pre session  93%    During ex  93%    Post session  95% Pre session 91%    During Ex 91%    Post session 94%                       Exercise Diary  19   NuStep  Level 2  10 min Level 2  10 min Level 1   10 min L 2  10 min Level 1  10 min    SLR x 3  1 x 20 ea Ward 1 x 15 ea Ward 1 x 15 ea Ward 1 x 15 ea Ward 1 x 15 ea Ward   HR/TR 1 x 20 ea  1 x 20 ea  1 x 15ea  1 x 15 ea  1 x 15 ea    Squats  1 x 15 1 x 15 1 x 15 1 x 15 1 x 15   Step-ups  C Fwd x 15  Fwd C 1 x 10 Fwd C 1 x 10 Fwd C 1 x 15 Fwd C x 15    SBB 5" x 10 NP 5" x 10 5" x 10 5" x 10   PPT 5"  1 x 20 5" 1 x 15 5" x 15 5" x 15 5" x 15   PPT with marches        SLR 1 x 15 Ward 1 x 15 Ward 1 x 15 Ward 1 x 15 Ward 1 x 15 Ward   S/L SLR        Clamshells Red  x 15 Ward Red 1 x 15 Ward 1 x 15 Ward 1 x 15 Ward 1 x 15 Ward   Hip abd 1 x 20 5" 1 x 10 5" 1 x 15 5" 1 x 15 5" 1 x 15 5"   Hip add Green x 20  Green 1 x 10 Red 1 x 15 Green  1 x 15 Green 1 x 15   LTR 10" x 10 10" x 10 5" x 10 5" x 10 5" x 10   SKTC 5" x 5 Ward 5"x 5 Ward 5" x 5 Ward 5" x 5 Ward 5" x 5 Ward   Heel walk-outs 1 x 10 1 x 10 1 x 5  1 x 5 1 x 5    Quad alt UE        Quad alt LE         MTP/LTP Red 1 x 20 ea   Red 1 x 20 ea  Red 1 x 15 ea  Red 1 x 15 ea  Red x 15 ea        Modalities 8/21/19 8/23/19 8/13/19 8/14/19 8/16/19   MHP/CP prn  MHP seated 10 min  MHP seated   10 min MHP seated   10 min MHP seated 10' MHP seated 10 min Island Pedicle Flap With Canthal Suspension Text: The defect edges were debeveled with a #15 scalpel blade.  Given the location of the defect, shape of the defect and the proximity to free margins an island pedicle advancement flap was deemed most appropriate.  Using a sterile surgical marker, an appropriate advancement flap was drawn incorporating the defect, outlining the appropriate donor tissue and placing the expected incisions within the relaxed skin tension lines where possible. The area thus outlined was incised deep to adipose tissue with a #15 scalpel blade.  The skin margins were undermined to an appropriate distance in all directions around the primary defect and laterally outward around the island pedicle utilizing iris scissors.  There was minimal undermining beneath the pedicle flap. A suspension suture was placed in the canthal tendon to prevent tension and prevent ectropion.

## 2019-08-26 ENCOUNTER — OFFICE VISIT (OUTPATIENT)
Dept: PHYSICAL THERAPY | Facility: HOME HEALTHCARE | Age: 79
End: 2019-08-26
Payer: MEDICARE

## 2019-08-26 DIAGNOSIS — M48.00 SPINAL STENOSIS, UNSPECIFIED SPINAL REGION: Primary | ICD-10-CM

## 2019-08-26 PROCEDURE — 97110 THERAPEUTIC EXERCISES: CPT | Performed by: PHYSICAL THERAPIST

## 2019-08-26 NOTE — PROGRESS NOTES
Daily Note     Today's date: 2019  Patient name: Dany Chirinos  : 1940  MRN: [id number]  Referring provider: Steve Vega DPM  Dx:   Encounter Diagnosis     ICD-10-CM    1  Spinal stenosis, unspecified spinal region M48 00                   Subjective: Pt reporting that she had a lot of pain over the weekend and could hardly get out of bed  She states that she didn't do anything different than usual so unsure why she had the increase  Objective: See treatment diary below      Assessment: Pt unable to complete supine PPT and standing program remained modified due to higher pain in standing position vs supine  Pt with mild increase in pain in the back but states program was tolerable  Plan: Continue per plan of care        Precautions: None  RE Due: 19  Specialty Daily Treatment Diary     Manual  19   Check SpO2 prn Pre session 92%    During Ex 88%    Post session 94% Pre session   93%    During Ex 91%    Post session 95% Pre session  96%    During Ex 93%    Post session   94%   Pre session  93%    During ex  93%    Post session  95% Pre session 91%    During Ex 91%    Post session 94%                       Exercise Diary  19   NuStep  Level 2  10 min Level 2  10 min Level 1   10 min L 2  10 min Level 1  10 min    SLR x 3  1 x 20 ea Ward 1 x 15 ea Ward 1 x 10 ea Ward 1 x 15 ea Ward 1 x 15 ea Ward   HR/TR 1 x 20 ea  1 x 20 ea  1 x 15 ea  1 x 15 ea  1 x 15 ea    Squats  1 x 15 1 x 15 1 x 15 1 x 15 1 x 15   Step-ups  C Fwd x 15  Fwd C 1 x 10 Held Fwd C 1 x 15 Fwd C x 15    SBB 5" x 10 NP 5" x 10 5" x 10 5" x 10   PPT 5"  1 x 20 5" 1 x 15 Pt unable  5" x 15 5" x 15   PPT with marches        SLR 1 x 15 Ward 1 x 15 Ward 1 x 20 Ward 1 x 15 Ward 1 x 15 Ward   S/L SLR        Clamshells Red  x 15 Ward Red 1 x 15 Ward Green x 20  1 x 15 Ward 1 x 15 Ward   Hip abd 1 x 20 5" 1 x 10 5" 1 x 20 5" 1 x 15 5" 1 x 15 5"   Hip add Green x 20 Green 1 x 10 Green x 20  Green  1 x 15 Green 1 x 15   LTR 10" x 10 10" x 10 10" x 10 5" x 10 5" x 10   SKTC 5" x 5 Ward 5"x 5 Ward 5" x 5 Ward 5" x 5 Ward 5" x 5 Ward   Heel walk-outs 1 x 10 1 x 10 1 x 10 1 x 5 1 x 5    Quad alt UE        Quad alt LE         MTP/LTP Red 1 x 20 ea   Red 1 x 20 ea  Red 1 x 20 ea  Red 1 x 15 ea  Red x 15 ea        Modalities 8/21/19 8/23/19 8/26/19 8/14/19 8/16/19   MHP/CP prn  MHP seated 10 min  MHP seated   10 min MHP seated   10 min MHP seated 10' MHP seated 10 min

## 2019-08-28 ENCOUNTER — OFFICE VISIT (OUTPATIENT)
Dept: PHYSICAL THERAPY | Facility: HOME HEALTHCARE | Age: 79
End: 2019-08-28
Payer: MEDICARE

## 2019-08-28 DIAGNOSIS — M48.00 SPINAL STENOSIS, UNSPECIFIED SPINAL REGION: Primary | ICD-10-CM

## 2019-08-28 PROCEDURE — 97110 THERAPEUTIC EXERCISES: CPT | Performed by: PHYSICAL THERAPIST

## 2019-08-28 NOTE — PROGRESS NOTES
Daily Note     Today's date: 2019  Patient name: Sofie Booker  : 1940  MRN: [id number]  Referring provider: Judge Riley DPM  Dx:   Encounter Diagnosis     ICD-10-CM    1  Spinal stenosis, unspecified spinal region M48 00                   Subjective: Pt reporting that she will go back to see the Dr Minna Perea today  Objective: See treatment diary below      Assessment: Pt with good tolerance to program until completing supine to sit transfer from mat table  She noted significant increase in her back and declined Tband activities standing  Mild relief with MHP in seated position to end  Plan: Continue per plan of care        Precautions: None  RE Due: 19  Specialty Daily Treatment Diary     Manual  19   Check SpO2 prn Pre session 92%    During Ex 88%    Post session 94% Pre session   93%    During Ex 91%    Post session 95% Pre session  96%    During Ex 93%    Post session   94%   Pre session  93%    During ex  94%    Post session  95% Pre session 91%    During Ex 91%    Post session 94%                       Exercise Diary  19   NuStep  Level 2  10 min Level 2  10 min Level 1   10 min L 2  10 min Level 1  10 min    SLR x 3  1 x 20 ea Ward 1 x 15 ea Ward 1 x 10 ea Ward 1 x 10 ea Ward 1 x 15 ea Ward   HR/TR 1 x 20 ea  1 x 20 ea  1 x 15 ea  1 x 15 ea  1 x 15 ea    Squats  1 x 15 1 x 15 1 x 15 1 x 15 1 x 15   Step-ups  C Fwd x 15  Fwd C 1 x 10 Held Held Fwd C x 15    SBB 5" x 10 NP 5" x 10 5" x 10 5" x 10   PPT 5"  1 x 20 5" 1 x 15 Pt unable  5" x 20 5" x 15   PPT with marches        SLR 1 x 15 Ward 1 x 15 Ward 1 x 20 Ward 1 x 20 Ward 1 x 15 Ward   S/L SLR        Clamshells Red  x 15 Ward Red 1 x 15 Ward Green x 20  Green x 20  1 x 15 Ward   Hip abd 1 x 20 5" 1 x 10 5" 1 x 20 5" 1 x 20 5" 1 x 15 5"   Hip add Green x 20  Green 1 x 10 Green x 20  Green  1 x 20 Green 1 x 15   LTR 10" x 10 10" x 10 10" x 10 5" x 10 5" x 10   SKTC 5" x 5 Ward 5"x 5 Ward 5" x 5 Ward 5" x 5 Ward 5" x 5 Ward   Heel walk-outs 1 x 10 1 x 10 1 x 10 1 x 10 1 x 5    Quad alt UE        Quad alt LE         MTP/LTP Red 1 x 20 ea   Red 1 x 20 ea  Red 1 x 20 ea  Held Red x 15 ea        Modalities 8/21/19 8/23/19 8/26/19 8/28/19 8/16/19   MHP/CP prn  MHP seated 10 min  MHP seated   10 min MHP seated   10 min MHP seated 10' MHP seated 10 min

## 2019-08-30 ENCOUNTER — OFFICE VISIT (OUTPATIENT)
Dept: PHYSICAL THERAPY | Facility: HOME HEALTHCARE | Age: 79
End: 2019-08-30
Payer: MEDICARE

## 2019-08-30 DIAGNOSIS — M48.00 SPINAL STENOSIS, UNSPECIFIED SPINAL REGION: Primary | ICD-10-CM

## 2019-08-30 PROCEDURE — 97150 GROUP THERAPEUTIC PROCEDURES: CPT

## 2019-08-30 NOTE — PROGRESS NOTES
Daily Note     Today's date: 2019  Patient name: Emely Peck  : 1940  MRN: [id number]  Referring provider: Ayaan Gage DPM  Dx:   Encounter Diagnosis     ICD-10-CM    1  Spinal stenosis, unspecified spinal region M48 00               Subjective: Pt reports she has pain in in her LB  Objective: See treatment diary below      Assessment: Tolerated treatment fairly well  Verbal cues needed t/o TE to perform correctly  No increased pain reported t/o session  Pt able to perform MTP/LTP this session  Pt reports pain level same at end of session after MHP  Patient would benefit from continued PT      Plan: Continue per plan of care        Precautions: None  RE Due: 19  Specialty Daily Treatment Diary     Manual  19   Check SpO2 prn Pre session 92%    During Ex 88%    Post session 94% Pre session   93%    During Ex 91%    Post session 95% Pre session  96%    During Ex 93%    Post session   94%   Pre session  93%    During ex  94%    Post session  95% Pre session 95%    During Ex 93%    Post session 95%                       Exercise Diary  19   NuStep  Level 2  10 min Level 2  10 min Level 1   10 min L 2  10 min Level 1  10 min    SLR x 3  1 x 20 ea Ward 1 x 15 ea Ward 1 x 10 ea Ward 1 x 10 ea Ward 1 x 15 ea Ward   HR/TR 1 x 20 ea  1 x 20 ea  1 x 15 ea  1 x 15 ea  1 x 15 ea    Squats  1 x 15 1 x 15 1 x 15 1 x 15 1 x 15   Step-ups  C Fwd x 15  Fwd C 1 x 10 Held Held Held    SBB 5" x 10 NP 5" x 10 5" x 10 5" x 10   PPT 5"  1 x 20 5" 1 x 15 Pt unable  5" x 20 5" x 20   PPT with marches        SLR 1 x 15 Ward 1 x 15 Ward 1 x 20 Ward 1 x 20 Ward 1 x 20 Ward   S/L SLR        Clamshells Red  x 15 Ward Red 1 x 15 Ward Green x 20  Green x 20  Green 1 x 20 Ward   Hip abd 1 x 20 5" 1 x 10 5" 1 x 20 5" 1 x 20 5" 1 x 20 5"   Hip add Green x 20  Green 1 x 10 Green x 20  Green  1 x 20 Green 1 x 20   LTR 10" x 10 10" x 10 10" x 10 5" x 10 5" x 10 SKTC 5" x 5 Ward 5"x 5 Ward 5" x 5 Ward 5" x 5 Ward 5" x 5 Ward   Heel walk-outs 1 x 10 1 x 10 1 x 10 1 x 10 1 x 10   Quad alt UE        Quad alt LE         MTP/LTP Red 1 x 20 ea   Red 1 x 20 ea  Red 1 x 20 ea  Held Red x 20 ea        Modalities 8/21/19 8/23/19 8/26/19 8/28/19 8/30/19   MHP/CP prn  MHP seated 10 min  MHP seated   10 min MHP seated   10 min MHP seated 10' MHP seated 10 min

## 2019-09-03 DIAGNOSIS — E78.5 HYPERLIPIDEMIA, UNSPECIFIED HYPERLIPIDEMIA TYPE: ICD-10-CM

## 2019-09-03 RX ORDER — ATORVASTATIN CALCIUM 20 MG/1
TABLET, FILM COATED ORAL
Qty: 90 TABLET | Refills: 3 | Status: SHIPPED | OUTPATIENT
Start: 2019-09-03 | End: 2020-01-29 | Stop reason: SDUPTHER

## 2019-09-04 ENCOUNTER — OFFICE VISIT (OUTPATIENT)
Dept: PHYSICAL THERAPY | Facility: HOME HEALTHCARE | Age: 79
End: 2019-09-04
Payer: MEDICARE

## 2019-09-04 DIAGNOSIS — M48.00 SPINAL STENOSIS, UNSPECIFIED SPINAL REGION: Primary | ICD-10-CM

## 2019-09-04 PROCEDURE — 97110 THERAPEUTIC EXERCISES: CPT | Performed by: PHYSICAL THERAPIST

## 2019-09-04 NOTE — PROGRESS NOTES
Daily Note     Today's date: 2019  Patient name: Ana Monreal  : 1940  MRN: [id number]  Referring provider: Kody Womack DPM  Dx:   Encounter Diagnosis     ICD-10-CM    1  Spinal stenosis, unspecified spinal region M48 00                   Subjective: Pt reporting that she is feeling a little bit better than last week but still has the back pain  Objective: See treatment diary below      Assessment: Pt with good tolerance to current level of program; PT spoke to pt about slower progression with standing TE reps to address remaining strength deficits  Pt was agreeable as long as pain does not increase like previous attempt to progress program        Plan: Continue per plan of care        Precautions: None  RE Due: 19  Specialty Daily Treatment Diary     Manual  19   Check SpO2 prn Pre session 93%    During Ex 90%    Post session 94% Pre session   93%    During Ex 91%    Post session 95% Pre session  96%    During Ex 93%    Post session   94%   Pre session  93%    During ex  94%    Post session  95% Pre session 95%    During Ex 93%    Post session 95%                       Exercise Diary  19   NuStep  Level 2  10 min Level 2  10 min Level 1   10 min L 2  10 min Level 1  10 min    SLR x 3  1 x 15ea Ward 1 x 15 ea Ward 1 x 10 ea Ward 1 x 10 ea Ward 1 x 15 ea Ward   HR/TR 1 x 15 ea  1 x 20 ea  1 x 15 ea  1 x 15 ea  1 x 15 ea    Squats  1 x 15 1 x 15 1 x 15 1 x 15 1 x 15   Step-ups  C Fwd x 15  Fwd C 1 x 10 Held Held Held    SBB 5" x 10 NP 5" x 10 5" x 10 5" x 10   PPT 5"  1 x 20 5" 1 x 15 Pt unable  5" x 20 5" x 20   PPT with marches        SLR 1 x 15 Ward 1 x 15 Ward 1 x 20 Ward 1 x 20 Ward 1 x 20 Ward   S/L SLR        Clamshells Green x 20  Red 1 x 15 Ward Green x 20  Green x 20  Green 1 x 20 Wrad   Hip abd 1 x 20 5" 1 x 10 5" 1 x 20 5" 1 x 20 5" 1 x 20 5"   Hip add Green x 20  Green 1 x 10 Green x 20  Green  1 x 20 Green 1 x 20 LTR 10" x 10 10" x 10 10" x 10 5" x 10 5" x 10   SKTC 5" x 5 Ward 5"x 5 Ward 5" x 5 Ward 5" x 5 Ward 5" x 5 Ward   Heel walk-outs 1 x 10 1 x 10 1 x 10 1 x 10 1 x 10   Quad alt UE        Quad alt LE         MTP/LTP Red 1 x 20 ea   Red 1 x 20 ea  Red 1 x 20 ea  Held Red x 20 ea        Modalities 9/4/19 8/23/19 8/26/19 8/28/19 8/30/19   MHP/CP prn  MHP seated 10 min  MHP seated   10 min MHP seated   10 min MHP seated 10' MHP seated 10 min

## 2019-09-06 ENCOUNTER — OFFICE VISIT (OUTPATIENT)
Dept: PHYSICAL THERAPY | Facility: HOME HEALTHCARE | Age: 79
End: 2019-09-06
Payer: MEDICARE

## 2019-09-06 DIAGNOSIS — M48.00 SPINAL STENOSIS, UNSPECIFIED SPINAL REGION: Primary | ICD-10-CM

## 2019-09-06 PROCEDURE — 97110 THERAPEUTIC EXERCISES: CPT | Performed by: PHYSICAL THERAPIST

## 2019-09-06 NOTE — PROGRESS NOTES
Daily Note     Today's date: 2019  Patient name: Mina Carmona  : 1940  MRN: [id number]  Referring provider: Leticia Lazar DPM  Dx:   Encounter Diagnosis     ICD-10-CM    1  Spinal stenosis, unspecified spinal region M48 00                   Subjective: Pt states that she is feeling about the same  Objective: See treatment diary below      Assessment: Standing SLR increased to x20 reps today; only progression in program in order to slowly increase tolerance to TE/Strengthening without increase in pain  Pt with return to baseline pain with MHP to end session  Need for continued therapy to address remaining limitations  Plan: Continue per plan of care        Precautions: None  RE Due: 19  Specialty Daily Treatment Diary     Manual  19   Check SpO2 prn Pre session 93%    During Ex 90%    Post session 94% Pre session   93%    During Ex 92%    Post session 94% Pre session  96%    During Ex 93%    Post session   94%   Pre session  93%    During ex  94%    Post session  95% Pre session 95%    During Ex 93%    Post session 95%                       Exercise Diary  19   NuStep  Level 2  10 min Level 2  10 min Level 1   10 min L 2  10 min Level 1  10 min    SLR x 3  1 x 15ea Ward 1 x 20ea Ward 1 x 10 ea Ward 1 x 10 ea Ward 1 x 15 ea Ward   HR/TR 1 x 15 ea  1 x 15 ea  1 x 15 ea  1 x 15 ea  1 x 15 ea    Squats  1 x 15 1 x 15 1 x 15 1 x 15 1 x 15   Step-ups  C Fwd x 15  Fwd C 1 x 15 Held Held Held    SBB 5" x 10 5" x 10  5" x 10 5" x 10 5" x 10   PPT 5"  1 x 20 5" 1 x 20 Pt unable  5" x 20 5" x 20   PPT with marches        SLR 1 x 15 Ward 1 x 15 Ward 1 x 20 Ward 1 x 20 Ward 1 x 20 Ward   S/L SLR        Clamshells Green x 20  Green 1 x 20 Ward Green x 20  Green x 20  Green 1 x 20 Ward   Hip abd 1 x 20 5" 1 x 20 5" 1 x 20 5" 1 x 20 5" 1 x 20 5"   Hip add Green x 20  Green 1 x 20 Green x 20  Green  1 x 20 Green 1 x 20   LTR 10" x 10 10" x 10 10" x 10 5" x 10 5" x 10   SKTC 5" x 5 Ward 5"x 5 Ward 5" x 5 Ward 5" x 5 Ward 5" x 5 Ward   Heel walk-outs 1 x 10 1 x 10 1 x 10 1 x 10 1 x 10   Quad alt UE        Quad alt LE         MTP/LTP Red 1 x 20 ea   Red 1 x 20 ea  Red 1 x 20 ea  Held Red x 20 ea        Modalities 9/4/19 9/6/19 8/26/19 8/28/19 8/30/19   MHP/CP prn  MHP seated 10 min  MHP seated   10 min MHP seated   10 min MHP seated 10' MHP seated 10 min

## 2019-09-09 ENCOUNTER — OFFICE VISIT (OUTPATIENT)
Dept: PHYSICAL THERAPY | Facility: HOME HEALTHCARE | Age: 79
End: 2019-09-09
Payer: MEDICARE

## 2019-09-09 DIAGNOSIS — M48.00 SPINAL STENOSIS, UNSPECIFIED SPINAL REGION: Primary | ICD-10-CM

## 2019-09-09 PROCEDURE — 97110 THERAPEUTIC EXERCISES: CPT

## 2019-09-09 NOTE — PROGRESS NOTES
Daily Note     Today's date: 2019  Patient name: Elvia Farris  : 1940  MRN: [id number]  Referring provider: Jacques Patino DPM  Dx:   Encounter Diagnosis     ICD-10-CM    1  Spinal stenosis, unspecified spinal region M48 00               Subjective: Pt reports she has no pain right now  Objective: See treatment diary below      Assessment: Tolerated treatment well  A few verbal cues needed t/o TE to perform correctly  No increased pain reported t/o session  Pt reported feeling good at end of session  Patient would benefit from continued PT      Plan: Continue per plan of care        Precautions: None  RE Due: 19  Specialty Daily Treatment Diary     Manual  19   Check SpO2 prn Pre session 93%    During Ex 90%    Post session 94% Pre session   93%    During Ex 92%    Post session 94% Pre session  95%    During Ex 94%    Post session   93%   Pre session  93%    During ex  94%    Post session  95% Pre session 95%    During Ex 93%    Post session 95%                       Exercise Diary  19   NuStep  Level 2  10 min Level 2  10 min Level 2  10 min L 2  10 min Level 1  10 min    SLR x 3  1 x 15ea Ward 1 x 20ea Ward 1 x 20 ea Ward 1 x 10 ea Ward 1 x 15 ea Ward   HR/TR 1 x 15 ea  1 x 15 ea  1 x 20 ea  1 x 15 ea  1 x 15 ea    Squats  1 x 15 1 x 15 1 x 15 1 x 15 1 x 15   Step-ups  C Fwd x 15  Fwd C 1 x 15 For C 1 x 15 Held Held    SBB 5" x 10 5" x 10  5" x 10 5" x 10 5" x 10   PPT 5"  1 x 20 5" 1 x 20 5" 1 x 20 5" x 20 5" x 20   PPT with marches        SLR 1 x 15 Ward 1 x 15 Ward 1 x 20 Ward 1 x 20 Ward 1 x 20 Ward   S/L SLR        Clamshells Green x 20  Green 1 x 20 Ward Green x 20  Green x 20  Green 1 x 20 Ward   Hip abd 1 x 20 5" 1 x 20 5" 1 x 20 5" 1 x 20 5" 1 x 20 5"   Hip add Green x 20  Green 1 x 20 Green x 20  Green  1 x 20 Green 1 x 20   LTR 10" x 10 10" x 10 10" x 10 5" x 10 5" x 10   SKTC 5" x 5 Ward 5"x 5 Ward 5" x 5 Ward 5" x 5 Ward 5" x 5 Ward   Heel walk-outs 1 x 10 1 x 10 1 x 10 1 x 10 1 x 10   Quad alt UE        Quad alt LE         MTP/LTP Red 1 x 20 ea   Red 1 x 20 ea  Red 1 x 20 ea  Held Red x 20 ea        Modalities 9/4/19 9/6/19 9/9/19 8/28/19 8/30/19   MHP/CP prn  MHP seated 10 min  MHP seated   10 min MHP seated   10 min MHP seated 10' MHP seated 10 min

## 2019-09-11 ENCOUNTER — OFFICE VISIT (OUTPATIENT)
Dept: PHYSICAL THERAPY | Facility: HOME HEALTHCARE | Age: 79
End: 2019-09-11
Payer: MEDICARE

## 2019-09-11 DIAGNOSIS — M48.00 SPINAL STENOSIS, UNSPECIFIED SPINAL REGION: Primary | ICD-10-CM

## 2019-09-11 PROCEDURE — 97110 THERAPEUTIC EXERCISES: CPT | Performed by: PHYSICAL THERAPIST

## 2019-09-11 NOTE — PROGRESS NOTES
Daily Note     Today's date: 2019  Patient name: Mina Carmona  : 1940  MRN: [id number]  Referring provider: Leticia Lazar DPM  Dx:   Encounter Diagnosis     ICD-10-CM    1  Spinal stenosis, unspecified spinal region M48 00                   Subjective: The patient states that she went grocery shopping yesterday and sat on the bus for 2 5 hours  She notes that she is more sore from sitting and rates pain 5/10 at start of session today  Objective: See treatment diary below      Assessment: The patient with good tolerance to TE today  She needs some verbal cues for correct form with completing TE  Despite being more sore today from sitting on the bus she was able to complete all TE as outlined below  HP x 10 minutes to end session and she reports decreased pain after this  Plan: Continue per plan of care        Precautions: None  RE Due: 19  Specialty Daily Treatment Diary     Manual  19   Check SpO2 prn Pre session 93%    During Ex 90%    Post session 94% Pre session   93%    During Ex 92%    Post session 94% Pre session  95%    During Ex 94%    Post session   93%   Pre session  92%    During ex  91%    Post session  96% Pre session 95%    During Ex 93%    Post session 95%                       Exercise Diary  19   NuStep  Level 2  10 min Level 2  10 min Level 2  10 min Level 2  10 minutes Level 1  10 min    SLR x 3  1 x 15ea Ward 1 x 20ea Ward 1 x 20 ea Ward 1 x 20 ea Ward 1 x 15 ea Ward   HR/TR 1 x 15 ea  1 x 15 ea  1 x 20 ea  1 x 20 each  1 x 15 ea    Squats  1 x 15 1 x 15 1 x 15 1 x 15 1 x 15   Step-ups  C Fwd x 15  Fwd C 1 x 15 For C 1 x 15 For C 1 x 15 Held    SBB 5" x 10 5" x 10  5" x 10 5" x 10 5" x 10   PPT 5"  1 x 20 5" 1 x 20 5" 1 x 20 5" x 20 5" x 20   PPT with sloane        SLR 1 x 15 Ward 1 x 15 Ward 1 x 20 Ward 1 x 20 Ward 1 x 20 Ward   S/L SLR        Clamshells Green x 20  Green 1 x 20 Ward Green x 20 Green x 20  Green 1 x 20 Ward   Hip abd 1 x 20 5" 1 x 20 5" 1 x 20 5" 1 x 20 Green 1 x 20 5"   Hip add Green x 20  Green 1 x 20 Green x 20  5" 1 x 20 Green 1 x 20   LTR 10" x 10 10" x 10 10" x 10 10" x 10 5" x 10   SKTC 5" x 5 Ward 5"x 5 Ward 5" x 5 Ward 5" x 5 Ward 5" x 5 Ward   Heel walk-outs 1 x 10 1 x 10 1 x 10 1 x 10 1 x 10   Quad alt UE        Quad alt LE         MTP/LTP Red 1 x 20 ea   Red 1 x 20 ea  Red 1 x 20 ea  Green 1 x 20 ea Red x 20 ea        Modalities 9/4/19 9/6/19 9/9/19 9/11/19 8/30/19   MHP/CP prn  MHP seated 10 min  MHP seated   10 min MHP seated   10 min MHP seated 10 minutes MHP seated 10 min

## 2019-09-13 ENCOUNTER — APPOINTMENT (OUTPATIENT)
Dept: PHYSICAL THERAPY | Facility: HOME HEALTHCARE | Age: 79
End: 2019-09-13
Payer: MEDICARE

## 2019-09-16 ENCOUNTER — OFFICE VISIT (OUTPATIENT)
Dept: PHYSICAL THERAPY | Facility: HOME HEALTHCARE | Age: 79
End: 2019-09-16
Payer: MEDICARE

## 2019-09-16 DIAGNOSIS — M48.00 SPINAL STENOSIS, UNSPECIFIED SPINAL REGION: Primary | ICD-10-CM

## 2019-09-16 PROCEDURE — 97110 THERAPEUTIC EXERCISES: CPT

## 2019-09-16 NOTE — PROGRESS NOTES
Daily Note     Today's date: 2019  Patient name: Jessica Hummel  : 1940  MRN: [id number]  Referring provider: Jose Antonio Evans DPM  Dx:   Encounter Diagnosis     ICD-10-CM    1  Spinal stenosis, unspecified spinal region M48 00                   Subjective: Pt reports she doesn't have any pain this morning  Pt also reports she doesn't feel the best because she feels like she is getting a cold  Pt reports she wants to try PT today  Objective: See treatment diary below      Assessment: Tolerated treatment fairly well  Pt with no c/o pain t/o session  A few verbal cues needed t/o TE to perform correctly  Pt reports relief at end of session  Patient would benefit from continued PT      Plan: Continue per plan of care        Precautions: None  RE Due: 19  Specialty Daily Treatment Diary     Manual  19   Check SpO2 prn Pre session 93%    During Ex 90%    Post session 94% Pre session   93%    During Ex 92%    Post session 94% Pre session  95%    During Ex 94%    Post session   93%   Pre session  92%    During ex  91%    Post session  96% Pre session 93%    During Ex 94%    Post session 93%                       Exercise Diary  19   NuStep  Level 2  10 min Level 2  10 min Level 2  10 min Level 2  10 minutes Level 2  10 min    SLR x 3  1 x 15ea Ward 1 x 20ea Ward 1 x 20 ea Ward 1 x 20 ea Ward 1 x 20 ea Ward   HR/TR 1 x 15 ea  1 x 15 ea  1 x 20 ea  1 x 20 each  1 x 20 ea    Squats  1 x 15 1 x 15 1 x 15 1 x 15 1 x 15   Step-ups  C Fwd x 15  Fwd C 1 x 15 For C 1 x 15 For C 1 x 15 For C 1 x 15    SBB 5" x 10 5" x 10  5" x 10 5" x 10 5" x 10   PPT 5"  1 x 20 5" 1 x 20 5" 1 x 20 5" x 20 5" x 20   PPT with marches        SLR 1 x 15 Ward 1 x 15 Ward 1 x 20 Ward 1 x 20 Ward 1 x 20 Ward   S/L SLR        Clamshells Green x 20  Green 1 x 20 Ward Green x 20  Green x 20  Green 1 x 20 Ward   Hip abd 1 x 20 5" 1 x 20 5" 1 x 20 5" 1 x 20 Green 1 x 20 Green   Hip add Green x 20  Green 1 x 20 Green x 20  5" 1 x 20  1 x 20   LTR 10" x 10 10" x 10 10" x 10 10" x 10 10" x 10   SKTC 5" x 5 Ward 5"x 5 Ward 5" x 5 Ward 5" x 5 Ward 5" x 5 Ward   Heel walk-outs 1 x 10 1 x 10 1 x 10 1 x 10 1 x 10   Quad alt UE        Quad alt LE         MTP/LTP Red 1 x 20 ea   Red 1 x 20 ea  Red 1 x 20 ea  Green 1 x 20 ea Green  x 20 ea        Modalities 9/4/19 9/6/19 9/9/19 9/11/19 9/16/19   MHP/CP prn  MHP seated 10 min  MHP seated   10 min MHP seated   10 min MHP seated 10 minutes MHP seated 10 min

## 2019-09-18 ENCOUNTER — OFFICE VISIT (OUTPATIENT)
Dept: PHYSICAL THERAPY | Facility: HOME HEALTHCARE | Age: 79
End: 2019-09-18
Payer: MEDICARE

## 2019-09-18 DIAGNOSIS — M48.00 SPINAL STENOSIS, UNSPECIFIED SPINAL REGION: Primary | ICD-10-CM

## 2019-09-18 PROCEDURE — 97110 THERAPEUTIC EXERCISES: CPT

## 2019-09-18 NOTE — PROGRESS NOTES
Daily Note     Today's date: 2019  Patient name: Sheila Hallman  : 1940  MRN: [id number]  Referring provider: Levon Anderson DPM  Dx:   Encounter Diagnosis     ICD-10-CM    1  Spinal stenosis, unspecified spinal region M48 00               Subjective: I had a bad cramp at R calf when putting on my shoe this morning  My calf is still sore  A little LBP but nothing unusual     Objective: See treatment diary below    Assessment: Tolerated treatment well  Pt reports less overall pain since [facility] and denied increased LBP  She did report some mild soreness at LB during PPT  Patient would benefit from continued PT    Plan: Continue per plan of care        Precautions: None  RE Due: 19  Specialty Daily Treatment Diary     Manual  19   Check SpO2 prn Pre session 93%    During Ex 96%    Post session 96% Pre session   93%    During Ex 92%    Post session 94% Pre session  95%    During Ex 94%    Post session   93%   Pre session  92%    During ex  91%    Post session  96% Pre session 93%    During Ex 94%    Post session 93%                       Exercise Diary  19   NuStep  L 2 10' Level 2  10 min Level 2  10 min Level 2  10 minutes Level 2  10 min    SLR x 3  1 x 20ea Ward 1 x 20ea Ward 1 x 20 ea Ward 1 x 20 ea Ward 1 x 20 ea Ward   HR/TR 1 x 20 ea  1 x 15 ea  1 x 20 ea  1 x 20 each  1 x 20 ea    Squats  1 x 15 1 x 15 1 x 15 1 x 15 1 x 15   Step-ups  C Fwd x 15  Fwd C 1 x 15 For C 1 x 15 For C 1 x 15 For C 1 x 15    SBB 5" x 10 5" x 10  5" x 10 5" x 10 5" x 10   PPT 5"  1 x 20 5" 1 x 20 5" 1 x 20 5" x 20 5" x 20   PPT with marches        SLR 1 x 20 Ward 1 x 15 Ward 1 x 20 Ward 1 x 20 Ward 1 x 20 Ward   S/L SLR        Clamshells Green x 20  Green 1 x 20 Ward Green x 20  Green x 20  Green 1 x 20 Ward   Hip abd 1 x 20 5" 1 x 20 5" 1 x 20 5" 1 x 20 Green 1 x 20 Green   Hip add Green x 20  Green 1 x 20 Green x 20  5" 1 x 20  1 x 20   LTR 10" x 10 10" x 10 10" x 10 10" x 10 10" x 10   SKTC 5" x 5 Ward 5"x 5 Ward 5" x 5 Ward 5" x 5 Ward 5" x 5 Ward   Heel walk-outs 1 x 10 1 x 10 1 x 10 1 x 10 1 x 10   Quad alt UE        Quad alt LE         MTP/LTP Green 1 x 20 ea   Red 1 x 20 ea  Red 1 x 20 ea  Green 1 x 20 ea Green  x 20 ea        Modalities 9-18-19 9/6/19 9/9/19 9/11/19 9/16/19   MHP/CP prn  MHP seated 10 min  MHP seated   10 min MHP seated   10 min MHP seated 10 minutes MHP seated 10 min

## 2019-09-20 ENCOUNTER — OFFICE VISIT (OUTPATIENT)
Dept: PHYSICAL THERAPY | Facility: HOME HEALTHCARE | Age: 79
End: 2019-09-20
Payer: MEDICARE

## 2019-09-20 DIAGNOSIS — M48.00 SPINAL STENOSIS, UNSPECIFIED SPINAL REGION: Primary | ICD-10-CM

## 2019-09-20 PROCEDURE — 97110 THERAPEUTIC EXERCISES: CPT

## 2019-09-20 NOTE — PROGRESS NOTES
Daily Note     Today's date: 2019  Patient name: Bennett Arevalo  : 1940  MRN: [id number]  Referring provider: Celeste Ridley DPM  Dx:   Encounter Diagnosis     ICD-10-CM    1  Spinal stenosis, unspecified spinal region M48 00               Subjective: Pt reports she hurts all over  Objective: See treatment diary below      Assessment: Tolerated treatment fairly well  No increased pain reported t/o session but did reports some soreness in her back  Some relief noted at end of session after MHP  Patient would benefit from continued PT      Plan: Continue per plan of care  Re-eval next session         Precautions: None  RE Due: 19  Specialty Daily Treatment Diary     Manual  19   Check SpO2 prn Pre session 93%    During Ex 96%    Post session 96% Pre session   93%    During Ex 93%    Post session 95% Pre session  95%    During Ex 94%    Post session   93%   Pre session  92%    During ex  91%    Post session  96% Pre session 93%    During Ex 94%    Post session 93%                       Exercise Diary  19   NuStep  L 2 10' Level 2  10 min Level 2  10 min Level 2  10 minutes Level 2  10 min    SLR x 3  1 x 20ea Ward 1 x 20ea Ward 1 x 20 ea Ward 1 x 20 ea Ward 1 x 20 ea Ward   HR/TR 1 x 20 ea  1 x 20 ea  1 x 20 ea  1 x 20 each  1 x 20 ea    Squats  1 x 15 1 x 15 1 x 15 1 x 15 1 x 15   Step-ups  C Fwd x 15  Fwd C 1 x 15 For C 1 x 15 For C 1 x 15 For C 1 x 15    SBB 5" x 10 5" x 10  5" x 10 5" x 10 5" x 10   PPT 5"  1 x 20 5" 1 x 20 5" 1 x 20 5" x 20 5" x 20   PPT with marches        SLR 1 x 20 Ward 1 x 20 Ward 1 x 20 Ward 1 x 20 Ward 1 x 20 Ward   S/L SLR        Clamshells Green x 20  Green 1 x 20 Ward Green x 20  Green x 20  Green 1 x 20 Ward   Hip abd 1 x 20 5" 1 x 20 5" 1 x 20 5" 1 x 20 Green 1 x 20 Green   Hip add Green x 20  Green 1 x 20 Green x 20  5" 1 x 20  1 x 20   LTR 10" x 10 10" x 10 10" x 10 10" x 10 10" x 10   SKTC 5" x 5 Ward 5"x 5 Ward 5" x 5 Ward 5" x 5 Ward 5" x 5 Ward   Heel walk-outs 1 x 10 1 x 10 1 x 10 1 x 10 1 x 10   Quad alt UE        Quad alt LE         MTP/LTP Green 1 x 20 ea   Green  1 x 20 ea  Red 1 x 20 ea  Green 1 x 20 ea Green  x 20 ea        Modalities 9-18-19 9/20/19 9/9/19 9/11/19 9/16/19   MHP/CP prn  MHP seated 10 min  MHP seated   10 min MHP seated   10 min MHP seated 10 minutes MHP seated 10 min

## 2019-09-23 ENCOUNTER — EVALUATION (OUTPATIENT)
Dept: PHYSICAL THERAPY | Facility: HOME HEALTHCARE | Age: 79
End: 2019-09-23
Payer: MEDICARE

## 2019-09-23 DIAGNOSIS — M48.00 SPINAL STENOSIS, UNSPECIFIED SPINAL REGION: Primary | ICD-10-CM

## 2019-09-23 PROCEDURE — 97164 PT RE-EVAL EST PLAN CARE: CPT | Performed by: PHYSICAL THERAPIST

## 2019-09-23 PROCEDURE — 97110 THERAPEUTIC EXERCISES: CPT | Performed by: PHYSICAL THERAPIST

## 2019-09-23 NOTE — PROGRESS NOTES
PT Discharge    Today's date: 2019  Patient name: Sheila Hallman  : 1940  MRN: [id number]  Referring provider: Levon Anderson DPM  Dx:   Encounter Diagnosis     ICD-10-CM    1  Spinal stenosis, unspecified spinal region M48 00                   Assessment  Assessment details: Pt Marco Foy is a 78 y o  who presents to OPPT with s/s consistent with L/S spinal stenosis  PT notes that pt has completed 2 months of OPPT, attended therapy consistently throughout POC  Pt was initially showing slight decrease in pain and improvement in mobility, however with progression of TE program pt began to exhibit increased symptoms again  PT returned to conservative program which was more agreeable to pt but was never able to progress further due to pt limitations with pain  PT to discharge pt from  [facility] as she is unable to progress past current point; she was advised to continue with HEP as tolerable to maintain gains made in therapy  PT also instructed pt to speak to MD at next appt  Re: remaining pain in the lower back  Pt to be D/C from OPPT  Thank you!    Impairments: abnormal or restricted ROM, activity intolerance, impaired balance, impaired physical strength, pain with function and poor posture   Understanding of Dx/Px/POC: good   Prognosis: good    Goals  STG: to be achieved within 4 weeks   Decrease pain 25-50% - partially met  Improve ROM by 5-10 degrees - partially met  Improve strength by 1/2 grade - partially met    LTG: to be achieved within 8 weeks   Independent with HEP - met  ADL function returned to PLOF - partially met  Ambulation with rollator walker > 20 minutes - not met  Pt able to complete household chores with minimal difficulty/increase in pain - partially met     Plan  Plan details: Pt to be D/C from OPPT   Treatment plan discussed with: patient        Subjective Evaluation    History of Present Illness  Mechanism of injury: Pt states that she originally felt like therapy was helping but isn't seeing much difference anymore     Quality of life: good    Pain  At best pain ratin  At worst pain ratin  Quality: dull ache (N/T in the R foot )  Relieving factors: rest (sitting)  Aggravating factors: lifting and walking  Progression: worsening    Social Support  Steps to enter house: no  Stairs in house: no   Lives in: apartment  Lives with: alone    Treatments  Previous treatment: injection treatment  Current treatment: physical therapy  Patient Goals  Patient goals for therapy: increased strength, independence with ADLs/IADLs, increased motion, improved balance, decreased pain and decreased edema          Objective     Postural Observations  Seated posture: fair  Standing posture: fair        Neurological Testing     Sensation     Lumbar   Left   Intact: light touch    Right   Intact: light touch    Active Range of Motion     Lumbar   Flexion:  Restriction level: moderate  Extension:  Restriction level: moderate  Left lateral flexion:  Restriction level: moderate  Right lateral flexion:  Restriction level: minimal    Additional Active Range of Motion Details  Pt unable to tolerate supine positioning for ROM assessment     Strength/Myotome Testing     Left Hip   Planes of Motion   Flexion: 3+  Abduction: 3+  Adduction: 3+    Right Hip   Planes of Motion   Flexion: 4-  Abduction: 4-  Adduction: 4-    Left Knee   Flexion: 4  Extension: 4-    Right Knee   Flexion: 4  Extension: 4-    Ambulation   Weight-Bearing Status   Assistive device used: rollator walker    Additional Weight-Bearing Status Details  Use of Rollator walker for community ambulation   Tolerance x 20 minutes   Can now walk around the grocery store using the cart to hold onto; previously using electric scooter     Ambulation: Stairs   Pattern: reciprocal  Railings: one rail  Pattern: reciprocal  Railings: one rail    General Comments:      Lumbar Comments  Romberg: Firm EO x 30"     Tadem: Firm EO L back: 13"   Tadem: Firm EO R back: 5" Precautions: None  RE Due: 9/21/19  Specialty Daily Treatment Diary     Manual  9-18-19 9/20/19 9/23/19 9/11/19 9/16/19   Check SpO2 prn Pre session 93%    During Ex 96%    Post session 96% Pre session   93%    During Ex 93%    Post session 95%  Pre session  92%    During ex  91%    Post session  96% Pre session 93%    During Ex 94%    Post session 93%                       Exercise Diary  9-18-19 9/20/19 9/23/19 9/11/19 9/16/19   NuStep  L 2 10' Level 2  10 min  Level 2  10 minutes Level 2  10 min    SLR x 3  1 x 20ea Ward 1 x 20ea Ward 1 x 20 ea Ward 1 x 20 ea Ward 1 x 20 ea Ward   HR/TR 1 x 20 ea  1 x 20 ea  1 x 20 ea  1 x 20 each  1 x 20 ea    Squats  1 x 15 1 x 15 1 x 15 1 x 15 1 x 15   Step-ups  C Fwd x 15  Fwd C 1 x 15 For C 1 x 15 For C 1 x 15 For C 1 x 15    SBB 5" x 10 5" x 10  5" x 10 5" x 10 5" x 10   PPT 5"  1 x 20 5" 1 x 20  5" x 20 5" x 20   PPT with marches        SLR 1 x 20 Ward 1 x 20 Ward  1 x 20 Ward 1 x 20 Ward   S/L SLR        Clamshells Green x 20  Green 1 x 20 Ward  Green x 20  Green 1 x 20 Ward   Hip abd 1 x 20 5" 1 x 20 5"  1 x 20 Green 1 x 20 Green   Hip add Green x 20  Green 1 x 20  5" 1 x 20  1 x 20   LTR 10" x 10 10" x 10  10" x 10 10" x 10   SKTC 5" x 5 Ward 5"x 5 Ward  5" x 5 Ward 5" x 5 Ward   Heel walk-outs 1 x 10 1 x 10  1 x 10 1 x 10   Quad alt UE        Quad alt LE         MTP/LTP Green 1 x 20 ea   Green  1 x 20 ea   Green 1 x 20 ea Green  x 20 ea        Modalities 9-18-19 9/20/19 9/23/19 9/11/19 9/16/19   MHP/CP prn  MHP seated 10 min  MHP seated   10 min  MHP seated 10 minutes MHP seated 10 min

## 2019-09-25 ENCOUNTER — APPOINTMENT (OUTPATIENT)
Dept: PHYSICAL THERAPY | Facility: HOME HEALTHCARE | Age: 79
End: 2019-09-25
Payer: MEDICARE

## 2019-09-27 ENCOUNTER — APPOINTMENT (OUTPATIENT)
Dept: PHYSICAL THERAPY | Facility: HOME HEALTHCARE | Age: 79
End: 2019-09-27
Payer: MEDICARE

## 2019-10-23 ENCOUNTER — OFFICE VISIT (OUTPATIENT)
Dept: INTERNAL MEDICINE CLINIC | Facility: CLINIC | Age: 79
End: 2019-10-23
Payer: MEDICARE

## 2019-10-23 VITALS
DIASTOLIC BLOOD PRESSURE: 80 MMHG | WEIGHT: 175.2 LBS | BODY MASS INDEX: 32.24 KG/M2 | OXYGEN SATURATION: 96 % | TEMPERATURE: 99.2 F | HEIGHT: 62 IN | SYSTOLIC BLOOD PRESSURE: 142 MMHG | HEART RATE: 87 BPM | RESPIRATION RATE: 18 BRPM

## 2019-10-23 DIAGNOSIS — E55.9 VITAMIN D DEFICIENCY: ICD-10-CM

## 2019-10-23 DIAGNOSIS — J45.20 MILD INTERMITTENT ASTHMA, UNSPECIFIED WHETHER COMPLICATED: ICD-10-CM

## 2019-10-23 DIAGNOSIS — E11.65 TYPE 2 DIABETES MELLITUS WITH HYPERGLYCEMIA, WITHOUT LONG-TERM CURRENT USE OF INSULIN (HCC): Primary | ICD-10-CM

## 2019-10-23 DIAGNOSIS — Z13.29 SCREENING FOR THYROID DISORDER: ICD-10-CM

## 2019-10-23 DIAGNOSIS — K21.9 GASTROESOPHAGEAL REFLUX DISEASE WITHOUT ESOPHAGITIS: ICD-10-CM

## 2019-10-23 DIAGNOSIS — E78.5 HYPERLIPIDEMIA, UNSPECIFIED HYPERLIPIDEMIA TYPE: ICD-10-CM

## 2019-10-23 DIAGNOSIS — Z23 NEED FOR INFLUENZA VACCINATION: ICD-10-CM

## 2019-10-23 DIAGNOSIS — J45.909 UNCOMPLICATED ASTHMA, UNSPECIFIED ASTHMA SEVERITY, UNSPECIFIED WHETHER PERSISTENT: ICD-10-CM

## 2019-10-23 DIAGNOSIS — I50.9 CONGESTIVE HEART FAILURE, UNSPECIFIED HF CHRONICITY, UNSPECIFIED HEART FAILURE TYPE (HCC): ICD-10-CM

## 2019-10-23 DIAGNOSIS — G62.9 NEUROPATHY: ICD-10-CM

## 2019-10-23 DIAGNOSIS — J44.1 CHRONIC OBSTRUCTIVE PULMONARY DISEASE WITH ACUTE EXACERBATION (HCC): ICD-10-CM

## 2019-10-23 DIAGNOSIS — N32.81 OAB (OVERACTIVE BLADDER): ICD-10-CM

## 2019-10-23 DIAGNOSIS — I10 HYPERTENSION, UNSPECIFIED TYPE: ICD-10-CM

## 2019-10-23 LAB — SL AMB POCT HEMOGLOBIN AIC: 6.7 (ref ?–6.5)

## 2019-10-23 PROCEDURE — 90662 IIV NO PRSV INCREASED AG IM: CPT | Performed by: PHYSICIAN ASSISTANT

## 2019-10-23 PROCEDURE — G0008 ADMIN INFLUENZA VIRUS VAC: HCPCS | Performed by: PHYSICIAN ASSISTANT

## 2019-10-23 PROCEDURE — 2028F FOOT EXAM PERFORMED: CPT | Performed by: PHYSICIAN ASSISTANT

## 2019-10-23 PROCEDURE — 99214 OFFICE O/P EST MOD 30 MIN: CPT | Performed by: PHYSICIAN ASSISTANT

## 2019-10-23 PROCEDURE — 83036 HEMOGLOBIN GLYCOSYLATED A1C: CPT | Performed by: PHYSICIAN ASSISTANT

## 2019-10-23 RX ORDER — OXYBUTYNIN CHLORIDE 5 MG/1
5 TABLET, EXTENDED RELEASE ORAL DAILY
Qty: 90 TABLET | Refills: 2 | Status: SHIPPED | OUTPATIENT
Start: 2019-10-23 | End: 2020-01-29 | Stop reason: SDUPTHER

## 2019-10-23 NOTE — PROGRESS NOTES
Assessment/Plan:  Problem List Items Addressed This Visit        Digestive    Acid reflux disease    Relevant Orders    CBC and differential    Comprehensive metabolic panel       Endocrine    Type 2 diabetes mellitus with hyperglycemia, without long-term current use of insulin (HCC) - Primary    Relevant Orders    CBC and differential    Comprehensive metabolic panel    POCT hemoglobin A1c (Completed)       Respiratory    Asthma, mild intermittent    Relevant Medications    fluticasone-salmeterol (ADVAIR, WIXELA) 250-50 mcg/dose inhaler    tiotropium (SPIRIVA HANDIHALER) 18 mcg inhalation capsule    Other Relevant Orders    CBC and differential    Comprehensive metabolic panel    COPD (chronic obstructive pulmonary disease) ([facility] [address] )     Pulmonary referral provided  Restart spiriva  Replace Breo with Advair  Alpha 1 test performed  Relevant Medications    fluticasone-salmeterol (ADVAIR, WIXELA) 250-50 mcg/dose inhaler    tiotropium (SPIRIVA HANDIHALER) 18 mcg inhalation capsule    Other Relevant Orders    CBC and differential    Comprehensive metabolic panel    Ambulatory referral to Pulmonology       Cardiovascular and Mediastinum    Congestive heart failure (HCC)    Relevant Orders    CBC and differential    Comprehensive metabolic panel       Nervous and Auditory    Neuropathy    Relevant Orders    CBC and differential    Comprehensive metabolic panel       Genitourinary    OAB (overactive bladder)     Start ditropan XL daily  Directions for use and possible side effects discussed and patient verbalized understanding of these             Relevant Medications    oxybutynin (DITROPAN-XL) 5 mg 24 hr tablet       Other    Hyperlipidemia, unspecified    Relevant Orders    Lipid Panel with Direct LDL reflex    Vitamin D deficiency    Relevant Orders    Vitamin D 25 hydroxy      Other Visit Diagnoses     Hypertension, unspecified type        Relevant Orders    CBC and differential    Comprehensive metabolic panel Screening for thyroid disorder        Relevant Orders    TSH, 3rd generation with Free T4 reflex    Uncomplicated asthma, unspecified asthma severity, unspecified whether persistent        Relevant Medications    fluticasone-salmeterol (ADVAIR, WIXELA) 250-50 mcg/dose inhaler    tiotropium (SPIRIVA HANDIHALER) 18 mcg inhalation capsule    Need for influenza vaccination        Relevant Orders    influenza vaccine, [id number], high-dose, PF 0 5 mL (FLUZONE HIGH-DOSE)           Diagnoses and all orders for this visit:    Type 2 diabetes mellitus with hyperglycemia, without long-term current use of insulin (HCC)  -     CBC and differential  -     Comprehensive metabolic panel  -     POCT hemoglobin A1c    Hyperlipidemia, unspecified hyperlipidemia type  -     Lipid Panel with Direct LDL reflex    Congestive heart failure, unspecified HF chronicity, unspecified heart failure type (HCC)  -     CBC and differential  -     Comprehensive metabolic panel    Chronic obstructive pulmonary disease with acute exacerbation (HCC)  -     CBC and differential  -     Comprehensive metabolic panel  -     fluticasone-salmeterol (ADVAIR, WIXELA) 250-50 mcg/dose inhaler; Inhale 1 puff 2 (two) times a day Rinse mouth after use  -     Ambulatory referral to Pulmonology;  Future    Mild intermittent asthma, unspecified whether complicated  -     CBC and differential  -     Comprehensive metabolic panel    Hypertension, unspecified type  -     CBC and differential  -     Comprehensive metabolic panel    Gastroesophageal reflux disease without esophagitis  -     CBC and differential  -     Comprehensive metabolic panel    Neuropathy  -     CBC and differential  -     Comprehensive metabolic panel    Vitamin D deficiency  -     Vitamin D 25 hydroxy    Screening for thyroid disorder  -     TSH, 3rd generation with Free T4 reflex    Uncomplicated asthma, unspecified asthma severity, unspecified whether persistent  -     tiotropium (SPIRIVA HANDIHALER) 18 mcg inhalation capsule; Place 1 capsule (18 mcg total) into inhaler and inhale daily    OAB (overactive bladder)  -     oxybutynin (DITROPAN-XL) 5 mg 24 hr tablet; Take 1 tablet (5 mg total) by mouth daily    Need for influenza vaccination  -     influenza vaccine, [id number], high-dose, PF 0 5 mL (FLUZONE HIGH-DOSE)        COPD (chronic obstructive pulmonary disease) ([facility] [address] )  Pulmonary referral provided  Restart spiriva  Replace Breo with Advair  Alpha 1 test performed  OAB (overactive bladder)  Start ditropan XL daily  Directions for use and possible side effects discussed and patient verbalized understanding of these  Subjective:      Patient ID: Gabby Pappas is a 78 y o  female  Pt presents for routine diabetic visit  She is due for A1C and foot exam  She is also due for routine labs  A1C in the office is 6 7  She complains of ongoing SOB  At the last visit she was given breo but felt it was causing her headaches  She had been using this in place of spiriva instead of along with it  Will restart spiriva and start alternative for Breo  Alpha 1 test will also be performed and pulmonary referral provided per pt request  She also notes some tinnitus  She feels anxious this month as she had many family members die in October  She does not wish to try anything for this at this time  She also notes urinary urgency and frequency with occasional incontinence  She denies painful urination or blood in her urine  The following portions of the patient's history were reviewed and updated as appropriate:   She has a past medical history of Allergic, COPD (chronic obstructive pulmonary disease) ([facility] [address] ), Diabetes mellitus ([facility] [address] ), GERD (gastroesophageal reflux disease), Hyperlipidemia, Hypertension, Pneumonia, Pneumonia of right lower lobe due to infectious organism ([facility] [address] ) (12/4/2018), and Vitamin D deficiency  ,  does not have any pertinent problems on file  ,   has a past surgical history that includes Cholecystectomy (1984); Eye surgery (Bilateral); and Colonoscopy (N/A, 1/30/2019)  ,  family history includes Heart disease in her mother; Lung cancer in her brother, father, and sister  ,   reports that she has quit smoking  Her smoking use included cigarettes  She quit after 50 00 years of use  She has never used smokeless tobacco  She reports that she does not drink alcohol or use drugs  ,  is allergic to other     Current Outpatient Medications   Medication Sig Dispense Refill    albuterol (PROVENTIL HFA,VENTOLIN HFA) 90 mcg/act inhaler Inhale 2 puffs every 6 (six) hours as needed for wheezing or shortness of breath 1 Inhaler 5    atorvastatin (LIPITOR) 20 mg tablet TAKE 1 TABLET BY MOUTH  DAILY 90 tablet 3    Blood Glucose Monitoring Suppl (ONETOUCH VERIO) w/Device KIT USE AS DIRECTED 1 kit 0    clotrimazole-betamethasone (LOTRISONE) 1-0 05 % cream Apply topically 2 (two) times a day 30 g 2    Ergocalciferol (VITAMIN D2) 2000 units TABS Take 2,000 mg by mouth      gabapentin (NEURONTIN) 300 mg capsule Take 1 capsule (300 mg total) by mouth daily 90 capsule 3    glucose blood (ONETOUCH VERIO) test strip Use as instructed TEST TWO TIMES DAILY e11 [id number] 300 each 3    glucose blood (ONETOUCH VERIO) test strip 1 each by Other route 2 (two) times a day Test two times daily 300 each 1    lisinopril (ZESTRIL) 5 mg tablet Take 1 tablet (5 mg total) by mouth daily 90 tablet 3    metFORMIN (GLUCOPHAGE) 500 mg tablet Take 1 tablet (500 mg total) by mouth 2 (two) times a day with meals 180 tablet 3    Multiple Vitamin (MULTIVITAMINS PO) Take by mouth daily      omeprazole (PriLOSEC) 20 mg delayed release capsule Take 1 capsule (20 mg total) by mouth 2 (two) times a day 180 capsule 3    ONETOUCH DELICA LANCETS FINE MISC USE AS DIRECTED THREE TIMES DAILY 100 each 2    fluticasone-salmeterol (ADVAIR, WIXELA) 250-50 mcg/dose inhaler Inhale 1 puff 2 (two) times a day Rinse mouth after use   3 Inhaler 2    oxybutynin (DITROPAN-XL) 5 mg 24 hr tablet Take 1 tablet (5 mg total) by mouth daily 90 tablet 2    tiotropium (SPIRIVA HANDIHALER) 18 mcg inhalation capsule Place 1 capsule (18 mcg total) into inhaler and inhale daily 3 each 3     No current facility-administered medications for this visit  Review of Systems   Constitutional: Negative for chills and fever  HENT: Positive for tinnitus  Negative for congestion, ear pain, hearing loss, postnasal drip, rhinorrhea, sinus pressure, sinus pain, sore throat and trouble swallowing  Eyes: Negative for pain and visual disturbance  Respiratory: Positive for shortness of breath  Negative for cough, chest tightness and wheezing  Cardiovascular: Negative  Negative for chest pain, palpitations and leg swelling  Gastrointestinal: Negative for abdominal pain, blood in stool, constipation, diarrhea, nausea and vomiting  Endocrine: Negative for cold intolerance, heat intolerance, polydipsia, polyphagia and polyuria  Genitourinary: Positive for frequency and urgency  Negative for difficulty urinating, dysuria and flank pain  Musculoskeletal: Negative for arthralgias, back pain, gait problem and myalgias  Skin: Negative for rash  Allergic/Immunologic: Negative  Neurological: Negative for dizziness, weakness, light-headedness and headaches  Hematological: Negative  Psychiatric/Behavioral: Negative for behavioral problems, dysphoric mood and sleep disturbance  The patient is nervous/anxious            PHQ-9 Depression Screening    PHQ-9:    Frequency of the following problems over the past two weeks:       Little interest or pleasure in doing things:  0 - not at all  Feeling down, depressed, or hopeless:  0 - not at all  PHQ-2 Score:  0          Objective:  Vitals:    10/23/19 0901   BP: 142/80   BP Location: Left arm   Patient Position: Sitting   Cuff Size: Adult   Pulse: 87   Resp: 18   Temp: 99 2 °F (37 3 °C)   TempSrc: Tympanic   SpO2: 96% Weight: 79 5 kg (175 lb 3 2 oz)   Height: 5' 2" (1 575 m)     Body mass index is 32 04 kg/m²  Physical Exam   Constitutional: She is oriented to person, place, and time  She appears well-developed and well-nourished  No distress  HENT:   Head: Normocephalic and atraumatic  Right Ear: External ear normal    Left Ear: External ear normal    Nose: Nose normal    Mouth/Throat: Oropharynx is clear and moist  No oropharyngeal exudate  Eyes: Pupils are equal, round, and reactive to light  Conjunctivae and EOM are normal  Right eye exhibits no discharge  Left eye exhibits no discharge  No scleral icterus  Neck: Normal range of motion  Neck supple  No thyromegaly present  Cardiovascular: Normal rate, regular rhythm and normal heart sounds  Exam reveals no gallop and no friction rub  Pulses are no weak pulses  No murmur heard  Pulses:       Dorsalis pedis pulses are 2+ on the right side, and 2+ on the left side  Posterior tibial pulses are 2+ on the right side, and 2+ on the left side  Pulmonary/Chest: Effort normal  No respiratory distress  She has decreased breath sounds  She has no wheezes  She has no rales  Abdominal: Soft  Bowel sounds are normal  She exhibits no distension  There is no tenderness  Musculoskeletal: Normal range of motion  She exhibits no edema, tenderness or deformity  Feet:   Right Foot:   Skin Integrity: Negative for ulcer, skin breakdown, erythema, warmth, callus or dry skin  Left Foot:   Skin Integrity: Negative for ulcer, skin breakdown, erythema, warmth, callus or dry skin  Neurological: She is alert and oriented to person, place, and time  No cranial nerve deficit  Skin: Skin is warm and dry  She is not diaphoretic  Psychiatric: She has a normal mood and affect  Her behavior is normal  Judgment and thought content normal          Patient's shoes and socks removed  Right Foot/Ankle   Right Foot Inspection  Skin Exam: skin normal and skin intact no dry skin, no warmth, no callus, no erythema, no maceration, no abnormal color, no pre-ulcer, no ulcer and no callus                          Toe Exam: ROM and strength within normal limits  Sensory   Vibration: intact  Proprioception: intact   Monofilament testing: intact  Vascular  Capillary refills: < 3 seconds  The right DP pulse is 2+  The right PT pulse is 2+  Left Foot/Ankle  Left Foot Inspection  Skin Exam: skin normal and skin intactno dry skin, no warmth, no erythema, no maceration, normal color, no pre-ulcer, no ulcer and no callus                         Toe Exam: ROM and strength within normal limits                   Sensory   Vibration: intact  Proprioception: intact  Monofilament: intact  Vascular  Capillary refills: < 3 seconds  The left DP pulse is 2+  The left PT pulse is 2+  Assign Risk Category:  No deformity present; No loss of protective sensation;  No weak pulses       Risk: 0

## 2019-10-23 NOTE — ASSESSMENT & PLAN NOTE
Start ditropan XL daily  Directions for use and possible side effects discussed and patient verbalized understanding of these

## 2019-11-01 ENCOUNTER — APPOINTMENT (OUTPATIENT)
Dept: LAB | Facility: CLINIC | Age: 79
End: 2019-11-01
Payer: MEDICARE

## 2019-11-01 LAB
25(OH)D3 SERPL-MCNC: 63.2 NG/ML (ref 30–100)
ALBUMIN SERPL BCP-MCNC: 3.7 G/DL (ref 3.5–5)
ALP SERPL-CCNC: 68 U/L (ref 46–116)
ALT SERPL W P-5'-P-CCNC: 32 U/L (ref 12–78)
ANION GAP SERPL CALCULATED.3IONS-SCNC: 4 MMOL/L (ref 4–13)
AST SERPL W P-5'-P-CCNC: 16 U/L (ref 5–45)
BASOPHILS # BLD AUTO: 0.07 THOUSANDS/ΜL (ref 0–0.1)
BASOPHILS NFR BLD AUTO: 1 % (ref 0–1)
BILIRUB SERPL-MCNC: 0.42 MG/DL (ref 0.2–1)
BUN SERPL-MCNC: 10 MG/DL (ref 5–25)
CALCIUM SERPL-MCNC: 9.4 MG/DL (ref 8.3–10.1)
CHLORIDE SERPL-SCNC: 106 MMOL/L (ref 100–108)
CHOLEST SERPL-MCNC: 172 MG/DL (ref 50–200)
CO2 SERPL-SCNC: 31 MMOL/L (ref 21–32)
CREAT SERPL-MCNC: 0.77 MG/DL (ref 0.6–1.3)
EOSINOPHIL # BLD AUTO: 0.2 THOUSAND/ΜL (ref 0–0.61)
EOSINOPHIL NFR BLD AUTO: 3 % (ref 0–6)
ERYTHROCYTE [DISTWIDTH] IN BLOOD BY AUTOMATED COUNT: 13.1 % (ref 11.6–15.1)
GFR SERPL CREATININE-BSD FRML MDRD: 74 ML/MIN/1.73SQ M
GLUCOSE P FAST SERPL-MCNC: 114 MG/DL (ref 65–99)
HCT VFR BLD AUTO: 39.4 % (ref 34.8–46.1)
HDLC SERPL-MCNC: 51 MG/DL
HGB BLD-MCNC: 12.3 G/DL (ref 11.5–15.4)
IMM GRANULOCYTES # BLD AUTO: 0.03 THOUSAND/UL (ref 0–0.2)
IMM GRANULOCYTES NFR BLD AUTO: 0 % (ref 0–2)
LDLC SERPL CALC-MCNC: 91 MG/DL (ref 0–100)
LYMPHOCYTES # BLD AUTO: 2.04 THOUSANDS/ΜL (ref 0.6–4.47)
LYMPHOCYTES NFR BLD AUTO: 29 % (ref 14–44)
MCH RBC QN AUTO: 28.1 PG (ref 26.8–34.3)
MCHC RBC AUTO-ENTMCNC: 31.2 G/DL (ref 31.4–37.4)
MCV RBC AUTO: 90 FL (ref 82–98)
MONOCYTES # BLD AUTO: 0.63 THOUSAND/ΜL (ref 0.17–1.22)
MONOCYTES NFR BLD AUTO: 9 % (ref 4–12)
NEUTROPHILS # BLD AUTO: 4.09 THOUSANDS/ΜL (ref 1.85–7.62)
NEUTS SEG NFR BLD AUTO: 58 % (ref 43–75)
NRBC BLD AUTO-RTO: 0 /100 WBCS
PLATELET # BLD AUTO: 226 THOUSANDS/UL (ref 149–390)
PMV BLD AUTO: 11.4 FL (ref 8.9–12.7)
POTASSIUM SERPL-SCNC: 3.9 MMOL/L (ref 3.5–5.3)
PROT SERPL-MCNC: 6.9 G/DL (ref 6.4–8.2)
RBC # BLD AUTO: 4.37 MILLION/UL (ref 3.81–5.12)
SODIUM SERPL-SCNC: 141 MMOL/L (ref 136–145)
TRIGL SERPL-MCNC: 150 MG/DL
TSH SERPL DL<=0.05 MIU/L-ACNC: 2.29 UIU/ML (ref 0.36–3.74)
WBC # BLD AUTO: 7.06 THOUSAND/UL (ref 4.31–10.16)

## 2019-11-01 PROCEDURE — 36415 COLL VENOUS BLD VENIPUNCTURE: CPT | Performed by: PHYSICIAN ASSISTANT

## 2019-11-01 PROCEDURE — 80061 LIPID PANEL: CPT | Performed by: PHYSICIAN ASSISTANT

## 2019-11-01 PROCEDURE — 82306 VITAMIN D 25 HYDROXY: CPT | Performed by: PHYSICIAN ASSISTANT

## 2019-11-01 PROCEDURE — 85025 COMPLETE CBC W/AUTO DIFF WBC: CPT | Performed by: PHYSICIAN ASSISTANT

## 2019-11-01 PROCEDURE — 84443 ASSAY THYROID STIM HORMONE: CPT | Performed by: PHYSICIAN ASSISTANT

## 2019-11-01 PROCEDURE — 80053 COMPREHEN METABOLIC PANEL: CPT | Performed by: PHYSICIAN ASSISTANT

## 2019-11-21 ENCOUNTER — TELEPHONE (OUTPATIENT)
Dept: INTERNAL MEDICINE CLINIC | Facility: CLINIC | Age: 79
End: 2019-11-21

## 2019-11-21 DIAGNOSIS — M79.2 NEUROPATHIC PAIN: ICD-10-CM

## 2019-11-21 DIAGNOSIS — J44.1 CHRONIC OBSTRUCTIVE PULMONARY DISEASE WITH ACUTE EXACERBATION (HCC): Primary | ICD-10-CM

## 2019-11-21 DIAGNOSIS — J45.20 MILD INTERMITTENT ASTHMA, UNSPECIFIED WHETHER COMPLICATED: ICD-10-CM

## 2019-11-21 DIAGNOSIS — I50.9 CONGESTIVE HEART FAILURE, UNSPECIFIED HF CHRONICITY, UNSPECIFIED HEART FAILURE TYPE (HCC): ICD-10-CM

## 2019-11-21 RX ORDER — GABAPENTIN 300 MG/1
300 CAPSULE ORAL DAILY
Qty: 90 CAPSULE | Refills: 3 | Status: SHIPPED | OUTPATIENT
Start: 2019-11-21 | End: 2020-02-24 | Stop reason: SDUPTHER

## 2019-11-26 ENCOUNTER — HOSPITAL ENCOUNTER (INPATIENT)
Facility: HOSPITAL | Age: 79
LOS: 1 days | Discharge: HOME | DRG: 149 | End: 2019-11-27
Attending: EMERGENCY MEDICINE | Admitting: INTERNAL MEDICINE
Payer: MEDICARE

## 2019-11-26 ENCOUNTER — APPOINTMENT (EMERGENCY)
Dept: CT IMAGING | Facility: HOSPITAL | Age: 79
DRG: 149 | End: 2019-11-26
Payer: MEDICARE

## 2019-11-26 ENCOUNTER — APPOINTMENT (EMERGENCY)
Dept: RADIOLOGY | Facility: HOSPITAL | Age: 79
DRG: 149 | End: 2019-11-26
Payer: MEDICARE

## 2019-11-26 ENCOUNTER — APPOINTMENT (INPATIENT)
Dept: MRI IMAGING | Facility: HOSPITAL | Age: 79
DRG: 149 | End: 2019-11-26
Payer: MEDICARE

## 2019-11-26 DIAGNOSIS — R51.9 HEADACHE: ICD-10-CM

## 2019-11-26 DIAGNOSIS — R93.0 ABNORMAL CT SCAN OF HEAD: ICD-10-CM

## 2019-11-26 DIAGNOSIS — H65.192 OTHER NON-RECURRENT ACUTE NONSUPPURATIVE OTITIS MEDIA OF LEFT EAR: ICD-10-CM

## 2019-11-26 DIAGNOSIS — R42 DIZZINESS: ICD-10-CM

## 2019-11-26 DIAGNOSIS — E87.6 HYPOKALEMIA: ICD-10-CM

## 2019-11-26 DIAGNOSIS — R11.2 NAUSEA & VOMITING: Primary | ICD-10-CM

## 2019-11-26 DIAGNOSIS — E83.42 HYPOMAGNESEMIA: ICD-10-CM

## 2019-11-26 PROBLEM — R93.89 ABNORMAL COMPUTED TOMOGRAPHY ANGIOGRAPHY (CTA): Status: ACTIVE | Noted: 2019-11-26

## 2019-11-26 PROBLEM — I50.32 CHRONIC DIASTOLIC CONGESTIVE HEART FAILURE (HCC): Status: ACTIVE | Noted: 2019-07-11

## 2019-11-26 PROBLEM — E87.0 HYPERNATREMIA: Status: ACTIVE | Noted: 2019-11-26

## 2019-11-26 LAB
ALBUMIN SERPL BCP-MCNC: 2.9 G/DL (ref 3.5–5)
ALP SERPL-CCNC: 54 U/L (ref 46–116)
ALT SERPL W P-5'-P-CCNC: 27 U/L (ref 12–78)
ANION GAP SERPL CALCULATED.3IONS-SCNC: 11 MMOL/L (ref 4–13)
APTT PPP: 33 SECONDS (ref 23–37)
AST SERPL W P-5'-P-CCNC: 12 U/L (ref 5–45)
ATRIAL RATE: 73 BPM
BASOPHILS # BLD AUTO: 0.06 THOUSANDS/ΜL (ref 0–0.1)
BASOPHILS NFR BLD AUTO: 1 % (ref 0–1)
BILIRUB SERPL-MCNC: 0.4 MG/DL (ref 0.2–1)
BILIRUB UR QL STRIP: NEGATIVE
BUN SERPL-MCNC: 8 MG/DL (ref 5–25)
CALCIUM SERPL-MCNC: 6.9 MG/DL (ref 8.3–10.1)
CHLORIDE SERPL-SCNC: 111 MMOL/L (ref 100–108)
CLARITY UR: CLEAR
CO2 SERPL-SCNC: 24 MMOL/L (ref 21–32)
COLOR UR: ABNORMAL
CREAT SERPL-MCNC: 0.49 MG/DL (ref 0.6–1.3)
EOSINOPHIL # BLD AUTO: 0.07 THOUSAND/ΜL (ref 0–0.61)
EOSINOPHIL NFR BLD AUTO: 1 % (ref 0–6)
ERYTHROCYTE [DISTWIDTH] IN BLOOD BY AUTOMATED COUNT: 13.1 % (ref 11.6–15.1)
GFR SERPL CREATININE-BSD FRML MDRD: 93 ML/MIN/1.73SQ M
GLUCOSE SERPL-MCNC: 121 MG/DL (ref 65–140)
GLUCOSE SERPL-MCNC: 143 MG/DL (ref 65–140)
GLUCOSE SERPL-MCNC: 211 MG/DL (ref 65–140)
GLUCOSE UR STRIP-MCNC: ABNORMAL MG/DL
HCT VFR BLD AUTO: 42.8 % (ref 34.8–46.1)
HGB BLD-MCNC: 13.5 G/DL (ref 11.5–15.4)
HGB UR QL STRIP.AUTO: NEGATIVE
IMM GRANULOCYTES # BLD AUTO: 0.04 THOUSAND/UL (ref 0–0.2)
IMM GRANULOCYTES NFR BLD AUTO: 0 % (ref 0–2)
INR PPP: 1.02 (ref 0.84–1.19)
KETONES UR STRIP-MCNC: NEGATIVE MG/DL
LEUKOCYTE ESTERASE UR QL STRIP: NEGATIVE
LIPASE SERPL-CCNC: 135 U/L (ref 73–393)
LYMPHOCYTES # BLD AUTO: 2.63 THOUSANDS/ΜL (ref 0.6–4.47)
LYMPHOCYTES NFR BLD AUTO: 26 % (ref 14–44)
MAGNESIUM SERPL-MCNC: 1.1 MG/DL (ref 1.6–2.6)
MCH RBC QN AUTO: 28.1 PG (ref 26.8–34.3)
MCHC RBC AUTO-ENTMCNC: 31.5 G/DL (ref 31.4–37.4)
MCV RBC AUTO: 89 FL (ref 82–98)
MONOCYTES # BLD AUTO: 0.64 THOUSAND/ΜL (ref 0.17–1.22)
MONOCYTES NFR BLD AUTO: 6 % (ref 4–12)
NEUTROPHILS # BLD AUTO: 6.69 THOUSANDS/ΜL (ref 1.85–7.62)
NEUTS SEG NFR BLD AUTO: 66 % (ref 43–75)
NITRITE UR QL STRIP: NEGATIVE
NRBC BLD AUTO-RTO: 0 /100 WBCS
P AXIS: 39 DEGREES
PH UR STRIP.AUTO: 7 [PH]
PLATELET # BLD AUTO: 277 THOUSANDS/UL (ref 149–390)
PMV BLD AUTO: 10.7 FL (ref 8.9–12.7)
POTASSIUM SERPL-SCNC: 3.1 MMOL/L (ref 3.5–5.3)
PR INTERVAL: 174 MS
PROT SERPL-MCNC: 5.1 G/DL (ref 6.4–8.2)
PROT UR STRIP-MCNC: NEGATIVE MG/DL
PROTHROMBIN TIME: 13.4 SECONDS (ref 11.6–14.5)
QRS AXIS: 33 DEGREES
QRSD INTERVAL: 84 MS
QT INTERVAL: 414 MS
QTC INTERVAL: 456 MS
RBC # BLD AUTO: 4.8 MILLION/UL (ref 3.81–5.12)
SODIUM SERPL-SCNC: 146 MMOL/L (ref 136–145)
SP GR UR STRIP.AUTO: 1.01 (ref 1–1.03)
T WAVE AXIS: 59 DEGREES
TROPONIN I SERPL-MCNC: <0.02 NG/ML
UROBILINOGEN UR QL STRIP.AUTO: 0.2 E.U./DL
VENTRICULAR RATE: 73 BPM
WBC # BLD AUTO: 10.13 THOUSAND/UL (ref 4.31–10.16)

## 2019-11-26 PROCEDURE — 99285 EMERGENCY DEPT VISIT HI MDM: CPT | Performed by: PHYSICIAN ASSISTANT

## 2019-11-26 PROCEDURE — 96361 HYDRATE IV INFUSION ADD-ON: CPT

## 2019-11-26 PROCEDURE — 83735 ASSAY OF MAGNESIUM: CPT | Performed by: PHYSICIAN ASSISTANT

## 2019-11-26 PROCEDURE — 70551 MRI BRAIN STEM W/O DYE: CPT

## 2019-11-26 PROCEDURE — 82948 REAGENT STRIP/BLOOD GLUCOSE: CPT

## 2019-11-26 PROCEDURE — 70544 MR ANGIOGRAPHY HEAD W/O DYE: CPT

## 2019-11-26 PROCEDURE — 96366 THER/PROPH/DIAG IV INF ADDON: CPT

## 2019-11-26 PROCEDURE — 36415 COLL VENOUS BLD VENIPUNCTURE: CPT | Performed by: PHYSICIAN ASSISTANT

## 2019-11-26 PROCEDURE — 80053 COMPREHEN METABOLIC PANEL: CPT | Performed by: PHYSICIAN ASSISTANT

## 2019-11-26 PROCEDURE — 85610 PROTHROMBIN TIME: CPT | Performed by: PHYSICIAN ASSISTANT

## 2019-11-26 PROCEDURE — 70496 CT ANGIOGRAPHY HEAD: CPT

## 2019-11-26 PROCEDURE — 83690 ASSAY OF LIPASE: CPT | Performed by: PHYSICIAN ASSISTANT

## 2019-11-26 PROCEDURE — 70498 CT ANGIOGRAPHY NECK: CPT

## 2019-11-26 PROCEDURE — 93010 ELECTROCARDIOGRAM REPORT: CPT | Performed by: INTERNAL MEDICINE

## 2019-11-26 PROCEDURE — 96365 THER/PROPH/DIAG IV INF INIT: CPT

## 2019-11-26 PROCEDURE — 99223 1ST HOSP IP/OBS HIGH 75: CPT | Performed by: PHYSICIAN ASSISTANT

## 2019-11-26 PROCEDURE — 81003 URINALYSIS AUTO W/O SCOPE: CPT | Performed by: PHYSICIAN ASSISTANT

## 2019-11-26 PROCEDURE — 99285 EMERGENCY DEPT VISIT HI MDM: CPT

## 2019-11-26 PROCEDURE — 71046 X-RAY EXAM CHEST 2 VIEWS: CPT

## 2019-11-26 PROCEDURE — 85025 COMPLETE CBC W/AUTO DIFF WBC: CPT | Performed by: PHYSICIAN ASSISTANT

## 2019-11-26 PROCEDURE — 93005 ELECTROCARDIOGRAM TRACING: CPT

## 2019-11-26 PROCEDURE — 84484 ASSAY OF TROPONIN QUANT: CPT | Performed by: PHYSICIAN ASSISTANT

## 2019-11-26 PROCEDURE — 96375 TX/PRO/DX INJ NEW DRUG ADDON: CPT

## 2019-11-26 PROCEDURE — 85730 THROMBOPLASTIN TIME PARTIAL: CPT | Performed by: PHYSICIAN ASSISTANT

## 2019-11-26 RX ORDER — MECLIZINE HCL 12.5 MG/1
25 TABLET ORAL ONCE
Status: COMPLETED | OUTPATIENT
Start: 2019-11-26 | End: 2019-11-26

## 2019-11-26 RX ORDER — MAGNESIUM SULFATE HEPTAHYDRATE 40 MG/ML
2 INJECTION, SOLUTION INTRAVENOUS ONCE
Status: COMPLETED | OUTPATIENT
Start: 2019-11-26 | End: 2019-11-26

## 2019-11-26 RX ORDER — ATORVASTATIN CALCIUM 40 MG/1
40 TABLET, FILM COATED ORAL EVERY EVENING
Status: DISCONTINUED | OUTPATIENT
Start: 2019-11-26 | End: 2019-11-27 | Stop reason: HOSPADM

## 2019-11-26 RX ORDER — OXYBUTYNIN CHLORIDE 5 MG/1
5 TABLET, EXTENDED RELEASE ORAL DAILY
Status: DISCONTINUED | OUTPATIENT
Start: 2019-11-27 | End: 2019-11-27 | Stop reason: HOSPADM

## 2019-11-26 RX ORDER — ONDANSETRON 2 MG/ML
4 INJECTION INTRAMUSCULAR; INTRAVENOUS ONCE
Status: COMPLETED | OUTPATIENT
Start: 2019-11-26 | End: 2019-11-26

## 2019-11-26 RX ORDER — GABAPENTIN 300 MG/1
300 CAPSULE ORAL DAILY
Status: DISCONTINUED | OUTPATIENT
Start: 2019-11-27 | End: 2019-11-27 | Stop reason: HOSPADM

## 2019-11-26 RX ORDER — ASPIRIN 81 MG/1
81 TABLET, CHEWABLE ORAL ONCE
Status: COMPLETED | OUTPATIENT
Start: 2019-11-26 | End: 2019-11-26

## 2019-11-26 RX ORDER — ASPIRIN 81 MG/1
81 TABLET, CHEWABLE ORAL DAILY
Status: DISCONTINUED | OUTPATIENT
Start: 2019-11-27 | End: 2019-11-27 | Stop reason: HOSPADM

## 2019-11-26 RX ORDER — POTASSIUM CHLORIDE 20MEQ/15ML
40 LIQUID (ML) ORAL ONCE
Status: COMPLETED | OUTPATIENT
Start: 2019-11-26 | End: 2019-11-26

## 2019-11-26 RX ORDER — SODIUM CHLORIDE 9 MG/ML
75 INJECTION, SOLUTION INTRAVENOUS CONTINUOUS
Status: DISCONTINUED | OUTPATIENT
Start: 2019-11-26 | End: 2019-11-27 | Stop reason: HOSPADM

## 2019-11-26 RX ORDER — ONDANSETRON 2 MG/ML
1 INJECTION INTRAMUSCULAR; INTRAVENOUS ONCE
Status: COMPLETED | OUTPATIENT
Start: 2019-11-26 | End: 2019-11-26

## 2019-11-26 RX ORDER — FLUTICASONE FUROATE AND VILANTEROL 200; 25 UG/1; UG/1
1 POWDER RESPIRATORY (INHALATION) DAILY
Status: DISCONTINUED | OUTPATIENT
Start: 2019-11-27 | End: 2019-11-27 | Stop reason: HOSPADM

## 2019-11-26 RX ORDER — PANTOPRAZOLE SODIUM 20 MG/1
20 TABLET, DELAYED RELEASE ORAL
Status: DISCONTINUED | OUTPATIENT
Start: 2019-11-27 | End: 2019-11-27 | Stop reason: HOSPADM

## 2019-11-26 RX ADMIN — IOHEXOL 85 ML: 350 INJECTION, SOLUTION INTRAVENOUS at 11:33

## 2019-11-26 RX ADMIN — SODIUM CHLORIDE 500 ML: 0.9 INJECTION, SOLUTION INTRAVENOUS at 11:02

## 2019-11-26 RX ADMIN — SODIUM CHLORIDE 75 ML/HR: 0.9 INJECTION, SOLUTION INTRAVENOUS at 18:17

## 2019-11-26 RX ADMIN — POTASSIUM CHLORIDE 40 MEQ: 20 SOLUTION ORAL at 11:49

## 2019-11-26 RX ADMIN — ONDANSETRON 4 MG: 2 INJECTION INTRAMUSCULAR; INTRAVENOUS at 11:02

## 2019-11-26 RX ADMIN — INSULIN LISPRO 2 UNITS: 100 INJECTION, SOLUTION INTRAVENOUS; SUBCUTANEOUS at 18:19

## 2019-11-26 RX ADMIN — ASPIRIN 81 MG 81 MG: 81 TABLET ORAL at 15:58

## 2019-11-26 RX ADMIN — MAGNESIUM SULFATE HEPTAHYDRATE 2 G: 40 INJECTION, SOLUTION INTRAVENOUS at 18:18

## 2019-11-26 RX ADMIN — ATORVASTATIN CALCIUM 40 MG: 40 TABLET, FILM COATED ORAL at 18:18

## 2019-11-26 RX ADMIN — MAGNESIUM SULFATE HEPTAHYDRATE 2 G: 40 INJECTION, SOLUTION INTRAVENOUS at 11:54

## 2019-11-26 RX ADMIN — MECLIZINE 25 MG: 12.5 TABLET ORAL at 11:11

## 2019-11-26 NOTE — ED NOTES
Patient returned for xray and CT  Regan Morrison, case management at bedside        Jacquie Green RN  11/26/19 8199

## 2019-11-26 NOTE — H&P
H&P- Leelee Jaramillo 1940, 78 y o  female MRN: [id number]    Unit/Bed#: 425-01 Encounter: [id number]    Primary Care Provider: Kan Nolasco PA-C   Date and time admitted to hospital: 11/26/2019 10:41 AM        Dizziness  Assessment & Plan  Patient presents with acute onset of dizziness and vertigo, which lead to nausea and vomiting  She also notes a headache  All symptoms have since improved with treatment in the ER  CTA head and neck showed possible high grade stenosis of the left vertebral artery  Will place on observation status  Telemetry monitoring  Follow stroke pathway with frequent neurologic assessments  High grade statin therapy  Maintain tight diabetic control  Echocardiogram   Obtain MRI/MRA head to further visualize CTA abnormality  Consult tele-neurology  Continue meclizine as needed and give IV fluid hydration  Hypokalemia  Assessment & Plan  Potassium is 3 1 on admission, likely due to dehydration from vomiting  Will give jazlyn IVF hydration and given 1 time dose of potassium supplementation in ER  Hypernatremia  Assessment & Plan  Mild, likely hypovolemic, hyponatremia due to GI losses with nausea and vomiting  Will give IV fluid hydration with normal saline for now, encourage PO diet and fluid intake if tolerated  Follow BMP  Abnormal computed tomography angiography (CTA)  Assessment & Plan  Abnormal CTA of the head and neck:      Mild cervical atherosclerotic disease without hemodynamically significant carotid stenosis by NASCET criteria  Poor visualization of the left vertebral artery origin raising the question of focal high-grade stenosis  Remaining cervical vertebral circulation is unremarkable, bilaterally  Correlate for signs and symptoms of vertebrobasilar insufficiency  No large vessel occlusion, dissection or aneurysm  Centrilobular emphysema      Possible high grade vetebral artery stenosis - will obtain more detailed view with MRI / MRA of the head  Chronic diastolic congestive heart failure (HCC)  Assessment & Plan  Wt Readings from Last 3 Encounters:   11/26/19 77 8 kg (171 lb 8 3 oz)   10/23/19 79 5 kg (175 lb 3 2 oz)   07/11/19 80 2 kg (176 lb 12 8 oz)       CHF noted in medical record, likely diastolic based on prior echo from 2018 showing mild diastolic dysfunction  Essential hypertension  Assessment & Plan  Hold lisinopril in the setting of dehydration  Follow BP closely and allow for permissive hypertension overnight while undergoing stroke / TIA workup  Hyperlipidemia, unspecified  Assessment & Plan  Increase dose of lipitor from 20mg to high intensity dose of 40mg daily  Type 2 diabetes mellitus with hyperglycemia, without long-term current use of insulin ([facility])  Assessment & Plan  Lab Results   Component Value Date    HGBA1C 6 7 (A) 10/23/2019       No results for input(s): POCGLU in the last 72 hours  Blood Sugar Average: Last 72 hrs:     A1c recently checked at 6 7  Will hold metformin in the setting of nausea and vomiting  Maintain accuchecks with insulin sliding scale coverage as needed  Diabetic diet      COPD (chronic obstructive pulmonary disease) ([facility])  Assessment & Plan  Stable, no acute exacerbation  Continue advair  Respiratory protocol  Acid reflux disease  Assessment & Plan  Continue PPI  VTE Prophylaxis: Enoxaparin (Lovenox)  / sequential compression device   Code Status: full code      Anticipated Length of Stay:  Patient will be admitted on an Inpatient basis with an anticipated length of stay of  Greater than 2 midnights  Justification for Hospital Stay: need for further stroke workup including MRI/MRI brain and echocardiogram , telemetry monitoring, neurology consultation  Total Time for Visit, including Counseling / Coordination of Care: 1 hour  Greater than 50% of this total time spent on direct patient counseling and coordination of care      Chief Complaint: dizziness    History of Present Illness:  Katalina Man is a 78 y o  female who presents with acute severe dizziness she describes as the room is spinning  This occurred is exam when she awoke from sleep  The patient also noticed mild headache at the time with transient blurred vision  Dizziness quickly became quite severe and she became very nauseous and started vomiting several times  Patient has never experienced symptoms like this in the past   Alleviating factors include sitting still wears aggravating factors include any movement of the head especially but also getting up walking around  She does admit some intermittent palpitations but no chest pain no shortness of breath  She denies any visual changes, difficulty swallowing, weakness tingling or numbness of extremities  Symptoms are currently improved after treatment in the ER with IV fluid hydration as well as meclizine  Review of Systems:  Review of Systems   All other systems reviewed and are negative  Past Medical and Surgical History:   Past Medical History:   Diagnosis Date    Allergic     COPD [facility]chronic obstructive [facility]) [facility] [address] [facility]     [facility]     GERD (gastroesophageal reflux disease)     Hyperlipidemia     Hypertension     Pneumonia     Pneumonia of right lower lobe due to infectious organism ([facility] ) 12/4/2018    Vitamin D deficiency        Past Surgical History:   Procedure Laterality Date    CHOLECYSTECTOMY  1984    COLONOSCOPY N/A 1/30/2019    Procedure: COLONOSCOPY with multiple  polypectomies;  Surgeon: Chidi Malin MD;  Location: [facility]; Service: Gastroenterology    EYE SURGERY Bilateral     CATARACT       Meds/Allergies:    Prior to Admission medications    Medication Sig Start Date End Date Taking?  Authorizing Provider   albuterol (PROVENTIL HFA,VENTOLIN HFA) 90 mcg/act inhaler Inhale 2 puffs every 6 (six) hours as needed for wheezing or shortness of breath 4/5/19  Yes Berl Manisha, FELIPE   atorvastatin (LIPITOR) 20 mg tablet TAKE 1 TABLET BY MOUTH  DAILY 9/3/19  Yes Mary Kay Oliveira PA-C   Blood Glucose Monitoring Suppl (ONETOUCH VERIO) w/Device KIT USE AS DIRECTED 4/27/18  Yes Deborah Bee PA-C   clotrimazole-betamethasone (LOTRISONE) 1-0 05 % cream Apply topically 2 (two) times a day 2/13/19  Yes Mary Kay Oliveira PA-C   Ergocalciferol (VITAMIN D2) 2000 units TABS Take 2,000 mg by mouth   Yes Historical Provider, MD   fluticasone-salmeterol (ADVAIR, WIXELA) 250-50 mcg/dose inhaler Inhale 1 puff 2 (two) times a day Rinse mouth after use   10/23/19  Yes Mary Kay Oliveira PA-C   gabapentin (NEURONTIN) 300 mg capsule Take 1 capsule (300 mg total) by mouth daily 11/21/19  Yes Salome Farnsworth PA-C   glucose blood (ONETOUCH VERIO) test strip Use as instructed TEST TWO TIMES DAILY e11 65 10/18/18  Yes Salome Farnsworth PA-C   glucose blood (ONETOUCH VERIO) test strip 1 each by Other route 2 (two) times a day Test two times daily 12/27/18  Yes Mary Kay Oliveira PA-C   lisinopril (ZESTRIL) 5 mg tablet Take 1 tablet (5 mg total) by mouth daily 2/28/19  Yes Mary Kay Oliveira PA-C   metFORMIN (GLUCOPHAGE) 500 mg tablet Take 1 tablet (500 mg total) by mouth 2 (two) times a day with meals 9/27/18  Yes Mary Kay Oliveira PA-C   Multiple Vitamin (MULTIVITAMINS PO) Take by mouth daily   Yes Historical Provider, MD   omeprazole (PriLOSEC) 20 mg delayed release capsule Take 1 capsule (20 mg total) by mouth 2 (two) times a day 2/28/19  Yes Mary Kay Oliveira PA-C   ONETOUCH DELICA LANCETS FINE MISC USE AS DIRECTED THREE TIMES DAILY 4/27/18  Yes Bernard Swann PA-C   oxybutynin (DITROPAN-XL) 5 mg 24 hr tablet Take 1 tablet (5 mg total) by mouth daily 10/23/19  Yes Mary Kay Oliveira PA-C   tiotropium (SPIRIVA HANDIHALER) 18 mcg inhalation capsule Place 1 capsule (18 mcg total) into inhaler and inhale daily  Patient not taking: Reported on 11/26/2019 10/23/19   Salome Farnsworth PA-C     I have reviewed home medications with patient personally  Allergies: Allergies   Allergen Reactions    Other      FLOWERS/GRASS       Social History:  Marital Status:      Substance Use History:   Social History     Substance and Sexual Activity   Alcohol Use No    Frequency: Never    Binge frequency: Never     Social History     Tobacco Use   Smoking Status Former Smoker    Years: 50 00    Types: Cigarettes   Smokeless Tobacco Never Used     Social History     Substance and Sexual Activity   Drug Use Never       Family History:  non-contributory      Physical Exam:   Vitals:   Blood Pressure: 154/66 (11/26/19 1644)  Pulse: 67 (11/26/19 1644)  Temperature: 97 8 °F (36 6 °C) (11/26/19 1644)  Temp Source: Temporal (11/26/19 1046)  Respirations: 16 (11/26/19 1644)  Height: 5' 2" (157 5 cm) (11/26/19 1629)  Weight - Scale: 77 8 kg (171 lb 8 3 oz) (11/26/19 1629)  SpO2: 99 % (11/26/19 1644)    Physical Exam   Constitutional: She is oriented to person, place, and time  She appears well-developed and well-nourished  No distress  Wears glasses  HENT:   Head: Normocephalic  Mouth/Throat: Oropharynx is clear and moist    Neck: Neck supple  Cardiovascular: Normal rate, regular rhythm, normal heart sounds and intact distal pulses  No murmur heard  Pulmonary/Chest: Effort normal and breath sounds normal    Abdominal: Soft  There is no tenderness  Bowel sounds hyperactive  Musculoskeletal: She exhibits no edema  Neurological: She is alert and oriented to person, place, and time  No cranial nerve deficit or sensory deficit  She exhibits normal muscle tone  Coordination normal    Skin: Skin is warm  She is not diaphoretic  Psychiatric: She has a normal mood and affect  Nursing note and vitals reviewed  Additional Data:   Lab Results: I have personally reviewed pertinent reports        Results from last 7 days   Lab Units 11/26/19  1057   WBC Thousand/uL 10 13   HEMOGLOBIN g/dL 13 5   HEMATOCRIT % 42 8 PLATELETS Thousands/uL 277   NEUTROS PCT % 66   LYMPHS PCT % 26   MONOS PCT % 6   EOS PCT % 1     Results from last 7 days   Lab Units 11/26/19  1057   SODIUM mmol/L 146*   POTASSIUM mmol/L 3 1*   CHLORIDE mmol/L 111*   CO2 mmol/L 24   BUN mg/dL 8   CREATININE mg/dL 0 49*   ANION GAP mmol/L 11   CALCIUM mg/dL 6 9*   ALBUMIN g/dL 2 9*   TOTAL BILIRUBIN mg/dL 0 40   ALK PHOS U/L 54   ALT U/L 27   AST U/L 12   GLUCOSE RANDOM mg/dL 143*     Results from last 7 days   Lab Units 11/26/19  1057   INR  1 02                   Imaging: I have personally reviewed pertinent reports  XR chest 2 views   Final Result by Agatha Diana MD (11/26 4117)      No acute cardiopulmonary disease  Workstation performed: [id number]         CTA head and neck with and without contrast   Final Result by Magaly Giang MD (11/26 8554)      Mild cervical atherosclerotic disease without hemodynamically significant carotid stenosis by NASCET criteria  Poor visualization of the left vertebral artery origin raising the question of focal high-grade stenosis  Remaining cervical vertebral circulation is unremarkable, bilaterally  Correlate for signs and symptoms of vertebrobasilar insufficiency  No large vessel occlusion, dissection or aneurysm  Centrilobular emphysema  Workstation performed: [id number]         MRA head wo contrast    (Results Pending)   MRI brain wo contrast    (Results Pending)       EKG, Pathology, and Other Studies Reviewed on Admission:   · EKG: NSR, rate 73, nonspecific T wave changes but no signs of acute ischemia  Allscripts / Epic Records Reviewed: Yes     ** Please Note: This note has been constructed using a voice recognition system   **

## 2019-11-26 NOTE — CASE MANAGEMENT
I self referred the patient to discuss resources that might be available to her  She is interested in meals on wheels  I will contact [employer] when the patient is discharged  The patient lives alone in a second floor apartment  There is an elevator in the apartment building  She has a walker and O2 2 5 lpm prn but she says she uses it continuously  She gets her O2 from [employer]  She does not receive home health or meals on wheels  She takes care of her own ADL's  She does not drive she uses [employer] or her family will drive her to where she needs to go  She uses [employer] in [facility]  She has no trouble getting or paying for her medications  She has Medicare and [employer]  She did not call her PCP prior to coming to the ED today

## 2019-11-26 NOTE — ASSESSMENT & PLAN NOTE
Severe at 1 1  In the setting of nausea and vomiting  Given 2g IV magnesium supplementation, will give an additional 2g IV once now

## 2019-11-26 NOTE — ASSESSMENT & PLAN NOTE
Lab Results   Component Value Date    HGBA1C 6 7 (A) 10/23/2019       No results for input(s): POCGLU in the last 72 hours  Blood Sugar Average: Last 72 hrs:     A1c recently checked at 6 7  Will hold metformin in the setting of nausea and vomiting  Maintain accuchecks with insulin sliding scale coverage as needed    Diabetic diet

## 2019-11-26 NOTE — ASSESSMENT & PLAN NOTE
Wt Readings from Last 3 Encounters:   11/26/19 77 8 kg (171 lb 8 3 oz)   10/23/19 79 5 kg (175 lb 3 2 oz)   07/11/19 80 2 kg (176 lb 12 8 oz)       CHF noted in medical record, likely diastolic based on prior echo from 2018 showing mild diastolic dysfunction

## 2019-11-26 NOTE — ED PROVIDER NOTES
History  Chief Complaint   Patient presents with    Vomiting     Pt states she has had several episodes of vomiting since this morning, 1 episode of diarrhea, and dizziness when she stands up  78year old female presents via EMS from home for evaluation of nausea and vomiting  She notes this started acutely when she woke up today  Has had several episodes of vomiting  Was given zofran by EMS which help temporarily  She had one episode of diarrhea with this  Denies abdominal pain  Denies fever, chills  Denies chest pain or SOB  She also complains of dizziness when standing  She feels like the room is spinning and notes symptoms are triggered by moving her head and standing up  Denies past history of vertigo  She notes with onset of symptoms she had a mild headache and felt like her vision was blurred  Visual disturbance has since resolved  Denies neck pain  Denies chest pain or SOB  Denies recent falls, head injury or trauma  Denies recent illness  Denies recent change in medications  History provided by:  Patient and EMS personnel   used: No    Vomiting   Quality:  Bilious material  Chronicity:  New  Associated symptoms: diarrhea and headaches    Associated symptoms: no abdominal pain, no chills, no cough, no fever, no myalgias, no sore throat and no URI    Risk factors: prior abdominal surgery    Risk factors: no sick contacts, no suspect food intake and no travel to endemic areas        Prior to Admission Medications   Prescriptions Last Dose Informant Patient Reported? Taking?    Blood Glucose Monitoring Suppl Johnson James) w/Device KIT 11/26/2019 at Unknown time  No Yes   Sig: USE AS DIRECTED   Ergocalciferol (VITAMIN D2) 2000 units TABS 11/25/2019 at Unknown time  Yes Yes   Sig: Take 2,000 mg by mouth   Multiple Vitamin (MULTIVITAMINS PO) 11/25/2019 at Unknown time  Yes Yes   Sig: Take by mouth daily   ONETOUCH DELICA LANCETS FINE MISC 11/26/2019 at Unknown time  No Yes   Sig: USE AS DIRECTED THREE TIMES DAILY   albuterol (PROVENTIL HFA,VENTOLIN HFA) 90 mcg/act inhaler 2019 at Unknown time  No Yes   Sig: Inhale 2 puffs every 6 (six) hours as needed for wheezing or shortness of breath   atorvastatin (LIPITOR) 20 mg tablet 2019 at Unknown time  No Yes   Sig: TAKE 1 TABLET BY MOUTH  DAILY   clotrimazole-betamethasone (LOTRISONE) 1-0 05 % cream 2019 at Unknown time  No Yes   Sig: Apply topically 2 (two) times a day   fluticasone-salmeterol (ADVAIR, WIXELA) 250-50 mcg/dose inhaler 2019 at Unknown time  No Yes   Sig: Inhale 1 puff 2 (two) times a day Rinse mouth after use    gabapentin (NEURONTIN) 300 mg capsule Past Week at Unknown time  No Yes   Sig: Take 1 capsule (300 mg total) by mouth daily   glucose blood (ONETOUCH VERIO) test strip 2019 at Unknown time  No Yes   Sig: Use as instructed TEST TWO TIMES DAILY e11 65   glucose blood (ONETOUCH VERIO) test strip 2019 at Unknown time  No Yes   Si each by Other route 2 (two) times a day Test two times daily   lisinopril (ZESTRIL) 5 mg tablet 2019 at Unknown time  No Yes   Sig: Take 1 tablet (5 mg total) by mouth daily   metFORMIN (GLUCOPHAGE) 500 mg tablet 2019 at Unknown time  No Yes   Sig: Take 1 tablet (500 mg total) by mouth 2 (two) times a day with meals   omeprazole (PriLOSEC) 20 mg delayed release capsule 2019 at Unknown time  No Yes   Sig: Take 1 capsule (20 mg total) by mouth 2 (two) times a day   oxybutynin (DITROPAN-XL) 5 mg 24 hr tablet 2019 at Unknown time  No Yes   Sig: Take 1 tablet (5 mg total) by mouth daily   tiotropium (SPIRIVA HANDIHALER) 18 mcg inhalation capsule Not Taking at Unknown time  No No   Sig: Place 1 capsule (18 mcg total) into inhaler and inhale daily   Patient not taking: Reported on 2019      Facility-Administered Medications: None       Past Medical History:   Diagnosis Date    Allergic     COPD [facility]chronic obstructive pulmonary disease) ([facility] )     Diabetes mellitus ([facility] )     GERD (gastroesophageal reflux disease)     Hyperlipidemia     Hypertension     Pneumonia     Pneumonia of right lower lobe due to infectious organism ([place] [id number] ) 12/4/2018    Vitamin D deficiency        Past Surgical History:   Procedure Laterality Date    CHOLECYSTECTOMY  1984    COLONOSCOPY N/A 1/30/2019    Procedure: COLONOSCOPY with multiple  polypectomies;  Surgeon: Gregg Rand MD;  Location: [address] [place] [address] GI LAB; Service: Gastroenterology    EYE SURGERY Bilateral     CATARACT       Family History   Problem Relation Age of Onset    Lung cancer Father     Heart disease Mother     Lung cancer Sister     Lung cancer Brother      I have reviewed and agree with the history as documented  Social History     Tobacco Use    Smoking status: Former Smoker     Years: 50 00     Types: Cigarettes    Smokeless tobacco: Never Used   Substance Use Topics    Alcohol use: No     Frequency: Never     Binge frequency: Never    Drug use: Never        Review of Systems   Constitutional: Negative  Negative for chills, fatigue and fever  HENT: Positive for tinnitus  Negative for congestion, ear pain, rhinorrhea and sore throat  Eyes: Positive for visual disturbance  Respiratory: Negative  Negative for cough, shortness of breath and wheezing  Cardiovascular: Negative  Negative for chest pain, palpitations and leg swelling  Gastrointestinal: Positive for diarrhea, nausea and vomiting  Negative for abdominal pain and constipation  Genitourinary: Negative  Negative for dysuria, flank pain, frequency and hematuria  Musculoskeletal: Negative  Negative for back pain, myalgias and neck pain  Skin: Negative  Negative for rash  Neurological: Positive for dizziness and headaches  Negative for tremors, seizures, syncope, speech difficulty, light-headedness and numbness  Psychiatric/Behavioral: Negative  Negative for confusion     All other systems reviewed and are negative  Physical Exam  Physical Exam   Constitutional: She is oriented to person, place, and time  She appears well-developed and well-nourished  Non-toxic appearance  No distress  Elderly female   HENT:   Head: Normocephalic and atraumatic  Right Ear: Hearing, tympanic membrane, external ear and ear canal normal    Left Ear: Hearing, tympanic membrane, external ear and ear canal normal    Nose: Nose normal    Mouth/Throat: Uvula is midline, oropharynx is clear and moist and mucous membranes are normal  No oropharyngeal exudate  Eyes: Pupils are equal, round, and reactive to light  Conjunctivae, EOM and lids are normal  No scleral icterus  Neck: Trachea normal, full passive range of motion without pain and phonation normal  Neck supple  No tracheal deviation present  Cardiovascular: Normal rate, regular rhythm, normal heart sounds, intact distal pulses and normal pulses  No murmur heard  Pulmonary/Chest: Effort normal and breath sounds normal  No tachypnea  No respiratory distress  She has no wheezes  She has no rhonchi  She has no rales  Abdominal: Soft  Bowel sounds are normal  She exhibits no distension  There is no hepatosplenomegaly  There is no tenderness  There is no rigidity, no rebound, no guarding and no CVA tenderness  No hernia  Musculoskeletal: Normal range of motion  She exhibits no edema or tenderness  Neurological: She is alert and oriented to person, place, and time  She has normal strength and normal reflexes  No cranial nerve deficit or sensory deficit  She exhibits normal muscle tone  Coordination normal  GCS eye subscore is 4  GCS verbal subscore is 5  GCS motor subscore is 6  Skin: Skin is warm and dry  Capillary refill takes less than 2 seconds  No rash noted  Psychiatric: She has a normal mood and affect  Her speech is normal and behavior is normal    Nursing note and vitals reviewed        Vital Signs  ED Triage Vitals [11/26/19 1046]   Temperature Pulse Respirations Blood Pressure SpO2   97 8 °F (36 6 °C) 84 20 (!) 178/82 93 %      Temp Source Heart Rate Source Patient Position - Orthostatic VS BP Location FiO2 (%)   Temporal Monitor Lying Right arm --      Pain Score       No Pain           Vitals:    11/26/19 1300 11/26/19 1400 11/26/19 1500 11/26/19 1644   BP: 113/57 115/56 169/78 154/66   Pulse: 68 65 69 67   Patient Position - Orthostatic VS: Lying Lying Sitting          Visual Acuity  Visual Acuity      Most Recent Value   L Pupil Size (mm)  3   R Pupil Size (mm)  3   L Pupil Shape  Round   R Pupil Shape  Round          ED Medications  Medications   fluticasone-vilanterol (BREO ELLIPTA) 200-25 MCG/INH inhaler 1 puff (has no administration in time range)   gabapentin (NEURONTIN) capsule 300 mg (has no administration in time range)   pantoprazole (PROTONIX) EC tablet 20 mg (has no administration in time range)   oxybutynin (DITROPAN-XL) 24 hr tablet 5 mg (has no administration in time range)   atorvastatin (LIPITOR) tablet 40 mg (has no administration in time range)   aspirin chewable tablet 81 mg (has no administration in time range)   enoxaparin (LOVENOX) subcutaneous injection 40 mg (has no administration in time range)   sodium chloride 0 9 % infusion (has no administration in time range)   insulin lispro (HumaLOG) 100 units/mL subcutaneous injection 1-5 Units (has no administration in time range)   insulin lispro (HumaLOG) 100 units/mL subcutaneous injection 1-5 Units (has no administration in time range)   magnesium sulfate 2 g/50 mL IVPB (premix) 2 g (has no administration in time range)   ondansetron (FOR EMS ONLY) (ZOFRAN) 4 mg/2 mL injection 4 mg (0 mg Does not apply Given to EMS 11/26/19 1045)   ondansetron (ZOFRAN) injection 4 mg (4 mg Intravenous Given 11/26/19 1102)   sodium chloride 0 9 % bolus 500 mL (0 mL Intravenous Stopped 11/26/19 1149)   meclizine (ANTIVERT) tablet 25 mg (25 mg Oral Given 11/26/19 1111)   magnesium sulfate 2 g/50 mL IVPB (premix) 2 g (0 g Intravenous Stopped 11/26/19 1347)   potassium chloride 10 % oral solution 40 mEq (40 mEq Oral Given 11/26/19 1149)   iohexol (OMNIPAQUE) 350 MG/ML injection (SINGLE-DOSE) 85 mL (85 mL Intravenous Given 11/26/19 1133)   aspirin chewable tablet 81 mg (81 mg Oral Given 11/26/19 5918)       Diagnostic Studies  Results Reviewed     Procedure Component Value Units Date/Time    UA (URINE) with reflex to Scope [[id number]]  (Abnormal) Collected:  11/26/19 1233    Lab Status:  Final result Specimen:  Urine, Clean Catch Updated:  11/26/19 1240     Color, UA Light Yellow     Clarity, UA Clear     Specific Gravity, UA 1 010     pH, UA 7 0     Leukocytes, UA Negative     Nitrite, UA Negative     Protein, UA Negative mg/dl      Glucose,  (1/10%) mg/dl      Ketones, UA Negative mg/dl      Urobilinogen, UA 0 2 E U /dl      Bilirubin, UA Negative     Blood, UA Negative    Troponin I [[id number]]  (Normal) Collected:  11/26/19 1057    Lab Status:  Final result Specimen:  Blood from Arm, Left Updated:  11/26/19 1121     Troponin I <0 02 ng/mL     Comprehensive metabolic panel [[id number]]  (Abnormal) Collected:  11/26/19 1057    Lab Status:  Final result Specimen:  Blood from Arm, Left Updated:  11/26/19 1117     Sodium 146 mmol/L      Potassium 3 1 mmol/L      Chloride 111 mmol/L      CO2 24 mmol/L      ANION GAP 11 mmol/L      BUN 8 mg/dL      Creatinine 0 49 mg/dL      Glucose 143 mg/dL      Calcium 6 9 mg/dL      AST 12 U/L      ALT 27 U/L      Alkaline Phosphatase 54 U/L      Total Protein 5 1 g/dL      Albumin 2 9 g/dL      Total Bilirubin 0 40 mg/dL      eGFR 93 ml/min/1 73sq m     Narrative:       [place] guidelines for Chronic Kidney Disease (CKD):     Stage 1 with normal or high GFR (GFR > 90 mL/min/1 73 square meters)    Stage 2 Mild CKD (GFR = 60-89 mL/min/1 73 square meters)    Stage 3A Moderate CKD (GFR = 45-59 mL/min/1 73 square meters)    Stage 3B Moderate CKD (GFR = 30-44 mL/min/1 73 square meters)    Stage 4 Severe CKD (GFR = 15-29 mL/min/1 73 square meters)    Stage 5 End Stage CKD (GFR <15 mL/min/1 73 square meters)  Note: GFR calculation is accurate only with a steady state creatinine    Magnesium [[id number]]  (Abnormal) Collected:  11/26/19 1057    Lab Status:  Final result Specimen:  Blood from Arm, Left Updated:  11/26/19 1114     Magnesium 1 1 mg/dL     Lipase [[id number]]  (Normal) Collected:  11/26/19 1057    Lab Status:  Final result Specimen:  Blood from Arm, Left Updated:  11/26/19 1114     Lipase 135 u/L     Protime-INR [[id number]]  (Normal) Collected:  11/26/19 1057    Lab Status:  Final result Specimen:  Blood from Arm, Left Updated:  11/26/19 1112     Protime 13 4 seconds      INR 1 02    APTT [[id number]]  (Normal) Collected:  11/26/19 1057    Lab Status:  Final result Specimen:  Blood from Arm, Left Updated:  11/26/19 1112     PTT 33 seconds     CBC and differential [[id number]] Collected:  11/26/19 1057    Lab Status:  Final result Specimen:  Blood from Arm, Left Updated:  11/26/19 1103     WBC 10 13 Thousand/uL      RBC 4 80 Million/uL      Hemoglobin 13 5 g/dL      Hematocrit 42 8 %      MCV 89 fL      MCH 28 1 pg      MCHC 31 5 g/dL      RDW 13 1 %      MPV 10 7 fL      Platelets 158 Thousands/uL      nRBC 0 /100 WBCs      Neutrophils Relative 66 %      Immat GRANS % 0 %      Lymphocytes Relative 26 %      Monocytes Relative 6 %      Eosinophils Relative 1 %      Basophils Relative 1 %      Neutrophils Absolute 6 69 Thousands/µL      Immature Grans Absolute 0 04 Thousand/uL      Lymphocytes Absolute 2 63 Thousands/µL      Monocytes Absolute 0 64 Thousand/µL      Eosinophils Absolute 0 07 Thousand/µL      Basophils Absolute 0 06 Thousands/µL                  XR chest 2 views   Final Result by Robert Villaseñor MD (11/26 1255)      No acute cardiopulmonary disease              Workstation performed: [id number]         CTA head and neck with and without contrast Final Result by Parish Thomas MD (11/26 1158)      Mild cervical atherosclerotic disease without hemodynamically significant carotid stenosis by NASCET criteria  Poor visualization of the left vertebral artery origin raising the question of focal high-grade stenosis  Remaining cervical vertebral circulation is unremarkable, bilaterally  Correlate for signs and symptoms of vertebrobasilar insufficiency  No large vessel occlusion, dissection or aneurysm  Centrilobular emphysema  Workstation performed: [id number]         MRA head wo contrast    (Results Pending)   MRI brain wo contrast    (Results Pending)              Procedures  ECG 12 Lead Documentation Only  Date/Time: 11/26/2019 10:56 AM  Performed by: Paulett Duane, PA-C  Authorized by: Paulett Duane, PA-C     Indications / Diagnosis:  Nausea/vomiting/dizziness  ECG reviewed by me, the ED Provider: yes    Patient location:  ED  Previous ECG:     Previous ECG:  Compared to current    Comparison ECG info:  12/4/18    Similarity:  Changes noted    Comparison to cardiac monitor: Yes    Interpretation:     Interpretation: non-specific    Rate:     ECG rate:  73    ECG rate assessment: normal    Rhythm:     Rhythm: sinus rhythm    Ectopy:     Ectopy: none    QRS:     QRS axis:  Normal    QRS intervals:  Normal  Conduction:     Conduction: normal    ST segments:     ST segments:  Normal  T waves:     T waves: non-specific      T waves comment:  Inverted T v1, flattening T waves V2  Comments:      , QRS 84, QT/QTc 414/456; no acute changes or evidence of acute ischemia             ED Course  ED Course as of Nov 26 1741   Tue Nov 26, 2019   1108 WBC: 10 13   1108 Hemoglobin: 13 5   1108 Platelet Count: 002   1113 INR: 1 02   1113 Protime: 13 4   1113 PTT: 33   1117 Lipase: 135   1117 Will replete with IV magnesium   Magnesium(!): 1 1   1119 Glucose, Random(!): 143   1119 Creatinine(!): 0 49   1119 BUN: 8   1119 Sodium(!): 146 1119 Will replete orally; replete magnesium as well  Potassium(!): 3 1   1119 Chloride(!): 111   1119 CO2: 24   1119 Anion Gap: 11   1119 Calcium(!): 6 9   1120 AST: 12   1120 ALT: 27   1120 Alkaline Phosphatase: 54   1120 Total Protein(!): 5 1   1120 Albumin(!): 2 9   1120 TOTAL BILIRUBIN: 0 40   1120 eGFR: 93   1130 Troponin I: <0 02   1204    IMPRESSION:     Mild cervical atherosclerotic disease without hemodynamically significant carotid stenosis by NASCET criteria      Poor visualization of the left vertebral artery origin raising the question of focal high-grade stenosis  Remaining cervical vertebral circulation is unremarkable, bilaterally  Correlate for signs and symptoms of vertebrobasilar insufficiency      No large vessel occlusion, dissection or aneurysm      Centrilobular emphysema  CTA head and neck with and without contrast   1221 Findings improved with pt  She is feeling improved  Nausea better  No further emesis  1236 Case was discussed with attending Dr Kirill Rahman  Will consult neurology  [place] Text sent to Dr Sukhi Ocasio to discuss further  1251 Color, UA: Light Yellow   1251 Clarity, UA: Clear   1251 SL AMB SPECIFIC GRAVITY_URINE: 1 010   1251 pH, UA: 7 0   1251 Leukocytes, UA: Negative   1251 Nitrite, UA: Negative   1251 POCT URINE PROTEIN: Negative   1251 Glucose, UA(!): 100 (1/10%)   1251 Ketones, UA: Negative   1251 SL AMB POCT UROBILINOGEN: 0 2   1251 Bilirubin, UA: Negative   1251 Blood, UA: Negative   1319 IMPRESSION:     No acute cardiopulmonary disease  XR chest 2 views   1335 Spoke to on call neurology Dr Sukhi Ocasio regarding pt  She advised admission for MRI and stroke pathway and initiation of baby aspirin if not already taking as well as statin therapy  1527 Findings reviewed with pt and daughter who is now at bedside  Recommended admission and discussed my conservation with neurologist   They are agreeable to treatment plan as outlined        [address] Text sent to on call SLSHEY Fernandes to discuss admission  MARCUS [address] 23 Per Dr Rita Smith, okay to admit inpatient  Pt afebrile and hemodynamically stable  Symptomatically has improved here with treatment  Pt and daughter in agreeance with proposed admission and treatment plan  HEART Risk Score      Most Recent Value   History  0 Filed at: 11/26/2019 1055   ECG  0 Filed at: 11/26/2019 1055   Age  2 Filed at: 11/26/2019 1055   Risk Factors  2 Filed at: 11/26/2019 1055   Troponin  0 Filed at: 11/26/2019 1055   Heart Score Risk Calculator   History  0 Filed at: 11/26/2019 1055   ECG  0 Filed at: 11/26/2019 1055   Age  2 Filed at: 11/26/2019 1055   Risk Factors  2 Filed at: 11/26/2019 1055   Troponin  0 Filed at: 11/26/2019 1055   HEART Score  4 Filed at: 11/26/2019 1055   HEART Score  4 Filed at: 11/26/2019 1055          Stroke Assessment     Row Name 11/26/19 1055             NIH Stroke Scale    Interval  Baseline      Level of Consciousness (1a )  0      LOC Questions (1b )  0      LOC Commands (1c )  0      Best Gaze (2 )  0      Visual (3 )  0      Facial Palsy (4 )  0      Motor Arm, Left (5a )  0      Motor Arm, Right (5b )  0      Motor Leg, Left (6a )  0      Motor Leg, Right (6b )  0      Limb Ataxia (7 )  0      Sensory (8 )  0      Best Language (9 )  0      Dysarthria (10 )  0      Extinction and Inattention (11 ) (Formerly Neglect)  0      Total  0          First Filed Value   TPA Decision  Patient not a TPA candidate  Patient is not a candidate options  Unclear time of onset outside appropriate time window , Symptoms resolved/clearly non disabling                          MDM  Number of Diagnoses or Management Options  Abnormal CT scan of head: new and requires workup  Dizziness: new and requires workup  Headache: new and requires workup  Hypokalemia: new and requires workup  Hypomagnesemia: new and requires workup  Nausea & vomiting: new and requires workup     Amount and/or Complexity of Data Reviewed  Clinical lab tests: ordered and reviewed  Tests in the radiology section of CPT®: ordered and reviewed  Decide to obtain previous medical records or to obtain history from someone other than the patient: yes  Obtain history from someone other than the patient: yes  Review and summarize past medical records: yes  Discuss the patient with other providers: yes (Attending, neurology, SL[facility])  Independent visualization of images, tracings, or specimens: yes    Patient Progress  Patient progress: improved      Disposition  Final diagnoses:   Nausea & vomiting   Headache   Dizziness   Abnormal CT scan of head   Hypokalemia   Hypomagnesemia     Time reflects when diagnosis was documented in both MDM as applicable and the Disposition within this note     Time User Action Codes Description Comment    11/26/2019  3:50 PM Rhetta Angelica Add [R11 2] Nausea & vomiting     11/26/2019  3:50 PM Rhetta Angelica Add [R51] Headache     11/26/2019  3:50 PM Rhetta Angelica Add [R42] Dizziness     11/26/2019  3:50 PM Rhetta Angelica Add [R93 0] Abnormal CT scan of head     11/26/2019  3:52 PM Rhetta Angelica Add [E87 6] Hypokalemia     11/26/2019  3:52 PM Rhetta Angelica Add [E83 42] Hypomagnesemia       ED Disposition     ED Disposition Condition Date/Time Comment    Admit Stable Tue Nov 26, 2019  3:50 PM Case was discussed with Dr Naresh Metz and the patient's admission status was agreed to be Admission Status: inpatient status to the service of Dr Naresh Metz          Follow-up Information    None         Current Discharge Medication List      CONTINUE these medications which have NOT CHANGED    Details   albuterol (PROVENTIL HFA,VENTOLIN HFA) 90 mcg/act inhaler Inhale 2 puffs every 6 (six) hours as needed for wheezing or shortness of breath  Qty: 1 Inhaler, Refills: 5    Associated Diagnoses: Mild intermittent asthma, unspecified whether complicated      atorvastatin (LIPITOR) 20 mg tablet TAKE 1 TABLET BY MOUTH  DAILY  Qty: 90 tablet, Refills: 3    Associated Diagnoses: Hyperlipidemia, unspecified hyperlipidemia type      Blood Glucose Monitoring Suppl (Paris Fortune) w/Device KIT USE AS DIRECTED  Qty: 1 kit, Refills: 0    Associated Diagnoses: Diabetes mellitus of other type with complication, unspecified whether long term insulin use      clotrimazole-betamethasone (LOTRISONE) 1-0 05 % cream Apply topically 2 (two) times a day  Qty: 30 g, Refills: 2    Associated Diagnoses: Intertrigo      Ergocalciferol (VITAMIN D2) 2000 units TABS Take 2,000 mg by mouth      fluticasone-salmeterol (ADVAIR, WIXELA) 250-50 mcg/dose inhaler Inhale 1 puff 2 (two) times a day Rinse mouth after use  Qty: 3 Inhaler, Refills: 2    Comments: Substitution to a formulary equivalent within the same pharmaceutical class is authorized    Associated Diagnoses: Chronic obstructive pulmonary disease with acute exacerbation (HCC)      gabapentin (NEURONTIN) 300 mg capsule Take 1 capsule (300 mg total) by mouth daily  Qty: 90 capsule, Refills: 3    Associated Diagnoses: Neuropathic pain      !! glucose blood (ONETOUCH VERIO) test strip Use as instructed TEST TWO TIMES DAILY e11 65  Qty: 300 each, Refills: 3    Associated Diagnoses: Diabetes mellitus of other type with complication, unspecified whether long term insulin use      !! glucose blood (ONETOUCH VERIO) test strip 1 each by Other route 2 (two) times a day Test two times daily  Qty: 300 each, Refills: 1    Associated Diagnoses: Diabetes mellitus of other type with complication, unspecified whether long term insulin use      lisinopril (ZESTRIL) 5 mg tablet Take 1 tablet (5 mg total) by mouth daily  Qty: 90 tablet, Refills: 3    Associated Diagnoses: Hypertension, unspecified type      metFORMIN (GLUCOPHAGE) 500 mg tablet Take 1 tablet (500 mg total) by mouth 2 (two) times a day with meals  Qty: 180 tablet, Refills: 3    Associated Diagnoses: Diabetes mellitus of other type with complication, unspecified whether long term insulin use      Multiple Vitamin (MULTIVITAMINS PO) Take by mouth daily      omeprazole (PriLOSEC) 20 mg delayed release capsule Take 1 capsule (20 mg total) by mouth 2 (two) times a day  Qty: 180 capsule, Refills: 3    Associated Diagnoses: Gastroesophageal reflux disease without esophagitis      ONETOUCH DELICA LANCETS FINE MISC USE AS DIRECTED THREE TIMES DAILY  Qty: 100 each, Refills: 2    Associated Diagnoses: Diabetes mellitus of other type with complication, unspecified whether long term insulin use      oxybutynin (DITROPAN-XL) 5 mg 24 hr tablet Take 1 tablet (5 mg total) by mouth daily  Qty: 90 tablet, Refills: 2    Associated Diagnoses: OAB (overactive bladder)      tiotropium (SPIRIVA HANDIHALER) 18 mcg inhalation capsule Place 1 capsule (18 mcg total) into inhaler and inhale daily  Qty: 3 each, Refills: 3    Associated Diagnoses: Uncomplicated asthma, unspecified asthma severity, unspecified whether persistent       !! - Potential duplicate medications found  Please discuss with provider  No discharge procedures on file      ED Provider  Electronically Signed by           Tabitha Roth PA-C  11/26/19 [id number]

## 2019-11-26 NOTE — ASSESSMENT & PLAN NOTE
Potassium is 3 1 on admission, likely due to dehydration from vomiting  Will give jazlyn IVF hydration and given 1 time dose of potassium supplementation in ER

## 2019-11-26 NOTE — ASSESSMENT & PLAN NOTE
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

## 2019-11-26 NOTE — ASSESSMENT & PLAN NOTE
Patient presents with acute onset of dizziness and vertigo, which lead to nausea and vomiting  She also notes a headache  All symptoms have since improved with treatment in the ER  CTA head and neck showed possible high grade stenosis of the left vertebral artery  Will place on observation status  Telemetry monitoring  Follow stroke pathway with frequent neurologic assessments  High grade statin therapy  Maintain tight diabetic control  Echocardiogram   Obtain MRI/MRA head to further visualize CTA abnormality  Consult tele-neurology  Continue meclizine as needed and give IV fluid hydration

## 2019-11-26 NOTE — ASSESSMENT & PLAN NOTE
Hold lisinopril in the setting of dehydration  Follow BP closely and allow for permissive hypertension overnight while undergoing stroke / TIA workup

## 2019-11-26 NOTE — ED NOTES
Patients daughter left and states that she will be coming back  Call bell within reach       Lazaro Green RN  11/26/19 0261

## 2019-11-26 NOTE — ASSESSMENT & PLAN NOTE
Mild, likely hypovolemic, hyponatremia due to GI losses with nausea and vomiting  Will give IV fluid hydration with normal saline for now, encourage PO diet and fluid intake if tolerated  Follow BMP

## 2019-11-26 NOTE — PLAN OF CARE
Problem: Potential for Falls  Goal: Patient will remain free of falls  Description  INTERVENTIONS:  - Assess patient frequently for physical needs  -  Identify cognitive and physical deficits and behaviors that affect risk of falls    -  [place] fall precautions as indicated by assessment   - Educate patient/family on patient safety including physical limitations  - Instruct patient to call for assistance with activity based on assessment  - Modify environment to reduce risk of injury  - Consider OT/PT consult to assist with strengthening/mobility  Outcome: Progressing     Problem: PAIN - ADULT  Goal: Verbalizes/displays adequate comfort level or baseline comfort level  Description  Interventions:  - Encourage patient to monitor pain and request assistance  - Assess pain using appropriate pain scale  - Administer analgesics based on type and severity of pain and evaluate response  - Implement non-pharmacological measures as appropriate and evaluate response  - Consider cultural and social influences on pain and pain management  - Notify physician/advanced practitioner if interventions unsuccessful or patient reports new pain  Outcome: Progressing     Problem: INFECTION - ADULT  Goal: Absence or prevention of progression during hospitalization  Description  INTERVENTIONS:  - Assess and monitor for signs and symptoms of infection  - Monitor lab/diagnostic results  - Monitor all insertion sites, i e  indwelling lines, tubes, and drains  - Monitor endotracheal if appropriate and nasal secretions for changes in amount and color  - [place] appropriate cooling/warming therapies per order  - Administer medications as ordered  - Instruct and encourage patient and family to use good hand hygiene technique  - Identify and instruct in appropriate isolation precautions for identified infection/condition  Outcome: Progressing     Problem: SAFETY ADULT  Goal: Patient will remain free of falls  Description  INTERVENTIONS:  - Assess patient frequently for physical needs  -  Identify cognitive and physical deficits and behaviors that affect risk of falls    -  [place] fall precautions as indicated by assessment   - Educate patient/family on patient safety including physical limitations  - Instruct patient to call for assistance with activity based on assessment  - Modify environment to reduce risk of injury  - Consider OT/PT consult to assist with strengthening/mobility  Outcome: Progressing  Goal: Maintain or return to baseline ADL function  Description  INTERVENTIONS:  -  Assess patient's ability to carry out ADLs; assess patient's baseline for ADL function and identify physical deficits which impact ability to perform ADLs (bathing, care of mouth/teeth, toileting, grooming, dressing, etc )  - Assess/evaluate cause of self-care deficits   - Assess range of motion  - Assess patient's mobility; develop plan if impaired  - Assess patient's need for assistive devices and provide as appropriate  - Encourage maximum independence but intervene and supervise when necessary  - Involve family in performance of ADLs  - Assess for home care needs following discharge   - Consider OT consult to assist with ADL evaluation and planning for discharge  - Provide patient education as appropriate  Outcome: Progressing  Goal: Maintain or return mobility status to optimal level  Description  INTERVENTIONS:  - Assess patient's baseline mobility status (ambulation, transfers, stairs, etc )    - Identify cognitive and physical deficits and behaviors that affect mobility  - Identify mobility aids required to assist with transfers and/or ambulation (gait belt, sit-to-stand, lift, walker, cane, etc )  - [place] fall precautions as indicated by assessment  - Record patient progress and toleration of activity level on Mobility SBAR; progress patient to next Phase/Stage  - Instruct patient to call for assistance with activity based on assessment  - Consider rehabilitation consult to assist with strengthening/weightbearing, etc   Outcome: Progressing     Problem: DISCHARGE PLANNING  Goal: Discharge to home or other facility with appropriate resources  Description  INTERVENTIONS:  - Identify barriers to discharge w/patient and caregiver  - Arrange for needed discharge resources and transportation as appropriate  - Identify discharge learning needs (meds, wound care, etc )  - Arrange for interpretive services to assist at discharge as needed  - Refer to Case Management Department for coordinating discharge planning if the patient needs post-hospital services based on physician/advanced practitioner order or complex needs related to functional status, cognitive ability, or social support system  Outcome: Progressing     Problem: Knowledge Deficit  Goal: Patient/family/caregiver demonstrates understanding of disease process, treatment plan, medications, and discharge instructions  Description  Complete learning assessment and assess knowledge base    Interventions:  - Provide teaching at level of understanding  - Provide teaching via preferred learning methods  Outcome: Progressing     Problem: NEUROSENSORY - ADULT  Goal: Achieves stable or improved neurological status  Description  INTERVENTIONS  - Monitor and report changes in neurological status  - Monitor vital signs such as temperature, blood pressure, glucose, and any other labs ordered   - Initiate measures to prevent increased intracranial pressure  - Monitor for seizure activity and implement precautions if appropriate      Outcome: Progressing  Goal: Remains free of injury related to seizures activity  Description  INTERVENTIONS  - Maintain airway, patient safety  and administer oxygen as ordered  - Monitor patient for seizure activity, document and report duration and description of seizure to physician/advanced practitioner  - If seizure occurs,  ensure patient safety during seizure  - Reorient patient post seizure  - Seizure pads on all 4 side rails  - Instruct patient/family to notify RN of any seizure activity including if an aura is experienced  - Instruct patient/family to call for assistance with activity based on nursing assessment  - Administer anti-seizure medications if ordered    Outcome: Progressing  Goal: Achieves maximal functionality and self care  Description  INTERVENTIONS  - Monitor swallowing and airway patency with patient fatigue and changes in neurological status  - Encourage and assist patient to increase activity and self care     - Encourage visually impaired, hearing impaired and aphasic patients to use assistive/communication devices  Outcome: Progressing     Problem: CARDIOVASCULAR - ADULT  Goal: Maintains optimal cardiac output and hemodynamic stability  Description  INTERVENTIONS:  - Monitor I/O, vital signs and rhythm  - Monitor for S/S and trends of decreased cardiac output  - Administer and titrate ordered vasoactive medications to optimize hemodynamic stability  - Assess quality of pulses, skin color and temperature  - Assess for signs of decreased coronary artery perfusion  - Instruct patient to report change in severity of symptoms  Outcome: Progressing  Goal: Absence of cardiac dysrhythmias or at baseline rhythm  Description  INTERVENTIONS:  - Continuous cardiac monitoring, vital signs, obtain 12 lead EKG if ordered  - Administer antiarrhythmic and heart rate control medications as ordered  - Monitor electrolytes and administer replacement therapy as ordered  Outcome: Progressing     Problem: GASTROINTESTINAL - ADULT  Goal: Minimal or absence of nausea and/or vomiting  Description  INTERVENTIONS:  - Administer IV fluids if ordered to ensure adequate hydration  - Maintain NPO status until nausea and vomiting are resolved  - Nasogastric tube if ordered  - Administer ordered antiemetic medications as needed  - Provide nonpharmacologic comfort measures as appropriate  - Advance diet as tolerated, if ordered  - Consider nutrition services referral to assist patient with adequate nutrition and appropriate food choices  Outcome: Progressing  Goal: Maintains or returns to baseline bowel function  Description  INTERVENTIONS:  - Assess bowel function  - Encourage oral fluids to ensure adequate hydration  - Administer IV fluids if ordered to ensure adequate hydration  - Administer ordered medications as needed  - Encourage mobilization and activity  - Consider nutritional services referral to assist patient with adequate nutrition and appropriate food choices  Outcome: Progressing  Goal: Maintains adequate nutritional intake  Description  INTERVENTIONS:  - Monitor percentage of each meal consumed  - Identify factors contributing to decreased intake, treat as appropriate  - Assist with meals as needed  - Monitor I&O, weight, and lab values if indicated  - Obtain nutrition services referral as needed  Outcome: Progressing

## 2019-11-27 ENCOUNTER — APPOINTMENT (INPATIENT)
Dept: NON INVASIVE DIAGNOSTICS | Facility: HOSPITAL | Age: 79
DRG: 149 | End: 2019-11-27
Payer: MEDICARE

## 2019-11-27 VITALS
OXYGEN SATURATION: 93 % | BODY MASS INDEX: 31.56 KG/M2 | WEIGHT: 171.52 LBS | HEIGHT: 62 IN | TEMPERATURE: 98 F | DIASTOLIC BLOOD PRESSURE: 69 MMHG | SYSTOLIC BLOOD PRESSURE: 126 MMHG | HEART RATE: 71 BPM | RESPIRATION RATE: 15 BRPM

## 2019-11-27 PROBLEM — H65.92 LEFT NON-SUPPURATIVE OTITIS MEDIA: Status: ACTIVE | Noted: 2019-11-27

## 2019-11-27 LAB
ANION GAP SERPL CALCULATED.3IONS-SCNC: 5 MMOL/L (ref 4–13)
BUN SERPL-MCNC: 9 MG/DL (ref 5–25)
CALCIUM SERPL-MCNC: 8.6 MG/DL (ref 8.3–10.1)
CHLORIDE SERPL-SCNC: 107 MMOL/L (ref 100–108)
CHOLEST SERPL-MCNC: 169 MG/DL (ref 50–200)
CO2 SERPL-SCNC: 32 MMOL/L (ref 21–32)
CREAT SERPL-MCNC: 0.76 MG/DL (ref 0.6–1.3)
ERYTHROCYTE [DISTWIDTH] IN BLOOD BY AUTOMATED COUNT: 13.1 % (ref 11.6–15.1)
EST. AVERAGE GLUCOSE BLD GHB EST-MCNC: 151 MG/DL
GFR SERPL CREATININE-BSD FRML MDRD: 75 ML/MIN/1.73SQ M
GLUCOSE SERPL-MCNC: 110 MG/DL (ref 65–140)
GLUCOSE SERPL-MCNC: 114 MG/DL (ref 65–140)
GLUCOSE SERPL-MCNC: 152 MG/DL (ref 65–140)
HBA1C MFR BLD: 6.9 % (ref 4.2–6.3)
HCT VFR BLD AUTO: 39.7 % (ref 34.8–46.1)
HDLC SERPL-MCNC: 52 MG/DL
HGB BLD-MCNC: 12.4 G/DL (ref 11.5–15.4)
LDLC SERPL CALC-MCNC: 99 MG/DL (ref 0–100)
MAGNESIUM SERPL-MCNC: 2.2 MG/DL (ref 1.6–2.6)
MCH RBC QN AUTO: 28.5 PG (ref 26.8–34.3)
MCHC RBC AUTO-ENTMCNC: 31.2 G/DL (ref 31.4–37.4)
MCV RBC AUTO: 91 FL (ref 82–98)
PLATELET # BLD AUTO: 234 THOUSANDS/UL (ref 149–390)
PMV BLD AUTO: 10.8 FL (ref 8.9–12.7)
POTASSIUM SERPL-SCNC: 4.1 MMOL/L (ref 3.5–5.3)
RBC # BLD AUTO: 4.35 MILLION/UL (ref 3.81–5.12)
SODIUM SERPL-SCNC: 144 MMOL/L (ref 136–145)
TRIGL SERPL-MCNC: 92 MG/DL
WBC # BLD AUTO: 8.43 THOUSAND/UL (ref 4.31–10.16)

## 2019-11-27 PROCEDURE — 97163 PT EVAL HIGH COMPLEX 45 MIN: CPT

## 2019-11-27 PROCEDURE — G8979 MOBILITY GOAL STATUS: HCPCS

## 2019-11-27 PROCEDURE — G8978 MOBILITY CURRENT STATUS: HCPCS

## 2019-11-27 PROCEDURE — 99239 HOSP IP/OBS DSCHRG MGMT >30: CPT | Performed by: PHYSICIAN ASSISTANT

## 2019-11-27 PROCEDURE — 94664 DEMO&/EVAL PT USE INHALER: CPT

## 2019-11-27 PROCEDURE — 94760 N-INVAS EAR/PLS OXIMETRY 1: CPT

## 2019-11-27 PROCEDURE — 97530 THERAPEUTIC ACTIVITIES: CPT

## 2019-11-27 PROCEDURE — 80061 LIPID PANEL: CPT | Performed by: PHYSICIAN ASSISTANT

## 2019-11-27 PROCEDURE — 93306 TTE W/DOPPLER COMPLETE: CPT | Performed by: INTERNAL MEDICINE

## 2019-11-27 PROCEDURE — 97166 OT EVAL MOD COMPLEX 45 MIN: CPT

## 2019-11-27 PROCEDURE — 82948 REAGENT STRIP/BLOOD GLUCOSE: CPT

## 2019-11-27 PROCEDURE — G8988 SELF CARE GOAL STATUS: HCPCS

## 2019-11-27 PROCEDURE — 85027 COMPLETE CBC AUTOMATED: CPT | Performed by: PHYSICIAN ASSISTANT

## 2019-11-27 PROCEDURE — 93306 TTE W/DOPPLER COMPLETE: CPT

## 2019-11-27 PROCEDURE — 97112 NEUROMUSCULAR REEDUCATION: CPT

## 2019-11-27 PROCEDURE — G8987 SELF CARE CURRENT STATUS: HCPCS

## 2019-11-27 PROCEDURE — 83036 HEMOGLOBIN GLYCOSYLATED A1C: CPT | Performed by: PHYSICIAN ASSISTANT

## 2019-11-27 PROCEDURE — 80048 BASIC METABOLIC PNL TOTAL CA: CPT | Performed by: PHYSICIAN ASSISTANT

## 2019-11-27 PROCEDURE — 83735 ASSAY OF MAGNESIUM: CPT | Performed by: PHYSICIAN ASSISTANT

## 2019-11-27 RX ORDER — AMOXICILLIN AND CLAVULANATE POTASSIUM 875; 125 MG/1; MG/1
1 TABLET, FILM COATED ORAL EVERY 12 HOURS SCHEDULED
Status: DISCONTINUED | OUTPATIENT
Start: 2019-11-27 | End: 2019-11-27 | Stop reason: HOSPADM

## 2019-11-27 RX ORDER — ALBUTEROL SULFATE 2.5 MG/3ML
2.5 SOLUTION RESPIRATORY (INHALATION) EVERY 4 HOURS PRN
Status: DISCONTINUED | OUTPATIENT
Start: 2019-11-27 | End: 2019-11-27 | Stop reason: HOSPADM

## 2019-11-27 RX ORDER — AMOXICILLIN AND CLAVULANATE POTASSIUM 875; 125 MG/1; MG/1
1 TABLET, FILM COATED ORAL EVERY 12 HOURS SCHEDULED
Qty: 12 TABLET | Refills: 0 | Status: SHIPPED | OUTPATIENT
Start: 2019-11-27 | End: 2019-12-03

## 2019-11-27 RX ORDER — FLUTICASONE PROPIONATE 50 MCG
1 SPRAY, SUSPENSION (ML) NASAL DAILY
Status: DISCONTINUED | OUTPATIENT
Start: 2019-11-27 | End: 2019-11-27 | Stop reason: HOSPADM

## 2019-11-27 RX ORDER — MECLIZINE HYDROCHLORIDE 25 MG/1
25 TABLET ORAL EVERY 8 HOURS PRN
Status: DISCONTINUED | OUTPATIENT
Start: 2019-11-27 | End: 2019-11-27 | Stop reason: HOSPADM

## 2019-11-27 RX ORDER — FLUTICASONE PROPIONATE 50 MCG
1 SPRAY, SUSPENSION (ML) NASAL DAILY
Qty: 1 BOTTLE | Refills: 0 | Status: SHIPPED | OUTPATIENT
Start: 2019-11-27 | End: 2021-04-26

## 2019-11-27 RX ORDER — MECLIZINE HYDROCHLORIDE 25 MG/1
25 TABLET ORAL EVERY 8 HOURS PRN
Qty: 12 TABLET | Refills: 0 | Status: SHIPPED | OUTPATIENT
Start: 2019-11-27 | End: 2019-12-03 | Stop reason: SDUPTHER

## 2019-11-27 RX ADMIN — OXYBUTYNIN CHLORIDE 5 MG: 5 TABLET, EXTENDED RELEASE ORAL at 08:50

## 2019-11-27 RX ADMIN — FLUTICASONE PROPIONATE 1 SPRAY: 50 SPRAY, METERED NASAL at 16:26

## 2019-11-27 RX ADMIN — AMOXICILLIN AND CLAVULANATE POTASSIUM 1 TABLET: 875; 125 TABLET, FILM COATED ORAL at 16:26

## 2019-11-27 RX ADMIN — ASPIRIN 81 MG 81 MG: 81 TABLET ORAL at 08:49

## 2019-11-27 RX ADMIN — PANTOPRAZOLE SODIUM 20 MG: 20 TABLET, DELAYED RELEASE ORAL at 05:01

## 2019-11-27 RX ADMIN — GABAPENTIN 300 MG: 300 CAPSULE ORAL at 08:49

## 2019-11-27 RX ADMIN — ENOXAPARIN SODIUM 40 MG: 40 INJECTION SUBCUTANEOUS at 08:49

## 2019-11-27 RX ADMIN — MECLIZINE HYDROCHLORIDE 25 MG: 25 TABLET ORAL at 11:26

## 2019-11-27 RX ADMIN — FLUTICASONE FUROATE AND VILANTEROL TRIFENATATE 1 PUFF: 200; 25 POWDER RESPIRATORY (INHALATION) at 08:50

## 2019-11-27 RX ADMIN — INSULIN LISPRO 1 UNITS: 100 INJECTION, SOLUTION INTRAVENOUS; SUBCUTANEOUS at 11:29

## 2019-11-27 RX ADMIN — SODIUM CHLORIDE 75 ML/HR: 0.9 INJECTION, SOLUTION INTRAVENOUS at 08:55

## 2019-11-27 NOTE — PLAN OF CARE
Problem: PHYSICAL THERAPY ADULT  Goal: Performs mobility at highest level of function for planned discharge setting  See evaluation for individualized goals  Description  Treatment/Interventions: Functional transfer training, LE strengthening/ROM, Therapeutic exercise, Endurance training, Bed mobility, Gait training(Neuro re-ed)          See flowsheet documentation for full assessment, interventions and recommendations  Note:   Prognosis: Good  Problem List: Decreased strength, Decreased endurance, Impaired balance, Impaired vision, Decreased mobility  Assessment: Patient is a 78 y o  female evaluated by Physical Therapy s/p admit to [address] [place] [address],[address] on 11/26/2019 with admitting diagnosis of: Hypokalemia, Hypomagnesemia, Dizziness, Vomiting, Nausea & vomiting, Abnormal CT scan of head, Headache, and principal problem of: Dizziness  PT was consulted to assess patient's functional mobility and discharge needs  Ordered are PT Evaluation and treatment with activity level of: up with assistance  Comorbidities affecting patient's physical performance at time of assessment include: COPD, CM, hyperlipidemia, HTN, chronic diastolic congestive heart failure, spinal stenosis, neuropathy  Personal factors affecting the patient at time of IE include: ambulating with assistive device and inability/difficulty performing IADLs  Please locate objective findings from PT assessment regarding body systems outlined above  Upon evaluation, pt severely limited by dizziness  Pt reports feeling dizzy for the past few days  The dizziness is described as spinning, rather than lightheadedness  At rest, pt does demonstrate both L and R beating nystagmus  Left [place] positive and pt tolerated Epley maneuver fairly well, however pt unable to tolerate right [employer]  Pt did not experience any relief or change in symptoms from Epley maneuver and nystagmus continued   Pt would benefit from continued PT intervention during LOS to monitor vestibular status, maintain safe functional mobility, and facilitate safe d/c home when medically appropriate  D/c recommendation is outpatient vestibular PT  Barriers to Discharge: None     Recommendation: Outpatient PT(Vestibular)          See flowsheet documentation for full assessment

## 2019-11-27 NOTE — PLAN OF CARE
Problem: OCCUPATIONAL THERAPY ADULT  Goal: Performs self-care activities at highest level of function for planned discharge setting  See evaluation for individualized goals  Description  Treatment Interventions: ADL retraining, Functional transfer training, UE strengthening/ROM, Endurance training, Patient/family training, Activityengagement          See flowsheet documentation for full assessment, interventions and recommendations  Note:   Limitation: Decreased ADL status, Decreased UE strength, Decreased Safe judgement during ADL, Decreased endurance, Decreased self-care trans, Decreased high-level ADLs     Assessment: Pt is a 78 y o  female seen for OT evaluation s/p admit to [facility] on 11/26/2019 w/ Dizziness  Comorbidities affecting pt's functional performance at time of assessment include: DM, HTN, obesity, COPD and pneumonia, hyperlipidemia, allergies  Personal factors affecting pt at time of IE include:steps to enter environment, difficulty performing ADLS, difficulty performing IADLS  and health management   Prior to admission, pt was (I) with ADLs and (A) with IADLs with use of no device during functional mobility  Upon evaluation: Pt requires (S)-min (A) with no device during functional mobility 2* the following deficits impacting occupational performance: weakness, decreased strength, decreased balance, decreased tolerance, impaired initiation and impaired interpersonal skills  Pt to benefit from continued skilled OT tx while in the hospital to address deficits as defined above and maximize level of functional independence w ADL's and functional mobility  Occupational Performance areas to address include: grooming, bathing/shower, toilet hygiene, dressing, functional mobility, community mobility and clothing management  From OT standpoint, recommendation at time of d/c would be home with OP PT follow up due to vestibular symptoms        OT Discharge Recommendation: Other (Comment)(OP PT)

## 2019-11-27 NOTE — DISCHARGE SUMMARY
Discharge- Karli Avon 1940, 78 y o  female MRN: [id number]    Unit/Bed#: 425-01 Encounter: [id number]    Primary Care Provider: Ilan Camacho PA-C   Date and time admitted to hospital: 11/26/2019 10:41 AM        * Dizziness  Assessment & Plan  Patient presents with acute onset of dizziness, which appears to be vertigo/BPPV, which lead to nausea and vomiting  She also notes a headache  All symptoms have since improved with treatment in the ER  CTA head and neck showed possible high grade stenosis of the left vertebral artery  Placed on observation status  Telemetry monitoring unremarkable  Follow stroke pathway with frequent neurologic assessments  High grade statin therapy  Maintain tight diabetic control  Echocardiogram unremarkable  MRI/MRA showed no stroke or vascular abnormalities  Symptoms greatly improved with meclizine and IV fluid hydration  Will continue meclizine as needed on outpatient basis  More thorough examination revealed patient also has a left otitis media  This is likely contributing to her vertigo and should improve with treatment  Hypokalemia  Assessment & Plan  Potassium is 3 1 on admission, now resolved,  likely due to dehydration from vomiting  Given jazlyn IVF hydration and given 1 time dose of potassium supplementation in ER  Hypernatremia  Assessment & Plan  Resolved  Mild, likely hypovolemic, hyponatremia due to GI losses with nausea and vomiting  Given IV fluid hydration with normal saline for now, encourage PO diet and fluid intake if tolerated  Abnormal computed tomography angiography (CTA)  Assessment & Plan  Abnormal CTA of the head and neck:      Mild cervical atherosclerotic disease without hemodynamically significant carotid stenosis by NASCET criteria  Poor visualization of the left vertebral artery origin raising the question of focal high-grade stenosis    Remaining cervical vertebral circulation is unremarkable, bilaterally  Correlate for signs and symptoms of vertebrobasilar insufficiency  No large vessel occlusion, dissection or aneurysm  Centrilobular emphysema  Due to Possible high grade vetebral artery stenosis - obtaind more detailed view with MRI / MRA of the head - the results showed no vascular abnormalities  No further inpatient workup warranted  Left non-suppurative otitis media  Assessment & Plan  Will treat with PO augmentin BID for 7 day course  Add flonase due to sinus congestion  May benefit from outpatient ENT referral pending re-evaluation and resolution of vertigo and tinnitus  Hypomagnesemia  Assessment & Plan  Severe at 1 1  In the setting of nausea and vomiting  Repeat level this AM was 2 2 following IV magnesium  Chronic diastolic congestive heart failure (HCC)  Assessment & Plan  Wt Readings from Last 3 Encounters:   11/26/19 77 8 kg (171 lb 8 3 oz)   10/23/19 79 5 kg (175 lb 3 2 oz)   07/11/19 80 2 kg (176 lb 12 8 oz)       CHF noted in medical record, likely diastolic based on prior echo from 2018 showing mild diastolic dysfunction  Essential hypertension  Assessment & Plan  Hold lisinopril in the setting of dehydration, now resolved  Ok to restart lisinopril on discharge  Hyperlipidemia, unspecified  Assessment & Plan  Increased dose of lipitor from 20mg to high intensity dose of 40mg daily temporarily while following stroke pathway  Patient's lipid panel however shows controlled LDL of 99 therefore okay to continue prior dose of 20 mg daily  Type 2 diabetes mellitus with hyperglycemia, without long-term current use of insulin [facility])  Assessment & Plan  Lab Results   Component Value Date    HGBA1C 6 9 (H) 11/27/2019       Recent Labs     11/26/19  1819 11/26/19  2155 11/27/19  0735 11/27/19  1128   POCGLU 211* 121 114 152*       Blood Sugar Average: Last 72 hrs:  (P) 149 5   A1c recently checked at 6 7     Held metformin in the setting of nausea and vomiting, now ok to continue on discharge  Diabetic diet      COPD (chronic obstructive pulmonary disease) ([facility])  Assessment & Plan  Stable, no acute exacerbation  Continue advair  Respiratory protocol  Acid reflux disease  Assessment & Plan  Continue PPI  Discharging Physician / Practitioner: Brandon Barnhart PA-C  PCP: Luiz Reyes PA-C  Admission Date:   Admission Orders (From admission, onward)     Ordered        11/26/19 1551  Inpatient Admission  Once                   Discharge Date: 11/27/19    Resolved Problems  Date Reviewed: 11/27/2019    None          Consultations During Hospital Stay:  · PT/OT - noted findings consistent with severe BPPV  Procedures Performed:   · none    Significant Findings / Test Results:   Cta Head And Neck With And Without Contrast  Result Date: 11/26/2019  Impression: Mild cervical atherosclerotic disease without hemodynamically significant carotid stenosis by NASCET criteria  Poor visualization of the left vertebral artery origin raising the question of focal high-grade stenosis  Remaining cervical vertebral circulation is unremarkable, bilaterally  Correlate for signs and symptoms of vertebrobasilar insufficiency  No large vessel occlusion, dissection or aneurysm  Centrilobular emphysema  Workstation performed: [id number]     Xr Chest 2 Views  Result Date: 11/26/2019  Impression: No acute cardiopulmonary disease  Workstation performed: [id number]     Mra Head Wo Contrast  Result Date: 11/26/2019  Impression: No stenosis or occlusion of the major vessels of the [place]  Workstation performed: [id number]     Mri Brain Wo Contrast  Result Date: 11/26/2019  Impression: Mild microangiopathic disease  No evidence of acute infarct, hemorrhage or mass   Workstation performed: [id number]       Incidental Findings:   · none    Test Results Pending at Discharge (will require follow up):   · none     Outpatient Tests Requested:  · none    Complications:  none    Reason for Admission: Dizziness    Hospital Course:     Delilah Varma is a 78 y o  female patient who originally presented to the hospital on 11/26/2019 due to Vomiting (Pt states she has had several episodes of vomiting since this morning, 1 episode of diarrhea, and dizziness when she stands up  )    The patient was placed observation status and stroke protocol was initiated  MRI and MRA of the brain was obtained due to abnormal CTA  This was unremarkable  Echocardiogram was unremarkable as well  Patient had stable telemetry monitoring throughout her stay  Overall her vertigo symptoms greatly improved with the administration of meclizine, she noted about 90% symptom improvement  On further examination, a discovered exam findings consistent with a left otitis media  The patient will therefore be discharged home on prescription for meclizine, Augmentin, and Flonase  Please see above list of diagnoses and related plan for additional information  Condition at Discharge: good     Discharge Day Visit / Exam:     Subjective:  Patient is feeling better today following meclizine administration  Symptoms are about 90% improved  No further episodes of vomiting  She is tolerating her diet well  Vitals: Blood Pressure: 126/68 (11/27/19 1148)  Pulse: 66 (11/27/19 1148)  Temperature: 98 °F (36 7 °C) (11/27/19 0726)  Temp Source: Oral (11/27/19 0726)  Respirations: 18 (11/27/19 1148)  Height: 5' 2" (157 5 cm) (11/26/19 1629)  Weight - Scale: 77 8 kg (171 lb 8 3 oz) (11/26/19 1629)  SpO2: 98 % (11/27/19 1148)  Exam:   Physical Exam   Constitutional: She is oriented to person, place, and time  She appears well-developed and well-nourished  HENT:   Head: Normocephalic  Right Ear: Tympanic membrane and ear canal normal    Left Ear: Ear canal normal  Tympanic membrane is injected (with mild bulging of TM and dull appearance  )     Mouth/Throat: Oropharynx is clear and moist    Cardiovascular: Normal rate, regular rhythm, normal heart sounds and intact distal pulses  No murmur heard  Pulmonary/Chest: Effort normal and breath sounds normal  No respiratory distress  Abdominal: Soft  Bowel sounds are normal    Musculoskeletal: She exhibits no edema  Neurological: She is alert and oriented to person, place, and time  No cranial nerve deficit  Psychiatric: She has a normal mood and affect  Her behavior is normal    Nursing note and vitals reviewed  Discharge instructions/Information to patient and family:   See after visit summary for information provided to patient and family  Provisions for Follow-Up Care:  See after visit summary for information related to follow-up care and any pertinent home health orders  Disposition:     Home        Planned Readmission: no     Discharge Statement:  I spent 45 minutes discharging the patient  This time was spent on the day of discharge  I had direct contact with the patient on the day of discharge  Greater than 50% of the total time was spent examining patient, answering all patient questions, arranging and discussing plan of care with patient as well as directly providing post-discharge instructions  Additional time then spent on discharge activities  Discharge Medications:  See after visit summary for reconciled discharge medications provided to patient and family        ** Please Note: This note has been constructed using a voice recognition system **

## 2019-11-27 NOTE — NURSING NOTE
Patient wheeled off unit via wheelchair by RN  Discharge instructions reviewed, verbal understanding of same  Patient in no acute distress

## 2019-11-27 NOTE — PLAN OF CARE
Problem: Potential for Falls  Goal: Patient will remain free of falls  Description  INTERVENTIONS:  - Assess patient frequently for physical needs  -  Identify cognitive and physical deficits and behaviors that affect risk of falls    -  [place] fall precautions as indicated by assessment   - Educate patient/family on patient safety including physical limitations  - Instruct patient to call for assistance with activity based on assessment  - Modify environment to reduce risk of injury  - Consider OT/PT consult to assist with strengthening/mobility  Outcome: Progressing     Problem: PAIN - ADULT  Goal: Verbalizes/displays adequate comfort level or baseline comfort level  Description  Interventions:  - Encourage patient to monitor pain and request assistance  - Assess pain using appropriate pain scale  - Administer analgesics based on type and severity of pain and evaluate response  - Implement non-pharmacological measures as appropriate and evaluate response  - Consider cultural and social influences on pain and pain management  - Notify physician/advanced practitioner if interventions unsuccessful or patient reports new pain  Outcome: Progressing     Problem: INFECTION - ADULT  Goal: Absence or prevention of progression during hospitalization  Description  INTERVENTIONS:  - Assess and monitor for signs and symptoms of infection  - Monitor lab/diagnostic results  - Monitor all insertion sites, i e  indwelling lines, tubes, and drains  - Monitor endotracheal if appropriate and nasal secretions for changes in amount and color  - [place] appropriate cooling/warming therapies per order  - Administer medications as ordered  - Instruct and encourage patient and family to use good hand hygiene technique  - Identify and instruct in appropriate isolation precautions for identified infection/condition  Outcome: Progressing     Problem: SAFETY ADULT  Goal: Patient will remain free of falls  Description  INTERVENTIONS:  - Assess patient frequently for physical needs  -  Identify cognitive and physical deficits and behaviors that affect risk of falls    -  [place] fall precautions as indicated by assessment   - Educate patient/family on patient safety including physical limitations  - Instruct patient to call for assistance with activity based on assessment  - Modify environment to reduce risk of injury  - Consider OT/PT consult to assist with strengthening/mobility  Outcome: Progressing  Goal: Maintain or return to baseline ADL function  Description  INTERVENTIONS:  -  Assess patient's ability to carry out ADLs; assess patient's baseline for ADL function and identify physical deficits which impact ability to perform ADLs (bathing, care of mouth/teeth, toileting, grooming, dressing, etc )  - Assess/evaluate cause of self-care deficits   - Assess range of motion  - Assess patient's mobility; develop plan if impaired  - Assess patient's need for assistive devices and provide as appropriate  - Encourage maximum independence but intervene and supervise when necessary  - Involve family in performance of ADLs  - Assess for home care needs following discharge   - Consider OT consult to assist with ADL evaluation and planning for discharge  - Provide patient education as appropriate  Outcome: Progressing  Goal: Maintain or return mobility status to optimal level  Description  INTERVENTIONS:  - Assess patient's baseline mobility status (ambulation, transfers, stairs, etc )    - Identify cognitive and physical deficits and behaviors that affect mobility  - Identify mobility aids required to assist with transfers and/or ambulation (gait belt, sit-to-stand, lift, walker, cane, etc )  - [place] fall precautions as indicated by assessment  - Record patient progress and toleration of activity level on Mobility SBAR; progress patient to next Phase/Stage  - Instruct patient to call for assistance with activity based on assessment  - Consider rehabilitation consult to assist with strengthening/weightbearing, etc   Outcome: Progressing     Problem: DISCHARGE PLANNING  Goal: Discharge to home or other facility with appropriate resources  Description  INTERVENTIONS:  - Identify barriers to discharge w/patient and caregiver  - Arrange for needed discharge resources and transportation as appropriate  - Identify discharge learning needs (meds, wound care, etc )  - Arrange for interpretive services to assist at discharge as needed  - Refer to Case Management Department for coordinating discharge planning if the patient needs post-hospital services based on physician/advanced practitioner order or complex needs related to functional status, cognitive ability, or social support system  Outcome: Progressing     Problem: Knowledge Deficit  Goal: Patient/family/caregiver demonstrates understanding of disease process, treatment plan, medications, and discharge instructions  Description  Complete learning assessment and assess knowledge base    Interventions:  - Provide teaching at level of understanding  - Provide teaching via preferred learning methods  Outcome: Progressing     Problem: NEUROSENSORY - ADULT  Goal: Achieves stable or improved neurological status  Description  INTERVENTIONS  - Monitor and report changes in neurological status  - Monitor vital signs such as temperature, blood pressure, glucose, and any other labs ordered   - Initiate measures to prevent increased intracranial pressure  - Monitor for seizure activity and implement precautions if appropriate      Outcome: Progressing  Goal: Remains free of injury related to seizures activity  Description  INTERVENTIONS  - Maintain airway, patient safety  and administer oxygen as ordered  - Monitor patient for seizure activity, document and report duration and description of seizure to physician/advanced practitioner  - If seizure occurs,  ensure patient safety during seizure  - Reorient patient post seizure  - Seizure pads on all 4 side rails  - Instruct patient/family to notify RN of any seizure activity including if an aura is experienced  - Instruct patient/family to call for assistance with activity based on nursing assessment  - Administer anti-seizure medications if ordered    Outcome: Progressing  Goal: Achieves maximal functionality and self care  Description  INTERVENTIONS  - Monitor swallowing and airway patency with patient fatigue and changes in neurological status  - Encourage and assist patient to increase activity and self care     - Encourage visually impaired, hearing impaired and aphasic patients to use assistive/communication devices  Outcome: Progressing     Problem: CARDIOVASCULAR - ADULT  Goal: Maintains optimal cardiac output and hemodynamic stability  Description  INTERVENTIONS:  - Monitor I/O, vital signs and rhythm  - Monitor for S/S and trends of decreased cardiac output  - Administer and titrate ordered vasoactive medications to optimize hemodynamic stability  - Assess quality of pulses, skin color and temperature  - Assess for signs of decreased coronary artery perfusion  - Instruct patient to report change in severity of symptoms  Outcome: Progressing  Goal: Absence of cardiac dysrhythmias or at baseline rhythm  Description  INTERVENTIONS:  - Continuous cardiac monitoring, vital signs, obtain 12 lead EKG if ordered  - Administer antiarrhythmic and heart rate control medications as ordered  - Monitor electrolytes and administer replacement therapy as ordered  Outcome: Progressing     Problem: GASTROINTESTINAL - ADULT  Goal: Minimal or absence of nausea and/or vomiting  Description  INTERVENTIONS:  - Administer IV fluids if ordered to ensure adequate hydration  - Maintain NPO status until nausea and vomiting are resolved  - Nasogastric tube if ordered  - Administer ordered antiemetic medications as needed  - Provide nonpharmacologic comfort measures as appropriate  - Advance diet as tolerated, if ordered  - Consider nutrition services referral to assist patient with adequate nutrition and appropriate food choices  Outcome: Progressing  Goal: Maintains or returns to baseline bowel function  Description  INTERVENTIONS:  - Assess bowel function  - Encourage oral fluids to ensure adequate hydration  - Administer IV fluids if ordered to ensure adequate hydration  - Administer ordered medications as needed  - Encourage mobilization and activity  - Consider nutritional services referral to assist patient with adequate nutrition and appropriate food choices  Outcome: Progressing  Goal: Maintains adequate nutritional intake  Description  INTERVENTIONS:  - Monitor percentage of each meal consumed  - Identify factors contributing to decreased intake, treat as appropriate  - Assist with meals as needed  - Monitor I&O, weight, and lab values if indicated  - Obtain nutrition services referral as needed  Outcome: Progressing

## 2019-11-27 NOTE — OCCUPATIONAL THERAPY NOTE
Occupational Therapy Evaluation     Patient Name: Leelee GONZÁLESDDABRIL Date: 11/27/2019  Problem List  Principal Problem:    Dizziness  Active Problems:    Acid reflux disease    COPD (chronic obstructive pulmonary disease) ([facility])    Type 2 diabetes mellitus with hyperglycemia, without long-term current use of insulin ([facility])    Hyperlipidemia, unspecified    Essential hypertension    Chronic diastolic congestive heart failure ([facility])    Abnormal computed tomography angiography (CTA)    Hypernatremia    Hypokalemia    Hypomagnesemia    Past Medical History  Past Medical History:   Diagnosis Date    Allergic     [facility]ch[facility]) [facility] )     Diabetes mellitus ([facility] )     GERD (gastroesophageal reflux disease)     Hyperlipidemia     Hypertension     Pneumonia     Pneumonia of right lower lobe due to infectious organism ([facility] ) 12/4/2018    Vitamin D deficiency      Past Surgical History  Past Surgical History:   Procedure Laterality Date    CHOLECYSTECTOMY  1984    COLONOSCOPY N/A 1/30/2019    Procedure: COLONOSCOPY with multiple  polypectomies;  Surgeon: Taylor Marie MD;  Location: [address] [place] [address] GI LAB; Service: Gastroenterology    EYE SURGERY Bilateral     CATARACT             11/27/19 1010   Note Type   Note type Eval/Treat   Restrictions/Precautions   Weight Bearing Precautions Per Order No   Other Precautions Fall Risk;O2;Multiple lines;Telemetry   Pain Assessment   Pain Assessment No/denies pain   Home Living   Type of Home Apartment   Home Layout One level;Performs ADLs on one level; Able to live on main level with bedroom/bathroom; [address] Equipment Walker;Cane;Grab bars   Additional Comments pt reports no device during functional mobility; wears O2 "occasionally"   Prior Function   Level of [place] Independent with ADLs and functional mobility; Needs assistance with IADLs   Lives With Alone   Receives Help From Family   ADL Assistance Independent   IADLs Needs assistance   Falls in the last 6 months 0   Vocational Retired   Comments pt's family drives   Psychosocial   Psychosocial (WDL) WDL   Subjective   Subjective "this is the worst feeling"   ADL   Where Assessed Edge of bed   LB Dressing Assistance 2  Maximal Assistance   LB Dressing Deficit Don/doff R sock; Don/doff L sock   Additional Comments pt limited due to forward bend resulting in increased dizziness   Bed Mobility   Supine to Sit 4  Minimal assistance   Additional items Assist x 1;Bedrails; Increased time required;LE management   Sit to Supine 4  Minimal assistance   Additional items Assist x 1;LE management   Additional Comments pt on 1 5L O2 initially; SpO2 96-98%; trialed off O2, no drop in O2 and placed on O2 at end of sesison as pt was on O2 upon entering room; no complaints of SOB   Transfers   Sit to Stand 4  Minimal assistance   Additional items Assist x 1; Increased time required;Verbal cues   Stand to Sit 4  Minimal assistance   Additional items Assist x 1;Verbal cues   Additional Comments pt utilizes handheld (A) for functional transfers this date; increased dizziness present during session with all movement with note to the R side worse than L side   Functional Mobility   Functional Mobility 4  Minimal assistance   Additional Comments x1 with use of handheld (A) due to increased dizziness and decreased stability during session; no complaints of SOB or LOB   Additional items Hand hold assistance   Balance   Static Sitting Fair +   Dynamic Sitting Fair +   Static Standing Fair   Dynamic Standing Fair   Ambulatory Fair -   Activity Tolerance   Activity Tolerance Patient limited by fatigue; Other (Comment)  (dizziness, spinning)   RUE Assessment   RUE Assessment WFL   LUE Assessment   LUE Assessment WFL   Hand Function   Gross Motor Coordination Functional   Fine Motor Coordination Functional   Sensation   Light Touch No apparent deficits   Sharp/Dull No apparent deficits Proprioception   Proprioception No apparent deficits   Vision-Basic Assessment   Current Vision Wears glasses all the time   Patient Visual Report Nausea/blurring vision with head movement   Vision - Complex Assessment   Ocular Range of Motion WFL   Tracking   (dizziness durnig tracking bilaterally increases)   Visual Screen Results Nystagmus in primary gaze   Additional Comments pt with bilateral nystagmus during session; see PT note for vestibular assessment   Perception   Inattention/Neglect Appears intact   Cognition   Overall Cognitive Status WFL   Arousal/Participation Alert   Attention Within functional limits   Orientation Level Oriented X4   Memory Within functional limits   Following Commands Follows all commands and directions without difficulty   Assessment   Limitation Decreased ADL status; Decreased UE strength;Decreased Safe judgement during ADL;Decreased endurance;Decreased self-care trans;Decreased high-level ADLs   Assessment Pt is a 78 y o  female seen for OT evaluation s/p admit to [facility] on 11/26/2019 w/ Dizziness  Comorbidities affecting pt's functional performance at time of assessment include: DM, HTN, obesity, COPD and pneumonia, hyperlipidemia, allergies  Personal factors affecting pt at time of IE include:steps to enter environment, difficulty performing ADLS, difficulty performing IADLS  and health management   Prior to admission, pt was (I) with ADLs and (A) with IADLs with use of no device during functional mobility  Upon evaluation: Pt requires (S)-min (A) with no device during functional mobility 2* the following deficits impacting occupational performance: weakness, decreased strength, decreased balance, decreased tolerance, impaired initiation and impaired interpersonal skills  Pt to benefit from continued skilled OT tx while in the hospital to address deficits as defined above and maximize level of functional independence w ADL's and functional mobility   Occupational Performance areas to address include: grooming, bathing/shower, toilet hygiene, dressing, functional mobility, community mobility and clothing management  From OT standpoint, recommendation at time of d/c would be home with OP PT follow up due to vestibular symptoms  Goals   Patient Goals to not be dizzy   Short Term Goal  pt will perform toilet transfers and hygiene at (I) level    Long Term Goal #1 pt will increase independence with functional transfers and mobility to (I) level wiht no LOB   Long Term Goal #2 pt will demonstrate UB/LB bathing and grooming tasks at (I) level seated in chair    Plan   Treatment Interventions ADL retraining;Functional transfer training;UE strengthening/ROM; Endurance training;Patient/family training; Activityengagement   Goal Expiration Date 01/10/20   OT Frequency 3-5x/wk   Recommendation   OT Discharge Recommendation Other (Comment)  (OP PT)   Barthel Index   Feeding 10   Bathing 0   Grooming Score 0   Dressing Score 5   Bladder Score 10   Bowels Score 10   Toilet Use Score 5   Transfers (Bed/Chair) Score 10   Mobility (Level Surface) Score 10   Stairs Score 0   Barthel Index Score 60     Pt will benefit from continued OT services in order to maximize (I) c ADL performance, FM c no device, and improve overall endurance/strength required to complete functional tasks in preparation for d/c  Pt left supine in bed at end of session; all needs within reach; all lines intact

## 2019-11-27 NOTE — PHYSICAL THERAPY NOTE
Physical Therapy Evaluation    Patient Name: Lucy URIBEICJ Date: 11/27/2019     Problem List  Principal Problem:    Dizziness  Active Problems:    Acid reflux disease    COPD (chronic obstructive pulmonary disease) ([facility])    Type 2 diabetes mellitus with hyperglycemia, without long-term current use of insulin ([facility])    Hyperlipidemia, unspecified    Essential hypertension    Chronic diastolic congestive heart failure ([facility])    Abnormal computed tomography angiography (CTA)    Hypernatremia    Hypokalemia    Hypomagnesemia       Past Medical History  Past Medical History:   Diagnosis Date    Allergic     [facility]     [facility] mellitus ([facility] )     GERD (gastroesophageal reflux disease)     Hyperlipidemia     Hypertension     Pneumonia     Pneumonia of right lower lobe due to infectious organism ([facility] ) 12/4/2018    Vitamin D deficiency         Past Surgical History  Past Surgical History:   Procedure Laterality Date    CHOLECYSTECTOMY  1984    COLONOSCOPY N/A 1/30/2019    Procedure: COLONOSCOPY with multiple  polypectomies;  Surgeon: Hesham Escobar MD;  Location: [facility] GI LAB; Service: Gastroenterology    EYE SURGERY Bilateral     CATARACT           11/27/19 1016   Note Type   Note type Eval/Treat   Pain Assessment   Pain Assessment No/denies pain   Pain Score No Pain   Home Living   Type of Home Apartment   Home Layout Able to live on main level with bedroom/bathroom; Elevator   Home Equipment Walker;Cane   Prior Function   Level of [place] Independent with ADLs and functional mobility; Needs assistance with IADLs   Lives With Alone   Receives Help From Family   ADL Assistance Independent   IADLs Needs assistance   Vocational Retired   Comments family provides transportations; pt also takes bus   Restrictions/Precautions   Wells Viktoria Bearing Precautions Per Order No   Other Precautions Fall Risk;O2;Multiple lines   Cognition   Overall Cognitive Status WFL   Arousal/Participation Alert   Orientation Level Oriented X4   RLE Assessment   RLE Assessment WFL  (tested functionally)   LLE Assessment   LLE Assessment WFL  (tested functionally)   Coordination   Movements are Fluid and Coordinated 1   Sensation WFL   Bed Mobility   Supine to Sit 4  Minimal assistance   Additional items Increased time required;Verbal cues;LE management   Sit to Supine 4  Minimal assistance   Additional items Increased time required;LE management;Verbal cues   Transfers   Sit to Stand 4  Minimal assistance   Additional items Assist x 1; Increased time required;Verbal cues   Stand to Sit 4  Minimal assistance   Additional items Assist x 1; Increased time required;Verbal cues   Ambulation/Elevation   Gait pattern Not appropriate; Not tested  (2* to severe dizziness)   Balance   Static Sitting Fair +   Dynamic Sitting Fair +   Static Standing Fair   Dynamic Standing Fair   Endurance Deficit   Endurance Deficit Yes   Activity Tolerance   Activity Tolerance   (limited by dizziness)   Assessment   Prognosis Good   Problem List Decreased strength;Decreased endurance; Impaired balance; Impaired vision;Decreased mobility   Assessment Patient is a 78 y o  female evaluated by Physical Therapy s/p admit to [address] [place] [address] on 11/26/2019 with admitting diagnosis of: Hypokalemia, Hypomagnesemia, Dizziness, Vomiting, Nausea & vomiting, Abnormal CT scan of head, Headache, and principal problem of: Dizziness  PT was consulted to assess patient's functional mobility and discharge needs  Ordered are PT Evaluation and treatment with activity level of: up with assistance  Comorbidities affecting patient's physical performance at time of assessment include: COPD, CM, hyperlipidemia, HTN, chronic diastolic congestive heart failure, spinal stenosis, neuropathy   Personal factors affecting the patient at time of IE include: ambulating with assistive device and inability/difficulty performing IADLs  Please locate objective findings from PT assessment regarding body systems outlined above  Upon evaluation, pt severely limited by dizziness  Pt reports feeling dizzy for the past few days  The dizziness is described as spinning, rather than lightheadedness  At rest, pt does demonstrate both L and R beating nystagmus  Left Tammi Hallpike positive and pt tolerated Epley maneuver fairly well, however pt unable to tolerate right [employer]  Pt did not experience any relief or change in symptoms from Epley maneuver and nystagmus continued  Pt would benefit from continued PT intervention during LOS to monitor vestibular status, maintain safe functional mobility, and facilitate safe d/c home when medically appropriate  D/c recommendation is outpatient vestibular PT  Barriers to Discharge None   Goals   Patient Goals to make the dizziness stop   LTG Expiration Date 12/11/19   Long Term Goal #1 Pt will perform all functional transfers and ambulation mod(I) with decreased postural sway  Long Term Goal #2 Pt will ambulate 100' with RW and SUP with functional dynamic balance to reflect increased LOF  Plan   Treatment/Interventions Functional transfer training;LE strengthening/ROM; Therapeutic exercise; Endurance training;Bed mobility;Gait training  (Neuro re-ed)   PT Frequency   (3-5x/week)   Recommendation   Recommendation Outpatient PT  (Vestibular)     Pt supine in bed at end of session with all needs in reach

## 2019-11-27 NOTE — SOCIAL WORK
Met with pt to discuss role as  in helping pt to develop discharge plan and to help pt carry out their plan  Pt lives in a 2nd floor apartment alone  PT has an elevator on the outside  Pt has a walker at home which she uses to ambulate  Pt has 02 at 2 5 liters which she uses  continuously from [place] Son  Pt is independent with ADL's   Pt's does the cooking and cleaning  Pt is interested in getting more information on [employer]   I gave her info on same and the phone number  Pt's family drives her to appointments or she takes the [employer] bus  Pt has never had home care or any services in the home  Pt's PCP is Janki Mcneal  Pt uses the [facility] in [facility]  Pt was given a discharge check list and Meds to [facility]  Both reviewed with a good understanding  Will continue to follow for any Case Management needs  Pt might benefit from Home care

## 2019-11-27 NOTE — RESPIRATORY THERAPY NOTE
RT Protocol Note  Christelle Carballo 78 y o  female MRN: [id number]  Unit/Bed#: 425-01 Encounter: [id number]    Assessment    Active Problems:    Acid reflux disease    COPD (chronic obstructive pulmonary disease) ([facility])    Type 2 diabetes mellitus with hyperglycemia, without long-term current use of insulin ([facility])    Dizziness    Hyperlipidemia, unspecified    Essential hypertension    Chronic diastolic congestive heart failure ([facility])    Abnormal computed tomography angiography (CTA)    Hypernatremia    Hypokalemia    Hypomagnesemia      Home Pulmonary Medications:  Pt  Is non compliant with home medications  Past Medical History:   Diagnosis Date    Allergic     COPD [facility]ch[facility]) [facility] )     Diabetes mellitus ([facility])     GERD (gastroesophageal reflux disease)     Hyperlipidemia     Hypertension     Pneumonia     Pneumonia of right lower lobe due to infectious organism ([place] [address] ) 12/4/2018    Vitamin D deficiency      Social History     Socioeconomic History    Marital status:       Spouse name: None    Number of children: None    Years of education: None    Highest education level: None   Occupational History    Occupation: RETIRED   Social Needs    Financial resource strain: None    Food insecurity:     Worry: None     Inability: None    Transportation needs:     Medical: None     Non-medical: None   Tobacco Use    Smoking status: Former Smoker     Years: 50 00     Types: Cigarettes    Smokeless tobacco: Never Used   Substance and Sexual Activity    Alcohol use: Never     Frequency: Never     Binge frequency: Never    Drug use: Never    Sexual activity: None   Lifestyle    Physical activity:     Days per week: None     Minutes per session: None    Stress: None   Relationships    Social connections:     Talks on phone: None     Gets together: None     Attends Mandaen service: None     Active member of club or organization: None     Attends meetings of clubs or organizations: None     Relationship status: None    Intimate partner violence:     Fear of current or ex partner: None     Emotionally abused: None     Physically abused: None     Forced sexual activity: None   Other Topics Concern    None   Social History Narrative    Daily caffeine consumption       Subjective         Objective    Physical Exam:   Assessment Type: Assess only  General Appearance: Alert, Awake  Respiratory Pattern: Normal  Chest Assessment: Chest expansion symmetrical  Bilateral Breath Sounds: Diminished, Clear  Cough: None  O2 Device: [email or web address]    Vitals:  Blood pressure 101/51, pulse 60, temperature 97 8 °F (36 6 °C), resp  rate 18, height 5' 2" (1 575 m), weight 77 8 kg (171 lb 8 3 oz), SpO2 99 %  Imaging and other studies: I have personally reviewed pertinent reports        O2 Device: [email or web address]     Plan    Respiratory Plan: Home Bronchodilator Patient pathway       SVN 2 5mg Albuterol Q4PRN

## 2019-11-27 NOTE — SOCIAL WORK
Discussed with patient preferences on discharge;understanding how to manage health at home; purpose of taking medications; importance of follow up care/appointments; and symptoms to watch out for once discharged home  Pt is being dc'd home on this date and will be following up with pt's PCP: Lou Sorenson as well as [facility]  Called pt's PCP office to get pt a sooner appointment as she doesn't have one until January   Pt's appointment will be on December 20th at [address] [place] [address]

## 2019-11-27 NOTE — ASSESSMENT & PLAN NOTE
Patient presents with acute onset of dizziness, which appears to be vertigo/BPPV, which lead to nausea and vomiting  She also notes a headache  All symptoms have since improved with treatment in the ER  CTA head and neck showed possible high grade stenosis of the left vertebral artery  Placed on observation status  Telemetry monitoring unremarkable  Follow stroke pathway with frequent neurologic assessments  High grade statin therapy  Maintain tight diabetic control  Echocardiogram unremarkable  MRI/MRA showed no stroke or vascular abnormalities  Symptoms greatly improved with meclizine and IV fluid hydration  Will continue meclizine as needed on outpatient basis  More thorough examination revealed patient also has a left otitis media  This is likely contributing to her vertigo and should improve with treatment

## 2019-11-28 NOTE — ASSESSMENT & PLAN NOTE
Potassium is 3 1 on admission, now resolved,  likely due to dehydration from vomiting  Given jazlyn IVF hydration and given 1 time dose of potassium supplementation in ER

## 2019-11-28 NOTE — ASSESSMENT & PLAN NOTE
Abnormal CTA of the head and neck:      Mild cervical atherosclerotic disease without hemodynamically significant carotid stenosis by NASCET criteria  Poor visualization of the left vertebral artery origin raising the question of focal high-grade stenosis  Remaining cervical vertebral circulation is unremarkable, bilaterally  Correlate for signs and symptoms of vertebrobasilar insufficiency  No large vessel occlusion, dissection or aneurysm  Centrilobular emphysema  Due to Possible high grade vetebral artery stenosis - obtaind more detailed view with MRI / MRA of the head - the results showed no vascular abnormalities  No further inpatient workup warranted

## 2019-11-28 NOTE — ASSESSMENT & PLAN NOTE
Severe at 1 1  In the setting of nausea and vomiting  Repeat level this AM was 2 2 following IV magnesium

## 2019-11-28 NOTE — ASSESSMENT & PLAN NOTE
Will treat with PO augmentin BID for 7 day course  Add flonase due to sinus congestion  May benefit from outpatient ENT referral pending re-evaluation and resolution of vertigo and tinnitus

## 2019-11-28 NOTE — ASSESSMENT & PLAN NOTE
Increased dose of lipitor from 20mg to high intensity dose of 40mg daily temporarily while following stroke pathway  Patient's lipid panel however shows controlled LDL of 99 therefore okay to continue prior dose of 20 mg daily

## 2019-11-28 NOTE — ASSESSMENT & PLAN NOTE
Lab Results   Component Value Date    HGBA1C 6 9 (H) 11/27/2019       Recent Labs     11/26/19  1819 11/26/19  2155 11/27/19  0735 11/27/19  1128   POCGLU 211* 121 114 152*       Blood Sugar Average: Last 72 hrs:  (P) 149 5   A1c recently checked at 6 7  Held metformin in the setting of nausea and vomiting, now ok to continue on discharge    Diabetic diet

## 2019-11-28 NOTE — ASSESSMENT & PLAN NOTE
Resolved  Mild, likely hypovolemic, hyponatremia due to GI losses with nausea and vomiting  Given IV fluid hydration with normal saline for now, encourage PO diet and fluid intake if tolerated

## 2019-12-02 ENCOUNTER — DOCUMENTATION (OUTPATIENT)
Dept: CASE MANAGEMENT | Facility: OTHER | Age: 79
End: 2019-12-02

## 2019-12-02 ENCOUNTER — TRANSITIONAL CARE MANAGEMENT (OUTPATIENT)
Dept: INTERNAL MEDICINE CLINIC | Facility: CLINIC | Age: 79
End: 2019-12-02

## 2019-12-02 NOTE — CASE MANAGEMENT
I put a referral in to [facility] on behalf of the patient  [facility] said they would be in contact with the patient

## 2019-12-03 ENCOUNTER — OFFICE VISIT (OUTPATIENT)
Dept: INTERNAL MEDICINE CLINIC | Facility: CLINIC | Age: 79
End: 2019-12-03
Payer: MEDICARE

## 2019-12-03 VITALS
SYSTOLIC BLOOD PRESSURE: 100 MMHG | RESPIRATION RATE: 16 BRPM | HEART RATE: 74 BPM | WEIGHT: 173 LBS | TEMPERATURE: 98.6 F | HEIGHT: 62 IN | BODY MASS INDEX: 31.83 KG/M2 | DIASTOLIC BLOOD PRESSURE: 68 MMHG

## 2019-12-03 DIAGNOSIS — R42 DIZZINESS: ICD-10-CM

## 2019-12-03 DIAGNOSIS — H81.12 BENIGN PAROXYSMAL POSITIONAL VERTIGO OF LEFT EAR: Primary | ICD-10-CM

## 2019-12-03 DIAGNOSIS — E11.9 TYPE 2 DIABETES MELLITUS WITHOUT COMPLICATION, UNSPECIFIED WHETHER LONG TERM INSULIN USE (HCC): ICD-10-CM

## 2019-12-03 DIAGNOSIS — E11.9 TYPE 2 DIABETES MELLITUS WITHOUT COMPLICATION, WITHOUT LONG-TERM CURRENT USE OF INSULIN (HCC): ICD-10-CM

## 2019-12-03 PROBLEM — R93.89 ABNORMAL COMPUTED TOMOGRAPHY ANGIOGRAPHY (CTA): Status: RESOLVED | Noted: 2019-11-26 | Resolved: 2019-12-03

## 2019-12-03 PROBLEM — E87.6 HYPOKALEMIA: Status: RESOLVED | Noted: 2019-11-26 | Resolved: 2019-12-03

## 2019-12-03 PROBLEM — E87.0 HYPERNATREMIA: Status: RESOLVED | Noted: 2019-11-26 | Resolved: 2019-12-03

## 2019-12-03 PROBLEM — E83.42 HYPOMAGNESEMIA: Status: RESOLVED | Noted: 2019-11-26 | Resolved: 2019-12-03

## 2019-12-03 PROCEDURE — 99495 TRANSJ CARE MGMT MOD F2F 14D: CPT | Performed by: PHYSICIAN ASSISTANT

## 2019-12-03 RX ORDER — MECLIZINE HYDROCHLORIDE 25 MG/1
25 TABLET ORAL EVERY 8 HOURS PRN
Qty: 12 TABLET | Refills: 0 | Status: SHIPPED | OUTPATIENT
Start: 2019-12-03 | End: 2019-12-17 | Stop reason: ALTCHOICE

## 2019-12-03 NOTE — PROGRESS NOTES
Transition of Care  Follow-up After Hospitalization    Lulu Cameron 78 y o  female   Date:  12/3/2019    TCM Call (since 11/2/2019)     Date and time call was made  12/2/2019 11:36 AM    Hospital care reviewed  Records reviewed    Patient was hospitialized at  [address] [place] [address]    Date of Admission  11/26/19    Date of discharge  11/27/19    Diagnosis  vertigo    Disposition  Home    Were the patients medications reviewed and updated  Yes    Current Symptoms  None      TCM Call (since 11/2/2019)     Post hospital issues  None    Should patient be enrolled in anticoag monitoring? No    Scheduled for follow up? Yes    Did you obtain your prescribed medications  Yes    Do you need help managing your prescriptions or medications  No    Is transportation to your appointment needed  No    I have advised the patient to call PCP with any new or worsening symptoms  Donna/sharee    Counseling  Patient    Counseling topics  Diagnostic results; instructions for management; Risk factor reduction; patient and family education; Prognosis; Activities of daily living; Importance of RX compliance        Admit Date: 11/26  Discharge Date: 11/27  Diagnosis: vertigo and left otitis media  Location: [facility] records were reviewed  Medications upon discharge reviewed/updated  Medication Changes: started on augmentin and meclizine  Imaging:MRI/MRA brain, CTA head, Echo  Consults: none  Discharge Disposition:  home  Follow up visits with other specialists: none      Assessment and Plan:    Rhonda Hudson was seen today for follow-up  Diagnoses and all orders for this visit:    Benign paroxysmal positional vertigo of left ear    Type 2 diabetes mellitus without complication, without long-term current use of insulin (HCC)  -     metFORMIN (GLUCOPHAGE) 500 mg tablet; Take 1 tablet (500 mg total) by mouth 2 (two) times a day with meals    Dizziness  -     meclizine (ANTIVERT) 25 mg tablet;  Take 1 tablet (25 mg total) by mouth every 8 (eight) hours as needed for dizziness or nausea    Type 2 diabetes mellitus without complication, unspecified whether long term insulin use (HCC)  -     glucose blood (ONETOUCH VERIO) test strip; Use as instructed TEST TWO TIMES DAILY e11 65            HPI:  Pt presents for TASNEEM  [facility] after being brought to the ER by EMS for nausea, vomiting and dizziness  She underwent CTA which showed possible vertebral artery stenosis but this was dismissed after normal MRI/MRA  She had a normal echo as well  PE revealed left AOM  She was started on meclizine and augmentin and symptoms dramatically improved  She was discharged home the following day  She feels well presently  She has some mild dizziness if she changes position too quickly  She has been using a walker for added stability until it resolves completely  ROS: Review of Systems   Constitutional: Negative for chills and fever  HENT: Negative for congestion, ear pain, hearing loss, postnasal drip, rhinorrhea, sinus pressure, sinus pain, sore throat and trouble swallowing  Eyes: Negative for pain and visual disturbance  Respiratory: Negative for cough, chest tightness, shortness of breath and wheezing  Cardiovascular: Negative  Negative for chest pain, palpitations and leg swelling  Gastrointestinal: Negative for abdominal pain, blood in stool, constipation, diarrhea, nausea and vomiting  Endocrine: Negative for cold intolerance, heat intolerance, polydipsia, polyphagia and polyuria  Genitourinary: Negative for difficulty urinating, dysuria, flank pain and urgency  Musculoskeletal: Negative for arthralgias, back pain, gait problem and myalgias  Skin: Negative for rash  Allergic/Immunologic: Negative  Neurological: Positive for dizziness  Negative for weakness, light-headedness and headaches  Hematological: Negative      Psychiatric/Behavioral: Negative for behavioral problems, dysphoric mood and sleep disturbance  The patient is not nervous/anxious  Past Medical History:   Diagnosis Date    Allergic     [facility]ch[facility] disease) [facility]     Diabetes mellitus ([facility] )     GERD (gastroesophageal reflux disease)     Hyperlipidemia     Hypertension     Left non-suppurative otitis media 11/27/2019    Pneumonia     Pneumonia of right lower lobe due to infectious organism ([facility] ) 12/4/2018    Vitamin D deficiency        Past Surgical History:   Procedure Laterality Date    CHOLECYSTECTOMY  1984    COLONOSCOPY N/A 1/30/2019    Procedure: COLONOSCOPY with multiple  polypectomies;  Surgeon: Brittney Squires MD;  Location: [address] [place] [address] GI LAB; Service: Gastroenterology    EYE SURGERY Bilateral     CATARACT       Social History     Socioeconomic History    Marital status:       Spouse name: None    Number of children: None    Years of education: None    Highest education level: None   Occupational History    Occupation: RETIRED   Social Needs    Financial resource strain: None    Food insecurity:     Worry: None     Inability: None    Transportation needs:     Medical: None     Non-medical: None   Tobacco Use    Smoking status: Former Smoker     Years: 50 00     Types: Cigarettes    Smokeless tobacco: Never Used   Substance and Sexual Activity    Alcohol use: Never     Frequency: Never     Binge frequency: Never    Drug use: Never    Sexual activity: None   Lifestyle    Physical activity:     Days per week: None     Minutes per session: None    Stress: None   Relationships    Social connections:     Talks on phone: None     Gets together: None     Attends L[employer] day service: None     Active member of club or organization: None     Attends meetings of clubs or organizations: None     Relationship status: None    Intimate partner violence:     Fear of current or ex partner: None     Emotionally abused: None     Physically abused: None     Forced sexual activity: None   Other Topics Concern    None   Social History Narrative    Daily caffeine consumption       Family History   Problem Relation Age of Onset    Lung cancer Father     Heart disease Mother     Lung cancer Sister     Lung cancer Brother        Allergies   Allergen Reactions    Other      FLOWERS/GRASS         Current Outpatient Medications:     albuterol (PROVENTIL HFA,VENTOLIN HFA) 90 mcg/act inhaler, Inhale 2 puffs every 6 (six) hours as needed for wheezing or shortness of breath, Disp: 1 Inhaler, Rfl: 5    atorvastatin (LIPITOR) 20 mg tablet, TAKE 1 TABLET BY MOUTH  DAILY, Disp: 90 tablet, Rfl: 3    Blood Glucose Monitoring Suppl (ONETOUCH VERIO) w/Device KIT, USE AS DIRECTED, Disp: 1 kit, Rfl: 0    clotrimazole-betamethasone (LOTRISONE) 1-0 05 % cream, Apply topically 2 (two) times a day, Disp: 30 g, Rfl: 2    Ergocalciferol (VITAMIN D2) 2000 units TABS, Take 2,000 mg by mouth, Disp: , Rfl:     fluticasone (FLONASE) 50 mcg/act nasal spray, 1 spray into each nostril daily for 7 days, Disp: 1 Bottle, Rfl: 0    fluticasone-salmeterol (ADVAIR, WIXELA) 250-50 mcg/dose inhaler, Inhale 1 puff 2 (two) times a day Rinse mouth after use , Disp: 3 Inhaler, Rfl: 2    gabapentin (NEURONTIN) 300 mg capsule, Take 1 capsule (300 mg total) by mouth daily, Disp: 90 capsule, Rfl: 3    glucose blood (ONETOUCH VERIO) test strip, Use as instructed TEST TWO TIMES DAILY e11 65, Disp: 300 each, Rfl: 3    lisinopril (ZESTRIL) 5 mg tablet, Take 1 tablet (5 mg total) by mouth daily, Disp: 90 tablet, Rfl: 3    meclizine (ANTIVERT) 25 mg tablet, Take 1 tablet (25 mg total) by mouth every 8 (eight) hours as needed for dizziness or nausea, Disp: 12 tablet, Rfl: 0    metFORMIN (GLUCOPHAGE) 500 mg tablet, Take 1 tablet (500 mg total) by mouth 2 (two) times a day with meals, Disp: 180 tablet, Rfl: 3    Multiple Vitamin (MULTIVITAMINS PO), Take by mouth daily, Disp: , Rfl:     omeprazole (PriLOSEC) 20 mg delayed release capsule, Take 1 capsule (20 mg total) by mouth 2 (two) times a day, Disp: 180 capsule, Rfl: 3    ONETOUCH DELICA LANCETS FINE MISC, USE AS DIRECTED THREE TIMES DAILY, Disp: 100 each, Rfl: 2    oxybutynin (DITROPAN-XL) 5 mg 24 hr tablet, Take 1 tablet (5 mg total) by mouth daily, Disp: 90 tablet, Rfl: 2    tiotropium (SPIRIVA HANDIHALER) 18 mcg inhalation capsule, Place 1 capsule (18 mcg total) into inhaler and inhale daily (Patient not taking: Reported on 11/26/2019), Disp: 3 each, Rfl: 3      Physical Exam:  /68   Pulse 74   Temp 98 6 °F (37 °C)   Resp 16   Ht 5' 2" (1 575 m)   Wt 78 5 kg (173 lb)   BMI 31 64 kg/m²     Physical Exam   Constitutional: She is oriented to person, place, and time  She appears well-developed and well-nourished  No distress  HENT:   Head: Normocephalic and atraumatic  Right Ear: External ear normal    Left Ear: External ear normal    Nose: Nose normal    Mouth/Throat: Oropharynx is clear and moist  No oropharyngeal exudate  Eyes: Pupils are equal, round, and reactive to light  Conjunctivae and EOM are normal  Right eye exhibits no discharge  Left eye exhibits no discharge  No scleral icterus  Neck: Normal range of motion  Neck supple  No thyromegaly present  Cardiovascular: Normal rate, regular rhythm and normal heart sounds  Exam reveals no gallop and no friction rub  No murmur heard  Pulmonary/Chest: Effort normal and breath sounds normal  No respiratory distress  She has no wheezes  She has no rales  Abdominal: Soft  Bowel sounds are normal  She exhibits no distension  There is no tenderness  Musculoskeletal: Normal range of motion  She exhibits no edema, tenderness or deformity  Neurological: She is alert and oriented to person, place, and time  No cranial nerve deficit  Skin: Skin is warm and dry  She is not diaphoretic  Psychiatric: She has a normal mood and affect   Her behavior is normal  Judgment and thought content normal            Labs:  Lab Results Component Value Date    WBC 8 43 11/27/2019    HGB 12 4 11/27/2019    HCT 39 7 11/27/2019    MCV 91 11/27/2019     11/27/2019     Lab Results   Component Value Date    K 4 1 11/27/2019     11/27/2019    CO2 32 11/27/2019    BUN 9 11/27/2019    CREATININE 0 76 11/27/2019    GLUF 114 (H) 11/01/2019    CALCIUM 8 6 11/27/2019    AST 12 11/26/2019    ALT 27 11/26/2019    ALKPHOS 54 11/26/2019    EGFR 75 11/27/2019           A/P    1  Dizziness, nausea and vomiting secondary to BPPV and left AOM  Symptoms dramatically improved  She has completed augmentin  Will refill meclizine for prn use for any lingering vertigo symptoms

## 2019-12-04 NOTE — PHYSICIAN ADVISOR
Current patient class: Inpatient  The patient is currently on Hospital Day: 2 at [address]      The patient was admitted to the hospital  on 11/26/19 at [phone] for the following diagnosis:  Hypokalemia [E87 6]  Hypomagnesemia [E83 42]  Dizziness [R42]  Vomiting [R11 10]  Nausea & vomiting [R11 2]  Abnormal CT scan of head [R93 0]  Headache [R51]     After review of the relevant documentation, labs, vital signs and test results, the patient is a provider liable case and is most appropriate for OBSERVATION STATUS  In this particular case the patient was admitted to the hospital as an inpatient  The patient however fails to satisfy the 2 midnight benchmark and closer scrutiny of the case is warranted  After review of the patient presentation and relevant labs the patient was most appropriate for observation status on admission  Given that this patient has already been discharged prior to this review they become a provider liable case  Rationale is as follows: The patient is a 78 yrs   Female who presented to the ED at 11/26/2019 10:41 AM with a chief complaint of Vomiting (Pt states she has had several episodes of vomiting since this morning, 1 episode of diarrhea, and dizziness when she stands up  )  Patient was noted to be slightly hypokalemic and slightly hypernatremic on admission likely secondary to dehydration  The patient also had an abnormal CT scan for which they wanted to rule out any underlying central neurological process such as a stroke and the patient was put on the stroke pathway  The patient had an abnormal CT scan however an MRI and MRA were otherwise unremarkable and stroke workup was negative  As per documentation as well the patient's symptoms did resolve while she was in the emergency department after she received treatment there    Given the quick resolution of symptoms even before the patient was admitted she should have been observation appropriate from the beginning of the admission which was also confirmed with negative workup  This case is provider liable  The patients vitals on arrival were ED Triage Vitals [11/26/19 1046]   Temperature Pulse Respirations Blood Pressure SpO2   97 8 °F (36 6 °C) 84 20 (!) 178/82 93 %      Temp Source Heart Rate Source Patient Position - Orthostatic VS BP Location FiO2 (%)   Temporal Monitor Lying Right arm --      Pain Score       No Pain           Past Medical History:   Diagnosis Date    Allergic     COPD (chronic obstructive pulmonary disease) (HCC)     Diabetes mellitus (HCC)     GERD (gastroesophageal reflux disease)     Hyperlipidemia     Hypertension     Left non-suppurative otitis media 11/27/2019    Pneumonia     Pneumonia of right lower lobe due to infectious organism ([facility] ) 12/4/2018    Vitamin D deficiency      Past Surgical History:   Procedure Laterality Date    CHOLECYSTECTOMY  1984    COLONOSCOPY N/A 1/30/2019    Procedure: COLONOSCOPY with multiple  polypectomies;  Surgeon: Rafa Grant MD;  Location: [facility] GI LAB; Service: Gastroenterology    EYE SURGERY Bilateral     CATARACT           Consults have been placed to:   None    Vitals:    11/27/19 0332 11/27/19 0726 11/27/19 1148 11/27/19 1627   BP: 114/61 123/67 126/68 126/69   BP Location:  Left arm Left arm    Pulse: 71 69 66 71   Resp: 19 18 18 15   Temp: 98 2 °F (36 8 °C) 98 °F (36 7 °C)     TempSrc: Oral Oral     SpO2: 100% 99% 98% 93%   Weight:       Height:           Most recent labs:    No results for input(s): WBC, HGB, HCT, PLT, K, NA, CALCIUM, BUN, CREATININE, LIPASE, AMYLASE, INR, TROPONINI, CKTOTAL, AST, ALT, ALKPHOS, BILITOT in the last 72 hours  Scheduled Meds:  Continuous Infusions:  No current facility-administered medications for this encounter  PRN Meds:      Surgical procedures (if appropriate):

## 2019-12-04 NOTE — PHYSICIAN ADVISOR
Current patient class: Inpatient  The patient is currently on Hospital Day: 2 at [address]      The patient was admitted to the hospital  on 11/26/19 at [phone] for the following diagnosis:  Hypokalemia [E87 6]  Hypomagnesemia [E83 42]  Dizziness [R42]  Vomiting [R11 10]  Nausea & vomiting [R11 2]  Abnormal CT scan of head [R93 0]  Headache [R51]     After review of the relevant documentation, labs, vital signs and test results, the patient is a provider liable case and is most appropriate for OBSERVATION STATUS  In this particular case the patient was admitted to the hospital as an inpatient  The patient however fails to satisfy the 2 midnight benchmark and closer scrutiny of the case is warranted  After review of the patient presentation and relevant labs the patient was most appropriate for observation status on admission  Given that this patient has already been discharged prior to this review they become a provider liable case  Rationale is as follows: The patient is a 77-year-old female who presented with acute severe dizziness  She became nauseous and vomited several times  The provider wish to exclude stroke and according to the H&P, she was placed under observation status  An inpatient order however had been placed by the emergency department  The discharge summary indicates that her symptoms improved with treatment in the emergency department  It was determined that left otitis media was likely contributing to her vertigo and should improve with treatment  She did not have acute stroke  Again, it indicates that she was under observation class while hospitalized  Based on the plan for hospital observation and her need for only a one midnight stay in the hospital with exclusion of stroke, I feel that observation was most appropriate      The patients vitals on arrival were ED Triage Vitals [11/26/19 1046]   Temperature Pulse Respirations Blood Pressure SpO2 97 8 °F (36 6 °C) 84 20 (!) 178/82 93 %      Temp Source Heart Rate Source Patient Position - Orthostatic VS BP Location FiO2 (%)   Temporal Monitor Lying Right arm --      Pain Score       No Pain           Past Medical History:   Diagnosis Date    Allergic     COPD (chronic obstructive pulmonary disease) (HCC)     Diabetes mellitus (HCC)     GERD (gastroesophageal reflux disease)     Hyperlipidemia     Hypertension     Left non-suppurative otitis media 11/27/2019    Pneumonia     Pneumonia of right lower lobe due to infectious organism ([facility] ) 12/4/2018    Vitamin D deficiency      Past Surgical History:   Procedure Laterality Date    CHOLECYSTECTOMY  1984    COLONOSCOPY N/A 1/30/2019    Procedure: COLONOSCOPY with multiple  polypectomies;  Surgeon: Brittney Squires MD;  Location: [facility] GI LAB; Service: Gastroenterology    EYE SURGERY Bilateral     CATARACT           Consults have been placed to:   None    Vitals:    11/27/19 0332 11/27/19 0726 11/27/19 1148 11/27/19 1627   BP: 114/61 123/67 126/68 126/69   BP Location:  Left arm Left arm    Pulse: 71 69 66 71   Resp: 19 18 18 15   Temp: 98 2 °F (36 8 °C) 98 °F (36 7 °C)     TempSrc: Oral Oral     SpO2: 100% 99% 98% 93%   Weight:       Height:           Most recent labs:    No results for input(s): WBC, HGB, HCT, PLT, K, NA, CALCIUM, BUN, CREATININE, LIPASE, AMYLASE, INR, TROPONINI, CKTOTAL, AST, ALT, ALKPHOS, BILITOT in the last 72 hours  Scheduled Meds:  Continuous Infusions:  No current facility-administered medications for this encounter  PRN Meds:      Surgical procedures (if appropriate):

## 2019-12-17 ENCOUNTER — OFFICE VISIT (OUTPATIENT)
Dept: PULMONOLOGY | Facility: CLINIC | Age: 79
End: 2019-12-17
Payer: MEDICARE

## 2019-12-17 VITALS
SYSTOLIC BLOOD PRESSURE: 118 MMHG | HEART RATE: 87 BPM | WEIGHT: 172 LBS | DIASTOLIC BLOOD PRESSURE: 80 MMHG | HEIGHT: 62 IN | OXYGEN SATURATION: 93 % | BODY MASS INDEX: 31.65 KG/M2

## 2019-12-17 DIAGNOSIS — J43.1 PANLOBULAR EMPHYSEMA (HCC): Primary | ICD-10-CM

## 2019-12-17 PROCEDURE — 99214 OFFICE O/P EST MOD 30 MIN: CPT | Performed by: INTERNAL MEDICINE

## 2019-12-17 RX ORDER — CETIRIZINE HYDROCHLORIDE 10 MG/1
10 TABLET ORAL DAILY
COMMUNITY

## 2019-12-17 RX ORDER — ASPIRIN 81 MG/1
81 TABLET, CHEWABLE ORAL DAILY
COMMUNITY

## 2019-12-17 NOTE — PROGRESS NOTES
Patient:  Eliu Kitchen   :  1940    MRN:  [id number]    SSN:  [id number]    Date of Service:  2019    Primary Care Provider:  Christina Bettencourt PA-C    Eliu Kitchen is a 78 y o  female who presents for evaluation of shortness of breath  She was hospitalized in 2018 and feels that her breathing has never been back to baseline  She was told that she had pneumonia at that time although a CT of the chest was very unimpressive concerning infiltrates although the amount of emphysema was quite impressive  She has never had a pulmonary function study  She was on home oxygen for a few months but is no longer using that  She has no daily cough  She does have a fair amount of nasal congestion though  Past medical history:    A cholecystectomy as her only surgery  Her chronic medical problems include COPD, hyperlipidemia, hypertension, diabetes, acid reflux disease, and peripheral neuropathy  Medications:  Reviewed    Socially:  She has about 50 pack years of smoking stopping in     Family history:  She has 3 children that are alive and well 1 is  from suicide    Living situation:  She has always lived in [place]    Employment history:  She worked in [employer] for many years    Review of systems:  She is up-to-date on pneumonia vaccinations and flu shots  She has lost a little weight in the past year to after a change in diet  All other systems were negative  Labs: On 2019 an echocardiogram showed a left ventricular ejection fraction of 60% mild mitral regurgitation  2019 a chest x-ray was clear/normal   In 2018 a CT of the chest showed significant emphysema        Physical Exam:  /80 (BP Location: Left arm, Patient Position: Sitting)   Pulse 87   Ht 5' 2" (1 575 m)   Wt 78 kg (172 lb)   SpO2 93%   BMI 31 46 kg/m²   In general:  Average weight a mildly obese elderly lady in no respiratory distress  Oropharynx:  No evidence of thrush moist mucosa  Neck:  Supple no lymphadenopathy  Lung fields:  Clear and equal bilaterally with decreased excursion throughout but no wheezes rales or rhonchi  Heart tones:  Distant but no murmur gallop lift rub or heave was noted  Abdomen:  Obese and soft nontender no masses  No ankle edema no clubbing of the digits  Skin, warm dry no rash  Psychological:  Pleasant, appropriate  Neurological:  Alert and oriented no focal neurologic deficits        Assessment/Plan:  1  Emphysema:  I reviewed her CT of the chest from December 6998 and she certainly does have significant emphysema  I strongly suspect that this is the cause of her complaints of dyspnea  I have switched her Advair over to Trelegy  I gave her a sample of this and demonstrated how to use it  I ordered a full pulmonary function study and will see her back again in a month to see how she responded to the trilogy and go over pulmonary functions with her  2  Ex-smoker:  She stopped in 2013 after 50 pack years    3  Hyperlipidemia    4  Hypertension    5  Diabetes    6  Acid reflux disease    7  Neuropathy    8  Obesity:  Her weight today was 172 lb with a body mass index of 31 5  The plan was discussed with the patient and/or family      Electronically signed by: Sagar Roach MD, 12/17/2019 10:58 AM

## 2019-12-20 ENCOUNTER — APPOINTMENT (OUTPATIENT)
Dept: LAB | Facility: CLINIC | Age: 79
End: 2019-12-20
Payer: MEDICARE

## 2019-12-20 ENCOUNTER — OFFICE VISIT (OUTPATIENT)
Dept: INTERNAL MEDICINE CLINIC | Facility: CLINIC | Age: 79
End: 2019-12-20
Payer: MEDICARE

## 2019-12-20 VITALS
HEART RATE: 78 BPM | HEIGHT: 62 IN | WEIGHT: 172 LBS | DIASTOLIC BLOOD PRESSURE: 78 MMHG | RESPIRATION RATE: 16 BRPM | TEMPERATURE: 98 F | BODY MASS INDEX: 31.65 KG/M2 | SYSTOLIC BLOOD PRESSURE: 122 MMHG

## 2019-12-20 DIAGNOSIS — J44.9 CHRONIC OBSTRUCTIVE PULMONARY DISEASE, UNSPECIFIED COPD TYPE (HCC): Primary | ICD-10-CM

## 2019-12-20 DIAGNOSIS — E11.65 TYPE 2 DIABETES MELLITUS WITH HYPERGLYCEMIA, WITHOUT LONG-TERM CURRENT USE OF INSULIN (HCC): ICD-10-CM

## 2019-12-20 DIAGNOSIS — R42 DIZZINESS: ICD-10-CM

## 2019-12-20 PROBLEM — H65.92 LEFT NON-SUPPURATIVE OTITIS MEDIA: Status: RESOLVED | Noted: 2019-11-27 | Resolved: 2019-12-20

## 2019-12-20 LAB
CREAT UR-MCNC: 29.1 MG/DL
MICROALBUMIN UR-MCNC: <5 MG/L (ref 0–20)
MICROALBUMIN/CREAT 24H UR: <17 MG/G CREATININE (ref 0–30)

## 2019-12-20 PROCEDURE — 82570 ASSAY OF URINE CREATININE: CPT | Performed by: PHYSICIAN ASSISTANT

## 2019-12-20 PROCEDURE — 99213 OFFICE O/P EST LOW 20 MIN: CPT | Performed by: PHYSICIAN ASSISTANT

## 2019-12-20 PROCEDURE — 82043 UR ALBUMIN QUANTITATIVE: CPT | Performed by: PHYSICIAN ASSISTANT

## 2019-12-20 NOTE — PROGRESS NOTES
Assessment/Plan:  Problem List Items Addressed This Visit        Endocrine    Type 2 diabetes mellitus with hyperglycemia, without long-term current use of insulin ([facility])    Relevant Orders    Microalbumin / creatinine urine ratio       Respiratory    COPD (chronic obstructive pulmonary disease) ([facility] [address] ) - Primary     Improved with Trelegy use  Keep scheduled appt for PFTs on 1/2/20            Other    Dizziness     Nearly completely resolved  Pt defers vestibular PT for this                Diagnoses and all orders for this visit:    Chronic obstructive pulmonary disease, unspecified COPD type ([facility] [address] )    Type 2 diabetes mellitus with hyperglycemia, without long-term current use of insulin ([facility])  -     Microalbumin / creatinine urine ratio    Dizziness        Dizziness  Nearly completely resolved  Pt defers vestibular PT for this    COPD (chronic obstructive pulmonary disease) ([facility] [address] )  Improved with Trelegy use  Keep scheduled appt for PFTs on 1/2/20      Subjective:      Patient ID: Shivani Cedillo is a 78 y o  female  Pt presents for follow up  Her vertigo symptoms are nearly completely resolved  She saw pulmonary regarding her dyspnea and was diagnosed with emphysema and started on Trelegy which has been working nicely for her  Vitals are all stable  She denies any new complaints or concerns  The following portions of the patient's history were reviewed and updated as appropriate:   She has a past medical history of Allergic, COPD (chronic obstructive pulmonary disease) ([facility] [address] ), Diabetes mellitus ([facility] [address] ), GERD (gastroesophageal reflux disease), Hyperlipidemia, Hypertension, Left non-suppurative otitis media (11/27/2019), Pneumonia, Pneumonia of right lower lobe due to infectious organism ([address] ) (12/4/2018), and Vitamin D deficiency  ,  does not have any pertinent problems on file  ,   has a past surgical history that includes Cholecystectomy (1984); Eye surgery (Bilateral); and Colonoscopy (N/A, 1/30/2019)  ,  family history includes Heart disease in her mother; Lung cancer in her brother, father, and sister  ,   reports that she quit smoking about 6 years ago  Her smoking use included cigarettes  She started smoking about 56 years ago  She has a 50 00 pack-year smoking history  She has never used smokeless tobacco  She reports that she does not drink alcohol or use drugs  ,  is allergic to other     Current Outpatient Medications   Medication Sig Dispense Refill    aspirin 81 mg chewable tablet Chew 81 mg daily      atorvastatin (LIPITOR) 20 mg tablet TAKE 1 TABLET BY MOUTH  DAILY 90 tablet 3    cetirizine (ZyrTEC) 10 mg tablet Take 10 mg by mouth daily      Ergocalciferol (VITAMIN D2) 2000 units TABS Take 2,000 mg by mouth      fluticasone (FLONASE) 50 mcg/act nasal spray 1 spray into each nostril daily for 7 days 1 Bottle 0    fluticasone-salmeterol (ADVAIR, WIXELA) 250-50 mcg/dose inhaler Inhale 1 puff 2 (two) times a day Rinse mouth after use  3 Inhaler 2    gabapentin (NEURONTIN) 300 mg capsule Take 1 capsule (300 mg total) by mouth daily 90 capsule 3    glucose blood (ONETOUCH VERIO) test strip Use as instructed TEST TWO TIMES DAILY e11 65 300 each 3    lisinopril (ZESTRIL) 5 mg tablet Take 1 tablet (5 mg total) by mouth daily 90 tablet 3    metFORMIN (GLUCOPHAGE) 500 mg tablet Take 1 tablet (500 mg total) by mouth 2 (two) times a day with meals 180 tablet 3    Multiple Vitamin (MULTIVITAMINS PO) Take by mouth daily      omeprazole (PriLOSEC) 20 mg delayed release capsule Take 1 capsule (20 mg total) by mouth 2 (two) times a day 180 capsule 3    oxybutynin (DITROPAN-XL) 5 mg 24 hr tablet Take 1 tablet (5 mg total) by mouth daily 90 tablet 2     No current facility-administered medications for this visit  Review of Systems   Constitutional: Negative for chills and fever     HENT: Negative for congestion, ear pain, hearing loss, postnasal drip, rhinorrhea, sinus pressure, sinus pain, sore throat and trouble swallowing  Eyes: Negative for pain and visual disturbance  Respiratory: Negative for cough, chest tightness, shortness of breath and wheezing  Cardiovascular: Negative  Negative for chest pain, palpitations and leg swelling  Gastrointestinal: Negative for abdominal pain, blood in stool, constipation, diarrhea, nausea and vomiting  Endocrine: Negative for cold intolerance, heat intolerance, polydipsia, polyphagia and polyuria  Genitourinary: Negative for difficulty urinating, dysuria, flank pain and urgency  Musculoskeletal: Negative for arthralgias, back pain, gait problem and myalgias  Skin: Negative for rash  Allergic/Immunologic: Negative  Neurological: Negative for dizziness, weakness, light-headedness and headaches  Hematological: Negative  Psychiatric/Behavioral: Negative for behavioral problems, dysphoric mood and sleep disturbance  The patient is not nervous/anxious  PHQ-9 Depression Screening    PHQ-9:    Frequency of the following problems over the past two weeks:               Objective:  Vitals:    12/20/19 0846   BP: 122/78   Pulse: 78   Resp: 16   Temp: 98 °F (36 7 °C)   TempSrc: Tympanic   Weight: 78 kg (172 lb)   Height: 5' 2" (1 575 m)     Body mass index is 31 46 kg/m²  Physical Exam   Constitutional: She is oriented to person, place, and time  She appears well-developed and well-nourished  No distress  HENT:   Head: Normocephalic and atraumatic  Right Ear: External ear normal    Left Ear: External ear normal    Nose: Nose normal    Mouth/Throat: Oropharynx is clear and moist  No oropharyngeal exudate  Eyes: Pupils are equal, round, and reactive to light  Conjunctivae and EOM are normal  Right eye exhibits no discharge  Left eye exhibits no discharge  No scleral icterus  Neck: Normal range of motion  Neck supple  No thyromegaly present  Cardiovascular: Normal rate, regular rhythm and normal heart sounds   Exam reveals no gallop and no friction rub    No murmur heard  Pulmonary/Chest: Effort normal and breath sounds normal  No respiratory distress  She has no wheezes  She has no rales  Abdominal: Soft  Bowel sounds are normal  She exhibits no distension  There is no tenderness  Musculoskeletal: Normal range of motion  She exhibits no edema, tenderness or deformity  Neurological: She is alert and oriented to person, place, and time  No cranial nerve deficit  Skin: Skin is warm and dry  She is not diaphoretic  Psychiatric: She has a normal mood and affect  Her behavior is normal  Judgment and thought content normal      BMI Counseling: Body mass index is 31 46 kg/m²  The BMI is above normal  Nutrition recommendations include decreasing portion sizes, consuming healthier snacks and limiting drinks that contain sugar

## 2019-12-31 DIAGNOSIS — J44.9 CHRONIC OBSTRUCTIVE PULMONARY DISEASE, UNSPECIFIED COPD TYPE (HCC): Primary | ICD-10-CM

## 2020-01-02 ENCOUNTER — HOSPITAL ENCOUNTER (OUTPATIENT)
Dept: PULMONOLOGY | Facility: HOSPITAL | Age: 80
Discharge: HOME | End: 2020-01-02
Attending: INTERNAL MEDICINE
Payer: MEDICARE

## 2020-01-02 DIAGNOSIS — J43.1 PANLOBULAR EMPHYSEMA (HCC): ICD-10-CM

## 2020-01-02 PROCEDURE — 94060 EVALUATION OF WHEEZING: CPT | Performed by: INTERNAL MEDICINE

## 2020-01-02 PROCEDURE — 94729 DIFFUSING CAPACITY: CPT

## 2020-01-02 PROCEDURE — 94726 PLETHYSMOGRAPHY LUNG VOLUMES: CPT | Performed by: INTERNAL MEDICINE

## 2020-01-02 PROCEDURE — 94727 GAS DIL/WSHOT DETER LNG VOL: CPT

## 2020-01-02 PROCEDURE — 94760 N-INVAS EAR/PLS OXIMETRY 1: CPT

## 2020-01-02 PROCEDURE — 94060 EVALUATION OF WHEEZING: CPT

## 2020-01-02 PROCEDURE — 94729 DIFFUSING CAPACITY: CPT | Performed by: INTERNAL MEDICINE

## 2020-01-02 RX ORDER — ALBUTEROL SULFATE 2.5 MG/3ML
2.5 SOLUTION RESPIRATORY (INHALATION) ONCE AS NEEDED
Status: COMPLETED | OUTPATIENT
Start: 2020-01-02 | End: 2020-01-02

## 2020-01-02 RX ADMIN — ALBUTEROL SULFATE 2.5 MG: 2.5 SOLUTION RESPIRATORY (INHALATION) at 09:55

## 2020-01-13 ENCOUNTER — TELEPHONE (OUTPATIENT)
Dept: OTHER | Facility: HOSPITAL | Age: 80
End: 2020-01-13

## 2020-01-13 NOTE — TELEPHONE ENCOUNTER
Called patient and reviewed PFTs with her  She has moderate obstruction and increase RV indicative of air trapping and moderately reduced diffusion capacity  She verbalized understanding and offered no questions  I recommend that she continue Trelegy and follow with Dr June Claudio as previously scheduled  Patient agrees to plan

## 2020-01-20 ENCOUNTER — OFFICE VISIT (OUTPATIENT)
Dept: PULMONOLOGY | Facility: CLINIC | Age: 80
End: 2020-01-20
Payer: MEDICARE

## 2020-01-20 VITALS
SYSTOLIC BLOOD PRESSURE: 110 MMHG | HEART RATE: 95 BPM | OXYGEN SATURATION: 91 % | WEIGHT: 173 LBS | BODY MASS INDEX: 31.83 KG/M2 | DIASTOLIC BLOOD PRESSURE: 70 MMHG | HEIGHT: 62 IN

## 2020-01-20 DIAGNOSIS — J43.1 PANLOBULAR EMPHYSEMA (HCC): Primary | ICD-10-CM

## 2020-01-20 PROCEDURE — 99212 OFFICE O/P EST SF 10 MIN: CPT | Performed by: INTERNAL MEDICINE

## 2020-01-20 NOTE — PROGRESS NOTES
Patient:  Eliu Kitchen   :  1940    MRN:  [id number]    SSN:  [id number]    Date of Service:  2020    Primary Care Provider:  Christina Bettencourt PA-C    Eliu Kitchen is a 78 y o  female who presents for follow-up after her original visit with me on 2019  She has had pulmonary function studies done  She tells me that switching her to Trelegy from Advair has been helpful  Physical Exam:  /70 (BP Location: Left arm, Patient Position: Sitting)   Pulse 95   Ht 5' 2" (1 575 m)   Wt 78 5 kg (173 lb)   SpO2 91%   BMI 31 64 kg/m²   In general:  Mildly obese elderly lady in no respiratory distress  Oropharynx:  No thrush moist mucosa  Neck:  Supple no lymphadenopathy  Lung fields:  Clear and equal bilaterally with overall decreased excursion but no wheezes rales or rhonchi equal breath sounds bilaterally  Heart tones:  Rather distant but regular no murmur gallop  No ankle edema  Alert and oriented no focal deficits  Skin:  Warm dry no rash  Psychological:  Pleasant appropriate        Pulmonary function studies:  FEV1 of 0 90 L (56% predicted)  She had no significant bronchodilator response  Her residual volume was 147% predicted her DLCO was 58% predicted but her DLCO/VA was 101% predicted  All this is compatible with moderate obstructive airway disease, air trapping, and a diffusion capacity that is normal after correction for alveolar volume  Assessment/Plan:  1  Emphysema:  I reviewed her CT of the chest from 2018 on her original visit with me and she certainly does have significant emphysema  Her pulmonary function studies are compatible with moderate emphysema and no evidence of bronchodilator response with a significant amount of air trapping  If she was younger we would discuss the possibility of transplant  For now we are going to stay on Trelegy indefinitely    I encouraged her to call us any time otherwise we will plan to see her on a yearly basis      2  Ex-smoker:  She stopped in 2013 after 50 pack years     3  Hyperlipidemia     4  Hypertension     5  Diabetes     6   Acid reflux disease     7  Neuropathy     8  Obesity:  Her weight today was 173 lb which is up 1 lb in the past month  with a body mass index of 31 6         The plan was discussed with the patient and/or family      Electronically signed by: Ryland Chin MD, 1/20/2020 8:50 AM

## 2020-01-29 DIAGNOSIS — E78.5 HYPERLIPIDEMIA, UNSPECIFIED HYPERLIPIDEMIA TYPE: ICD-10-CM

## 2020-01-29 DIAGNOSIS — N32.81 OAB (OVERACTIVE BLADDER): ICD-10-CM

## 2020-01-29 DIAGNOSIS — K21.9 GASTROESOPHAGEAL REFLUX DISEASE WITHOUT ESOPHAGITIS: ICD-10-CM

## 2020-01-29 RX ORDER — OMEPRAZOLE 20 MG/1
20 CAPSULE, DELAYED RELEASE ORAL 2 TIMES DAILY
Qty: 180 CAPSULE | Refills: 3 | Status: SHIPPED | OUTPATIENT
Start: 2020-01-29 | End: 2021-01-21

## 2020-01-29 RX ORDER — OXYBUTYNIN CHLORIDE 5 MG/1
5 TABLET, EXTENDED RELEASE ORAL DAILY
Qty: 90 TABLET | Refills: 2 | Status: SHIPPED | OUTPATIENT
Start: 2020-01-29 | End: 2020-10-19

## 2020-01-29 RX ORDER — ATORVASTATIN CALCIUM 20 MG/1
20 TABLET, FILM COATED ORAL DAILY
Qty: 90 TABLET | Refills: 3 | Status: SHIPPED | OUTPATIENT
Start: 2020-01-29 | End: 2021-01-21

## 2020-01-29 NOTE — TELEPHONE ENCOUNTER
Pt needs a refill on atovastatin 20 mg -1 daily #30 , omeprazole 20 mg - bid, and oxybutynin ER tabs 5 mg-1 daily  [facility]

## 2020-02-24 DIAGNOSIS — I10 HYPERTENSION, UNSPECIFIED TYPE: ICD-10-CM

## 2020-02-24 DIAGNOSIS — M79.2 NEUROPATHIC PAIN: ICD-10-CM

## 2020-02-25 RX ORDER — LISINOPRIL 5 MG/1
5 TABLET ORAL DAILY
Qty: 90 TABLET | Refills: 1 | Status: SHIPPED | OUTPATIENT
Start: 2020-02-25 | End: 2020-08-17 | Stop reason: SDUPTHER

## 2020-02-25 RX ORDER — GABAPENTIN 300 MG/1
300 CAPSULE ORAL DAILY
Qty: 90 CAPSULE | Refills: 1 | Status: SHIPPED | OUTPATIENT
Start: 2020-02-25 | End: 2020-08-17 | Stop reason: SDUPTHER

## 2020-03-02 DIAGNOSIS — J43.1 PANLOBULAR EMPHYSEMA (HCC): Primary | ICD-10-CM

## 2020-03-23 DIAGNOSIS — E11.9 TYPE 2 DIABETES MELLITUS WITHOUT COMPLICATION, WITHOUT LONG-TERM CURRENT USE OF INSULIN (HCC): ICD-10-CM

## 2020-05-28 ENCOUNTER — OFFICE VISIT (OUTPATIENT)
Dept: INTERNAL MEDICINE CLINIC | Facility: CLINIC | Age: 80
End: 2020-05-28
Payer: MEDICARE

## 2020-05-28 VITALS
HEIGHT: 62 IN | RESPIRATION RATE: 18 BRPM | OXYGEN SATURATION: 98 % | SYSTOLIC BLOOD PRESSURE: 124 MMHG | HEART RATE: 82 BPM | TEMPERATURE: 100.1 F | DIASTOLIC BLOOD PRESSURE: 68 MMHG | BODY MASS INDEX: 32.2 KG/M2 | WEIGHT: 175 LBS

## 2020-05-28 DIAGNOSIS — Z00.00 MEDICARE ANNUAL WELLNESS VISIT, SUBSEQUENT: ICD-10-CM

## 2020-05-28 DIAGNOSIS — E11.65 TYPE 2 DIABETES MELLITUS WITH HYPERGLYCEMIA, WITHOUT LONG-TERM CURRENT USE OF INSULIN (HCC): Primary | ICD-10-CM

## 2020-05-28 PROBLEM — R42 DIZZINESS: Status: RESOLVED | Noted: 2017-06-12 | Resolved: 2020-05-28

## 2020-05-28 LAB — SL AMB POCT HEMOGLOBIN AIC: 7 (ref ?–6.5)

## 2020-05-28 PROCEDURE — 1036F TOBACCO NON-USER: CPT | Performed by: PHYSICIAN ASSISTANT

## 2020-05-28 PROCEDURE — 4040F PNEUMOC VAC/ADMIN/RCVD: CPT | Performed by: PHYSICIAN ASSISTANT

## 2020-05-28 PROCEDURE — 1160F RVW MEDS BY RX/DR IN RCRD: CPT | Performed by: PHYSICIAN ASSISTANT

## 2020-05-28 PROCEDURE — 83036 HEMOGLOBIN GLYCOSYLATED A1C: CPT | Performed by: PHYSICIAN ASSISTANT

## 2020-05-28 PROCEDURE — 1125F AMNT PAIN NOTED PAIN PRSNT: CPT | Performed by: PHYSICIAN ASSISTANT

## 2020-05-28 PROCEDURE — G0439 PPPS, SUBSEQ VISIT: HCPCS | Performed by: PHYSICIAN ASSISTANT

## 2020-05-28 PROCEDURE — 99213 OFFICE O/P EST LOW 20 MIN: CPT | Performed by: PHYSICIAN ASSISTANT

## 2020-05-28 PROCEDURE — 3074F SYST BP LT 130 MM HG: CPT | Performed by: PHYSICIAN ASSISTANT

## 2020-05-28 PROCEDURE — 3008F BODY MASS INDEX DOCD: CPT | Performed by: PHYSICIAN ASSISTANT

## 2020-05-28 PROCEDURE — 1170F FXNL STATUS ASSESSED: CPT | Performed by: PHYSICIAN ASSISTANT

## 2020-05-28 PROCEDURE — 3051F HG A1C>EQUAL 7.0%<8.0%: CPT | Performed by: PHYSICIAN ASSISTANT

## 2020-05-28 PROCEDURE — 2022F DILAT RTA XM EVC RTNOPTHY: CPT | Performed by: PHYSICIAN ASSISTANT

## 2020-05-28 PROCEDURE — 3078F DIAST BP <80 MM HG: CPT | Performed by: PHYSICIAN ASSISTANT

## 2020-06-23 DIAGNOSIS — R21 RASH: Primary | ICD-10-CM

## 2020-06-23 RX ORDER — CLOTRIMAZOLE AND BETAMETHASONE DIPROPIONATE 10; .64 MG/G; MG/G
CREAM TOPICAL 2 TIMES DAILY
Qty: 45 G | Refills: 5 | Status: SHIPPED | OUTPATIENT
Start: 2020-06-23 | End: 2021-08-04

## 2020-06-23 RX ORDER — CLOTRIMAZOLE AND BETAMETHASONE DIPROPIONATE 10; .64 MG/G; MG/G
CREAM TOPICAL 2 TIMES DAILY
COMMUNITY
End: 2020-06-23 | Stop reason: SDUPTHER

## 2020-06-30 DIAGNOSIS — J44.9 CHRONIC OBSTRUCTIVE PULMONARY DISEASE, UNSPECIFIED COPD TYPE (HCC): ICD-10-CM

## 2020-08-17 DIAGNOSIS — M79.2 NEUROPATHIC PAIN: ICD-10-CM

## 2020-08-17 DIAGNOSIS — I10 HYPERTENSION, UNSPECIFIED TYPE: ICD-10-CM

## 2020-08-17 RX ORDER — LISINOPRIL 5 MG/1
5 TABLET ORAL DAILY
Qty: 90 TABLET | Refills: 1 | Status: SHIPPED | OUTPATIENT
Start: 2020-08-17 | End: 2020-08-18

## 2020-08-17 RX ORDER — GABAPENTIN 300 MG/1
300 CAPSULE ORAL DAILY
Qty: 90 CAPSULE | Refills: 1 | Status: SHIPPED | OUTPATIENT
Start: 2020-08-17 | End: 2020-08-18

## 2020-08-18 DIAGNOSIS — I10 HYPERTENSION, UNSPECIFIED TYPE: ICD-10-CM

## 2020-08-18 DIAGNOSIS — M79.2 NEUROPATHIC PAIN: ICD-10-CM

## 2020-08-18 RX ORDER — GABAPENTIN 300 MG/1
CAPSULE ORAL
Qty: 90 CAPSULE | Refills: 1 | Status: SHIPPED | OUTPATIENT
Start: 2020-08-18 | End: 2021-02-15 | Stop reason: SDUPTHER

## 2020-08-18 RX ORDER — LISINOPRIL 5 MG/1
TABLET ORAL
Qty: 90 TABLET | Refills: 1 | Status: SHIPPED | OUTPATIENT
Start: 2020-08-18 | End: 2021-03-01 | Stop reason: SDUPTHER

## 2020-09-11 ENCOUNTER — PREPPED CHART (OUTPATIENT)
Dept: URBAN - METROPOLITAN AREA CLINIC 6 | Facility: CLINIC | Age: 80
End: 2020-09-11

## 2020-09-15 DIAGNOSIS — E11.9 TYPE 2 DIABETES MELLITUS WITHOUT COMPLICATION, WITHOUT LONG-TERM CURRENT USE OF INSULIN (HCC): ICD-10-CM

## 2020-10-19 DIAGNOSIS — N32.81 OAB (OVERACTIVE BLADDER): ICD-10-CM

## 2020-10-19 RX ORDER — OXYBUTYNIN CHLORIDE 5 MG/1
TABLET, EXTENDED RELEASE ORAL
Qty: 90 TABLET | Refills: 2 | Status: SHIPPED | OUTPATIENT
Start: 2020-10-19 | End: 2021-02-15 | Stop reason: SDUPTHER

## 2021-01-13 ENCOUNTER — TELEPHONE (OUTPATIENT)
Dept: PULMONOLOGY | Facility: CLINIC | Age: 81
End: 2021-01-13

## 2021-01-13 DIAGNOSIS — J44.9 CHRONIC OBSTRUCTIVE PULMONARY DISEASE, UNSPECIFIED COPD TYPE (HCC): ICD-10-CM

## 2021-01-13 NOTE — TELEPHONE ENCOUNTER
Pt called  She needs refills on Trelegy Inhaler  Send to [facility]  Pt made a follow up for February

## 2021-01-21 DIAGNOSIS — E78.5 HYPERLIPIDEMIA, UNSPECIFIED HYPERLIPIDEMIA TYPE: ICD-10-CM

## 2021-01-21 DIAGNOSIS — K21.9 GASTROESOPHAGEAL REFLUX DISEASE WITHOUT ESOPHAGITIS: ICD-10-CM

## 2021-01-21 RX ORDER — OMEPRAZOLE 20 MG/1
CAPSULE, DELAYED RELEASE ORAL
Qty: 180 CAPSULE | Refills: 3 | Status: SHIPPED | OUTPATIENT
Start: 2021-01-21 | End: 2021-03-01 | Stop reason: SDUPTHER

## 2021-01-21 RX ORDER — ATORVASTATIN CALCIUM 20 MG/1
TABLET, FILM COATED ORAL
Qty: 90 TABLET | Refills: 3 | Status: SHIPPED | OUTPATIENT
Start: 2021-01-21 | End: 2021-02-15 | Stop reason: SDUPTHER

## 2021-02-10 ENCOUNTER — TELEPHONE (OUTPATIENT)
Dept: PULMONOLOGY | Facility: CLINIC | Age: 81
End: 2021-02-10

## 2021-02-10 DIAGNOSIS — J44.9 CHRONIC OBSTRUCTIVE PULMONARY DISEASE, UNSPECIFIED COPD TYPE (HCC): ICD-10-CM

## 2021-02-15 DIAGNOSIS — E78.5 HYPERLIPIDEMIA, UNSPECIFIED HYPERLIPIDEMIA TYPE: ICD-10-CM

## 2021-02-15 DIAGNOSIS — M79.2 NEUROPATHIC PAIN: ICD-10-CM

## 2021-02-15 DIAGNOSIS — N32.81 OAB (OVERACTIVE BLADDER): ICD-10-CM

## 2021-02-15 RX ORDER — OXYBUTYNIN CHLORIDE 5 MG/1
5 TABLET, EXTENDED RELEASE ORAL DAILY
Qty: 90 TABLET | Refills: 1 | Status: SHIPPED | OUTPATIENT
Start: 2021-02-15 | End: 2021-08-23 | Stop reason: SDUPTHER

## 2021-02-15 RX ORDER — GABAPENTIN 300 MG/1
300 CAPSULE ORAL DAILY
Qty: 90 CAPSULE | Refills: 1 | Status: SHIPPED | OUTPATIENT
Start: 2021-02-15 | End: 2021-08-23 | Stop reason: SDUPTHER

## 2021-02-15 RX ORDER — ATORVASTATIN CALCIUM 20 MG/1
20 TABLET, FILM COATED ORAL DAILY
Qty: 90 TABLET | Refills: 1 | Status: SHIPPED | OUTPATIENT
Start: 2021-02-15 | End: 2021-08-04 | Stop reason: SDUPTHER

## 2021-02-24 ENCOUNTER — OFFICE VISIT (OUTPATIENT)
Dept: PULMONOLOGY | Facility: CLINIC | Age: 81
End: 2021-02-24
Payer: COMMERCIAL

## 2021-02-24 VITALS
OXYGEN SATURATION: 95 % | TEMPERATURE: 98.7 F | HEIGHT: 62 IN | DIASTOLIC BLOOD PRESSURE: 78 MMHG | WEIGHT: 167.6 LBS | HEART RATE: 62 BPM | BODY MASS INDEX: 30.84 KG/M2 | SYSTOLIC BLOOD PRESSURE: 122 MMHG

## 2021-02-24 DIAGNOSIS — J44.9 CHRONIC OBSTRUCTIVE PULMONARY DISEASE, UNSPECIFIED COPD TYPE (HCC): Primary | ICD-10-CM

## 2021-02-24 PROCEDURE — 1160F RVW MEDS BY RX/DR IN RCRD: CPT | Performed by: INTERNAL MEDICINE

## 2021-02-24 PROCEDURE — 99213 OFFICE O/P EST LOW 20 MIN: CPT | Performed by: INTERNAL MEDICINE

## 2021-02-24 PROCEDURE — 94618 PULMONARY STRESS TESTING: CPT | Performed by: INTERNAL MEDICINE

## 2021-02-24 PROCEDURE — 1036F TOBACCO NON-USER: CPT | Performed by: INTERNAL MEDICINE

## 2021-02-24 RX ORDER — ALBUTEROL SULFATE 90 UG/1
2 AEROSOL, METERED RESPIRATORY (INHALATION) EVERY 6 HOURS PRN
Qty: 18 G | Refills: 3 | Status: SHIPPED | OUTPATIENT
Start: 2021-02-24 | End: 2021-12-13 | Stop reason: SDUPTHER

## 2021-02-25 NOTE — PROGRESS NOTES
Pulmonary Follow Up Note   Shivani Cedillo [de-identified] y o  female MRN: [id number]  2/24/2021      Assessment/Plan:    Chronic obstructive pulmonary disease, unspecified COPD type ([address] )  -Remains on Trelegy inhaler as maintenance which she is compliant with  -Has been using PRN albuterol a few times a week  -Recommend to continue inhaler therapy with Trelegy  -6MW performed in the office today to assess for hypoxemia, patient had leg pain that caused her to stop walking  -Will follow up in 6 months    Return in about 6 months (around 8/24/2021)  History of Present Illness   HPI:  Shivani Cedillo is a [de-identified] y o  female who has a history of COPD who presents today for follow up  Marlyn Luque was last see in our clinic one year ago  She has been maintained on Trelegy which she has taken every day  She also has a rescue inhaler but has not used that frequently  She says she may need to use it a few times a week when she is doing any exertional activity  She does still have a cough occasionally which is mostly dry  She denies any chest pain, palpitations, nausea, vomiting, recent fever  She has not had any exacerbations, no need for prednisone and no exacerbations  Review of Systems   Constitutional: Negative for activity change, chills, fever and unexpected weight change  HENT: Negative for congestion, rhinorrhea and voice change  Respiratory: Negative for cough, chest tightness, shortness of breath and wheezing  Cardiovascular: Negative for chest pain and palpitations  Gastrointestinal: Negative for abdominal distention, constipation, diarrhea, nausea and vomiting  Endocrine: Negative for cold intolerance and heat intolerance  Genitourinary: Negative for dysuria, frequency and urgency  Musculoskeletal: Negative for arthralgias, joint swelling and myalgias  Skin: Negative for color change, pallor and rash  Neurological: Negative for dizziness, weakness and numbness     Psychiatric/Behavioral: Negative for agitation and confusion  The patient is not nervous/anxious  Historical Information   Past Medical History:   Diagnosis Date    Allergic     COPD (chronic obstructive pulmonary disease) ([facility] )     Diabetes mellitus ([facility] [address] )     GERD (gastroesophageal reflux disease)     Hyperlipidemia     Hypertension     Left non-suppurative otitis media 11/27/2019    Pneumonia     Pneumonia of right lower lobe due to infectious organism 12/4/2018    Vitamin D deficiency      Past Surgical History:   Procedure Laterality Date    CHOLECYSTECTOMY  1984    COLONOSCOPY N/A 1/30/2019    Procedure: COLONOSCOPY with multiple  polypectomies;  Surgeon: Mamie Thayer MD;  Location: [address] [place] [address] GI LAB;   Service: Gastroenterology    EYE SURGERY Bilateral     CATARACT     Family History   Problem Relation Age of Onset    Lung cancer Father     Heart disease Mother     Lung cancer Sister     Lung cancer Brother        Meds/Allergies     Current Outpatient Medications:     aspirin 81 mg chewable tablet, Chew 81 mg daily, Disp: , Rfl:     atorvastatin (LIPITOR) 20 mg tablet, Take 1 tablet (20 mg total) by mouth daily, Disp: 90 tablet, Rfl: 1    cetirizine (ZyrTEC) 10 mg tablet, Take 10 mg by mouth daily, Disp: , Rfl:     clotrimazole-betamethasone (LOTRISONE) 1-0 05 % cream, Apply topically 2 (two) times a day, Disp: 45 g, Rfl: 5    Ergocalciferol (VITAMIN D2) 2000 units TABS, Take 2,000 mg by mouth, Disp: , Rfl:     fluticasone-umeclidinium-vilanterol (TRELEGY) 100-62 5-25 MCG/INH inhaler, Inhale 1 puff daily Rinse mouth after use , Disp: 1 Inhaler, Rfl: 5    gabapentin (NEURONTIN) 300 mg capsule, Take 1 capsule (300 mg total) by mouth daily, Disp: 90 capsule, Rfl: 1    glucose blood (ONETOUCH VERIO) test strip, Use as instructed TEST TWO TIMES DAILY e11 65, Disp: 300 each, Rfl: 3    lisinopril (ZESTRIL) 5 mg tablet, take 1 tablet by mouth once daily, Disp: 90 tablet, Rfl: 1    metFORMIN (GLUCOPHAGE) 500 mg tablet, take 1 tablet by mouth twice a day with meals, Disp: 180 tablet, Rfl: 1    Multiple Vitamin (MULTIVITAMINS PO), Take by mouth daily, Disp: , Rfl:     omeprazole (PriLOSEC) 20 mg delayed release capsule, take 1 capsule by mouth twice a day, Disp: 180 capsule, Rfl: 3    oxybutynin (DITROPAN-XL) 5 mg 24 hr tablet, Take 1 tablet (5 mg total) by mouth daily, Disp: 90 tablet, Rfl: 1    albuterol (Ventolin HFA) 90 mcg/act inhaler, Inhale 2 puffs every 6 (six) hours as needed for wheezing, Disp: 18 g, Rfl: 3    fluticasone (FLONASE) 50 mcg/act nasal spray, 1 spray into each nostril daily for 7 days (Patient not taking: Reported on 2/24/2021), Disp: 1 Bottle, Rfl: 0  Allergies   Allergen Reactions    Bee Venom Anaphylaxis    Other      FLOWERS/GRASS       Vitals: Blood pressure 122/78, pulse 62, temperature 98 7 °F (37 1 °C), temperature source Tympanic, height 5' 2" (1 575 m), weight 76 kg (167 lb 9 6 oz), SpO2 95 %  Body mass index is 30 65 kg/m²  Oxygen Therapy  SpO2: 95 %      Physical Exam  Vitals signs reviewed  Constitutional:       Appearance: Normal appearance  She is not ill-appearing or toxic-appearing  HENT:      Head: Normocephalic and atraumatic  Nose: Nose normal  No congestion or rhinorrhea  Mouth/Throat:      Mouth: Mucous membranes are moist       Pharynx: Oropharynx is clear  Neck:      Musculoskeletal: Normal range of motion and neck supple  Cardiovascular:      Rate and Rhythm: Normal rate and regular rhythm  Pulses: Normal pulses  Heart sounds: Normal heart sounds  Pulmonary:      Effort: Pulmonary effort is normal  No respiratory distress  Breath sounds: Normal breath sounds  No wheezing, rhonchi or rales  Abdominal:      General: Abdomen is flat  Bowel sounds are normal  There is no distension  Palpations: Abdomen is soft  Tenderness: There is no abdominal tenderness  There is no guarding  Musculoskeletal: Normal range of motion           General: No swelling or tenderness  Right lower leg: No edema  Left lower leg: No edema  Skin:     General: Skin is warm and dry  Findings: No rash  Neurological:      General: No focal deficit present  Mental Status: She is alert and oriented to person, place, and time  Mental status is at baseline  Psychiatric:         Mood and Affect: Mood normal          Behavior: Behavior normal          Labs: I have personally reviewed pertinent lab results  Lab Results   Component Value Date    WBC 8 43 11/27/2019    HGB 12 4 11/27/2019    HCT 39 7 11/27/2019    MCV 91 11/27/2019     11/27/2019     Lab Results   Component Value Date    CALCIUM 8 6 11/27/2019    K 4 1 11/27/2019    CO2 32 11/27/2019     11/27/2019    BUN 9 11/27/2019    CREATININE 0 76 11/27/2019     No results found for: IGE  Lab Results   Component Value Date    ALT 27 11/26/2019    AST 12 11/26/2019    ALKPHOS 54 11/26/2019       Imaging and other studies: I have personally reviewed pertinent reports  and I have personally reviewed pertinent films in PACS     CXR 10/2019: No acute cardiopulmonary disease  Pulmonary function testing:   FEV1/FVC Ratio:  47 %  Forced Vital Capacity:  1 91 L   81 % predicted  FEV1:  0 9 L      56 % predicted     Lung volumes by body plethysmography:   Total Lung Capacity 4 57 L, 106 % predicted   Residual volume 2 73 L, 147 % predicted     DLCO corrected for patients hemoglobin level:  58 %    EKG, Pathology, and Other Studies: I have personally reviewed pertinent reports  and I have personally reviewed pertinent films in PACS      [facility]

## 2021-03-01 DIAGNOSIS — K21.9 GASTROESOPHAGEAL REFLUX DISEASE WITHOUT ESOPHAGITIS: ICD-10-CM

## 2021-03-01 DIAGNOSIS — I10 HYPERTENSION, UNSPECIFIED TYPE: ICD-10-CM

## 2021-03-01 RX ORDER — LISINOPRIL 5 MG/1
5 TABLET ORAL DAILY
Qty: 90 TABLET | Refills: 1 | Status: SHIPPED | OUTPATIENT
Start: 2021-03-01 | End: 2021-08-23 | Stop reason: SDUPTHER

## 2021-03-01 RX ORDER — OMEPRAZOLE 20 MG/1
20 CAPSULE, DELAYED RELEASE ORAL 2 TIMES DAILY
Qty: 180 CAPSULE | Refills: 3 | Status: SHIPPED | OUTPATIENT
Start: 2021-03-01 | End: 2021-08-19

## 2021-03-11 DIAGNOSIS — E11.9 TYPE 2 DIABETES MELLITUS WITHOUT COMPLICATION, WITHOUT LONG-TERM CURRENT USE OF INSULIN (HCC): ICD-10-CM

## 2021-04-19 ENCOUNTER — RA CDI HCC (OUTPATIENT)
Dept: OTHER | Facility: HOSPITAL | Age: 81
End: 2021-04-19

## 2021-04-19 NOTE — PROGRESS NOTES
[place] [id number]  coding opportunities             Chart reviewed, (number of) suggestions sent to provider: 2      DX used     Patients insurance company: [address]  (Medicare Advantage and Commercial)     Visit status: Patient arrived for their scheduled appointment        [place] [id number]  coding opportunities             Chart reviewed, (number of) suggestions sent to provider: 2      DX: E11 9 Type 2 diabetes mellitus without complications  DX: I84 23 Chronic diastolic (congestive) heart failure       Patients insurance company: [address]  (Medicare Advantage and Commercial)

## 2021-04-26 ENCOUNTER — OFFICE VISIT (OUTPATIENT)
Dept: INTERNAL MEDICINE CLINIC | Facility: CLINIC | Age: 81
End: 2021-04-26
Payer: COMMERCIAL

## 2021-04-26 VITALS
HEIGHT: 62 IN | RESPIRATION RATE: 18 BRPM | WEIGHT: 168 LBS | TEMPERATURE: 97.9 F | SYSTOLIC BLOOD PRESSURE: 130 MMHG | OXYGEN SATURATION: 93 % | HEART RATE: 74 BPM | DIASTOLIC BLOOD PRESSURE: 80 MMHG | BODY MASS INDEX: 30.91 KG/M2

## 2021-04-26 DIAGNOSIS — Z13.29 SCREENING FOR THYROID DISORDER: ICD-10-CM

## 2021-04-26 DIAGNOSIS — E11.9 TYPE 2 DIABETES MELLITUS WITHOUT COMPLICATION, UNSPECIFIED WHETHER LONG TERM INSULIN USE (HCC): ICD-10-CM

## 2021-04-26 DIAGNOSIS — E55.9 VITAMIN D DEFICIENCY: ICD-10-CM

## 2021-04-26 DIAGNOSIS — I10 ESSENTIAL HYPERTENSION: ICD-10-CM

## 2021-04-26 DIAGNOSIS — E78.2 MIXED HYPERLIPIDEMIA: ICD-10-CM

## 2021-04-26 DIAGNOSIS — M17.10 ARTHRITIS OF KNEE: ICD-10-CM

## 2021-04-26 DIAGNOSIS — E11.9 TYPE 2 DIABETES MELLITUS WITHOUT COMPLICATION, WITHOUT LONG-TERM CURRENT USE OF INSULIN (HCC): ICD-10-CM

## 2021-04-26 DIAGNOSIS — I50.32 CHRONIC DIASTOLIC (CONGESTIVE) HEART FAILURE (HCC): ICD-10-CM

## 2021-04-26 DIAGNOSIS — E11.65 TYPE 2 DIABETES MELLITUS WITH HYPERGLYCEMIA, WITHOUT LONG-TERM CURRENT USE OF INSULIN (HCC): Primary | ICD-10-CM

## 2021-04-26 LAB — SL AMB POCT HEMOGLOBIN AIC: 6.5 (ref ?–6.5)

## 2021-04-26 PROCEDURE — 2028F FOOT EXAM PERFORMED: CPT | Performed by: PHYSICIAN ASSISTANT

## 2021-04-26 PROCEDURE — 3725F SCREEN DEPRESSION PERFORMED: CPT | Performed by: PHYSICIAN ASSISTANT

## 2021-04-26 PROCEDURE — 1160F RVW MEDS BY RX/DR IN RCRD: CPT | Performed by: PHYSICIAN ASSISTANT

## 2021-04-26 PROCEDURE — 3288F FALL RISK ASSESSMENT DOCD: CPT | Performed by: PHYSICIAN ASSISTANT

## 2021-04-26 PROCEDURE — 99214 OFFICE O/P EST MOD 30 MIN: CPT | Performed by: PHYSICIAN ASSISTANT

## 2021-04-26 PROCEDURE — 83036 HEMOGLOBIN GLYCOSYLATED A1C: CPT | Performed by: PHYSICIAN ASSISTANT

## 2021-04-26 PROCEDURE — 1101F PT FALLS ASSESS-DOCD LE1/YR: CPT | Performed by: PHYSICIAN ASSISTANT

## 2021-04-26 PROCEDURE — 1036F TOBACCO NON-USER: CPT | Performed by: PHYSICIAN ASSISTANT

## 2021-04-26 RX ORDER — BLOOD SUGAR DIAGNOSTIC
STRIP MISCELLANEOUS
Qty: 300 EACH | Refills: 5 | Status: SHIPPED | OUTPATIENT
Start: 2021-04-26

## 2021-04-26 NOTE — ASSESSMENT & PLAN NOTE
Lab Results   Component Value Date    HGBA1C 6 5 04/26/2021       Pts symptoms are stable with current regime  No changes at present

## 2021-04-26 NOTE — PROGRESS NOTES
Assessment/Plan:  Problem List Items Addressed This Visit        Endocrine    Type 2 diabetes mellitus with hyperglycemia, without long-term current use of insulin ([facility] [address] ) - Primary       Lab Results   Component Value Date    HGBA1C 6 5 04/26/2021       Pts symptoms are stable with current regime  No changes at present  Relevant Medications    metFORMIN (GLUCOPHAGE) 500 mg tablet    Other Relevant Orders    POCT hemoglobin A1c (Completed)    Comprehensive metabolic panel    Microalbumin / creatinine urine ratio       Cardiovascular and Mediastinum    Essential hypertension     Pts symptoms are stable with current regime  No changes at present  Relevant Orders    CBC and differential       Other    Hyperlipidemia, unspecified    Relevant Orders    Lipid panel    Vitamin D deficiency    Relevant Orders    Vitamin D 25 hydroxy      Other Visit Diagnoses     Type 2 diabetes mellitus without complication, without long-term current use of insulin (HCC)        Relevant Medications    metFORMIN (GLUCOPHAGE) 500 mg tablet    Type 2 diabetes mellitus without complication, unspecified whether long term insulin use (HCC)        Relevant Medications    metFORMIN (GLUCOPHAGE) 500 mg tablet    glucose blood (OneTouch Verio) test strip    Chronic diastolic (congestive) heart failure (HCC)        Screening for thyroid disorder        Relevant Orders    TSH, 3rd generation with Free T4 reflex    Arthritis of knee        Relevant Medications    Diclofenac Sodium (VOLTAREN) 1 %    Other Relevant Orders    Ambulatory referral to Orthopedic Surgery           Diagnoses and all orders for this visit:    Type 2 diabetes mellitus with hyperglycemia, without long-term current use of insulin (HCC)  -     POCT hemoglobin A1c  -     Comprehensive metabolic panel;  Future  -     Microalbumin / creatinine urine ratio    Type 2 diabetes mellitus without complication, without long-term current use of insulin (HCC)  -     metFORMIN (GLUCOPHAGE) 500 mg tablet; Take 1 tablet (500 mg total) by mouth 2 (two) times a day with meals    Type 2 diabetes mellitus without complication, unspecified whether long term insulin use (HCC)  -     glucose blood (OneTouch Verio) test strip; Use as instructed TEST TWO TIMES DAILY e11 65    Chronic diastolic (congestive) heart failure (HCC)    Essential hypertension  -     CBC and differential; Future    Mixed hyperlipidemia  -     Lipid panel; Future    Vitamin D deficiency  -     Vitamin D 25 hydroxy; Future    Screening for thyroid disorder  -     TSH, 3rd generation with Free T4 reflex; Future    Arthritis of knee  -     Diclofenac Sodium (VOLTAREN) 1 %; Apply 2 g topically 4 (four) times a day  -     Ambulatory referral to Orthopedic Surgery; Future        Type 2 diabetes mellitus with hyperglycemia, without long-term current use of insulin ([facility] )    Lab Results   Component Value Date    HGBA1C 6 5 04/26/2021       Pts symptoms are stable with current regime  No changes at present  Essential hypertension  Pts symptoms are stable with current regime  No changes at present  Subjective:      Patient ID: Ilan Uriarte is a 80 y o  female  Pt presents for routine visit  She is due for A1C and foot exam  She is due for labs  She has not been covid vaccinated but is interested in this if she can get Moderna  Will help pt get scheduled  BP is normal  A1C today is nicely controlled at 6 5  She complains of bilateral knee pain that has worsened over time  The following portions of the patient's history were reviewed and updated as appropriate:   She has a past medical history of Allergic, COPD (chronic obstructive pulmonary disease) ([facility] [address] ), Diabetes mellitus ([facility] [address] ), GERD (gastroesophageal reflux disease), Hyperlipidemia, Hypertension, Left non-suppurative otitis media (11/27/2019), Pneumonia, Pneumonia of right lower lobe due to infectious organism (12/4/2018), and Vitamin D deficiency  ,  does not have any pertinent problems on file  ,   has a past surgical history that includes Cholecystectomy (1984); Eye surgery (Bilateral); and Colonoscopy (N/A, 1/30/2019)  ,  family history includes Heart disease in her mother; Lung cancer in her brother, father, and sister  ,   reports that she quit smoking about 7 years ago  Her smoking use included cigarettes  She started smoking about 57 years ago  She has a 50 00 pack-year smoking history  She has never used smokeless tobacco  She reports that she does not drink alcohol or use drugs  ,  is allergic to bee venom and other     Current Outpatient Medications   Medication Sig Dispense Refill    albuterol (Ventolin HFA) 90 mcg/act inhaler Inhale 2 puffs every 6 (six) hours as needed for wheezing 18 g 3    aspirin 81 mg chewable tablet Chew 81 mg daily      atorvastatin (LIPITOR) 20 mg tablet Take 1 tablet (20 mg total) by mouth daily 90 tablet 1    cetirizine (ZyrTEC) 10 mg tablet Take 10 mg by mouth daily      clotrimazole-betamethasone (LOTRISONE) 1-0 05 % cream Apply topically 2 (two) times a day 45 g 5    Ergocalciferol (VITAMIN D2) 2000 units TABS Take 2,000 mg by mouth      fluticasone-umeclidinium-vilanterol (TRELEGY) 100-62 5-25 MCG/INH inhaler Inhale 1 puff daily Rinse mouth after use   1 Inhaler 5    gabapentin (NEURONTIN) 300 mg capsule Take 1 capsule (300 mg total) by mouth daily 90 capsule 1    glucose blood (OneTouch Verio) test strip Use as instructed TEST TWO TIMES DAILY e11 65 300 each 5    lisinopril (ZESTRIL) 5 mg tablet Take 1 tablet (5 mg total) by mouth daily 90 tablet 1    metFORMIN (GLUCOPHAGE) 500 mg tablet Take 1 tablet (500 mg total) by mouth 2 (two) times a day with meals 180 tablet 1    Multiple Vitamin (MULTIVITAMINS PO) Take by mouth daily      omeprazole (PriLOSEC) 20 mg delayed release capsule Take 1 capsule (20 mg total) by mouth 2 (two) times a day 180 capsule 3    oxybutynin (DITROPAN-XL) 5 mg 24 hr tablet Take 1 tablet (5 mg total) by mouth daily 90 tablet 1    Diclofenac Sodium (VOLTAREN) 1 % Apply 2 g topically 4 (four) times a day 100 g 2     No current facility-administered medications for this visit  Review of Systems   Constitutional: Negative for chills and fever  HENT: Negative for congestion, ear pain, hearing loss, postnasal drip, rhinorrhea, sinus pressure, sinus pain, sore throat and trouble swallowing  Eyes: Negative for pain and visual disturbance  Respiratory: Negative for cough, chest tightness, shortness of breath and wheezing  Cardiovascular: Negative  Negative for chest pain, palpitations and leg swelling  Gastrointestinal: Negative for abdominal pain, blood in stool, constipation, diarrhea, nausea and vomiting  Endocrine: Negative for cold intolerance, heat intolerance, polydipsia, polyphagia and polyuria  Genitourinary: Negative for difficulty urinating, dysuria, flank pain and urgency  Musculoskeletal: Positive for arthralgias  Negative for back pain, gait problem and myalgias  Skin: Negative for rash  Allergic/Immunologic: Negative  Neurological: Negative for dizziness, weakness, light-headedness and headaches  Hematological: Negative  Psychiatric/Behavioral: Negative for behavioral problems, dysphoric mood and sleep disturbance  The patient is not nervous/anxious  PHQ-9 Depression Screening    PHQ-9:   Frequency of the following problems over the past two weeks:      Little interest or pleasure in doing things: 0 - not at all  Feeling down, depressed, or hopeless: 1 - several days  PHQ-2 Score: 1          Objective:  Vitals:    04/26/21 0923   BP: 130/80   Pulse: 74   Resp: 18   Temp: 97 9 °F (36 6 °C)   TempSrc: Tympanic   SpO2: 93%   Weight: 76 2 kg (168 lb)   Height: 5' 2" (1 575 m)     Body mass index is 30 73 kg/m²  Physical Exam  Constitutional:       General: She is not in acute distress  Appearance: She is well-developed  She is not diaphoretic  HENT:      Head: Normocephalic and atraumatic  Right Ear: External ear normal       Left Ear: External ear normal       Nose: Nose normal       Mouth/Throat:      Pharynx: No oropharyngeal exudate  Eyes:      General: No scleral icterus  Right eye: No discharge  Left eye: No discharge  Conjunctiva/sclera: Conjunctivae normal       Pupils: Pupils are equal, round, and reactive to light  Neck:      Musculoskeletal: Normal range of motion and neck supple  Thyroid: No thyromegaly  Cardiovascular:      Rate and Rhythm: Normal rate and regular rhythm  Pulses: no weak pulses          Dorsalis pedis pulses are 2+ on the right side and 2+ on the left side  Posterior tibial pulses are 2+ on the right side and 2+ on the left side  Heart sounds: Normal heart sounds  No murmur  No friction rub  No gallop  Pulmonary:      Effort: Pulmonary effort is normal  No respiratory distress  Breath sounds: Normal breath sounds  No wheezing or rales  Abdominal:      General: Bowel sounds are normal  There is no distension  Palpations: Abdomen is soft  Tenderness: There is no abdominal tenderness  Musculoskeletal: Normal range of motion  General: No deformity  Right knee: Tenderness found  Left knee: Tenderness found  Feet:      Right foot:      Skin integrity: No ulcer, skin breakdown, erythema, warmth, callus or dry skin  Left foot:      Skin integrity: No ulcer, skin breakdown, erythema, warmth, callus or dry skin  Skin:     General: Skin is warm and dry  Neurological:      Mental Status: She is alert and oriented to person, place, and time  Cranial Nerves: No cranial nerve deficit  Psychiatric:         Behavior: Behavior normal          Thought Content: Thought content normal          Judgment: Judgment normal        BMI Counseling: Body mass index is 30 73 kg/m²   The BMI is above normal  Nutrition recommendations include decreasing portion sizes, consuming healthier snacks and limiting drinks that contain sugar  Patient's shoes and socks removed  Right Foot/Ankle   Right Foot Inspection  Skin Exam: skin normal and skin intact no dry skin, no warmth, no callus, no erythema, no maceration, no abnormal color, no pre-ulcer, no ulcer and no callus                          Toe Exam: ROM and strength within normal limits  Sensory   Vibration: intact  Proprioception: intact   Monofilament testing: intact  Vascular  Capillary refills: < 3 seconds  The right DP pulse is 2+  The right PT pulse is 2+  Left Foot/Ankle  Left Foot Inspection  Skin Exam: skin normal and skin intactno dry skin, no warmth, no erythema, no maceration, normal color, no pre-ulcer, no ulcer and no callus                         Toe Exam: ROM and strength within normal limits                   Sensory   Vibration: intact  Proprioception: intact  Monofilament: intact  Vascular  Capillary refills: < 3 seconds  The left DP pulse is 2+  The left PT pulse is 2+  Assign Risk Category:  No deformity present; No loss of protective sensation;  No weak pulses       Risk: 0

## 2021-04-29 ENCOUNTER — IMMUNIZATIONS (OUTPATIENT)
Dept: FAMILY MEDICINE CLINIC | Facility: HOSPITAL | Age: 81
End: 2021-04-29
Payer: COMMERCIAL

## 2021-04-29 ENCOUNTER — APPOINTMENT (OUTPATIENT)
Dept: LAB | Facility: HOSPITAL | Age: 81
End: 2021-04-29
Payer: COMMERCIAL

## 2021-04-29 DIAGNOSIS — I10 ESSENTIAL HYPERTENSION: ICD-10-CM

## 2021-04-29 DIAGNOSIS — E55.9 VITAMIN D DEFICIENCY: ICD-10-CM

## 2021-04-29 DIAGNOSIS — Z13.29 SCREENING FOR THYROID DISORDER: ICD-10-CM

## 2021-04-29 DIAGNOSIS — Z23 ENCOUNTER FOR IMMUNIZATION: Primary | ICD-10-CM

## 2021-04-29 DIAGNOSIS — E78.2 MIXED HYPERLIPIDEMIA: ICD-10-CM

## 2021-04-29 DIAGNOSIS — E11.65 TYPE 2 DIABETES MELLITUS WITH HYPERGLYCEMIA, WITHOUT LONG-TERM CURRENT USE OF INSULIN (HCC): ICD-10-CM

## 2021-04-29 LAB
25(OH)D3 SERPL-MCNC: 54.3 NG/ML (ref 30–100)
ALBUMIN SERPL BCP-MCNC: 4 G/DL (ref 3.5–5)
ALP SERPL-CCNC: 75 U/L (ref 46–116)
ALT SERPL W P-5'-P-CCNC: 41 U/L (ref 12–78)
ANION GAP SERPL CALCULATED.3IONS-SCNC: 9 MMOL/L (ref 4–13)
AST SERPL W P-5'-P-CCNC: 18 U/L (ref 5–45)
BASOPHILS # BLD AUTO: 0.07 THOUSANDS/ΜL (ref 0–0.1)
BASOPHILS NFR BLD AUTO: 1 % (ref 0–1)
BILIRUB SERPL-MCNC: 0.63 MG/DL (ref 0.2–1)
BUN SERPL-MCNC: 14 MG/DL (ref 5–25)
CALCIUM SERPL-MCNC: 9.5 MG/DL (ref 8.3–10.1)
CHLORIDE SERPL-SCNC: 104 MMOL/L (ref 100–108)
CHOLEST SERPL-MCNC: 177 MG/DL (ref 50–200)
CO2 SERPL-SCNC: 28 MMOL/L (ref 21–32)
CREAT SERPL-MCNC: 0.99 MG/DL (ref 0.6–1.3)
CREAT UR-MCNC: 69.2 MG/DL
EOSINOPHIL # BLD AUTO: 0.25 THOUSAND/ΜL (ref 0–0.61)
EOSINOPHIL NFR BLD AUTO: 3 % (ref 0–6)
ERYTHROCYTE [DISTWIDTH] IN BLOOD BY AUTOMATED COUNT: 14.1 % (ref 11.6–15.1)
GFR SERPL CREATININE-BSD FRML MDRD: 54 ML/MIN/1.73SQ M
GLUCOSE P FAST SERPL-MCNC: 112 MG/DL (ref 65–99)
HCT VFR BLD AUTO: 42.9 % (ref 34.8–46.1)
HDLC SERPL-MCNC: 53 MG/DL
HGB BLD-MCNC: 13.8 G/DL (ref 11.5–15.4)
IMM GRANULOCYTES # BLD AUTO: 0.03 THOUSAND/UL (ref 0–0.2)
IMM GRANULOCYTES NFR BLD AUTO: 0 % (ref 0–2)
LDLC SERPL CALC-MCNC: 90 MG/DL (ref 0–100)
LYMPHOCYTES # BLD AUTO: 3.12 THOUSANDS/ΜL (ref 0.6–4.47)
LYMPHOCYTES NFR BLD AUTO: 41 % (ref 14–44)
MCH RBC QN AUTO: 28.1 PG (ref 26.8–34.3)
MCHC RBC AUTO-ENTMCNC: 32.2 G/DL (ref 31.4–37.4)
MCV RBC AUTO: 87 FL (ref 82–98)
MICROALBUMIN UR-MCNC: 5.4 MG/L (ref 0–20)
MICROALBUMIN/CREAT 24H UR: 8 MG/G CREATININE (ref 0–30)
MONOCYTES # BLD AUTO: 0.64 THOUSAND/ΜL (ref 0.17–1.22)
MONOCYTES NFR BLD AUTO: 8 % (ref 4–12)
NEUTROPHILS # BLD AUTO: 3.59 THOUSANDS/ΜL (ref 1.85–7.62)
NEUTS SEG NFR BLD AUTO: 47 % (ref 43–75)
NONHDLC SERPL-MCNC: 124 MG/DL
NRBC BLD AUTO-RTO: 0 /100 WBCS
PLATELET # BLD AUTO: 274 THOUSANDS/UL (ref 149–390)
PMV BLD AUTO: 11.1 FL (ref 8.9–12.7)
POTASSIUM SERPL-SCNC: 4.1 MMOL/L (ref 3.5–5.3)
PROT SERPL-MCNC: 7.1 G/DL (ref 6.4–8.2)
RBC # BLD AUTO: 4.91 MILLION/UL (ref 3.81–5.12)
SODIUM SERPL-SCNC: 141 MMOL/L (ref 136–145)
TRIGL SERPL-MCNC: 172 MG/DL
TSH SERPL DL<=0.05 MIU/L-ACNC: 1.92 UIU/ML (ref 0.36–3.74)
WBC # BLD AUTO: 7.7 THOUSAND/UL (ref 4.31–10.16)

## 2021-04-29 PROCEDURE — 0011A SARS-COV-2 / COVID-19 MRNA VACCINE (MODERNA) 100 MCG: CPT

## 2021-04-29 PROCEDURE — 91301 SARS-COV-2 / COVID-19 MRNA VACCINE (MODERNA) 100 MCG: CPT

## 2021-04-29 PROCEDURE — 82043 UR ALBUMIN QUANTITATIVE: CPT | Performed by: PHYSICIAN ASSISTANT

## 2021-04-29 PROCEDURE — 82570 ASSAY OF URINE CREATININE: CPT | Performed by: PHYSICIAN ASSISTANT

## 2021-04-29 PROCEDURE — 84443 ASSAY THYROID STIM HORMONE: CPT

## 2021-04-29 PROCEDURE — 82306 VITAMIN D 25 HYDROXY: CPT

## 2021-04-29 PROCEDURE — 85025 COMPLETE CBC W/AUTO DIFF WBC: CPT

## 2021-04-29 PROCEDURE — 36415 COLL VENOUS BLD VENIPUNCTURE: CPT

## 2021-04-29 PROCEDURE — 80053 COMPREHEN METABOLIC PANEL: CPT

## 2021-04-29 PROCEDURE — 80061 LIPID PANEL: CPT

## 2021-05-27 ENCOUNTER — IMMUNIZATIONS (OUTPATIENT)
Dept: FAMILY MEDICINE CLINIC | Facility: HOSPITAL | Age: 81
End: 2021-05-27

## 2021-05-27 DIAGNOSIS — Z23 ENCOUNTER FOR IMMUNIZATION: Primary | ICD-10-CM

## 2021-05-27 PROCEDURE — 0012A SARS-COV-2 / COVID-19 MRNA VACCINE (MODERNA) 100 MCG: CPT

## 2021-05-27 PROCEDURE — 91301 SARS-COV-2 / COVID-19 MRNA VACCINE (MODERNA) 100 MCG: CPT

## 2021-07-03 ENCOUNTER — HOSPITAL ENCOUNTER (EMERGENCY)
Facility: HOSPITAL | Age: 81
End: 2021-07-04
Attending: EMERGENCY MEDICINE | Admitting: EMERGENCY MEDICINE
Payer: COMMERCIAL

## 2021-07-03 ENCOUNTER — APPOINTMENT (EMERGENCY)
Dept: CT IMAGING | Facility: HOSPITAL | Age: 81
End: 2021-07-03
Payer: COMMERCIAL

## 2021-07-03 DIAGNOSIS — I48.91 ATRIAL FIBRILLATION, NEW ONSET (HCC): ICD-10-CM

## 2021-07-03 DIAGNOSIS — R77.8 ELEVATED TROPONIN: ICD-10-CM

## 2021-07-03 DIAGNOSIS — N12 PYELONEPHRITIS: Primary | ICD-10-CM

## 2021-07-03 LAB
ALBUMIN SERPL BCP-MCNC: 4.1 G/DL (ref 3.5–5)
ALP SERPL-CCNC: 70 U/L (ref 46–116)
ALT SERPL W P-5'-P-CCNC: 42 U/L (ref 12–78)
ANION GAP SERPL CALCULATED.3IONS-SCNC: 10 MMOL/L (ref 4–13)
APTT PPP: 31 SECONDS (ref 23–37)
AST SERPL W P-5'-P-CCNC: 29 U/L (ref 5–45)
BACTERIA UR QL AUTO: ABNORMAL /HPF
BASOPHILS # BLD AUTO: 0.08 THOUSANDS/ΜL (ref 0–0.1)
BASOPHILS NFR BLD AUTO: 1 % (ref 0–1)
BILIRUB SERPL-MCNC: 0.73 MG/DL (ref 0.2–1)
BILIRUB UR QL STRIP: NEGATIVE
BUN SERPL-MCNC: 14 MG/DL (ref 5–25)
CALCIUM SERPL-MCNC: 9.3 MG/DL (ref 8.3–10.1)
CHLORIDE SERPL-SCNC: 100 MMOL/L (ref 100–108)
CLARITY UR: ABNORMAL
CO2 SERPL-SCNC: 27 MMOL/L (ref 21–32)
COLOR UR: YELLOW
CREAT SERPL-MCNC: 0.94 MG/DL (ref 0.6–1.3)
D DIMER PPP FEU-MCNC: 0.42 UG/ML FEU
EOSINOPHIL # BLD AUTO: 0.11 THOUSAND/ΜL (ref 0–0.61)
EOSINOPHIL NFR BLD AUTO: 1 % (ref 0–6)
ERYTHROCYTE [DISTWIDTH] IN BLOOD BY AUTOMATED COUNT: 13.2 % (ref 11.6–15.1)
GFR SERPL CREATININE-BSD FRML MDRD: 57 ML/MIN/1.73SQ M
GLUCOSE SERPL-MCNC: 113 MG/DL (ref 65–140)
GLUCOSE UR STRIP-MCNC: NEGATIVE MG/DL
HCT VFR BLD AUTO: 45.7 % (ref 34.8–46.1)
HGB BLD-MCNC: 14.7 G/DL (ref 11.5–15.4)
HGB UR QL STRIP.AUTO: ABNORMAL
IMM GRANULOCYTES # BLD AUTO: 0.04 THOUSAND/UL (ref 0–0.2)
IMM GRANULOCYTES NFR BLD AUTO: 0 % (ref 0–2)
INR PPP: 1.06 (ref 0.84–1.19)
KETONES UR STRIP-MCNC: NEGATIVE MG/DL
LACTATE SERPL-SCNC: 1.8 MMOL/L (ref 0.5–2)
LEUKOCYTE ESTERASE UR QL STRIP: ABNORMAL
LIPASE SERPL-CCNC: 124 U/L (ref 73–393)
LYMPHOCYTES # BLD AUTO: 3.71 THOUSANDS/ΜL (ref 0.6–4.47)
LYMPHOCYTES NFR BLD AUTO: 30 % (ref 14–44)
MAGNESIUM SERPL-MCNC: 1.6 MG/DL (ref 1.6–2.6)
MCH RBC QN AUTO: 28.1 PG (ref 26.8–34.3)
MCHC RBC AUTO-ENTMCNC: 32.2 G/DL (ref 31.4–37.4)
MCV RBC AUTO: 87 FL (ref 82–98)
MONOCYTES # BLD AUTO: 1.45 THOUSAND/ΜL (ref 0.17–1.22)
MONOCYTES NFR BLD AUTO: 12 % (ref 4–12)
NEUTROPHILS # BLD AUTO: 7.08 THOUSANDS/ΜL (ref 1.85–7.62)
NEUTS SEG NFR BLD AUTO: 56 % (ref 43–75)
NITRITE UR QL STRIP: NEGATIVE
NON-SQ EPI CELLS URNS QL MICRO: ABNORMAL /HPF
NRBC BLD AUTO-RTO: 0 /100 WBCS
PH UR STRIP.AUTO: 5.5 [PH]
PLATELET # BLD AUTO: 311 THOUSANDS/UL (ref 149–390)
PMV BLD AUTO: 10.7 FL (ref 8.9–12.7)
POTASSIUM SERPL-SCNC: 4.2 MMOL/L (ref 3.5–5.3)
PROT SERPL-MCNC: 7.6 G/DL (ref 6.4–8.2)
PROT UR STRIP-MCNC: NEGATIVE MG/DL
PROTHROMBIN TIME: 13.6 SECONDS (ref 11.6–14.5)
RBC # BLD AUTO: 5.24 MILLION/UL (ref 3.81–5.12)
RBC #/AREA URNS AUTO: ABNORMAL /HPF
SODIUM SERPL-SCNC: 137 MMOL/L (ref 136–145)
SP GR UR STRIP.AUTO: 1.02 (ref 1–1.03)
TROPONIN I SERPL-MCNC: 0.06 NG/ML
TROPONIN I SERPL-MCNC: 0.51 NG/ML
TSH SERPL DL<=0.05 MIU/L-ACNC: 3.14 UIU/ML (ref 0.36–3.74)
UROBILINOGEN UR QL STRIP.AUTO: 0.2 E.U./DL
WBC # BLD AUTO: 12.47 THOUSAND/UL (ref 4.31–10.16)
WBC #/AREA URNS AUTO: ABNORMAL /HPF

## 2021-07-03 PROCEDURE — 87077 CULTURE AEROBIC IDENTIFY: CPT | Performed by: EMERGENCY MEDICINE

## 2021-07-03 PROCEDURE — 74177 CT ABD & PELVIS W/CONTRAST: CPT

## 2021-07-03 PROCEDURE — 83690 ASSAY OF LIPASE: CPT | Performed by: EMERGENCY MEDICINE

## 2021-07-03 PROCEDURE — 85025 COMPLETE CBC W/AUTO DIFF WBC: CPT | Performed by: EMERGENCY MEDICINE

## 2021-07-03 PROCEDURE — 85730 THROMBOPLASTIN TIME PARTIAL: CPT | Performed by: EMERGENCY MEDICINE

## 2021-07-03 PROCEDURE — 96361 HYDRATE IV INFUSION ADD-ON: CPT

## 2021-07-03 PROCEDURE — 36415 COLL VENOUS BLD VENIPUNCTURE: CPT | Performed by: EMERGENCY MEDICINE

## 2021-07-03 PROCEDURE — 80053 COMPREHEN METABOLIC PANEL: CPT | Performed by: EMERGENCY MEDICINE

## 2021-07-03 PROCEDURE — 83605 ASSAY OF LACTIC ACID: CPT | Performed by: EMERGENCY MEDICINE

## 2021-07-03 PROCEDURE — 85610 PROTHROMBIN TIME: CPT | Performed by: EMERGENCY MEDICINE

## 2021-07-03 PROCEDURE — 96365 THER/PROPH/DIAG IV INF INIT: CPT

## 2021-07-03 PROCEDURE — 87040 BLOOD CULTURE FOR BACTERIA: CPT | Performed by: EMERGENCY MEDICINE

## 2021-07-03 PROCEDURE — 96375 TX/PRO/DX INJ NEW DRUG ADDON: CPT

## 2021-07-03 PROCEDURE — 99285 EMERGENCY DEPT VISIT HI MDM: CPT | Performed by: EMERGENCY MEDICINE

## 2021-07-03 PROCEDURE — 99285 EMERGENCY DEPT VISIT HI MDM: CPT

## 2021-07-03 PROCEDURE — 87086 URINE CULTURE/COLONY COUNT: CPT | Performed by: EMERGENCY MEDICINE

## 2021-07-03 PROCEDURE — 84443 ASSAY THYROID STIM HORMONE: CPT | Performed by: PHYSICIAN ASSISTANT

## 2021-07-03 PROCEDURE — 84443 ASSAY THYROID STIM HORMONE: CPT | Performed by: EMERGENCY MEDICINE

## 2021-07-03 PROCEDURE — G1004 CDSM NDSC: HCPCS

## 2021-07-03 PROCEDURE — 87186 SC STD MICRODIL/AGAR DIL: CPT | Performed by: EMERGENCY MEDICINE

## 2021-07-03 PROCEDURE — 83036 HEMOGLOBIN GLYCOSYLATED A1C: CPT | Performed by: PHYSICIAN ASSISTANT

## 2021-07-03 PROCEDURE — 93005 ELECTROCARDIOGRAM TRACING: CPT

## 2021-07-03 PROCEDURE — 83735 ASSAY OF MAGNESIUM: CPT | Performed by: EMERGENCY MEDICINE

## 2021-07-03 PROCEDURE — 81001 URINALYSIS AUTO W/SCOPE: CPT | Performed by: EMERGENCY MEDICINE

## 2021-07-03 PROCEDURE — 85379 FIBRIN DEGRADATION QUANT: CPT | Performed by: EMERGENCY MEDICINE

## 2021-07-03 PROCEDURE — 84484 ASSAY OF TROPONIN QUANT: CPT | Performed by: EMERGENCY MEDICINE

## 2021-07-03 RX ORDER — ONDANSETRON 2 MG/ML
4 INJECTION INTRAMUSCULAR; INTRAVENOUS ONCE
Status: COMPLETED | OUTPATIENT
Start: 2021-07-03 | End: 2021-07-03

## 2021-07-03 RX ORDER — FENTANYL CITRATE 50 UG/ML
25 INJECTION, SOLUTION INTRAMUSCULAR; INTRAVENOUS ONCE
Status: COMPLETED | OUTPATIENT
Start: 2021-07-03 | End: 2021-07-03

## 2021-07-03 RX ORDER — CEFTRIAXONE 1 G/50ML
1000 INJECTION, SOLUTION INTRAVENOUS ONCE
Status: COMPLETED | OUTPATIENT
Start: 2021-07-03 | End: 2021-07-04

## 2021-07-03 RX ADMIN — ONDANSETRON 4 MG: 2 INJECTION INTRAMUSCULAR; INTRAVENOUS at 19:35

## 2021-07-03 RX ADMIN — FENTANYL CITRATE 25 MCG: 50 INJECTION, SOLUTION INTRAMUSCULAR; INTRAVENOUS at 19:37

## 2021-07-03 RX ADMIN — SODIUM CHLORIDE 1000 ML: 0.9 INJECTION, SOLUTION INTRAVENOUS at 19:34

## 2021-07-03 RX ADMIN — CEFTRIAXONE 1000 MG: 1 INJECTION, SOLUTION INTRAVENOUS at 23:30

## 2021-07-03 RX ADMIN — IOHEXOL 100 ML: 350 INJECTION, SOLUTION INTRAVENOUS at 21:35

## 2021-07-03 NOTE — ED PROVIDER NOTES
History  Chief Complaint   Patient presents with    Flank Pain     pt reports left side flank pain; nausea vomitting;     Constipation     last bm 5 days ago     35-year-old female presents with 3 day history of left flank pain  She describes it as sharp  It radiates around to the left mid abdomen  She denies prior history of this type of pain  Pain is worse with movment  She denies prior history of kidney stones she has had increased urinary frequency but that is her baseline she denies any dysuria she has had no gross hematuria she is denying any trauma or falls  She has no chest pain no pleuritic pain no prior history of DVT or PE  She does not feel short of breath  Last bowel movement was 5 days ago she feels constipated her abdomen is bloated  She was throwing up for period of 2 days none today however she main she has a occasionally intermittently nauseated  She denies fever chills she has felt hot and cold  No occasionally lightheaded  No new medications  Prior to Admission Medications   Prescriptions Last Dose Informant Patient Reported? Taking? Diclofenac Sodium (VOLTAREN) 1 %   No No   Sig: Apply 2 g topically 4 (four) times a day   Ergocalciferol (VITAMIN D2) 2000 units TABS   Yes No   Sig: Take 2,000 mg by mouth   Multiple Vitamin (MULTIVITAMINS PO)   Yes No   Sig: Take by mouth daily   albuterol (Ventolin HFA) 90 mcg/act inhaler   No No   Sig: Inhale 2 puffs every 6 (six) hours as needed for wheezing   aspirin 81 mg chewable tablet  Self Yes No   Sig: Chew 81 mg daily   atorvastatin (LIPITOR) 20 mg tablet   No No   Sig: Take 1 tablet (20 mg total) by mouth daily   cetirizine (ZyrTEC) 10 mg tablet   Yes No   Sig: Take 10 mg by mouth daily   clotrimazole-betamethasone (LOTRISONE) 1-0 05 % cream   No No   Sig: Apply topically 2 (two) times a day   fluticasone-umeclidinium-vilanterol (TRELEGY) 100-62 5-25 MCG/INH inhaler   No No   Sig: Inhale 1 puff daily Rinse mouth after use  gabapentin (NEURONTIN) 300 mg capsule   No No   Sig: Take 1 capsule (300 mg total) by mouth daily   glucose blood (OneTouch Verio) test strip   No No   Sig: Use as instructed TEST TWO TIMES DAILY e11 65   lisinopril (ZESTRIL) 5 mg tablet   No No   Sig: Take 1 tablet (5 mg total) by mouth daily   metFORMIN (GLUCOPHAGE) 500 mg tablet   No No   Sig: Take 1 tablet (500 mg total) by mouth 2 (two) times a day with meals   omeprazole (PriLOSEC) 20 mg delayed release capsule   No No   Sig: Take 1 capsule (20 mg total) by mouth 2 (two) times a day   oxybutynin (DITROPAN-XL) 5 mg 24 hr tablet   No No   Sig: Take 1 tablet (5 mg total) by mouth daily      Facility-Administered Medications: None       Past Medical History:   Diagnosis Date    Allergic     COPD (chronic obstructive pulmonary disease) (HCC)     Diabetes mellitus (HCC)     GERD (gastroesophageal reflux disease)     Hyperlipidemia     Hypertension     Left non-suppurative otitis media 11/27/2019    Pneumonia     Pneumonia of right lower lobe due to infectious organism 12/4/2018    Vitamin D deficiency        Past Surgical History:   Procedure Laterality Date    CHOLECYSTECTOMY  1984    COLONOSCOPY N/A 1/30/2019    Procedure: COLONOSCOPY with multiple  polypectomies;  Surgeon: Clari Wong MD;  Location: [address] [place] [address] GI LAB; Service: Gastroenterology    EYE SURGERY Bilateral     CATARACT       Family History   Problem Relation Age of Onset    Lung cancer Father     Heart disease Mother     Lung cancer Sister     Lung cancer Brother      I have reviewed and agree with the history as documented      E-Cigarette/Vaping    E-Cigarette Use Never User      E-Cigarette/Vaping Substances    Nicotine No     THC No     CBD No     Flavoring No     Other No     Unknown No      Social History     Tobacco Use    Smoking status: Former Smoker     Packs/day: 1 00     Years: 50 00     Pack years: 50 00     Types: Cigarettes     Start date: 12/17/1963     Quit date: 2013     Years since quittin 5    Smokeless tobacco: Never Used   Vaping Use    Vaping Use: Never used   Substance Use Topics    Alcohol use: Never    Drug use: Never       Review of Systems   Constitutional: Negative for activity change, appetite change, chills, diaphoresis and fever  HENT: Negative for congestion, ear pain, rhinorrhea, sneezing and sore throat  Eyes: Negative for discharge  Respiratory: Negative for cough and shortness of breath  Cardiovascular: Negative for chest pain and leg swelling  Gastrointestinal: Positive for abdominal distention, abdominal pain (left mid), constipation, nausea and vomiting  Negative for blood in stool and diarrhea  Endocrine: Negative for polyuria  Genitourinary: Positive for flank pain (left flank) and frequency (baseline)  Negative for decreased urine volume, difficulty urinating, dysuria and urgency  Musculoskeletal: Negative for back pain and myalgias  Skin: Negative for rash  Neurological: Negative for dizziness, weakness, light-headedness and numbness  Hematological: Negative for adenopathy  Psychiatric/Behavioral: Negative for confusion  All other systems reviewed and are negative  Physical Exam  Physical Exam  Vitals reviewed  Constitutional:       General: She is not in acute distress  Appearance: She is well-developed  She is not diaphoretic  HENT:      Head: Normocephalic and atraumatic  Right Ear: Tympanic membrane and external ear normal       Left Ear: Tympanic membrane and external ear normal       Nose: Nose normal       Mouth/Throat:      Mouth: Mucous membranes are moist       Pharynx: No oropharyngeal exudate  Comments: Edentulous  Eyes:      General:         Right eye: No discharge  Left eye: No discharge  Conjunctiva/sclera: Conjunctivae normal       Pupils: Pupils are equal, round, and reactive to light     Neck:      Comments: No midline or paraspinous tenderness  Cardiovascular:      Rate and Rhythm: Normal rate  Rhythm irregular  Pulses: Normal pulses  Heart sounds: Normal heart sounds  Pulmonary:      Effort: Pulmonary effort is normal  No respiratory distress  Breath sounds: Normal breath sounds  Abdominal:      General: Bowel sounds are normal  There is distension  Palpations: Abdomen is soft  Tenderness: There is abdominal tenderness (Left mid abdomen)  There is no right CVA tenderness, left CVA tenderness, guarding or rebound  Comments: Back no midline T or L-spine tenderness patient has discomfort inferior to the CVA region  Genitourinary:     Rectum: Guaiac result negative  Comments: Rectal is normal tone no evidence of fecal impaction brown stool on glove heme-negative controls intact  Musculoskeletal:         General: No tenderness or deformity  Normal range of motion  Cervical back: Normal range of motion and neck supple  No rigidity or tenderness  Right lower leg: No edema  Left lower leg: No edema  Comments: 2+ anterior tibial pulses  Lymphadenopathy:      Cervical: No cervical adenopathy  Skin:     General: Skin is warm and dry  Neurological:      Mental Status: She is alert and oriented to person, place, and time  Cranial Nerves: No cranial nerve deficit  Sensory: No sensory deficit  Motor: No weakness or abnormal muscle tone        Coordination: Coordination normal       Comments: Gait steady   Psychiatric:         Mood and Affect: Mood normal          Vital Signs  ED Triage Vitals [07/03/21 1810]   Temperature Pulse Respirations Blood Pressure SpO2   98 6 °F (37 °C) 87 18 (!) 173/97 98 %      Temp Source Heart Rate Source Patient Position - Orthostatic VS BP Location FiO2 (%)   Temporal Monitor Sitting Right arm --      Pain Score       Worst Possible Pain           Vitals:    07/04/21 0700 07/04/21 0730 07/04/21 0800 07/04/21 0830   BP: 153/77 140/61 133/59 121/64 Pulse: 86 72 72 79   Patient Position - Orthostatic VS: Lying Lying Lying Lying         Visual Acuity      ED Medications  Medications   ondansetron (ZOFRAN) injection 4 mg (4 mg Intravenous Given 7/3/21 1935)   fentanyl citrate (PF) 100 MCG/2ML 25 mcg (25 mcg Intravenous Given 7/3/21 1937)   sodium chloride 0 9 % bolus 1,000 mL (0 mL Intravenous Stopped 7/4/21 0005)   iohexol (OMNIPAQUE) 350 MG/ML injection (SINGLE-DOSE) 100 mL (100 mL Intravenous Given 7/3/21 2135)   cefTRIAXone (ROCEPHIN) IVPB (premix in dextrose) 1,000 mg 50 mL (0 mg Intravenous Stopped 7/4/21 0305)       Diagnostic Studies  Results Reviewed     Procedure Component Value Units Date/Time    Troponin I [[id number]]  (Abnormal) Collected: 07/04/21 0302    Lab Status: Final result Specimen: Blood from Arm, Right Updated: 07/04/21 0338     Troponin I 0 77 ng/mL     Troponin I [[id number]]  (Abnormal) Collected: 07/03/21 2328    Lab Status: Final result Specimen: Blood from Arm, Right Updated: 07/03/21 2351     Troponin I 0 51 ng/mL     Blood culture #1 [[id number]] Collected: 07/03/21 2328    Lab Status: In process Specimen: Blood from Arm, Right Updated: 07/03/21 2333    Blood culture #2 [[id number]] Collected: 07/03/21 2328    Lab Status: In process Specimen: Blood from Arm, Right Updated: 07/03/21 2333    Urine Microscopic [[id number]]  (Abnormal) Collected: 07/03/21 2109    Lab Status: Final result Specimen: Urine, Clean Catch Updated: 07/03/21 2131     RBC, UA 2-4 /hpf      WBC, UA 10-20 /hpf      Epithelial Cells Occasional /hpf      Bacteria, UA Innumerable /hpf     Urine culture [[id number]] Collected: 07/03/21 2109    Lab Status:  In process Specimen: Urine, Clean Catch Updated: 07/03/21 2131    UA w Reflex to Microscopic w Reflex to Culture [[id number]]  (Abnormal) Collected: 07/03/21 2109    Lab Status: Final result Specimen: Urine, Clean Catch Updated: 07/03/21 2121     Color, UA Yellow     Clarity, UA Cloudy     Specific [place], UA 1 020 pH, UA 5 5     Leukocytes, UA Small     Nitrite, UA Negative     Protein, UA Negative mg/dl      Glucose, UA Negative mg/dl      Ketones, UA Negative mg/dl      Urobilinogen, UA 0 2 E U /dl      Bilirubin, UA Negative     Blood, UA Trace-Intact    Magnesium [[id number]]  (Normal) Collected: 07/03/21 1943    Lab Status: Final result Specimen: Blood Updated: 07/03/21 2002     Magnesium 1 6 mg/dL     Troponin I [[id number]]  (Abnormal) Collected: 07/03/21 1943    Lab Status: Final result Specimen: Blood Updated: 07/03/21 2001     Troponin I 0 06 ng/mL     Lipase [[id number]]  (Normal) Collected: 07/03/21 1929    Lab Status: Final result Specimen: Blood from Arm, Right Updated: 07/03/21 1959     Lipase 124 u/L     Lactic acid [[id number]]  (Normal) Collected: 07/03/21 1929    Lab Status: Final result Specimen: Blood from Arm, Right Updated: 07/03/21 1959     LACTIC ACID 1 8 mmol/L     Narrative:      Result may be elevated if tourniquet was used during collection  TSH, 3rd generation with Free T4 reflex [[id number]]  (Normal) Collected: 07/03/21 1929    Lab Status: Final result Specimen: Blood from Arm, Right Updated: 07/03/21 1959     TSH 3RD GENERATON 3 140 uIU/mL     Narrative:      Patients undergoing fluorescein dye angiography may retain small amounts of fluorescein in the body for 48-72 hours post procedure  Samples containing fluorescein can produce falsely depressed TSH values  If the patient had this procedure,a specimen should be resubmitted post fluorescein clearance        Comprehensive metabolic panel [[id number]] Collected: 07/03/21 1929    Lab Status: Final result Specimen: Blood from Arm, Right Updated: 07/03/21 1952     Sodium 137 mmol/L      Potassium 4 2 mmol/L      Chloride 100 mmol/L      CO2 27 mmol/L      ANION GAP 10 mmol/L      BUN 14 mg/dL      Creatinine 0 94 mg/dL      Glucose 113 mg/dL      Calcium 9 3 mg/dL      AST 29 U/L      ALT 42 U/L      Alkaline Phosphatase 70 U/L      Total Protein 7 6 g/dL      Albumin 4 1 g/dL      Total Bilirubin 0 73 mg/dL      eGFR 57 ml/min/1 73sq m     Narrative:      [place] guidelines for Chronic Kidney Disease (CKD):     Stage 1 with normal or high GFR (GFR > 90 mL/min/1 73 square meters)    Stage 2 Mild CKD (GFR = 60-89 mL/min/1 73 square meters)    Stage 3A Moderate CKD (GFR = 45-59 mL/min/1 73 square meters)    Stage 3B Moderate CKD (GFR = 30-44 mL/min/1 73 square meters)    Stage 4 Severe CKD (GFR = 15-29 mL/min/1 73 square meters)    Stage 5 End Stage CKD (GFR <15 mL/min/1 73 square meters)  Note: GFR calculation is accurate only with a steady state creatinine    D-Dimer [[id number]]  (Normal) Collected: 07/03/21 1929    Lab Status: Final result Specimen: Blood from Arm, Right Updated: 07/03/21 1949     D-Dimer, Quant 0 42 ug/ml FEU     Protime-INR [[id number]]  (Normal) Collected: 07/03/21 1929    Lab Status: Final result Specimen: Blood from Arm, Right Updated: 07/03/21 1947     Protime 13 6 seconds      INR 1 06    APTT [[id number]]  (Normal) Collected: 07/03/21 1929    Lab Status: Final result Specimen: Blood from Arm, Right Updated: 07/03/21 1947     PTT 31 seconds     CBC and differential [[id number]]  (Abnormal) Collected: 07/03/21 1929    Lab Status: Final result Specimen: Blood from Arm, Right Updated: 07/03/21 1935     WBC 12 47 Thousand/uL      RBC 5 24 Million/uL      Hemoglobin 14 7 g/dL      Hematocrit 45 7 %      MCV 87 fL      MCH 28 1 pg      MCHC 32 2 g/dL      RDW 13 2 %      MPV 10 7 fL      Platelets 256 Thousands/uL      nRBC 0 /100 WBCs      Neutrophils Relative 56 %      Immat GRANS % 0 %      Lymphocytes Relative 30 %      Monocytes Relative 12 %      Eosinophils Relative 1 %      Basophils Relative 1 %      Neutrophils Absolute 7 08 Thousands/µL      Immature Grans Absolute 0 04 Thousand/uL      Lymphocytes Absolute 3 71 Thousands/µL      Monocytes Absolute 1 45 Thousand/µL      Eosinophils Absolute 0 11 Thousand/µL      Basophils Absolute 0 08 Thousands/µL                  CT abdomen pelvis with contrast   Final Result by Sinan Sawyer MD (07/03 2239)      No acute intra-abdominal abnormality  No free air or free fluid  Scattered colonic diverticulosis with no inflammatory changes present to suggest acute diverticulitis  Small sliding hiatal hernia  Workstation performed: [id number]                    Procedures  ECG 12 Lead Documentation Only    Date/Time: 7/3/2021 7:39 PM  Performed by: Mary Barker MD  Authorized by: Mary Barker MD     Indications / Diagnosis:  Abdm pain  ECG reviewed by me, the ED Provider: yes    Patient location:  ED  Previous ECG:     Previous ECG:  Compared to current    Comparison ECG info:  11/26/2019 10:53    Similarity:  Changes noted  Rate:     ECG rate:  101    ECG rate assessment: tachycardic    Rhythm:     Rhythm: atrial fibrillation    QRS:     QRS axis:  Left  Comments:      Nonspecific ST-T changes; afib is new;     ECG 12 Lead Documentation Only    Date/Time: 7/3/2021 11:35 PM  Performed by: Mary Barker MD  Authorized by: Mary Barker MD     Indications / Diagnosis:  Recheck afib  ECG reviewed by me, the ED Provider: yes    Patient location:  ED  Previous ECG:     Previous ECG:  Compared to current    Comparison ECG info:  7/3/21 18:45    Similarity:  Changes noted  Rate:     ECG rate:  94    ECG rate assessment: normal    Rhythm:     Rhythm: sinus rhythm    QRS:     QRS axis:  Left  Comments:      PACs; no acute ischemic changes             ED Course  ED Course as of Jul 04 1306   Sat Jul 03, 2021   2320 No prior urine cultures available will rx rocephin  Will recheck ekg and trop and contact [facility]      2328 HR controlled with Afib 80's to low 100's rechecking ekg and trop   Anticipating hospitalization will contact [facility] after 2nd trop results      2355 Repeat EKG demonstrating NSR recheck trop is elevated 0 51      Sun Jul 04, 2021 0037 TC to Dr Roxann Early cardiology to review and determine if appropriate for miners      0206 Case reviewed with Luis Felipe Martinez, PAC recommends full admit to Dr Wade Hatch service pending cards consult send prior TC message no response will go thru [facility] for pacs referral      0221 Desats with sleeping to mid 80s with awakening will spontaneously increased to 93%; placed 2L oxygen for sleeping; d-dimer lower chest unremarkable on CT      0235 Pending cardiology consult signed out to Dr Jackie Brady   History  1 Filed at: 07/03/2021 2106   ECG  1 Filed at: 07/03/2021 2106   Age  2 Filed at: 07/03/2021 2106   Risk Factors  2 Filed at: 07/03/2021 2106   Troponin  1 Filed at: 07/03/2021 2106   HEART Score  7 Filed at: 07/03/2021 2106                      SBIRT 20yo+      Most Recent Value   SBIRT (23 yo +)   In order to provide better care to our patients, we are screening all of our patients for alcohol and drug use  Would it be okay to ask you these screening questions? Yes Filed at: 07/03/2021 1813   Initial Alcohol Screen: US AUDIT-C    1  How often do you have a drink containing alcohol?  0 Filed at: 07/03/2021 1813   2  How many drinks containing alcohol do you have on a typical day you are drinking? 0 Filed at: 07/03/2021 1813   3a  Male UNDER 65: How often do you have five or more drinks on one occasion? 0 Filed at: 07/03/2021 1813   3b  FEMALE Any Age, or MALE 65+: How often do you have 4 or more drinks on one occassion? 0 Filed at: 07/03/2021 1813   Audit-C Score  0 Filed at: 07/03/2021 1813   MERA: How many times in the past year have you    Used an illegal drug or used a prescription medication for non-medical reasons?   Never Filed at: 07/03/2021 1813                    MDM  Number of Diagnoses or Management Options  Diagnosis management comments: Mdm:  Patient presents in new onset atrial fib rate is 101 and then improved into the 80s  Will evaluate with electrolytes TSH D-dimer  Patient has left flank pain radiating to the abdomen with complaints of constipation anticipating need for imaging awaiting D-dimer result will check for lipase UA result  Patient is otherwise neurovascularly intact  Symptomatic management initiated; ddx included pyelonephritis kidney stone diverticulitis and less likely aneurysm      Disposition  Final diagnoses:   Pyelonephritis   Atrial fibrillation, new onset ([address] ) - paroxysmal   Elevated troponin     Time reflects when diagnosis was documented in both MDM as applicable and the Disposition within this note     Time User Action Codes Description Comment    7/3/2021 11:25 PM Omar Headings Add [N12] Pyelonephritis     7/3/2021 11:25 PM Omar Headings Add [I48 91] Atrial fibrillation, new onset ([facility] [address] )     7/3/2021 11:25 PM Omar Headings Add [R77 8] Elevated troponin     7/3/2021 11:56 PM Omar Headings Modify [I48 91] Atrial fibrillation, new onset [facility]) paroxysmal      ED Disposition     ED Disposition Condition Date/Time Comment    Transfer to Another Facility-In Network  [place] Jul 4, 2021  4:22 AM Trice Diaz should be transferred out to [address] [facility]  MD Documentation      Most Recent Value   Patient Condition  The patient has been stabilized such that within reasonable medical probability, no material deterioration of the patient condition or the condition of the unborn child(sangeeta) is likely to result from the transfer   Reason for Transfer  Level of Care needed not available at this facility   Benefits of Transfer  Specialized equipment and/or services available at the receiving facility (Include comment)________________________   Risks of Transfer  Potential for delay in receiving treatment, Potential deterioration of medical condition, Loss of IV, Increased discomfort during transfer   Accepting Physician  DR Fernanda Laureano Name, [address] Sending MD POPE   [facility]   Provider Certification  General risk, such as traffic hazards, adverse weather conditions, rough terrain or turbulence, possible failure of equipment (including vehicle or aircraft), or consequences of actions of persons outside the control of the transport personnel      RN Documentation      [place] [address] Bia Meraz Name, [address]   [facility]   Bed Assignment  214   Report Given to  [address] [place] [address] RN      Follow-up Information    None         Discharge Medication List as of 7/4/2021  8:46 AM      CONTINUE these medications which have NOT CHANGED    Details   albuterol (Ventolin HFA) 90 mcg/act inhaler Inhale 2 puffs every 6 (six) hours as needed for wheezing, Starting Wed 2/24/2021, Normal      aspirin 81 mg chewable tablet Chew 81 mg daily, Historical Med      atorvastatin (LIPITOR) 20 mg tablet Take 1 tablet (20 mg total) by mouth daily, Starting Mon 2/15/2021, Normal      cetirizine (ZyrTEC) 10 mg tablet Take 10 mg by mouth daily, Historical Med      clotrimazole-betamethasone (LOTRISONE) 1-0 05 % cream Apply topically 2 (two) times a day, Starting Tue 6/23/2020, Normal      Diclofenac Sodium (VOLTAREN) 1 % Apply 2 g topically 4 (four) times a day, Starting Mon 4/26/2021, Normal      Ergocalciferol (VITAMIN D2) 2000 units TABS Take 2,000 mg by mouth, Historical Med      fluticasone-umeclidinium-vilanterol (TRELEGY) 100-62 5-25 MCG/INH inhaler Inhale 1 puff daily Rinse mouth after use , Starting Wed 2/10/2021, Normal      gabapentin (NEURONTIN) 300 mg capsule Take 1 capsule (300 mg total) by mouth daily, Starting Mon 2/15/2021, Normal      glucose blood (OneTouch Verio) test strip Use as instructed TEST TWO TIMES DAILY e11 65, Normal      lisinopril (ZESTRIL) 5 mg tablet Take 1 tablet (5 mg total) by mouth daily, Starting Mon 3/1/2021, Normal      metFORMIN (GLUCOPHAGE) 500 mg tablet Take 1 tablet (500 mg total) by mouth 2 (two) times a day with meals, Starting Mon 4/26/2021, Normal      Multiple Vitamin (MULTIVITAMINS PO) Take by mouth daily, Historical Med      omeprazole (PriLOSEC) 20 mg delayed release capsule Take 1 capsule (20 mg total) by mouth 2 (two) times a day, Starting Mon 3/1/2021, Normal      oxybutynin (DITROPAN-XL) 5 mg 24 hr tablet Take 1 tablet (5 mg total) by mouth daily, Starting Mon 2/15/2021, Normal           No discharge procedures on file      PDMP Review     None          ED Provider  Electronically Signed by           Song Cohen MD  07/04/21 7373

## 2021-07-03 NOTE — Clinical Note
Case was discussed with JOSE Orr  and the patient's admission status was agreed to be Admission Status: inpatient status to the service of Dr Doc Mohan

## 2021-07-04 ENCOUNTER — HOSPITAL ENCOUNTER (INPATIENT)
Facility: HOSPITAL | Age: 81
LOS: 2 days | Discharge: HOME | DRG: 392 | End: 2021-07-06
Attending: INTERNAL MEDICINE | Admitting: INTERNAL MEDICINE
Payer: COMMERCIAL

## 2021-07-04 ENCOUNTER — APPOINTMENT (INPATIENT)
Dept: NON INVASIVE DIAGNOSTICS | Facility: HOSPITAL | Age: 81
DRG: 392 | End: 2021-07-04
Payer: COMMERCIAL

## 2021-07-04 VITALS
HEART RATE: 79 BPM | BODY MASS INDEX: 30.91 KG/M2 | OXYGEN SATURATION: 98 % | SYSTOLIC BLOOD PRESSURE: 121 MMHG | HEIGHT: 62 IN | TEMPERATURE: 98.6 F | WEIGHT: 168 LBS | RESPIRATION RATE: 21 BRPM | DIASTOLIC BLOOD PRESSURE: 64 MMHG

## 2021-07-04 DIAGNOSIS — R77.8 ELEVATED TROPONIN: Primary | ICD-10-CM

## 2021-07-04 DIAGNOSIS — N30.00 ACUTE CYSTITIS WITHOUT HEMATURIA: ICD-10-CM

## 2021-07-04 PROBLEM — N12 PYELONEPHRITIS: Status: ACTIVE | Noted: 2021-07-04

## 2021-07-04 PROBLEM — R94.31 ABNORMAL EKG: Status: ACTIVE | Noted: 2021-07-04

## 2021-07-04 PROBLEM — A41.9 SEPSIS DUE TO UNDETERMINED ORGANISM (HCC): Status: ACTIVE | Noted: 2021-07-04

## 2021-07-04 PROBLEM — R10.32 LEFT LOWER QUADRANT ABDOMINAL PAIN: Status: ACTIVE | Noted: 2021-07-04

## 2021-07-04 LAB
APTT PPP: 32 SECONDS (ref 23–37)
APTT PPP: 70 SECONDS (ref 23–37)
ERYTHROCYTE [DISTWIDTH] IN BLOOD BY AUTOMATED COUNT: 13.3 % (ref 11.6–15.1)
EST. AVERAGE GLUCOSE BLD GHB EST-MCNC: 140 MG/DL
GLUCOSE SERPL-MCNC: 100 MG/DL (ref 65–140)
GLUCOSE SERPL-MCNC: 158 MG/DL (ref 65–140)
HBA1C MFR BLD: 6.5 %
HCT VFR BLD AUTO: 40.5 % (ref 34.8–46.1)
HGB BLD-MCNC: 13.3 G/DL (ref 11.5–15.4)
INR PPP: 1.07 (ref 0.84–1.19)
MCH RBC QN AUTO: 28.7 PG (ref 26.8–34.3)
MCHC RBC AUTO-ENTMCNC: 32.8 G/DL (ref 31.4–37.4)
MCV RBC AUTO: 87 FL (ref 82–98)
PLATELET # BLD AUTO: 237 THOUSANDS/UL (ref 149–390)
PMV BLD AUTO: 10.3 FL (ref 8.9–12.7)
PROTHROMBIN TIME: 13.9 SECONDS (ref 11.6–14.5)
RBC # BLD AUTO: 4.64 MILLION/UL (ref 3.81–5.12)
TROPONIN I SERPL-MCNC: 0.71 NG/ML
TROPONIN I SERPL-MCNC: 0.77 NG/ML
TROPONIN I SERPL-MCNC: 0.84 NG/ML
TROPONIN I SERPL-MCNC: 1 NG/ML
TSH SERPL DL<=0.05 MIU/L-ACNC: 3.17 UIU/ML (ref 0.36–3.74)
WBC # BLD AUTO: 9.55 THOUSAND/UL (ref 4.31–10.16)

## 2021-07-04 PROCEDURE — 85730 THROMBOPLASTIN TIME PARTIAL: CPT | Performed by: PHYSICIAN ASSISTANT

## 2021-07-04 PROCEDURE — 93306 TTE W/DOPPLER COMPLETE: CPT | Performed by: INTERNAL MEDICINE

## 2021-07-04 PROCEDURE — 85027 COMPLETE CBC AUTOMATED: CPT | Performed by: PHYSICIAN ASSISTANT

## 2021-07-04 PROCEDURE — 36415 COLL VENOUS BLD VENIPUNCTURE: CPT | Performed by: EMERGENCY MEDICINE

## 2021-07-04 PROCEDURE — 82948 REAGENT STRIP/BLOOD GLUCOSE: CPT

## 2021-07-04 PROCEDURE — 84484 ASSAY OF TROPONIN QUANT: CPT | Performed by: PHYSICIAN ASSISTANT

## 2021-07-04 PROCEDURE — 85730 THROMBOPLASTIN TIME PARTIAL: CPT | Performed by: INTERNAL MEDICINE

## 2021-07-04 PROCEDURE — 85610 PROTHROMBIN TIME: CPT | Performed by: PHYSICIAN ASSISTANT

## 2021-07-04 PROCEDURE — 96366 THER/PROPH/DIAG IV INF ADDON: CPT

## 2021-07-04 PROCEDURE — 93005 ELECTROCARDIOGRAM TRACING: CPT

## 2021-07-04 PROCEDURE — 99223 1ST HOSP IP/OBS HIGH 75: CPT | Performed by: PHYSICIAN ASSISTANT

## 2021-07-04 PROCEDURE — 84484 ASSAY OF TROPONIN QUANT: CPT | Performed by: EMERGENCY MEDICINE

## 2021-07-04 PROCEDURE — 93306 TTE W/DOPPLER COMPLETE: CPT

## 2021-07-04 RX ORDER — GABAPENTIN 300 MG/1
300 CAPSULE ORAL DAILY
Status: DISCONTINUED | OUTPATIENT
Start: 2021-07-04 | End: 2021-07-06 | Stop reason: HOSPADM

## 2021-07-04 RX ORDER — ASPIRIN 81 MG/1
81 TABLET, CHEWABLE ORAL DAILY
Status: DISCONTINUED | OUTPATIENT
Start: 2021-07-04 | End: 2021-07-06 | Stop reason: HOSPADM

## 2021-07-04 RX ORDER — LISINOPRIL 2.5 MG/1
5 TABLET ORAL DAILY
Status: DISCONTINUED | OUTPATIENT
Start: 2021-07-04 | End: 2021-07-06 | Stop reason: HOSPADM

## 2021-07-04 RX ORDER — ATORVASTATIN CALCIUM 20 MG/1
20 TABLET, FILM COATED ORAL DAILY
Status: DISCONTINUED | OUTPATIENT
Start: 2021-07-04 | End: 2021-07-05

## 2021-07-04 RX ORDER — ONDANSETRON 2 MG/ML
4 INJECTION INTRAMUSCULAR; INTRAVENOUS EVERY 6 HOURS PRN
Status: DISCONTINUED | OUTPATIENT
Start: 2021-07-04 | End: 2021-07-06 | Stop reason: HOSPADM

## 2021-07-04 RX ORDER — ACETAMINOPHEN 325 MG/1
650 TABLET ORAL EVERY 6 HOURS PRN
Status: DISCONTINUED | OUTPATIENT
Start: 2021-07-04 | End: 2021-07-06 | Stop reason: HOSPADM

## 2021-07-04 RX ORDER — SENNOSIDES 8.6 MG
1 TABLET ORAL 2 TIMES DAILY
Status: DISCONTINUED | OUTPATIENT
Start: 2021-07-04 | End: 2021-07-06 | Stop reason: HOSPADM

## 2021-07-04 RX ORDER — HEPARIN SODIUM 10000 [USP'U]/100ML
3-20 INJECTION, SOLUTION INTRAVENOUS
Status: DISCONTINUED | OUTPATIENT
Start: 2021-07-04 | End: 2021-07-06

## 2021-07-04 RX ADMIN — HEPARIN SODIUM 12 UNITS/KG/HR: 10000 INJECTION, SOLUTION INTRAVENOUS at 15:26

## 2021-07-04 RX ADMIN — LISINOPRIL 5 MG: 2.5 TABLET ORAL at 12:51

## 2021-07-04 RX ADMIN — ENOXAPARIN SODIUM 40 MG: 40 INJECTION SUBCUTANEOUS at 14:21

## 2021-07-04 RX ADMIN — ATORVASTATIN CALCIUM 20 MG: 20 TABLET, FILM COATED ORAL at 12:51

## 2021-07-04 RX ADMIN — SENNOSIDES 8.6 MG: 8.6 TABLET ORAL at 17:39

## 2021-07-04 RX ADMIN — GABAPENTIN 300 MG: 300 CAPSULE ORAL at 12:51

## 2021-07-04 RX ADMIN — ASPIRIN 81 MG CHEWABLE TABLET 81 MG: 81 TABLET CHEWABLE at 12:51

## 2021-07-04 RX ADMIN — Medication 12.5 MG: at 21:05

## 2021-07-04 NOTE — ASSESSMENT & PLAN NOTE
· Initial EKG possible ectopic atrial rhythm versus sinus rhythm with PACs, since improved    Patient otherwise asymptomatic  · Cardiology input will be appreciated

## 2021-07-04 NOTE — ASSESSMENT & PLAN NOTE
· Elevated troponin with increase [facility] evaluation    Patient was without chest pain or shortness of breath at any point  · Troponin 0 06 -->0 51 --> 0 77  · May be demand ischemia in setting of constipation/dehydration, however abnormalities on initial EKG noted  · check echocardiogram, consult Cardiology  · Continue telemetry monitoring for now

## 2021-07-04 NOTE — ED NOTES
Patient left emergency department at 8:30 am  Troponin unable to be collected at 8:31 from provider order  Provider made aware       Nancy Dowling RN  07/04/21 8985

## 2021-07-04 NOTE — ED NOTES
Pt de-sats when sleeping into mid 80's  Nasal 02 applied 2l/m  Continue to monitor        Sidra Mckeon RN  07/04/21 7318

## 2021-07-04 NOTE — H&P
[facility]  H&P- Melissa Mariano 1940, 80 y o  female MRN: [id number]  Unit/Bed#: CW2 214-01 Encounter: [id number]  Primary Care Provider: Benjamín Cadet PA-C   Date and time admitted to hospital: 7/4/2021 10:02 AM    * Elevated troponin  Assessment & Plan  · Elevated troponin with increase noted throughout [facility] evaluation  Patient was without chest pain or shortness of breath at any point  · Troponin 0 06 -->0 51 --> 0 77  · May be demand ischemia in setting of constipation/dehydration, however abnormalities on initial EKG noted  · check echocardiogram, consult Cardiology  · Continue telemetry monitoring for now    Abnormal EKG  Assessment & Plan  · Initial EKG possible ectopic atrial rhythm versus sinus rhythm with PACs, since improved  Patient otherwise asymptomatic  · Cardiology input will be appreciated    Left lower quadrant abdominal pain  Assessment & Plan  · 5 day history of abdominal pain, constipation, poor oral intake, vomiting  CT abdomen/pelvis is without any abnormalities  UA with pyuria  Constipation vs UTI  · Patient now feeling better after bowel movement this morning, so suspecting constipation  · Will continue with ceftriaxone pending results of urine culture  · Trend WBC, temperature curve, hemodynamics    COPD (chronic obstructive pulmonary disease) ([facility])  Assessment & Plan  · Not in acute exacerbation, continue home inhalers  · Noted to de-sat into 80's while sleeping  · Will check overnight pulse ox    Type 2 diabetes mellitus with hyperglycemia, without long-term current use of insulin ([facility])  Assessment & Plan  Lab Results   Component Value Date    HGBA1C 6 5 04/26/2021       No results for input(s): POCGLU in the last 72 hours      Blood Sugar Average: Last 72 hrs:  ·  well controlled as outpatient  · Hold metformin while inpatient, initiate SSI with Accu-Cheks and carb controlled diet  · Initiate hypoglycemia protocol    Essential hypertension  Assessment & Plan  · Continue home lisinopril 5 mg daily with hold parameters, monitor blood pressure per protocol    VTE Pharmacologic Prophylaxis: VTE Score: 5 High Risk (Score >/= 5) - Pharmacological DVT Prophylaxis Ordered: enoxaparin (Lovenox)  Sequential Compression Devices Ordered  Code Status: Prior DNR/DNI 3  Discussion with family: Patient declined call to   Anticipated Length of Stay: Patient will be admitted on an inpatient basis with an anticipated length of stay of greater than 2 midnights secondary to cardiac workup  Total Time for Visit, including Counseling / Coordination of Care: 60 minutes Greater than 50% of this total time spent on direct patient counseling and coordination of care  Chief Complaint: abdominal pain    History of Present Illness:  Yeimy Damon is a 80 y o  female with a PMH of COPD, DM type 2, HTN who presents from the [address] [place] [address] with a 5 day history of left sided abdominal pain, poor oral intake, a few episodes of vomiting, and constipation  Patient was noted to have troponin elevation 0 06 -->0 77 with non specific EKG abnormalities  She was transferred to [address] [place] [address] for Cardiology evaluation  Currently, patient is feeling better after having a bowel movement this morning  Her pain has subsided  She denies any chest pain  Patient has chronic SOB from her COPD and states that it is at her baseline  Telemetry reviewed and is showing NSR with PACs vs a flutter  Review of Systems:  Review of Systems   Constitutional: Negative for activity change, appetite change, chills, diaphoresis, fatigue and fever  HENT: Negative for rhinorrhea, sinus pressure, sneezing and sore throat  Eyes: Negative for photophobia, redness and visual disturbance  Respiratory: Negative for cough, chest tightness, shortness of breath and wheezing  Cardiovascular: Negative for chest pain, palpitations and leg swelling     Gastrointestinal: Positive for abdominal pain  Negative for abdominal distention, anal bleeding, blood in stool, constipation, diarrhea, nausea and vomiting  Genitourinary: Negative for difficulty urinating, dysuria, flank pain, frequency, hematuria and urgency  Musculoskeletal: Negative for back pain and gait problem  Skin: Negative for rash and wound  Allergic/Immunologic: Negative for immunocompromised state  Neurological: Negative for dizziness, tremors, seizures, syncope, facial asymmetry, speech difficulty, weakness, light-headedness, numbness and headaches  Hematological: Negative for adenopathy  Psychiatric/Behavioral: Negative for agitation, confusion and hallucinations  The patient is not nervous/anxious  Past Medical and Surgical History:   Past Medical History:   Diagnosis Date    Allergic     COPD [facility]chronic obstructive [facility] disease) [facility] )     Diabetes mellitus ([facility] )     GERD (gastroesophageal reflux disease)     Hyperlipidemia     Hypertension     Left non-suppurative otitis media 11/27/2019    Pneumonia     Pneumonia of right lower lobe due to infectious organism 12/4/2018    Vitamin D deficiency        Past Surgical History:   Procedure Laterality Date    CHOLECYSTECTOMY  1984    COLONOSCOPY N/A 1/30/2019    Procedure: COLONOSCOPY with multiple  polypectomies;  Surgeon: Bladimir Fisher MD;  Location: [facility]; Service: Gastroenterology    EYE SURGERY Bilateral     CATARACT       Meds/Allergies:  Prior to Admission medications    Medication Sig Start Date End Date Taking?  Authorizing Provider   albuterol (Ventolin HFA) 90 mcg/act inhaler Inhale 2 puffs every 6 (six) hours as needed for wheezing 2/24/21   Sanjay Betancur MD   aspirin 81 mg chewable tablet Chew 81 mg daily    Historical Provider, MD   atorvastatin (LIPITOR) 20 mg tablet Take 1 tablet (20 mg total) by mouth daily 2/15/21   Mary Kay Oliveira PA-C   cetirizine (ZyrTEC) 10 mg tablet Take 10 mg by mouth daily    Historical Provider, MD clotrimazole-betamethasone (LOTRISONE) 1-0 05 % cream Apply topically 2 (two) times a day 6/23/20   Mary Kay Oliveira PA-C   Diclofenac Sodium (VOLTAREN) 1 % Apply 2 g topically 4 (four) times a day 4/26/21   Mary Kay Oliveira PA-C   Ergocalciferol (VITAMIN D2) 2000 units TABS Take 2,000 mg by mouth    Historical Provider, MD   fluticasone-umeclidinium-vilanterol (TRELEGY) 100-62 5-25 MCG/INH inhaler Inhale 1 puff daily Rinse mouth after use  2/10/21   Edward Strange PA-C   gabapentin (NEURONTIN) 300 mg capsule Take 1 capsule (300 mg total) by mouth daily 2/15/21   Mary Kay Oliveira PA-C   glucose blood (OneTouch Verio) test strip Use as instructed TEST TWO TIMES DAILY e11 65 4/26/21   Mary Kay Oliveira PA-C   lisinopril (ZESTRIL) 5 mg tablet Take 1 tablet (5 mg total) by mouth daily 3/1/21   Mary Kay Oliveira PA-C   metFORMIN (GLUCOPHAGE) 500 mg tablet Take 1 tablet (500 mg total) by mouth 2 (two) times a day with meals 4/26/21   Mary Kay Oliveira PA-C   Multiple Vitamin (MULTIVITAMINS PO) Take by mouth daily    Historical Provider, MD   omeprazole (PriLOSEC) 20 mg delayed release capsule Take 1 capsule (20 mg total) by mouth 2 (two) times a day 3/1/21   Mary Kay Oliveira PA-C   oxybutynin (DITROPAN-XL) 5 mg 24 hr tablet Take 1 tablet (5 mg total) by mouth daily 2/15/21   Mary Kay Oliveira PA-C     I have reviewed home medications with patient personally  Allergies: Allergies   Allergen Reactions    Bee Venom Anaphylaxis    Other      FLOWERS/GRASS       Social History:  Marital Status:     Occupation: retired  Patient Pre-hospital Living Situation: Home  Patient Pre-hospital Level of Mobility: walks  Patient Pre-hospital Diet Restrictions: none  Substance Use History:   Social History     Substance and Sexual Activity   Alcohol Use Never     Social History     Tobacco Use   Smoking Status Former Smoker    Packs/day: 1 00    Years: 50 00    Pack years: 50 00    Types: Cigarettes    Start date: 1963    Quit date: 2013    Years since quittin 5   Smokeless Tobacco Never Used     Social History     Substance and Sexual Activity   Drug Use Never       Family History:  Family History   Problem Relation Age of Onset    Lung cancer Father     Heart disease Mother     Lung cancer Sister     Lung cancer Brother        Physical Exam:     Vitals:   Blood Pressure: 137/75 (21 1032)  Pulse: 83 (21 1032)  Temperature: 98 4 °F (36 9 °C) (21 1032)  SpO2: 94 % (21 1032)    Physical Exam  Constitutional:       Appearance: Normal appearance  She is obese  Cardiovascular:      Rate and Rhythm: Normal rate and regular rhythm  Heart sounds: No murmur heard  Pulmonary:      Effort: Pulmonary effort is normal       Breath sounds: Normal breath sounds  Abdominal:      General: Bowel sounds are normal  There is no distension  Palpations: Abdomen is soft  Tenderness: There is no abdominal tenderness  Skin:     General: Skin is warm and dry  Neurological:      General: No focal deficit present  Mental Status: She is alert and oriented to person, place, and time     Psychiatric:         Mood and Affect: Mood normal          Additional Data:     Lab Results:  Results from last 7 days   Lab Units 21   WBC Thousand/uL 12 47*   HEMOGLOBIN g/dL 14 7   HEMATOCRIT % 45 7   PLATELETS Thousands/uL 311   NEUTROS PCT % 56   LYMPHS PCT % 30   MONOS PCT % 12   EOS PCT % 1     Results from last 7 days   Lab Units 21   SODIUM mmol/L 137   POTASSIUM mmol/L 4 2   CHLORIDE mmol/L 100   CO2 mmol/L 27   BUN mg/dL 14   CREATININE mg/dL 0 94   ANION GAP mmol/L 10   CALCIUM mg/dL 9 3   ALBUMIN g/dL 4 1   TOTAL BILIRUBIN mg/dL 0 73   ALK PHOS U/L 70   ALT U/L 42   AST U/L 29   GLUCOSE RANDOM mg/dL 113     Results from last 7 days   Lab Units 21   INR  1 06             Results from last 7 days   Lab Units 21   LACTIC ACID mmol/L 1 8 Imaging: Reviewed radiology reports from this admission including: abdominal/pelvic CT  No orders to display       EKG and Other Studies Reviewed on Admission:   · EKG: will obtain EKG  Telemetry with possible PACs  ** Please Note: This note has been constructed using a voice recognition system   **

## 2021-07-04 NOTE — ASSESSMENT & PLAN NOTE
Lab Results   Component Value Date    HGBA1C 6 5 04/26/2021       No results for input(s): POCGLU in the last 72 hours      Blood Sugar Average: Last 72 hrs:  ·  well controlled as outpatient  · Hold metformin while inpatient, initiate SSI with Accu-Cheks and carb controlled diet  · Initiate hypoglycemia protocol

## 2021-07-04 NOTE — EMTALA/ACUTE CARE TRANSFER
[facility] EMERGENCY DEPARTMENT  [address] [facility] [address]  Dept: [phone]      EMTALA TRANSFER CONSENT    NAME Dany RICO 1940                              MRN [id number]    I have been informed of my rights regarding examination, treatment, and transfer   by Dr Jacoby Solomon MD    Benefits: Specialized equipment and/or services available at the receiving facility (Include comment)________________________    Risks: Potential for delay in receiving treatment, Potential deterioration of medical condition, Loss of IV, Increased discomfort during transfer      Consent for Transfer:  I acknowledge that my medical condition has been evaluated and explained to me by the emergency department physician or other qualified medical person and/or my attending physician, who has recommended that I be transferred to the service of  Accepting Physician: Dr Mony Cannon at [facility] Name, [address] : [place]  The above potential benefits of such transfer, the potential risks associated with such transfer, and the probable risks of not being transferred have been explained to me, and I fully understand them  The doctor has explained that, in my case, the benefits of transfer outweigh the risks  I agree to be transferred  I authorize the performance of emergency medical procedures and treatments upon me in both transit and upon arrival at the receiving facility  Additionally, I authorize the release of any and all medical records to the receiving facility and request they be transported with me, if possible  I understand that the safest mode of transportation during a medical emergency is an ambulance and that the Hospital advocates the use of this mode of transport   Risks of traveling to the receiving facility by car, including absence of medical control, life sustaining equipment, such as oxygen, and medical personnel has been explained to me and I fully understand them  (DAMEON CORRECT BOX BELOW)  [  ]  I consent to the stated transfer and to be transported by ambulance/helicopter  [  ]  I consent to the stated transfer, but refuse transportation by ambulance and accept full responsibility for my transportation by car  I understand the risks of non-ambulance transfers and I exonerate the Hospital and its staff from any deterioration in my condition that results from this refusal     X___________________________________________    DATE  21  TIME________  Signature of patient or legally responsible individual signing on patient behalf           RELATIONSHIP TO PATIENT_________________________          Provider Certification    NAME Teresa Brower                                         1940                              MRN [id number]    A medical screening exam was performed on the above named patient  Based on the examination:    Condition Necessitating Transfer The primary encounter diagnosis was Pyelonephritis  Diagnoses of Atrial fibrillation, new onset ([address] ) and Elevated troponin were also pertinent to this visit      Patient Condition: The patient has been stabilized such that within reasonable medical probability, no material deterioration of the patient condition or the condition of the unborn child(sangeeta) is likely to result from the transfer    Reason for Transfer: Level of Care needed not available at this facility    [facility]   · Space available and qualified personnel available for treatment as acknowledged by    · Agreed to accept transfer and to provide appropriate medical treatment as acknowledged by       Dr Kaleigh Dawkins  · Appropriate medical records of the examination and treatment of the patient are provided at the time of transfer   [address] _______  · Transfer will be performed by qualified personnel from    and appropriate transfer equipment as required, including the use of necessary and appropriate life support measures  Provider Certification: I have examined the patient and explained the following risks and benefits of being transferred/refusing transfer to the patient/family:  General risk, such as traffic hazards, adverse weather conditions, rough terrain or turbulence, possible failure of equipment (including vehicle or aircraft), or consequences of actions of persons outside the control of the transport personnel      Based on these reasonable risks and benefits to the patient and/or the unborn child(sangeeta), and based upon the information available at the time of the patients examination, I certify that the medical benefits reasonably to be expected from the provision of appropriate medical treatments at another medical facility outweigh the increasing risks, if any, to the individuals medical condition, and in the case of labor to the unborn child, from effecting the transfer      X____________________________________________ DATE 07/04/21        TIME_______      ORIGINAL - SEND TO MEDICAL RECORDS   COPY - SEND WITH PATIENT DURING TRANSFER

## 2021-07-04 NOTE — ASSESSMENT & PLAN NOTE
· 5 day history of abdominal pain, constipation, poor oral intake, vomiting  CT abdomen/pelvis is without any abnormalities  UA with pyuria   Constipation vs UTI  · Patient now feeling better after bowel movement this morning, so suspecting constipation  · Will continue with ceftriaxone pending results of urine culture  · Trend WBC, temperature curve, hemodynamics

## 2021-07-04 NOTE — ED NOTES
Patient placed on bedpan  Patient pleasant, alert, and oriented  Vitals WNL  Patient remains on 2 liters nasal cannula  Patient was to be picked up at 0630 for transfer to [facility]  Transfer packet ready        Jazmin Calix RN  07/04/21 8657

## 2021-07-04 NOTE — ASSESSMENT & PLAN NOTE
· Not in acute exacerbation, continue home inhalers  · Noted to de-sat into 80's while sleeping  · Will check overnight pulse ox

## 2021-07-04 NOTE — QUICK NOTE
Spoke with Cardiology fellow  Given troponin now up to 1, will start heparin drip  Continue ASA and statin  Was also contacted later to start metoprolol tartrate 12 5mg BID and continue to trend troponins  They will see in consultation tomorrow   Echo pending negative Affect and characteristics of appearance, verbalizations, behaviors are appropriate

## 2021-07-04 NOTE — ED CARE HANDOFF
Emergency Department Sign Out Note        Sign out and transfer of care from Dr Yaritza Le  See Separate Emergency Department note  The patient, Aureliano Ackerman, was evaluated by the previous provider for left-sided flank pain over the past 3 days  Workup Completed:  Labs including CMP, lipase, lactate, troponin, CBC, TSH, D-dimer, and urinalysis  Patient's 1st troponin was 0 06  Initial EKG was concerning for atrial fibrillation  Repeat EKG revealed normal sinus rhythm, premature atrial contractions, possible ectopic atrial rhythm due to varying P-wave morphology  Patient had no chest pain and no shortness of breath, chest the left flank pain  Patient is receiving ceftriaxone for presumed left-sided pyelonephritis  Patient's repeat troponin 0 51  ED Course / Workup Pending (followup): At the time of hand off, plan is to reach out to Dr Luba Garcia who is on-call with cardiology, to discuss with the patient would be safe to admit at [facility] versus with the patient would need to be transferred to high level of care due to unavailability of Cardiology at [facility] over the holiday weekend  At present, PACS is attempting to reach Dr Luba Garcia  HEART Risk Score      Most Recent Value   Heart Score Risk Calculator   History  1 Filed at: 07/03/2021 2106   ECG  1 Filed at: 07/03/2021 2106   Age  2 Filed at: 07/03/2021 2106   Risk Factors  2 Filed at: 07/03/2021 2106   Troponin  1 Filed at: 07/03/2021 2106   HEART Score  7 Filed at: 07/03/2021 2106              ED Course as of Jul 04 0843   Sun Jul 04, 2021   0404 Case discussed with Dr Luba Garcia with Cardiology  Given the holiday weekend, there will not be a cardiologist on-site at [facility] until 7/6  Since the patient's troponin is rising and there is a question of arrhythmia (ectopic atrial rhythm? A-fib)?, Dr Luba Garcia recommends transfer to [facility] to expedite cardiac evaluation        2026 I discussed Cardiology recommendations with the patient and she is agreeable to being transferred to [facility]  She reports that left flank pain is ok  She does not have chest pain or shortness of breath at present  No needs at present  1309 Dr Gloria Roblero kindly accepts patient for admission at [facility]  ECG 12 Lead Documentation Only    Date/Time: 7/4/2021 3:00 AM  Performed by: Naeem Carl MD  Authorized by: Naeem Carl MD     Comments:      Normal sinus rhythm, ventricular rate 72, DE interval 176, QRS 74, , T-wave inversions in lead aVL, no ST/T-wave changes to suggest ischemia otherwise, no STEMI, premature atrial contractions are present no significant change from prior EKG obtained on 07/03 at 11:24 p m  MDM  Number of Diagnoses or Management Options      Disposition  Final diagnoses:   Pyelonephritis   Atrial fibrillation, new onset ([facility] ) - paroxysmal   Elevated troponin     Time reflects when diagnosis was documented in both MDM as applicable and the Disposition within this note     Time User Action Codes Description Comment    7/3/2021 11:25 PM Sandro Fossa Add [N12] Pyelonephritis     7/3/2021 11:25 PM Sandro Fossa Add [I48 91] Atrial fibrillation, new onset ([facility] )     7/3/2021 11:25 PM Sandro Fossa Add [R77 8] Elevated troponin     7/3/2021 11:56 PM Sandro Fossa Modify [I48 91] Atrial fibrillation, new onset [facility]) paroxysmal      ED Disposition     ED Disposition Condition Date/Time Comment    Transfer to Another Facility-In Network  [place] Jul 4, 2021  4:22 AM Carlos Body should be transferred out to [facility]          MD Documentation      Most Recent Value   Patient Condition  The patient has been stabilized such that within reasonable medical probability, no material deterioration of the patient condition or the condition of the unborn child(sangeeta) is likely to result from the transfer   Reason for Transfer  Level of Care needed not available at this facility Benefits of Transfer  Specialized equipment and/or services available at the receiving facility (Include comment)________________________   Risks of Transfer  Potential for delay in receiving treatment, Potential deterioration of medical condition, Loss of IV, Increased discomfort during transfer   Accepting Physician  DR Fernanda Laureano Name, Dara Cabrales MD, DR  [facility]   Provider Certification  General risk, such as traffic hazards, adverse weather conditions, rough terrain or turbulence, possible failure of equipment (including vehicle or aircraft), or consequences of actions of persons outside the control of the transport personnel      RN Documentation      [address] Name, [address]   SL   Bed Assignment  214   Report Given to  SONAM HER      Follow-up Information    None       Patient's Medications   Discharge Prescriptions    No medications on file     No discharge procedures on file         ED Provider  Electronically Signed by     Yousuf Benitez MD  07/04/21 3302

## 2021-07-05 PROBLEM — I47.2 NSVT (NONSUSTAINED VENTRICULAR TACHYCARDIA) (HCC): Status: ACTIVE | Noted: 2021-07-05

## 2021-07-05 PROBLEM — I47.29 NSVT (NONSUSTAINED VENTRICULAR TACHYCARDIA): Status: ACTIVE | Noted: 2021-07-05

## 2021-07-05 PROBLEM — I21.4 TYPE 1 NON-ST ELEVATION MYOCARDIAL INFARCTION (NSTEMI) (HCC): Status: ACTIVE | Noted: 2018-12-03

## 2021-07-05 LAB
ALBUMIN SERPL BCP-MCNC: 3 G/DL (ref 3.5–5)
ALP SERPL-CCNC: 58 U/L (ref 46–116)
ALT SERPL W P-5'-P-CCNC: 32 U/L (ref 12–78)
ANION GAP SERPL CALCULATED.3IONS-SCNC: 4 MMOL/L (ref 4–13)
APTT PPP: 74 SECONDS (ref 23–37)
AST SERPL W P-5'-P-CCNC: 23 U/L (ref 5–45)
ATRIAL RATE: 109 BPM
BILIRUB SERPL-MCNC: 0.56 MG/DL (ref 0.2–1)
BUN SERPL-MCNC: 13 MG/DL (ref 5–25)
CALCIUM ALBUM COR SERPL-MCNC: 9.3 MG/DL (ref 8.3–10.1)
CALCIUM SERPL-MCNC: 8.5 MG/DL (ref 8.3–10.1)
CHLORIDE SERPL-SCNC: 108 MMOL/L (ref 100–108)
CO2 SERPL-SCNC: 28 MMOL/L (ref 21–32)
CREAT SERPL-MCNC: 0.64 MG/DL (ref 0.6–1.3)
ERYTHROCYTE [DISTWIDTH] IN BLOOD BY AUTOMATED COUNT: 13.3 % (ref 11.6–15.1)
GFR SERPL CREATININE-BSD FRML MDRD: 84 ML/MIN/1.73SQ M
GLUCOSE SERPL-MCNC: 106 MG/DL (ref 65–140)
GLUCOSE SERPL-MCNC: 109 MG/DL (ref 65–140)
GLUCOSE SERPL-MCNC: 110 MG/DL (ref 65–140)
GLUCOSE SERPL-MCNC: 121 MG/DL (ref 65–140)
GLUCOSE SERPL-MCNC: 123 MG/DL (ref 65–140)
HCT VFR BLD AUTO: 37.2 % (ref 34.8–46.1)
HGB BLD-MCNC: 12.3 G/DL (ref 11.5–15.4)
MCH RBC QN AUTO: 28.9 PG (ref 26.8–34.3)
MCHC RBC AUTO-ENTMCNC: 33.1 G/DL (ref 31.4–37.4)
MCV RBC AUTO: 88 FL (ref 82–98)
P AXIS: 58 DEGREES
PLATELET # BLD AUTO: 182 THOUSANDS/UL (ref 149–390)
PMV BLD AUTO: 10.9 FL (ref 8.9–12.7)
POTASSIUM SERPL-SCNC: 4 MMOL/L (ref 3.5–5.3)
PR INTERVAL: 160 MS
PROT SERPL-MCNC: 5.5 G/DL (ref 6.4–8.2)
QRS AXIS: 4 DEGREES
QRSD INTERVAL: 80 MS
QT INTERVAL: 348 MS
QTC INTERVAL: 468 MS
RBC # BLD AUTO: 4.25 MILLION/UL (ref 3.81–5.12)
SODIUM SERPL-SCNC: 140 MMOL/L (ref 136–145)
T WAVE AXIS: 63 DEGREES
TROPONIN I SERPL-MCNC: 0.81 NG/ML
VENTRICULAR RATE: 109 BPM
WBC # BLD AUTO: 7.38 THOUSAND/UL (ref 4.31–10.16)

## 2021-07-05 PROCEDURE — 82948 REAGENT STRIP/BLOOD GLUCOSE: CPT

## 2021-07-05 PROCEDURE — 84484 ASSAY OF TROPONIN QUANT: CPT | Performed by: PHYSICIAN ASSISTANT

## 2021-07-05 PROCEDURE — 93010 ELECTROCARDIOGRAM REPORT: CPT | Performed by: INTERNAL MEDICINE

## 2021-07-05 PROCEDURE — 99232 SBSQ HOSP IP/OBS MODERATE 35: CPT | Performed by: PHYSICIAN ASSISTANT

## 2021-07-05 PROCEDURE — 99223 1ST HOSP IP/OBS HIGH 75: CPT | Performed by: INTERNAL MEDICINE

## 2021-07-05 PROCEDURE — 80053 COMPREHEN METABOLIC PANEL: CPT | Performed by: INTERNAL MEDICINE

## 2021-07-05 PROCEDURE — 85027 COMPLETE CBC AUTOMATED: CPT | Performed by: INTERNAL MEDICINE

## 2021-07-05 PROCEDURE — 85730 THROMBOPLASTIN TIME PARTIAL: CPT | Performed by: INTERNAL MEDICINE

## 2021-07-05 RX ORDER — ATORVASTATIN CALCIUM 40 MG/1
40 TABLET, FILM COATED ORAL DAILY
Status: DISCONTINUED | OUTPATIENT
Start: 2021-07-06 | End: 2021-07-06 | Stop reason: HOSPADM

## 2021-07-05 RX ADMIN — ATORVASTATIN CALCIUM 20 MG: 20 TABLET, FILM COATED ORAL at 09:18

## 2021-07-05 RX ADMIN — HEPARIN SODIUM 12 UNITS/KG/HR: 10000 INJECTION, SOLUTION INTRAVENOUS at 17:12

## 2021-07-05 RX ADMIN — TICAGRELOR 180 MG: 90 TABLET ORAL at 15:44

## 2021-07-05 RX ADMIN — SENNOSIDES 8.6 MG: 8.6 TABLET ORAL at 17:10

## 2021-07-05 RX ADMIN — ASPIRIN 81 MG CHEWABLE TABLET 81 MG: 81 TABLET CHEWABLE at 09:17

## 2021-07-05 RX ADMIN — SENNOSIDES 8.6 MG: 8.6 TABLET ORAL at 09:17

## 2021-07-05 RX ADMIN — GABAPENTIN 300 MG: 300 CAPSULE ORAL at 09:18

## 2021-07-05 RX ADMIN — CEFTRIAXONE 1000 MG: 1 INJECTION, POWDER, FOR SOLUTION INTRAMUSCULAR; INTRAVENOUS at 00:12

## 2021-07-05 RX ADMIN — Medication 12.5 MG: at 09:16

## 2021-07-05 RX ADMIN — LISINOPRIL 5 MG: 2.5 TABLET ORAL at 09:17

## 2021-07-05 NOTE — ASSESSMENT & PLAN NOTE
· Not in acute exacerbation, continue home inhalers  · Noted to de-sat into 80's while sleeping  · Will check overnight pulse ox - pending

## 2021-07-05 NOTE — ASSESSMENT & PLAN NOTE
· Initial EKG possible ectopic atrial rhythm versus sinus rhythm with PACs  Repeat EKG showed NSR with PACs   Patient otherwise asymptomatic  · Cardiology input will be appreciated  · Metoprolol started

## 2021-07-05 NOTE — ASSESSMENT & PLAN NOTE
Lab Results   Component Value Date    HGBA1C 6 5 (H) 07/03/2021       Recent Labs     07/04/21  1615 07/04/21  2103 07/05/21  0559 07/05/21  1030   POCGLU 158* 100 121 106       Blood Sugar Average: Last 72 hrs:  · (P) 121 25 well controlled as outpatient  · Hold metformin while inpatient, initiate SSI with Accu-Cheks and carb controlled diet  · Initiate hypoglycemia protocol

## 2021-07-05 NOTE — PLAN OF CARE
Problem: MOBILITY - ADULT  Goal: Maintain or return to baseline ADL function  Description: INTERVENTIONS:  -  Assess patient's ability to carry out ADLs; assess patient's baseline for ADL function and identify physical deficits which impact ability to perform ADLs (bathing, care of mouth/teeth, toileting, grooming, dressing, etc )  - Assess/evaluate cause of self-care deficits   - Assess range of motion  - Assess patient's mobility; develop plan if impaired  - Assess patient's need for assistive devices and provide as appropriate  - Encourage maximum independence but intervene and supervise when necessary  - Involve family in performance of ADLs  - Assess for home care needs following discharge   - Consider OT consult to assist with ADL evaluation and planning for discharge  - Provide patient education as appropriate  Outcome: Progressing  Goal: Maintains/Returns to pre admission functional level  Description: INTERVENTIONS:  - Perform BMAT or MOVE assessment daily    - Set and communicate daily mobility goal to care team and patient/family/caregiver  - Collaborate with rehabilitation services on mobility goals if consulted  - Perform Range of Motion 4 times a day  - Reposition patient every 2 hours    - Dangle patient 2 times a day  - Stand patient 8 times a day  - Ambulate patient 4 times a day  - Out of bed to chair 3 times a day   - Out of bed for meals 3 times a day  - Out of bed for toileting  - Record patient progress and toleration of activity level   Outcome: Progressing

## 2021-07-05 NOTE — PROGRESS NOTES
[address] [facility]  Progress Note Bita Posada 1940, 80 y o  female MRN: [id number]  Unit/Bed#: CW2 214-01 Encounter: [id number]  Primary Care Provider: Hayes Jacome PA-C   Date and time admitted to hospital: 7/4/2021 10:02 AM    * Elevated troponin  Assessment & Plan  · Elevated troponin with increase noted throughout [facility] evaluation  Patient was without chest pain or shortness of breath at any point  · Troponin 0 06 -->0 51 --> 0 77 --> 1 0 --> 0 81  · check echocardiogram, consult Cardiology  · Continue telemetry monitoring for now  · Continue heparin drip, ASA, statin, BB  · Patient denies chest pain    NSVT (nonsustained ventricular tachycardia) (formerly [facility])  Assessment & Plan  · 8 beat run of NSVT noted on telemetry last evening  · Continue metoprolol     Abnormal EKG  Assessment & Plan  · Initial EKG possible ectopic atrial rhythm versus sinus rhythm with PACs  Repeat EKG showed NSR with PACs  Patient otherwise asymptomatic  · Cardiology input will be appreciated  · Metoprolol started    Left lower quadrant abdominal pain  Assessment & Plan  · 5 day history of abdominal pain, constipation, poor oral intake, vomiting  CT abdomen/pelvis is without any abnormalities  UA with pyuria   Constipation vs UTI  · Patient now feeling better after bowel movement, so suspecting constipation  · Will continue with ceftriaxone x 3 doses pending results of urine culture  · Trend WBC, temperature curve, hemodynamics    COPD (chronic obstructive pulmonary disease) ([address] )  Assessment & Plan  · Not in acute exacerbation, continue home inhalers  · Noted to de-sat into 80's while sleeping  · Will check overnight pulse ox - pending    Type 2 diabetes mellitus with hyperglycemia, without long-term current use of insulin [facility])  Assessment & Plan  Lab Results   Component Value Date    HGBA1C 6 5 (H) 07/03/2021       Recent Labs     07/04/21  1615 07/04/21  2103 07/05/21  0559 07/05/21  1030 POCGLU 158* 100 121 106       Blood Sugar Average: Last 72 hrs:  · (P) 121 25 well controlled as outpatient  · Hold metformin while inpatient, initiate SSI with Accu-Cheks and carb controlled diet  · Initiate hypoglycemia protocol    Essential hypertension  Assessment & Plan  · Continue home lisinopril 5 mg daily with hold parameters, monitor blood pressure per protocol  · Monitor with addition of metoprolol          VTE Pharmacologic Prophylaxis: VTE Score: 5 High Risk (Score >/= 5) - Pharmacological DVT Prophylaxis Ordered: heparin drip  Sequential Compression Devices Ordered  Patient Centered Rounds: I performed bedside rounds with nursing staff today  Discussions with Specialists or Other Care Team Provider: case management    Education and Discussions with Family / Patient: Patient declined call to   Time Spent for Care: 30 minutes  More than 50% of total time spent on counseling and coordination of care as described above  Current Length of Stay: 1 day(s)  Current Patient Status: Inpatient   Certification Statement: The patient will continue to require additional inpatient hospital stay due to cardiac workup  Discharge Plan: await plan per Cardiology    Code Status: Level 3 - DNAR and DNI    Subjective:   No events overnight  Denies any CP or SOB  Abdominal pain resolved  Objective:     Vitals:   Temp (24hrs), Av 4 °F (36 9 °C), Min:98 3 °F (36 8 °C), Max:98 5 °F (36 9 °C)    Temp:  [98 3 °F (36 8 °C)-98 5 °F (36 9 °C)] 98 3 °F (36 8 °C)  HR:  [72-87] 87  Resp:  [18] 18  BP: (119-124)/(59-77) 119/60  SpO2:  [92 %-95 %] 94 %  There is no height or weight on file to calculate BMI  Input and Output Summary (last 24 hours): Intake/Output Summary (Last 24 hours) at 2021 1037  Last data filed at 2021 0901  Gross per 24 hour   Intake 300 ml   Output 200 ml   Net 100 ml       Physical Exam:   Physical Exam  Constitutional:       Appearance: Normal appearance  Cardiovascular:      Rate and Rhythm: Normal rate and regular rhythm  Heart sounds: No murmur heard  Pulmonary:      Effort: Pulmonary effort is normal       Breath sounds: Normal breath sounds  Abdominal:      General: Bowel sounds are normal  There is no distension  Palpations: Abdomen is soft  Tenderness: There is no abdominal tenderness  Skin:     General: Skin is warm and dry  Neurological:      General: No focal deficit present  Mental Status: She is alert and oriented to person, place, and time     Psychiatric:         Mood and Affect: Mood normal          Additional Data:     Labs:  Results from last 7 days   Lab Units 07/05/21  0400 07/03/21  1929   WBC Thousand/uL 7 38 12 47*   HEMOGLOBIN g/dL 12 3 14 7   HEMATOCRIT % 37 2 45 7   PLATELETS Thousands/uL 182 311   NEUTROS PCT %  --  56   LYMPHS PCT %  --  30   MONOS PCT %  --  12   EOS PCT %  --  1     Results from last 7 days   Lab Units 07/05/21  0439   SODIUM mmol/L 140   POTASSIUM mmol/L 4 0   CHLORIDE mmol/L 108   CO2 mmol/L 28   BUN mg/dL 13   CREATININE mg/dL 0 64   ANION GAP mmol/L 4   CALCIUM mg/dL 8 5   ALBUMIN g/dL 3 0*   TOTAL BILIRUBIN mg/dL 0 56   ALK PHOS U/L 58   ALT U/L 32   AST U/L 23   GLUCOSE RANDOM mg/dL 109     Results from last 7 days   Lab Units 07/04/21  1523   INR  1 07     Results from last 7 days   Lab Units 07/05/21  1030 07/05/21  0559 07/04/21  2103 07/04/21  1615   POC GLUCOSE mg/dl 106 121 100 158*     Results from last 7 days   Lab Units 07/03/21  1926   HEMOGLOBIN A1C % 6 5*     Results from last 7 days   Lab Units 07/03/21  1929   LACTIC ACID mmol/L 1 8       Lines/Drains:  Invasive Devices     Peripheral Intravenous Line            Peripheral IV 07/03/21 Right Antecubital 1 day    Peripheral IV 07/05/21 Distal;Left;Ventral (anterior) Forearm <1 day                  Telemetry:  Telemetry Orders (From admission, onward)             48 Hour Telemetry Monitoring  Continuous x 48 hours Question:  Reason for 48 Hour Telemetry  Answer:  Arrhythmias Requiring Medical Therapy (eg  SVT, Vtach/fib, Bradycardia, Uncontrolled A-fib)                 Telemetry Reviewed: Normal Sinus Rhythm  Indication for Continued Telemetry Use: Arrthymias requiring medical therapy           Imaging: No pertinent imaging reviewed  Recent Cultures (last 7 days):   Results from last 7 days   Lab Units 07/03/21  2328   BLOOD CULTURE  Received in Microbiology Lab  Culture in Progress  Received in Microbiology Lab  Culture in Progress  Last 24 Hours Medication List:   Current Facility-Administered Medications   Medication Dose Route Frequency Provider Last Rate    acetaminophen  650 mg Oral Q6H PRN Zoila Ferrer PA-C      aspirin  81 mg Oral Daily Sharon Valerio PA-C      atorvastatin  20 mg Oral Daily Lola Browne      cefTRIAXone  1,000 mg Intravenous Q24H Zoila Ferrer PA-C 1,000 mg (07/05/21 0012)    fluticasone-umeclidinium-vilanterol  1 puff Inhalation Daily Zoila Ferrer PA-C      gabapentin  300 mg Oral Daily Zoila Ferrer PA-C      heparin (porcine)  3-20 Units/kg/hr (Order-Specific) Intravenous Titrated Zoila Ferrer PA-C 12 Units/kg/hr (07/05/21 0305)    insulin lispro  1-5 Units Subcutaneous TID AC Sharon Valerio PA-C      insulin lispro  1-5 Units Subcutaneous HS Sharon Valerio PA-C      lisinopril  5 mg Oral Daily Zoila Ferrer PA-C      metoprolol tartrate  12 5 mg Oral Q12H [facility] Sharon LAGUNAS PA-C      ondansetron  4 mg Intravenous Q6H PRN Zoial Ferrer PA-C      senna  1 tablet Oral BID Zoila Ferrer PA-C          Today, Patient Was Seen By: Zoila Ferrer PA-C    **Please Note: This note may have been constructed using a voice recognition system  **

## 2021-07-05 NOTE — ASSESSMENT & PLAN NOTE
· 5 day history of abdominal pain, constipation, poor oral intake, vomiting  CT abdomen/pelvis is without any abnormalities  UA with pyuria   Constipation vs UTI  · Patient now feeling better after bowel movement, so suspecting constipation  · Will continue with ceftriaxone x 3 doses pending results of urine culture  · Trend WBC, temperature curve, hemodynamics

## 2021-07-05 NOTE — ASSESSMENT & PLAN NOTE
· Continue home lisinopril 5 mg daily with hold parameters, monitor blood pressure per protocol  · Monitor with addition of metoprolol

## 2021-07-05 NOTE — ASSESSMENT & PLAN NOTE
· Elevated troponin with increase noted throughout [facility] evaluation    Patient was without chest pain or shortness of breath at any point  · Troponin 0 06 -->0 51 --> 0 77 --> 1 0 --> 0 81  · check echocardiogram, consult Cardiology  · Continue telemetry monitoring for now  · Continue heparin drip, ASA, statin, BB  · Patient denies chest pain

## 2021-07-05 NOTE — CONSULTS
Consultation - Cardiology  Radha Dempsey 80 y o  female MRN: [id number]  Unit/Bed#: CW2 214-01 Encounter: [id number]      Inpatient consult to Cardiology  Consult performed by: Che Ramey PA-C  Consult ordered by: Oxana Richards PA-C          History of Present Illness   Physician Requesting Consult: Sandrine Cevallos DO  Reason for Consult / Principal Problem: Elevated troponin    Assessment/Plan   Assessment:  1  Elevated troponin concerning for NSTEMI  - troponin trend: 0 06 - 0 51 - 0 77 - 1 00 - 0 71 - 0 84 - 0 81  - preserved LVEF per echo 11/2019 / repeat pending  - prior stress test 1/2019 showing very small zone of ischemia by perfusion imaging  - maintained on aspirin and statin as an outpatient / currently started on beta-blocker therapy inpatient  2  Essential hypertension  - maintained on lisinopril 5mg daily as an outpatient   3  Hyperlipidemia  - maintained on atorvastatin 20mg daily  4  Type II diabetes mellitus  5  Left lower quadrant pain  - constipation relieved with bowel movement  - still currently being treated with ceftriaxone    Plan:  Patient does have elevated troponins concerning for NSTEMI  She is currently be maintained on heparin drip which should be continued and is already on aspirin, statin, and beta-blocker therapy now  We did discuss cardiac catheterization to which the patient is agreeable  Will be made NPO after midnight for this procedure  Will follow up on repeat echo as well to determine any changes in her ejection fraction or any wall motion abnormalities  HPI: Radha Dempsey is a 80y o  year old female with essential hypertension, hyperlipidemia, and diabetes  She presented to [facility] on 07/03 due to left flank pain x3  She reports that this pain was sharp in nature and worse with movement and radiated to her left mid abdomen  She also admitted to some constipation over the last 5 days and nausea with vomiting    Through further workup in the emergency room, first troponin was 0 06 and EKG showed possibly atrial fibrillation  Serial troponin showed continue elevation of troponins and it was felt the patient should be transferred to [place] for further cardiology care  On arrival, her troponins continue to be trended and magno as high as 1 00  Therefore, she was started on heparin drip along with beta-blocker  Repeat echo was ordered  In terms of her cardiac history, patient does not follow with a cardiologist as an outpatient but was seen in house in 2018 for elevated troponins as well that had likely been in the setting hypoxia  She has been followed with serial echoes and stress tests  Last echo in 2019 showed preserved LVEF and no significant valvular disease  Nuclear stress test that same year did show a very small zone of ischemia by perfusing imaging  Patient had been maintained on aspirin and statin as an outpatient  She reports that her mother did have heart issues but the nature of them were never dye vault to her, she just found that she was on cardiac meds after her death  Cancer does run in her family but she denies any other cardiac diagnoses  Patient was a smoker in the past, about 50 years she smoked probably less than half a pack a day and quit 9 years ago  When she recounted to me her abdominal discomfort, she reports that it began on Wednesday and she just did not feel right  Her daughter had wanted to take her to a campground and in the car she felt not herself  She first felt that it might be due to being car sick after having not driven a car for a while  Then she also developed the flank pain that she reported to the emergency room that was worse with movement  This remained constant in nature until the emergency room  She reports that now that pain has resolved with 2 bowel movements yesterday      TELE: sinus with PAC's and PVC's     EKG:       Historical Information   Past Medical History:   Diagnosis Date    Allergic     COPD (chronic obstructive pulmonary disease) (HCC)     Diabetes mellitus ([address] )     GERD (gastroesophageal reflux disease)     Hyperlipidemia     Hypertension     Left non-suppurative otitis media 2019    Pneumonia     Pneumonia of right lower lobe due to infectious organism 2018    Vitamin D deficiency      Past Surgical History:   Procedure Laterality Date    CHOLECYSTECTOMY  1984    COLONOSCOPY N/A 2019    Procedure: COLONOSCOPY with multiple  polypectomies;  Surgeon: Joselo Turner MD;  Location: [facility] GI LAB;   Service: Gastroenterology    EYE SURGERY Bilateral     CATARACT     Social History     Substance and Sexual Activity   Alcohol Use Never     Social History     Substance and Sexual Activity   Drug Use Never     Social History     Tobacco Use   Smoking Status Former Smoker    Packs/day: 1 00    Years: 50 00    Pack years: 50 00    Types: Cigarettes    Start date: 1963   Hanna Bennett Quit date: 2013    Years since quittin 5   Smokeless Tobacco Never Used     Family History:   Family History   Problem Relation Age of Onset    Lung cancer Father     Heart disease Mother     Lung cancer Sister     Lung cancer Brother        Meds/Allergies   Hospital Medications:   Current Facility-Administered Medications   Medication Dose Route Frequency    acetaminophen (TYLENOL) tablet 650 mg  650 mg Oral Q6H PRN    aspirin chewable tablet 81 mg  81 mg Oral Daily    atorvastatin (LIPITOR) tablet 20 mg  20 mg Oral Daily    cefTRIAXone (ROCEPHIN) 1,000 mg in dextrose 5 % 50 mL IVPB  1,000 mg Intravenous Q24H    fluticasone-umeclidinium-vilanterol (TRELEGY) 100-62 5-25 MCG/INH inhaler 1 puff  1 puff Inhalation Daily    gabapentin (NEURONTIN) capsule 300 mg  300 mg Oral Daily    heparin (porcine) 25,000 units in 0 45% NaCl 250 mL infusion (premix)  3-20 Units/kg/hr (Order-Specific) Intravenous Titrated    insulin lispro (HumaLOG) 100 units/mL subcutaneous injection 1-5 Units  1-5 Units Subcutaneous TID AC    insulin lispro (HumaLOG) 100 units/mL subcutaneous injection 1-5 Units  1-5 Units Subcutaneous HS    lisinopril (ZESTRIL) tablet 5 mg  5 mg Oral Daily    metoprolol tartrate (LOPRESSOR) partial tablet 12 5 mg  12 5 mg Oral Q12H [address]    ondansetron (ZOFRAN) injection 4 mg  4 mg Intravenous Q6H PRN    senna (SENOKOT) tablet 8 6 mg  1 tablet Oral BID     Home Medications:   Medications Prior to Admission   Medication    albuterol (Ventolin HFA) 90 mcg/act inhaler    aspirin 81 mg chewable tablet    atorvastatin (LIPITOR) 20 mg tablet    cetirizine (ZyrTEC) 10 mg tablet    clotrimazole-betamethasone (LOTRISONE) 1-0 05 % cream    Diclofenac Sodium (VOLTAREN) 1 %    Ergocalciferol (VITAMIN D2) 2000 units TABS    fluticasone-umeclidinium-vilanterol (TRELEGY) 100-62 5-25 MCG/INH inhaler    gabapentin (NEURONTIN) 300 mg capsule    glucose blood (OneTouch Verio) test strip    lisinopril (ZESTRIL) 5 mg tablet    metFORMIN (GLUCOPHAGE) 500 mg tablet    Multiple Vitamin (MULTIVITAMINS PO)    omeprazole (PriLOSEC) 20 mg delayed release capsule    oxybutynin (DITROPAN-XL) 5 mg 24 hr tablet       Allergies   Allergen Reactions    Bee Venom Anaphylaxis    Other      FLOWERS/GRASS       Objective   Vitals: Blood pressure 119/60, pulse 87, temperature 98 3 °F (36 8 °C), temperature source Oral, resp  rate 18, SpO2 94 %    Orthostatic Blood Pressures      Most Recent Value   Blood Pressure  119/60 filed at 07/05/2021 4051   Patient Position - Orthostatic VS  Lying filed at 07/05/2021 0811            Intake/Output Summary (Last 24 hours) at 7/5/2021 0939  Last data filed at 7/5/2021 0851  Gross per 24 hour   Intake 300 ml   Output --   Net 300 ml       Invasive Devices     Peripheral Intravenous Line            Peripheral IV 07/03/21 Right Antecubital 1 day    Peripheral IV 07/05/21 Distal;Left;Ventral (anterior) Forearm <1 day                Review of Systems:  ROS  ROS as noted above, otherwise 12 point review of systems was performed and is negative  Physical Exam:   Physical Exam    Lab Results: I have personally reviewed pertinent lab results  Results from last 7 days   Lab Units 21  0400 21  1523 21  1929   WBC Thousand/uL 7 38 9 55 12 47*   HEMOGLOBIN g/dL 12 3 13 3 14 7   HEMATOCRIT % 37 2 40 5 45 7   PLATELETS Thousands/uL 182 237 311     Results from last 7 days   Lab Units 21  0439 21  1929   POTASSIUM mmol/L 4 0 4 2   CHLORIDE mmol/L 108 100   CO2 mmol/L 28 27   BUN mg/dL 13 14   CREATININE mg/dL 0 64 0 94   CALCIUM mg/dL 8 5 9 3     Results from last 7 days   Lab Units 21  0307 218 21  1523 21  192   INR   --   --  1 07 1 06   PTT seconds 74* 70* 32 31     Results from last 7 days   Lab Units 21  1943   MAGNESIUM mg/dL 1 6       Imaging: I have personally reviewed pertinent reports  ECHO: Results for orders placed during the hospital encounter of 19    Echo complete with contrast if indicated    Narrative  [address] [place] [address]  [address], [facility]  [phone]    Transthoracic Echocardiogram  2D    Study date:  2019    Patient: Gely Tadeo  MR number: [id number]  Account number: [de-identified]  : 1940  Age: 78 years  Gender: Female  Status: Inpatient  Location: Bedside  Height: 66 in  Weight: 175 lb  BP: 111/ 64 mmHg    Indications: vomiting, dizzy    Diagnoses: 780 4 - DIZZINESS AND GIDDINESS    Sonographer:  Aguila Bennett RDCS, CCT  Primary Physician:  Wilma Britton PA-C  Referring Physician:  Ector Meng DO  Group:  [id number]  Interpreting Physician:  Vashti Harada, MD    SUMMARY    LEFT VENTRICLE:  Systolic function was normal  Ejection fraction was estimated to be 60 %  This study was inadequate for the evaluation of regional wall motion    Doppler parameters were consistent with abnormal left ventricular relaxation (grade 1 diastolic dysfunction)  MITRAL VALVE:  There was mild regurgitation  TRICUSPID VALVE:  There was trace regurgitation  Estimated peak PA pressure was 38 mmHg  The findings suggest mild pulmonary hypertension  HISTORY: PRIOR HISTORY: Patient has no history of cardiovascular disease  PROCEDURE: The procedure was performed at the bedside  This was a routine study  The transthoracic approach was used  The study included complete 2D imaging  The heart rate was 90 bpm, at the start of the study  This was a technically  difficult study  There were no complications during the procedure  LEFT VENTRICLE: Size was normal  Systolic function was normal  Ejection fraction was estimated to be 60 %  This study was inadequate for the evaluation of regional wall motion  Wall thickness was normal  DOPPLER: Doppler parameters were  consistent with abnormal left ventricular relaxation (grade 1 diastolic dysfunction)  RIGHT VENTRICLE: The size was normal  Systolic function was normal  Wall thickness was normal     LEFT ATRIUM: Size was normal     RIGHT ATRIUM: Size was normal     MITRAL VALVE: Valve structure was normal  There was normal leaflet separation  DOPPLER: The transmitral velocity was within the normal range  There was no evidence for stenosis  There was mild regurgitation  AORTIC VALVE: Leaflets exhibited normal thickness and normal cuspal separation  The valve was not well visualized  DOPPLER: Transaortic velocity was within the normal range  There was no evidence for stenosis  There was no significant  regurgitation  TRICUSPID VALVE: The valve structure was normal  There was normal leaflet separation  DOPPLER: The transtricuspid velocity was within the normal range  There was no evidence for stenosis  There was trace regurgitation  Estimated peak PA  pressure was 38 mmHg  The findings suggest mild pulmonary hypertension      PULMONIC VALVE: Leaflets exhibited normal thickness, no calcification, and normal cuspal separation  DOPPLER: The transpulmonic velocity was within the normal range  There was no significant regurgitation  PERICARDIUM: There was no pericardial effusion  The pericardium was normal in appearance  AORTA: The root exhibited normal size  SYSTEMIC VEINS: IVC: The inferior vena cava was normal in size      SYSTEM MEASUREMENT TABLES    2D  %FS: 26 94 %  Ao Diam: 2 86 cm  EDV(Teich): 93 7 ml  EF(Teich): 52 68 %  ESV(Teich): 44 34 ml  IVSd: 1 05 cm  LA Area: 15 39 cm2  LA Diam: 3 12 cm  LVEDV MOD A4C: 42 22 ml  LVEF MOD A4C: 68 79 %  LVESV MOD A4C: 13 18 ml  LVIDd: 4 53 cm  LVIDs: 3 31 cm  LVLd A4C: 6 47 cm  LVLs A4C: 5 6 cm  LVPWd: 0 88 cm  RA Area: 13 56 cm2  RVIDd: 2 89 cm  RWT: 0 39  SV MOD A4C: 29 05 ml  SV(Teich): 49 36 ml    CW  RAP: 0 mmHg  TR Vmax: 2 87 m/s  TR maxP 06 mmHg    MM  TAPSE: 3 cm    PW  E' Sept: 0 07 m/s  E/E' Sept: 13 52  MV A Kb: 1 12 m/s  MV Dec [place]: 5 22 m/s2  MV DecT: 182 53 ms  MV E Kb: 0 93 m/s  MV E/A Ratio: 0 83  RVSP: 33 06 mmHg    [facility] Accredited Echocardiography Laboratory    Prepared and electronically signed by    Valentino Hosteller, MD  Signed 2019 12:40:55       Cardiac testing:   ECHO:   Results for orders placed during the hospital encounter of 19    Echo complete with contrast if indicated    Narrative  [address] [facility] [address] [facility] [address], [place] [address]  [phone]    Transthoracic Echocardiogram  2D    Study date:  2019    Patient: Regina Perez  MR number: [id number]  Account number: [de-identified]  : 1940  Age: 78 years  Gender: Female  Status: Inpatient  Location: Bedside  Height: 66 in  Weight: 175 lb  BP: 111/ 64 mmHg    Indications: vomiting, dizzy    Diagnoses: 780 4 - DIZZINESS AND GIDDINESS    Sonographer:  Alie Jonas RD, CCT  Primary Physician:  Walt Tolliver PA-C  Referring Physician:  Teo Zhu DO  Group:  [facility]  Interpreting Physician:  Valentino Hosteller, MD    SUMMARY    LEFT VENTRICLE:  Systolic function was normal  Ejection fraction was estimated to be 60 %  This study was inadequate for the evaluation of regional wall motion  Doppler parameters were consistent with abnormal left ventricular relaxation (grade 1 diastolic dysfunction)  MITRAL VALVE:  There was mild regurgitation  TRICUSPID VALVE:  There was trace regurgitation  Estimated peak PA pressure was 38 mmHg  The findings suggest mild pulmonary hypertension  HISTORY: PRIOR HISTORY: Patient has no history of cardiovascular disease  PROCEDURE: The procedure was performed at the bedside  This was a routine study  The transthoracic approach was used  The study included complete 2D imaging  The heart rate was 90 bpm, at the start of the study  This was a technically  difficult study  There were no complications during the procedure  LEFT VENTRICLE: Size was normal  Systolic function was normal  Ejection fraction was estimated to be 60 %  This study was inadequate for the evaluation of regional wall motion  Wall thickness was normal  DOPPLER: Doppler parameters were  consistent with abnormal left ventricular relaxation (grade 1 diastolic dysfunction)  RIGHT VENTRICLE: The size was normal  Systolic function was normal  Wall thickness was normal     LEFT ATRIUM: Size was normal     RIGHT ATRIUM: Size was normal     MITRAL VALVE: Valve structure was normal  There was normal leaflet separation  DOPPLER: The transmitral velocity was within the normal range  There was no evidence for stenosis  There was mild regurgitation  AORTIC VALVE: Leaflets exhibited normal thickness and normal cuspal separation  The valve was not well visualized  DOPPLER: Transaortic velocity was within the normal range  There was no evidence for stenosis  There was no significant  regurgitation      TRICUSPID VALVE: The valve structure was normal  There was normal leaflet separation  DOPPLER: The transtricuspid velocity was within the normal range  There was no evidence for stenosis  There was trace regurgitation  Estimated peak PA  pressure was 38 mmHg  The findings suggest mild pulmonary hypertension  PULMONIC VALVE: Leaflets exhibited normal thickness, no calcification, and normal cuspal separation  DOPPLER: The transpulmonic velocity was within the normal range  There was no significant regurgitation  PERICARDIUM: There was no pericardial effusion  The pericardium was normal in appearance  AORTA: The root exhibited normal size  SYSTEMIC VEINS: IVC: The inferior vena cava was normal in size  SYSTEM MEASUREMENT TABLES    2D  %FS: 26 94 %  Ao Diam: 2 86 cm  EDV(Teich): 93 7 ml  EF(Teich): 52 68 %  ESV(Teich): 44 34 ml  IVSd: 1 05 cm  LA Area: 15 39 cm2  LA Diam: 3 12 cm  LVEDV MOD A4C: 42 22 ml  LVEF MOD A4C: 68 79 %  LVESV MOD A4C: 13 18 ml  LVIDd: 4 53 cm  LVIDs: 3 31 cm  LVLd A4C: 6 47 cm  LVLs A4C: 5 6 cm  LVPWd: 0 88 cm  RA Area: 13 56 cm2  RVIDd: 2 89 cm  RWT: 0 39  SV MOD A4C: 29 05 ml  SV(Teich): 49 36 ml    CW  RAP: 0 mmHg  TR Vmax: 2 87 m/s  TR maxP 06 mmHg    MM  TAPSE: 3 cm    PW  E' Sept: 0 07 m/s  E/E' Sept: 13 52  MV A Kb: 1 12 m/s  MV Dec [place]: 5 22 m/s2  MV DecT: 182 53 ms  MV E Kb: 0 93 m/s  MV E/A Ratio: 0 83  RVSP: 33 06 mmHg    [employer] Accredited Echocardiography Laboratory    Prepared and electronically signed by    Isaac Faust MD  Signed 2019 12:40:55    No results found for this or any previous visit  CATH:  No results found for this or any previous visit  STRESS TEST:  No results found for this or any previous visit        VTE Prophylaxis: Sequential compression device (Venodyne)  and Heparin

## 2021-07-06 ENCOUNTER — APPOINTMENT (INPATIENT)
Dept: NON INVASIVE DIAGNOSTICS | Facility: HOSPITAL | Age: 81
DRG: 392 | End: 2021-07-06
Payer: COMMERCIAL

## 2021-07-06 ENCOUNTER — TRANSITIONAL CARE MANAGEMENT (OUTPATIENT)
Dept: INTERNAL MEDICINE CLINIC | Facility: CLINIC | Age: 81
End: 2021-07-06

## 2021-07-06 LAB
APTT PPP: 70 SECONDS (ref 23–37)
ATRIAL RATE: 80 BPM
ATRIAL RATE: 94 BPM
BACTERIA UR CULT: ABNORMAL
BACTERIA UR CULT: ABNORMAL
GLUCOSE SERPL-MCNC: 109 MG/DL (ref 65–140)
GLUCOSE SERPL-MCNC: 111 MG/DL (ref 65–140)
GLUCOSE SERPL-MCNC: 113 MG/DL (ref 65–140)
P AXIS: 62 DEGREES
PR INTERVAL: 180 MS
QRS AXIS: -39 DEGREES
QRS AXIS: -5 DEGREES
QRSD INTERVAL: 72 MS
QRSD INTERVAL: 74 MS
QT INTERVAL: 374 MS
QT INTERVAL: 380 MS
QTC INTERVAL: 475 MS
QTC INTERVAL: 484 MS
T WAVE AXIS: 60 DEGREES
T WAVE AXIS: 66 DEGREES
VENTRICULAR RATE: 101 BPM
VENTRICULAR RATE: 94 BPM

## 2021-07-06 PROCEDURE — 93454 CORONARY ARTERY ANGIO S&I: CPT | Performed by: INTERNAL MEDICINE

## 2021-07-06 PROCEDURE — NC001 PR NO CHARGE: Performed by: PHYSICIAN ASSISTANT

## 2021-07-06 PROCEDURE — 82948 REAGENT STRIP/BLOOD GLUCOSE: CPT

## 2021-07-06 PROCEDURE — C1769 GUIDE WIRE: HCPCS

## 2021-07-06 PROCEDURE — 99153 MOD SED SAME PHYS/QHP EA: CPT

## 2021-07-06 PROCEDURE — 93454 CORONARY ARTERY ANGIO S&I: CPT

## 2021-07-06 PROCEDURE — 93010 ELECTROCARDIOGRAM REPORT: CPT | Performed by: INTERNAL MEDICINE

## 2021-07-06 PROCEDURE — 99238 HOSP IP/OBS DSCHRG MGMT 30/<: CPT | Performed by: PHYSICIAN ASSISTANT

## 2021-07-06 PROCEDURE — C1894 INTRO/SHEATH, NON-LASER: HCPCS

## 2021-07-06 PROCEDURE — 94762 N-INVAS EAR/PLS OXIMTRY CONT: CPT

## 2021-07-06 PROCEDURE — B211YZZ FLUOROSCOPY OF MULTIPLE CORONARY ARTERIES USING OTHER CONTRAST: ICD-10-PCS | Performed by: INTERNAL MEDICINE

## 2021-07-06 PROCEDURE — 99232 SBSQ HOSP IP/OBS MODERATE 35: CPT | Performed by: INTERNAL MEDICINE

## 2021-07-06 PROCEDURE — 99152 MOD SED SAME PHYS/QHP 5/>YRS: CPT

## 2021-07-06 PROCEDURE — 85730 THROMBOPLASTIN TIME PARTIAL: CPT | Performed by: INTERNAL MEDICINE

## 2021-07-06 PROCEDURE — 99152 MOD SED SAME PHYS/QHP 5/>YRS: CPT | Performed by: INTERNAL MEDICINE

## 2021-07-06 RX ORDER — MIDAZOLAM HYDROCHLORIDE 2 MG/2ML
INJECTION, SOLUTION INTRAMUSCULAR; INTRAVENOUS CODE/TRAUMA/SEDATION MEDICATION
Status: COMPLETED | OUTPATIENT
Start: 2021-07-06 | End: 2021-07-06

## 2021-07-06 RX ORDER — SODIUM CHLORIDE 9 MG/ML
INJECTION, SOLUTION INTRAVENOUS
Status: COMPLETED | OUTPATIENT
Start: 2021-07-06 | End: 2021-07-06

## 2021-07-06 RX ORDER — SODIUM CHLORIDE 9 MG/ML
75 INJECTION, SOLUTION INTRAVENOUS CONTINUOUS
Status: CANCELLED | OUTPATIENT
Start: 2021-07-06 | End: 2021-07-06

## 2021-07-06 RX ORDER — FENTANYL CITRATE 50 UG/ML
INJECTION, SOLUTION INTRAMUSCULAR; INTRAVENOUS CODE/TRAUMA/SEDATION MEDICATION
Status: COMPLETED | OUTPATIENT
Start: 2021-07-06 | End: 2021-07-06

## 2021-07-06 RX ORDER — NITROGLYCERIN 20 MG/100ML
INJECTION INTRAVENOUS CODE/TRAUMA/SEDATION MEDICATION
Status: COMPLETED | OUTPATIENT
Start: 2021-07-06 | End: 2021-07-06

## 2021-07-06 RX ORDER — HEPARIN SODIUM 1000 [USP'U]/ML
INJECTION, SOLUTION INTRAVENOUS; SUBCUTANEOUS CODE/TRAUMA/SEDATION MEDICATION
Status: COMPLETED | OUTPATIENT
Start: 2021-07-06 | End: 2021-07-06

## 2021-07-06 RX ORDER — VERAPAMIL HYDROCHLORIDE 2.5 MG/ML
INJECTION, SOLUTION INTRAVENOUS CODE/TRAUMA/SEDATION MEDICATION
Status: COMPLETED | OUTPATIENT
Start: 2021-07-06 | End: 2021-07-06

## 2021-07-06 RX ORDER — LIDOCAINE HYDROCHLORIDE 10 MG/ML
INJECTION, SOLUTION EPIDURAL; INFILTRATION; INTRACAUDAL; PERINEURAL CODE/TRAUMA/SEDATION MEDICATION
Status: COMPLETED | OUTPATIENT
Start: 2021-07-06 | End: 2021-07-06

## 2021-07-06 RX ORDER — CEPHALEXIN 500 MG/1
500 CAPSULE ORAL EVERY 6 HOURS SCHEDULED
Qty: 8 CAPSULE | Refills: 0 | Status: SHIPPED | OUTPATIENT
Start: 2021-07-06 | End: 2021-07-08

## 2021-07-06 RX ADMIN — GABAPENTIN 300 MG: 300 CAPSULE ORAL at 09:03

## 2021-07-06 RX ADMIN — SODIUM CHLORIDE 100 ML/HR: 0.9 INJECTION, SOLUTION INTRAVENOUS at 10:16

## 2021-07-06 RX ADMIN — IOHEXOL 40 ML: 350 INJECTION, SOLUTION INTRAVENOUS at 10:33

## 2021-07-06 RX ADMIN — TICAGRELOR 90 MG: 90 TABLET ORAL at 09:04

## 2021-07-06 RX ADMIN — ASPIRIN 81 MG CHEWABLE TABLET 81 MG: 81 TABLET CHEWABLE at 09:03

## 2021-07-06 RX ADMIN — ATORVASTATIN CALCIUM 40 MG: 40 TABLET, FILM COATED ORAL at 09:03

## 2021-07-06 RX ADMIN — HEPARIN SODIUM 4000 UNITS: 1000 INJECTION INTRAVENOUS; SUBCUTANEOUS at 10:25

## 2021-07-06 RX ADMIN — LISINOPRIL 5 MG: 2.5 TABLET ORAL at 09:04

## 2021-07-06 RX ADMIN — LIDOCAINE HYDROCHLORIDE 1 ML: 10 INJECTION, SOLUTION EPIDURAL; INFILTRATION; INTRACAUDAL; PERINEURAL at 10:22

## 2021-07-06 RX ADMIN — Medication 200 MCG: at 10:24

## 2021-07-06 RX ADMIN — VERAPAMIL HYDROCHLORIDE 2.5 MG: 2.5 INJECTION, SOLUTION INTRAVENOUS at 10:25

## 2021-07-06 RX ADMIN — CEFTRIAXONE 1000 MG: 1 INJECTION, POWDER, FOR SOLUTION INTRAMUSCULAR; INTRAVENOUS at 00:03

## 2021-07-06 RX ADMIN — FENTANYL CITRATE 50 MCG: 50 INJECTION INTRAMUSCULAR; INTRAVENOUS at 10:16

## 2021-07-06 RX ADMIN — SENNOSIDES 8.6 MG: 8.6 TABLET ORAL at 09:04

## 2021-07-06 RX ADMIN — MIDAZOLAM 2 MG: 1 INJECTION INTRAMUSCULAR; INTRAVENOUS at 10:17

## 2021-07-06 RX ADMIN — Medication 12.5 MG: at 09:03

## 2021-07-06 NOTE — ASSESSMENT & PLAN NOTE
· Initial EKG possible ectopic atrial rhythm versus sinus rhythm with PACs  Repeat EKG showed NSR with PACs   Patient otherwise asymptomatic  · Cardiology input will be appreciated

## 2021-07-06 NOTE — PROGRESS NOTES
General Cardiology   Progress Note -  [facility]   Wendy Love 80 y o  female MRN: [id number]    Unit/Bed#: CW2 218-02 Encounter: [id number]    Assessment:    1  Non MI troponin elevation:  Complained of nonexertional left-sided chest pain  Peak troponin 1 0  Patient loaded with Brilinta and started on beta-blocker, heparin infusion with continuation of aspirin  · Prior stress test 1/2019 showing very small zone of ischemia by perfusion imaging  · Cardiac catheterization this morning with normal coronary arteries  2  Preserved biventricular systolic function: EF 10%, no diagnostic RWMA but this cannot be excluded on the basis of this study, G1DD, no significant valvular abnormality  3  Essential hypertension:  Average /67 on lisinopril 5 mg daily and Lopressor 12 5 mg BID  4  Hyperlipidemia:  Lipid panel 04/2021 , , HDL 53, LDL 90 on atorvastatin 20mg daily  5  Type II DM:  Hemoglobin A1c 6 5  Management per primary team   6  Left lower quadrant pain: Currently being treated with ceftriaxone    Plan/Recommendations:  · Discontinue Brilinta, heparin infusion and beta-blocker  · Continue aspirin and statin  · Patient stable for discharge from cardiac standpoint with no further workup of noncardiac chest pain  __________________________________________________________________________    Interval history:  71-year-old female with essential hypertension, hyperlipidemia, type 2 DM who presented to [facility] 7/3 with left flank pain as well as nonexertional pain underneath her left breast   Troponin peaked at 1 0   Echocardiogram revealed preserved biventricular function with an EF 60%, no diagnostic RWMA but this cannot be excluded on the basis of this study, G1DD, no significant valvular abnormality  Patient was recommended for cardiac catheterization for definitive evaluation and potential intervention for NSTEMI type 1   Cardiac catheterization this morning showed normal coronary arteries  Subjective    Patient seen and examined  No acute events overnight  She denies any chest pain, shortness of breath or palpitations  Right radial band in place  Review of Systems   Constitutional: Negative  Negative for chills  Cardiovascular: Negative for chest pain, dyspnea on exertion, leg swelling, near-syncope, orthopnea, palpitations, paroxysmal nocturnal dyspnea and syncope  Respiratory: Negative  Negative for cough, shortness of breath and wheezing  Endocrine: Negative  Hematologic/Lymphatic: Negative  Skin: Negative  Musculoskeletal: Negative  Gastrointestinal: Negative  Negative for diarrhea, nausea and vomiting  Neurological: Negative for dizziness, light-headedness and weakness  Psychiatric/Behavioral: Negative  Negative for altered mental status  All other systems reviewed and are negative  Objective:   Vitals: Blood pressure 132/56, pulse 82, temperature 97 7 °F (36 5 °C), resp  rate 18, SpO2 94 %  ,     Wt Readings from Last 3 Encounters:   07/03/21 76 2 kg (168 lb)   07/04/21 76 kg (167 lb 8 8 oz)   04/26/21 76 2 kg (168 lb)        Lab Results   Component Value Date    CREATININE 0 64 07/05/2021    CREATININE 0 94 07/03/2021    CREATININE 0 99 04/29/2021         There is no height or weight on file to calculate BMI ,     Systolic (43THG), DQM:959 , Min:95 , IOK:854     Diastolic (35KJJ), DOF:88, Min:56, Max:83          Intake/Output Summary (Last 24 hours) at 7/6/2021 0924  Last data filed at 7/6/2021 7427  Gross per 24 hour   Intake 762 95 ml   Output 350 ml   Net 412 95 ml     Weight (last 2 days)     None        Telemetry Review: No significant arrhythmias seen on telemetry review  Physical Exam  Vitals and nursing note reviewed  Constitutional:       General: She is not in acute distress  Appearance: She is well-developed  Comments: On RA in NAD   HENT:      Head: Normocephalic and atraumatic     Neck:      Vascular: No JVD    Cardiovascular:      Rate and Rhythm: Normal rate and regular rhythm  Heart sounds: Normal heart sounds  No murmur heard  No friction rub  Pulmonary:      Effort: Pulmonary effort is normal  No respiratory distress  Breath sounds: Normal breath sounds  No wheezing or rales  Abdominal:      General: Bowel sounds are normal  There is no distension  Palpations: Abdomen is soft  Tenderness: There is no abdominal tenderness  Musculoskeletal:         General: No tenderness  Normal range of motion  Cervical back: Normal range of motion and neck supple  Right lower leg: No edema  Left lower leg: No edema  Skin:     General: Skin is warm and dry  Findings: No erythema  Comments: Right TR band noted   Neurological:      Mental Status: She is alert and oriented to person, place, and time  Psychiatric:         Mood and Affect: Mood normal          Behavior: Behavior normal          Thought Content:  Thought content normal          Judgment: Judgment normal          LABORATORY RESULTS  Results from last 7 days   Lab Units 07/05/21  0011 07/04/21 2058 07/04/21  1730   TROPONIN I ng/mL 0 81* 0 84* 0 71*     CBC with diff:   Results from last 7 days   Lab Units 07/05/21  0400 07/04/21  1523 07/03/21 1929   WBC Thousand/uL 7 38 9 55 12 47*   HEMOGLOBIN g/dL 12 3 13 3 14 7   HEMATOCRIT % 37 2 40 5 45 7   MCV fL 88 87 87   PLATELETS Thousands/uL 182 237 311   MCH pg 28 9 28 7 28 1   MCHC g/dL 33 1 32 8 32 2   RDW % 13 3 13 3 13 2   MPV fL 10 9 10 3 10 7   NRBC AUTO /100 WBCs  --   --  0       CMP:  Results from last 7 days   Lab Units 07/05/21  0439 07/03/21 1929   POTASSIUM mmol/L 4 0 4 2   CHLORIDE mmol/L 108 100   CO2 mmol/L 28 27   BUN mg/dL 13 14   CREATININE mg/dL 0 64 0 94   CALCIUM mg/dL 8 5 9 3   AST U/L 23 29   ALT U/L 32 42   ALK PHOS U/L 58 70   EGFR ml/min/1 73sq m 84 57       BMP:  Results from last 7 days   Lab Units 07/05/21  0439 07/03/21 1929 POTASSIUM mmol/L 4 0 4 2   CHLORIDE mmol/L 108 100   CO2 mmol/L 28 27   BUN mg/dL 13 14   CREATININE mg/dL 0 64 0 94   CALCIUM mg/dL 8 5 9 3       No results found for: NTBNP          Results from last 7 days   Lab Units 21  1943   MAGNESIUM mg/dL 1 6          Results from last 7 days   Lab Units 21  1926   HEMOGLOBIN A1C % 6 5*          Results from last 7 days   Lab Units 21  1943   TSH 3RD GENERATON uIU/mL 3 168       Results from last 7 days   Lab Units 21  1523 21  1929   INR  1 07 1 06       Lipid Profile:   No results found for: CHOL  Lab Results   Component Value Date    HDL 53 2021    HDL 52 2019    HDL 51 2019     Lab Results   Component Value Date    LDLCALC 90 2021    LDLCALC 99 2019    LDLCALC 91 2019     Lab Results   Component Value Date    TRIG 172 (H) 2021    TRIG 92 2019    TRIG 150 2019       Cardiac testing:   Results for orders placed during the hospital encounter of 21    Echo complete with contrast if indicated    Narrative  [address]  [address] [facility], [address] [place] [address]  [phone]    Transthoracic Echocardiogram  2D, M-mode, Doppler, and Color Doppler    Study date:  2021    Patient: Sony De La Rosa [facility] [place]  MR number: [id number]  Account number: [de-identified]  : 1940  Age: 80 years  Gender: Female  Status: Inpatient  Location: Bedside  Height: 62 in  Weight: 167 lb  BP: 137/ 75 mmHg    Indications: Coronary Artery Disease    Diagnoses: I25 83 - Coronary atherosclerosis due to lipid rich plaque    Sonographer:  Maryan Jean RDCS  Primary Physician:  Wu Mauro PA-C  Referring Physician:  Alvarado Jung DO  Group:  [facility]  Interpreting Physician:  Bira Moreno DO    SUMMARY    LEFT VENTRICLE:  Systolic function was normal  Ejection fraction was estimated to be 60 %    Although no diagnostic regional wall motion abnormality was identified, this possibility cannot be completely excluded on the basis of this study  Wall thickness was increased  Doppler parameters were consistent with abnormal left ventricular relaxation (grade 1 diastolic dysfunction)  MITRAL VALVE:  There was mild to moderate annular calcification  There was trace regurgitation  TRICUSPID VALVE:  There was mild regurgitation  Estimated peak PA pressure was 45 mmHg  HISTORY: PRIOR HISTORY: COPD, DM, Hypertension, Sepsis    PROCEDURE: The procedure was performed at the bedside  This was a routine study  The transthoracic approach was used  The study included complete 2D imaging, M-mode, complete spectral Doppler, and color Doppler  The heart rate was 93 bpm,  at the start of the study  Images were obtained from the parasternal, apical, subcostal, and suprasternal notch acoustic windows  Echocardiographic views were limited due to poor acoustic window availability and lung interference  This was  a technically difficult study  LEFT VENTRICLE: Size was normal  Systolic function was normal  Ejection fraction was estimated to be 60 %  Although no diagnostic regional wall motion abnormality was identified, this possibility cannot be completely excluded on the basis  of this study  Wall thickness was increased  DOPPLER: Doppler parameters were consistent with abnormal left ventricular relaxation (grade 1 diastolic dysfunction)  RIGHT VENTRICLE: The size was normal  Systolic function was normal  Wall thickness was normal     LEFT ATRIUM: Size was normal     RIGHT ATRIUM: Size was normal     MITRAL VALVE: There was mild to moderate annular calcification  Valve structure was normal  There was normal leaflet separation  DOPPLER: The transmitral velocity was within the normal range  There was no evidence for stenosis  There was  trace regurgitation  AORTIC VALVE: The valve was probably trileaflet   Leaflets exhibited mildly increased thickness, normal cuspal separation, and sclerosis  DOPPLER: Transaortic velocity was within the normal range  There was no evidence for stenosis  There  was no regurgitation  TRICUSPID VALVE: The valve structure was normal  There was normal leaflet separation  DOPPLER: The transtricuspid velocity was within the normal range  There was no evidence for stenosis  There was mild regurgitation  Estimated peak PA  pressure was 45 mmHg  PULMONIC VALVE: Leaflets exhibited normal thickness, no calcification, and normal cuspal separation  DOPPLER: The transpulmonic velocity was within the normal range  There was no regurgitation  PERICARDIUM: There was no pericardial effusion  The pericardium was normal in appearance  AORTA: The root exhibited normal size  SYSTEMIC VEINS: IVC: The inferior vena cava was normal in size and course  Respirophasic changes were normal     SYSTEM MEASUREMENT TABLES    2D  %FS: 29 32 %  Ao Diam: 2 6 cm  EDV(Teich): 77 36 ml  EF(Teich): 56 61 %  ESV(Teich): 33 57 ml  IVSd: 1 26 cm  LA Diam: 3 17 cm  LAAs A4C: 13 64 cm2  LAESV A-L A4C: 33 33 ml  LAESV MOD A4C: 28 45 ml  LALs A4C: 4 74 cm  LVEDV MOD A4C: 45 7 ml  LVEF MOD A4C: 58 68 %  LVESV MOD A4C: 18 88 ml  LVIDd: 4 17 cm  LVIDs: 2 95 cm  LVLd A4C: 6 2 cm  LVLs A4C: 5 23 cm  LVPWd: 1 17 cm  RAESV A-L: 53 75 ml  RAESV MOD: 48 86 ml  RALs: 5 39 cm  RVIDd: 3 43 cm  SV MOD A4C: 26 82 ml  SV(Teich): 43 8 ml    CW  AV Env  Ti: 246 62 ms  AV VTI: 25 79 cm  AV Vmax: 1 62 m/s  AV Vmean: 1 04 m/s  AV maxPG: 10 62 mmHg  AV meanP 15 mmHg  TR Vmax: 3 26 m/s  TR maxP 4 mmHg    MM  TAPSE: 2 17 cm    PW  LVOT Env  Ti: 304 47 ms  LVOT VTI: 17 87 cm  LVOT Vmax: 0 85 m/s  LVOT Vmean: 0 59 m/s  LVOT maxP 87 mmHg  LVOT meanP 57 mmHg  MV A Kb: 1 25 m/s  MV Dec Dawson: 3 41 m/s2  MV DecT: 228 96 ms  MV E Kb: 0 78 m/s  MV E/A Ratio: 0 62  MV PHT: 66 4 ms  MVA By PHT: 3 31 cm2    [employer] Accredited Echocardiography Laboratory    Prepared and electronically signed by    Adam Maldonado DO  Signed 05-Jul-2021 11:23:03    No results found for this or any previous visit  No results found for this or any previous visit  No valid procedures specified  No results found for this or any previous visit  Meds/Allergies   all current active meds have been reviewed, current meds:   Current Facility-Administered Medications   Medication Dose Route Frequency    acetaminophen (TYLENOL) tablet 650 mg  650 mg Oral Q6H PRN    aspirin chewable tablet 81 mg  81 mg Oral Daily    atorvastatin (LIPITOR) tablet 40 mg  40 mg Oral Daily    cefTRIAXone (ROCEPHIN) 1,000 mg in dextrose 5 % 50 mL IVPB  1,000 mg Intravenous Q24H    fluticasone-umeclidinium-vilanterol (TRELEGY) 100-62 5-25 MCG/INH inhaler 1 puff  1 puff Inhalation Daily    gabapentin (NEURONTIN) capsule 300 mg  300 mg Oral Daily    heparin (porcine) 25,000 units in 0 45% NaCl 250 mL infusion (premix)  3-20 Units/kg/hr (Order-Specific) Intravenous Titrated    insulin lispro (HumaLOG) 100 units/mL subcutaneous injection 1-5 Units  1-5 Units Subcutaneous TID AC    insulin lispro (HumaLOG) 100 units/mL subcutaneous injection 1-5 Units  1-5 Units Subcutaneous HS    lisinopril (ZESTRIL) tablet 5 mg  5 mg Oral Daily    metoprolol tartrate (LOPRESSOR) partial tablet 12 5 mg  12 5 mg Oral Q12H [address]    ondansetron (ZOFRAN) injection 4 mg  4 mg Intravenous Q6H PRN    senna (SENOKOT) tablet 8 6 mg  1 tablet Oral BID    ticagrelor (BRILINTA) tablet 90 mg  90 mg Oral Q12H [address]    and PTA meds:   Prior to Admission Medications   Prescriptions Last Dose Informant Patient Reported? Taking?    Diclofenac Sodium (VOLTAREN) 1 %   No No   Sig: Apply 2 g topically 4 (four) times a day   Ergocalciferol (VITAMIN D2) 2000 units TABS   Yes No   Sig: Take 2,000 mg by mouth   Multiple Vitamin (MULTIVITAMINS PO)   Yes No   Sig: Take by mouth daily   albuterol (Ventolin HFA) 90 mcg/act inhaler   No No   Sig: Inhale 2 puffs every 6 (six) hours as needed for wheezing   aspirin 81 mg chewable tablet  Self Yes No   Sig: Chew 81 mg daily   atorvastatin (LIPITOR) 20 mg tablet   No No   Sig: Take 1 tablet (20 mg total) by mouth daily   cetirizine (ZyrTEC) 10 mg tablet   Yes No   Sig: Take 10 mg by mouth daily   clotrimazole-betamethasone (LOTRISONE) 1-0 05 % cream   No No   Sig: Apply topically 2 (two) times a day   fluticasone-umeclidinium-vilanterol (TRELEGY) 100-62 5-25 MCG/INH inhaler   No No   Sig: Inhale 1 puff daily Rinse mouth after use    gabapentin (NEURONTIN) 300 mg capsule   No No   Sig: Take 1 capsule (300 mg total) by mouth daily   glucose blood (OneTouch Verio) test strip   No No   Sig: Use as instructed TEST TWO TIMES DAILY e11 65   lisinopril (ZESTRIL) 5 mg tablet   No No   Sig: Take 1 tablet (5 mg total) by mouth daily   metFORMIN (GLUCOPHAGE) 500 mg tablet   No No   Sig: Take 1 tablet (500 mg total) by mouth 2 (two) times a day with meals   omeprazole (PriLOSEC) 20 mg delayed release capsule   No No   Sig: Take 1 capsule (20 mg total) by mouth 2 (two) times a day   oxybutynin (DITROPAN-XL) 5 mg 24 hr tablet   No No   Sig: Take 1 tablet (5 mg total) by mouth daily      Facility-Administered Medications: None     Medications Prior to Admission   Medication    albuterol (Ventolin HFA) 90 mcg/act inhaler    aspirin 81 mg chewable tablet    atorvastatin (LIPITOR) 20 mg tablet    cetirizine (ZyrTEC) 10 mg tablet    clotrimazole-betamethasone (LOTRISONE) 1-0 05 % cream    Diclofenac Sodium (VOLTAREN) 1 %    Ergocalciferol (VITAMIN D2) 2000 units TABS    fluticasone-umeclidinium-vilanterol (TRELEGY) 100-62 5-25 MCG/INH inhaler    gabapentin (NEURONTIN) 300 mg capsule    glucose blood (OneTouch Verio) test strip    lisinopril (ZESTRIL) 5 mg tablet    metFORMIN (GLUCOPHAGE) 500 mg tablet    Multiple Vitamin (MULTIVITAMINS PO)    omeprazole (PriLOSEC) 20 mg delayed release capsule  oxybutynin (DITROPAN-XL) 5 mg 24 hr tablet       heparin (porcine), 3-20 Units/kg/hr (Order-Specific), Last Rate: 12 Units/kg/hr (07/06/21 0615)        Counseling / Coordination of Care  Total floor / unit time spent today 20 minutes  Greater than 50% of total time was spent with the patient and / or family counseling and / or coordination of care  ** Please Note: Dragon 360 Dictation voice to text software may have been used in the creation of this document   **

## 2021-07-06 NOTE — ASSESSMENT & PLAN NOTE
· Elevated troponin with increase noted throughout [facility] evaluation  Patient was without chest pain or shortness of breath at any point  · Troponin 0 06 -->0 51 --> 0 77 --> 1 0 --> 0 81  · Cardiology consult appreciated  · Continue telemetry monitoring for now  · Initial concern for NSTEMI  Cardiac cath with no CAD  Heparin gtt, BB discontinued  NSTEMI has been ruled out    Stable for discharge with outpt f/u

## 2021-07-06 NOTE — PROGRESS NOTES
[address] [facility]  Progress Note Elie Potter 1940, 80 y o  female MRN: [id number]  Unit/Bed#: CW2 218-02 Encounter: [id number]  Primary Care Provider: Nafisa Farnsworth PA-C   Date and time admitted to hospital: 7/4/2021 10:02 AM    * Type 1 non-ST elevation myocardial infarction (NSTEMI) [facility])  Assessment & Plan  · Elevated troponin with increase noted throughout [facility]OSPITAL [facility] evaluation  Patient was without chest pain or shortness of breath at any point  · Troponin 0 06 -->0 51 --> 0 77 --> 1 0 --> 0 81  · Cardiology consult appreciated  Plan for cardiac cath today   · Continue telemetry monitoring for now  · Continue heparin drip, ASA, statin, BB  · Patient denies chest pain presently  · Echo: EF 60%, G1DD    NSVT (nonsustained ventricular tachycardia) ([facility])  Assessment & Plan  · 8 beat run of NSVT noted on telemetry last evening  · Continue metoprolol     Left lower quadrant abdominal pain  Assessment & Plan  · 5 day history of abdominal pain, constipation, poor oral intake, vomiting  CT abdomen/pelvis is without any abnormalities  UA with pyuria  Constipation vs UTI  · Patient now feeling better after bowel movement, so suspecting constipation though pt does note persistent LLQ pain, worse with sudden movement   · Will continue with ceftriaxone x 3 doses pending results of urine culture  · Prelim urine culture 30,000 E coli - f/u final result   · Trend WBC, temperature curve, hemodynamics    Abnormal EKG  Assessment & Plan  · Initial EKG possible ectopic atrial rhythm versus sinus rhythm with PACs  Repeat EKG showed NSR with PACs   Patient otherwise asymptomatic  · Cardiology input will be appreciated  · Metoprolol started    Essential hypertension  Assessment & Plan  · Continue home lisinopril 5 mg daily with hold parameters, monitor blood pressure per protocol  · Monitor with addition of metoprolol    Type 2 diabetes mellitus with hyperglycemia, without long-term current use of insulin [facility])  Assessment & Plan  Lab Results   Component Value Date    HGBA1C 6 5 (H) 2021       Recent Labs     21  1030 21  1550 21  2048 21  0605   POCGLU 106 123 110 109       Blood Sugar Average: Last 72 hrs:  · (P) 118 [id number] well controlled as outpatient  · Hold metformin while inpatient, initiate SSI with Accu-Cheks and carb controlled diet  · Initiate hypoglycemia protocol    COPD (chronic obstructive pulmonary disease) ([facility] [address] )  Assessment & Plan  · Not in acute exacerbation, continue home inhalers  · Noted to de-sat into 80's while sleeping  · Will check overnight pulse ox - pending          VTE Pharmacologic Prophylaxis: VTE Score: 5 High Risk (Score >/= 5) - Pharmacological DVT Prophylaxis Ordered: heparin drip  Sequential Compression Devices Ordered  Patient Centered Rounds: I performed bedside rounds with nursing staff today  Discussions with Specialists or Other Care Team Provider: RN    Education and Discussions with Family / Patient: Patient declined call to   Time Spent for Care: 30 minutes  More than 50% of total time spent on counseling and coordination of care as described above  Current Length of Stay: 2 day(s)  Current Patient Status: Inpatient   Certification Statement: The patient will continue to require additional inpatient hospital stay due to cardiac cath today  Discharge Plan: Anticipate discharge in 24-48 hrs to home  Code Status: Level 3 - DNAR and DNI    Subjective: The patient has no acute complaints today    Denies CP, sob, n/v   States she doesn't have abd pain now but it does come on when she "moves too fast "      Objective:     Vitals:   Temp (24hrs), Av 2 °F (36 8 °C), Min:97 7 °F (36 5 °C), Max:98 5 °F (36 9 °C)    Temp:  [97 7 °F (36 5 °C)-98 5 °F (36 9 °C)] 97 7 °F (36 5 °C)  HR:  [73-93] 82  Resp:  [16-18] 18  BP: ()/(56-83) 132/56  SpO2:  [93 %-98 %] 94 %  There is no height or weight on file to calculate BMI  Input and Output Summary (last 24 hours): Intake/Output Summary (Last 24 hours) at 7/6/2021 0849  Last data filed at 7/6/2021 0615  Gross per 24 hour   Intake 917 45 ml   Output 550 ml   Net 367 45 ml       Physical Exam:   Physical Exam  Vitals reviewed  Constitutional:       General: She is not in acute distress  Appearance: She is not toxic-appearing  HENT:      Head: Normocephalic and atraumatic  Eyes:      Extraocular Movements: Extraocular movements intact  Cardiovascular:      Rate and Rhythm: Normal rate and regular rhythm  Pulmonary:      Effort: Pulmonary effort is normal  No respiratory distress  Breath sounds: Normal breath sounds  Abdominal:      General: Bowel sounds are normal  There is no distension  Palpations: Abdomen is soft  Tenderness: There is abdominal tenderness (LLQ)  Musculoskeletal:         General: Normal range of motion  Cervical back: Normal range of motion  Neurological:      General: No focal deficit present  Mental Status: She is alert and oriented to person, place, and time  Psychiatric:         Mood and Affect: Mood normal          Behavior: Behavior normal          Thought Content:  Thought content normal          Additional Data:     Labs:  Results from last 7 days   Lab Units 07/05/21  0400 07/03/21  1929   WBC Thousand/uL 7 38 12 47*   HEMOGLOBIN g/dL 12 3 14 7   HEMATOCRIT % 37 2 45 7   PLATELETS Thousands/uL 182 311   NEUTROS PCT %  --  56   LYMPHS PCT %  --  30   MONOS PCT %  --  12   EOS PCT %  --  1     Results from last 7 days   Lab Units 07/05/21  0439   SODIUM mmol/L 140   POTASSIUM mmol/L 4 0   CHLORIDE mmol/L 108   CO2 mmol/L 28   BUN mg/dL 13   CREATININE mg/dL 0 64   ANION GAP mmol/L 4   CALCIUM mg/dL 8 5   ALBUMIN g/dL 3 0*   TOTAL BILIRUBIN mg/dL 0 56   ALK PHOS U/L 58   ALT U/L 32   AST U/L 23   GLUCOSE RANDOM mg/dL 109     Results from last 7 days   Lab Units 07/04/21  1523   INR  1 07 Results from last 7 days   Lab Units 07/06/21  0605 07/05/21  2048 07/05/21  1550 07/05/21  1030 07/05/21  0559 07/04/21  2103 07/04/21  1615   POC GLUCOSE mg/dl 109 110 123 106 121 100 158*     Results from last 7 days   Lab Units 07/03/21  1926   HEMOGLOBIN A1C % 6 5*     Results from last 7 days   Lab Units 07/03/21  1929   LACTIC ACID mmol/L 1 8       Lines/Drains:  Invasive Devices     Peripheral Intravenous Line            Peripheral IV 07/03/21 Right Antecubital 2 days    Peripheral IV 07/05/21 Distal;Left;Ventral (anterior) Forearm 1 day                  Telemetry:  Telemetry Orders (From admission, onward)             48 Hour Telemetry Monitoring  Continuous x 48 hours     Expiring   Question:  Reason for 48 Hour Telemetry  Answer:  Arrhythmias Requiring Medical Therapy (eg  SVT, Vtach/fib, Bradycardia, Uncontrolled A-fib)                 Telemetry Reviewed: ? a fib  Indication for Continued Telemetry Use: Acute MI/Unstable Angina/Rule out ACS           Imaging: No pertinent imaging reviewed  Recent Cultures (last 7 days):   Results from last 7 days   Lab Units 07/03/21 2328 07/03/21 2109   BLOOD CULTURE  No Growth at 24 hrs    No Growth at 24 hrs   --    URINE CULTURE   --  30,000-39,000 cfu/ml Escherichia coli*  10,000-19,000 cfu/ml        Last 24 Hours Medication List:   Current Facility-Administered Medications   Medication Dose Route Frequency Provider Last Rate    acetaminophen  650 mg Oral Q6H PRN Alecia Diana PA-C      aspirin  81 mg Oral Daily Sharon Valerio PA-C      atorvastatin  40 mg Oral Daily Giancarlo Guzman DO      cefTRIAXone  1,000 mg Intravenous Q24H Alecia Diana PA-C Stopped (07/06/21 9533)   Paris Whitmore fluticasone-umeclidinium-vilanterol  1 puff Inhalation Daily Alecia Diana PA-C      gabapentin  300 mg Oral Daily Alecia Diana PA-C      heparin (porcine)  3-20 Units/kg/hr (Order-Specific) Intravenous Titrated JOE Chery-C 12 Units/kg/hr (07/06/21 6762)   Paris Whitmore insulin lispro  1-5 Units Subcutaneous TID AC Sharon Valerio PA-C      insulin lispro  1-5 Units Subcutaneous HS Esthela Cox PA-C      lisinopril  5 mg Oral Daily Esthela Cox PA-C      metoprolol tartrate  12 5 mg Oral Q12H [facility] Sharon LAGUNAS PA-C      ondansetron  4 mg Intravenous Q6H PRN Esthela Cox PA-C      senna  1 tablet Oral BID Sharon Valerio PA-C      ticagrelor  90 mg Oral Q12H [facility] Debo Tolentino PA-C          Today, Patient Was Seen By: Vineet John PA-C    **Please Note: This note may have been constructed using a voice recognition system  **

## 2021-07-06 NOTE — ASSESSMENT & PLAN NOTE
Lab Results   Component Value Date    HGBA1C 6 5 (H) 07/03/2021       Recent Labs     07/05/21  1550 07/05/21  2048 07/06/21  0605 07/06/21  1109   POCGLU 123 110 109 111       Blood Sugar Average: Last 72 hrs:  · (P) 117 25 well controlled as outpatient  · Hold metformin while inpatient - resume on discharge

## 2021-07-06 NOTE — ASSESSMENT & PLAN NOTE
Lab Results   Component Value Date    HGBA1C 6 5 (H) 07/03/2021       Recent Labs     07/05/21  1030 07/05/21  1550 07/05/21 2048 07/06/21  0605   POCGLU 106 123 110 109       Blood Sugar Average: Last 72 hrs:  · (P) 867 [id number] well controlled as outpatient  · Hold metformin while inpatient, initiate SSI with Accu-Cheks and carb controlled diet  · Initiate hypoglycemia protocol

## 2021-07-06 NOTE — ASSESSMENT & PLAN NOTE
· Elevated troponin with increase noted throughout [facility] evaluation  Patient was without chest pain or shortness of breath at any point  · Troponin 0 06 -->0 51 --> 0 77 --> 1 0 --> 0 81  · Cardiology consult appreciated    Plan for cardiac cath today   · Continue telemetry monitoring for now  · Continue heparin drip, ASA, statin, BB  · Patient denies chest pain presently  · Echo: EF 60%, G1DD

## 2021-07-06 NOTE — ASSESSMENT & PLAN NOTE
· 5 day history of abdominal pain, constipation, poor oral intake, vomiting  CT abdomen/pelvis is without any abnormalities  UA with pyuria   Constipation vs UTI  · Patient now feeling better after bowel movement, so suspecting constipation though pt does note persistent LLQ pain, worse with sudden movement   · Will continue with ceftriaxone x 3 doses pending results of urine culture  · Prelim urine culture 30,000 E coli - f/u final result   · Trend WBC, temperature curve, hemodynamics

## 2021-07-06 NOTE — CASE MANAGEMENT
LOS 2  Not a bundle  Not a readmission  Unplanned readmission risk score: 10    No CM needs identified in care coordination rounds today  No CM consult initiated      CM will continue to follow and be available for d/c planning needs as requested by pt and treatment team

## 2021-07-06 NOTE — UTILIZATION REVIEW
Initial Clinical Review    Admission: Date/Time/Statement:   Admission Orders (From admission, onward)     Ordered        07/04/21 1232  Inpatient Admission  Once                   Orders Placed This Encounter   Procedures    Inpatient Admission     Standing Status:   Standing     Number of Occurrences:   1     Order Specific Question:   Level of Care     Answer:   Med Surg [16]     Order Specific Question:   Estimated length of stay     Answer:   More than 2 Midnights     Order Specific Question:   Certification     Answer:   I certify that inpatient services are medically necessary for this patient for a duration of greater than two midnights  See H&P and MD Progress Notes for additional information about the patient's course of treatment  Initial Presentation: 80year old female, presented to the ED @ [address] [facility], transferred to [facility] level of care, via EMS  Admitted as Inpatient due to Elevated Troponin  PMH of COPD, DM type 2, HTN  Date: 07/04/2021   Reports a 5 day history of left sided abdominal pain, poor oral intake, a few episodes of vomiting, and constipation  Patient was noted to have troponin elevation 0 06 -->0 77 with non specific EKG abnormalities  She was transferred to [facility] for Cardiology evaluation  Elevated troponin with increase noted throughout [address] evaluation  Patient was without chest pain or shortness of breath at any point  Troponin 0 06 -->0 51 --> 0 77  May be demand ischemia in setting of constipation/dehydration, however abnormalities on initial EKG noted  check echocardiogram, consult Cardiology  Continue telemetry monitoring  Trend trops  CT abdomen/pelvis is without any abnormalities  UA with pyuria  Will continue with ceftriaxone pending results of urine culture  Trend WBC, temperature curve, hemodynamics    VTE Pharmacologic Prophylaxis: VTE Score: 5 High Risk (Score >/= 5) - Pharmacological DVT Prophylaxis Ordered: enoxaparin (Lovenox)  Sequential Compression Devices Ordered  Day 2: 07/05/2021   Continue IV heparin gtt  Continue PO ASA, Statin, BB   8 beat run of NSVT noted on telemetry last evening    07/05/2021  Consult Cardio:  Assessment:  Elevated troponin concerning for NSTEMI:  - troponin trend: 0 06 - 0 51 - 0 77 - 1 00 - 0 71 - 0 84 - 0 81   - preserved LVEF per echo 11/2019 / repeat pending   - prior stress test 1/2019 showing very small zone of ischemia by perfusion imaging   - maintained on aspirin and statin as an outpatient / currently started on beta-blocker therapy inpatient  Essential hypertension:  - maintained on lisinopril 5mg daily as an outpatient  Hyperlipidemia:  - maintained on atorvastatin 20mg daily  Type II diabetes mellitus  Left lower quadrant pain:  - constipation relieved with bowel movement  - still currently being treated with ceftriaxone  Plan:  Patient does have elevated troponins concerning for NSTEMI  She is currently be maintained on heparin drip which should be continued and is already on aspirin, statin, and beta-blocker therapy now  We did discuss cardiac catheterization to which the patient is agreeable  Will be made NPO after midnight for this procedure    Will follow up on repeat echo as well to determine any changes in her ejection fraction or any wall motion abnormalities         Triage Vitals   Temperature Pulse Respirations Blood Pressure SpO2   07/04/21 1032 07/04/21 1032 07/05/21 0004 07/04/21 1032 07/04/21 1032   98 4 °F (36 9 °C) 83 18 137/75 94 %      Temp Source Heart Rate Source Patient Position - Orthostatic VS BP Location FiO2 (%)   07/05/21 0811 -- 07/05/21 0811 07/05/21 0811 --   Oral  Lying Left arm       Pain Score       07/04/21 1216       No Pain          Wt Readings from Last 1 Encounters:   07/03/21 76 2 kg (168 lb)     Additional Vital Signs:   Date/Time  Temp  Pulse  Resp  BP  MAP (mmHg)  SpO2  O2 Device  Patient Position - Orthostatic VS   07/06/21 15:53:05  --  70  -- 128/70  89  96 %  --  --   07/06/21 1530  --  70  --  110/63  79  97 %  --  --   07/06/21 1515  --  72  --  --  --  96 %  --  --   07/06/21 1500  --  75  --  108/48Abnormal   68  96 %  --  --   07/06/21 1445  --  72  --  --  --  96 %  --  --   07/06/21 1430  --  69  --  97/50  66  95 %  --  --   07/06/21 1415  --  75  --  --  --  96 %  --  --   07/06/21 1400  --  78  --  --  --  96 %  --  --   07/06/21 1345  --  77  --  --  --  95 %  --  --   07/06/21 1330  --  71  --  99/51  67  96 %  --  --   07/06/21 13:15:24  --  74  --  99/51  67  96 %  --  --   07/06/21 1315  --  76  --  99/51  67  95 %  --  --   07/06/21 1300  --  69  --  80/43Abnormal   55  95 %  --  --   07/06/21 1245  --  67  --  --  --  94 %  --  --   07/06/21 1230  --  70  --  --  --  95 %  --  --   07/06/21 1215  --  75  --  --  --  95 %  --  --   07/06/21 1200  --  70  --  74/40Abnormal   51  95 %  --  --   07/06/21 10:59:05  --  60  --  96/55  69  96 %  --  Lying   07/06/21 1055  --  66  --  123/57  79  96 %  None (Room air)  Lying   07/06/21 07:29:12  97 7 °F (36 5 °C)  --  18  132/56  81  --  --  --   07/06/21 0510  --  --  --  --  --  94 %  None (Room air)  --   07/06/21 04:01:06  98 °F (36 7 °C)  --  --  135/82  100  --  --  --   07/05/21 23:16:32  97 9 °F (36 6 °C)  82  17  129/83  98  98 %  --  --   07/05/21 2156  --  --  --  --  --  96 %  None (Room air)  --   07/05/21 19:04:39  98 5 °F (36 9 °C)  93  --  95/76  82  96 %  --  --   07/05/21 15:50:56  98 4 °F (36 9 °C)  73  18  125/57  80  94 %  --  Lying   07/05/21 15:47:39  98 4 °F (36 9 °C)  --  --  125/57  80  --  --  --   07/05/21 12:04:26  98 3 °F (36 8 °C)  92  16  119/61  80  93 %  --  --   07/05/21 08:11:44  98 3 °F (36 8 °C)  87  18  119/60  80  94 %  None (Room air)  Lying   07/05/21 00:04:46  98 5 °F (36 9 °C)  85  18  124/77  93  95 %  --  --   07/05/21 0000  --  --  --  --  --  94 %  None (Room air)  --   07/04/21 21:00:49  --  72  --  124/59  81  92 %  --  --     07/03/2021 @ 2229 CT abd/pel:  No acute intra-abdominal abnormality   No free air or free fluid  Scattered colonic diverticulosis with no inflammatory changes present to suggest acute diverticulitis  Small sliding hiatal hernia    2021 @ 1324  EC, Sinus tachycardia with Premature atrial complexes  Nonspecific ST and T wave abnormality  2021 Cardiac Cath:  SUMMARY:  CORONARY CIRCULATION:  Left main: Normal   LAD: Normal   Circumflex: Normal   RCA: Normal    IMPRESSIONS: Non MI trponinn elevation  The coronary arteries are normal     RECOMMENDATIONS:  The patient should continue with the present medications      Pertinent Labs/Diagnostic Test Results:     Results from last 7 days   Lab Units 21  0400 21  1523 21  1929   WBC Thousand/uL 7 38 9 55 12 47*   HEMOGLOBIN g/dL 12 3 13 3 14 7   HEMATOCRIT % 37 2 40 5 45 7   PLATELETS Thousands/uL 182 237 311   NEUTROS ABS Thousands/µL  --   --  7 08     Results from last 7 days   Lab Units 21  0439 21  1943 21  1929   SODIUM mmol/L 140  --  137   POTASSIUM mmol/L 4 0  --  4 2   CHLORIDE mmol/L 108  --  100   CO2 mmol/L 28  --  27   ANION GAP mmol/L 4  --  10   BUN mg/dL 13  --  14   CREATININE mg/dL 0 64  --  0 94   EGFR ml/min/1 73sq m 84  --  57   CALCIUM mg/dL 8 5  --  9 3   MAGNESIUM mg/dL  --  1 6  --      Results from last 7 days   Lab Units 21  0439 21  1929   AST U/L 23 29   ALT U/L 32 42   ALK PHOS U/L 58 70   TOTAL PROTEIN g/dL 5 5* 7 6   ALBUMIN g/dL 3 0* 4 1   TOTAL BILIRUBIN mg/dL 0 56 0 73     Results from last 7 days   Lab Units 21  1109 21  0605 21  2048 21  1550 21  1030 21  0559 21  2103 07/04/21  1615   POC GLUCOSE mg/dl 111 109 110 123 106 121 100 158*     Results from last 7 days   Lab Units 21  0439 21  1929   GLUCOSE RANDOM mg/dL 109 113     Results from last 7 days   Lab Units 21  1926   HEMOGLOBIN A1C % 6 5*   EAG mg/dl 140     Results from last 7 days   Lab Units 07/05/21  0011 07/04/21 2058 07/04/21  1730 07/04/21  1416 07/04/21  0302 07/03/21  2328 07/03/21  1943   TROPONIN I ng/mL 0 81* 0 84* 0 71* 1 00* 0 77* 0 51* 0 06*     Results from last 7 days   Lab Units 07/03/21  1929   D-DIMER QUANTITATIVE ug/ml FEU 0 42     Results from last 7 days   Lab Units 07/06/21  0418 07/05/21  0307 07/04/21 2058 07/04/21  1523 07/03/21  1929   PROTIME seconds  --   --   --  13 9 13 6   INR   --   --   --  1 07 1 06   PTT seconds 70* 74* 70* 32 31     Results from last 7 days   Lab Units 07/03/21  1943 07/03/21  1929   TSH 3RD GENERATON uIU/mL 3 168 3 140     Results from last 7 days   Lab Units 07/03/21  1929   LACTIC ACID mmol/L 1 8     Results from last 7 days   Lab Units 07/03/21  1929   LIPASE u/L 124     Results from last 7 days   Lab Units 07/03/21 2109   CLARITY UA  Cloudy   COLOR UA  Yellow   SPEC GRAV UA  1 020   PH UA  5 5   GLUCOSE UA mg/dl Negative   KETONES UA mg/dl Negative   BLOOD UA  Trace-Intact*   PROTEIN UA mg/dl Negative   NITRITE UA  Negative   BILIRUBIN UA  Negative   UROBILINOGEN UA E U /dl 0 2   LEUKOCYTES UA  Small*   WBC UA /hpf 10-20*   RBC UA /hpf 2-4   BACTERIA UA /hpf Innumerable*   EPITHELIAL CELLS WET PREP /hpf Occasional       Results from last 7 days   Lab Units 07/03/21 2328 07/03/21 2109   BLOOD CULTURE  No Growth at 48 hrs  No Growth at 48 hrs    --    URINE CULTURE   --  30,000-39,000 cfu/ml Escherichia coli*  10,000-19,000 cfu/ml      Past Medical History:   Diagnosis Date    Allergic     COPD (chronic obstructive pulmonary disease) ([address] )     Diabetes mellitus (HCC)     GERD (gastroesophageal reflux disease)     Hyperlipidemia     Hypertension     Left non-suppurative otitis media 11/27/2019    Pneumonia     Pneumonia of right lower lobe due to infectious organism 12/4/2018    Vitamin D deficiency      Present on Admission:   Elevated troponin I level   Abnormal EKG   Left lower quadrant abdominal pain   COPD (chronic obstructive pulmonary disease) (HCC)   Type 2 diabetes mellitus with hyperglycemia, without long-term current use of insulin (HCC)   Essential hypertension   Acute cystitis without hematuria      Admitting Diagnosis: Atrial fibrillation (HCC) [I48 91]  Age/Sex: 80 y o  female  Admission Orders:  Scheduled Medications:  aspirin, 81 mg, Oral, Daily  atorvastatin, 40 mg, Oral, Daily  cefTRIAXone, 1,000 mg, Intravenous, Q24H  fluticasone-umeclidinium-vilanterol, 1 puff, Inhalation, Daily  gabapentin, 300 mg, Oral, Daily  insulin lispro, 1-5 Units, Subcutaneous, TID AC  insulin lispro, 1-5 Units, Subcutaneous, HS  lisinopril, 5 mg, Oral, Daily  metoprolol tartrate, 12 5 mg, Oral, Q12H NIGEL  senna, 1 tablet, Oral, BID      Continuous IV Infusions:     PRN Meds:  acetaminophen, 650 mg, Oral, Q6H PRN  ondansetron, 4 mg, Intravenous, Q6H PRN      Telemetry  [facility] SCDs  IP CONSULT TO CARDIOLOGY    Network Utilization Review Department  ATTENTION: Please call with any questions or concerns to [phone] and carefully listen to the prompts so that you are directed to the right person  All voicemails are confidential   [place] Fetch all requests for admission clinical reviews, approved or denied determinations and any other requests to dedicated fax number below belonging to the campus where the patient is receiving treatment   List of dedicated fax numbers for the Facilities:  [address] DENIALS (Administrative/Medical Necessity) [phone]   [address] [place] [address] (Maternity/NICU/Pediatrics) 261 [facility] [address] Romero Tran [address] [address] [address]  [address] [facility] Anshul Rubalcava 1481 [phone] [place] [id number] [phone]

## 2021-07-06 NOTE — DISCHARGE INSTRUCTIONS
Your heart vessels did not have any blockages  Please follow up with your primary care provider within one week    1  Please see the post cardiac catheterization dishcarge instructions  No heavy lifting, greater than 10 lbs  or strenuous  activity for 48 hrs  2 Remove band aid tomorrow  Shower and wash area- wrist gently with soap and water- beginning tomorrow  Rinse and pat dry  Apply new water seal band aid  Repeat this process for 5 days  No powders, creams lotions or antibiotic ointments  for 5 days  No tub baths, hot tubs or swimming for 5 days  3  Please call our office ([phone]) if you have any fever, redness, swelling, discharge from your wrist access site  4 No driving for 1 day    Left Heart Catheterization   WHAT YOU NEED TO KNOW:   A left heart catheterization is a procedure to look at your heart and its arteries  You may need this procedure if you have chest pain, heart disease, or your heart is not working as it should  WHILE YOU ARE HERE:   Before your procedure:   · Informed consent  is a legal document that explains the tests, treatments, or procedures that you may need  Informed consent means you understand what will be done and can make decisions about what you want  You give your permission when you sign the consent form  You can have someone sign this form for you if you are not able to sign it  You have the right to understand your medical care in words you know  Before you sign the consent form, understand the risks and benefits of what will be done  Make sure all your questions are answered  · An IV  is a small tube placed in your vein that is used to give you medicine or liquids  · Medicines:      ? Antihistamines  help prevent a reaction to the dye used during your heart catheterization  ? A sedative  is given to help you stay calm and relaxed  ? Steroids  decrease inflammation and help prevent a reaction to the contrast dye      · Local anesthesia is a shot of medicine put into your arm or leg  It is used to numb the area and dull the pain  You may still feel pressure or pushing during the procedure  During your procedure:   · Your healthcare provider will insert a catheter into an artery in your arm, wrist, or leg  An x-ray will be used to carefully guide the catheter to your heart  He will inject a dye so he can see the blood vessels, muscle, or valves of your heart more clearly  You may get a warm feeling or slight nausea right after the dye is injected  This is normal, and will pass quickly  Your healthcare provider may remove a small sample of heart tissue and send it to a lab for testing  He may also open a narrow or blocked heart valve or artery  A stent (small tube) may be left inside your artery to hold it open  · The catheter may be left in place to monitor pressure in your heart  When the catheter is removed, a healthcare provider will apply pressure to the site for at least 30 minutes to help decrease the risk of bleeding  A collagen plug or other closure devices may be used to close the site  If the site is in your wrist, your healthcare provider will place a compression device around your wrist  Healthcare providers will cover the site with a pressure bandage to decrease further bleeding  After your procedure: You will be taken to a room to rest until you are fully awake  If insertion was in your wrist, the pressure device will be around your wrist  Healthcare providers will slowly decrease pressure in the device  If insertion was in your groin, a pressure bandage will be in place  Keep your arm or leg straight  Do not  get out of bed until your healthcare provider says it is okay  Healthcare providers will frequently monitor your vital signs and pulses  They will also frequently check your wound for bleeding  Healthcare providers may ask you to drink more liquids to help flush the dye out of your body   If the catheter was in your groin and you need to cough, apply pressure over the area with your hands as directed  RISKS:   During the procedure, the catheter may tear an artery and cause bleeding  An air bubble may enter your lung, or your lung may collapse  You may have a heart attack  After the procedure, you may have bleeding or an infection  You may have damage to a heart valve, or a fistula (abnormal opening) may form between an artery and vein  You may have irregular heartbeats, which may cause dizziness or fainting  You may get a blood clot in your leg or arm  Without this procedure, your condition may get worse  These problems may become life-threatening  CARE AGREEMENT:   You have the right to help plan your care  Learn about your health condition and how it may be treated  Discuss treatment options with your healthcare providers to decide what care you want to receive  You always have the right to refuse treatment  © Copyright [employer] 2018 Information is for End User's use only and may not be sold, redistributed or otherwise used for commercial purposes  All illustrations and images included in CareNotes® are the copyrighted property of [employer]  or [facility] Matthew Hilliard   The above information is an  only  It is not intended as medical advice for individual conditions or treatments  Talk to your doctor, nurse or pharmacist before following any medical regimen to see if it is safe and effective for you

## 2021-07-06 NOTE — DISCHARGE SUMMARY
[facility]  Discharge- Sheila Score 1940, 80 y o  female MRN: [id number]  Unit/Bed#: CW2 218-02 Encounter: [id number]  Primary Care Provider: Dangelo Webb PA-C   Date and time admitted to hospital: 7/4/2021 10:02 AM    * Elevated troponin I level  Assessment & Plan  · Elevated troponin with increase noted throughout [facility] evaluation  Patient was without chest pain or shortness of breath at any point  · Troponin 0 06 -->0 51 --> 0 77 --> 1 0 --> 0 81  · Cardiology consult appreciated  · Continue telemetry monitoring for now  · Initial concern for NSTEMI  Cardiac cath with no CAD  Heparin gtt, BB discontinued  NSTEMI has been ruled out  Stable for discharge with outpt f/u     NSVT (nonsustained ventricular tachycardia) (HCC)  Assessment & Plan  · 8 beat run of NSVT noted on telemetry last evening  · Continue metoprolol     Left lower quadrant abdominal pain  Assessment & Plan  · 5 day history of abdominal pain, constipation, poor oral intake, vomiting  CT abdomen/pelvis is without any abnormalities  UA with pyuria  Constipation vs UTI  · Patient now feeling better after bowel movement, so suspecting constipation though pt does note persistent LLQ pain, worse with sudden movement   · Will continue with ceftriaxone and transition to PO Keflex on discharge  · Prelim urine culture 30,000 E coli   · Trend WBC, temperature curve, hemodynamics    Abnormal EKG  Assessment & Plan  · Initial EKG possible ectopic atrial rhythm versus sinus rhythm with PACs  Repeat EKG showed NSR with PACs   Patient otherwise asymptomatic  · Cardiology input will be appreciated    Essential hypertension  Assessment & Plan  · Continue home lisinopril 5 mg daily with hold parameters, monitor blood pressure per protocol  · Monitor with addition of metoprolol    Type 2 diabetes mellitus with hyperglycemia, without long-term current use of insulin [facility])  Assessment & Plan  Lab Results Component Value Date    HGBA1C 6 5 (H) 07/03/2021       Recent Labs     07/05/21  1550 07/05/21  2048 07/06/21  0605 07/06/21  1109   POCGLU 123 110 109 111       Blood Sugar Average: Last 72 hrs:  · (P) 117 25 well controlled as outpatient  · Hold metformin while inpatient - resume on discharge     COPD (chronic obstructive pulmonary disease) ([facility])  Assessment & Plan  · Not in acute exacerbation, continue home inhalers  · Noted to de-sat into 80's while sleeping  · Will check overnight pulse ox - pending  Recommend outpt sleep study         Medical Problems     Resolved Problems  Date Reviewed: 7/6/2021    None              Discharging Physician / Practitioner: Andrea Garcia PA-C  PCP: Olivia Charlton PA-C  Admission Date:   Admission Orders (From admission, onward)     Ordered        07/04/21 1232  Inpatient Admission  Once                   Discharge Date: 07/06/21    Consultations During Hospital Stay:  · Cardiology    Procedures Performed:   · Cardiac cath    Significant Findings / Test Results:   · Troponin 0 06/0 51/0 77/1 0/0 81  · Cardiac cath: No CAD, normal coronaries   · CT abdomen/pelvis: No acute intra-abdominal abnormality  No free air or free fluid  Scattered colonic diverticulosis with no inflammatory changes present to suggest acute diverticulitis  Small sliding hiatal hernia  · Urine culture with E coli    Incidental Findings:   · None     Test Results Pending at Discharge (will require follow up): · None     Outpatient Tests Requested:  · None    Complications:  None    Reason for Admission: elevated troponin    Hospital Course:   Carlos Coombs is a 80 y o  female patient who originally presented to the hospital on 7/4/2021 due to abdominal pain, constipation, vomiting  Pt was noted to have elevated troponin that trended up during ER evaluation so she was transferred to [facility] for cardiology evaluation  CT a/p revealed no acute findings    Pt reported that her pain was improved following a BM  She was seen by cardiology due to concern for NSTEMI  She was started on a heparin gtt  Cardiac cath done 7/6 revealed normal coronaries, no CAD  She has been cleared by cardiology for discharge  UA/urine culture positive for UTI  She is being treated with antibiotics and will be discharged on PO Keflex for treatment of cystitis  Do not suspect pyelonephritis as she has no CVA tenderness on exam and CT a/p unrevealing  She is stable for discharge with outpt f/u  Please see above list of diagnoses and related plan for additional information  Condition at Discharge: good    Discharge Day Visit / Exam:   * Please refer to separate progress note for these details *    Discussion with Family: Patient declined call to   Discharge instructions/Information to patient and family:   See after visit summary for information provided to patient and family  Provisions for Follow-Up Care:  See after visit summary for information related to follow-up care and any pertinent home health orders  Disposition:   Home    Planned Readmission: no     Discharge Statement:  I spent 25 minutes discharging the patient  This time was spent on the day of discharge  I had direct contact with the patient on the day of discharge  Greater than 50% of the total time was spent examining patient, answering all patient questions, arranging and discussing plan of care with patient as well as directly providing post-discharge instructions  Additional time then spent on discharge activities  Discharge Medications:  See after visit summary for reconciled discharge medications provided to patient and/or family        **Please Note: This note may have been constructed using a voice recognition system**

## 2021-07-07 ENCOUNTER — TELEPHONE (OUTPATIENT)
Dept: INTERNAL MEDICINE CLINIC | Facility: CLINIC | Age: 81
End: 2021-07-07

## 2021-07-07 VITALS
TEMPERATURE: 99.3 F | OXYGEN SATURATION: 90 % | RESPIRATION RATE: 19 BRPM | SYSTOLIC BLOOD PRESSURE: 115 MMHG | HEART RATE: 84 BPM | DIASTOLIC BLOOD PRESSURE: 51 MMHG

## 2021-07-07 NOTE — TELEPHONE ENCOUNTER
TCM Admitted at [facility] for pain in stomach/hips, vomiting, constipation  Had blood work done  Discharged on 7/3 sent to [facility], discharged from [facility]  7/6   Scheduled her appt for 7/12/2021 at 10:00am

## 2021-07-08 ENCOUNTER — RA CDI HCC (OUTPATIENT)
Dept: OTHER | Facility: HOSPITAL | Age: 81
End: 2021-07-08

## 2021-07-08 NOTE — PROGRESS NOTES
[place] [id number]  coding opportunities             Chart reviewed, (number of) suggestions sent to provider: 1               Number of suggestions NOT actually used: 1     Patients insurance company: [address]  (Medicare Advantage and Commercial)     Visit status: Patient arrived for their scheduled appointment     Provider never responded to [place] [phone]  coding request     [place] [address]  coding opportunities        DX: I11 0 Hypertensive heart disease with heart failure       Chart reviewed, (number of) suggestions sent to provider: 1                  Patients insurance company: [address]  (Medicare Advantage and Commercial)

## 2021-07-09 LAB
BACTERIA BLD CULT: NORMAL
BACTERIA BLD CULT: NORMAL

## 2021-07-09 NOTE — UTILIZATION REVIEW
Notification of Discharge   This is a Notification of Discharge from our facility [address]  Please be advised that this patient has been discharge from our facility  Below you will find the admission and discharge date and time including the patients disposition  UTILIZATION REVIEW CONTACT:  So Ahumada  Utilization   Network Utilization Review Department  Phone: [phone] x carefully listen to the prompts  All voicemails are confidential   Email: [email or web address]     PHYSICIAN ADVISORY SERVICES:  FOR LCXQ-CT-VRHQ REVIEW - MEDICAL NECESSITY DENIAL  Phone: [phone]  Fax: [phone]  Email: [email or web address]     PRESENTATION DATE: 7/4/2021 10:02 AM  OBERVATION ADMISSION DATE:   INPATIENT ADMISSION DATE: 7/4/21 10:02 AM   DISCHARGE DATE: 7/6/2021  8:04 PM  DISPOSITION: Home/Self Care Home/Self Care      IMPORTANT INFORMATION:  Send all requests for admission clinical reviews, approved or denied determinations and any other requests to dedicated fax number below belonging to the campus where the patient is receiving treatment   List of dedicated fax numbers:  [address] DENIALS (Administrative/Medical Necessity) [phone]   [address] (Maternity/NICU/Pediatrics) [phone]   Sis Mora [phone]   [facility] [phone]   Camilla Lerma [phone]   [facility] [address] [place] [address] [phone]   [facility]  [phone]   [address] [place] [address], [address]  [address] [facility] [phone]

## 2021-07-12 ENCOUNTER — OFFICE VISIT (OUTPATIENT)
Dept: INTERNAL MEDICINE CLINIC | Facility: CLINIC | Age: 81
End: 2021-07-12
Payer: COMMERCIAL

## 2021-07-12 VITALS
BODY MASS INDEX: 30.36 KG/M2 | HEIGHT: 62 IN | DIASTOLIC BLOOD PRESSURE: 70 MMHG | RESPIRATION RATE: 18 BRPM | TEMPERATURE: 97.9 F | OXYGEN SATURATION: 95 % | HEART RATE: 112 BPM | SYSTOLIC BLOOD PRESSURE: 122 MMHG | WEIGHT: 165 LBS

## 2021-07-12 DIAGNOSIS — R77.8 ELEVATED TROPONIN I LEVEL: ICD-10-CM

## 2021-07-12 DIAGNOSIS — M25.562 CHRONIC PAIN OF LEFT KNEE: ICD-10-CM

## 2021-07-12 DIAGNOSIS — G89.29 CHRONIC PAIN OF LEFT KNEE: ICD-10-CM

## 2021-07-12 DIAGNOSIS — K59.09 CHRONIC CONSTIPATION: ICD-10-CM

## 2021-07-12 DIAGNOSIS — R10.32 LEFT LOWER QUADRANT ABDOMINAL PAIN: Primary | ICD-10-CM

## 2021-07-12 PROBLEM — N30.00 ACUTE CYSTITIS WITHOUT HEMATURIA: Status: RESOLVED | Noted: 2021-07-04 | Resolved: 2021-07-12

## 2021-07-12 PROCEDURE — 99496 TRANSJ CARE MGMT HIGH F2F 7D: CPT | Performed by: PHYSICIAN ASSISTANT

## 2021-07-12 PROCEDURE — 1111F DSCHRG MED/CURRENT MED MERGE: CPT | Performed by: PHYSICIAN ASSISTANT

## 2021-07-12 RX ORDER — LINACLOTIDE 145 UG/1
145 CAPSULE, GELATIN COATED ORAL DAILY
Qty: 30 CAPSULE | Refills: 2 | Status: SHIPPED | OUTPATIENT
Start: 2021-07-12 | End: 2021-08-09

## 2021-07-12 NOTE — PROGRESS NOTES
Transition of Care  Follow-up After Hospitalization    Jessica Hummel 80 y o  female   Date:  7/12/2021    TCM Call (since 6/11/2021)     Date and time call was made  7/7/2021 10:51 AM    Hospital care reviewed  Records reviewed    Patient was hospitialized at  [facility]        Date of Admission  07/04/21    Date of discharge  07/06/21    Diagnosis  Elevated troponin I level    Disposition  Home    Were the patients medications reviewed and updated  Yes    Current Symptoms  None      TCM Call (since 6/11/2021)     Post hospital issues  None    Should patient be enrolled in anticoag monitoring? No    Scheduled for follow up? Yes    Did you obtain your prescribed medications  Yes    Do you need help managing your prescriptions or medications  No    Is transportation to your appointment needed  No    I have advised the patient to call PCP with any new or worsening symptoms  darrius alexander josenikolay         Admit Date: 7/3  Discharge Date: 7/6  Diagnosis: LLQ pain, elevated troponins  Location: [facility] records were reviewed  Medications upon discharge reviewed/updated  Medication Changes: none  Imaging: CT A/P  Consults: cardiology  Discharge Disposition:  home  Follow up visits with other specialists: none      Assessment and Plan:    1  LLQ: Improved but ongoing and likely related to chronic constipation  Start linzess  CT A/P clear  Directions for use and possible side effects discussed and patient verbalized understanding of these  2  Elevated troponins: Negative cardiac cath, no further intervention needed  Rosa Mcelroy was seen today for follow-up  Diagnoses and all orders for this visit:    Chronic pain of left knee  -     Ambulatory referral to Orthopedic Surgery; Future    Chronic constipation  -     linaCLOtide (Linzess) 145 MCG CAPS; Take 1 capsule (145 mcg total) by mouth daily            HPI:  Pt presents for TCM  She initially presented to [facility] on 7/3for LLQ abdominal pain and nausea  She underwent labs which revealed serial elevated troponins  She was transferred to [facility] for further cardiac workup  She underwent cardiac cath which was clear  She had CT AP which was negative  She was having infrequent BM but did not symptom improvement after having a BM  She was discharged home on 7/6  She was discharged on Keflex for some pyuria  She continues with some abdominal bloating and discomfort and has not had a BM in 3 days and states it was not a satisfying evacuation  ROS: Review of Systems   Constitutional: Negative for chills and fever  HENT: Negative for congestion, ear pain, hearing loss, postnasal drip, rhinorrhea, sinus pressure, sinus pain, sore throat and trouble swallowing  Eyes: Negative for pain and visual disturbance  Respiratory: Negative for cough, chest tightness, shortness of breath and wheezing  Cardiovascular: Negative  Negative for chest pain, palpitations and leg swelling  Gastrointestinal: Positive for abdominal pain, constipation and nausea  Negative for blood in stool, diarrhea and vomiting  Endocrine: Negative for cold intolerance, heat intolerance, polydipsia, polyphagia and polyuria  Genitourinary: Negative for difficulty urinating, dysuria, flank pain and urgency  Musculoskeletal: Negative for arthralgias, back pain, gait problem and myalgias  Skin: Negative for rash  Allergic/Immunologic: Negative  Neurological: Negative for dizziness, weakness, light-headedness and headaches  Hematological: Negative  Psychiatric/Behavioral: Negative for behavioral problems, dysphoric mood and sleep disturbance  The patient is not nervous/anxious          Past Medical History:   Diagnosis Date    Allergic     COPD [facility]chronic obstructive pulmonary disease) ([facility] [address] )     Diabetes mellitus (HCC)     GERD (gastroesophageal reflux disease)     Hyperlipidemia     Hypertension     Left non-suppurative otitis media 11/27/2019    Pneumonia     Pneumonia of right lower lobe due to infectious organism 2018    Vitamin D deficiency        Past Surgical History:   Procedure Laterality Date    CARDIAC CATHETERIZATION      CHOLECYSTECTOMY  1984    COLONOSCOPY N/A 2019    Procedure: COLONOSCOPY with multiple  polypectomies;  Surgeon: Laurine Bumpers, MD;  Location: [facility]; Service: Gastroenterology    EYE SURGERY Bilateral     CATARACT       Social History     Socioeconomic History    Marital status:      Spouse name: None    Number of children: None    Years of education: None    Highest education level: None   Occupational History    Occupation: RETIRED   Tobacco Use    Smoking status: Former Smoker     Packs/day: 1 00     Years: 50 00     Pack years: 50 00     Types: Cigarettes     Start date: 1963     Quit date: 2013     Years since quittin 5    Smokeless tobacco: Never Used   Vaping Use    Vaping Use: Never used   Substance and Sexual Activity    Alcohol use: Never    Drug use: Never    Sexual activity: None   Other Topics Concern    None   Social History Narrative    Daily caffeine consumption     Social Determinants of Health     Financial Resource Strain:     Difficulty of Paying Living Expenses:    Food Insecurity: No Food Insecurity    Worried About Running Out of Food in the Last Year: Never true    [place] of Food in the Last Year: Never true   Transportation Needs: No Transportation Needs    Lack of Transportation (Medical): No    Lack of Transportation (Non-Medical):  No   Physical Activity:     Days of Exercise per Week:     Minutes of Exercise per Session:    Stress:     Feeling of Stress :    Social Connections:     Frequency of Communication with Friends and Family:     Frequency of Social Gatherings with Friends and Family:     Attends Worship Services:     Active Member of Clubs or Organizations:     Attends Club or Organization Meetings:     Marital Status:    Intimate Partner Violence:     Fear of Current or Ex-Partner:     Emotionally Abused:     Physically Abused:     Sexually Abused:        Family History   Problem Relation Age of Onset    Lung cancer Father     Heart disease Mother     Lung cancer Sister     Lung cancer Brother        Allergies   Allergen Reactions    Bee Venom Anaphylaxis    Other      FLOWERS/GRASS         Current Outpatient Medications:     albuterol (Ventolin HFA) 90 mcg/act inhaler, Inhale 2 puffs every 6 (six) hours as needed for wheezing, Disp: 18 g, Rfl: 3    aspirin 81 mg chewable tablet, Chew 81 mg daily, Disp: , Rfl:     atorvastatin (LIPITOR) 20 mg tablet, Take 1 tablet (20 mg total) by mouth daily, Disp: 90 tablet, Rfl: 1    cetirizine (ZyrTEC) 10 mg tablet, Take 10 mg by mouth daily, Disp: , Rfl:     clotrimazole-betamethasone (LOTRISONE) 1-0 05 % cream, Apply topically 2 (two) times a day, Disp: 45 g, Rfl: 5    Ergocalciferol (VITAMIN D2) 2000 units TABS, Take 2,000 mg by mouth, Disp: , Rfl:     fluticasone-umeclidinium-vilanterol (TRELEGY) 100-62 5-25 MCG/INH inhaler, Inhale 1 puff daily Rinse mouth after use , Disp: 1 Inhaler, Rfl: 5    gabapentin (NEURONTIN) 300 mg capsule, Take 1 capsule (300 mg total) by mouth daily, Disp: 90 capsule, Rfl: 1    glucose blood (OneTouch Verio) test strip, Use as instructed TEST TWO TIMES DAILY e11 65, Disp: 300 each, Rfl: 5    lisinopril (ZESTRIL) 5 mg tablet, Take 1 tablet (5 mg total) by mouth daily, Disp: 90 tablet, Rfl: 1    metFORMIN (GLUCOPHAGE) 500 mg tablet, Take 1 tablet (500 mg total) by mouth 2 (two) times a day with meals, Disp: 180 tablet, Rfl: 1    Multiple Vitamin (MULTIVITAMINS PO), Take by mouth daily, Disp: , Rfl:     omeprazole (PriLOSEC) 20 mg delayed release capsule, Take 1 capsule (20 mg total) by mouth 2 (two) times a day, Disp: 180 capsule, Rfl: 3    oxybutynin (DITROPAN-XL) 5 mg 24 hr tablet, Take 1 tablet (5 mg total) by mouth daily, Disp: 90 tablet, Rfl: 1   Diclofenac Sodium (VOLTAREN) 1 %, Apply 2 g topically 4 (four) times a day (Patient not taking: Reported on 7/12/2021), Disp: 100 g, Rfl: 2    linaCLOtide (Linzess) 145 MCG CAPS, Take 1 capsule (145 mcg total) by mouth daily, Disp: 30 capsule, Rfl: 2      Physical Exam:  /70   Pulse (!) 112   Temp 97 9 °F (36 6 °C) (Tympanic)   Resp 18   Ht 5' 2" (1 575 m)   Wt 74 8 kg (165 lb)   SpO2 95%   BMI 30 18 kg/m²     Physical Exam  Constitutional:       General: She is not in acute distress  Appearance: She is well-developed  She is not diaphoretic  HENT:      Head: Normocephalic and atraumatic  Right Ear: External ear normal       Left Ear: External ear normal       Nose: Nose normal       Mouth/Throat:      Pharynx: No oropharyngeal exudate  Eyes:      General: No scleral icterus  Right eye: No discharge  Left eye: No discharge  Conjunctiva/sclera: Conjunctivae normal       Pupils: Pupils are equal, round, and reactive to light  Neck:      Thyroid: No thyromegaly  Cardiovascular:      Rate and Rhythm: Normal rate  Rhythm irregular  Heart sounds: Normal heart sounds  No murmur heard  No friction rub  No gallop  Pulmonary:      Effort: Pulmonary effort is normal  No respiratory distress  Breath sounds: Normal breath sounds  No wheezing or rales  Abdominal:      General: Bowel sounds are normal  There is no distension  Palpations: Abdomen is soft  Tenderness: There is no abdominal tenderness  Musculoskeletal:         General: No tenderness or deformity  Normal range of motion  Cervical back: Normal range of motion and neck supple  Skin:     General: Skin is warm and dry  Neurological:      Mental Status: She is alert and oriented to person, place, and time  Cranial Nerves: No cranial nerve deficit  Psychiatric:         Behavior: Behavior normal          Thought Content:  Thought content normal          Judgment: Judgment normal  Labs:  Lab Results   Component Value Date    WBC 7 38 07/05/2021    HGB 12 3 07/05/2021    HCT 37 2 07/05/2021    MCV 88 07/05/2021     07/05/2021     Lab Results   Component Value Date    K 4 0 07/05/2021     07/05/2021    CO2 28 07/05/2021    BUN 13 07/05/2021    CREATININE 0 64 07/05/2021    GLUF 112 (H) 04/29/2021    CALCIUM 8 5 07/05/2021    CORRECTEDCA 9 3 07/05/2021    AST 23 07/05/2021    ALT 32 07/05/2021    ALKPHOS 58 07/05/2021    EGFR 84 07/05/2021

## 2021-07-23 ENCOUNTER — APPOINTMENT (EMERGENCY)
Dept: CT IMAGING | Facility: HOSPITAL | Age: 81
End: 2021-07-23
Payer: COMMERCIAL

## 2021-07-23 ENCOUNTER — HOSPITAL ENCOUNTER (EMERGENCY)
Facility: HOSPITAL | Age: 81
Discharge: HOME | End: 2021-07-23
Attending: EMERGENCY MEDICINE | Admitting: EMERGENCY MEDICINE
Payer: COMMERCIAL

## 2021-07-23 ENCOUNTER — TELEPHONE (OUTPATIENT)
Dept: INTERNAL MEDICINE CLINIC | Facility: CLINIC | Age: 81
End: 2021-07-23

## 2021-07-23 VITALS
WEIGHT: 163.58 LBS | TEMPERATURE: 97.8 F | DIASTOLIC BLOOD PRESSURE: 63 MMHG | SYSTOLIC BLOOD PRESSURE: 143 MMHG | BODY MASS INDEX: 30.1 KG/M2 | HEIGHT: 62 IN | RESPIRATION RATE: 18 BRPM | HEART RATE: 81 BPM | OXYGEN SATURATION: 94 %

## 2021-07-23 DIAGNOSIS — K59.00 CONSTIPATION, UNSPECIFIED CONSTIPATION TYPE: Primary | ICD-10-CM

## 2021-07-23 DIAGNOSIS — R10.32 LEFT LOWER QUADRANT ABDOMINAL PAIN: ICD-10-CM

## 2021-07-23 LAB
ALBUMIN SERPL BCP-MCNC: 3.8 G/DL (ref 3.5–5)
ALP SERPL-CCNC: 69 U/L (ref 46–116)
ALT SERPL W P-5'-P-CCNC: 32 U/L (ref 12–78)
ANION GAP SERPL CALCULATED.3IONS-SCNC: 7 MMOL/L (ref 4–13)
APTT PPP: 34 SECONDS (ref 23–37)
AST SERPL W P-5'-P-CCNC: 18 U/L (ref 5–45)
BACTERIA UR QL AUTO: ABNORMAL /HPF
BASOPHILS # BLD AUTO: 0.06 THOUSANDS/ΜL (ref 0–0.1)
BASOPHILS NFR BLD AUTO: 1 % (ref 0–1)
BILIRUB DIRECT SERPL-MCNC: 0.15 MG/DL (ref 0–0.2)
BILIRUB SERPL-MCNC: 0.49 MG/DL (ref 0.2–1)
BILIRUB UR QL STRIP: NEGATIVE
BUN SERPL-MCNC: 13 MG/DL (ref 5–25)
CALCIUM SERPL-MCNC: 9.8 MG/DL (ref 8.3–10.1)
CHLORIDE SERPL-SCNC: 102 MMOL/L (ref 100–108)
CLARITY UR: CLEAR
CO2 SERPL-SCNC: 30 MMOL/L (ref 21–32)
COLOR UR: YELLOW
CREAT SERPL-MCNC: 0.92 MG/DL (ref 0.6–1.3)
EOSINOPHIL # BLD AUTO: 0.2 THOUSAND/ΜL (ref 0–0.61)
EOSINOPHIL NFR BLD AUTO: 2 % (ref 0–6)
ERYTHROCYTE [DISTWIDTH] IN BLOOD BY AUTOMATED COUNT: 13 % (ref 11.6–15.1)
GFR SERPL CREATININE-BSD FRML MDRD: 59 ML/MIN/1.73SQ M
GLUCOSE SERPL-MCNC: 120 MG/DL (ref 65–140)
GLUCOSE UR STRIP-MCNC: NEGATIVE MG/DL
HCT VFR BLD AUTO: 43.7 % (ref 34.8–46.1)
HGB BLD-MCNC: 14.1 G/DL (ref 11.5–15.4)
HGB UR QL STRIP.AUTO: NEGATIVE
IMM GRANULOCYTES # BLD AUTO: 0.03 THOUSAND/UL (ref 0–0.2)
IMM GRANULOCYTES NFR BLD AUTO: 0 % (ref 0–2)
INR PPP: 1.07 (ref 0.84–1.19)
KETONES UR STRIP-MCNC: ABNORMAL MG/DL
LEUKOCYTE ESTERASE UR QL STRIP: ABNORMAL
LIPASE SERPL-CCNC: 118 U/L (ref 73–393)
LYMPHOCYTES # BLD AUTO: 3.31 THOUSANDS/ΜL (ref 0.6–4.47)
LYMPHOCYTES NFR BLD AUTO: 34 % (ref 14–44)
MCH RBC QN AUTO: 28.3 PG (ref 26.8–34.3)
MCHC RBC AUTO-ENTMCNC: 32.3 G/DL (ref 31.4–37.4)
MCV RBC AUTO: 88 FL (ref 82–98)
MONOCYTES # BLD AUTO: 0.9 THOUSAND/ΜL (ref 0.17–1.22)
MONOCYTES NFR BLD AUTO: 9 % (ref 4–12)
NEUTROPHILS # BLD AUTO: 5.16 THOUSANDS/ΜL (ref 1.85–7.62)
NEUTS SEG NFR BLD AUTO: 54 % (ref 43–75)
NITRITE UR QL STRIP: NEGATIVE
NON-SQ EPI CELLS URNS QL MICRO: ABNORMAL /HPF
NRBC BLD AUTO-RTO: 0 /100 WBCS
PH UR STRIP.AUTO: 6 [PH]
PLATELET # BLD AUTO: 281 THOUSANDS/UL (ref 149–390)
PMV BLD AUTO: 10.4 FL (ref 8.9–12.7)
POTASSIUM SERPL-SCNC: 4.2 MMOL/L (ref 3.5–5.3)
PROT SERPL-MCNC: 7.2 G/DL (ref 6.4–8.2)
PROT UR STRIP-MCNC: NEGATIVE MG/DL
PROTHROMBIN TIME: 13.7 SECONDS (ref 11.6–14.5)
RBC # BLD AUTO: 4.98 MILLION/UL (ref 3.81–5.12)
RBC #/AREA URNS AUTO: ABNORMAL /HPF
SODIUM SERPL-SCNC: 139 MMOL/L (ref 136–145)
SP GR UR STRIP.AUTO: 1.02 (ref 1–1.03)
TROPONIN I SERPL-MCNC: <0.02 NG/ML
UROBILINOGEN UR QL STRIP.AUTO: 1 E.U./DL
WBC # BLD AUTO: 9.66 THOUSAND/UL (ref 4.31–10.16)
WBC #/AREA URNS AUTO: ABNORMAL /HPF

## 2021-07-23 PROCEDURE — 80048 BASIC METABOLIC PNL TOTAL CA: CPT | Performed by: EMERGENCY MEDICINE

## 2021-07-23 PROCEDURE — 99284 EMERGENCY DEPT VISIT MOD MDM: CPT

## 2021-07-23 PROCEDURE — G1004 CDSM NDSC: HCPCS

## 2021-07-23 PROCEDURE — 74176 CT ABD & PELVIS W/O CONTRAST: CPT

## 2021-07-23 PROCEDURE — 83690 ASSAY OF LIPASE: CPT | Performed by: EMERGENCY MEDICINE

## 2021-07-23 PROCEDURE — 36415 COLL VENOUS BLD VENIPUNCTURE: CPT | Performed by: EMERGENCY MEDICINE

## 2021-07-23 PROCEDURE — 80076 HEPATIC FUNCTION PANEL: CPT | Performed by: EMERGENCY MEDICINE

## 2021-07-23 PROCEDURE — 85730 THROMBOPLASTIN TIME PARTIAL: CPT | Performed by: EMERGENCY MEDICINE

## 2021-07-23 PROCEDURE — 81001 URINALYSIS AUTO W/SCOPE: CPT | Performed by: EMERGENCY MEDICINE

## 2021-07-23 PROCEDURE — 99284 EMERGENCY DEPT VISIT MOD MDM: CPT | Performed by: EMERGENCY MEDICINE

## 2021-07-23 PROCEDURE — 85025 COMPLETE CBC W/AUTO DIFF WBC: CPT | Performed by: EMERGENCY MEDICINE

## 2021-07-23 PROCEDURE — 85610 PROTHROMBIN TIME: CPT | Performed by: EMERGENCY MEDICINE

## 2021-07-23 PROCEDURE — 84484 ASSAY OF TROPONIN QUANT: CPT | Performed by: EMERGENCY MEDICINE

## 2021-07-23 RX ORDER — MAGNESIUM CARB/ALUMINUM HYDROX 105-160MG
296 TABLET,CHEWABLE ORAL ONCE
Status: COMPLETED | OUTPATIENT
Start: 2021-07-23 | End: 2021-07-23

## 2021-07-23 RX ADMIN — Medication 296 ML: at 21:39

## 2021-07-23 NOTE — ED PROVIDER NOTES
History  Chief Complaint   Patient presents with    Constipation     patient reports that she has not had a bm since Sunday  she reports that her pcp gave her a script which she has been taking without relief  she also reports nausea and vomiting  HPI  49-year-old female with past medical history significant for obesity, chronic constipation, type 2 diabetes, COPD, hypertension, hyperlipidemia, AFib RVR, NSTEMI, recent admission in July 3rd underwent cardiac catheterization reports normal findings who presents to the ER today for evaluation of left lower quadrant abdominal pain, vomiting, lack of bowel movement  Patient reports that her last bowel movement was Sunday, 5 days ago  She reports prior to that she has not had a full bowel movement in almost 1 month  She reports a history of constipation  This was present at her last admission however she was transferred to [facility] for elevated troponin underwent catheterization  She reports that she was started on Linzess and which she has been taking without relief in her symptoms  She denies bloody stools  She reports left lower quadrant abdominal discomfort which has been present for over a month  She denies fevers, chills  She did report vomiting all day  She denies a prior history of requiring disimpaction is  She denies significant rectal pain  She denies urinary complaints  Pain is mild at this time  It is achy and dull  It does not radiate  Nothing seems to make better worse  Prior to Admission Medications   Prescriptions Last Dose Informant Patient Reported? Taking?    Diclofenac Sodium (VOLTAREN) 1 % Not Taking at Unknown time  No No   Sig: Apply 2 g topically 4 (four) times a day   Patient not taking: Reported on 7/12/2021   Ergocalciferol (VITAMIN D2) 2000 units TABS 7/23/2021 at Unknown time  Yes Yes   Sig: Take 2,000 mg by mouth   Multiple Vitamin (MULTIVITAMINS PO) 7/23/2021 at Unknown time  Yes Yes   Sig: Take by mouth daily albuterol (Ventolin HFA) 90 mcg/act inhaler 7/23/2021 at Unknown time  No Yes   Sig: Inhale 2 puffs every 6 (six) hours as needed for wheezing   aspirin 81 mg chewable tablet 7/23/2021 at Unknown time Self Yes Yes   Sig: Chew 81 mg daily   atorvastatin (LIPITOR) 20 mg tablet 7/23/2021 at Unknown time  No Yes   Sig: Take 1 tablet (20 mg total) by mouth daily   cetirizine (ZyrTEC) 10 mg tablet 7/23/2021 at Unknown time  Yes Yes   Sig: Take 10 mg by mouth daily   clotrimazole-betamethasone (LOTRISONE) 1-0 05 % cream 7/23/2021 at Unknown time  No Yes   Sig: Apply topically 2 (two) times a day   fluticasone-umeclidinium-vilanterol (TRELEGY) 100-62 5-25 MCG/INH inhaler Not Taking at Unknown time  No No   Sig: Inhale 1 puff daily Rinse mouth after use     Patient not taking: Reported on 7/23/2021   gabapentin (NEURONTIN) 300 mg capsule 7/23/2021 at Unknown time  No Yes   Sig: Take 1 capsule (300 mg total) by mouth daily   glucose blood (OneTouch Verio) test strip 7/23/2021 at Unknown time  No Yes   Sig: Use as instructed TEST TWO TIMES DAILY e11 65   linaCLOtide (Linzess) 145 MCG CAPS 7/23/2021 at Unknown time  No Yes   Sig: Take 1 capsule (145 mcg total) by mouth daily   lisinopril (ZESTRIL) 5 mg tablet 7/23/2021 at Unknown time  No Yes   Sig: Take 1 tablet (5 mg total) by mouth daily   metFORMIN (GLUCOPHAGE) 500 mg tablet 7/23/2021 at Unknown time  No Yes   Sig: Take 1 tablet (500 mg total) by mouth 2 (two) times a day with meals   omeprazole (PriLOSEC) 20 mg delayed release capsule 7/23/2021 at Unknown time  No Yes   Sig: Take 1 capsule (20 mg total) by mouth 2 (two) times a day   oxybutynin (DITROPAN-XL) 5 mg 24 hr tablet 7/23/2021 at Unknown time  No Yes   Sig: Take 1 tablet (5 mg total) by mouth daily      Facility-Administered Medications: None       Past Medical History:   Diagnosis Date    Allergic     [facility]ch[facility] obstructive [facility] disease) ([facility] )     Diabetes mellitus ([facility] )     GERD (gastroesophageal reflux disease)     Hyperlipidemia     Hypertension     Left non-suppurative otitis media 2019    Pneumonia     Pneumonia of right lower lobe due to infectious organism 2018    Vitamin D deficiency        Past Surgical History:   Procedure Laterality Date    CARDIAC CATHETERIZATION      CHOLECYSTECTOMY      COLONOSCOPY N/A 2019    Procedure: COLONOSCOPY with multiple  polypectomies;  Surgeon: Princess Jak MD;  Location: [address] [place] [address] GI LAB; Service: Gastroenterology    EYE SURGERY Bilateral     CATARACT       Family History   Problem Relation Age of Onset    Lung cancer Father     Heart disease Mother     Lung cancer Sister     Lung cancer Brother      I have reviewed and agree with the history as documented  E-Cigarette/Vaping    E-Cigarette Use Never User      E-Cigarette/Vaping Substances    Nicotine No     THC No     CBD No     Flavoring No     Other No     Unknown No      Social History     Tobacco Use    Smoking status: Former Smoker     Packs/day: 1 00     Years: 50 00     Pack years: 50 00     Types: Cigarettes     Start date: 1963     Quit date: 2013     Years since quittin 6    Smokeless tobacco: Never Used   Vaping Use    Vaping Use: Never used   Substance Use Topics    Alcohol use: Never    Drug use: Never       Review of Systems   Constitutional: Negative for chills and fever  HENT: Negative for ear pain and sore throat  Eyes: Negative for pain and visual disturbance  Respiratory: Negative for cough and shortness of breath  Cardiovascular: Negative for chest pain and palpitations  Gastrointestinal: Positive for abdominal pain, constipation, nausea and vomiting  Genitourinary: Negative for dysuria and hematuria  Musculoskeletal: Negative for arthralgias and back pain  Skin: Negative for color change and rash  Neurological: Negative for seizures and syncope  All other systems reviewed and are negative        Physical Exam  Physical Exam  Vitals and nursing note reviewed  Exam conducted with a chaperone present  Constitutional:       General: She is not in acute distress  Appearance: She is well-developed  She is obese  She is not ill-appearing  HENT:      Head: Normocephalic and atraumatic  Eyes:      Conjunctiva/sclera: Conjunctivae normal    Cardiovascular:      Rate and Rhythm: Normal rate and regular rhythm  Heart sounds: No murmur heard  Pulmonary:      Effort: Pulmonary effort is normal  No respiratory distress  Breath sounds: Normal breath sounds  Abdominal:      Palpations: Abdomen is soft  Tenderness: There is no abdominal tenderness  Comments: Abdomen soft, nondistended, nontender  There is no rebound or guarding  There is mild tenderness to palpation left lower quadrant  Genitourinary:     Comments: Rectum normal   Rectal exam without rectal fecal impaction  Small amount of stool in the rectal vault  No bleeding, fissures, tenderness  Musculoskeletal:      Cervical back: Neck supple  Right lower leg: No edema  Left lower leg: No edema  Skin:     General: Skin is warm and dry  Neurological:      Mental Status: She is alert           Vital Signs  ED Triage Vitals [07/23/21 1838]   Temperature Pulse Respirations Blood Pressure SpO2   97 8 °F (36 6 °C) (!) 106 18 127/77 95 %      Temp Source Heart Rate Source Patient Position - Orthostatic VS BP Location FiO2 (%)   Temporal Monitor Sitting Right arm --      Pain Score       9           Vitals:    07/23/21 2000 07/23/21 2030 07/23/21 2100 07/23/21 2130   BP: 127/71 154/73 122/90 143/63   Pulse: 78 85 84 81   Patient Position - Orthostatic VS: Lying Lying Lying Lying         Visual Acuity      ED Medications  Medications   magnesium citrate (CITROMA) oral solution 296 mL (296 mL Oral Given 7/23/21 2139)       Diagnostic Studies  Results Reviewed     Procedure Component Value Units Date/Time    Urine Microscopic [[id number]]  (Abnormal) Collected: 07/23/21 2158    Lab Status: Final result Specimen: Urine, Clean Catch Updated: 07/23/21 2216     RBC, UA 0-1 /hpf      WBC, UA 4-10 /hpf      Epithelial Cells Occasional /hpf      Bacteria, UA Moderate /hpf     UA w Reflex to Microscopic w Reflex to Culture [[id number]]  (Abnormal) Collected: 07/23/21 2158    Lab Status: Final result Specimen: Urine, Clean Catch Updated: 07/23/21 2203     Color, UA Yellow     Clarity, UA Clear     Specific [place], UA 1 020     pH, UA 6 0     Leukocytes, UA Small     Nitrite, UA Negative     Protein, UA Negative mg/dl      Glucose, UA Negative mg/dl      Ketones, UA Trace mg/dl      Urobilinogen, UA 1 0 E U /dl      Bilirubin, UA Negative     Blood, UA Negative    Troponin I [[id number]]  (Normal) Collected: 07/23/21 1926    Lab Status: Final result Specimen: Blood from Arm, Right Updated: 07/23/21 1948     Troponin I <0 02 ng/mL     Basic metabolic panel [[id number]] Collected: 07/23/21 1926    Lab Status: Final result Specimen: Blood from Arm, Right Updated: 07/23/21 1946     Sodium 139 mmol/L      Potassium 4 2 mmol/L      Chloride 102 mmol/L      CO2 30 mmol/L      ANION GAP 7 mmol/L      BUN 13 mg/dL      Creatinine 0 92 mg/dL      Glucose 120 mg/dL      Calcium 9 8 mg/dL      eGFR 59 ml/min/1 73sq m     Narrative:      Sanjiv guidelines for Chronic Kidney Disease (CKD):     Stage 1 with normal or high GFR (GFR > 90 mL/min/1 73 square meters)    Stage 2 Mild CKD (GFR = 60-89 mL/min/1 73 square meters)    Stage 3A Moderate CKD (GFR = 45-59 mL/min/1 73 square meters)    Stage 3B Moderate CKD (GFR = 30-44 mL/min/1 73 square meters)    Stage 4 Severe CKD (GFR = 15-29 mL/min/1 73 square meters)    Stage 5 End Stage CKD (GFR <15 mL/min/1 73 square meters)  Note: GFR calculation is accurate only with a steady state creatinine    Hepatic function panel [[id number]]  (Normal) Collected: 07/23/21 1926    Lab Status: Final result Specimen: Blood from Arm, Right Updated: 07/23/21 1946     Total Bilirubin 0 49 mg/dL      Bilirubin, Direct 0 15 mg/dL      Alkaline Phosphatase 69 U/L      AST 18 U/L      ALT 32 U/L      Total Protein 7 2 g/dL      Albumin 3 8 g/dL     Lipase [[id number]]  (Normal) Collected: 07/23/21 1926    Lab Status: Final result Specimen: Blood from Arm, Right Updated: 07/23/21 1946     Lipase 118 u/L     Protime-INR [[id number]]  (Normal) Collected: 07/23/21 1926    Lab Status: Final result Specimen: Blood from Arm, Right Updated: 07/1940     Protime 13 7 seconds      INR 1 07    APTT [[id number]]  (Normal) Collected: 07/23/21 1926    Lab Status: Final result Specimen: Blood from Arm, Right Updated: 07/1940     PTT 34 seconds     CBC and differential [[id number]] Collected: 07/23/21 1926    Lab Status: Final result Specimen: Blood from Arm, Right Updated: 07/23/21 1931     WBC 9 66 Thousand/uL      RBC 4 98 Million/uL      Hemoglobin 14 1 g/dL      Hematocrit 43 7 %      MCV 88 fL      MCH 28 3 pg      MCHC 32 3 g/dL      RDW 13 0 %      MPV 10 4 fL      Platelets 057 Thousands/uL      nRBC 0 /100 WBCs      Neutrophils Relative 54 %      Immat GRANS % 0 %      Lymphocytes Relative 34 %      Monocytes Relative 9 %      Eosinophils Relative 2 %      Basophils Relative 1 %      Neutrophils Absolute 5 16 Thousands/µL      Immature Grans Absolute 0 03 Thousand/uL      Lymphocytes Absolute 3 31 Thousands/µL      Monocytes Absolute 0 90 Thousand/µL      Eosinophils Absolute 0 20 Thousand/µL      Basophils Absolute 0 06 Thousands/µL                  CT abdomen pelvis wo contrast   Final Result by Cheyenne Us MD (07/23 2058)      No acute inflammatory process identified  Stable hiatal hernia              Workstation performed: [id number]                    Procedures  Procedures         ED Course  ED Course as of Jul 23 2258 Fri Jul 23, 2021 2006 Labs reviewed and are unremarkable      2216 CT scan no acute findings  There does not represent significant stool impaction at the rectum  I performed a rectal exam and there is a small amount of stool in the vault without evidence of fecal impaction  Will attempt magnesium citrate disseminated bowel with emergency department  I am somewhat reassured by the patient's bowel movement 5 days ago  If unable to have a bowel movement here with no evidence of fecal impaction will likely discharge with outpatient management and strict return precautions should she continue to have discomfort or failure to improve  2685 Patient did have a successful bowel movement with enema and Mag citrate  Will discharge  SBIRT 22yo+      Most Recent Value   SBIRT (22 yo +)   In order to provide better care to our patients, we are screening all of our patients for alcohol and drug use  Would it be okay to ask you these screening questions? Yes Filed at: 07/23/2021 1906   Initial Alcohol Screen: US AUDIT-C    1  How often do you have a drink containing alcohol?  0 Filed at: 07/23/2021 1906   2  How many drinks containing alcohol do you have on a typical day you are drinking? 0 Filed at: 07/23/2021 1906   3b  FEMALE Any Age, or MALE 65+: How often do you have 4 or more drinks on one occassion? 0 Filed at: 07/23/2021 1906   Audit-C Score  0 Filed at: 07/23/2021 1906   MERA: How many times in the past year have you    Used an illegal drug or used a prescription medication for non-medical reasons?   Never Filed at: 07/23/2021 1906                    MDM  Number of Diagnoses or Management Options  Constipation, unspecified constipation type: established and worsening  Left lower quadrant abdominal pain: new and requires workup     Amount and/or Complexity of Data Reviewed  Clinical lab tests: ordered and reviewed  Tests in the radiology section of CPT®: ordered and reviewed  Tests in the medicine section of CPT®: reviewed and ordered  Decide to obtain previous medical records or to obtain history from someone other than the patient: yes  Review and summarize past medical records: yes  Independent visualization of images, tracings, or specimens: yes    Risk of Complications, Morbidity, and/or Mortality  Presenting problems: low  Diagnostic procedures: low  Management options: low    Patient Progress  Patient progress: improved    75-year-old female presenting for evaluation of constipation, left lower quadrant abdominal pain, vomiting  I suspect constipation is the etiology of her symptoms  However given her history recent admission recent catheterization and vomiting as well as pain in her abdomen elderly female proceed with CT scan to evaluate for possibility of diverticulitis or other intra-abdominal pathology  Will obtain screening blood work  If CT scan shows stool burden without other acute abnormalities will perform rectal exam to evaluate for fecal impaction  If complaints related to constipation only without other findings will proceed with enema and Mag citrate or manual disimpaction in the emergency department  Disposition  Final diagnoses:   Constipation, unspecified constipation type   Left lower quadrant abdominal pain     Time reflects when diagnosis was documented in both MDM as applicable and the Disposition within this note     Time User Action Codes Description Comment    7/23/2021 10:56 PM Addison Rolling Add [K59 00] Constipation, unspecified constipation type     7/23/2021 10:56 PM Addison Rolling Add [R10 32] Left lower quadrant abdominal pain       ED Disposition     ED Disposition Condition Date/Time Comment    Discharge Stable Fri Jul 23, 2021 10:55 PM Mina Carmona discharge to home/self care              Follow-up Information     Follow up With Specialties Details Why Contact Info Additional Information    Fermin Diaz PA-C Family Medicine, Internal Medicine, Physician Assistant   [address] [facility] [address] Emergency Department Emergency Medicine Go to  If symptoms worsen [place] [address] [phone]  [facility] Emergency Department, [address], [address]          Patient's Medications   Discharge Prescriptions    No medications on file     No discharge procedures on file      PDMP Review     None          ED Provider  Electronically Signed by           Kerry Pena DO  07/23/21 1252

## 2021-07-24 NOTE — DISCHARGE INSTRUCTIONS
Thank you for visiting the Emergency Department today  We evaluated you with labs and urine  Your CT scan did not show any acute abnormalities  Did not have signs of fecal impaction  We performed a soapsuds enema as well as a magnesium citrate motility stimulant and you able to have a bowel movement the emergency department  If your symptoms worsen or fail to improve please return to the emergency department or follow-up with primary care provider for further evaluation of your symptoms

## 2021-07-30 ENCOUNTER — CONSULT (OUTPATIENT)
Dept: OBGYN CLINIC | Facility: CLINIC | Age: 81
End: 2021-07-30
Payer: COMMERCIAL

## 2021-07-30 ENCOUNTER — APPOINTMENT (OUTPATIENT)
Dept: RADIOLOGY | Facility: CLINIC | Age: 81
End: 2021-07-30
Payer: COMMERCIAL

## 2021-07-30 VITALS
SYSTOLIC BLOOD PRESSURE: 135 MMHG | DIASTOLIC BLOOD PRESSURE: 76 MMHG | TEMPERATURE: 98.6 F | HEART RATE: 72 BPM | WEIGHT: 163 LBS | BODY MASS INDEX: 30 KG/M2 | HEIGHT: 62 IN

## 2021-07-30 DIAGNOSIS — M25.562 CHRONIC PAIN OF LEFT KNEE: ICD-10-CM

## 2021-07-30 DIAGNOSIS — G89.29 CHRONIC PAIN OF LEFT KNEE: ICD-10-CM

## 2021-07-30 DIAGNOSIS — M17.12 PRIMARY OSTEOARTHRITIS OF LEFT KNEE: Primary | ICD-10-CM

## 2021-07-30 PROCEDURE — 99204 OFFICE O/P NEW MOD 45 MIN: CPT | Performed by: FAMILY MEDICINE

## 2021-07-30 PROCEDURE — 73562 X-RAY EXAM OF KNEE 3: CPT

## 2021-07-30 PROCEDURE — 20610 DRAIN/INJ JOINT/BURSA W/O US: CPT | Performed by: FAMILY MEDICINE

## 2021-07-30 RX ORDER — METHYLPREDNISOLONE ACETATE 40 MG/ML
1 INJECTION, SUSPENSION INTRA-ARTICULAR; INTRALESIONAL; INTRAMUSCULAR; SOFT TISSUE
Status: COMPLETED | OUTPATIENT
Start: 2021-07-30 | End: 2021-07-30

## 2021-07-30 RX ORDER — LIDOCAINE HYDROCHLORIDE 10 MG/ML
4 INJECTION, SOLUTION INFILTRATION; PERINEURAL
Status: COMPLETED | OUTPATIENT
Start: 2021-07-30 | End: 2021-07-30

## 2021-07-30 RX ADMIN — METHYLPREDNISOLONE ACETATE 1 ML: 40 INJECTION, SUSPENSION INTRA-ARTICULAR; INTRALESIONAL; INTRAMUSCULAR; SOFT TISSUE at 13:12

## 2021-07-30 RX ADMIN — LIDOCAINE HYDROCHLORIDE 4 ML: 10 INJECTION, SOLUTION INFILTRATION; PERINEURAL at 13:12

## 2021-07-30 NOTE — PATIENT INSTRUCTIONS
F/u as needed  Injection L knee done today  Icing/heat/OTC pain meds as needed  Knee exercises  Knee Exercises   AMBULATORY CARE:   What you need to know about knee exercises:  Knee exercises help strengthen the muscles around your knee  Strong muscles can help reduce pain and decrease your risk of future injury  Knee exercises also help you heal after an injury or surgery  · Start slow  These are beginning exercises  Ask your healthcare provider if you need to see a physical therapist for more advanced exercises  As you get stronger, you may be able to do more sets of each exercise or add weights  · Stop if you feel pain  It is normal to feel some discomfort at first  Regular exercise will help decrease your discomfort over time  · Do the exercises on both legs  Do this so both knees remain strong  · Warm up before you do knee exercises  Walk or ride a stationary bike for 5 or 10 minutes to warm your muscles  How to perform knee stretches safely:  Always stretch before you do strengthening exercises  Do these stretching exercises again after you do the strengthening exercises  Do these stretches 4 or 5 days a week, or as directed  · Standing calf stretch: Face a wall and place both palms flat on the wall, or hold the back of a chair for balance  Keep a slight bend in your knees  Take a big step backward with one leg  Keep your other leg directly under you  Keep both heels flat and press your hips forward  Hold the stretch for 30 seconds, and then relax for 30 seconds  Switch legs  Repeat 2 or 3 times on each leg  · Standing quadriceps stretch:  Stand and place one hand against a wall or hold the back of a chair for balance  With your weight on one leg, bend your other leg and grab your ankle  Bring your heel toward your buttocks  Hold the stretch for 30 to 60 seconds  Switch legs  Repeat 2 or 3 times on each leg           · Sitting hamstring stretch:  Sit with both legs straight in front of you  Do not point or flex your toes  Place your palms on the floor and slide your hands forward until you feel the stretch  Do not round your back  Hold the stretch for 30 seconds  Repeat 2 or 3 times  How to perform knee strengthening exercises safely:  Do these exercises 4 or 5 days a week, or as directed  · Standing half squats:  Stand with your feet shoulder-width apart  Lean your back against a wall or hold the back of a chair for balance, if needed  Slowly sit down about 10 inches, as if you are going to sit in a chair  Your body weight should be mostly over your heels  Hold the squat for 5 seconds, then rise to a standing position  Do 3 sets of 10 squats to strengthen your buttocks and thighs  · Standing hamstring curls: Face a wall and place both palms flat on the wall, or hold the back of a chair for balance  With your weight on one leg, lift your other foot as close to your buttocks as you can  Hold for 5 seconds and then lower your leg  Do 2 sets of 10 curls on each leg  This exercise strengthens the muscles in the back of your thigh  · Standing calf raises:  Face a wall and place both palms flat on the wall, or hold the back of a chair for balance  Stand up straight, and do not lean  Place all your weight on one leg by lifting the other foot off the floor  Raise the heel of the foot that is on the floor as high as you can and then lower it  Do 2 sets of 10 calf raises on each leg to strengthen your calf muscles  · Straight leg lifts:  Lie on your stomach with straight legs  Fold your arms in front of you and rest your head in your arms  Tighten your leg muscles and raise one leg as high as you can  Hold for 5 seconds, then lower your leg  Do 2 sets of 10 lifts on each leg to strengthen your buttocks  · Sitting leg lifts:  Sit in a chair  Slowly straighten and raise one leg  Squeeze your thigh muscles and hold for 5 seconds   Relax and return your foot to the floor  Do 2 sets of 10 lifts on each leg  This helps strengthen the muscles in the front of your thigh  Contact your healthcare provider if:   · You have new pain or your pain becomes worse  · You have questions or concerns about your condition or care  © Copyright [employer] 2021 Information is for End User's use only and may not be sold, redistributed or otherwise used for commercial purposes  All illustrations and images included in CareNotes® are the copyrighted property of [employer]  or Leida H[employer]   The above information is an  only  It is not intended as medical advice for individual conditions or treatments  Talk to your doctor, nurse or pharmacist before following any medical regimen to see if it is safe and effective for you

## 2021-07-30 NOTE — LETTER
July 30, 2021     Nery Grover PA-C  [address]    Patient: Yeimy Damon   YOB: 1940   Date of Visit: 7/30/2021       Dear Dr Jacob Zapata: Thank you for referring Kathy London to me for evaluation  Below are my notes for this consultation  If you have questions, please do not hesitate to call me  I look forward to following your patient along with you  Sincerely,        Nikhil Harrison MD        CC: No Recipients  Nikhil Harrison MD  7/30/2021  1:33 PM  Incomplete  [facility] [place]  [address]  [facility] [address]  [phone]  [phone]      Chief Complaint:  Chief Complaint   Patient presents with    Left Knee - Pain       Vitals:  /76   Pulse 72   Temp 98 6 °F (37 °C) (Tympanic)   Ht 5' 2" (1 575 m)   Wt 73 9 kg (163 lb)   BMI 29 81 kg/m²     The following portions of the patient's history were reviewed and updated as appropriate: allergies, current medications, past family history, past medical history, past social history, past surgical history, and problem list       Subjective:   Patient ID: Yeimy Damon is a 80 y o  female  Here c/o L knee pain  Sent by her PCP  Pain for years  Denies swelling  Gives out/locking  Denies injury  Dull pain/muscles spasm  No pain meds taken  Becomes stuck getting into car      Review of Systems   Constitutional: Negative for fatigue and fever  Respiratory: Negative for shortness of breath  Cardiovascular: Negative for chest pain  Gastrointestinal: Negative for abdominal pain and nausea  Genitourinary: Negative for dysuria  Musculoskeletal: Positive for arthralgias and gait problem  Skin: Negative for rash and wound  Neurological: Negative for weakness and headaches  Objective:  Left Knee Exam     Muscle Strength   The patient has normal left knee strength      Tenderness   The patient is experiencing tenderness in the medial joint line  Range of Motion   The patient has normal left knee ROM  Tests   Aaron:  Medial - negative Lateral - negative  Varus: negative Valgus: negative    Other   Swelling: none  Effusion: no effusion present          Observations   Left Knee   Negative for effusion  Physical Exam  Vitals and nursing note reviewed  Constitutional:       Appearance: Normal appearance  She is well-developed  HENT:      Head: Normocephalic  Mouth/Throat:      Mouth: Mucous membranes are moist    Eyes:      Extraocular Movements: Extraocular movements intact  Cardiovascular:      Rate and Rhythm: Normal rate and regular rhythm  Heart sounds: Normal heart sounds  Pulmonary:      Effort: Pulmonary effort is normal       Breath sounds: Normal breath sounds  Abdominal:      General: Bowel sounds are normal       Palpations: Abdomen is soft  Musculoskeletal:         General: Tenderness present  Normal range of motion  Cervical back: Normal range of motion  Left knee: No effusion  Instability Tests: Medial Aaron test negative and lateral Aaron test negative  Skin:     General: Skin is warm and dry  Neurological:      General: No focal deficit present  Mental Status: She is alert and oriented to person, place, and time  Psychiatric:         Mood and Affect: Mood normal          Behavior: Behavior normal          Thought Content: Thought content normal      Large joint arthrocentesis: L knee  Universal Protocol:  Consent: Verbal consent obtained  Risks and benefits: risks, benefits and alternatives were discussed  Consent given by: patient  Time out: Immediately prior to procedure a "time out" was called to verify the correct patient, procedure, equipment, support staff and site/side marked as required    Timeout called at: 7/30/2021 1:11 PM   Site marked: the operative site was marked  Supporting Documentation  Indications: pain   Procedure Details  Location: knee - L knee  Preparation: Patient was prepped and draped in the usual sterile fashion  Needle size: 25 G  Ultrasound guidance: no  Approach: anterolateral  Medications administered: 4 mL lidocaine 1 %; 1 mL methylPREDNISolone acetate 40 mg/mL    Patient tolerance: patient tolerated the procedure well with no immediate complications  Dressing:  Sterile dressing applied            I have personally reviewed pertinent films in PACS and my interpretation is XR- L knee- medial joint space narrowing         Assessment/Plan:  Assessment/Plan   Diagnoses and all orders for this visit:    Primary osteoarthritis of left knee    Chronic pain of left knee  -     Ambulatory referral to Orthopedic Surgery  -     XR knee 3 vw left non injury; Future    Other orders  -     Large joint arthrocentesis: L knee        Return if symptoms worsen or fail to improve  MD Quin Gallegos MD  7/30/2021  1:12 PM  [facility] [address] [facility] [address]  [facility] [address]  [phone]  [phone]      Chief Complaint:  Chief Complaint   Patient presents with    Left Knee - Pain       Vitals:  /76   Pulse 72   Temp 98 6 °F (37 °C) (Tympanic)   Ht 5' 2" (1 575 m)   Wt 73 9 kg (163 lb)   BMI 29 81 kg/m²     The following portions of the patient's history were reviewed and updated as appropriate: allergies, current medications, past family history, past medical history, past social history, past surgical history, and problem list       Subjective:   Patient ID: Manuel Johnston is a 80 y o  female  Here c/o L knee pain  Sent by her PCP  Pain for years  Denies swelling  Gives out/locking  Denies injury  Dull pain/muscles spasm  No pain meds taken  Becomes stuck getting into car      Review of Systems   Constitutional: Negative for fatigue and fever  Respiratory: Negative for shortness of breath  Cardiovascular: Negative for chest pain     Gastrointestinal: Negative for abdominal pain and nausea  Genitourinary: Negative for dysuria  Musculoskeletal: Positive for arthralgias and gait problem  Skin: Negative for rash and wound  Neurological: Negative for weakness and headaches  Objective:  Left Knee Exam     Muscle Strength   The patient has normal left knee strength  Tenderness   The patient is experiencing tenderness in the medial joint line  Range of Motion   The patient has normal left knee ROM  Tests   Aaron:  Medial - negative Lateral - negative  Varus: negative Valgus: negative    Other   Swelling: none  Effusion: no effusion present          Observations   Left Knee   Negative for effusion  Physical Exam  Vitals and nursing note reviewed  Constitutional:       Appearance: Normal appearance  She is well-developed  HENT:      Head: Normocephalic  Mouth/Throat:      Mouth: Mucous membranes are moist    Eyes:      Extraocular Movements: Extraocular movements intact  Cardiovascular:      Rate and Rhythm: Normal rate and regular rhythm  Heart sounds: Normal heart sounds  Pulmonary:      Effort: Pulmonary effort is normal       Breath sounds: Normal breath sounds  Abdominal:      General: Bowel sounds are normal       Palpations: Abdomen is soft  Musculoskeletal:         General: Tenderness present  Normal range of motion  Cervical back: Normal range of motion  Left knee: No effusion  Instability Tests: Medial Aaron test negative and lateral Aaron test negative  Skin:     General: Skin is warm and dry  Neurological:      General: No focal deficit present  Mental Status: She is alert and oriented to person, place, and time  Psychiatric:         Mood and Affect: Mood normal          Behavior: Behavior normal          Thought Content: Thought content normal      Large joint arthrocentesis: L knee  Universal Protocol:  Consent: Verbal consent obtained    Risks and benefits: risks, benefits and alternatives were discussed  Consent given by: patient  Time out: Immediately prior to procedure a "time out" was called to verify the correct patient, procedure, equipment, support staff and site/side marked as required  Timeout called at: 7/30/2021 1:11 PM   Site marked: the operative site was marked  Supporting Documentation  Indications: pain   Procedure Details  Location: knee - L knee  Preparation: Patient was prepped and draped in the usual sterile fashion  Needle size: 25 G  Ultrasound guidance: no  Approach: anterolateral  Medications administered: 4 mL lidocaine 1 %; 1 mL methylPREDNISolone acetate 40 mg/mL    Patient tolerance: patient tolerated the procedure well with no immediate complications  Dressing:  Sterile dressing applied            {Imaging Review Statement:[id number]}      Assessment/Plan:  Assessment/Plan   Diagnoses and all orders for this visit:    Primary osteoarthritis of left knee    Chronic pain of left knee  -     Ambulatory referral to Orthopedic Surgery  -     XR knee 3 vw left non injury; Future    Other orders  -     Large joint arthrocentesis        Return if symptoms worsen or fail to improve       Reggie Garcia MD

## 2021-07-30 NOTE — LETTER
July 30, 2021     Nery Grover PA-C  [address]    Patient: Yeimy Damon   YOB: 1940   Date of Visit: 7/30/2021       Dear Dr Jcaob Zapata: Thank you for referring Kathy London to me for evaluation  Below are my notes for this consultation  If you have questions, please do not hesitate to call me  I look forward to following your patient along with you  Sincerely,        Nikhil Harrison MD        CC: No Recipients  Nikhil Harrison MD  7/30/2021  1:33 PM  Sign when Signing Visit  [address]  [address]  [facility] [address]  [phone]  [phone]      Chief Complaint:  Chief Complaint   Patient presents with    Left Knee - Pain       Vitals:  /76   Pulse 72   Temp 98 6 °F (37 °C) (Tympanic)   Ht 5' 2" (1 575 m)   Wt 73 9 kg (163 lb)   BMI 29 81 kg/m²     The following portions of the patient's history were reviewed and updated as appropriate: allergies, current medications, past family history, past medical history, past social history, past surgical history, and problem list       Subjective:   Patient ID: Yeimy Damon is a 80 y o  female  Here c/o L knee pain  Sent by her PCP  Pain for years  Denies swelling  Gives out/locking  Denies injury  Dull pain/muscles spasm  No pain meds taken  Becomes stuck getting into car      Review of Systems   Constitutional: Negative for fatigue and fever  Respiratory: Negative for shortness of breath  Cardiovascular: Negative for chest pain  Gastrointestinal: Negative for abdominal pain and nausea  Genitourinary: Negative for dysuria  Musculoskeletal: Positive for arthralgias and gait problem  Skin: Negative for rash and wound  Neurological: Negative for weakness and headaches  Objective:  Left Knee Exam     Muscle Strength   The patient has normal left knee strength      Tenderness   The patient is experiencing tenderness in the medial joint line  Range of Motion   The patient has normal left knee ROM  Tests   Aaron:  Medial - negative Lateral - negative  Varus: negative Valgus: negative    Other   Swelling: none  Effusion: no effusion present          Observations   Left Knee   Negative for effusion  Physical Exam  Vitals and nursing note reviewed  Constitutional:       Appearance: Normal appearance  She is well-developed  HENT:      Head: Normocephalic  Mouth/Throat:      Mouth: Mucous membranes are moist    Eyes:      Extraocular Movements: Extraocular movements intact  Cardiovascular:      Rate and Rhythm: Normal rate and regular rhythm  Heart sounds: Normal heart sounds  Pulmonary:      Effort: Pulmonary effort is normal       Breath sounds: Normal breath sounds  Abdominal:      General: Bowel sounds are normal       Palpations: Abdomen is soft  Musculoskeletal:         General: Tenderness present  Normal range of motion  Cervical back: Normal range of motion  Left knee: No effusion  Instability Tests: Medial Aaron test negative and lateral Aaron test negative  Skin:     General: Skin is warm and dry  Neurological:      General: No focal deficit present  Mental Status: She is alert and oriented to person, place, and time  Psychiatric:         Mood and Affect: Mood normal          Behavior: Behavior normal          Thought Content: Thought content normal      Large joint arthrocentesis: L knee  Universal Protocol:  Consent: Verbal consent obtained  Risks and benefits: risks, benefits and alternatives were discussed  Consent given by: patient  Time out: Immediately prior to procedure a "time out" was called to verify the correct patient, procedure, equipment, support staff and site/side marked as required    Timeout called at: 7/30/2021 1:11 PM   Site marked: the operative site was marked  Supporting Documentation  Indications: pain   Procedure Details  Location: knee - L knee  Preparation: Patient was prepped and draped in the usual sterile fashion  Needle size: 25 G  Ultrasound guidance: no  Approach: anterolateral  Medications administered: 4 mL lidocaine 1 %; 1 mL methylPREDNISolone acetate 40 mg/mL    Patient tolerance: patient tolerated the procedure well with no immediate complications  Dressing:  Sterile dressing applied            I have personally reviewed pertinent films in PACS and my interpretation is XR- L knee- medial joint space narrowing         Assessment/Plan:  Assessment/Plan   Diagnoses and all orders for this visit:    Primary osteoarthritis of left knee    Chronic pain of left knee  -     Ambulatory referral to Orthopedic Surgery  -     XR knee 3 vw left non injury; Future    Other orders  -     Large joint arthrocentesis: L knee        Return if symptoms worsen or fail to improve       Patrick Banerjee MD

## 2021-07-30 NOTE — PROGRESS NOTES
[facility] SPECIALISTS [place] [address]  [facility] [address]  [phone]      Chief Complaint:  Chief Complaint   Patient presents with    Left Knee - Pain       Vitals:  /76   Pulse 72   Temp 98 6 °F (37 °C) (Tympanic)   Ht 5' 2" (1 575 m)   Wt 73 9 kg (163 lb)   BMI 29 81 kg/m²     The following portions of the patient's history were reviewed and updated as appropriate: allergies, current medications, past family history, past medical history, past social history, past surgical history, and problem list       Subjective:   Patient ID: Aureliano Ackerman is a 80 y o  female  Here c/o L knee pain  Sent by her PCP  Pain for years  Denies swelling  Gives out/locking  Denies injury  Dull pain/muscles spasm  No pain meds taken  Becomes stuck getting into car      Review of Systems   Constitutional: Negative for fatigue and fever  Respiratory: Negative for shortness of breath  Cardiovascular: Negative for chest pain  Gastrointestinal: Negative for abdominal pain and nausea  Genitourinary: Negative for dysuria  Musculoskeletal: Positive for arthralgias and gait problem  Skin: Negative for rash and wound  Neurological: Negative for weakness and headaches  Objective:  Left Knee Exam     Muscle Strength   The patient has normal left knee strength  Tenderness   The patient is experiencing tenderness in the medial joint line  Range of Motion   The patient has normal left knee ROM  Tests   Aaron:  Medial - negative Lateral - negative  Varus: negative Valgus: negative    Other   Swelling: none  Effusion: no effusion present          Observations   Left Knee   Negative for effusion  Physical Exam  Vitals and nursing note reviewed  Constitutional:       Appearance: Normal appearance  She is well-developed  HENT:      Head: Normocephalic        Mouth/Throat:      Mouth: Mucous membranes are moist    Eyes:      Extraocular Movements: Extraocular movements intact  Cardiovascular:      Rate and Rhythm: Normal rate and regular rhythm  Heart sounds: Normal heart sounds  Pulmonary:      Effort: Pulmonary effort is normal       Breath sounds: Normal breath sounds  Abdominal:      General: Bowel sounds are normal       Palpations: Abdomen is soft  Musculoskeletal:         General: Tenderness present  Normal range of motion  Cervical back: Normal range of motion  Left knee: No effusion  Instability Tests: Medial Aaron test negative and lateral Aaron test negative  Skin:     General: Skin is warm and dry  Neurological:      General: No focal deficit present  Mental Status: She is alert and oriented to person, place, and time  Psychiatric:         Mood and Affect: Mood normal          Behavior: Behavior normal          Thought Content: Thought content normal      Large joint arthrocentesis: L knee  Universal Protocol:  Consent: Verbal consent obtained  Risks and benefits: risks, benefits and alternatives were discussed  Consent given by: patient  Time out: Immediately prior to procedure a "time out" was called to verify the correct patient, procedure, equipment, support staff and site/side marked as required  Timeout called at: 7/30/2021 1:11 PM   Site marked: the operative site was marked  Supporting Documentation  Indications: pain   Procedure Details  Location: knee - L knee  Preparation: Patient was prepped and draped in the usual sterile fashion  Needle size: 25 G  Ultrasound guidance: no  Approach: anterolateral  Medications administered: 4 mL lidocaine 1 %; 1 mL methylPREDNISolone acetate 40 mg/mL    Patient tolerance: patient tolerated the procedure well with no immediate complications  Dressing:  Sterile dressing applied            I have personally reviewed pertinent films in PACS and my interpretation is XR- L knee- medial joint space narrowing         Assessment/Plan:  Assessment/Plan Diagnoses and all orders for this visit:    Primary osteoarthritis of left knee    Chronic pain of left knee  -     Ambulatory referral to Orthopedic Surgery  -     XR knee 3 vw left non injury; Future    Other orders  -     Large joint arthrocentesis: L knee        Return if symptoms worsen or fail to improve       Agustina Huston MD

## 2021-08-04 ENCOUNTER — OFFICE VISIT (OUTPATIENT)
Dept: INTERNAL MEDICINE CLINIC | Facility: CLINIC | Age: 81
End: 2021-08-04
Payer: COMMERCIAL

## 2021-08-04 VITALS
TEMPERATURE: 98.9 F | HEIGHT: 62 IN | SYSTOLIC BLOOD PRESSURE: 125 MMHG | DIASTOLIC BLOOD PRESSURE: 78 MMHG | HEART RATE: 76 BPM | OXYGEN SATURATION: 96 % | WEIGHT: 164 LBS | BODY MASS INDEX: 30.18 KG/M2 | RESPIRATION RATE: 14 BRPM

## 2021-08-04 DIAGNOSIS — E78.5 HYPERLIPIDEMIA, UNSPECIFIED HYPERLIPIDEMIA TYPE: ICD-10-CM

## 2021-08-04 DIAGNOSIS — K59.09 CHRONIC CONSTIPATION: Primary | ICD-10-CM

## 2021-08-04 PROCEDURE — 1111F DSCHRG MED/CURRENT MED MERGE: CPT | Performed by: NURSE PRACTITIONER

## 2021-08-04 PROCEDURE — 99213 OFFICE O/P EST LOW 20 MIN: CPT | Performed by: NURSE PRACTITIONER

## 2021-08-04 RX ORDER — ATORVASTATIN CALCIUM 20 MG/1
20 TABLET, FILM COATED ORAL DAILY
Qty: 90 TABLET | Refills: 3 | Status: SHIPPED | OUTPATIENT
Start: 2021-08-04 | End: 2022-05-16 | Stop reason: SDUPTHER

## 2021-08-04 NOTE — ASSESSMENT & PLAN NOTE
· Patient in ED on 7/23/2021 with LLQ Abdominal Pain  · Stated that her previous BM was on 7/18/2021  · Was given a magnesium citrate drink - without relief  · Then was given a soap suds enema  · Was previously started on LInzess, without relief  · Will continue with linzess  · She also eats 3-4 pieces of black licorice, and metamucil (once a day)  · Did discuss using ivory soap as a suppository

## 2021-08-04 NOTE — PROGRESS NOTES
Assessment/Plan:    Problem List Items Addressed This Visit        Digestive    Chronic constipation - Primary     · Patient in ED on 7/23/2021 with LLQ Abdominal Pain  · Stated that her previous BM was on 7/18/2021  · Was given a magnesium citrate drink - without relief  · Then was given a soap suds enema  · Was previously started on LInzess, without relief  · Will continue with linzess  · She also eats 3-4 pieces of black licorice, and metamucil (once a day)  · Did discuss using ivory soap as a suppository            Other    Hyperlipidemia, unspecified    Relevant Medications    atorvastatin (LIPITOR) 20 mg tablet         Diagnoses and all orders for this visit:    Chronic constipation    Hyperlipidemia, unspecified hyperlipidemia type  -     atorvastatin (LIPITOR) 20 mg tablet; Take 1 tablet (20 mg total) by mouth daily    Other orders  -     psyllium (METAMUCIL) 58 6 % packet; Take 1 packet by mouth daily  -     LICORICE, GLYCYRRHIZA GLABRA, PO; Take 3-4 Pieces by mouth daily     Chronic constipation  · Patient in ED on 7/23/2021 with LLQ Abdominal Pain  · Stated that her previous BM was on 7/18/2021  · Was given a magnesium citrate drink - without relief  · Then was given a soap suds enema  · Was previously started on LInzess, without relief  · Will continue with linzess  · She also eats 3-4 pieces of black licorice, and metamucil (once a day)  · Did discuss using ivory soap as a suppository      Subjective:      Patient ID: Theresa Tadeo is a 80 y o  female  The patient was at the ER on 7/23/2021 with left lower quadrant abdominal pain, vomiting, lack of bowel movement  Patient stated that her last BM was on 7/18/2021  She reports a history of constipation  She reports that she was started on Linzess and which she has been taking without relief in her symptoms  She denies bloody stools or vomiting      On 7/23/2021: CT A/P: STOMACH AND BOWEL:  There is colonic diverticulosis without evidence of acute diverticulitis  Stable hiatal hernia  In the ER she Was given a magnesium citrate drink - without relief  Then was given a soap suds enema  This was effective and since then she has started to eat 3-4 pieces of black licorice, and metamucil (once a day)  Did discuss using ivory soap as a suppository  The following portions of the patient's history were reviewed and updated as appropriate:   Past Medical History:  She has a past medical history of Allergic, COPD (chronic obstructive [facility] disease) ([facility] ), Diabetes mellitus ([facility] ), GERD (gastroesophageal reflux disease), Hyperlipidemia, Hypertension, Left non-suppurative otitis media (11/27/2019), Pneumonia, Pneumonia of right lower lobe due to infectious organism (12/4/2018), and Vitamin D deficiency  ,  _______________________________________________________________________  Medical Problems:  does not have any pertinent problems on file ,  _______________________________________________________________________  Past Surgical History:   has a past surgical history that includes Cholecystectomy (1984); Eye surgery (Bilateral); Colonoscopy (N/A, 1/30/2019); and Cardiac catheterization  ,  _______________________________________________________________________  Family History:  family history includes Heart disease in her mother; Lung cancer in her brother, father, and sister  ,  _______________________________________________________________________  Social History:   reports that she quit smoking about 7 years ago  Her smoking use included cigarettes  She started smoking about 57 years ago  She has a 50 00 pack-year smoking history  She has never used smokeless tobacco  She reports that she does not drink alcohol and does not use drugs  ,  _______________________________________________________________________  Allergies:  is allergic to bee venom and other     _______________________________________________________________________  Current Outpatient Medications   Medication Sig Dispense Refill    albuterol (Ventolin HFA) 90 mcg/act inhaler Inhale 2 puffs every 6 (six) hours as needed for wheezing 18 g 3    aspirin 81 mg chewable tablet Chew 81 mg daily      atorvastatin (LIPITOR) 20 mg tablet Take 1 tablet (20 mg total) by mouth daily 90 tablet 3    cetirizine (ZyrTEC) 10 mg tablet Take 10 mg by mouth daily      Ergocalciferol (VITAMIN D2) 2000 units TABS Take 2,000 mg by mouth      gabapentin (NEURONTIN) 300 mg capsule Take 1 capsule (300 mg total) by mouth daily 90 capsule 1    glucose blood (OneTouch Verio) test strip Use as instructed TEST TWO TIMES DAILY I34 15 765 each 5    LICORICE, GLYCYRRHIZA GLABRA, PO Take 3-4 Pieces by mouth daily      linaCLOtide (Linzess) 145 MCG CAPS Take 1 capsule (145 mcg total) by mouth daily 30 capsule 2    lisinopril (ZESTRIL) 5 mg tablet Take 1 tablet (5 mg total) by mouth daily 90 tablet 1    metFORMIN (GLUCOPHAGE) 500 mg tablet Take 1 tablet (500 mg total) by mouth 2 (two) times a day with meals 180 tablet 1    Multiple Vitamin (MULTIVITAMINS PO) Take by mouth daily      omeprazole (PriLOSEC) 20 mg delayed release capsule Take 1 capsule (20 mg total) by mouth 2 (two) times a day 180 capsule 3    oxybutynin (DITROPAN-XL) 5 mg 24 hr tablet Take 1 tablet (5 mg total) by mouth daily 90 tablet 1    psyllium (METAMUCIL) 58 6 % packet Take 1 packet by mouth daily      fluticasone-umeclidinium-vilanterol (TRELEGY) 100-62 5-25 MCG/INH inhaler Inhale 1 puff daily Rinse mouth after use  (Patient not taking: Reported on 7/23/2021) 1 Inhaler 5     No current facility-administered medications for this visit      _______________________________________________________________________  Review of Systems   Constitutional: Negative for activity change, chills, fatigue and fever  HENT: Negative for rhinorrhea and sore throat  Eyes: Negative for pain  Respiratory: Negative for cough and shortness of breath  Cardiovascular: Negative for chest pain, palpitations and leg swelling  Gastrointestinal: Negative for abdominal pain, constipation, diarrhea, nausea and vomiting  Genitourinary: Negative for difficulty urinating, flank pain, frequency and urgency  Musculoskeletal: Negative for gait problem, joint swelling and myalgias  Skin: Negative for color change  Neurological: Negative for dizziness, weakness, light-headedness and headaches  Psychiatric/Behavioral: Negative for sleep disturbance  The patient is not nervous/anxious  All other systems reviewed and are negative  Objective:  Vitals:    08/04/21 0904   BP: 125/78   BP Location: Left arm   Patient Position: Sitting   Cuff Size: Standard   Pulse: 76   Resp: 14   Temp: 98 9 °F (37 2 °C)   SpO2: 96%   Weight: 74 4 kg (164 lb)   Height: 5' 2" (1 575 m)     Body mass index is 30 kg/m²  Physical Exam  Vitals reviewed  Constitutional:       General: She is awake  Appearance: Normal appearance  She is well-developed and normal weight  HENT:      Head: Normocephalic and atraumatic  Nose: Nose normal       Mouth/Throat:      Mouth: Mucous membranes are moist    Eyes:      Extraocular Movements: Extraocular movements intact  Cardiovascular:      Rate and Rhythm: Normal rate and regular rhythm  Pulses: Normal pulses  Heart sounds: Normal heart sounds  Pulmonary:      Effort: Pulmonary effort is normal       Breath sounds: Normal breath sounds  Abdominal:      General: Bowel sounds are normal       Palpations: Abdomen is soft  Comments: Patient is able to a daily bowel movement  Musculoskeletal:         General: Normal range of motion  Cervical back: Normal range of motion  Right lower leg: No edema  Left lower leg: No edema  Skin:     General: Skin is warm and dry  Neurological:      Mental Status: She is alert and oriented to person, place, and time     Psychiatric:         Attention and Perception: Attention normal          Mood and Affect: Mood normal          Speech: Speech normal          Behavior: Behavior normal  Behavior is cooperative             PHQ-9 Depression Screening    PHQ-9:   Frequency of the following problems over the past two weeks:

## 2021-08-09 ENCOUNTER — OFFICE VISIT (OUTPATIENT)
Dept: PULMONOLOGY | Facility: CLINIC | Age: 81
End: 2021-08-09
Payer: COMMERCIAL

## 2021-08-09 VITALS
TEMPERATURE: 97.9 F | SYSTOLIC BLOOD PRESSURE: 131 MMHG | WEIGHT: 165.4 LBS | HEIGHT: 62 IN | BODY MASS INDEX: 30.44 KG/M2 | DIASTOLIC BLOOD PRESSURE: 77 MMHG | HEART RATE: 91 BPM | OXYGEN SATURATION: 95 %

## 2021-08-09 DIAGNOSIS — J44.9 CHRONIC OBSTRUCTIVE PULMONARY DISEASE, UNSPECIFIED COPD TYPE (HCC): Primary | ICD-10-CM

## 2021-08-09 PROCEDURE — 1160F RVW MEDS BY RX/DR IN RCRD: CPT | Performed by: INTERNAL MEDICINE

## 2021-08-09 PROCEDURE — 1036F TOBACCO NON-USER: CPT | Performed by: INTERNAL MEDICINE

## 2021-08-09 PROCEDURE — 99214 OFFICE O/P EST MOD 30 MIN: CPT | Performed by: INTERNAL MEDICINE

## 2021-08-09 RX ORDER — TIOTROPIUM BROMIDE INHALATION SPRAY 3.12 UG/1
2 SPRAY, METERED RESPIRATORY (INHALATION) DAILY
Qty: 4 G | Refills: 3 | Status: SHIPPED | OUTPATIENT
Start: 2021-08-09 | End: 2021-08-09

## 2021-08-09 NOTE — PROGRESS NOTES
Pulmonary Follow Up Note   Arpita Pena 80 y o  female MRN: [id number]  8/9/2021      Assessment/Plan:    Chronic obstructive pulmonary disease, unspecified COPD type ([address] )  · Overall stable without any recent exacerbations  · She has not been able to use her Trelegy because it is too expensive for her  · Provided samples during clinic today and also gave her coupons for her to be able to get this at a discounted price  · Continue with her activity and conditioning     Health Maintenance  Immunization History   Administered Date(s) Administered    INFLUENZA 12/05/2005, 10/15/2006, 10/15/2007, 10/05/2009, 12/06/2010, 11/21/2011, 12/03/2012, 10/07/2013, 09/10/2014, 09/17/2015, 10/12/2016    Influenza, high dose seasonal 0 7 mL 10/18/2018, 10/23/2019    Influenza, seasonal, injectable 11/30/2017    Pneumococcal Conjugate 13-Valent 09/11/2018    Pneumococcal Polysaccharide PPV23 11/21/2002, 10/15/2007    SARS-CoV-2 / COVID-19 mRNA IM ([facility]) 04/29/2021, 05/27/2021    Tdap 09/17/2015       Return in about 6 months (around 2/9/2022)  History of Present Illness   HPI:  Teresa Brower is a 80 y o  female  who has a history of COPD who presents today for follow up  I last saw Giacomo Navarrete about 6 months ago  She has been maintained on Trelegy inhaler for maintenance  Since her last visit with me, she has overall done well  She has not had any exacerbations,  Her Trelegy was too expensive so she did not pick this up and has not been on any maintenance therapy  She says she has only had to use her albuterol intermittently  She has not had any recent complaints of wheezing, shortness of breath, exacerbations  Review of Systems   Constitutional: Negative for activity change, chills, fever and unexpected weight change  HENT: Negative for congestion, rhinorrhea and voice change  Respiratory: Negative for cough, chest tightness, shortness of breath and wheezing      Cardiovascular: Negative for chest pain and palpitations  Gastrointestinal: Negative for abdominal distention, constipation, diarrhea, nausea and vomiting  Endocrine: Negative for cold intolerance and heat intolerance  Genitourinary: Negative for dysuria, frequency and urgency  Musculoskeletal: Negative for arthralgias, joint swelling and myalgias  Skin: Negative for color change, pallor and rash  Neurological: Negative for dizziness, weakness and numbness  Psychiatric/Behavioral: Negative for agitation and confusion  The patient is not nervous/anxious  Historical Information   Past Medical History:   Diagnosis Date    Allergic     [facility] )     Diabetes mellitus ([facility] )     GERD (gastroesophageal reflux disease)     Hyperlipidemia     Hypertension     Left non-suppurative otitis media 11/27/2019    Pneumonia     Pneumonia of right lower lobe due to infectious organism 12/4/2018    Vitamin D deficiency      Past Surgical History:   Procedure Laterality Date    CARDIAC CATHETERIZATION      CHOLECYSTECTOMY  1984    COLONOSCOPY N/A 1/30/2019    Procedure: COLONOSCOPY with multiple  polypectomies;  Surgeon: Jes Felix MD;  Location: [facility];   Service: Gastroenterology    EYE SURGERY Bilateral     CATARACT     Family History   Problem Relation Age of Onset    Lung cancer Father     Heart disease Mother     Lung cancer Sister     Lung cancer Brother          Meds/Allergies     Current Outpatient Medications:     albuterol (Ventolin HFA) 90 mcg/act inhaler, Inhale 2 puffs every 6 (six) hours as needed for wheezing, Disp: 18 g, Rfl: 3    aspirin 81 mg chewable tablet, Chew 81 mg daily, Disp: , Rfl:     atorvastatin (LIPITOR) 20 mg tablet, Take 1 tablet (20 mg total) by mouth daily, Disp: 90 tablet, Rfl: 3    cetirizine (ZyrTEC) 10 mg tablet, Take 10 mg by mouth daily, Disp: , Rfl:     Ergocalciferol (VITAMIN D2) 2000 units TABS, Take 2,000 mg by mouth, Disp: , Rfl:    heart sounds  Pulmonary:      Effort: Pulmonary effort is normal  No respiratory distress  Breath sounds: Normal breath sounds  No wheezing, rhonchi or rales  Abdominal:      General: Abdomen is flat  Bowel sounds are normal  There is no distension  Palpations: Abdomen is soft  Tenderness: There is no abdominal tenderness  There is no guarding  Musculoskeletal:         General: No swelling or tenderness  Normal range of motion  Cervical back: Normal range of motion and neck supple  Right lower leg: No edema  Left lower leg: No edema  Skin:     General: Skin is warm and dry  Findings: No rash  Neurological:      General: No focal deficit present  Mental Status: She is alert and oriented to person, place, and time  Mental status is at baseline  Psychiatric:         Mood and Affect: Mood normal          Behavior: Behavior normal        Labs: I have personally reviewed pertinent lab results  Lab Results   Component Value Date    WBC 9 66 07/23/2021    HGB 14 1 07/23/2021    HCT 43 7 07/23/2021    MCV 88 07/23/2021     07/23/2021     Lab Results   Component Value Date    CALCIUM 9 8 07/23/2021    K 4 2 07/23/2021    CO2 30 07/23/2021     07/23/2021    BUN 13 07/23/2021    CREATININE 0 92 07/23/2021     No results found for: IGE  Lab Results   Component Value Date    ALT 32 07/23/2021    AST 18 07/23/2021    ALKPHOS 69 07/23/2021         Imaging and other studies: I have personally reviewed pertinent reports  and I have personally reviewed pertinent films in PACS    CT abdomen 7/23: No acute inflammatory process identified  Stable hiatal hernia        Pulmonary function testing:   FEV1/FVC ratio 47%    FEV1 56% predicted  FVC 81% predicted  No response to bronchodilators   % predicted   % predicted  DLCO corrected for hemoglobin 58 % predicted      EKG, Pathology, and Other Studies: I have personally reviewed pertinent reports     and I have personally reviewed pertinent films in PACS      [facility]

## 2021-08-19 DIAGNOSIS — K21.9 GASTROESOPHAGEAL REFLUX DISEASE WITHOUT ESOPHAGITIS: Primary | ICD-10-CM

## 2021-08-19 RX ORDER — OMEPRAZOLE 40 MG/1
40 CAPSULE, DELAYED RELEASE ORAL
Qty: 30 CAPSULE | Refills: 5 | Status: SHIPPED | OUTPATIENT
Start: 2021-08-19 | End: 2022-02-28 | Stop reason: SDUPTHER

## 2021-08-23 DIAGNOSIS — N32.81 OAB (OVERACTIVE BLADDER): ICD-10-CM

## 2021-08-23 DIAGNOSIS — I10 HYPERTENSION, UNSPECIFIED TYPE: ICD-10-CM

## 2021-08-23 DIAGNOSIS — M79.2 NEUROPATHIC PAIN: ICD-10-CM

## 2021-08-23 RX ORDER — GABAPENTIN 300 MG/1
300 CAPSULE ORAL DAILY
Qty: 90 CAPSULE | Refills: 1 | Status: SHIPPED | OUTPATIENT
Start: 2021-08-23 | End: 2022-02-17 | Stop reason: SDUPTHER

## 2021-08-23 RX ORDER — LISINOPRIL 5 MG/1
5 TABLET ORAL DAILY
Qty: 90 TABLET | Refills: 1 | Status: SHIPPED | OUTPATIENT
Start: 2021-08-23 | End: 2022-02-17 | Stop reason: SDUPTHER

## 2021-08-23 RX ORDER — OXYBUTYNIN CHLORIDE 5 MG/1
5 TABLET, EXTENDED RELEASE ORAL DAILY
Qty: 90 TABLET | Refills: 1 | Status: SHIPPED | OUTPATIENT
Start: 2021-08-23 | End: 2022-02-17 | Stop reason: SDUPTHER

## 2021-10-25 ENCOUNTER — OFFICE VISIT (OUTPATIENT)
Dept: INTERNAL MEDICINE CLINIC | Facility: CLINIC | Age: 81
End: 2021-10-25
Payer: COMMERCIAL

## 2021-10-25 VITALS
HEIGHT: 62 IN | TEMPERATURE: 99.7 F | SYSTOLIC BLOOD PRESSURE: 112 MMHG | DIASTOLIC BLOOD PRESSURE: 82 MMHG | HEART RATE: 84 BPM | WEIGHT: 166 LBS | BODY MASS INDEX: 30.55 KG/M2 | OXYGEN SATURATION: 97 %

## 2021-10-25 DIAGNOSIS — R68.89 ABNORMAL FINDINGS ON AUSCULTATION: ICD-10-CM

## 2021-10-25 DIAGNOSIS — Z23 NEED FOR INFLUENZA VACCINATION: ICD-10-CM

## 2021-10-25 DIAGNOSIS — Z00.00 MEDICARE ANNUAL WELLNESS VISIT, SUBSEQUENT: ICD-10-CM

## 2021-10-25 DIAGNOSIS — E11.65 TYPE 2 DIABETES MELLITUS WITH HYPERGLYCEMIA, WITHOUT LONG-TERM CURRENT USE OF INSULIN (HCC): Primary | ICD-10-CM

## 2021-10-25 DIAGNOSIS — I49.3 PVC (PREMATURE VENTRICULAR CONTRACTION): ICD-10-CM

## 2021-10-25 LAB — SL AMB POCT HEMOGLOBIN AIC: 6.8 (ref ?–6.5)

## 2021-10-25 PROCEDURE — G0439 PPPS, SUBSEQ VISIT: HCPCS | Performed by: PHYSICIAN ASSISTANT

## 2021-10-25 PROCEDURE — 83036 HEMOGLOBIN GLYCOSYLATED A1C: CPT | Performed by: PHYSICIAN ASSISTANT

## 2021-10-25 PROCEDURE — 99214 OFFICE O/P EST MOD 30 MIN: CPT | Performed by: PHYSICIAN ASSISTANT

## 2021-10-25 PROCEDURE — 93000 ELECTROCARDIOGRAM COMPLETE: CPT | Performed by: PHYSICIAN ASSISTANT

## 2021-10-26 PROBLEM — R68.89: Status: ACTIVE | Noted: 2021-10-26

## 2021-10-26 PROCEDURE — G0008 ADMIN INFLUENZA VIRUS VAC: HCPCS | Performed by: PHYSICIAN ASSISTANT

## 2021-10-26 PROCEDURE — 90662 IIV NO PRSV INCREASED AG IM: CPT | Performed by: PHYSICIAN ASSISTANT

## 2021-11-09 NOTE — RESPIRATORY THERAPY NOTE
2       Home Oxygen Qualifying Test       Patient name: Blake Lerma        : 1940   Date of Test:  2018  Diagnosis:      Home Oxygen Test:    **Medicare Guidelines require item(s) 1-5 on all ambulatory patients or 1 and 2 on non-ambulatory patients  1   Baseline SPO2 on Room Air at rest 86 %  2   SPO2 during exercise on Room Air 84 %  During exercise monitor SpO2  If SPO2 increases >=89% with ambulation do not add supplemental             oxygen  If <= 88% on room air add O2 via NC and titrate patient  Patient must be ambulated with O2 and titrated to > 88% with exertion  3   SPO2 on Oxygen at rest  92 lpm     4   SPO2 during exercise on Oxygen  92% a liter flow of 2 lpm     5   Exercise performed: Amb with 1 l/m SpO2 88%          [x]  Supplemental Home Oxygen is indicated  @ 2l/m NC    []  Client does not qualify for home oxygen      Renee Nguyen, RT Patient received semi-yin in bed in NAD on RA, +SCDs, +Heplock/PICC, ex fix. Cleared by RN. Agreeable to PT.

## 2021-11-22 DIAGNOSIS — E11.9 TYPE 2 DIABETES MELLITUS WITHOUT COMPLICATION, WITHOUT LONG-TERM CURRENT USE OF INSULIN (HCC): ICD-10-CM

## 2021-12-13 DIAGNOSIS — J44.9 CHRONIC OBSTRUCTIVE PULMONARY DISEASE, UNSPECIFIED COPD TYPE (HCC): ICD-10-CM

## 2021-12-13 RX ORDER — ALBUTEROL SULFATE 90 UG/1
2 AEROSOL, METERED RESPIRATORY (INHALATION) EVERY 6 HOURS PRN
Qty: 18 G | Refills: 3 | Status: SHIPPED | OUTPATIENT
Start: 2021-12-13 | End: 2022-06-28 | Stop reason: SDUPTHER

## 2022-02-17 ENCOUNTER — TELEPHONE (OUTPATIENT)
Dept: INTERNAL MEDICINE CLINIC | Facility: CLINIC | Age: 82
End: 2022-02-17

## 2022-02-17 DIAGNOSIS — I10 HYPERTENSION, UNSPECIFIED TYPE: ICD-10-CM

## 2022-02-17 DIAGNOSIS — N32.81 OAB (OVERACTIVE BLADDER): ICD-10-CM

## 2022-02-17 DIAGNOSIS — M79.2 NEUROPATHIC PAIN: ICD-10-CM

## 2022-02-17 RX ORDER — GABAPENTIN 300 MG/1
300 CAPSULE ORAL DAILY
Qty: 90 CAPSULE | Refills: 1 | Status: SHIPPED | OUTPATIENT
Start: 2022-02-17 | End: 2022-06-27

## 2022-02-17 RX ORDER — LISINOPRIL 5 MG/1
5 TABLET ORAL DAILY
Qty: 90 TABLET | Refills: 1 | Status: SHIPPED | OUTPATIENT
Start: 2022-02-17 | End: 2022-05-16

## 2022-02-17 RX ORDER — OXYBUTYNIN CHLORIDE 5 MG/1
5 TABLET, EXTENDED RELEASE ORAL DAILY
Qty: 90 TABLET | Refills: 1 | Status: SHIPPED | OUTPATIENT
Start: 2022-02-17 | End: 2022-06-27

## 2022-02-17 NOTE — TELEPHONE ENCOUNTER
Pt needs refill on gabapentin (NEURONTIN) 300 mg capsule 90 day supply, oxybutynin (DITROPAN-XL) 5 mg 24 hr tablet 90 day supply and   lisinopril (ZESTRIL) 5 mg tablet 90 day supply please send to [facility] mail order    Pt is asking if we can send a partial refill to [facility] in [place] for all the above medications to hold her over until she gets them in the mail

## 2022-02-28 ENCOUNTER — OFFICE VISIT (OUTPATIENT)
Dept: INTERNAL MEDICINE CLINIC | Facility: CLINIC | Age: 82
End: 2022-02-28
Payer: COMMERCIAL

## 2022-02-28 VITALS
DIASTOLIC BLOOD PRESSURE: 64 MMHG | HEIGHT: 62 IN | TEMPERATURE: 99.6 F | WEIGHT: 165.38 LBS | OXYGEN SATURATION: 95 % | SYSTOLIC BLOOD PRESSURE: 126 MMHG | HEART RATE: 93 BPM | BODY MASS INDEX: 30.44 KG/M2

## 2022-02-28 DIAGNOSIS — E28.39 PRIMARY OVARIAN FAILURE: ICD-10-CM

## 2022-02-28 DIAGNOSIS — K21.9 GASTROESOPHAGEAL REFLUX DISEASE WITHOUT ESOPHAGITIS: ICD-10-CM

## 2022-02-28 DIAGNOSIS — E11.9 TYPE 2 DIABETES MELLITUS WITHOUT COMPLICATION, WITHOUT LONG-TERM CURRENT USE OF INSULIN (HCC): ICD-10-CM

## 2022-02-28 DIAGNOSIS — E11.65 TYPE 2 DIABETES MELLITUS WITH HYPERGLYCEMIA, WITHOUT LONG-TERM CURRENT USE OF INSULIN (HCC): Primary | ICD-10-CM

## 2022-02-28 DIAGNOSIS — I10 ESSENTIAL HYPERTENSION: ICD-10-CM

## 2022-02-28 PROBLEM — R79.89 ELEVATED TROPONIN I LEVEL: Status: RESOLVED | Noted: 2018-12-03 | Resolved: 2022-02-28

## 2022-02-28 PROBLEM — R94.31 ABNORMAL EKG: Status: RESOLVED | Noted: 2021-07-04 | Resolved: 2022-02-28

## 2022-02-28 PROBLEM — R10.32 LEFT LOWER QUADRANT ABDOMINAL PAIN: Status: RESOLVED | Noted: 2021-07-04 | Resolved: 2022-02-28

## 2022-02-28 PROBLEM — A41.9 SEPSIS DUE TO UNDETERMINED ORGANISM (HCC): Status: RESOLVED | Noted: 2021-07-04 | Resolved: 2022-02-28

## 2022-02-28 PROBLEM — R77.8 ELEVATED TROPONIN I LEVEL: Status: RESOLVED | Noted: 2018-12-03 | Resolved: 2022-02-28

## 2022-02-28 LAB — SL AMB POCT HEMOGLOBIN AIC: 7.1 (ref ?–6.5)

## 2022-02-28 PROCEDURE — 99214 OFFICE O/P EST MOD 30 MIN: CPT | Performed by: PHYSICIAN ASSISTANT

## 2022-02-28 PROCEDURE — 83036 HEMOGLOBIN GLYCOSYLATED A1C: CPT | Performed by: PHYSICIAN ASSISTANT

## 2022-02-28 RX ORDER — OMEPRAZOLE 40 MG/1
40 CAPSULE, DELAYED RELEASE ORAL
Qty: 90 CAPSULE | Refills: 1 | Status: SHIPPED | OUTPATIENT
Start: 2022-02-28 | End: 2022-07-10

## 2022-02-28 NOTE — ASSESSMENT & PLAN NOTE
Lab Results   Component Value Date    HGBA1C 6 8 (A) 10/25/2021     A1C 2/28/22: 7 1  Foot exam performed 4/26/21  On ACE and statin  Eye exam up to date 9/11/20 and due every 2 years

## 2022-02-28 NOTE — PROGRESS NOTES
Assessment/Plan:  Problem List Items Addressed This Visit        Digestive    Acid reflux disease    Relevant Medications    omeprazole (PriLOSEC) 40 MG capsule       Endocrine    Type 2 diabetes mellitus with hyperglycemia, without long-term current use of insulin ([facility])       Lab Results   Component Value Date    HGBA1C 6 8 (A) 10/25/2021     A1C 2/28/22: 7 1  Foot exam performed 4/26/21  On ACE and statin  Eye exam up to date 9/11/20 and due every 2 years         Relevant Medications    metFORMIN (GLUCOPHAGE) 500 mg tablet    Other Relevant Orders    POCT hemoglobin A1c       Cardiovascular and Mediastinum    Essential hypertension     Pts symptoms are stable with current regime  No changes at present  Other Visit Diagnoses     Primary ovarian failure    -  Primary    Relevant Orders    DXA bone density spine hip and pelvis    Type 2 diabetes mellitus without complication, without long-term current use of insulin ([facility])        Relevant Medications    metFORMIN (GLUCOPHAGE) 500 mg tablet           Diagnoses and all orders for this visit:    Primary ovarian failure  -     DXA bone density spine hip and pelvis; Future    Type 2 diabetes mellitus with hyperglycemia, without long-term current use of insulin ([facility])  -     POCT hemoglobin A1c    Gastroesophageal reflux disease without esophagitis  -     omeprazole (PriLOSEC) 40 MG capsule; Take 1 capsule (40 mg total) by mouth daily before breakfast    Type 2 diabetes mellitus without complication, without long-term current use of insulin ([facility])  -     metFORMIN (GLUCOPHAGE) 500 mg tablet;  Take 1 tablet (500 mg total) by mouth 2 (two) times a day with meals    Essential hypertension        Type 2 diabetes mellitus with hyperglycemia, without long-term current use of insulin ([facility])    Lab Results   Component Value Date    HGBA1C 6 8 (A) 10/25/2021     A1C 2/28/22: 7 1  Foot exam performed 4/26/21  On ACE and statin  Eye exam up to date 9/11/20 and due every 2 years    Essential hypertension  Pts symptoms are stable with current regime  No changes at present  Subjective:      Patient ID: Lizette Martinez is a 80 y o  female  Pt presents for routine visit  She is doing well overall  She is up to date with labs  A1C  Today is stable at 7  1  She is due for dexa scan  Covid vaccine booster encouraged  BP is normal        The following portions of the patient's history were reviewed and updated as appropriate:   She has a past medical history of Allergic, COPD (chronic obstructive pulmonary disease) ([facility] ), Diabetes mellitus ([facility] [address] ), GERD (gastroesophageal reflux disease), Hyperlipidemia, Hypertension, Left non-suppurative otitis media (11/27/2019), Pneumonia, Pneumonia of right lower lobe due to infectious organism (12/4/2018), and Vitamin D deficiency  ,  does not have any pertinent problems on file  ,   has a past surgical history that includes Cholecystectomy (1984); Eye surgery (Bilateral); Colonoscopy (N/A, 1/30/2019); and Cardiac catheterization  ,  family history includes Heart disease in her mother; Lung cancer in her brother, father, and sister  ,   reports that she quit smoking about 8 years ago  Her smoking use included cigarettes  She started smoking about 58 years ago  She has a 50 00 pack-year smoking history  She has never used smokeless tobacco  She reports that she does not drink alcohol and does not use drugs  ,  is allergic to bee venom and other     Current Outpatient Medications   Medication Sig Dispense Refill    albuterol (Ventolin HFA) 90 mcg/act inhaler Inhale 2 puffs every 6 (six) hours as needed for wheezing 18 g 3    aspirin 81 mg chewable tablet Chew 81 mg daily      atorvastatin (LIPITOR) 20 mg tablet Take 1 tablet (20 mg total) by mouth daily 90 tablet 3    cetirizine (ZyrTEC) 10 mg tablet Take 10 mg by mouth daily      Ergocalciferol (VITAMIN D2) 2000 units TABS Take 2,000 mg by mouth      fluticasone-umeclidinium-vilanterol (TRELEGY) 100-62 5-25 MCG/INH inhaler Inhale 1 puff daily Rinse mouth after use   gabapentin (NEURONTIN) 300 mg capsule Take 1 capsule (300 mg total) by mouth daily 90 capsule 1    glucose blood (OneTouch Verio) test strip Use as instructed TEST TWO TIMES DAILY I47 [id number] each 5    LICORICE, GLYCYRRHIZA GLABRA, PO Take 3-4 Pieces by mouth daily      lisinopril (ZESTRIL) 5 mg tablet Take 1 tablet (5 mg total) by mouth daily 90 tablet 1    metFORMIN (GLUCOPHAGE) 500 mg tablet Take 1 tablet (500 mg total) by mouth 2 (two) times a day with meals 180 tablet 1    Multiple Vitamin (MULTIVITAMINS PO) Take by mouth daily      omeprazole (PriLOSEC) 40 MG capsule Take 1 capsule (40 mg total) by mouth daily before breakfast 90 capsule 1    oxybutynin (DITROPAN-XL) 5 mg 24 hr tablet Take 1 tablet (5 mg total) by mouth daily 90 tablet 1    psyllium (METAMUCIL) 58 6 % packet Take 1 packet by mouth daily (Patient not taking: Reported on 10/25/2021)       No current facility-administered medications for this visit  Review of Systems   Constitutional: Negative for chills and fever  HENT: Negative for congestion, ear pain, hearing loss, postnasal drip, rhinorrhea, sinus pressure, sinus pain, sore throat and trouble swallowing  Eyes: Negative for pain and visual disturbance  Respiratory: Negative for cough, chest tightness, shortness of breath and wheezing  Cardiovascular: Negative  Negative for chest pain, palpitations and leg swelling  Gastrointestinal: Negative for abdominal pain, blood in stool, constipation, diarrhea, nausea and vomiting  Endocrine: Negative for cold intolerance, heat intolerance, polydipsia, polyphagia and polyuria  Genitourinary: Negative for difficulty urinating, dysuria, flank pain and urgency  Musculoskeletal: Negative for arthralgias, back pain, gait problem and myalgias  Skin: Negative for rash  Allergic/Immunologic: Negative      Neurological: Negative for dizziness, weakness, light-headedness and headaches  Hematological: Negative  Psychiatric/Behavioral: Negative for behavioral problems, dysphoric mood and sleep disturbance  The patient is not nervous/anxious  PHQ-2/9 Depression Screening            Objective:  Vitals:    02/28/22 1038   BP: 126/64   BP Location: Left arm   Patient Position: Sitting   Pulse: 93   Temp: 99 6 °F (37 6 °C)   SpO2: 95%   Weight: 75 kg (165 lb 6 oz)   Height: 5' 2" (1 575 m)     Body mass index is 30 25 kg/m²  Physical Exam  Nursing note reviewed  Constitutional:       General: She is not in acute distress  Appearance: She is well-developed  She is not diaphoretic  HENT:      Head: Normocephalic and atraumatic  Right Ear: External ear normal       Left Ear: External ear normal       Nose: Nose normal       Mouth/Throat:      Pharynx: No oropharyngeal exudate  Eyes:      General: No scleral icterus  Right eye: No discharge  Left eye: No discharge  Conjunctiva/sclera: Conjunctivae normal       Pupils: Pupils are equal, round, and reactive to light  Neck:      Thyroid: No thyromegaly  Cardiovascular:      Rate and Rhythm: Normal rate and regular rhythm  Heart sounds: Normal heart sounds  No murmur heard  No friction rub  No gallop  Pulmonary:      Effort: Pulmonary effort is normal  No respiratory distress  Breath sounds: Normal breath sounds  No wheezing or rales  Abdominal:      General: Bowel sounds are normal  There is no distension  Palpations: Abdomen is soft  Tenderness: There is no abdominal tenderness  Musculoskeletal:         General: No tenderness or deformity  Normal range of motion  Cervical back: Normal range of motion and neck supple  Skin:     General: Skin is warm and dry  Neurological:      Mental Status: She is alert and oriented to person, place, and time  Cranial Nerves: No cranial nerve deficit     Psychiatric:         Behavior: Behavior normal          Thought Content: Thought content normal          Judgment: Judgment normal        BMI Counseling: Body mass index is 30 25 kg/m²  The BMI is above normal  Nutrition recommendations include decreasing portion sizes, consuming healthier snacks and limiting drinks that contain sugar  Rationale for BMI follow-up plan is due to patient being overweight or obese  Falls Plan of Care: balance, strength, and gait training instructions were provided

## 2022-04-26 ENCOUNTER — OFFICE VISIT (OUTPATIENT)
Dept: PULMONOLOGY | Facility: CLINIC | Age: 82
End: 2022-04-26
Payer: COMMERCIAL

## 2022-04-26 VITALS
OXYGEN SATURATION: 93 % | DIASTOLIC BLOOD PRESSURE: 66 MMHG | SYSTOLIC BLOOD PRESSURE: 141 MMHG | WEIGHT: 169 LBS | BODY MASS INDEX: 31.1 KG/M2 | HEART RATE: 90 BPM | HEIGHT: 62 IN | TEMPERATURE: 98 F

## 2022-04-26 DIAGNOSIS — J44.9 CHRONIC OBSTRUCTIVE PULMONARY DISEASE, UNSPECIFIED COPD TYPE (HCC): Primary | ICD-10-CM

## 2022-04-26 PROCEDURE — 1036F TOBACCO NON-USER: CPT | Performed by: INTERNAL MEDICINE

## 2022-04-26 PROCEDURE — 99214 OFFICE O/P EST MOD 30 MIN: CPT | Performed by: INTERNAL MEDICINE

## 2022-04-26 PROCEDURE — 1160F RVW MEDS BY RX/DR IN RCRD: CPT | Performed by: INTERNAL MEDICINE

## 2022-04-26 NOTE — PROGRESS NOTES
Pulmonary Follow Up Note   Hao Pena 80 y o  female MRN: [id number]  4/26/2022      Assessment/Plan:      Chronic obstructive pulmonary disease, unspecified COPD type ([facility] )  · Overall stable without any recent exacerbations  · She has not been able to use her Trelegy because it is too expensive for her  · Provided samples during clinic today and also gave her coupons for her to be able to get this at a discounted price  · Continue Trelegy and rescue albuterol as her regimen  · No exacerbations in the past 6 months  · Continue with her activity and trying to improve conditioning     Health Maintenance  Immunization History   Administered Date(s) Administered    COVID-19 MODERNA VACC 0 5 ML IM 04/29/2021, 05/27/2021    INFLUENZA 12/05/2005, 10/15/2006, 10/15/2007, 10/05/2009, 12/06/2010, 11/21/2011, 12/03/2012, 10/07/2013, 09/10/2014, 09/17/2015, 10/12/2016    Influenza, high dose seasonal 0 7 mL 10/18/2018, 10/23/2019, 10/26/2021    Influenza, seasonal, injectable 11/30/2017    Pneumococcal Conjugate 13-Valent 09/11/2018    Pneumococcal Polysaccharide PPV23 11/21/2002, 10/15/2007    Tdap 09/17/2015, 06/24/2020     Return in about 6 months (around 10/26/2022)  History of Present Illness   HPI:  Clay Peck is a 80 y o  female who has a history of COPD who presents today for follow up  I last saw Lisset Mario about 6 months ago  She has been maintained on Trelegy inhaler for maintenance  Since her last visit with me, she has overall done well  She has not had any exacerbations,  Her Trelegy was too expensive so she did not pick this up and has not been on any maintenance therapy  She says she has only had to use her albuterol intermittently  She has not had any recent complaints of wheezing, shortness of breath, exacerbations  Review of Systems   Constitutional: Negative for activity change, chills, fever and unexpected weight change  HENT: Negative for congestion, rhinorrhea and voice change  Respiratory: Positive for shortness of breath  Negative for cough, chest tightness and wheezing  Cardiovascular: Negative for chest pain and palpitations  Gastrointestinal: Negative for abdominal distention, constipation, diarrhea, nausea and vomiting  Endocrine: Negative for cold intolerance and heat intolerance  Genitourinary: Negative for dysuria, frequency and urgency  Musculoskeletal: Negative for arthralgias, joint swelling and myalgias  Skin: Negative for color change, pallor and rash  Neurological: Negative for dizziness, weakness and numbness  Psychiatric/Behavioral: Negative for agitation and confusion  The patient is not nervous/anxious  Historical Information   Past Medical History:   Diagnosis Date    Allergic     COPD (chronic obstructive pulmonary disease) ([facility] )     Diabetes mellitus ([facility] )     GERD (gastroesophageal reflux disease)     Hyperlipidemia     Hypertension     Left non-suppurative otitis media 11/27/2019    Pneumonia     Pneumonia of right lower lobe due to infectious organism 12/4/2018    Vitamin D deficiency      Past Surgical History:   Procedure Laterality Date    CARDIAC CATHETERIZATION      CHOLECYSTECTOMY  1984    COLONOSCOPY N/A 1/30/2019    Procedure: COLONOSCOPY with multiple  polypectomies;  Surgeon: Sara Viera MD;  Location: [address] [place] [address] GI LAB;   Service: Gastroenterology    EYE SURGERY Bilateral     CATARACT     Family History   Problem Relation Age of Onset    Lung cancer Father     Heart disease Mother     Lung cancer Sister     Lung cancer Brother          Meds/Allergies     Current Outpatient Medications:     albuterol (Ventolin HFA) 90 mcg/act inhaler, Inhale 2 puffs every 6 (six) hours as needed for wheezing, Disp: 18 g, Rfl: 3    aspirin 81 mg chewable tablet, Chew 81 mg daily, Disp: , Rfl:     atorvastatin (LIPITOR) 20 mg tablet, Take 1 tablet (20 mg total) by mouth daily, Disp: 90 tablet, Rfl: 3    cetirizine (ZyrTEC) 10 mg tablet, Take 10 mg by mouth daily, Disp: , Rfl:     Ergocalciferol (VITAMIN D2) 2000 units TABS, Take 2,000 mg by mouth, Disp: , Rfl:     fluticasone-umeclidinium-vilanterol (TRELEGY) 100-62 5-25 MCG/INH inhaler, Inhale 1 puff daily Rinse mouth after use , Disp: , Rfl:     gabapentin (NEURONTIN) 300 mg capsule, Take 1 capsule (300 mg total) by mouth daily, Disp: 90 capsule, Rfl: 1    glucose blood (OneTouch Verio) test strip, Use as instructed TEST TWO TIMES DAILY e11 65, Disp: 300 each, Rfl: 5    LICORICE, GLYCYRRHIZA GLABRA, PO, Take 3-4 Pieces by mouth daily, Disp: , Rfl:     lisinopril (ZESTRIL) 5 mg tablet, Take 1 tablet (5 mg total) by mouth daily, Disp: 90 tablet, Rfl: 1    metFORMIN (GLUCOPHAGE) 500 mg tablet, Take 1 tablet (500 mg total) by mouth 2 (two) times a day with meals, Disp: 180 tablet, Rfl: 1    Multiple Vitamin (MULTIVITAMINS PO), Take by mouth daily, Disp: , Rfl:     omeprazole (PriLOSEC) 40 MG capsule, Take 1 capsule (40 mg total) by mouth daily before breakfast, Disp: 90 capsule, Rfl: 1    oxybutynin (DITROPAN-XL) 5 mg 24 hr tablet, Take 1 tablet (5 mg total) by mouth daily, Disp: 90 tablet, Rfl: 1    psyllium (METAMUCIL) 58 6 % packet, Take 1 packet by mouth daily (Patient not taking: Reported on 10/25/2021), Disp: , Rfl:   Allergies   Allergen Reactions    Bee Venom Anaphylaxis    Other Shortness Of Breath     FLOWERS/GRASS       Vitals: Blood pressure 141/66, pulse 90, temperature 98 °F (36 7 °C), temperature source Tympanic, height 5' 2" (1 575 m), weight 76 7 kg (169 lb), SpO2 93 %  Body mass index is 30 91 kg/m²  Oxygen Therapy  SpO2: 93 %  Oxygen Therapy: None (Room air)    Physical Exam  Vitals reviewed  Constitutional:       Appearance: Normal appearance  She is not ill-appearing or toxic-appearing  HENT:      Head: Normocephalic and atraumatic  Nose: Nose normal  No congestion or rhinorrhea        Mouth/Throat:      Mouth: Mucous membranes are moist  Pharynx: Oropharynx is clear  Cardiovascular:      Rate and Rhythm: Normal rate and regular rhythm  Pulses: Normal pulses  Heart sounds: Normal heart sounds  Pulmonary:      Effort: Pulmonary effort is normal  No respiratory distress  Breath sounds: Normal breath sounds  No wheezing, rhonchi or rales  Abdominal:      General: Abdomen is flat  Bowel sounds are normal  There is no distension  Palpations: Abdomen is soft  Tenderness: There is no abdominal tenderness  There is no guarding  Musculoskeletal:         General: No swelling or tenderness  Normal range of motion  Cervical back: Normal range of motion and neck supple  Right lower leg: No edema  Left lower leg: No edema  Skin:     General: Skin is warm and dry  Findings: No rash  Neurological:      General: No focal deficit present  Mental Status: She is alert and oriented to person, place, and time  Mental status is at baseline  Psychiatric:         Mood and Affect: Mood normal          Behavior: Behavior normal          Labs: I have personally reviewed pertinent lab results  Lab Results   Component Value Date    WBC 9 66 07/23/2021    HGB 14 1 07/23/2021    HCT 43 7 07/23/2021    MCV 88 07/23/2021     07/23/2021     Lab Results   Component Value Date    CALCIUM 9 8 07/23/2021    K 4 2 07/23/2021    CO2 30 07/23/2021     07/23/2021    BUN 13 07/23/2021    CREATININE 0 92 07/23/2021     No results found for: IGE  Lab Results   Component Value Date    ALT 32 07/23/2021    AST 18 07/23/2021    ALKPHOS 69 07/23/2021          Imaging and other studies: I have personally reviewed pertinent reports  and I have personally reviewed pertinent films in PACS     CT abdomen 7/23: No acute inflammatory process identified   Stable hiatal hernia         Pulmonary function testing:   FEV1/FVC ratio 47%    FEV1 56% predicted  FVC 81% predicted  No response to bronchodilators   % predicted  RV 147 % predicted  DLCO corrected for hemoglobin 58 % predicted        EKG, Pathology, and Other Studies: I have personally reviewed pertinent reports  and I have personally reviewed pertinent films in PACS       [facility]

## 2022-05-16 ENCOUNTER — TELEPHONE (OUTPATIENT)
Dept: INTERNAL MEDICINE CLINIC | Facility: CLINIC | Age: 82
End: 2022-05-16

## 2022-05-16 DIAGNOSIS — I10 HYPERTENSION, UNSPECIFIED TYPE: ICD-10-CM

## 2022-05-16 DIAGNOSIS — E78.5 HYPERLIPIDEMIA, UNSPECIFIED HYPERLIPIDEMIA TYPE: ICD-10-CM

## 2022-05-16 RX ORDER — ATORVASTATIN CALCIUM 20 MG/1
20 TABLET, FILM COATED ORAL DAILY
Qty: 90 TABLET | Refills: 1 | Status: SHIPPED | OUTPATIENT
Start: 2022-05-16

## 2022-05-16 RX ORDER — LISINOPRIL 5 MG/1
TABLET ORAL
Qty: 90 TABLET | Refills: 1 | Status: SHIPPED | OUTPATIENT
Start: 2022-05-16

## 2022-06-27 ENCOUNTER — RA CDI HCC (OUTPATIENT)
Dept: OTHER | Facility: HOSPITAL | Age: 82
End: 2022-06-27

## 2022-06-27 DIAGNOSIS — M79.2 NEUROPATHIC PAIN: ICD-10-CM

## 2022-06-27 DIAGNOSIS — N32.81 OAB (OVERACTIVE BLADDER): ICD-10-CM

## 2022-06-27 RX ORDER — OXYBUTYNIN CHLORIDE 5 MG/1
TABLET, EXTENDED RELEASE ORAL
Qty: 90 TABLET | Refills: 1 | Status: SHIPPED | OUTPATIENT
Start: 2022-06-27

## 2022-06-27 RX ORDER — GABAPENTIN 300 MG/1
CAPSULE ORAL
Qty: 90 CAPSULE | Refills: 1 | Status: SHIPPED | OUTPATIENT
Start: 2022-06-27

## 2022-06-27 NOTE — PROGRESS NOTES
[facility]  coding opportunities    I11 0, E11 40      Chart Reviewed number of suggestions sent to Provider: 2     Patients Insurance     Medicare Insurance: [address] [place] [address]

## 2022-06-28 DIAGNOSIS — J44.9 CHRONIC OBSTRUCTIVE PULMONARY DISEASE, UNSPECIFIED COPD TYPE (HCC): ICD-10-CM

## 2022-06-28 RX ORDER — ALBUTEROL SULFATE 90 UG/1
2 AEROSOL, METERED RESPIRATORY (INHALATION) EVERY 6 HOURS PRN
Qty: 18 G | Refills: 3 | Status: SHIPPED | OUTPATIENT
Start: 2022-06-28 | End: 2022-07-28

## 2022-06-28 NOTE — TELEPHONE ENCOUNTER
Patient Verenice Payne is requesting a refill for     Medication(s)  name Ventolin HFA inhaler     Dose 90 mcg/act    Last office Visit 4/26/22    Pharmacy [facility]    Quantity (30 day or 90 day) 30d      Medication(s)  name Trelegy inhaler    Dose 100-62 5-25 mcg/inh    Last office Visit 4/26/22    Pharmacy [facility]    Quantity (30 day or 90 day) 30d

## 2022-07-01 ENCOUNTER — OFFICE VISIT (OUTPATIENT)
Dept: INTERNAL MEDICINE CLINIC | Facility: CLINIC | Age: 82
End: 2022-07-01
Payer: COMMERCIAL

## 2022-07-01 VITALS
OXYGEN SATURATION: 98 % | BODY MASS INDEX: 30.46 KG/M2 | HEIGHT: 62 IN | WEIGHT: 165.5 LBS | TEMPERATURE: 99.3 F | SYSTOLIC BLOOD PRESSURE: 134 MMHG | HEART RATE: 80 BPM | DIASTOLIC BLOOD PRESSURE: 68 MMHG

## 2022-07-01 DIAGNOSIS — Z13.29 SCREENING FOR THYROID DISORDER: ICD-10-CM

## 2022-07-01 DIAGNOSIS — E78.5 HYPERLIPIDEMIA, UNSPECIFIED HYPERLIPIDEMIA TYPE: ICD-10-CM

## 2022-07-01 DIAGNOSIS — Z01.00 DIABETIC EYE EXAM (HCC): ICD-10-CM

## 2022-07-01 DIAGNOSIS — Z13.0 SCREENING FOR DEFICIENCY ANEMIA: ICD-10-CM

## 2022-07-01 DIAGNOSIS — E11.65 TYPE 2 DIABETES MELLITUS WITH HYPERGLYCEMIA, WITHOUT LONG-TERM CURRENT USE OF INSULIN (HCC): Primary | ICD-10-CM

## 2022-07-01 DIAGNOSIS — E78.2 MIXED HYPERLIPIDEMIA: ICD-10-CM

## 2022-07-01 DIAGNOSIS — E11.9 DIABETIC EYE EXAM (HCC): ICD-10-CM

## 2022-07-01 DIAGNOSIS — I47.29 NSVT (NONSUSTAINED VENTRICULAR TACHYCARDIA): ICD-10-CM

## 2022-07-01 DIAGNOSIS — E55.9 VITAMIN D DEFICIENCY: ICD-10-CM

## 2022-07-01 DIAGNOSIS — I10 ESSENTIAL HYPERTENSION: ICD-10-CM

## 2022-07-01 DIAGNOSIS — I50.32 CHRONIC DIASTOLIC CONGESTIVE HEART FAILURE (HCC): ICD-10-CM

## 2022-07-01 LAB — SL AMB POCT HEMOGLOBIN AIC: 6.6 (ref ?–6.5)

## 2022-07-01 PROCEDURE — 1160F RVW MEDS BY RX/DR IN RCRD: CPT | Performed by: PHYSICIAN ASSISTANT

## 2022-07-01 PROCEDURE — 3078F DIAST BP <80 MM HG: CPT | Performed by: PHYSICIAN ASSISTANT

## 2022-07-01 PROCEDURE — 3725F SCREEN DEPRESSION PERFORMED: CPT | Performed by: PHYSICIAN ASSISTANT

## 2022-07-01 PROCEDURE — 3075F SYST BP GE 130 - 139MM HG: CPT | Performed by: PHYSICIAN ASSISTANT

## 2022-07-01 PROCEDURE — 99214 OFFICE O/P EST MOD 30 MIN: CPT | Performed by: PHYSICIAN ASSISTANT

## 2022-07-01 PROCEDURE — 83036 HEMOGLOBIN GLYCOSYLATED A1C: CPT | Performed by: PHYSICIAN ASSISTANT

## 2022-07-01 PROCEDURE — 2028F FOOT EXAM PERFORMED: CPT | Performed by: PHYSICIAN ASSISTANT

## 2022-07-10 DIAGNOSIS — E11.9 TYPE 2 DIABETES MELLITUS WITHOUT COMPLICATION, WITHOUT LONG-TERM CURRENT USE OF INSULIN (HCC): ICD-10-CM

## 2022-07-10 DIAGNOSIS — K21.9 GASTROESOPHAGEAL REFLUX DISEASE WITHOUT ESOPHAGITIS: ICD-10-CM

## 2022-07-10 RX ORDER — OMEPRAZOLE 40 MG/1
CAPSULE, DELAYED RELEASE ORAL
Qty: 90 CAPSULE | Refills: 1 | Status: SHIPPED | OUTPATIENT
Start: 2022-07-10

## 2022-07-11 NOTE — PROGRESS NOTES
Assessment/Plan:  Problem List Items Addressed This Visit        Endocrine    Type 2 diabetes mellitus with hyperglycemia, without long-term current use of insulin ([facility] ) - Primary       Lab Results   Component Value Date    HGBA1C 6 6 (A) 07/01/2022   A1C 7/1/22: 6 6  Foot exam performed 7/1/22  On ACE and statin  Eye exam up to date 9/11/20 and due every 2 years           Relevant Orders    Ambulatory Referral to Ophthalmology    POCT hemoglobin A1c (Completed)    Comprehensive metabolic panel    Microalbumin / creatinine urine ratio       Cardiovascular and Mediastinum    Essential hypertension     Pts symptoms are stable with current regime  No changes at present  Chronic diastolic congestive heart failure (HCC)    NSVT (nonsustained ventricular tachycardia) (HCC)       Other    Hyperlipidemia, unspecified    Relevant Orders    Lipid panel    Vitamin D deficiency    Relevant Orders    Vitamin D 25 hydroxy      Other Visit Diagnoses     Diabetic eye exam ([place] [address] )        Relevant Orders    Ambulatory Referral to Ophthalmology    Screening for thyroid disorder        Relevant Orders    TSH, 3rd generation    Screening for deficiency anemia        Relevant Orders    CBC and differential           Diagnoses and all orders for this visit:    Type 2 diabetes mellitus with hyperglycemia, without long-term current use of insulin ([place] [address] )  -     Ambulatory Referral to Ophthalmology; Future  -     POCT hemoglobin A1c  -     Comprehensive metabolic panel; Future  -     Microalbumin / creatinine urine ratio    Diabetic eye exam [facility])  -     Ambulatory Referral to Ophthalmology; Future    Chronic diastolic congestive heart failure (HCC)    NSVT (nonsustained ventricular tachycardia) (HCC)    Mixed hyperlipidemia    Vitamin D deficiency  -     Vitamin D 25 hydroxy; Future    Hyperlipidemia, unspecified hyperlipidemia type  -     Lipid panel;  Future    Screening for thyroid disorder  -     TSH, 3rd generation; Future    Screening for deficiency anemia  -     CBC and differential; Future    Essential hypertension      Type 2 diabetes mellitus with hyperglycemia, without long-term current use of insulin ([facility])    Lab Results   Component Value Date    HGBA1C 6 6 (A) 07/01/2022   A1C 7/1/22: 6 6  Foot exam performed 7/1/22  On ACE and statin  Eye exam up to date 9/11/20 and due every 2 years    Essential hypertension  Pts symptoms are stable with current regime  No changes at present  Subjective:      Patient ID: Braden Miranda is a 80 y o  female  Pt presents for routine visit  She is up to date with AWV  She is due for diabetic eye exam  Diabetic foot exam performed and normal  A1C is improved and well controlled at 6 6  She denies any new completes or concerns  The following portions of the patient's history were reviewed and updated as appropriate:   She has a past medical history of Allergic, Allergic rhinitis, COPD (chronic obstructive pulmonary disease) ([facility] ), Diabetes mellitus ([facility] ), GERD (gastroesophageal reflux disease), Hyperlipidemia, Hypertension, Left non-suppurative otitis media (11/27/2019), Pneumonia, Pneumonia of right lower lobe due to infectious organism (12/4/2018), and Vitamin D deficiency  ,  does not have any pertinent problems on file  ,   has a past surgical history that includes Cholecystectomy (1984); Eye surgery (Bilateral); Colonoscopy (N/A, 1/30/2019); and Cardiac catheterization  ,  family history includes Heart disease in her mother; Lung cancer in her brother, father, and sister  ,   reports that she quit smoking about 8 years ago  Her smoking use included cigarettes  She started smoking about 58 years ago  She has a 50 00 pack-year smoking history  She has never used smokeless tobacco  She reports that she does not drink alcohol and does not use drugs  ,  is allergic to bee venom and other     Current Outpatient Medications   Medication Sig Dispense Refill    albuterol (Ventolin HFA) 90 mcg/act inhaler Inhale 2 puffs every 6 (six) hours as needed for wheezing 18 g 3    aspirin 81 mg chewable tablet Chew 81 mg daily      atorvastatin (LIPITOR) 20 mg tablet Take 1 tablet (20 mg total) by mouth in the morning  90 tablet 1    cetirizine (ZyrTEC) 10 mg tablet Take 10 mg by mouth daily      Ergocalciferol (VITAMIN D2) 2000 units TABS Take 2,000 mg by mouth      fluticasone-umeclidinium-vilanterol (TRELEGY) 100-62 5-25 MCG/INH inhaler Inhale 1 puff daily Rinse mouth after use  60 blister 3    gabapentin (NEURONTIN) 300 mg capsule TAKE ONE CAPSULE BY MOUTH EVERY DAY 90 capsule 1    lisinopril (ZESTRIL) 5 mg tablet TAKE ONE TABLET BY MOUTH EVERY DAY 90 tablet 1    Multiple Vitamin (MULTIVITAMINS PO) Take by mouth daily      oxybutynin (DITROPAN-XL) 5 mg 24 hr tablet TAKE ONE TABLET BY MOUTH EVERY DAY 90 tablet 1    glucose blood (OneTouch Verio) test strip Use as instructed TEST TWO TIMES DAILY S05 [id number] each 5    LICORICE, GLYCYRRHIZA GLABRA, PO Take 3-4 Pieces by mouth daily (Patient not taking: Reported on 4/26/2022 )      metFORMIN (GLUCOPHAGE) 500 mg tablet TAKE ONE TABLET BY MOUTH TWICE A DAY WITH MEALS 180 tablet 1    omeprazole (PriLOSEC) 40 MG capsule TAKE ONE CAPSULE BY MOUTH EVERY DAY BEFORE BREAKFAST 90 capsule 1    psyllium (METAMUCIL) 58 6 % packet Take 1 packet by mouth daily (Patient not taking: Reported on 10/25/2021)       No current facility-administered medications for this visit  Review of Systems   Constitutional: Negative for chills and fever  HENT: Negative for congestion, ear pain, hearing loss, postnasal drip, rhinorrhea, sinus pressure, sinus pain, sore throat and trouble swallowing  Eyes: Negative for pain and visual disturbance  Respiratory: Negative for cough, chest tightness, shortness of breath and wheezing  Cardiovascular: Negative  Negative for chest pain, palpitations and leg swelling     Gastrointestinal: Negative for abdominal pain, blood in stool, constipation, diarrhea, nausea and vomiting  Endocrine: Negative for cold intolerance, heat intolerance, polydipsia, polyphagia and polyuria  Genitourinary: Negative for difficulty urinating, dysuria, flank pain and urgency  Musculoskeletal: Negative for arthralgias, back pain, gait problem and myalgias  Skin: Negative for rash  Allergic/Immunologic: Negative  Neurological: Negative for dizziness, weakness, light-headedness and headaches  Hematological: Negative  Psychiatric/Behavioral: Negative for behavioral problems, dysphoric mood and sleep disturbance  The patient is not nervous/anxious  PHQ-2/9 Depression Screening    Little interest or pleasure in doing things: 0 - not at all  Feeling down, depressed, or hopeless: 0 - not at all  PHQ-2 Score: 0  PHQ-2 Interpretation: Negative depression screen          Objective:  Vitals:    07/01/22 1041   BP: 134/68   Pulse: 80   Temp: 99 3 °F (37 4 °C)   SpO2: 98%   Weight: 75 1 kg (165 lb 8 oz)   Height: 5' 2" (1 575 m)     Body mass index is 30 27 kg/m²  Physical Exam  Constitutional:       General: She is not in acute distress  Appearance: She is well-developed  She is not diaphoretic  HENT:      Head: Normocephalic and atraumatic  Right Ear: External ear normal       Left Ear: External ear normal       Nose: Nose normal       Mouth/Throat:      Pharynx: No oropharyngeal exudate  Eyes:      General: No scleral icterus  Right eye: No discharge  Left eye: No discharge  Conjunctiva/sclera: Conjunctivae normal       Pupils: Pupils are equal, round, and reactive to light  Neck:      Thyroid: No thyromegaly  Cardiovascular:      Rate and Rhythm: Normal rate and regular rhythm  Pulses: no weak pulses          Dorsalis pedis pulses are 2+ on the right side and 2+ on the left side  Posterior tibial pulses are 2+ on the right side and 2+ on the left side        Heart sounds: Normal heart sounds  No murmur heard  No friction rub  No gallop  Pulmonary:      Effort: Pulmonary effort is normal  No respiratory distress  Breath sounds: Normal breath sounds  No wheezing or rales  Abdominal:      General: Bowel sounds are normal  There is no distension  Palpations: Abdomen is soft  Tenderness: There is no abdominal tenderness  Musculoskeletal:         General: No tenderness or deformity  Normal range of motion  Cervical back: Normal range of motion and neck supple  Feet:      Right foot:      Skin integrity: No ulcer, skin breakdown, erythema, warmth, callus or dry skin  Left foot:      Skin integrity: No ulcer, skin breakdown, erythema, warmth, callus or dry skin  Skin:     General: Skin is warm and dry  Neurological:      Mental Status: She is alert and oriented to person, place, and time  Cranial Nerves: No cranial nerve deficit  Psychiatric:         Behavior: Behavior normal          Thought Content: Thought content normal          Judgment: Judgment normal        Patient's shoes and socks removed  Right Foot/Ankle   Right Foot Inspection  Skin Exam: skin normal and skin intact  No dry skin, no warmth, no callus, no erythema, no maceration, no abnormal color, no pre-ulcer, no ulcer and no callus  Toe Exam: ROM and strength within normal limits  Sensory   Vibration: intact  Proprioception: intact  Monofilament testing: intact    Vascular  Capillary refills: < 3 seconds  The right DP pulse is 2+  The right PT pulse is 2+  Left Foot/Ankle  Left Foot Inspection  Skin Exam: skin normal and skin intact  No dry skin, no warmth, no erythema, no maceration, normal color, no pre-ulcer, no ulcer and no callus  Toe Exam: ROM and strength within normal limits  Sensory   Vibration: intact  Proprioception: intact  Monofilament testing: intact    Vascular  Capillary refills: < 3 seconds  The left DP pulse is 2+  The left PT pulse is 2+  Assign Risk Category  No deformity present  No loss of protective sensation  No weak pulses  Risk: 0

## 2022-07-11 NOTE — ASSESSMENT & PLAN NOTE
Lab Results   Component Value Date    HGBA1C 6 6 (A) 07/01/2022   A1C 7/1/22: 6 6  Foot exam performed 7/1/22  On ACE and statin  Eye exam up to date 9/11/20 and due every 2 years

## 2022-07-26 LAB
LEFT EYE DIABETIC RETINOPATHY: NORMAL
RIGHT EYE DIABETIC RETINOPATHY: NORMAL

## 2022-07-27 ENCOUNTER — HOSPITAL ENCOUNTER (OUTPATIENT)
Dept: BONE DENSITY | Facility: HOSPITAL | Age: 82
Discharge: HOME | End: 2022-07-27
Payer: COMMERCIAL

## 2022-07-27 ENCOUNTER — APPOINTMENT (OUTPATIENT)
Dept: LAB | Facility: HOSPITAL | Age: 82
End: 2022-07-27
Payer: COMMERCIAL

## 2022-07-27 DIAGNOSIS — E55.9 VITAMIN D DEFICIENCY: ICD-10-CM

## 2022-07-27 DIAGNOSIS — Z13.29 SCREENING FOR THYROID DISORDER: ICD-10-CM

## 2022-07-27 DIAGNOSIS — E11.65 TYPE 2 DIABETES MELLITUS WITH HYPERGLYCEMIA, WITHOUT LONG-TERM CURRENT USE OF INSULIN (HCC): ICD-10-CM

## 2022-07-27 DIAGNOSIS — E78.5 HYPERLIPIDEMIA, UNSPECIFIED HYPERLIPIDEMIA TYPE: ICD-10-CM

## 2022-07-27 DIAGNOSIS — E28.39 PRIMARY OVARIAN FAILURE: ICD-10-CM

## 2022-07-27 DIAGNOSIS — Z13.0 SCREENING FOR DEFICIENCY ANEMIA: ICD-10-CM

## 2022-07-27 LAB
25(OH)D3 SERPL-MCNC: 52.5 NG/ML (ref 30–100)
ALBUMIN SERPL BCP-MCNC: 3.9 G/DL (ref 3.5–5)
ALP SERPL-CCNC: 71 U/L (ref 46–116)
ALT SERPL W P-5'-P-CCNC: 31 U/L (ref 12–78)
ANION GAP SERPL CALCULATED.3IONS-SCNC: 11 MMOL/L (ref 4–13)
AST SERPL W P-5'-P-CCNC: 15 U/L (ref 5–45)
BASOPHILS # BLD AUTO: 0.09 THOUSANDS/ΜL (ref 0–0.1)
BASOPHILS NFR BLD AUTO: 1 % (ref 0–1)
BILIRUB SERPL-MCNC: 0.68 MG/DL (ref 0.2–1)
BUN SERPL-MCNC: 15 MG/DL (ref 5–25)
CALCIUM SERPL-MCNC: 10 MG/DL (ref 8.3–10.1)
CHLORIDE SERPL-SCNC: 103 MMOL/L (ref 96–108)
CHOLEST SERPL-MCNC: 199 MG/DL
CO2 SERPL-SCNC: 26 MMOL/L (ref 21–32)
CREAT SERPL-MCNC: 1.15 MG/DL (ref 0.6–1.3)
CREAT UR-MCNC: 215 MG/DL
EOSINOPHIL # BLD AUTO: 0.44 THOUSAND/ΜL (ref 0–0.61)
EOSINOPHIL NFR BLD AUTO: 5 % (ref 0–6)
ERYTHROCYTE [DISTWIDTH] IN BLOOD BY AUTOMATED COUNT: 12.8 % (ref 11.6–15.1)
GFR SERPL CREATININE-BSD FRML MDRD: 44 ML/MIN/1.73SQ M
GLUCOSE P FAST SERPL-MCNC: 135 MG/DL (ref 65–99)
HCT VFR BLD AUTO: 43.8 % (ref 34.8–46.1)
HDLC SERPL-MCNC: 48 MG/DL
HGB BLD-MCNC: 14.3 G/DL (ref 11.5–15.4)
IMM GRANULOCYTES # BLD AUTO: 0.03 THOUSAND/UL (ref 0–0.2)
IMM GRANULOCYTES NFR BLD AUTO: 0 % (ref 0–2)
LDLC SERPL CALC-MCNC: 94 MG/DL (ref 0–100)
LYMPHOCYTES # BLD AUTO: 2.62 THOUSANDS/ΜL (ref 0.6–4.47)
LYMPHOCYTES NFR BLD AUTO: 30 % (ref 14–44)
MCH RBC QN AUTO: 28.4 PG (ref 26.8–34.3)
MCHC RBC AUTO-ENTMCNC: 32.6 G/DL (ref 31.4–37.4)
MCV RBC AUTO: 87 FL (ref 82–98)
MICROALBUMIN UR-MCNC: 20.6 MG/L (ref 0–20)
MICROALBUMIN/CREAT 24H UR: 10 MG/G CREATININE (ref 0–30)
MONOCYTES # BLD AUTO: 0.62 THOUSAND/ΜL (ref 0.17–1.22)
MONOCYTES NFR BLD AUTO: 7 % (ref 4–12)
NEUTROPHILS # BLD AUTO: 4.99 THOUSANDS/ΜL (ref 1.85–7.62)
NEUTS SEG NFR BLD AUTO: 57 % (ref 43–75)
NONHDLC SERPL-MCNC: 151 MG/DL
NRBC BLD AUTO-RTO: 0 /100 WBCS
PLATELET # BLD AUTO: 261 THOUSANDS/UL (ref 149–390)
PMV BLD AUTO: 11 FL (ref 8.9–12.7)
POTASSIUM SERPL-SCNC: 4.5 MMOL/L (ref 3.5–5.3)
PROT SERPL-MCNC: 7.2 G/DL (ref 6.4–8.4)
RBC # BLD AUTO: 5.03 MILLION/UL (ref 3.81–5.12)
SODIUM SERPL-SCNC: 140 MMOL/L (ref 135–147)
TRIGL SERPL-MCNC: 284 MG/DL
TSH SERPL DL<=0.05 MIU/L-ACNC: 2.73 UIU/ML (ref 0.45–4.5)
WBC # BLD AUTO: 8.79 THOUSAND/UL (ref 4.31–10.16)

## 2022-07-27 PROCEDURE — 82570 ASSAY OF URINE CREATININE: CPT | Performed by: PHYSICIAN ASSISTANT

## 2022-07-27 PROCEDURE — 84443 ASSAY THYROID STIM HORMONE: CPT

## 2022-07-27 PROCEDURE — 85025 COMPLETE CBC W/AUTO DIFF WBC: CPT

## 2022-07-27 PROCEDURE — 77080 DXA BONE DENSITY AXIAL: CPT

## 2022-07-27 PROCEDURE — 82043 UR ALBUMIN QUANTITATIVE: CPT | Performed by: PHYSICIAN ASSISTANT

## 2022-07-27 PROCEDURE — 36415 COLL VENOUS BLD VENIPUNCTURE: CPT

## 2022-07-27 PROCEDURE — 82306 VITAMIN D 25 HYDROXY: CPT

## 2022-07-27 PROCEDURE — 80061 LIPID PANEL: CPT

## 2022-07-27 PROCEDURE — 80053 COMPREHEN METABOLIC PANEL: CPT

## 2022-09-22 DIAGNOSIS — E78.5 HYPERLIPIDEMIA, UNSPECIFIED HYPERLIPIDEMIA TYPE: ICD-10-CM

## 2022-09-22 RX ORDER — ATORVASTATIN CALCIUM 20 MG/1
TABLET, FILM COATED ORAL
Qty: 90 TABLET | Refills: 1 | Status: SHIPPED | OUTPATIENT
Start: 2022-09-22

## 2022-10-07 ENCOUNTER — OFFICE VISIT (OUTPATIENT)
Dept: PULMONOLOGY | Facility: CLINIC | Age: 82
End: 2022-10-07
Payer: COMMERCIAL

## 2022-10-07 VITALS
WEIGHT: 165 LBS | HEIGHT: 62 IN | OXYGEN SATURATION: 91 % | DIASTOLIC BLOOD PRESSURE: 69 MMHG | HEART RATE: 114 BPM | SYSTOLIC BLOOD PRESSURE: 112 MMHG | TEMPERATURE: 100.3 F | BODY MASS INDEX: 30.36 KG/M2

## 2022-10-07 DIAGNOSIS — R07.89 CHEST PAIN, MUSCULOSKELETAL: ICD-10-CM

## 2022-10-07 DIAGNOSIS — J44.9 CHRONIC OBSTRUCTIVE PULMONARY DISEASE, UNSPECIFIED COPD TYPE (HCC): Primary | ICD-10-CM

## 2022-10-07 DIAGNOSIS — R06.09 DYSPNEA ON EXERTION: ICD-10-CM

## 2022-10-07 PROCEDURE — 99214 OFFICE O/P EST MOD 30 MIN: CPT | Performed by: INTERNAL MEDICINE

## 2022-10-07 RX ORDER — BUDESONIDE 0.5 MG/2ML
0.5 INHALANT ORAL 2 TIMES DAILY
Qty: 120 ML | Refills: 5 | Status: SHIPPED | OUTPATIENT
Start: 2022-10-07

## 2022-10-07 RX ORDER — FORMOTEROL FUMARATE 20 UG/2ML
20 SOLUTION RESPIRATORY (INHALATION) 2 TIMES DAILY
Qty: 120 ML | Refills: 5 | Status: SHIPPED | OUTPATIENT
Start: 2022-10-07

## 2022-10-07 NOTE — PATIENT INSTRUCTIONS
Continue albuterol as needed   Use trelogy once a day or nebulized budesonide and formoterol twice a day  Get budesonide and formoterol for nebulized  Use twice a day when you have it  Rinse your mouth out after using it  Return in 3 months time  Call her breathing should worsen before next visit  You should have breathing test before next visit  Call if you have difficulty getting the budesonide where the formoterol  Call if you chest pain does not go away the next several weeks

## 2022-10-07 NOTE — PROGRESS NOTES
Progress Note - Pulmonary   Jazlyn Gandhi 80 y o  female MRN: [id number]   Encounter: [id number]      Assessment/Plan:  Severe COPD struggling more recently  She is not been on trilogy for several months  She can no longer for that  She does use her albuterol 2-4 times per day  She has little bit of cough congestion  She remains a nonsmoker  She is no secondhand smoke  There unusual pets in the animals in the household  After discussion I gave her a month's supply of trilogy 100 additionally will see if we can get her budesonide formoterol for a nebulizer at home  Detailed instructions on use both were involved  Will have return in 3 months time cough or breathing should worsen  Dyspnea see above  Chest pain she complains of left-sided chest pain with some localization about the 10th left rib  This occurred after she moved her arm  I think she may have a nondisplaced fracture  Will order chest x-ray for completeness sake if persists will need additional imaging    1  Chronic obstructive pulmonary disease, unspecified COPD type (HCC)  -     formoterol (PERFOROMIST) 20 MCG/2ML nebulizer solution; Take 2 mL (20 mcg total) by nebulization 2 (two) times a day  -     Complete PFT with post Bronchodilator and Six Minute walk; Future  -     budesonide (Pulmicort) 0 5 mg/2 mL nebulizer solution; Take 2 mL (0 5 mg total) by nebulization 2 (two) times a day Rinse mouth after use  -     XR chest pa & lateral; Future; Expected date: 10/07/2022    2  Dyspnea on exertion  -     formoterol (PERFOROMIST) 20 MCG/2ML nebulizer solution; Take 2 mL (20 mcg total) by nebulization 2 (two) times a day  -     Complete PFT with post Bronchodilator and Six Minute walk; Future  -     budesonide (Pulmicort) 0 5 mg/2 mL nebulizer solution; Take 2 mL (0 5 mg total) by nebulization 2 (two) times a day Rinse mouth after use      3  Chest pain, musculoskeletal  -     XR chest pa & lateral; Future; Expected date: 10/07/2022      Patient may follow up in 3 months or sooner as necessary  Orders:  Orders Placed This Encounter   Procedures    XR chest pa & lateral     Left-sided chest pain     Standing Status:   Future     Standing Expiration Date:   10/7/2026     Scheduling Instructions:      Bring along any outside films relating to this procedure   Complete PFT with post Bronchodilator and Six Minute walk     Standing Status:   Future     Standing Expiration Date:   10/7/2023     Order Specific Question:   Would you like to add MVV, MIP, MEP into this order? Answer:   No       Subjective:   59-year-old woman with a history of severe COPD FEV1 of less than a L who is an ex-smoker time about 8 years  She is been struggling she can no longer afford her Trelegy medication  She does have an albuterol inhaler she uses intermittently along with nebulization therapy    Inhaler Regimen:  Albuterol MDI and nebulizer    Remainder of review of systems negative except as described in HPI  The following portions of the patient's history were reviewed and updated as appropriate: allergies, current medications, past family history, past medical history, past social history, past surgical history and problem list      Objective:   Vitals: Blood pressure 112/69, pulse (!) 114, temperature 100 3 °F (37 9 °C), height 5' 2" (1 575 m), weight 74 8 kg (165 lb), SpO2 91 % , Body mass index is 30 18 kg/m²      Physical Exam  Gen:  Adult woman, awake, alert, oriented x 3, no acute distress  HEENT: Mucous membranes moist, no oral lesions, no thrush, Mallampati 2  NECK: No accessory muscle use, JVP not elevated  Cardiac:  Regular rate and rhythm,  Lungs:  Hyperexpanded expiratory phase is prolonged decreased breath sounds  Abdomen: normoactive bowel sounds, soft nontender, nondistended, no rebound or rigidity, no guarding  Extremities: no cyanosis, no clubbing, trace edema  MSK:  Strength equal in all extremities  Derm:  No rashes/lesions noted  Neuro: Appropriate mood/affect    Labs: I have personally reviewed pertinent lab results  Lab Results   Component Value Date    WBC 8 79 07/27/2022    HGB 14 3 07/27/2022     07/27/2022     Lab Results   Component Value Date    CREATININE 1 15 07/27/2022        Imaging and other studies: I have personally reviewed pertinent reports  My interpretation:      Radiology findings:      Pulmonary Function Testing: I have personally reviewed pertinent reports          Emi John MD [facility]  [facility]

## 2022-10-26 ENCOUNTER — HOSPITAL ENCOUNTER (OUTPATIENT)
Dept: PULMONOLOGY | Facility: HOSPITAL | Age: 82
Discharge: HOME | End: 2022-10-26
Attending: INTERNAL MEDICINE

## 2022-10-26 DIAGNOSIS — R06.09 DYSPNEA ON EXERTION: ICD-10-CM

## 2022-10-26 DIAGNOSIS — J44.9 CHRONIC OBSTRUCTIVE PULMONARY DISEASE, UNSPECIFIED COPD TYPE (HCC): ICD-10-CM

## 2022-10-26 RX ORDER — ALBUTEROL SULFATE 2.5 MG/3ML
2.5 SOLUTION RESPIRATORY (INHALATION) ONCE AS NEEDED
Status: COMPLETED | OUTPATIENT
Start: 2022-10-26 | End: 2022-10-26

## 2022-10-26 RX ADMIN — ALBUTEROL SULFATE 2.5 MG: 2.5 SOLUTION RESPIRATORY (INHALATION) at 16:16

## 2022-10-31 ENCOUNTER — RA CDI HCC (OUTPATIENT)
Dept: OTHER | Facility: HOSPITAL | Age: 82
End: 2022-10-31

## 2022-10-31 NOTE — PROGRESS NOTES
[facility]  coding opportunities     I11 0, E11 40     Chart Reviewed number of suggestions sent to Provider: 2     Patients Insurance     Medicare Insurance: [address] [place] [address]

## 2022-11-01 ENCOUNTER — OFFICE VISIT (OUTPATIENT)
Dept: INTERNAL MEDICINE CLINIC | Facility: CLINIC | Age: 82
End: 2022-11-01

## 2022-11-01 VITALS
HEART RATE: 80 BPM | OXYGEN SATURATION: 95 % | TEMPERATURE: 97.9 F | HEIGHT: 62 IN | WEIGHT: 164.5 LBS | SYSTOLIC BLOOD PRESSURE: 124 MMHG | DIASTOLIC BLOOD PRESSURE: 64 MMHG | BODY MASS INDEX: 30.27 KG/M2

## 2022-11-01 DIAGNOSIS — J44.9 CHRONIC OBSTRUCTIVE PULMONARY DISEASE, UNSPECIFIED COPD TYPE (HCC): ICD-10-CM

## 2022-11-01 DIAGNOSIS — N18.30 STAGE 3 CHRONIC KIDNEY DISEASE, UNSPECIFIED WHETHER STAGE 3A OR 3B CKD (HCC): ICD-10-CM

## 2022-11-01 DIAGNOSIS — E11.65 TYPE 2 DIABETES MELLITUS WITH HYPERGLYCEMIA, WITHOUT LONG-TERM CURRENT USE OF INSULIN (HCC): Primary | ICD-10-CM

## 2022-11-01 DIAGNOSIS — Z00.00 MEDICARE ANNUAL WELLNESS VISIT, SUBSEQUENT: ICD-10-CM

## 2022-11-01 LAB — SL AMB POCT HEMOGLOBIN AIC: 6.7 (ref ?–6.5)

## 2022-11-01 RX ORDER — ALBUTEROL SULFATE 90 UG/1
AEROSOL, METERED RESPIRATORY (INHALATION)
COMMUNITY
Start: 2022-09-13

## 2022-11-01 RX ORDER — BUDESONIDE, GLYCOPYRROLATE, AND FORMOTEROL FUMARATE 160; 9; 4.8 UG/1; UG/1; UG/1
2 AEROSOL, METERED RESPIRATORY (INHALATION) DAILY
Qty: 10.7 G | Refills: 1 | Status: SHIPPED | OUTPATIENT
Start: 2022-11-01 | End: 2022-11-02 | Stop reason: SDUPTHER

## 2022-11-01 NOTE — ASSESSMENT & PLAN NOTE
Discontinue trelegy in favor of Breztri  Goal of decreasing albuterol use to 4 times per week or less

## 2022-11-01 NOTE — PATIENT INSTRUCTIONS
Medicare Preventive Visit Patient Instructions  Thank you for completing your Welcome to Medicare Visit or Medicare Annual Wellness Visit today  Your next wellness visit will be due in one year (11/2/2023)  The screening/preventive services that you may require over the next 5-10 years are detailed below  Some tests may not apply to you based off risk factors and/or age  Screening tests ordered at today's visit but not completed yet may show as past due  Also, please note that scanned in results may not display below  Preventive Screenings:  Service Recommendations Previous Testing/Comments   Colorectal Cancer Screening  * Colonoscopy    * Fecal Occult Blood Test (FOBT)/Fecal Immunochemical Test (FIT)  * Fecal DNA/Cologuard Test  * Flexible Sigmoidoscopy Age: 39-70 years old   Colonoscopy: every 10 years (may be performed more frequently if at higher risk)  OR  FOBT/FIT: every 1 year  OR  Cologuard: every 3 years  OR  Sigmoidoscopy: every 5 years  Screening may be recommended earlier than age 39 if at higher risk for colorectal cancer  Also, an individualized decision between you and your healthcare provider will decide whether screening between the ages of 74-80 would be appropriate  Colonoscopy: Not on file  FOBT/FIT: Not on file  Cologuard: Not on file  Sigmoidoscopy: Not on file          Breast Cancer Screening Age: 36 years old  Frequency: every 1-2 years  Not required if history of left and right mastectomy Mammogram: 05/07/2018        Cervical Cancer Screening Between the ages of 21-29, pap smear recommended once every 3 years  Between the ages of 33-67, can perform pap smear with HPV co-testing every 5 years     Recommendations may differ for women with a history of total hysterectomy, cervical cancer, or abnormal pap smears in past  Pap Smear: Not on file    Screening Not Indicated   Hepatitis C Screening Once for adults born between [facility]  More frequently in patients at high risk for Hepatitis C Hep C Antibody: Not on file        Diabetes Screening 1-2 times per year if you're at risk for diabetes or have pre-diabetes Fasting glucose: 135 mg/dL (7/27/2022)  A1C: 6 6 (7/1/2022)  Screening Not Indicated  History Diabetes   Cholesterol Screening Once every 5 years if you don't have a lipid disorder  May order more often based on risk factors  Lipid panel: 07/27/2022    Screening Not Indicated  History Lipid Disorder     Other Preventive Screenings Covered by Medicare:  1  Abdominal Aortic Aneurysm (AAA) Screening: covered once if your at risk  You're considered to be at risk if you have a family history of AAA  2  Lung Cancer Screening: covers low dose CT scan once per year if you meet all of the following conditions: (1) Age 50-69; (2) No signs or symptoms of lung cancer; (3) Current smoker or have quit smoking within the last 15 years; (4) You have a tobacco smoking history of at least 20 pack years (packs per day multiplied by number of years you smoked); (5) You get a written order from a healthcare provider  3  Glaucoma Screening: covered annually if you're considered high risk: (1) You have diabetes OR (2) Family history of glaucoma OR (3)  aged 48 and older OR (3)  American aged 72 and older  3  Osteoporosis Screening: covered every 2 years if you meet one of the following conditions: (1) You're estrogen deficient and at risk for osteoporosis based off medical history and other findings; (2) Have a vertebral abnormality; (3) On glucocorticoid therapy for more than 3 months; (4) Have primary hyperparathyroidism; (5) On osteoporosis medications and need to assess response to drug therapy  · Last bone density test (DXA Scan): 07/27/2022   5  HIV Screening: covered annually if you're between the age of 15-65  Also covered annually if you are younger than 13 and older than 72 with risk factors for HIV infection   For pregnant patients, it is covered up to 3 times per pregnancy  Immunizations:  Immunization Recommendations   Influenza Vaccine Annual influenza vaccination during flu season is recommended for all persons aged >= 6 months who do not have contraindications   Pneumococcal Vaccine   * Pneumococcal conjugate vaccine = PCV13 (Prevnar 13), PCV15 (Vaxneuvance), PCV20 (Prevnar 20)  * Pneumococcal polysaccharide vaccine = PPSV23 (Pneumovax) Adults 25-60 years old: 1-3 doses may be recommended based on certain risk factors  Adults 72 years old: 1-2 doses may be recommended based off what pneumonia vaccine you previously received   Hepatitis B Vaccine 3 dose series if at intermediate or high risk (ex: diabetes, end stage renal disease, liver disease)   Tetanus (Td) Vaccine - COST NOT COVERED BY MEDICARE PART B Following completion of primary series, a booster dose should be given every 10 years to maintain immunity against tetanus  Td may also be given as tetanus wound prophylaxis  Tdap Vaccine - COST NOT COVERED BY MEDICARE PART B Recommended at least once for all adults  For pregnant patients, recommended with each pregnancy  Shingles Vaccine (Shingrix) - COST NOT COVERED BY MEDICARE PART B  2 shot series recommended in those aged 48 and above     Health Maintenance Due:  There are no preventive care reminders to display for this patient  Immunizations Due:      Topic Date Due   • COVID-19 Vaccine (3 - Booster for Moderna series) 10/27/2021   • Influenza Vaccine (1) 09/01/2022     Advance Directives   What are advance directives? Advance directives are legal documents that state your wishes and plans for medical care  These plans are made ahead of time in case you lose your ability to make decisions for yourself  Advance directives can apply to any medical decision, such as the treatments you want, and if you want to donate organs  What are the types of advance directives?   There are many types of advance directives, and each state has rules about how to use them  You may choose a combination of any of the following:  · Living will: This is a written record of the treatment you want  You can also choose which treatments you do not want, which to limit, and which to stop at a certain time  This includes surgery, medicine, IV fluid, and tube feedings  · Durable power of  for healthcare [place]): This is a written record that states who you want to make healthcare choices for you when you are unable to make them for yourself  This person, called a proxy, is usually a family member or a friend  You may choose more than 1 proxy  · Do not resuscitate (DNR) order:  A DNR order is used in case your heart stops beating or you stop breathing  It is a request not to have certain forms of treatment, such as CPR  A DNR order may be included in other types of advance directives  · Medical directive: This covers the care that you want if you are in a coma, near death, or unable to make decisions for yourself  You can list the treatments you want for each condition  Treatment may include pain medicine, surgery, blood transfusions, dialysis, IV or tube feedings, and a ventilator (breathing machine)  · Values history: This document has questions about your views, beliefs, and how you feel and think about life  This information can help others choose the care that you would choose  Why are advance directives important? An advance directive helps you control your care  Although spoken wishes may be used, it is better to have your wishes written down  Spoken wishes can be misunderstood, or not followed  Treatments may be given even if you do not want them  An advance directive may make it easier for your family to make difficult choices about your care  Fall Prevention    Fall prevention  includes ways to make your home and other areas safer  It also includes ways you can move more carefully to prevent a fall   Health conditions that cause changes in your blood pressure, vision, or muscle strength and coordination may increase your risk for falls  Medicines may also increase your risk for falls if they make you dizzy, weak, or sleepy  Fall prevention tips:   · Stand or sit up slowly  · Use assistive devices as directed  · Wear shoes that fit well and have soles that   · Wear a personal alarm  · Stay active  · Manage your medical conditions  Home Safety Tips:  · Add items to prevent falls in the bathroom  · Keep paths clear  · Install bright lights in your home  · Keep items you use often on shelves within reach  · Paint or place reflective tape on the edges of your stairs  Weight Management   Why it is important to manage your weight:  Being overweight increases your risk of health conditions such as heart disease, high blood pressure, type 2 diabetes, and certain types of cancer  It can also increase your risk for osteoarthritis, sleep apnea, and other respiratory problems  Aim for a slow, steady weight loss  Even a small amount of weight loss can lower your risk of health problems  How to lose weight safely:  A safe and healthy way to lose weight is to eat fewer calories and get regular exercise  You can lose up about 1 pound a week by decreasing the number of calories you eat by 500 calories each day  Healthy meal plan for weight management:  A healthy meal plan includes a variety of foods, contains fewer calories, and helps you stay healthy  A healthy meal plan includes the following:  · Eat whole-grain foods more often  A healthy meal plan should contain fiber  Fiber is the part of grains, fruits, and vegetables that is not broken down by your body  Whole-grain foods are healthy and provide extra fiber in your diet  Some examples of whole-grain foods are whole-wheat breads and pastas, oatmeal, brown rice, and bulgur  · Eat a variety of vegetables every day    Include dark, leafy greens such as spinach, kale, juliet greens, and mustard greens  Eat yellow and orange vegetables such as carrots, sweet potatoes, and winter squash  · Eat a variety of fruits every day  Choose fresh or canned fruit (canned in its own juice or light syrup) instead of juice  Fruit juice has very little or no fiber  · Eat low-fat dairy foods  Drink fat-free (skim) milk or 1% milk  Eat fat-free yogurt and low-fat cottage cheese  Try low-fat cheeses such as mozzarella and other reduced-fat cheeses  · Choose meat and other protein foods that are low in fat  Choose beans or other legumes such as split peas or lentils  Choose fish, skinless poultry (chicken or turkey), or lean cuts of red meat (beef or pork)  Before you cook meat or poultry, cut off any visible fat  · Use less fat and oil  Try baking foods instead of frying them  Add less fat, such as margarine, sour cream, regular salad dressing and mayonnaise to foods  Eat fewer high-fat foods  Some examples of high-fat foods include french fries, doughnuts, ice cream, and cakes  · Eat fewer sweets  Limit foods and drinks that are high in sugar  This includes candy, cookies, regular soda, and sweetened drinks  Exercise:  Exercise at least 30 minutes per day on most days of the week  Some examples of exercise include walking, biking, dancing, and swimming  You can also fit in more physical activity by taking the stairs instead of the elevator or parking farther away from stores  Ask your healthcare provider about the best exercise plan for you  © Copyright [employer] 2018 Information is for End User's use only and may not be sold, redistributed or otherwise used for commercial purposes   All illustrations and images included in CareNotes® are the copyrighted property of [employer]  or [address] [place] [address] [employer]

## 2022-11-01 NOTE — PROGRESS NOTES
Assessment and Plan:     Problem List Items Addressed This Visit        Endocrine    Type 2 diabetes mellitus with hyperglycemia, without long-term current use of insulin ([facility] ) - Primary       Lab Results   Component Value Date    HGBA1C 6 6 (A) 07/01/2022   A1C 11/1/22: 6 7  Foot exam performed 7/1/22  On ACE and statin  Eye exam up to date 9/11/20 and due every 2 years            Respiratory    COPD (chronic obstructive pulmonary disease) ([facility] )     Discontinue trelegy in favor of Breztri  Goal of decreasing albuterol use to 4 times per week or less  Relevant Medications    albuterol (PROVENTIL HFA,VENTOLIN HFA) 90 mcg/act inhaler    Budeson-Glycopyrrol-Formoterol (Breztri Aerosphere) 160-9-4 8 MCG/ACT AERO       Genitourinary    Stage 3 chronic kidney disease, unspecified whether stage 3a or 3b CKD ([place] [address] )      Other Visit Diagnoses     Medicare annual wellness visit, subsequent              Depression Screening and Follow-up Plan: Patient was screened for depression during today's encounter  They screened negative with a PHQ-2 score of 1  Falls Plan of Care: balance, strength, and gait training instructions were provided  Preventive health issues were discussed with patient, and age appropriate screening tests were ordered as noted in patient's After Visit Summary  Personalized health advice and appropriate referrals for health education or preventive services given if needed, as noted in patient's After Visit Summary  History of Present Illness:     Patient presents for a Medicare Wellness Visit    Pt presents for routine visit  She is up to date with labs and diabetic foot and eye exam  She has been using Trelegy without improvement in her COPD symptoms and still needs her albuterol twice a day  She is agreeable to an alternative  Subsequent AWV performed       Patient Care Team:  Kalpesh Rubi PA-C as PCP - General (Internal Medicine)  Ezio Hodge MD as Endoscopist     Review of Systems:     Review of Systems   Constitutional: Negative for chills and fever  HENT: Negative for congestion, ear pain, hearing loss, postnasal drip, rhinorrhea, sinus pressure, sinus pain, sore throat and trouble swallowing  Eyes: Negative for pain and visual disturbance  Respiratory: Positive for shortness of breath  Negative for cough, chest tightness and wheezing  Cardiovascular: Negative  Negative for chest pain, palpitations and leg swelling  Gastrointestinal: Negative for abdominal pain, blood in stool, constipation, diarrhea, nausea and vomiting  Endocrine: Negative for cold intolerance, heat intolerance, polydipsia, polyphagia and polyuria  Genitourinary: Negative for difficulty urinating, dysuria, flank pain and urgency  Musculoskeletal: Negative for arthralgias, back pain, gait problem and myalgias  Skin: Negative for rash  Allergic/Immunologic: Negative  Neurological: Negative for dizziness, weakness, light-headedness and headaches  Hematological: Negative  Psychiatric/Behavioral: Negative for behavioral problems, dysphoric mood and sleep disturbance  The patient is not nervous/anxious           Problem List:     Patient Active Problem List   Diagnosis   • Acid reflux disease   • Asthma, mild intermittent   • COPD (chronic obstructive pulmonary disease) ([facility])   • Type 2 diabetes mellitus with hyperglycemia, without long-term current use of insulin ([facility])   • Hyperlipidemia, unspecified   • Essential hypertension   • Neuropathy   • Spinal stenosis of lumbar region without neurogenic claudication   • Vitamin D deficiency   • Chronic diastolic congestive heart failure ([facility])   • OAB (overactive bladder)   • NSVT (nonsustained ventricular tachycardia)   • Chronic constipation   • Abnormal findings on auscultation   • Dyspnea on exertion   • Stage 3 chronic kidney disease, unspecified whether stage 3a or 3b CKD ([facility] )      Past Medical and Surgical History:     Past Medical History:   Diagnosis Date   • Allergic    • Allergic rhinitis    • COPD (chronic obstructive pulmonary disease) ([facility])    • Diabetes mellitus ([facility] )    • GERD (gastroesophageal reflux disease)    • Hyperlipidemia    • Hypertension    • Left non-suppurative otitis media 2019   • Pneumonia    • Pneumonia of right lower lobe due to infectious organism 2018   • Stage 3 chronic kidney disease, unspecified whether stage 3a or 3b CKD ([facility] ) 2022   • Vitamin D deficiency      Past Surgical History:   Procedure Laterality Date   • CARDIAC CATHETERIZATION     • CHOLECYSTECTOMY     • COLONOSCOPY N/A 2019    Procedure: COLONOSCOPY with multiple  polypectomies;  Surgeon: Connor Sweet MD;  Location: [address] [place] [address] GI LAB; Service: Gastroenterology   • EYE SURGERY Bilateral     CATARACT      Family History:     Family History   Problem Relation Age of Onset   • Lung cancer Father    • Heart disease Mother    • Lung cancer Sister    • Lung cancer Brother       Social History:     Social History     Socioeconomic History   • Marital status:      Spouse name: None   • Number of children: None   • Years of education: None   • Highest education level: None   Occupational History   • Occupation: RETIRED   Tobacco Use   • Smoking status: Former Smoker     Packs/day: 1 00     Years: 50 00     Pack years: 50 00     Types: Cigarettes     Start date: 1963     Quit date: 2013     Years since quittin 8   • Smokeless tobacco: Never Used   Vaping Use   • Vaping Use: Never used   Substance and Sexual Activity   • Alcohol use: Never   • Drug use: Never   • Sexual activity: None   Other Topics Concern   • None   Social History Narrative    Daily caffeine consumption     Social Determinants of Health     Financial Resource Strain: Low Risk    • Difficulty of Paying Living Expenses: Not hard at all   Food Insecurity: Not on file   Transportation Needs: No Transportation Needs   • Lack of Transportation (Medical):  No • Lack of Transportation (Non-Medical): No   Physical Activity: Not on file   Stress: Not on file   Social Connections: Not on file   Intimate Partner Violence: Not on file   Housing Stability: Not on file      Medications and Allergies:     Current Outpatient Medications   Medication Sig Dispense Refill   • albuterol (PROVENTIL HFA,VENTOLIN HFA) 90 mcg/act inhaler      • aspirin 81 mg chewable tablet Chew 81 mg daily     • atorvastatin (LIPITOR) 20 mg tablet TAKE ONE TABLET BY MOUTH EVERY MORNING 90 tablet 1   • Budeson-Glycopyrrol-Formoterol (Breztri Aerosphere) 160-9-4 8 MCG/ACT AERO Inhale 2 puffs in the morning Rinse mouth after use  10 7 g 1   • cetirizine (ZyrTEC) 10 mg tablet Take 10 mg by mouth daily     • Ergocalciferol (VITAMIN D2) 2000 units TABS Take 2,000 mg by mouth     • gabapentin (NEURONTIN) 300 mg capsule TAKE ONE CAPSULE BY MOUTH EVERY DAY 90 capsule 1   • glucose blood (OneTouch Verio) test strip Use as instructed TEST TWO TIMES DAILY e11 65 300 each 5   • lisinopril (ZESTRIL) 5 mg tablet TAKE ONE TABLET BY MOUTH EVERY DAY 90 tablet 1   • metFORMIN (GLUCOPHAGE) 500 mg tablet TAKE ONE TABLET BY MOUTH TWICE A DAY WITH MEALS 180 tablet 1   • Multiple Vitamin (MULTIVITAMINS PO) Take by mouth daily     • omeprazole (PriLOSEC) 40 MG capsule TAKE ONE CAPSULE BY MOUTH EVERY DAY BEFORE BREAKFAST 90 capsule 1   • oxybutynin (DITROPAN-XL) 5 mg 24 hr tablet TAKE ONE TABLET BY MOUTH EVERY DAY 90 tablet 1   • LICORICE, GLYCYRRHIZA GLABRA, PO Take 3-4 Pieces by mouth daily (Patient not taking: No sig reported)     • psyllium (METAMUCIL) 58 6 % packet Take 1 packet by mouth daily (Patient not taking: No sig reported)       No current facility-administered medications for this visit       Allergies   Allergen Reactions   • Bee Venom Anaphylaxis   • Other Shortness Of Breath     FLOWERS/GRASS      Immunizations:     Immunization History   Administered Date(s) Administered   • COVID-19 MODERNA VACC 0 5 ML IM 04/29/2021, 05/27/2021   • INFLUENZA 12/05/2005, 10/15/2006, 10/15/2007, 10/05/2009, 12/06/2010, 11/21/2011, 12/03/2012, 10/07/2013, 09/10/2014, 09/17/2015, 10/12/2016   • Influenza, high dose seasonal 0 7 mL 10/18/2018, 10/23/2019, 10/26/2021   • Influenza, seasonal, injectable 11/30/2017   • Pneumococcal Conjugate 13-Valent 09/11/2018   • Pneumococcal Polysaccharide PPV23 11/21/2002, 10/15/2007   • Tdap 09/17/2015, 06/24/2020      Health Maintenance: There are no preventive care reminders to display for this patient  Topic Date Due   • COVID-19 Vaccine (3 - Booster for Moderna series) 10/27/2021   • Influenza Vaccine (1) 09/01/2022      Medicare Screening Tests and Risk Assessments:     Nir Brady is here for her Subsequent Wellness visit  Last Medicare Wellness visit information reviewed, patient interviewed and updates made to the record today  Health Risk Assessment:   Patient rates overall health as good  Patient feels that their physical health rating is slightly better  Patient is satisfied with their life  Eyesight was rated as same  Hearing was rated as same  Patient feels that their emotional and mental health rating is much better  Patients states they are sometimes angry  Patient states they are often unusually tired/fatigued  Pain experienced in the last 7 days has been some  Patient's pain rating has been 1/10  Patient states that she has experienced no weight loss or gain in last 6 months  Depression Screening:   PHQ-2 Score: 1      Fall Risk Screening: In the past year, patient has experienced: history of falling in past year    Number of falls: 2 or more  Injured during fall?: No    Feels unsteady when standing or walking?: Yes    Worried about falling?: Yes      Urinary Incontinence Screening:   Patient has not leaked urine accidently in the last six months  Home Safety:  Patient has trouble with stairs inside or outside of their home   Patient has working smoke alarms and has working carbon monoxide detector  Home safety hazards include: none  Nutrition:   Current diet is Regular  Medications:   Patient is currently taking over-the-counter supplements  OTC medications include: see medication list  Patient is able to manage medications  Activities of Daily Living (ADLs)/Instrumental Activities of Daily Living (IADLs):   Walk and transfer into and out of bed and chair?: Yes  Dress and groom yourself?: Yes    Bathe or shower yourself?: Yes    Feed yourself? Yes  Do your laundry/housekeeping?: Yes  Manage your money, pay your bills and track your expenses?: Yes  Make your own meals?: Yes    Do your own shopping?: Yes    Previous Hospitalizations:   Any hospitalizations or ED visits within the last 12 months?: No      Advance Care Planning:   Living will: Yes    Durable POA for healthcare: No    Advanced directive: Yes      Cognitive Screening:   Provider or family/friend/caregiver concerned regarding cognition?: No    PREVENTIVE SCREENINGS      Cardiovascular Screening:    General: Screening Not Indicated and History Lipid Disorder      Diabetes Screening:     General: Screening Not Indicated and History Diabetes      Colorectal Cancer Screening:     General: Screening Not Indicated      Breast Cancer Screening:     General: Screening Not Indicated      Cervical Cancer Screening:    General: Screening Not Indicated      Osteoporosis Screening:    General: Screening Not Indicated      Abdominal Aortic Aneurysm (AAA) Screening:        General: Screening Not Indicated      Lung Cancer Screening:     General: Screening Not Indicated      Hepatitis C Screening:    General: Screening Not Indicated    Screening, Brief Intervention, and Referral to Treatment (SBIRT)    Screening    Typical number of drinks in a week: 0    Single Item Drug Screening:  How often have you used an illegal drug (including marijuana) or a prescription medication for non-medical reasons in the past year? never    Single Item Drug Screen Score: 0  Interpretation: Negative screen for possible drug use disorder    No exam data present     Physical Exam:     /64 (BP Location: Left arm, Patient Position: Sitting)   Pulse 80   Temp 97 9 °F (36 6 °C)   Ht 5' 2" (1 575 m)   Wt 74 6 kg (164 lb 8 oz)   SpO2 95%   BMI 30 09 kg/m²     Physical Exam  Vitals and nursing note reviewed  Constitutional:       General: She is not in acute distress  Appearance: She is well-developed  HENT:      Head: Normocephalic and atraumatic  Right Ear: External ear normal       Left Ear: External ear normal       Nose: Nose normal    Eyes:      Conjunctiva/sclera: Conjunctivae normal    Cardiovascular:      Rate and Rhythm: Normal rate and regular rhythm  Heart sounds: Normal heart sounds  No murmur heard  Pulmonary:      Effort: Pulmonary effort is normal  No respiratory distress  Breath sounds: Normal breath sounds  Abdominal:      General: Bowel sounds are normal       Palpations: Abdomen is soft  Tenderness: There is no abdominal tenderness  Musculoskeletal:         General: Normal range of motion  Cervical back: Normal range of motion and neck supple  Skin:     General: Skin is warm and dry  Neurological:      Mental Status: She is alert and oriented to person, place, and time  Psychiatric:         Behavior: Behavior normal          Thought Content:  Thought content normal          Judgment: Judgment normal           Mary Kay Oliveira PA-C

## 2022-11-02 DIAGNOSIS — J44.9 CHRONIC OBSTRUCTIVE PULMONARY DISEASE, UNSPECIFIED COPD TYPE (HCC): ICD-10-CM

## 2022-11-02 RX ORDER — BUDESONIDE, GLYCOPYRROLATE, AND FORMOTEROL FUMARATE 160; 9; 4.8 UG/1; UG/1; UG/1
2 AEROSOL, METERED RESPIRATORY (INHALATION) DAILY
Qty: 32.1 G | Refills: 1 | Status: SHIPPED | OUTPATIENT
Start: 2022-11-02

## 2022-12-15 DIAGNOSIS — I10 HYPERTENSION, UNSPECIFIED TYPE: ICD-10-CM

## 2022-12-15 DIAGNOSIS — M79.2 NEUROPATHIC PAIN: ICD-10-CM

## 2022-12-15 RX ORDER — GABAPENTIN 300 MG/1
300 CAPSULE ORAL DAILY
Qty: 90 CAPSULE | Refills: 1 | Status: SHIPPED | OUTPATIENT
Start: 2022-12-15

## 2022-12-15 RX ORDER — LISINOPRIL 5 MG/1
5 TABLET ORAL DAILY
Qty: 90 TABLET | Refills: 1 | Status: SHIPPED | OUTPATIENT
Start: 2022-12-15

## 2022-12-24 DIAGNOSIS — K21.9 GASTROESOPHAGEAL REFLUX DISEASE WITHOUT ESOPHAGITIS: ICD-10-CM

## 2022-12-24 DIAGNOSIS — E11.9 TYPE 2 DIABETES MELLITUS WITHOUT COMPLICATION, WITHOUT LONG-TERM CURRENT USE OF INSULIN (HCC): ICD-10-CM

## 2022-12-24 RX ORDER — OMEPRAZOLE 40 MG/1
CAPSULE, DELAYED RELEASE ORAL
Qty: 90 CAPSULE | Refills: 1 | Status: SHIPPED | OUTPATIENT
Start: 2022-12-24

## 2023-01-13 ENCOUNTER — OFFICE VISIT (OUTPATIENT)
Dept: PULMONOLOGY | Facility: CLINIC | Age: 83
End: 2023-01-13

## 2023-01-13 VITALS
HEIGHT: 62 IN | TEMPERATURE: 98 F | BODY MASS INDEX: 29.81 KG/M2 | OXYGEN SATURATION: 95 % | SYSTOLIC BLOOD PRESSURE: 132 MMHG | DIASTOLIC BLOOD PRESSURE: 89 MMHG | WEIGHT: 162 LBS | HEART RATE: 83 BPM

## 2023-01-13 DIAGNOSIS — R07.81 RIB PAIN: ICD-10-CM

## 2023-01-13 DIAGNOSIS — J44.9 COPD, MODERATE (HCC): Primary | ICD-10-CM

## 2023-01-13 RX ORDER — ALBUTEROL SULFATE 2.5 MG/3ML
2.5 SOLUTION RESPIRATORY (INHALATION) EVERY 6 HOURS PRN
Qty: 360 ML | Refills: 5 | Status: SHIPPED | OUTPATIENT
Start: 2023-01-13 | End: 2023-01-19 | Stop reason: SDUPTHER

## 2023-01-13 RX ORDER — IBUPROFEN 400 MG/1
400 TABLET ORAL EVERY 6 HOURS PRN
Qty: 30 TABLET | Refills: 0 | Status: SHIPPED | OUTPATIENT
Start: 2023-01-13

## 2023-01-13 NOTE — ASSESSMENT & PLAN NOTE
Patient does endorse falling on her right side similar to where she has been having pain  On exam it appears to be musculoskeletal most   Will trial ibuprofen 400 mg every 6 hours as needed for pain  recommended she check chest x-ray as previously recommended for completeness sake

## 2023-01-13 NOTE — LETTER
To whom it may concern:    Kiarra Frausto is under my professional care  Last seen in the office 1/13/2023  She has severe obstructive lung disease/asthma  Cigarette smoke other aerosolized irritants trigger her symptoms and make it hard for her to breathe  Medically necessary for her to move to a different apartment where there is less smoke and other aerosolized irritant exposure  If you have any questions please feel free to contact me      Edward Strange PA-C

## 2023-01-13 NOTE — ASSESSMENT & PLAN NOTE
Daron Kee is unable to afford inhalers currently likely due to her deductible  Instructed on the proper way to use Breztri 2 puffs twice daily  She will try this for the next month and if she still prefers Trelegy will provide her samples of that moving forward to let her deductible is met  Continue albuterol HFA/nebs every 6 hours as needed  I placed orders for nebulizer and nebulizer supply for her  Reviewed pulmonary function test with her today that are consistent with moderate obstructive airflow defect with air trapping and moderately impaired diffusion capacity consistent with moderate COPD  Provided to patient requesting permission from her landlord to switch apartments she would not have so much exposures to smoke and other inhalants trigger her symptoms

## 2023-01-13 NOTE — PROGRESS NOTES
Pulmonary Follow Up Note   Javid Hernández 80 y o  female MRN: [id number]  1/13/2023      Assessment:    COPD, moderate ([address] )  Emmy Beltran is unable to afford inhalers currently likely due to her deductible  Instructed on the proper way to use Breztri 2 puffs twice daily  She will try this for the next month and if she still prefers Trelegy will provide her samples of that moving forward to let her deductible is met  Continue albuterol HFA/nebs every 6 hours as needed  I placed orders for nebulizer and nebulizer supply for her  Reviewed pulmonary function test with her today that are consistent with moderate obstructive airflow defect with air trapping and moderately impaired diffusion capacity consistent with moderate COPD  Provided to patient requesting permission from her landlord to switch apartments she would not have so much exposures to smoke and other inhalants trigger her symptoms  Rib pain  Patient does endorse falling on her right side similar to where she has been having pain  On exam it appears to be musculoskeletal most   Will trial ibuprofen 400 mg every 6 hours as needed for pain  recommended she check chest x-ray as previously recommended for completeness sake  Plan:    Diagnoses and all orders for this visit:    COPD, moderate ([address] )  -     Nebulizer  -     Nebulizer Supplies  -     albuterol (2 5 mg/3 mL) 0 083 % nebulizer solution; Take 3 mL (2 5 mg total) by nebulization every 6 (six) hours as needed for wheezing or shortness of breath  -     ibuprofen (MOTRIN) 400 mg tablet; Take 1 tablet (400 mg total) by mouth every 6 (six) hours as needed for mild pain    Rib pain        No follow-ups on file  History of Present Illness   HPI:  Javid Hernández is a 80 y o  female who is the office today for routine follow-up  Patient saw Dr Te Mota in October for COPD    Was recommended to use Trelegy samples and it was also recommended to go on an all nebulized regimen including budesonide and formoterol  Chest x-ray was also recommended for atypical chest pain concerning for displaced rib fracture  She does not have chest x-ray done  She also endorses that she does not have a nebulizer therefore cannot get nebulizer solutions for it  She did like Trelegy and feel like it worked well for her but it is too costly  Was switched to [facility] and that is even more costly and she feels does not work as well help her she also reports that she is only been taking 2 puffs once daily instead of twice daily  She complains of a chronic cough productive of clear sputum no hemoptysis  Complains of wheezing and dyspnea on exertion with any level of activity  She also endorses significant triggers in her apartment building stating that many people smoke marijuana and cigarettes that exacerbate her breathing  Patient denies chest pain, pleurisy or lower extremity edema  She herself is a former smoker with a quit date 8 years ago  Prior to that she had a 50-pack-year smoking history  Review of Systems   All other systems reviewed and are negative  Historical Information   Past Medical History:   Diagnosis Date   • Allergic    • Allergic rhinitis    • COPD (chronic obstructive pulmonary disease) ([address] )    • Diabetes mellitus ([place] [address] )    • GERD (gastroesophageal reflux disease)    • Hyperlipidemia    • Hypertension    • Left non-suppurative otitis media 11/27/2019   • Pneumonia    • Pneumonia of right lower lobe due to infectious organism 12/4/2018   • Stage 3 chronic kidney disease, unspecified whether stage 3a or 3b CKD ([address] ) 11/1/2022   • Vitamin D deficiency      Past Surgical History:   Procedure Laterality Date   • CARDIAC CATHETERIZATION     • CHOLECYSTECTOMY  1984   • COLONOSCOPY N/A 1/30/2019    Procedure: COLONOSCOPY with multiple  polypectomies;  Surgeon: Zulma Medina MD;  Location: [address] [place] [address] GI LAB;   Service: Gastroenterology   • EYE SURGERY Bilateral     CATARACT     Family History   Problem Relation Age of Onset   • Lung cancer Father    • Heart disease Mother    • Lung cancer Sister    • Lung cancer Brother        Social History     Tobacco Use   Smoking Status Former   • Packs/day: 1 00   • Years: 50 00   • Pack years: 50 00   • Types: Cigarettes   • Start date: 1963   • Quit date: 2013   • Years since quittin 0   Smokeless Tobacco Never         Meds/Allergies     Current Outpatient Medications:   •  albuterol (2 5 mg/3 mL) 0 083 % nebulizer solution, Take 3 mL (2 5 mg total) by nebulization every 6 (six) hours as needed for wheezing or shortness of breath, Disp: 360 mL, Rfl: 5  •  albuterol (PROVENTIL HFA,VENTOLIN HFA) 90 mcg/act inhaler, , Disp: , Rfl:   •  aspirin 81 mg chewable tablet, Chew 81 mg daily, Disp: , Rfl:   •  atorvastatin (LIPITOR) 20 mg tablet, TAKE ONE TABLET BY MOUTH EVERY MORNING, Disp: 90 tablet, Rfl: 1  •  Budeson-Glycopyrrol-Formoterol (Breztri Aerosphere) 160-9-4 8 MCG/ACT AERO, Inhale 2 puffs in the morning Rinse mouth after use , Disp: 32 1 g, Rfl: 1  •  cetirizine (ZyrTEC) 10 mg tablet, Take 10 mg by mouth daily, Disp: , Rfl:   •  Ergocalciferol (VITAMIN D2) 2000 units TABS, Take 2,000 mg by mouth, Disp: , Rfl:   •  gabapentin (NEURONTIN) 300 mg capsule, Take 1 capsule (300 mg total) by mouth daily, Disp: 90 capsule, Rfl: 1  •  glucose blood (OneTouch Verio) test strip, Use as instructed TEST TWO TIMES DAILY e11 65, Disp: 300 each, Rfl: 5  •  ibuprofen (MOTRIN) 400 mg tablet, Take 1 tablet (400 mg total) by mouth every 6 (six) hours as needed for mild pain, Disp: 30 tablet, Rfl: 0  •  lisinopril (ZESTRIL) 5 mg tablet, Take 1 tablet (5 mg total) by mouth daily, Disp: 90 tablet, Rfl: 1  •  metFORMIN (GLUCOPHAGE) 500 mg tablet, TAKE ONE TABLET BY MOUTH TWICE A DAY WITH MEALS, Disp: 180 tablet, Rfl: 1  •  Multiple Vitamin (MULTIVITAMINS PO), Take by mouth daily, Disp: , Rfl:   •  omeprazole (PriLOSEC) 40 MG capsule, TAKE ONE CAPSULE BY MOUTH EVERY DAY BEFORE BREAKFAST, Disp: 90 capsule, Rfl: 1  •  oxybutynin (DITROPAN-XL) 5 mg 24 hr tablet, TAKE ONE TABLET BY MOUTH EVERY DAY, Disp: 90 tablet, Rfl: 1  •  LICORICE, GLYCYRRHIZA GLABRA, PO, Take 3-4 Pieces by mouth daily (Patient not taking: Reported on 4/26/2022), Disp: , Rfl:   •  psyllium (METAMUCIL) 58 6 % packet, Take 1 packet by mouth daily (Patient not taking: Reported on 10/25/2021), Disp: , Rfl:   Allergies   Allergen Reactions   • Bee Venom Anaphylaxis   • Other Shortness Of Breath     FLOWERS/GRASS       Vitals: Blood pressure 132/89, pulse 83, temperature 98 °F (36 7 °C), temperature source Tympanic, height 5' 2" (1 575 m), weight 73 5 kg (162 lb), SpO2 95 %  Body mass index is 29 63 kg/m²  Oxygen Therapy  SpO2: 95 %  Oxygen Therapy: None (Room air)    Physical Exam  Physical Exam  Vitals reviewed  Constitutional:       Appearance: Normal appearance  She is well-developed  HENT:      Head: Normocephalic and atraumatic  Nose: Nose normal       Mouth/Throat:      Mouth: Mucous membranes are moist    Eyes:      Extraocular Movements: Extraocular movements intact  Cardiovascular:      Rate and Rhythm: Normal rate and regular rhythm  Heart sounds: Normal heart sounds  Pulmonary:      Effort: Pulmonary effort is normal  No respiratory distress  Breath sounds: No wheezing, rhonchi or rales  Musculoskeletal:         General: No swelling  Cervical back: Normal range of motion and neck supple  Skin:     General: Skin is warm and dry  Neurological:      General: No focal deficit present  Mental Status: She is alert  Mental status is at baseline  Psychiatric:         Mood and Affect: Mood normal          Behavior: Behavior normal          Labs: I have personally reviewed pertinent lab results  , ABG: No results found for: PHART, QPN8NKV, PO2ART, CEW2YQT, I7RGUSTK, BEART, SOURCE, BNP: No results found for: BNP, CBC: No results found for: WBC, HGB, HCT, MCV, PLT, ADJUSTEDWBC, MCH, MCHC, RDW, MPV, NRBC, CMP: No results found for: SODIUM, K, CL, CO2, ANIONGAP, BUN, CREATININE, GLUCOSE, CALCIUM, AST, ALT, ALKPHOS, PROT, BILITOT, EGFR, PT/INR: No results found for: PT, INR, Troponin: No results found for: TROPONINI  Lab Results   Component Value Date    WBC 8 79 07/27/2022    HGB 14 3 07/27/2022    HCT 43 8 07/27/2022    MCV 87 07/27/2022     07/27/2022     Lab Results   Component Value Date    CALCIUM 10 0 07/27/2022    K 4 5 07/27/2022    CO2 26 07/27/2022     07/27/2022    BUN 15 07/27/2022    CREATININE 1 15 07/27/2022     No results found for: IGE  Lab Results   Component Value Date    ALT 31 07/27/2022    AST 15 07/27/2022    ALKPHOS 71 07/27/2022       Imaging and other studies: I have personally reviewed pertinent reports  and I have personally reviewed pertinent films in PACS     Chest x-ray 11/26/2019  Lungs are clear without acute infiltrate, pneumothorax, pleural effusion    Pulmonary function testing:  Performed 10/26/2022   FEV1/FVC ratio 54%   FEV1 59% predicted  FVC 83% predicted  no response to bronchodilators   % predicted   % predicted  DLCO corrected for hemoglobin 60 % predicted  Moderate obstructive airflow defect  No severe response bronchodilators  Increase RV indicative of air trapping  Moderately poor diffusion capacity

## 2023-01-16 ENCOUNTER — TELEPHONE (OUTPATIENT)
Dept: PULMONOLOGY | Facility: CLINIC | Age: 83
End: 2023-01-16

## 2023-01-19 DIAGNOSIS — J44.9 COPD, MODERATE (HCC): ICD-10-CM

## 2023-01-19 RX ORDER — ALBUTEROL SULFATE 2.5 MG/3ML
2.5 SOLUTION RESPIRATORY (INHALATION) EVERY 6 HOURS PRN
Qty: 1080 ML | Refills: 1 | Status: SHIPPED | OUTPATIENT
Start: 2023-01-19 | End: 2023-04-19

## 2023-01-19 NOTE — TELEPHONE ENCOUNTER
[facility] called, to be able to process the albuterol nebulizer solution it has to be submitted as a 90 day supply, of 1080mL   Please advise

## 2023-02-27 ENCOUNTER — HOSPITAL ENCOUNTER (EMERGENCY)
Facility: HOSPITAL | Age: 83
Discharge: HOME | End: 2023-02-27
Attending: EMERGENCY MEDICINE

## 2023-02-27 VITALS
RESPIRATION RATE: 20 BRPM | WEIGHT: 162 LBS | SYSTOLIC BLOOD PRESSURE: 150 MMHG | DIASTOLIC BLOOD PRESSURE: 75 MMHG | OXYGEN SATURATION: 91 % | TEMPERATURE: 97.5 F | BODY MASS INDEX: 29.63 KG/M2 | HEART RATE: 84 BPM

## 2023-02-27 DIAGNOSIS — R04.0 RIGHT-SIDED EPISTAXIS: Primary | ICD-10-CM

## 2023-02-27 RX ORDER — OXYMETAZOLINE HYDROCHLORIDE 0.05 G/100ML
2 SPRAY NASAL ONCE
Status: COMPLETED | OUTPATIENT
Start: 2023-02-27 | End: 2023-02-27

## 2023-02-27 RX ADMIN — OXYMETAZOLINE HCL 2 SPRAY: 0.05 SPRAY NASAL at 04:34

## 2023-02-27 NOTE — DISCHARGE INSTRUCTIONS
You have been seen for nosebleeds  Please use the prescribed afrin nasal spray for your symptoms  Please hold pressure on your nose if your nose bleed returns  Return to the emergency department if you develop worsening bleeding, lightheadedness or any other symptoms of concern  Please follow up with ENT and your PCP by calling the number provided

## 2023-02-27 NOTE — ED NOTES
Call made to pt daughter Kay Ernst for transport home  Lina does not drive, but will contact her brother to pick mom up  Phone number [phone]       Rema Mendez RN  02/27/23 6650

## 2023-02-27 NOTE — ED PROVIDER NOTES
History  Chief Complaint   Patient presents with   • Nose Bleed     Nose bleed on and off past 2 days  Josh Cardona is a 80y o  year old female with PMH of COPD, HTN, HLD, DM, GERD, CKD presenting to the [facility] ED for epistaxis  Patient reports intermittent right-sided nosebleed for the past 2 days  She occasionally reports clots of blood coming from the right nose  She occasionally notices blood dripping down the back of her throat  No reported trauma to the area  No history of nosebleeds previously  This evening, the patient noticed a nosebleed and states she got scared prompting her to call EMS  The nosebleed resolved prior to EMS arrival   Patient states she is not actively having any bleeding from the nose at the time of evaluation in the emergency department  Patient has not taken/received any medications at home for relief of symptoms  Patient denies taking anticoagulant/antiplatelet medications daily  History provided by:  Medical records and patient   used: No    Nose Bleed  Associated symptoms: congestion    Associated symptoms: no cough, no fever and no sore throat        Prior to Admission Medications   Prescriptions Last Dose Informant Patient Reported? Taking? Budeson-Glycopyrrol-Formoterol (Breztri Aerosphere) 160-9-4 8 MCG/ACT AERO 2/26/2023  No Yes   Sig: Inhale 2 puffs in the morning Rinse mouth after use     Ergocalciferol (VITAMIN D2) 2000 units TABS 2/26/2023  Yes Yes   Sig: Take 2,803 mg by mouth   LICORICE, GLYCYRRHIZA GLABRA, PO   Yes No   Sig: Take 3-4 Pieces by mouth daily   Patient not taking: Reported on 4/26/2022   Multiple Vitamin (MULTIVITAMINS PO) 2/26/2023  Yes Yes   Sig: Take by mouth daily   albuterol (2 5 mg/3 mL) 0 083 % nebulizer solution   No No   Sig: Take 3 mL (2 5 mg total) by nebulization every 6 (six) hours as needed for wheezing or shortness of breath   albuterol (PROVENTIL HFA,VENTOLIN HFA) 90 mcg/act inhaler   Yes No   aspirin 81 mg chewable tablet Past Week  Yes Yes   Sig: Chew 81 mg daily   atorvastatin (LIPITOR) 20 mg tablet 2/26/2023  No Yes   Sig: TAKE ONE TABLET BY MOUTH EVERY MORNING   cetirizine (ZyrTEC) 10 mg tablet 2/26/2023  Yes Yes   Sig: Take 10 mg by mouth daily   gabapentin (NEURONTIN) 300 mg capsule 2/26/2023  No Yes   Sig: Take 1 capsule (300 mg total) by mouth daily   glucose blood (OneTouch Verio) test strip 2/26/2023  No Yes   Sig: Use as instructed TEST TWO TIMES DAILY e11 65   ibuprofen (MOTRIN) 400 mg tablet   No No   Sig: Take 1 tablet (400 mg total) by mouth every 6 (six) hours as needed for mild pain   lisinopril (ZESTRIL) 5 mg tablet 2/26/2023  No Yes   Sig: Take 1 tablet (5 mg total) by mouth daily   metFORMIN (GLUCOPHAGE) 500 mg tablet 2/26/2023  No Yes   Sig: TAKE ONE TABLET BY MOUTH TWICE A DAY WITH MEALS   omeprazole (PriLOSEC) 40 MG capsule 2/26/2023  No Yes   Sig: TAKE ONE CAPSULE BY MOUTH EVERY DAY BEFORE BREAKFAST   oxybutynin (DITROPAN-XL) 5 mg 24 hr tablet 2/26/2023  No Yes   Sig: TAKE ONE TABLET BY MOUTH EVERY DAY   psyllium (METAMUCIL) 58 6 % packet   Yes No   Sig: Take 1 packet by mouth daily   Patient not taking: Reported on 10/25/2021      Facility-Administered Medications: None       Past Medical History:   Diagnosis Date   • Allergic    • Allergic rhinitis    • COPD (chronic obstructive pulmonary disease) (HCC)    • Diabetes mellitus (HCC)    • GERD (gastroesophageal reflux disease)    • Hyperlipidemia    • Hypertension    • Left non-suppurative otitis media 11/27/2019   • Pneumonia    • Pneumonia of right lower lobe due to infectious organism 12/4/2018   • Stage 3 chronic kidney disease, unspecified whether stage 3a or 3b CKD ([facility] ) 11/1/2022   • Vitamin D deficiency        Past Surgical History:   Procedure Laterality Date   • CARDIAC CATHETERIZATION     • CHOLECYSTECTOMY  1984   • COLONOSCOPY N/A 1/30/2019    Procedure: COLONOSCOPY with multiple  polypectomies;  Surgeon: Ching Maddox MD; Location: [facility] GI LAB; Service: Gastroenterology   • EYE SURGERY Bilateral     CATARACT       Family History   Problem Relation Age of Onset   • Lung cancer Father    • Heart disease Mother    • Lung cancer Sister    • Lung cancer Brother      I have reviewed and agree with the history as documented  E-Cigarette/Vaping   • E-Cigarette Use Never User      E-Cigarette/Vaping Substances   • Nicotine No    • THC No    • CBD No    • Flavoring No    • Other No    • Unknown No      Social History     Tobacco Use   • Smoking status: Former     Packs/day: 1 00     Years: 50 00     Pack years: 50 00     Types: Cigarettes     Start date: 1963     Quit date: 2013     Years since quittin 2   • Smokeless tobacco: Never   Vaping Use   • Vaping Use: Never used   Substance Use Topics   • Alcohol use: Never   • Drug use: Never       Review of Systems   Constitutional: Negative for chills and fever  HENT: Positive for congestion and nosebleeds  Negative for sore throat  Respiratory: Negative for cough and shortness of breath  Cardiovascular: Negative for chest pain  Gastrointestinal: Negative for abdominal pain, diarrhea, nausea and vomiting  Musculoskeletal: Negative for neck pain  Hematological: Bruises/bleeds easily  All other systems reviewed and are negative  Physical Exam  Physical Exam  Vitals and nursing note reviewed  Constitutional:       General: She is not in acute distress  Appearance: Normal appearance  She is well-developed  She is not ill-appearing, toxic-appearing or diaphoretic  HENT:      Head: Normocephalic and atraumatic  Nose: Mucosal edema present  No nasal deformity, nasal tenderness or congestion  Right Nostril: No epistaxis or septal hematoma  Left Nostril: No epistaxis or septal hematoma  Comments: Right anterior nasal septum friable mucosa, no active bleeding visualized  No gross epistaxis    No polyp or mass visualized in bilateral nasal passage  Mouth/Throat:      Pharynx: Oropharynx is clear  Comments: No blood visualized in the posterior oropharynx  Eyes:      General:         Right eye: No discharge  Left eye: No discharge  Cardiovascular:      Rate and Rhythm: Normal rate and regular rhythm  Pulmonary:      Effort: Pulmonary effort is normal  No accessory muscle usage or respiratory distress  Breath sounds: Normal breath sounds  No stridor  No decreased breath sounds, wheezing, rhonchi or rales  Abdominal:      General: There is no distension  Palpations: Abdomen is soft  Tenderness: There is no abdominal tenderness  There is no guarding or rebound  Musculoskeletal:      Cervical back: Normal range of motion and neck supple  No rigidity  Right lower leg: No tenderness  Left lower leg: No tenderness  Skin:     Capillary Refill: Capillary refill takes less than 2 seconds  Neurological:      Mental Status: She is alert and oriented to person, place, and time  Psychiatric:         Mood and Affect: Mood normal          Behavior: Behavior normal          Vital Signs  ED Triage Vitals [02/27/23 0346]   Temperature Pulse Respirations Blood Pressure SpO2   97 5 °F (36 4 °C) 93 18 140/74 94 %      Temp Source Heart Rate Source Patient Position - Orthostatic VS BP Location FiO2 (%)   Tympanic Monitor Lying Right arm --      Pain Score       No Pain           Vitals:    02/27/23 0346 02/27/23 0500 02/27/23 0507   BP: 140/74 150/75 150/75   Pulse: 93 84 84   Patient Position - Orthostatic VS: Lying Sitting          Visual Acuity      ED Medications  Medications   oxymetazoline (AFRIN) 0 05 % nasal spray 2 spray (2 sprays Each Nare Given 2/27/23 0434)       Diagnostic Studies  Results Reviewed     None                 No orders to display              Procedures  Procedures         ED Course  ED Course as of 02/27/23 0605   [facility] Feb 27, 2023   0447 Patient reassessed   No active epistaxis during ED course  Patient denies any complaints  Will plan for discharge with afrin and plan for outpatient f/u with PCP/ENT if symptoms become recurrent  Medical Decision Making    80 y o  female presenting for epistaxis  VSS, no active bleeding on exam   Will give afrin nasal spray and observe in ED for recurrence  If no additional epistaxis, will plan for discharge  I have discussed with the patient our plan to discharge them from the ED and the patient is in agreement with this plan  The patient was provided a written after visit summary with strict RTED precautions  Discharge Plan: As needed compression and Afrin nasal spray for epistaxis  PCP/ENT follow-up if symptoms become recurrent  Followup: I have discussed with the patient plan to follow up with their PCP and ENT  Contact information provided in AVS     Right-sided epistaxis: acute illness or injury  Risk  OTC drugs  Disposition  Final diagnoses:   Right-sided epistaxis     Time reflects when diagnosis was documented in both MDM as applicable and the Disposition within this note     Time User Action Codes Description Comment    2/27/2023  4:24 AM Danisha Parker Add [R04 0] Right-sided epistaxis       ED Disposition     ED Disposition   Discharge    Condition   Stable    Date/Time   Mon Feb 27, 2023  4:24 AM    Comment   Joanne Pena discharge to home/self care  Follow-up Information     Follow up With Specialties Details Why Contact Info Additional [id number] [facility] Otolaryngology Schedule an appointment as soon as possible for a visit  To make appointment for reevaluation in 3-5 days   [address] [place] [id number] [phone] Q LSGUP CHVIHJ D, [address] [place] [address], [address]    Lyn Hernandez PA-C Family Medicine, Internal Medicine, Physician Assistant Schedule an appointment as soon as possible for a visit  To make appointment for reevaluation in 3-5 days  [address]  [phone]             Patient's Medications   Discharge Prescriptions    No medications on file       No discharge procedures on file      PDMP Review     None          ED Provider  Electronically Signed by           Daniela Ngo DO  02/27/23 8330

## 2023-03-01 ENCOUNTER — TELEPHONE (OUTPATIENT)
Dept: INTERNAL MEDICINE CLINIC | Facility: CLINIC | Age: 83
End: 2023-03-01

## 2023-03-01 ENCOUNTER — TELEPHONE (OUTPATIENT)
Dept: OTOLARYNGOLOGY | Facility: CLINIC | Age: 83
End: 2023-03-01

## 2023-03-01 DIAGNOSIS — R04.0 EPISTAXIS: Primary | ICD-10-CM

## 2023-03-01 NOTE — TELEPHONE ENCOUNTER
Bhaskar Zayas called because she would like to schedule a ENT appt  I told her with her insurance that she would have to get a referral  She was going to call her PCP

## 2023-03-01 NOTE — TELEPHONE ENCOUNTER
Tierra Brooke called and is asking for a referral for an ENT  She was at the ED on 2/27 for a bloody

## 2023-03-02 ENCOUNTER — TELEPHONE (OUTPATIENT)
Dept: OTOLARYNGOLOGY | Facility: CLINIC | Age: 83
End: 2023-03-02

## 2023-03-02 NOTE — TELEPHONE ENCOUNTER
CLM for Adriano March to call [phone] to schedule her ENT appt   I do have an opeining today at 1 pm

## 2023-03-06 ENCOUNTER — RA CDI HCC (OUTPATIENT)
Dept: OTHER | Facility: HOSPITAL | Age: 83
End: 2023-03-06

## 2023-03-06 NOTE — PROGRESS NOTES
Oriana [facility]  coding opportunities    E11 22, I13 0, E11 40      Chart Reviewed number of suggestions sent to Provider: 3     Patients Insurance     Medicare Insurance: [address] [place] [address]

## 2023-03-08 ENCOUNTER — OFFICE VISIT (OUTPATIENT)
Dept: INTERNAL MEDICINE CLINIC | Facility: CLINIC | Age: 83
End: 2023-03-08

## 2023-03-08 VITALS
WEIGHT: 155.25 LBS | TEMPERATURE: 99 F | HEIGHT: 62 IN | SYSTOLIC BLOOD PRESSURE: 122 MMHG | HEART RATE: 87 BPM | OXYGEN SATURATION: 96 % | DIASTOLIC BLOOD PRESSURE: 62 MMHG | BODY MASS INDEX: 28.57 KG/M2

## 2023-03-08 DIAGNOSIS — M79.2 NEUROPATHIC PAIN: ICD-10-CM

## 2023-03-08 DIAGNOSIS — R26.2 AMBULATORY DYSFUNCTION: ICD-10-CM

## 2023-03-08 DIAGNOSIS — J44.9 CHRONIC OBSTRUCTIVE PULMONARY DISEASE, UNSPECIFIED COPD TYPE (HCC): ICD-10-CM

## 2023-03-08 DIAGNOSIS — N18.30 STAGE 3 CHRONIC KIDNEY DISEASE, UNSPECIFIED WHETHER STAGE 3A OR 3B CKD (HCC): ICD-10-CM

## 2023-03-08 DIAGNOSIS — R13.19 ESOPHAGEAL DYSPHAGIA: ICD-10-CM

## 2023-03-08 DIAGNOSIS — E11.65 TYPE 2 DIABETES MELLITUS WITH HYPERGLYCEMIA, WITHOUT LONG-TERM CURRENT USE OF INSULIN (HCC): Primary | ICD-10-CM

## 2023-03-08 LAB — SL AMB POCT HEMOGLOBIN AIC: 7 (ref ?–6.5)

## 2023-03-08 RX ORDER — GABAPENTIN 300 MG/1
300 CAPSULE ORAL 3 TIMES DAILY
Qty: 270 CAPSULE | Refills: 1
Start: 2023-03-08

## 2023-03-08 RX ORDER — BUDESONIDE, GLYCOPYRROLATE, AND FORMOTEROL FUMARATE 160; 9; 4.8 UG/1; UG/1; UG/1
2 AEROSOL, METERED RESPIRATORY (INHALATION) 2 TIMES DAILY
Qty: 32.1 G | Refills: 3 | Status: SHIPPED | OUTPATIENT
Start: 2023-03-08

## 2023-03-09 ENCOUNTER — TELEPHONE (OUTPATIENT)
Dept: INTERNAL MEDICINE CLINIC | Facility: CLINIC | Age: 83
End: 2023-03-09

## 2023-03-09 PROBLEM — R13.19 ESOPHAGEAL DYSPHAGIA: Status: ACTIVE | Noted: 2023-03-09

## 2023-03-09 PROBLEM — M79.2 NEUROPATHIC PAIN: Status: ACTIVE | Noted: 2023-03-09

## 2023-03-09 NOTE — ASSESSMENT & PLAN NOTE
Lab Results   Component Value Date    HGBA1C 7 0 (A) 03/08/2023     Pts symptoms are stable with current regime  No changes at present

## 2023-03-09 NOTE — TELEPHONE ENCOUNTER
Pt called she wants you to know she uses:     [facility] in [place]

## 2023-03-09 NOTE — PROGRESS NOTES
Name: William Clark      : 1940      MRN: [id number]  Encounter Provider: Hector Gonzalez PA-C  Encounter Date: 3/8/2023   Encounter department: [address] [place] [address]  Type 2 diabetes mellitus with hyperglycemia, without long-term current use of insulin ([facility])  Assessment & Plan:    Lab Results   Component Value Date    HGBA1C 7 0 (A) 2023     Pts symptoms are stable with current regime  No changes at present  Orders:  -     POCT hemoglobin A1c    2  Chronic obstructive pulmonary disease, unspecified COPD type ([facility] )  Assessment & Plan:  Continue Breztri but increase to twice aday    Orders:  -     Budeson-Glycopyrrol-Formoterol (Breztri Aerosphere) 160-9-4 8 MCG/ACT AERO; Inhale 2 puffs 2 (two) times a day Rinse mouth after use  3  Neuropathic pain  Assessment & Plan:  Continue gabapentin but increase to 300mg TID  Orders:  -     gabapentin (NEURONTIN) 300 mg capsule; Take 1 capsule (300 mg total) by mouth 3 (three) times a day    4  Stage 3 chronic kidney disease, unspecified whether stage 3a or 3b CKD ([facility] )    5  Esophageal dysphagia  Assessment & Plan: Will get swallow study to better evaluate and treat according to results    Orders:  -     FL barium swallow ROUTINE esophagus; Future; Expected date: 2023    6  Ambulatory dysfunction  -     Walker      BMI Counseling: Body mass index is 28 4 kg/m²  The BMI is above normal  Nutrition recommendations include decreasing portion sizes, consuming healthier snacks and limiting drinks that contain sugar  Rationale for BMI follow-up plan is due to patient being overweight or obese  Falls Plan of Care: balance, strength, and gait training instructions were provided  Subjective      Pt presents for routine visit  She unfortunately recently had issues with difficult to stop nosebleed which prompted ER visit  She does have follow up with ENT scheduled and has been using Afrin   She is up to date with labs  A1C today is stable at 7 0  She is noting improvement with Manisha Pereira but has only been using it once per day and feels she would benefit from twice a day dosing  She also notes increased back pain and would agreeable to increasing her gabapentin  Lastly, she relates issues with swallowing  She notes this with solids no matter how well she chews and it makes her nervous that she will choke  She denies pain with swalling, just difficulty  Review of Systems   Constitutional: Negative for chills and fever  HENT: Positive for nosebleeds and trouble swallowing  Negative for congestion, ear pain, hearing loss, postnasal drip, rhinorrhea, sinus pressure, sinus pain and sore throat  Eyes: Negative for pain and visual disturbance  Respiratory: Negative for cough, chest tightness, shortness of breath and wheezing  Cardiovascular: Negative  Negative for chest pain, palpitations and leg swelling  Gastrointestinal: Negative for abdominal pain, blood in stool, constipation, diarrhea, nausea and vomiting  Endocrine: Negative for cold intolerance, heat intolerance, polydipsia, polyphagia and polyuria  Genitourinary: Negative for difficulty urinating, dysuria, flank pain and urgency  Musculoskeletal: Negative for arthralgias, back pain, gait problem and myalgias  Skin: Negative for rash  Allergic/Immunologic: Negative  Neurological: Negative for dizziness, weakness, light-headedness and headaches  Hematological: Negative  Psychiatric/Behavioral: Negative for behavioral problems, dysphoric mood and sleep disturbance  The patient is not nervous/anxious          Current Outpatient Medications on File Prior to Visit   Medication Sig   • albuterol (PROVENTIL HFA,VENTOLIN HFA) 90 mcg/act inhaler    • aspirin 81 mg chewable tablet Chew 81 mg daily   • atorvastatin (LIPITOR) 20 mg tablet TAKE ONE TABLET BY MOUTH EVERY MORNING   • cetirizine (ZyrTEC) 10 mg tablet Take 10 mg by mouth daily   • Ergocalciferol (VITAMIN D2) 2000 units TABS Take 2,000 mg by mouth   • glucose blood (OneTouch Verio) test strip Use as instructed TEST TWO TIMES DAILY e11 65   • lisinopril (ZESTRIL) 5 mg tablet Take 1 tablet (5 mg total) by mouth daily   • metFORMIN (GLUCOPHAGE) 500 mg tablet TAKE ONE TABLET BY MOUTH TWICE A DAY WITH MEALS   • Multiple Vitamin (MULTIVITAMINS PO) Take by mouth daily   • omeprazole (PriLOSEC) 40 MG capsule TAKE ONE CAPSULE BY MOUTH EVERY DAY BEFORE BREAKFAST   • oxybutynin (DITROPAN-XL) 5 mg 24 hr tablet TAKE ONE TABLET BY MOUTH EVERY DAY       Objective     /62 (BP Location: Left arm, Patient Position: Sitting)   Pulse 87   Temp 99 °F (37 2 °C)   Ht 5' 2" (1 575 m)   Wt 70 4 kg (155 lb 4 oz)   SpO2 96%   BMI 28 40 kg/m²     Physical Exam  Vitals and nursing note reviewed  Constitutional:       General: She is not in acute distress  Appearance: She is well-developed  She is not diaphoretic  HENT:      Head: Normocephalic and atraumatic  Right Ear: External ear normal       Left Ear: External ear normal       Nose: Nose normal       Mouth/Throat:      Pharynx: No oropharyngeal exudate  Eyes:      General: No scleral icterus  Right eye: No discharge  Left eye: No discharge  Conjunctiva/sclera: Conjunctivae normal       Pupils: Pupils are equal, round, and reactive to light  Neck:      Thyroid: No thyromegaly  Cardiovascular:      Rate and Rhythm: Normal rate and regular rhythm  Heart sounds: Normal heart sounds  No murmur heard  No friction rub  No gallop  Pulmonary:      Effort: Pulmonary effort is normal  No respiratory distress  Breath sounds: Normal breath sounds  No wheezing or rales  Abdominal:      General: Bowel sounds are normal  There is no distension  Palpations: Abdomen is soft  Tenderness: There is no abdominal tenderness  Musculoskeletal:         General: No tenderness or deformity  Normal range of motion  Cervical back: Normal range of motion and neck supple  Skin:     General: Skin is warm and dry  Neurological:      Mental Status: She is alert and oriented to person, place, and time  Cranial Nerves: No cranial nerve deficit  Psychiatric:         Behavior: Behavior normal          Thought Content:  Thought content normal          Judgment: Judgment normal        Mary Kay Oliveira PA-C

## 2023-03-10 DIAGNOSIS — E78.5 HYPERLIPIDEMIA, UNSPECIFIED HYPERLIPIDEMIA TYPE: ICD-10-CM

## 2023-03-10 RX ORDER — ATORVASTATIN CALCIUM 20 MG/1
TABLET, FILM COATED ORAL
Qty: 90 TABLET | Refills: 1 | Status: SHIPPED | OUTPATIENT
Start: 2023-03-10

## 2023-03-17 ENCOUNTER — HOSPITAL ENCOUNTER (OUTPATIENT)
Dept: RADIOLOGY | Facility: HOSPITAL | Age: 83
End: 2023-03-17

## 2023-03-17 DIAGNOSIS — R13.19 ESOPHAGEAL DYSPHAGIA: ICD-10-CM

## 2023-03-20 DIAGNOSIS — R13.19 ESOPHAGEAL DYSPHAGIA: Primary | ICD-10-CM

## 2023-04-20 NOTE — TELEPHONE ENCOUNTER
I'm happy to report that your chest x-ray does NOT show a pneumonia, excess fluid in your lungs, or other problem with your lungs. Pt aware

## 2023-05-02 ENCOUNTER — OFFICE VISIT (OUTPATIENT)
Dept: OTOLARYNGOLOGY | Facility: CLINIC | Age: 83
End: 2023-05-02

## 2023-05-02 VITALS
WEIGHT: 145 LBS | OXYGEN SATURATION: 93 % | DIASTOLIC BLOOD PRESSURE: 70 MMHG | SYSTOLIC BLOOD PRESSURE: 100 MMHG | BODY MASS INDEX: 28.47 KG/M2 | HEIGHT: 60 IN | TEMPERATURE: 97.2 F | HEART RATE: 115 BPM

## 2023-05-02 DIAGNOSIS — R04.0 EPISTAXIS: Primary | ICD-10-CM

## 2023-05-02 DIAGNOSIS — K21.9 GASTROESOPHAGEAL REFLUX DISEASE, UNSPECIFIED WHETHER ESOPHAGITIS PRESENT: ICD-10-CM

## 2023-05-02 DIAGNOSIS — R13.19 ESOPHAGEAL DYSPHAGIA: ICD-10-CM

## 2023-05-02 DIAGNOSIS — J34.89 NASAL VESTIBULITIS: ICD-10-CM

## 2023-05-02 NOTE — PROGRESS NOTES
Review of Systems   Constitutional: Negative  HENT: Positive for trouble swallowing and voice change  Eyes: Negative  Respiratory: Positive for cough  Cardiovascular: Negative  Gastrointestinal: Negative  Endocrine: Negative  Genitourinary: Negative  Musculoskeletal: Negative  Skin: Negative  Allergic/Immunologic: Negative  Neurological: Negative  Hematological: Negative  Psychiatric/Behavioral: Negative

## 2023-05-02 NOTE — PROGRESS NOTES
Otolaryngology Clinic Visit  Name:  Kinnie Cogan  MRN:  [id number]  Date:  5/2/2023 2:49 PM  ________________________________________________________________________       CHIEF COMPLAINT:   Nose bleeds     HPI:  Kinnie Cogan is a 80 y o  female with PMH as below who presents today for evaluation of nose bleeds  She had two over the winter  They have resolved  She has not had any issues since or prior  No issues with her ears, hearing is down but not interested in evaluation  She has significant dysphagia, hiatal hernia and reflux  She is set to see GI  No other ENT questions or concerns today  PMHx:  Past Medical History:   Diagnosis Date    Allergic     Allergic rhinitis     COPD (chronic obstructive pulmonary disease) ([facility] )     Diabetes mellitus ([facility] )     GERD (gastroesophageal reflux disease)     Hyperlipidemia     Hypertension     Left non-suppurative otitis media 11/27/2019    Pneumonia     Pneumonia of right lower lobe due to infectious organism 12/4/2018    Stage 3 chronic kidney disease, unspecified whether stage 3a or 3b CKD ([facility] ) 11/1/2022    Vitamin D deficiency        PSHx:  Past Surgical History:   Procedure Laterality Date    CARDIAC CATHETERIZATION      CHOLECYSTECTOMY  1984    COLONOSCOPY N/A 1/30/2019    Procedure: COLONOSCOPY with multiple  polypectomies;  Surgeon: Manuela Baker MD;  Location: [facility] GI [facility]; Service: Gastroenterology    EYE SURGERY Bilateral     CATARACT       FAMHx:  Family History   Problem Relation Age of Onset    Lung cancer Father     Heart disease Mother     Lung cancer Sister     Lung cancer Brother        SOCHx:  Social History     Socioeconomic History    Marital status:       Spouse name: None    Number of children: None    Years of education: None    Highest education level: None   Occupational History    Occupation: RETIRED   Tobacco Use    Smoking status: Former     Packs/day: 1 00     Years: 50 00     Pack years: 50 00     Types: Cigarettes     Start date: 1963     Quit date: 2013     Years since quittin 3    Smokeless tobacco: Never   Vaping Use    Vaping Use: Never used   Substance and Sexual Activity    Alcohol use: Never    Drug use: Never    Sexual activity: None   Other Topics Concern    None   Social History Narrative    Daily caffeine consumption     Social Determinants of Health     Financial Resource Strain: Low Risk     Difficulty of Paying Living Expenses: Not hard at all   Food Insecurity: Not on file   Transportation Needs: No Transportation Needs    Lack of Transportation (Medical): No    Lack of Transportation (Non-Medical): No   Physical Activity: Not on file   Stress: Not on file   Social Connections: Not on file   Intimate Partner Violence: Not on file   Housing Stability: Not on file       Allergies: Allergies   Allergen Reactions    Bee Venom Anaphylaxis    Other Shortness Of Breath     FLOWERS/GRASS        MEDS:  Reviewed    ROS:  As above       PHYSICAL EXAM:  /70 (BP Location: Left arm, Cuff Size: Large)   Pulse (!) 115   Temp (!) 97 2 °F (36 2 °C) (Temporal)   Ht 5' (1 524 m)   Wt 65 8 kg (145 lb)   SpO2 93%   BMI 28 32 kg/m²   General: NAD, AOx4  Eyes:  EOMI  Ears:  Right: ear canal normal, TM normal apperance, no fluid  Left: ear canal normal, TM normal apperance, no fluid  Nose:  Left septal deviation, no inferior turbinate hypertrophy, no mass/lesions, mild vestibulitis  Oral cavity:  No trismus, no mass/lesions, tonsils absent  Neck: Trachea is midline; no thyroid nodules, Salivary glands symmetrical, no masses/abnormality on palpation  Lymph:  No cervical lymphadenopathy  Skin:  No obvious facial lesions  Neuro: Face symmetrical, no obvious cranial nerve palsy   No focal deficits   Lungs:  Normal work of breathing, symmetrical chest expansion  Vascular: Well perfused    Procedures:  None     Medical Data Reviewed:  Records reviewed and summarized as in EPIC    Radiology:  Barium swallow with dysmotility     Labs:  None     Patient Active Problem List   Diagnosis    Acid reflux disease    Asthma, mild intermittent    Chronic obstructive pulmonary disease (HCC)    Type 2 diabetes mellitus with hyperglycemia, without long-term current use of insulin (HCC)    Hyperlipidemia, unspecified    Essential hypertension    Neuropathy    Spinal stenosis of lumbar region without neurogenic claudication    Vitamin D deficiency    Chronic diastolic congestive heart failure (HCC)    OAB (overactive bladder)    NSVT [facility]nonsustained ventricular tachycardia) ([facility]    Chronic constipation    Abnormal findings on auscultation    Stage 3 chronic kidney disease, unspecified whether stage 3a or 3b CKD ([address] )    Rib pain    Ambulatory dysfunction    Esophageal dysphagia    Neuropathic pain       ASSESSMENT/PLAN:  Romelia Ornelas is a 80 y o  female with acute and chronic problems as above who presents with:    1  Epistaxis    2  Esophageal dysphagia    3  Gastroesophageal reflux disease, unspecified whether esophagitis present        80 y o  here today for further evaluation of epistaxis, mild vestibulitis and is resolving  Will do Bactroban for 2 weeks to help  On the dysphagia front she is seeing GI, will defer to their mgmt  RTC as needed, Return to clinic precautions given            Dontae Sawyer MD MPH  Otolaryngology--Head and Neck Surgery  [facility]  5/2/2023 2:49 PM

## 2023-05-08 ENCOUNTER — OFFICE VISIT (OUTPATIENT)
Dept: PULMONOLOGY | Facility: CLINIC | Age: 83
End: 2023-05-08

## 2023-05-08 VITALS
HEART RATE: 74 BPM | BODY MASS INDEX: 31.18 KG/M2 | WEIGHT: 158.8 LBS | SYSTOLIC BLOOD PRESSURE: 118 MMHG | TEMPERATURE: 97.7 F | OXYGEN SATURATION: 94 % | DIASTOLIC BLOOD PRESSURE: 72 MMHG | HEIGHT: 60 IN

## 2023-05-08 DIAGNOSIS — J44.9 CHRONIC OBSTRUCTIVE PULMONARY DISEASE, UNSPECIFIED COPD TYPE (HCC): Primary | ICD-10-CM

## 2023-05-08 NOTE — PROGRESS NOTES
Pulmonary Follow Up Note   Mack Ospina 80 y o  female MRN: [id number]  5/9/2023      Assessment:    Chronic obstructive pulmonary disease ([place] [address] )  Landon Kelly has moderate obstructive airflow defect with FEV1 of 59% predicted on pulmonary function test from October 2022  We will continue triple therapy with Breztri 2 puffs twice daily  She was reminded to rinse her mouth out after each use  Continue albuterol HFA 2 puffs every 6 hours as needed  No need for systemic steroids at this time  Plan:    Diagnoses and all orders for this visit:    Chronic obstructive pulmonary disease, unspecified COPD type ([place] [address] )        Return in about 6 months (around 11/8/2023)  History of Present Illness   HPI:  Mack Ospina is a 80 y o  female who presents the office today for routine follow-up of moderate COPD  Patient remains on Breztri taking 2 puffs twice daily now and does feel like that has improved her symptoms  She uses rescue inhaler usually once a day  She endorses chronic dyspnea on exertion and chronic cough productive of clear sputum  Denies hemoptysis  Has off-and-on wheezing  No chest pain  No fevers or chills  Review of Systems   All other systems reviewed and are negative        Historical Information   Past Medical History:   Diagnosis Date   • Allergic    • Allergic rhinitis    • COPD (chronic obstructive pulmonary disease) ([place] [address] )    • Diabetes mellitus ([place] [address] )    • GERD (gastroesophageal reflux disease)    • Hyperlipidemia    • Hypertension    • Left non-suppurative otitis media 11/27/2019   • Pneumonia    • Pneumonia of right lower lobe due to infectious organism 12/4/2018   • Stage 3 chronic kidney disease, unspecified whether stage 3a or 3b CKD ([place] [address] ) 11/1/2022   • Vitamin D deficiency      Past Surgical History:   Procedure Laterality Date   • CARDIAC CATHETERIZATION     • CHOLECYSTECTOMY  1984   • COLONOSCOPY N/A 1/30/2019    Procedure: COLONOSCOPY with multiple  polypectomies;  Surgeon: Arin Driscoll MD;  Location: [facility] GI [facility];   Service: Gastroenterology   • EYE SURGERY Bilateral     CATARACT     Family History   Problem Relation Age of Onset   • Lung cancer Father    • Heart disease Mother    • Lung cancer Sister    • Lung cancer Brother        Social History     Tobacco Use   Smoking Status Former   • Packs/day: 1 00   • Years: 50 00   • Pack years: 50 00   • Types: Cigarettes   • Start date: 1963   • Quit date: 2013   • Years since quittin 3   Smokeless Tobacco Never         Meds/Allergies     Current Outpatient Medications:   •  albuterol (PROVENTIL HFA,VENTOLIN HFA) 90 mcg/act inhaler, , Disp: , Rfl:   •  aspirin 81 mg chewable tablet, Chew 81 mg daily, Disp: , Rfl:   •  atorvastatin (LIPITOR) 20 mg tablet, TAKE ONE TABLET BY MOUTH EVERY MORNING, Disp: 90 tablet, Rfl: 1  •  Budeson-Glycopyrrol-Formoterol (Breztri Aerosphere) 160-9-4 8 MCG/ACT AERO, Inhale 2 puffs 2 (two) times a day Rinse mouth after use , Disp: 32 1 g, Rfl: 3  •  cetirizine (ZyrTEC) 10 mg tablet, Take 10 mg by mouth daily, Disp: , Rfl:   •  Ergocalciferol (VITAMIN D2) 2000 units TABS, Take 2,000 mg by mouth, Disp: , Rfl:   •  gabapentin (NEURONTIN) 300 mg capsule, Take 1 capsule (300 mg total) by mouth 3 (three) times a day, Disp: 270 capsule, Rfl: 1  •  glucose blood (OneTouch Verio) test strip, Use as instructed TEST TWO TIMES DAILY e11 65, Disp: 300 each, Rfl: 5  •  lisinopril (ZESTRIL) 5 mg tablet, Take 1 tablet (5 mg total) by mouth daily, Disp: 90 tablet, Rfl: 1  •  metFORMIN (GLUCOPHAGE) 500 mg tablet, TAKE ONE TABLET BY MOUTH TWICE A DAY WITH MEALS, Disp: 180 tablet, Rfl: 1  •  Multiple Vitamin (MULTIVITAMINS PO), Take by mouth daily, Disp: , Rfl:   •  omeprazole (PriLOSEC) 40 MG capsule, TAKE ONE CAPSULE BY MOUTH EVERY DAY BEFORE BREAKFAST, Disp: 90 capsule, Rfl: 1  •  oxybutynin (DITROPAN-XL) 5 mg 24 hr tablet, TAKE ONE TABLET BY MOUTH EVERY DAY, Disp: 90 tablet, Rfl: 1  •  mupirocin (BACTROBAN) 2 % ointment, Apply topically 2 (two) times a day (Patient not taking: Reported on 5/8/2023), Disp: 66 g, Rfl: 0  Allergies   Allergen Reactions   • Bee Venom Anaphylaxis   • Other Shortness Of Breath     FLOWERS/GRASS       Vitals: Blood pressure 118/72, pulse 74, temperature 97 7 °F (36 5 °C), temperature source Tympanic, height 5' (1 524 m), weight 72 kg (158 lb 12 8 oz), SpO2 94 %  Body mass index is 31 01 kg/m²  Oxygen Therapy  SpO2: 94 %  Oxygen Therapy: None (Room air)    Physical Exam  Physical Exam  Vitals reviewed  Constitutional:       Appearance: Normal appearance  She is well-developed  HENT:      Head: Normocephalic and atraumatic  Nose: Nose normal       Mouth/Throat:      Mouth: Mucous membranes are moist    Eyes:      Extraocular Movements: Extraocular movements intact  Cardiovascular:      Rate and Rhythm: Normal rate and regular rhythm  Heart sounds: Normal heart sounds  Pulmonary:      Effort: Pulmonary effort is normal  No respiratory distress  Breath sounds: No wheezing, rhonchi or rales  Musculoskeletal:         General: No swelling  Skin:     General: Skin is warm and dry  Neurological:      Mental Status: She is alert  Mental status is at baseline  Psychiatric:         Mood and Affect: Mood normal          Behavior: Behavior normal          Labs: I have personally reviewed pertinent lab results  , ABG: No results found for: PHART, AQF3ZJF, PO2ART, SBD7YFR, B8KJMHPY, BEART, SOURCE, BNP: No results found for: BNP, CBC: No results found for: WBC, HGB, HCT, MCV, PLT, ADJUSTEDWBC, MCH, MCHC, RDW, MPV, NRBC, CMP: No results found for: SODIUM, K, CL, CO2, ANIONGAP, BUN, CREATININE, GLUCOSE, CALCIUM, AST, ALT, ALKPHOS, PROT, BILITOT, EGFR, PT/INR: No results found for: PT, INR, Troponin: No results found for: TROPONINI  Lab Results   Component Value Date    WBC 8 79 07/27/2022    HGB 14 3 07/27/2022    HCT 43 8 07/27/2022    MCV 87 07/27/2022     07/27/2022     Lab Results Component Value Date    CALCIUM 10 0 07/27/2022    K 4 5 07/27/2022    CO2 26 07/27/2022     07/27/2022    BUN 15 07/27/2022    CREATININE 1 15 07/27/2022     No results found for: IGE  Lab Results   Component Value Date    ALT 31 07/27/2022    AST 15 07/27/2022    ALKPHOS 71 07/27/2022       Imaging and other studies: I have personally reviewed pertinent reports  and I have personally reviewed pertinent films in PACS     CTA chest PE study 12/3/2018  No PE  Lungs are clear without consolidation, interstitial disease, suspicious nodules  Emphysematous changes noted  Mild patchy infiltrate in right lower lobe with peribronchial thickening present consistent with pneumonitis  Low-grade aspiration possible as well  Pulmonary function testing:  Performed 10/26/2022   FEV1/FVC ratio 54%   FEV1 59% predicted  FVC 83% predicted  no response to bronchodilators   % predicted   % predicted  DLCO corrected for hemoglobin 60 % predicted  Moderate restrictive airflow defect with reduced vital capacity  No significant change with bronchodilators  Normal TLC with increased RV indicative of air trapping  Reduced diffusion capacity

## 2023-05-09 NOTE — ASSESSMENT & PLAN NOTE
Meka Ray has moderate obstructive airflow defect with FEV1 of 59% predicted on pulmonary function test from October 2022  We will continue triple therapy with Breztri 2 puffs twice daily  She was reminded to rinse her mouth out after each use  Continue albuterol HFA 2 puffs every 6 hours as needed  No need for systemic steroids at this time

## 2023-05-23 ENCOUNTER — CONSULT (OUTPATIENT)
Dept: GASTROENTEROLOGY | Facility: CLINIC | Age: 83
End: 2023-05-23

## 2023-05-23 VITALS
HEIGHT: 60 IN | BODY MASS INDEX: 30.67 KG/M2 | RESPIRATION RATE: 16 BRPM | OXYGEN SATURATION: 95 % | HEART RATE: 80 BPM | WEIGHT: 156.2 LBS | DIASTOLIC BLOOD PRESSURE: 72 MMHG | TEMPERATURE: 99.7 F | SYSTOLIC BLOOD PRESSURE: 110 MMHG

## 2023-05-23 DIAGNOSIS — K21.9 CHRONIC GERD: Primary | ICD-10-CM

## 2023-05-23 DIAGNOSIS — K59.00 CONSTIPATION, UNSPECIFIED CONSTIPATION TYPE: ICD-10-CM

## 2023-05-23 DIAGNOSIS — R13.19 ESOPHAGEAL DYSPHAGIA: ICD-10-CM

## 2023-05-23 RX ORDER — POLYETHYLENE GLYCOL 3350 17 G/17G
POWDER, FOR SOLUTION ORAL
Qty: 30 EACH | Refills: 1 | Status: SHIPPED | OUTPATIENT
Start: 2023-05-23

## 2023-05-23 RX ORDER — FAMOTIDINE 40 MG/1
TABLET, FILM COATED ORAL
Qty: 30 TABLET | Refills: 1 | Status: SHIPPED | OUTPATIENT
Start: 2023-05-23

## 2023-05-23 NOTE — PROGRESS NOTES
[facility] - Outpatient Consultation  Ruel Zapata 80 y o  female MRN: [id number]  Encounter: [id number]          ASSESSMENT AND PLAN:    The patient presents today for a consultation regarding her worsening dysphagia and reflux symptoms for the past 2 months  The patient does report that her symptoms have somewhat improved, but still does have issues where at least once a day she will have difficulty swallowing something, especially more solid foods and active vomited back up and then reports that she then loses her voice for a while  She also reports that for the past 2 months she has had worsening nausea and breakthrough reflux symptoms until she takes her omeprazole  Exam: Lung sounds clear to auscultation bilaterally  Normal S1 and S2 noted upon exam   Abdomen was slightly rounded, soft, with mild tenderness noted upon exam in the left lower quadrant with hypoactive bowel sounds x4  No edema noted of the lower extremities and the patient's gait was slightly antalgic, but steady without the use of any assistive devices  1  Esophageal dysphagia  2  Chronic GERD  While the patient was in the office today, we did discuss with the patient that at this point time with her most recent and new onset dysphagia and worsening reflux, hiatal hernia, and she has never had an EGD in the past, that it would be beneficial to proceed with an EGD to further evaluate any other underlying etiology that would explain her symptoms  If the EGD is normal we could consider manometry in the future as well  The patient was agreeable and verbalized an understanding  I discussed the risks of procedure with the patient including, but not limited to: bleeding, infection, and perforation  The patient gave verbal understanding and is agreeable to proceed       With regards to addressing the worsening and breakthrough reflux symptoms we are going to have the patient continue her omeprazole as prescribed in the morning but start her on 40 mg of famotidine at bedtime  The patient was agreeable and verbalized an understanding     - famotidine (PEPCID) 40 MG tablet; Take 1 PO HS  Dispense: 30 tablet; Refill: 1  - EGD; Future    3  Constipation, unspecified constipation type  We had a thorough conversation regarding her continued constipation and at this point since her current bowel habits are normal for her since childhood we decided to hold off on a repeat colonoscopy, even though she did have 4 benign polyps removed at her last colonoscopy in 2019 we will continue to hold off on a colonoscopy unless she should have a changing or worsening symptoms  The patient was agreeable and verbalized an understanding  To help address the constipation we discussed the patient that we feel would be in her best interest to start MiraLAX daily and until she is having a regular and satisfying bowel movement on a daily basis continue to take it and then take it as needed or every other day as long as she is having regular and complete bowel movements  The patient was agreeable and verbalized an understanding     - polyethylene glycol (MIRALAX) 17 g packet; Take 1 pack daily for constipation  Dispense: 30 each; Refill: 1    The patient will schedule a follow up office visit after her EGD for re-evaluation and to discuss her treatment plan from there      ______________________________________________________________________    HPI: Patient is a 80 y o  female who presents today for a consultation regarding her worsening dysphagia and reflux symptoms, without any recent changes to her diet or medication regimen  The patient reports that approximately 2 months ago she started with difficulty swallowing and had a significant nosebleed so then went to the ER and then follow-up with her primary care provider who referred her to ENT and gastroenterology    Patient reports that over the past 2 months she has not had any more nosebleeds and that the swallowing is improving but still an issue as at least once a day she has an issue where she cannot get down certain solid foods, especially regular bread or even sometimes her pills and she vomits the content of what she was trying to swallow  The patient reports any nausea at the time, but does report that she has found that waking up in the morning she is significantly nauseous every morning and has some breakthrough heartburn and reflux symptoms even though she is taking her omeprazole 40 mg every day in the morning before she eats  The patient denies any blood when she vomits  She does report abdominal bloating and dyspepsia, but denies any abdominal pain  She reports that typically she has a bowel movement every 1 to 2 days and rates it at #5 on the [facility] score on average, but can go up to a 6 or 7, especially on the days when it has been a day or 2 since she has had a bowel movement  The patient currently denies taking any kind of over-the-counter medication or supplement to help with her bowel movements or regularity but does report trying Metamucil in the past but did not feel it was helpful and sometimes when she is constipated she will have a piece of black licorice, which she feels is helpful  The patient denies any use of NSAIDs  She does reports she avoids tomatoes and red sauces as that does seem to irritate her GERD  The patient denies any unintentional or recent weight loss or urge to have a nocturnal BM  PMH/PSH: Cholecystectomy  Hiatal Hernia  Former smoker, quit in 2014, but denies marijuana, illegal drugs, or alcohol  Negative for Garza's, celiac, Crohn's/IBD, colon cancer, or any other type of cancer  FH: Negative for any colon cancer  Meds: Omeprazole 40 mg daily in the morning      Endoscopy History: EGD: None, Colonoscopy: (2019): Four colonic polyps snared ablated removed from ascending colon 1 polyp, sigmoid colon 2 polyps, and rectum 1 polyp  Extensive diverticulosis seen without diverticulitis  Biopsies were normal      REVIEW OF SYSTEMS:    CONSTITUTIONAL: Denies any fever, chills, rigors, and weight loss  HEENT: No earache or tinnitus  Denies hearing loss or visual disturbances  CARDIOVASCULAR: No chest pain or palpitations  RESPIRATORY: Denies any cough, hemoptysis, shortness of breath or dyspnea on exertion  GASTROINTESTINAL: As noted in the History of Present Illness  GENITOURINARY: No problems with urination  Denies any hematuria or dysuria  NEUROLOGIC: No dizziness or vertigo, denies headaches  MUSCULOSKELETAL: Denies any muscle or joint pain  SKIN: Denies skin rashes or itching  ENDOCRINE: Denies excessive thirst  Denies intolerance to heat or cold  PSYCHOSOCIAL: Denies depression or anxiety  Denies any recent memory loss  Historical Information   Past Medical History:   Diagnosis Date   • Allergic    • Allergic rhinitis    • COPD (chronic obstructive pulmonary disease) ([place] [address] )    • Diabetes mellitus ([place] [address] )    • GERD (gastroesophageal reflux disease)    • Hyperlipidemia    • Hypertension    • Left non-suppurative otitis media 11/27/2019   • Pneumonia    • Pneumonia of right lower lobe due to infectious organism 12/4/2018   • Stage 3 chronic kidney disease, unspecified whether stage 3a or 3b CKD ([facility] [place] [facility] ) 11/1/2022   • Vitamin D deficiency      Past Surgical History:   Procedure Laterality Date   • CARDIAC CATHETERIZATION     • CHOLECYSTECTOMY  1984   • COLONOSCOPY N/A 1/30/2019    Procedure: COLONOSCOPY with multiple  polypectomies;  Surgeon: Merrick Martinez MD;  Location: [address] [place] [address] GI LAB;   Service: Gastroenterology   • EYE SURGERY Bilateral     CATARACT     Social History   Social History     Substance and Sexual Activity   Alcohol Use Never     Social History     Substance and Sexual Activity   Drug Use Never     Social History     Tobacco Use   Smoking Status Former   • Packs/day: 1 00   • Years: 50 00   • Pack years: 50 00   • Types: Cigarettes   • Start date: 1963   • Quit date: 2013   • Years since quittin 4   Smokeless Tobacco Never     Family History   Problem Relation Age of Onset   • Lung cancer Father    • Heart disease Mother    • Lung cancer Sister    • Lung cancer Brother        Meds/Allergies       Current Outpatient Medications:   •  albuterol (PROVENTIL HFA,VENTOLIN HFA) 90 mcg/act inhaler  •  aspirin 81 mg chewable tablet  •  atorvastatin (LIPITOR) 20 mg tablet  •  Budeson-Glycopyrrol-Formoterol (Breztri Aerosphere) 160-9-4 8 MCG/ACT AERO  •  cetirizine (ZyrTEC) 10 mg tablet  •  Ergocalciferol (VITAMIN D2) 2000 units TABS  •  famotidine (PEPCID) 40 MG tablet  •  gabapentin (NEURONTIN) 300 mg capsule  •  glucose blood (OneTouch Verio) test strip  •  lisinopril (ZESTRIL) 5 mg tablet  •  metFORMIN (GLUCOPHAGE) 500 mg tablet  •  Multiple Vitamin (MULTIVITAMINS PO)  •  omeprazole (PriLOSEC) 40 MG capsule  •  oxybutynin (DITROPAN-XL) 5 mg 24 hr tablet  •  polyethylene glycol (MIRALAX) 17 g packet  •  mupirocin (BACTROBAN) 2 % ointment    Allergies   Allergen Reactions   • Bee Venom Anaphylaxis   • Other Shortness Of Breath     FLOWERS/GRASS           Objective     Blood pressure 110/72, pulse 80, temperature 99 7 °F (37 6 °C), temperature source Tympanic, resp  rate 16, height 5' (1 524 m), weight 70 9 kg (156 lb 3 2 oz), SpO2 95 %  Body mass index is 30 51 kg/m²  PHYSICAL EXAM:      General Appearance:   Alert, cooperative, no distress   HEENT:   Normocephalic, atraumatic, anicteric  Neck:  Supple, symmetrical, trachea midline   Lungs:   Clear to auscultation bilaterally; no rales, rhonchi or wheezing; respirations unlabored    Heart[de-identified]   Regular rate and rhythm; no murmur, rub, or gallop     Abdomen:   Soft, non-tender, non-distended; normal bowel sounds; no masses, no organomegaly    Genitalia:   Deferred    Rectal:   Deferred    Extremities:  No cyanosis, clubbing or edema Pulses:  2+ and symmetric    Skin:  No jaundice, rashes, or lesions    Lymph nodes:  No palpable cervical lymphadenopathy        Lab Results:   No visits with results within 1 Day(s) from this visit  Latest known visit with results is:   Office Visit on 03/08/2023   Component Date Value   • Hemoglobin A1C 03/08/2023 7 0 (A)          Radiology Results:   No results found

## 2023-05-23 NOTE — PATIENT INSTRUCTIONS
Scheduled date of EGD(as of today): 6/7/2023  Physician performing EGD: Dr Abreu Payment  Location of EGD: [address] [place] [address]  Instructions reviewed with patient by:  Lina Lares  Clearances:   Diabetic   Recall in for a follow up appointment made

## 2023-05-23 NOTE — H&P (VIEW-ONLY)
[facility] - Outpatient Consultation  Sander Duverney 80 y o  female MRN: [id number]  Encounter: [id number]          ASSESSMENT AND PLAN:    The patient presents today for a consultation regarding her worsening dysphagia and reflux symptoms for the past 2 months  The patient does report that her symptoms have somewhat improved, but still does have issues where at least once a day she will have difficulty swallowing something, especially more solid foods and active vomited back up and then reports that she then loses her voice for a while  She also reports that for the past 2 months she has had worsening nausea and breakthrough reflux symptoms until she takes her omeprazole  Exam: Lung sounds clear to auscultation bilaterally  Normal S1 and S2 noted upon exam   Abdomen was slightly rounded, soft, with mild tenderness noted upon exam in the left lower quadrant with hypoactive bowel sounds x4  No edema noted of the lower extremities and the patient's gait was slightly antalgic, but steady without the use of any assistive devices  1  Esophageal dysphagia  2  Chronic GERD  While the patient was in the office today, we did discuss with the patient that at this point time with her most recent and new onset dysphagia and worsening reflux, hiatal hernia, and she has never had an EGD in the past, that it would be beneficial to proceed with an EGD to further evaluate any other underlying etiology that would explain her symptoms  If the EGD is normal we could consider manometry in the future as well  The patient was agreeable and verbalized an understanding  I discussed the risks of procedure with the patient including, but not limited to: bleeding, infection, and perforation  The patient gave verbal understanding and is agreeable to proceed       With regards to addressing the worsening and breakthrough reflux symptoms we are going to have the patient continue her omeprazole as prescribed in the morning but start her on 40 mg of famotidine at bedtime  The patient was agreeable and verbalized an understanding     - famotidine (PEPCID) 40 MG tablet; Take 1 PO HS  Dispense: 30 tablet; Refill: 1  - EGD; Future    3  Constipation, unspecified constipation type  We had a thorough conversation regarding her continued constipation and at this point since her current bowel habits are normal for her since childhood we decided to hold off on a repeat colonoscopy, even though she did have 4 benign polyps removed at her last colonoscopy in 2019 we will continue to hold off on a colonoscopy unless she should have a changing or worsening symptoms  The patient was agreeable and verbalized an understanding  To help address the constipation we discussed the patient that we feel would be in her best interest to start MiraLAX daily and until she is having a regular and satisfying bowel movement on a daily basis continue to take it and then take it as needed or every other day as long as she is having regular and complete bowel movements  The patient was agreeable and verbalized an understanding     - polyethylene glycol (MIRALAX) 17 g packet; Take 1 pack daily for constipation  Dispense: 30 each; Refill: 1    The patient will schedule a follow up office visit after her EGD for re-evaluation and to discuss her treatment plan from there      ______________________________________________________________________    HPI: Patient is a 80 y o  female who presents today for a consultation regarding her worsening dysphagia and reflux symptoms, without any recent changes to her diet or medication regimen  The patient reports that approximately 2 months ago she started with difficulty swallowing and had a significant nosebleed so then went to the ER and then follow-up with her primary care provider who referred her to ENT and gastroenterology    Patient reports that over the past 2 months she has not had any more nosebleeds and that the swallowing is improving but still an issue as at least once a day she has an issue where she cannot get down certain solid foods, especially regular bread or even sometimes her pills and she vomits the content of what she was trying to swallow  The patient reports any nausea at the time, but does report that she has found that waking up in the morning she is significantly nauseous every morning and has some breakthrough heartburn and reflux symptoms even though she is taking her omeprazole 40 mg every day in the morning before she eats  The patient denies any blood when she vomits  She does report abdominal bloating and dyspepsia, but denies any abdominal pain  She reports that typically she has a bowel movement every 1 to 2 days and rates it at #5 on the [facility] score on average, but can go up to a 6 or 7, especially on the days when it has been a day or 2 since she has had a bowel movement  The patient currently denies taking any kind of over-the-counter medication or supplement to help with her bowel movements or regularity but does report trying Metamucil in the past but did not feel it was helpful and sometimes when she is constipated she will have a piece of black licorice, which she feels is helpful  The patient denies any use of NSAIDs  She does reports she avoids tomatoes and red sauces as that does seem to irritate her GERD  The patient denies any unintentional or recent weight loss or urge to have a nocturnal BM  PMH/PSH: Cholecystectomy  Hiatal Hernia  Former smoker, quit in 2014, but denies marijuana, illegal drugs, or alcohol  Negative for Garza's, celiac, Crohn's/IBD, colon cancer, or any other type of cancer  FH: Negative for any colon cancer  Meds: Omeprazole 40 mg daily in the morning      Endoscopy History: EGD: None, Colonoscopy: (2019): Four colonic polyps snared ablated removed from ascending colon 1 polyp, sigmoid colon 2 polyps, and rectum 1 polyp  Extensive diverticulosis seen without diverticulitis  Biopsies were normal      REVIEW OF SYSTEMS:    CONSTITUTIONAL: Denies any fever, chills, rigors, and weight loss  HEENT: No earache or tinnitus  Denies hearing loss or visual disturbances  CARDIOVASCULAR: No chest pain or palpitations  RESPIRATORY: Denies any cough, hemoptysis, shortness of breath or dyspnea on exertion  GASTROINTESTINAL: As noted in the History of Present Illness  GENITOURINARY: No problems with urination  Denies any hematuria or dysuria  NEUROLOGIC: No dizziness or vertigo, denies headaches  MUSCULOSKELETAL: Denies any muscle or joint pain  SKIN: Denies skin rashes or itching  ENDOCRINE: Denies excessive thirst  Denies intolerance to heat or cold  PSYCHOSOCIAL: Denies depression or anxiety  Denies any recent memory loss  Historical Information   Past Medical History:   Diagnosis Date   • Allergic    • Allergic rhinitis    • COPD (chronic obstructive pulmonary disease) ([facility] [place] [facility] )    • Diabetes mellitus ([facility] [place] [facility] )    • GERD (gastroesophageal reflux disease)    • Hyperlipidemia    • Hypertension    • Left non-suppurative otitis media 11/27/2019   • Pneumonia    • Pneumonia of right lower lobe due to infectious organism 12/4/2018   • Stage 3 chronic kidney disease, unspecified whether stage 3a or 3b CKD ([facility] [place] [facility] ) 11/1/2022   • Vitamin D deficiency      Past Surgical History:   Procedure Laterality Date   • CARDIAC CATHETERIZATION     • CHOLECYSTECTOMY  1984   • COLONOSCOPY N/A 1/30/2019    Procedure: COLONOSCOPY with multiple  polypectomies;  Surgeon: Patty Chirinos MD;  Location: [facility] GI LAB;   Service: Gastroenterology   • EYE SURGERY Bilateral     CATARACT     Social History   Social History     Substance and Sexual Activity   Alcohol Use Never     Social History     Substance and Sexual Activity   Drug Use Never     Social History     Tobacco Use   Smoking Status Former   • Packs/day: 1 00   • Years: 50 00   • Pack years: 50 00   • Types: Cigarettes   • Start date: 1963   • Quit date: 2013   • Years since quittin 4   Smokeless Tobacco Never     Family History   Problem Relation Age of Onset   • Lung cancer Father    • Heart disease Mother    • Lung cancer Sister    • Lung cancer Brother        Meds/Allergies       Current Outpatient Medications:   •  albuterol (PROVENTIL HFA,VENTOLIN HFA) 90 mcg/act inhaler  •  aspirin 81 mg chewable tablet  •  atorvastatin (LIPITOR) 20 mg tablet  •  Budeson-Glycopyrrol-Formoterol (Breztri Aerosphere) 160-9-4 8 MCG/ACT AERO  •  cetirizine (ZyrTEC) 10 mg tablet  •  Ergocalciferol (VITAMIN D2) 2000 units TABS  •  famotidine (PEPCID) 40 MG tablet  •  gabapentin (NEURONTIN) 300 mg capsule  •  glucose blood (OneTouch Verio) test strip  •  lisinopril (ZESTRIL) 5 mg tablet  •  metFORMIN (GLUCOPHAGE) 500 mg tablet  •  Multiple Vitamin (MULTIVITAMINS PO)  •  omeprazole (PriLOSEC) 40 MG capsule  •  oxybutynin (DITROPAN-XL) 5 mg 24 hr tablet  •  polyethylene glycol (MIRALAX) 17 g packet  •  mupirocin (BACTROBAN) 2 % ointment    Allergies   Allergen Reactions   • Bee Venom Anaphylaxis   • Other Shortness Of Breath     FLOWERS/GRASS           Objective     Blood pressure 110/72, pulse 80, temperature 99 7 °F (37 6 °C), temperature source Tympanic, resp  rate 16, height 5' (1 524 m), weight 70 9 kg (156 lb 3 2 oz), SpO2 95 %  Body mass index is 30 51 kg/m²  PHYSICAL EXAM:      General Appearance:   Alert, cooperative, no distress   HEENT:   Normocephalic, atraumatic, anicteric  Neck:  Supple, symmetrical, trachea midline   Lungs:   Clear to auscultation bilaterally; no rales, rhonchi or wheezing; respirations unlabored    Heart[de-identified]   Regular rate and rhythm; no murmur, rub, or gallop     Abdomen:   Soft, non-tender, non-distended; normal bowel sounds; no masses, no organomegaly    Genitalia:   Deferred    Rectal:   Deferred    Extremities:  No cyanosis, clubbing or edema Pulses:  2+ and symmetric    Skin:  No jaundice, rashes, or lesions    Lymph nodes:  No palpable cervical lymphadenopathy        Lab Results:   No visits with results within 1 Day(s) from this visit  Latest known visit with results is:   Office Visit on 03/08/2023   Component Date Value   • Hemoglobin A1C 03/08/2023 7 0 (A)          Radiology Results:   No results found

## 2023-05-30 DIAGNOSIS — I10 HYPERTENSION, UNSPECIFIED TYPE: ICD-10-CM

## 2023-05-30 DIAGNOSIS — M79.2 NEUROPATHIC PAIN: ICD-10-CM

## 2023-05-30 DIAGNOSIS — N32.81 OAB (OVERACTIVE BLADDER): ICD-10-CM

## 2023-05-31 RX ORDER — GABAPENTIN 300 MG/1
CAPSULE ORAL
Qty: 90 CAPSULE | Refills: 1 | Status: SHIPPED | OUTPATIENT
Start: 2023-05-31

## 2023-05-31 RX ORDER — LISINOPRIL 5 MG/1
TABLET ORAL
Qty: 90 TABLET | Refills: 1 | Status: SHIPPED | OUTPATIENT
Start: 2023-05-31

## 2023-05-31 RX ORDER — OXYBUTYNIN CHLORIDE 5 MG/1
TABLET, EXTENDED RELEASE ORAL
Qty: 90 TABLET | Refills: 1 | Status: SHIPPED | OUTPATIENT
Start: 2023-05-31

## 2023-06-07 ENCOUNTER — ANESTHESIA (OUTPATIENT)
Dept: PERIOP | Facility: HOSPITAL | Age: 83
End: 2023-06-07

## 2023-06-07 ENCOUNTER — HOSPITAL ENCOUNTER (OUTPATIENT)
Dept: PERIOP | Facility: HOSPITAL | Age: 83
Setting detail: OUTPATIENT SURGERY
Discharge: HOME | End: 2023-06-07
Admitting: INTERNAL MEDICINE
Payer: COMMERCIAL

## 2023-06-07 ENCOUNTER — ANESTHESIA EVENT (OUTPATIENT)
Dept: PERIOP | Facility: HOSPITAL | Age: 83
End: 2023-06-07

## 2023-06-07 VITALS
OXYGEN SATURATION: 92 % | TEMPERATURE: 98.6 F | RESPIRATION RATE: 18 BRPM | HEART RATE: 84 BPM | HEIGHT: 60 IN | DIASTOLIC BLOOD PRESSURE: 62 MMHG | BODY MASS INDEX: 30.69 KG/M2 | SYSTOLIC BLOOD PRESSURE: 106 MMHG | WEIGHT: 156.31 LBS

## 2023-06-07 DIAGNOSIS — K21.9 CHRONIC GERD: ICD-10-CM

## 2023-06-07 DIAGNOSIS — R13.19 ESOPHAGEAL DYSPHAGIA: ICD-10-CM

## 2023-06-07 LAB — GLUCOSE SERPL-MCNC: 116 MG/DL (ref 65–140)

## 2023-06-07 PROCEDURE — 43239 EGD BIOPSY SINGLE/MULTIPLE: CPT | Performed by: INTERNAL MEDICINE

## 2023-06-07 PROCEDURE — 88305 TISSUE EXAM BY PATHOLOGIST: CPT | Performed by: STUDENT IN AN ORGANIZED HEALTH CARE EDUCATION/TRAINING PROGRAM

## 2023-06-07 PROCEDURE — 82948 REAGENT STRIP/BLOOD GLUCOSE: CPT

## 2023-06-07 RX ORDER — SODIUM CHLORIDE, SODIUM LACTATE, POTASSIUM CHLORIDE, CALCIUM CHLORIDE 600; 310; 30; 20 MG/100ML; MG/100ML; MG/100ML; MG/100ML
INJECTION, SOLUTION INTRAVENOUS CONTINUOUS PRN
Status: DISCONTINUED | OUTPATIENT
Start: 2023-06-07 | End: 2023-06-07

## 2023-06-07 RX ORDER — SODIUM CHLORIDE, SODIUM LACTATE, POTASSIUM CHLORIDE, CALCIUM CHLORIDE 600; 310; 30; 20 MG/100ML; MG/100ML; MG/100ML; MG/100ML
125 INJECTION, SOLUTION INTRAVENOUS CONTINUOUS
Status: CANCELLED | OUTPATIENT
Start: 2023-06-07

## 2023-06-07 RX ORDER — ALBUTEROL SULFATE 2.5 MG/3ML
2.5 SOLUTION RESPIRATORY (INHALATION) ONCE AS NEEDED
Status: DISCONTINUED | OUTPATIENT
Start: 2023-06-07 | End: 2023-06-11 | Stop reason: HOSPADM

## 2023-06-07 RX ORDER — LIDOCAINE HYDROCHLORIDE 20 MG/ML
INJECTION, SOLUTION EPIDURAL; INFILTRATION; INTRACAUDAL; PERINEURAL AS NEEDED
Status: DISCONTINUED | OUTPATIENT
Start: 2023-06-07 | End: 2023-06-07

## 2023-06-07 RX ORDER — ONDANSETRON 2 MG/ML
4 INJECTION INTRAMUSCULAR; INTRAVENOUS ONCE AS NEEDED
Status: DISCONTINUED | OUTPATIENT
Start: 2023-06-07 | End: 2023-06-11 | Stop reason: HOSPADM

## 2023-06-07 RX ORDER — PROPOFOL 10 MG/ML
INJECTION, EMULSION INTRAVENOUS AS NEEDED
Status: DISCONTINUED | OUTPATIENT
Start: 2023-06-07 | End: 2023-06-07

## 2023-06-07 RX ADMIN — SODIUM CHLORIDE, SODIUM LACTATE, POTASSIUM CHLORIDE, AND CALCIUM CHLORIDE: .6; .31; .03; .02 INJECTION, SOLUTION INTRAVENOUS at 08:14

## 2023-06-07 RX ADMIN — LIDOCAINE HYDROCHLORIDE 100 MG: 20 INJECTION, SOLUTION EPIDURAL; INFILTRATION; INTRACAUDAL; PERINEURAL at 08:19

## 2023-06-07 RX ADMIN — PROPOFOL 40 MG: 10 INJECTION, EMULSION INTRAVENOUS at 08:22

## 2023-06-07 RX ADMIN — PROPOFOL 50 MG: 10 INJECTION, EMULSION INTRAVENOUS at 08:19

## 2023-06-07 NOTE — INTERVAL H&P NOTE
H&P reviewed  After examining the patient I find no changes in the patients condition since the H&P had been written      Vitals:    06/07/23 0748   BP: 135/68   Pulse: 84   Resp: 20   Temp: (!) 96 8 °F (36 °C)   SpO2: 93%

## 2023-06-07 NOTE — ANESTHESIA POSTPROCEDURE EVALUATION
Post-Op Assessment Note    CV Status:  Stable    Pain management: adequate     Mental Status:  Alert and awake   Hydration Status:  Euvolemic   PONV Controlled:  Controlled   Airway Patency:  Patent      Post Op Vitals Reviewed: Yes      Staff: CRNA         No notable events documented      BP   90/45   Temp     Pulse  76   Resp   18   SpO2   98%

## 2023-06-07 NOTE — ANESTHESIA PREPROCEDURE EVALUATION
Procedure:  EGD    Relevant Problems   CARDIO   (+) Chronic diastolic congestive heart failure (HCC)   (+) Essential hypertension   (+) Hyperlipidemia, unspecified   (+) Rib pain      ENDO   (+) Type 2 diabetes mellitus with hyperglycemia, without long-term current use of insulin (HCC)      GI/HEPATIC   (+) Acid reflux disease   (+) Esophageal dysphagia      /RENAL   (+) Stage 3 chronic kidney disease, unspecified whether stage 3a or 3b CKD (HCC)      PULMONARY   (+) Asthma, mild intermittent   (+) Chronic obstructive pulmonary disease (HCC)        Physical Exam    Airway    Mallampati score: III  TM Distance: >3 FB  Neck ROM: full     Dental       Cardiovascular  Cardiovascular exam normal    Pulmonary  Pulmonary exam normal     Other Findings        Anesthesia Plan  ASA Score- 3     Anesthesia Type- IV sedation with anesthesia with ASA Monitors  Additional Monitors:   Airway Plan:           Plan Factors-Exercise tolerance (METS): >4 METS  Chart reviewed  EKG reviewed  Imaging results reviewed  Existing labs reviewed  Patient summary reviewed  Patient is not a current smoker  Induction- intravenous  Postoperative Plan-     Informed Consent- Anesthetic plan and risks discussed with patient  I personally reviewed this patient with the CRNA  Discussed and agreed on the Anesthesia Plan with the CRNA  Tavon Alvarez

## 2023-06-11 DIAGNOSIS — K21.9 GASTROESOPHAGEAL REFLUX DISEASE WITHOUT ESOPHAGITIS: ICD-10-CM

## 2023-06-11 DIAGNOSIS — E11.9 TYPE 2 DIABETES MELLITUS WITHOUT COMPLICATION, WITHOUT LONG-TERM CURRENT USE OF INSULIN (HCC): ICD-10-CM

## 2023-06-11 RX ORDER — OMEPRAZOLE 40 MG/1
CAPSULE, DELAYED RELEASE ORAL
Qty: 90 CAPSULE | Refills: 1 | Status: SHIPPED | OUTPATIENT
Start: 2023-06-11

## 2023-06-12 PROCEDURE — 88305 TISSUE EXAM BY PATHOLOGIST: CPT | Performed by: STUDENT IN AN ORGANIZED HEALTH CARE EDUCATION/TRAINING PROGRAM

## 2023-06-19 NOTE — ASSESSMENT & PLAN NOTE
· This was confirmed in the CTA of the chest  · Initial concerns included the possibility of an acute bacterial bronchitis  · Continue Levaquin at 750 mg p o  day 4  · Patient will need to complete a total of a 7 to ten-day course of antibiotics  · All cultures are negative thus far  · Sputum cultures are pending Tazorac Pregnancy And Lactation Text: This medication is not safe during pregnancy. It is unknown if this medication is excreted in breast milk.

## 2023-07-12 ENCOUNTER — OFFICE VISIT (OUTPATIENT)
Dept: INTERNAL MEDICINE CLINIC | Facility: CLINIC | Age: 83
End: 2023-07-12
Payer: COMMERCIAL

## 2023-07-12 ENCOUNTER — RA CDI HCC (OUTPATIENT)
Dept: OTHER | Facility: HOSPITAL | Age: 83
End: 2023-07-12

## 2023-07-12 VITALS
SYSTOLIC BLOOD PRESSURE: 132 MMHG | TEMPERATURE: 98.8 F | OXYGEN SATURATION: 97 % | DIASTOLIC BLOOD PRESSURE: 72 MMHG | HEIGHT: 60 IN | WEIGHT: 156.25 LBS | BODY MASS INDEX: 30.67 KG/M2 | HEART RATE: 79 BPM

## 2023-07-12 DIAGNOSIS — I47.29 NSVT (NONSUSTAINED VENTRICULAR TACHYCARDIA) (HCC): ICD-10-CM

## 2023-07-12 DIAGNOSIS — Z12.11 COLON CANCER SCREENING: ICD-10-CM

## 2023-07-12 DIAGNOSIS — E78.2 MIXED HYPERLIPIDEMIA: ICD-10-CM

## 2023-07-12 DIAGNOSIS — Z13.29 SCREENING FOR THYROID DISORDER: ICD-10-CM

## 2023-07-12 DIAGNOSIS — E55.9 VITAMIN D DEFICIENCY: ICD-10-CM

## 2023-07-12 DIAGNOSIS — I10 ESSENTIAL HYPERTENSION: ICD-10-CM

## 2023-07-12 DIAGNOSIS — R19.5 DARK STOOLS: ICD-10-CM

## 2023-07-12 DIAGNOSIS — E11.9 TYPE 2 DIABETES MELLITUS WITHOUT COMPLICATION, WITHOUT LONG-TERM CURRENT USE OF INSULIN (HCC): Primary | ICD-10-CM

## 2023-07-12 LAB — SL AMB POCT HEMOGLOBIN AIC: 6.9 (ref ?–6.5)

## 2023-07-12 PROCEDURE — 99214 OFFICE O/P EST MOD 30 MIN: CPT | Performed by: PHYSICIAN ASSISTANT

## 2023-07-12 PROCEDURE — 2028F FOOT EXAM PERFORMED: CPT | Performed by: PHYSICIAN ASSISTANT

## 2023-07-12 PROCEDURE — 83036 HEMOGLOBIN GLYCOSYLATED A1C: CPT | Performed by: PHYSICIAN ASSISTANT

## 2023-07-12 NOTE — PROGRESS NOTES
Name: Franki Irwin      : 1940      MRN: [id number]  Encounter Provider: Parrish Posada PA-C  Encounter Date: 2023   Encounter department: [id number] [place] [address] [place]     1. Type 2 diabetes mellitus without complication, without long-term current use of insulin (HCC)  -     POCT hemoglobin A1c  -     Comprehensive metabolic panel; Future  -     Albumin / creatinine urine ratio    2. Vitamin D deficiency  -     Vitamin D 25 hydroxy; Future    3. Mixed hyperlipidemia  -     Lipid panel; Future    4. Essential hypertension  -     CBC and differential; Future    5. Screening for thyroid disorder  -     TSH, 3rd generation; Future    6. NSVT (nonsustained ventricular tachycardia) ([facility])    7. Dark stools  Assessment & Plan:  Pt feels color change may be dietary. Will get cologuard. Not using any pepto bismol. Recommend bringing up with GI next month. Labs ordered as well    Orders:  -     Iron Panel (Includes Ferritin, Iron Sat%, Iron, and TIBC); Future    8. Colon cancer screening  -     Cologuard           Subjective      Pt presents for routine visit. She is due for labs and diabetic foot exam. Diabetic eye exam is scheduled for . A1C remains controlled at 6.9. BP is controlled. She feels well overall. She notes for 1 month she has had some dark stools and also feels like it explodes into the toilet when she goes. Stool is loose but not watery. No pain in abdomen or rectum. No nausea or vomiting. No BRB. Pt has one BM per day. Review of Systems   Constitutional: Negative for chills and fever. HENT: Negative for congestion, ear pain, hearing loss, postnasal drip, rhinorrhea, sinus pressure, sinus pain, sore throat and trouble swallowing. Eyes: Negative for pain and visual disturbance. Respiratory: Negative for cough, chest tightness, shortness of breath and wheezing. Cardiovascular: Negative. Negative for chest pain, palpitations and leg swelling. Gastrointestinal: Negative for abdominal pain, blood in stool, constipation, diarrhea, nausea and vomiting. Endocrine: Negative for cold intolerance, heat intolerance, polydipsia, polyphagia and polyuria. Genitourinary: Negative for difficulty urinating, dysuria, flank pain and urgency. Musculoskeletal: Negative for arthralgias, back pain, gait problem and myalgias. Skin: Negative for rash. Allergic/Immunologic: Negative. Neurological: Negative for dizziness, weakness, light-headedness and headaches. Hematological: Negative. Psychiatric/Behavioral: Negative for behavioral problems, dysphoric mood and sleep disturbance. The patient is not nervous/anxious. Current Outpatient Medications on File Prior to Visit   Medication Sig   • albuterol (PROVENTIL HFA,VENTOLIN HFA) 90 mcg/act inhaler    • aspirin 81 mg chewable tablet Chew 81 mg daily   • atorvastatin (LIPITOR) 20 mg tablet TAKE ONE TABLET BY MOUTH EVERY MORNING   • Budeson-Glycopyrrol-Formoterol (Breztri Aerosphere) 160-9-4.8 MCG/ACT AERO Inhale 2 puffs 2 (two) times a day Rinse mouth after use. • cetirizine (ZyrTEC) 10 mg tablet Take 10 mg by mouth daily   • famotidine (PEPCID) 40 MG tablet Take 1 PO HS.   • gabapentin (NEURONTIN) 300 mg capsule TAKE ONE CAPSULE BY MOUTH DAILY   • glucose blood (OneTouch Verio) test strip Use as instructed TEST TWO TIMES DAILY e11.65   • lisinopril (ZESTRIL) 5 mg tablet TAKE ONE TABLET BY MOUTH DAILY   • metFORMIN (GLUCOPHAGE) 500 mg tablet TAKE ONE TABLET BY MOUTH TWICE A DAY WITH MEALS   • Multiple Vitamin (MULTIVITAMINS PO) Take by mouth daily   • omeprazole (PriLOSEC) 40 MG capsule TAKE ONE CAPSULE BY MOUTH EVERY DAY BEFORE BREAKFAST   • oxybutynin (DITROPAN-XL) 5 mg 24 hr tablet TAKE ONE TABLET BY MOUTH EVERY DAY   • polyethylene glycol (MIRALAX) 17 g packet Take 1 pack daily for constipation.    • mupirocin (BACTROBAN) 2 % ointment Apply topically 2 (two) times a day (Patient not taking: Reported on 5/8/2023)       Objective     /72 (BP Location: Left arm, Patient Position: Sitting)   Pulse 79   Temp 98.8 °F (37.1 °C)   Ht 5' (1.524 m)   Wt 70.9 kg (156 lb 4 oz)   SpO2 97%   BMI 30.52 kg/m²     Physical Exam  Vitals and nursing note reviewed. Constitutional:       General: She is not in acute distress. Appearance: She is well-developed. She is not diaphoretic. HENT:      Head: Normocephalic and atraumatic. Right Ear: External ear normal.      Left Ear: External ear normal.      Nose: Nose normal.      Mouth/Throat:      Pharynx: No oropharyngeal exudate. Eyes:      General: No scleral icterus. Right eye: No discharge. Left eye: No discharge. Conjunctiva/sclera: Conjunctivae normal.      Pupils: Pupils are equal, round, and reactive to light. Neck:      Thyroid: No thyromegaly. Cardiovascular:      Rate and Rhythm: Normal rate and regular rhythm. Pulses: no weak pulses          Dorsalis pedis pulses are 2+ on the right side and 2+ on the left side. Posterior tibial pulses are 2+ on the right side and 2+ on the left side. Heart sounds: Normal heart sounds. No murmur heard. No friction rub. No gallop. Pulmonary:      Effort: Pulmonary effort is normal. No respiratory distress. Breath sounds: Normal breath sounds. No wheezing or rales. Abdominal:      General: Bowel sounds are normal. There is no distension. Palpations: Abdomen is soft. Tenderness: There is no abdominal tenderness. Musculoskeletal:         General: No tenderness or deformity. Normal range of motion. Cervical back: Normal range of motion and neck supple. Feet:      Right foot:      Skin integrity: No ulcer, skin breakdown, erythema, warmth, callus or dry skin. Left foot:      Skin integrity: No ulcer, skin breakdown, erythema, warmth, callus or dry skin. Skin:     General: Skin is warm and dry.    Neurological:      Mental Status: She is alert and oriented to person, place, and time. Cranial Nerves: No cranial nerve deficit. Psychiatric:         Behavior: Behavior normal.         Thought Content: Thought content normal.         Judgment: Judgment normal.     Patient's shoes and socks removed. Right Foot/Ankle   Right Foot Inspection  Skin Exam: skin normal and skin intact. No dry skin, no warmth, no callus, no erythema, no maceration, no abnormal color, no pre-ulcer, no ulcer and no callus. Toe Exam: ROM and strength within normal limits. Sensory   Vibration: intact  Proprioception: diminished  Monofilament testing: intact    Vascular  Capillary refills: < 3 seconds  The right DP pulse is 2+. The right PT pulse is 2+. Left Foot/Ankle  Left Foot Inspection  Skin Exam: skin normal and skin intact. No dry skin, no warmth, no erythema, no maceration, normal color, no pre-ulcer, no ulcer and no callus. Toe Exam: ROM and strength within normal limits. Sensory   Vibration: intact  Proprioception: intact  Monofilament testing: intact    Vascular  Capillary refills: < 3 seconds  The left DP pulse is 2+. The left PT pulse is 2+.      Assign Risk Category  No deformity present  Loss of protective sensation  No weak pulses  Risk: 1      Mary Kay Oliveira PA-C

## 2023-07-12 NOTE — ASSESSMENT & PLAN NOTE
Pt feels color change may be dietary. Will get cologuard. Not using any pepto bismol. Recommend bringing up with GI next month.  Labs ordered as well

## 2023-07-12 NOTE — ASSESSMENT & PLAN NOTE
Lab Results   Component Value Date    HGBA1C 6.9 (A) 07/12/2023     Pts symptoms are stable with current regime. No changes at present. Foot exam performed.

## 2023-07-12 NOTE — PROGRESS NOTES
[address] [facility] coding opportunities     I13.0, E11.22, 11.40     Chart Reviewed number of suggestions sent to Provider: 3   GR    Patients Insurance     Medicare Insurance: [facility]

## 2023-07-14 DIAGNOSIS — J44.9 CHRONIC OBSTRUCTIVE PULMONARY DISEASE, UNSPECIFIED COPD TYPE (HCC): ICD-10-CM

## 2023-07-14 RX ORDER — BUDESONIDE, GLYCOPYRROLATE, AND FORMOTEROL FUMARATE 160; 9; 4.8 UG/1; UG/1; UG/1
2 AEROSOL, METERED RESPIRATORY (INHALATION) 2 TIMES DAILY
Qty: 32.1 G | Refills: 3 | Status: SHIPPED | OUTPATIENT
Start: 2023-07-14

## 2023-07-20 ENCOUNTER — APPOINTMENT (OUTPATIENT)
Dept: LAB | Facility: HOSPITAL | Age: 83
End: 2023-07-20
Payer: COMMERCIAL

## 2023-07-20 DIAGNOSIS — E55.9 VITAMIN D DEFICIENCY: ICD-10-CM

## 2023-07-20 DIAGNOSIS — I10 ESSENTIAL HYPERTENSION: ICD-10-CM

## 2023-07-20 DIAGNOSIS — Z13.29 SCREENING FOR THYROID DISORDER: ICD-10-CM

## 2023-07-20 DIAGNOSIS — E11.9 TYPE 2 DIABETES MELLITUS WITHOUT COMPLICATION, WITHOUT LONG-TERM CURRENT USE OF INSULIN (HCC): ICD-10-CM

## 2023-07-20 DIAGNOSIS — R19.5 DARK STOOLS: ICD-10-CM

## 2023-07-20 LAB
25(OH)D3 SERPL-MCNC: 77.7 NG/ML (ref 30–100)
ALBUMIN SERPL BCP-MCNC: 4.2 G/DL (ref 3.5–5)
ALP SERPL-CCNC: 57 U/L (ref 34–104)
ALT SERPL W P-5'-P-CCNC: 13 U/L (ref 7–52)
ANION GAP SERPL CALCULATED.3IONS-SCNC: 8 MMOL/L
AST SERPL W P-5'-P-CCNC: 16 U/L (ref 13–39)
BASOPHILS # BLD AUTO: 0.11 THOUSANDS/ÂΜL (ref 0–0.1)
BASOPHILS NFR BLD AUTO: 1 % (ref 0–1)
BILIRUB SERPL-MCNC: 0.64 MG/DL (ref 0.2–1)
BUN SERPL-MCNC: 17 MG/DL (ref 5–25)
CALCIUM SERPL-MCNC: 10.1 MG/DL (ref 8.4–10.2)
CHLORIDE SERPL-SCNC: 101 MMOL/L (ref 96–108)
CO2 SERPL-SCNC: 28 MMOL/L (ref 21–32)
CREAT SERPL-MCNC: 0.96 MG/DL (ref 0.6–1.3)
CREAT UR-MCNC: 57.2 MG/DL
EOSINOPHIL # BLD AUTO: 0.47 THOUSAND/ÂΜL (ref 0–0.61)
EOSINOPHIL NFR BLD AUTO: 6 % (ref 0–6)
ERYTHROCYTE [DISTWIDTH] IN BLOOD BY AUTOMATED COUNT: 13.2 % (ref 11.6–15.1)
FERRITIN SERPL-MCNC: 39 NG/ML (ref 11–307)
GFR SERPL CREATININE-BSD FRML MDRD: 54 ML/MIN/1.73SQ M
GLUCOSE P FAST SERPL-MCNC: 114 MG/DL (ref 65–99)
HCT VFR BLD AUTO: 40.4 % (ref 34.8–46.1)
HGB BLD-MCNC: 13.1 G/DL (ref 11.5–15.4)
IMM GRANULOCYTES # BLD AUTO: 0.02 THOUSAND/UL (ref 0–0.2)
IMM GRANULOCYTES NFR BLD AUTO: 0 % (ref 0–2)
IRON SATN MFR SERPL: 23 % (ref 15–50)
IRON SERPL-MCNC: 67 UG/DL (ref 50–170)
LYMPHOCYTES # BLD AUTO: 2.4 THOUSANDS/ÂΜL (ref 0.6–4.47)
LYMPHOCYTES NFR BLD AUTO: 31 % (ref 14–44)
MCH RBC QN AUTO: 28.5 PG (ref 26.8–34.3)
MCHC RBC AUTO-ENTMCNC: 32.4 G/DL (ref 31.4–37.4)
MCV RBC AUTO: 88 FL (ref 82–98)
MICROALBUMIN UR-MCNC: 5.4 MG/L (ref 0–20)
MICROALBUMIN/CREAT 24H UR: 9 MG/G CREATININE (ref 0–30)
MONOCYTES # BLD AUTO: 0.67 THOUSAND/ÂΜL (ref 0.17–1.22)
MONOCYTES NFR BLD AUTO: 9 % (ref 4–12)
NEUTROPHILS # BLD AUTO: 4.06 THOUSANDS/ÂΜL (ref 1.85–7.62)
NEUTS SEG NFR BLD AUTO: 53 % (ref 43–75)
NRBC BLD AUTO-RTO: 0 /100 WBCS
PLATELET # BLD AUTO: 240 THOUSANDS/UL (ref 149–390)
PMV BLD AUTO: 10.7 FL (ref 8.9–12.7)
POTASSIUM SERPL-SCNC: 4.3 MMOL/L (ref 3.5–5.3)
PROT SERPL-MCNC: 6.7 G/DL (ref 6.4–8.4)
RBC # BLD AUTO: 4.59 MILLION/UL (ref 3.81–5.12)
SODIUM SERPL-SCNC: 137 MMOL/L (ref 135–147)
TIBC SERPL-MCNC: 292 UG/DL (ref 250–450)
TSH SERPL DL<=0.05 MIU/L-ACNC: 1.78 UIU/ML (ref 0.45–4.5)
WBC # BLD AUTO: 7.73 THOUSAND/UL (ref 4.31–10.16)

## 2023-07-20 PROCEDURE — 85025 COMPLETE CBC W/AUTO DIFF WBC: CPT

## 2023-07-20 PROCEDURE — 82728 ASSAY OF FERRITIN: CPT

## 2023-07-20 PROCEDURE — 83540 ASSAY OF IRON: CPT

## 2023-07-20 PROCEDURE — 82306 VITAMIN D 25 HYDROXY: CPT

## 2023-07-20 PROCEDURE — 36415 COLL VENOUS BLD VENIPUNCTURE: CPT

## 2023-07-20 PROCEDURE — 84443 ASSAY THYROID STIM HORMONE: CPT

## 2023-07-20 PROCEDURE — 82570 ASSAY OF URINE CREATININE: CPT | Performed by: PHYSICIAN ASSISTANT

## 2023-07-20 PROCEDURE — 80053 COMPREHEN METABOLIC PANEL: CPT

## 2023-07-20 PROCEDURE — 83550 IRON BINDING TEST: CPT

## 2023-07-20 PROCEDURE — 82043 UR ALBUMIN QUANTITATIVE: CPT | Performed by: PHYSICIAN ASSISTANT

## 2023-07-24 DIAGNOSIS — R13.19 ESOPHAGEAL DYSPHAGIA: ICD-10-CM

## 2023-07-24 DIAGNOSIS — K21.9 CHRONIC GERD: ICD-10-CM

## 2023-07-24 RX ORDER — FAMOTIDINE 40 MG/1
TABLET, FILM COATED ORAL
Qty: 30 TABLET | Refills: 1 | Status: SHIPPED | OUTPATIENT
Start: 2023-07-24

## 2023-07-25 LAB
LEFT EYE DIABETIC RETINOPATHY: NORMAL
RIGHT EYE DIABETIC RETINOPATHY: NORMAL

## 2023-07-29 LAB — COLOGUARD RESULT REPORTABLE: NORMAL

## 2023-08-19 LAB — COLOGUARD RESULT REPORTABLE: NEGATIVE

## 2023-09-10 PROBLEM — Z12.11 COLON CANCER SCREENING: Status: RESOLVED | Noted: 2023-07-12 | Resolved: 2023-09-10

## 2023-09-19 ENCOUNTER — TELEPHONE (OUTPATIENT)
Dept: PULMONOLOGY | Facility: CLINIC | Age: 83
End: 2023-09-19

## 2023-10-05 ENCOUNTER — RA CDI HCC (OUTPATIENT)
Dept: OTHER | Facility: HOSPITAL | Age: 83
End: 2023-10-05

## 2023-10-05 NOTE — PROGRESS NOTES
[address] coding opportunities       Chart reviewed, no opportunity found: [address] Review     Patients Insurance     Medicare Insurance: [employer]

## 2023-10-12 DIAGNOSIS — E78.5 HYPERLIPIDEMIA, UNSPECIFIED HYPERLIPIDEMIA TYPE: ICD-10-CM

## 2023-10-12 RX ORDER — ATORVASTATIN CALCIUM 20 MG/1
20 TABLET, FILM COATED ORAL DAILY
Qty: 90 TABLET | Refills: 1 | Status: SHIPPED | OUTPATIENT
Start: 2023-10-12

## 2023-10-13 ENCOUNTER — RA CDI HCC (OUTPATIENT)
Dept: OTHER | Facility: HOSPITAL | Age: 83
End: 2023-10-13

## 2023-10-13 NOTE — PROGRESS NOTES
[address] [facility] coding opportunities     I13.0, E11.40, E11.22     Chart Reviewed number of suggestions sent to Provider: 3     GR   suggestion already on previsit    Patients Insurance     Medicare Insurance: [employer]

## 2023-10-16 ENCOUNTER — OFFICE VISIT (OUTPATIENT)
Dept: INTERNAL MEDICINE CLINIC | Facility: CLINIC | Age: 83
End: 2023-10-16
Payer: COMMERCIAL

## 2023-10-16 VITALS
SYSTOLIC BLOOD PRESSURE: 116 MMHG | TEMPERATURE: 97.4 F | HEART RATE: 75 BPM | BODY MASS INDEX: 30.39 KG/M2 | OXYGEN SATURATION: 99 % | DIASTOLIC BLOOD PRESSURE: 70 MMHG | HEIGHT: 60 IN | WEIGHT: 154.8 LBS

## 2023-10-16 DIAGNOSIS — E11.65 TYPE 2 DIABETES MELLITUS WITH HYPERGLYCEMIA, WITHOUT LONG-TERM CURRENT USE OF INSULIN (HCC): Primary | ICD-10-CM

## 2023-10-16 PROBLEM — R19.5 DARK STOOLS: Status: RESOLVED | Noted: 2023-07-12 | Resolved: 2023-10-16

## 2023-10-16 PROBLEM — R07.81 RIB PAIN: Status: RESOLVED | Noted: 2023-01-13 | Resolved: 2023-10-16

## 2023-10-16 PROBLEM — R26.2 AMBULATORY DYSFUNCTION: Status: RESOLVED | Noted: 2023-03-08 | Resolved: 2023-10-16

## 2023-10-16 PROBLEM — R68.89: Status: RESOLVED | Noted: 2021-10-26 | Resolved: 2023-10-16

## 2023-10-16 LAB — SL AMB POCT HEMOGLOBIN AIC: 6.8 (ref ?–6.5)

## 2023-10-16 PROCEDURE — 83036 HEMOGLOBIN GLYCOSYLATED A1C: CPT | Performed by: PHYSICIAN ASSISTANT

## 2023-10-16 PROCEDURE — 99213 OFFICE O/P EST LOW 20 MIN: CPT | Performed by: PHYSICIAN ASSISTANT

## 2023-10-16 NOTE — PROGRESS NOTES
Name: Harris Serna      : 1940      MRN: [id number]  Encounter Provider: Trace Mata PA-C  Encounter Date: 10/16/2023   Encounter department: [id number]     1. Type 2 diabetes mellitus with hyperglycemia, without long-term current use of insulin [facility])  Assessment & Plan:    Lab Results   Component Value Date    HGBA1C 6.8 (A) 10/16/2023   A1C 10/16/23: 6.8  Foot exam performed 23  On ACE and statin  Eye exam  23 at [facility]    Orders:  -     POCT hemoglobin A1c        Depression Screening and Follow-up Plan: Patient was screened for depression during today's encounter. They screened negative with a PHQ-2 score of 0. Subjective      Pt presents for routine visit. She is up to date with diabetic foot and diabetic eye exam. She is up to date with labs. She has aged out of mammogram, CRC screening and dexa. BP is controlled. A1C is controlled at 6.8. She [place]. She denies any new complaints or concerns. Review of Systems   Constitutional:  Negative for chills and fever. HENT:  Negative for congestion, ear pain, hearing loss, postnasal drip, rhinorrhea, sinus pressure, sinus pain, sore throat and trouble swallowing. Eyes:  Negative for pain and visual disturbance. Respiratory:  Negative for cough, chest tightness, shortness of breath and wheezing. Cardiovascular: Negative. Negative for chest pain, palpitations and leg swelling. Gastrointestinal:  Negative for abdominal pain, blood in stool, constipation, diarrhea, nausea and vomiting. Endocrine: Negative for cold intolerance, heat intolerance, polydipsia, polyphagia and polyuria. Genitourinary:  Negative for difficulty urinating, dysuria, flank pain and urgency. Musculoskeletal:  Negative for arthralgias, back pain, gait problem and myalgias. Skin:  Negative for rash. Allergic/Immunologic: Negative.     Neurological: Negative for dizziness, weakness, light-headedness and headaches. Hematological: Negative. Psychiatric/Behavioral:  Negative for behavioral problems, dysphoric mood and sleep disturbance. The patient is not nervous/anxious. Current Outpatient Medications on File Prior to Visit   Medication Sig   • albuterol (PROVENTIL HFA,VENTOLIN HFA) 90 mcg/act inhaler    • aspirin 81 mg chewable tablet Chew 81 mg daily   • atorvastatin (LIPITOR) 20 mg tablet Take 1 tablet (20 mg total) by mouth daily   • Budeson-Glycopyrrol-Formoterol (Breztri Aerosphere) 160-9-4.8 MCG/ACT AERO Inhale 2 puffs 2 (two) times a day Rinse mouth after use. • cetirizine (ZyrTEC) 10 mg tablet Take 10 mg by mouth daily   • gabapentin (NEURONTIN) 300 mg capsule TAKE ONE CAPSULE BY MOUTH DAILY   • glucose blood (OneTouch Verio) test strip Use as instructed TEST TWO TIMES DAILY e11.65   • lisinopril (ZESTRIL) 5 mg tablet TAKE ONE TABLET BY MOUTH DAILY   • metFORMIN (GLUCOPHAGE) 500 mg tablet TAKE ONE TABLET BY MOUTH TWICE A DAY WITH MEALS   • Multiple Vitamin (MULTIVITAMINS PO) Take by mouth daily   • omeprazole (PriLOSEC) 40 MG capsule TAKE ONE CAPSULE BY MOUTH EVERY DAY BEFORE BREAKFAST   • oxybutynin (DITROPAN-XL) 5 mg 24 hr tablet TAKE ONE TABLET BY MOUTH EVERY DAY   • famotidine (PEPCID) 40 MG tablet take 1 tablet by mouth at bedtime (Patient not taking: Reported on 10/16/2023)   • mupirocin (BACTROBAN) 2 % ointment Apply topically 2 (two) times a day (Patient not taking: Reported on 5/8/2023)   • polyethylene glycol (MIRALAX) 17 g packet Take 1 pack daily for constipation. (Patient not taking: Reported on 10/16/2023)       Objective     /70 (BP Location: Left arm, Patient Position: Sitting)   Pulse 75   Temp (!) 97.4 °F (36.3 °C) (Tympanic)   Ht 5' (1.524 m)   Wt 70.2 kg (154 lb 12.8 oz)   SpO2 99%   BMI 30.23 kg/m²     Physical Exam  Vitals and nursing note reviewed.    Constitutional:       General: She is not in acute distress. Appearance: She is well-developed. She is not diaphoretic. HENT:      Head: Normocephalic and atraumatic. Right Ear: External ear normal.      Left Ear: External ear normal.      Nose: Nose normal.      Mouth/Throat:      Pharynx: No oropharyngeal exudate. Eyes:      General: No scleral icterus. Right eye: No discharge. Left eye: No discharge. Conjunctiva/sclera: Conjunctivae normal.      Pupils: Pupils are equal, round, and reactive to light. Neck:      Thyroid: No thyromegaly. Cardiovascular:      Rate and Rhythm: Normal rate and regular rhythm. Heart sounds: Normal heart sounds. No murmur heard. No friction rub. No gallop. Pulmonary:      Effort: Pulmonary effort is normal. No respiratory distress. Breath sounds: Normal breath sounds. No wheezing or rales. Abdominal:      General: Bowel sounds are normal. There is no distension. Palpations: Abdomen is soft. Tenderness: There is no abdominal tenderness. Musculoskeletal:         General: No tenderness or deformity. Normal range of motion. Cervical back: Normal range of motion and neck supple. Skin:     General: Skin is warm and dry. Neurological:      Mental Status: She is alert and oriented to person, place, and time. Cranial Nerves: No cranial nerve deficit. Psychiatric:         Behavior: Behavior normal.         Thought Content:  Thought content normal.         Judgment: Judgment normal.       Mary Kay Oliveira PA-C

## 2023-10-16 NOTE — ASSESSMENT & PLAN NOTE
Lab Results   Component Value Date    HGBA1C 6.8 (A) 10/16/2023   A1C 10/16/23: 6.8  Foot exam performed 7/12/23  On ACE and statin  Eye exam  7/25/23 at [facility]

## 2023-12-10 NOTE — ASSESSMENT & PLAN NOTE
Lab Results   Component Value Date    HGBA1C 6 6 (A) 07/01/2022   A1C 11/1/22: 6 7  Foot exam performed 7/1/22  On ACE and statin  Eye exam up to date 9/11/20 and due every 2 years Patient presents with pain, redness, swelling and drainage to a wound.  Patient had the sutures removed on 12/1 and the provider said the wound looked infected.  She was started on antibiotics and used steri strips on the wound.    Symptoms started yesterday.  She states the wound was originally caused from her toolbox falling on her leg and she got 15 stitches.      Patient would like communication of their results via:      Cell Phone:   Telephone Information:   Mobile [phone]     Okay to leave a message containing results? Yes      Writer was wearing face shield/goggles and N95 respirator during rooming process.

## 2024-01-12 ENCOUNTER — RA CDI HCC (OUTPATIENT)
Dept: OTHER | Facility: HOSPITAL | Age: 84
End: 2024-01-12

## 2024-01-12 DIAGNOSIS — K21.9 GASTROESOPHAGEAL REFLUX DISEASE WITHOUT ESOPHAGITIS: ICD-10-CM

## 2024-01-12 DIAGNOSIS — M79.2 NEUROPATHIC PAIN: ICD-10-CM

## 2024-01-12 DIAGNOSIS — I10 HYPERTENSION, UNSPECIFIED TYPE: ICD-10-CM

## 2024-01-12 DIAGNOSIS — N32.81 OAB (OVERACTIVE BLADDER): ICD-10-CM

## 2024-01-12 RX ORDER — OMEPRAZOLE 40 MG/1
40 CAPSULE, DELAYED RELEASE ORAL
Qty: 90 CAPSULE | Refills: 1 | Status: SHIPPED | OUTPATIENT
Start: 2024-01-12

## 2024-01-12 RX ORDER — OXYBUTYNIN CHLORIDE 5 MG/1
5 TABLET, EXTENDED RELEASE ORAL DAILY
Qty: 90 TABLET | Refills: 1 | Status: SHIPPED | OUTPATIENT
Start: 2024-01-12

## 2024-01-12 RX ORDER — GABAPENTIN 300 MG/1
300 CAPSULE ORAL DAILY
Qty: 90 CAPSULE | Refills: 1 | Status: SHIPPED | OUTPATIENT
Start: 2024-01-12

## 2024-01-12 RX ORDER — LISINOPRIL 5 MG/1
5 TABLET ORAL DAILY
Qty: 90 TABLET | Refills: 1 | Status: SHIPPED | OUTPATIENT
Start: 2024-01-12

## 2024-01-23 ENCOUNTER — RA CDI HCC (OUTPATIENT)
Dept: OTHER | Facility: HOSPITAL | Age: 84
End: 2024-01-23

## 2024-01-23 NOTE — PROGRESS NOTES
HCC coding opportunities    I13.0, E11.22, E11.40     Chart Reviewed number of suggestions sent to Provider: 3     GR  Suggestions on BPA    Patients Insurance     Medicare Insurance: [employer]

## 2024-02-16 ENCOUNTER — OFFICE VISIT (OUTPATIENT)
Dept: INTERNAL MEDICINE CLINIC | Facility: CLINIC | Age: 84
End: 2024-02-16
Payer: COMMERCIAL

## 2024-02-16 VITALS
HEIGHT: 61 IN | DIASTOLIC BLOOD PRESSURE: 78 MMHG | BODY MASS INDEX: 28.56 KG/M2 | WEIGHT: 151.25 LBS | SYSTOLIC BLOOD PRESSURE: 100 MMHG | OXYGEN SATURATION: 97 % | TEMPERATURE: 98.5 F | HEART RATE: 77 BPM

## 2024-02-16 DIAGNOSIS — J45.20 MILD INTERMITTENT ASTHMA, UNSPECIFIED WHETHER COMPLICATED: ICD-10-CM

## 2024-02-16 DIAGNOSIS — E78.2 MIXED HYPERLIPIDEMIA: ICD-10-CM

## 2024-02-16 DIAGNOSIS — Z13.29 SCREENING FOR THYROID DISORDER: ICD-10-CM

## 2024-02-16 DIAGNOSIS — E11.65 TYPE 2 DIABETES MELLITUS WITH HYPERGLYCEMIA, WITHOUT LONG-TERM CURRENT USE OF INSULIN (HCC): ICD-10-CM

## 2024-02-16 DIAGNOSIS — E55.9 VITAMIN D DEFICIENCY: ICD-10-CM

## 2024-02-16 DIAGNOSIS — Z00.00 MEDICARE ANNUAL WELLNESS VISIT, SUBSEQUENT: Primary | ICD-10-CM

## 2024-02-16 DIAGNOSIS — I10 ESSENTIAL HYPERTENSION: ICD-10-CM

## 2024-02-16 PROBLEM — I47.29 NSVT (NONSUSTAINED VENTRICULAR TACHYCARDIA) (HCC): Status: RESOLVED | Noted: 2021-07-05 | Resolved: 2024-02-16

## 2024-02-16 LAB — SL AMB POCT HEMOGLOBIN AIC: 6.6 (ref ?–6.5)

## 2024-02-16 PROCEDURE — G0439 PPPS, SUBSEQ VISIT: HCPCS | Performed by: PHYSICIAN ASSISTANT

## 2024-02-16 PROCEDURE — 83036 HEMOGLOBIN GLYCOSYLATED A1C: CPT | Performed by: PHYSICIAN ASSISTANT

## 2024-02-16 RX ORDER — ALBUTEROL SULFATE 90 UG/1
2 AEROSOL, METERED RESPIRATORY (INHALATION) 4 TIMES DAILY
Qty: 1 G | Refills: 1 | Status: SHIPPED | OUTPATIENT
Start: 2024-02-16 | End: 2024-02-19 | Stop reason: SDUPTHER

## 2024-02-16 NOTE — PROGRESS NOTES
Assessment and Plan:     Problem List Items Addressed This Visit    None      Depression Screening and Follow-up Plan: Patient was screened for depression during today's encounter. They screened negative with a PHQ-2 score of 1.      Preventive health issues were discussed with patient, and age appropriate screening tests were ordered as noted in patient's After Visit Summary.  Personalized health advice and appropriate referrals for health education or preventive services given if needed, as noted in patient's After Visit Summary.     History of Present Illness:     Patient presents for a Medicare Wellness Visit    HPI   Patient Care Team:  Mary Kay Oliveira PA-C as PCP - General (Internal Medicine)  Aissatou Lyles MD as Endoscopist     Review of Systems:     Review of Systems   Constitutional:  Negative for chills and fever.   HENT:  Negative for congestion, ear pain, hearing loss, postnasal drip, rhinorrhea, sinus pressure, sinus pain, sore throat and trouble swallowing.    Eyes:  Negative for pain and visual disturbance.   Respiratory:  Negative for cough, chest tightness, shortness of breath and wheezing.    Cardiovascular: Negative.  Negative for chest pain, palpitations and leg swelling.   Gastrointestinal:  Negative for abdominal pain, blood in stool, constipation, diarrhea, nausea and vomiting.   Endocrine: Negative for cold intolerance, heat intolerance, polydipsia, polyphagia and polyuria.   Genitourinary:  Negative for difficulty urinating, dysuria, flank pain and urgency.   Musculoskeletal:  Negative for arthralgias, back pain, gait problem and myalgias.   Skin:  Negative for rash.   Allergic/Immunologic: Negative.    Neurological:  Negative for dizziness, weakness, light-headedness and headaches.   Hematological: Negative.    Psychiatric/Behavioral:  Negative for behavioral problems, dysphoric mood and sleep disturbance. The patient is not nervous/anxious.         Problem List:     Patient Active  Problem List   Diagnosis    Acid reflux disease    Asthma, mild intermittent    Chronic obstructive pulmonary disease (HCC)    Type 2 diabetes mellitus with hyperglycemia, without long-term current use of insulin (HCC)    Hyperlipidemia, unspecified    Essential hypertension    Neuropathy    Spinal stenosis of lumbar region without neurogenic claudication    Vitamin D deficiency    Chronic diastolic congestive heart failure (HCC)    OAB (overactive bladder)    NSVT (nonsustained ventricular tachycardia) ([facility])    Chronic constipation    Stage 3 chronic kidney disease, unspecified whether stage 3a or 3b CKD ([facility])    Esophageal dysphagia    Neuropathic pain      Past Medical and Surgical History:     Past Medical History:   Diagnosis Date    Allergic     Allergic rhinitis     COPD (chronic obstructive pulmonary disease) (HCC)     Diabetes mellitus (HCC)     GERD (gastroesophageal reflux disease)     Hyperlipidemia     Hypertension     Left non-suppurative otitis media 11/27/2019    Pneumonia     Pneumonia of right lower lobe due to infectious organism 12/4/2018    Stage 3 chronic kidney disease, unspecified whether stage 3a or 3b CKD ([facility]) 11/1/2022    Vitamin D deficiency      Past Surgical History:   Procedure Laterality Date    CARDIAC CATHETERIZATION      CHOLECYSTECTOMY  1984    COLONOSCOPY N/A 1/30/2019    Procedure: COLONOSCOPY with multiple  polypectomies;  Surgeon: Aissatou Lyles MD;  Location:  GI LAB;  Service: Gastroenterology    EYE SURGERY Bilateral     CATARACT      Family History:     Family History   Problem Relation Age of Onset    Lung cancer Father     Heart disease Mother     Lung cancer Sister     Lung cancer Brother       Social History:     Social History     Socioeconomic History    Marital status:      Spouse name: None    Number of children: None    Years of education: None    Highest education level: None   Occupational History    Occupation: RETIRED   Tobacco Use    Smoking status:  Former     Current packs/day: 0.00     Average packs/day: 1 pack/day for 50.0 years (50.0 ttl pk-yrs)     Types: Cigarettes     Start date: 12/17/1963     Quit date: 12/17/2013     Years since quitting: 10.1     Passive exposure: Current    Smokeless tobacco: Never   Vaping Use    Vaping status: Never Used   Substance and Sexual Activity    Alcohol use: Never    Drug use: Never    Sexual activity: None   Other Topics Concern    None   Social History Narrative    Daily caffeine consumption     Social Determinants of Health     Financial Resource Strain: Low Risk  (11/1/2022)    Overall Financial Resource Strain (CARDIA)     Difficulty of Paying Living Expenses: Not hard at all   Food Insecurity: No Food Insecurity (4/26/2021)    Hunger Vital Sign     Worried About Running Out of Food in the Last Year: Never true     Ran Out of Food in the Last Year: Never true   Transportation Needs: No Transportation Needs (11/1/2022)    PRAPARE - Transportation     Lack of Transportation (Medical): No     Lack of Transportation (Non-Medical): No   Physical Activity: Not on file   Stress: Not on file   Social Connections: Not on file   Intimate Partner Violence: Not on file   Housing Stability: Not on file      Medications and Allergies:     Current Outpatient Medications   Medication Sig Dispense Refill    albuterol (PROVENTIL HFA,VENTOLIN HFA) 90 mcg/act inhaler       aspirin 81 mg chewable tablet Chew 81 mg daily      atorvastatin (LIPITOR) 20 mg tablet Take 1 tablet (20 mg total) by mouth daily 90 tablet 1    Budeson-Glycopyrrol-Formoterol (Breztri Aerosphere) 160-9-4.8 MCG/ACT AERO Inhale 2 puffs 2 (two) times a day Rinse mouth after use. 32.1 g 3    cetirizine (ZyrTEC) 10 mg tablet Take 10 mg by mouth daily      gabapentin (NEURONTIN) 300 mg capsule Take 1 capsule (300 mg total) by mouth daily 90 capsule 1    glucose blood (OneTouch Verio) test strip Use as instructed TEST TWO TIMES DAILY e11.65 300 each 5    lisinopril  (ZESTRIL) 5 mg tablet Take 1 tablet (5 mg total) by mouth daily 90 tablet 1    metFORMIN (GLUCOPHAGE) 500 mg tablet TAKE ONE TABLET BY MOUTH TWICE A DAY WITH MEALS 180 tablet 1    Multiple Vitamin (MULTIVITAMINS PO) Take by mouth daily      omeprazole (PriLOSEC) 40 MG capsule Take 1 capsule (40 mg total) by mouth daily before breakfast 90 capsule 1    oxybutynin (DITROPAN-XL) 5 mg 24 hr tablet Take 1 tablet (5 mg total) by mouth daily 90 tablet 1    famotidine (PEPCID) 40 MG tablet take 1 tablet by mouth at bedtime (Patient not taking: Reported on 10/16/2023) 30 tablet 1    mupirocin (BACTROBAN) 2 % ointment Apply topically 2 (two) times a day (Patient not taking: Reported on 5/8/2023) 66 g 0    polyethylene glycol (MIRALAX) 17 g packet Take 1 pack daily for constipation. (Patient not taking: Reported on 10/16/2023) 30 each 1     No current facility-administered medications for this visit.     Allergies   Allergen Reactions    Bee Venom Anaphylaxis    Other Shortness Of Breath     FLOWERS/GRASS      Immunizations:     Immunization History   Administered Date(s) Administered    COVID-19 MODERNA VACC 0.5 ML IM 04/29/2021, 05/27/2021    COVID-19 Moderna Vac BIVALENT 12 Yr+ IM 0.5 ML 10/06/2022    INFLUENZA 12/05/2005, 10/15/2006, 10/15/2007, 10/05/2009, 12/06/2010, 11/21/2011, 12/03/2012, 10/07/2013, 09/10/2014, 09/17/2015, 10/12/2016    Influenza, high dose seasonal 0.7 mL 10/18/2018, 10/23/2019, 10/26/2021    Influenza, seasonal, injectable 11/30/2017    Pneumococcal Conjugate 13-Valent 09/11/2018    Pneumococcal Polysaccharide PPV23 11/21/2002, 10/15/2007    Tdap 09/17/2015, 06/24/2020      Health Maintenance:         Topic Date Due    Lung Cancer Screening  Discontinued         Topic Date Due    Influenza Vaccine (1) 09/01/2023    COVID-19 Vaccine (4 - 2023-24 season) 09/01/2023      Medicare Screening Tests and Risk Assessments:     Cr is here for her Subsequent Wellness visit.     Health Risk Assessment:    Patient rates overall health as good. Patient feels that their physical health rating is slightly worse. Patient is satisfied with their life. Eyesight was rated as same. Hearing was rated as same. Patient feels that their emotional and mental health rating is same. Patients states they are sometimes angry. Patient states they are often unusually tired/fatigued. Pain experienced in the last 7 days has been some. Patient's pain rating has been 3/10. Patient states that she has experienced no weight loss or gain in last 6 months.     Depression Screening:   PHQ-2 Score: 1      Fall Risk Screening:   In the past year, patient has experienced: history of falling in past year    Number of falls: 1  Injured during fall?: No    Feels unsteady when standing or walking?: Yes    Worried about falling?: Yes      Urinary Incontinence Screening:   Patient has not leaked urine accidently in the last six months.     Home Safety:  Patient has trouble with stairs inside or outside of their home. Patient has working smoke alarms and has working carbon monoxide detector. Home safety hazards include: none.     Nutrition:   Current diet is Regular.     Medications:   Patient is not currently taking any over-the-counter supplements. Patient is not able to manage medications.     Activities of Daily Living (ADLs)/Instrumental Activities of Daily Living (IADLs):   Walk and transfer into and out of bed and chair?: Yes  Dress and groom yourself?: Yes    Bathe or shower yourself?: Yes    Feed yourself? Yes  Do your laundry/housekeeping?: Yes  Manage your money, pay your bills and track your expenses?: Yes  Make your own meals?: Yes    Do your own shopping?: Yes    Previous Hospitalizations:   Any hospitalizations or ED visits within the last 12 months?: No      Advance Care Planning:   Living will: No    Durable POA for healthcare: No    Advanced directive: No    ACP document given: Yes      Cognitive Screening:   Provider or  "family/friend/caregiver concerned regarding cognition?: No    PREVENTIVE SCREENINGS      Cardiovascular Screening:    General: Screening Not Indicated and History Lipid Disorder      Diabetes Screening:     General: Screening Not Indicated and History Diabetes      Colorectal Cancer Screening:     General: Screening Current      Breast Cancer Screening:     General: Screening Not Indicated      Cervical Cancer Screening:    General: Screening Not Indicated      Osteoporosis Screening:    General: Screening Not Indicated      Abdominal Aortic Aneurysm (AAA) Screening:        General: Screening Not Indicated      Lung Cancer Screening:     General: Screening Not Indicated      Hepatitis C Screening:    General: Screening Not Indicated    Screening, Brief Intervention, and Referral to Treatment (SBIRT)    Screening  Typical number of drinks in a day: 0  Typical number of drinks in a week: 0  Interpretation: Low risk drinking behavior.    Single Item Drug Screening:  How often have you used an illegal drug (including marijuana) or a prescription medication for non-medical reasons in the past year? never    Single Item Drug Screen Score: 0  Interpretation: Negative screen for possible drug use disorder    No results found.     Physical Exam:     /78 (BP Location: Left arm, Patient Position: Sitting)   Pulse 77   Temp 98.5 °F (36.9 °C)   Ht 5' 1\" (1.549 m)   Wt 68.6 kg (151 lb 4 oz)   SpO2 97%   BMI 28.58 kg/m²     Physical Exam  Vitals and nursing note reviewed.   Constitutional:       Appearance: She is well-developed.   HENT:      Head: Normocephalic and atraumatic.      Right Ear: External ear normal.      Left Ear: External ear normal.      Nose: Nose normal.   Eyes:      Conjunctiva/sclera: Conjunctivae normal.   Cardiovascular:      Rate and Rhythm: Normal rate and regular rhythm.      Heart sounds: Normal heart sounds.   Pulmonary:      Effort: Pulmonary effort is normal.      Breath sounds: Normal " breath sounds.   Abdominal:      General: Bowel sounds are normal.      Palpations: Abdomen is soft.   Musculoskeletal:         General: Normal range of motion.      Cervical back: Normal range of motion and neck supple.   Skin:     General: Skin is warm and dry.   Neurological:      Mental Status: She is alert and oriented to person, place, and time.   Psychiatric:         Behavior: Behavior normal.         Thought Content: Thought content normal.         Judgment: Judgment normal.          Mary Kay Oliveira PA-C

## 2024-02-16 NOTE — PATIENT INSTRUCTIONS
Medicare Preventive Visit Patient Instructions  Thank you for completing your Welcome to Medicare Visit or Medicare Annual Wellness Visit today. Your next wellness visit will be due in one year (2/16/2025).  The screening/preventive services that you may require over the next 5-10 years are detailed below. Some tests may not apply to you based off risk factors and/or age. Screening tests ordered at today's visit but not completed yet may show as past due. Also, please note that scanned in results may not display below.  Preventive Screenings:  Service Recommendations Previous Testing/Comments   Colorectal Cancer Screening  * Colonoscopy    * Fecal Occult Blood Test (FOBT)/Fecal Immunochemical Test (FIT)  * Fecal DNA/Cologuard Test  * Flexible Sigmoidoscopy Age: 45-75 years old   Colonoscopy: every 10 years (may be performed more frequently if at higher risk)  OR  FOBT/FIT: every 1 year  OR  Cologuard: every 3 years  OR  Sigmoidoscopy: every 5 years  Screening may be recommended earlier than age 45 if at higher risk for colorectal cancer. Also, an individualized decision between you and your healthcare provider will decide whether screening between the ages of 76-85 would be appropriate. Colonoscopy: 08/14/2023  FOBT/FIT: 08/14/2023  Cologuard: 08/14/2023  Sigmoidoscopy: 08/14/2023          Breast Cancer Screening Age: 40+ years old  Frequency: every 1-2 years  Not required if history of left and right mastectomy Mammogram: 05/07/2018        Cervical Cancer Screening Between the ages of 21-29, pap smear recommended once every 3 years.   Between the ages of 30-65, can perform pap smear with HPV co-testing every 5 years.   Recommendations may differ for women with a history of total hysterectomy, cervical cancer, or abnormal pap smears in past. Pap Smear: Not on file    Screening Not Indicated   Hepatitis C Screening Once for adults born between 1945 and 1965  More frequently in patients at high risk for Hepatitis C  Hep C Antibody: Not on file        Diabetes Screening 1-2 times per year if you're at risk for diabetes or have pre-diabetes Fasting glucose: 114 mg/dL (7/20/2023)  A1C: 6.8 (10/16/2023)  Screening Not Indicated  History Diabetes   Cholesterol Screening Once every 5 years if you don't have a lipid disorder. May order more often based on risk factors. Lipid panel: 07/27/2022    Screening Not Indicated  History Lipid Disorder     Other Preventive Screenings Covered by Medicare:  Abdominal Aortic Aneurysm (AAA) Screening: covered once if your at risk. You're considered to be at risk if you have a family history of AAA.  Lung Cancer Screening: covers low dose CT scan once per year if you meet all of the following conditions: (1) Age 55-77; (2) No signs or symptoms of lung cancer; (3) Current smoker or have quit smoking within the last 15 years; (4) You have a tobacco smoking history of at least 20 pack years (packs per day multiplied by number of years you smoked); (5) You get a written order from a healthcare provider.  Glaucoma Screening: covered annually if you're considered high risk: (1) You have diabetes OR (2) Family history of glaucoma OR (3)  aged 50 and older OR (4)  American aged 65 and older  Osteoporosis Screening: covered every 2 years if you meet one of the following conditions: (1) You're estrogen deficient and at risk for osteoporosis based off medical history and other findings; (2) Have a vertebral abnormality; (3) On glucocorticoid therapy for more than 3 months; (4) Have primary hyperparathyroidism; (5) On osteoporosis medications and need to assess response to drug therapy.   Last bone density test (DXA Scan): 07/27/2022.  HIV Screening: covered annually if you're between the age of 15-65. Also covered annually if you are younger than 15 and older than 65 with risk factors for HIV infection. For pregnant patients, it is covered up to 3 times per  pregnancy.    Immunizations:  Immunization Recommendations   Influenza Vaccine Annual influenza vaccination during flu season is recommended for all persons aged >= 6 months who do not have contraindications   Pneumococcal Vaccine   * Pneumococcal conjugate vaccine = PCV13 (Prevnar 13), PCV15 (Vaxneuvance), PCV20 (Prevnar 20)  * Pneumococcal polysaccharide vaccine = PPSV23 (Pneumovax) Adults 19-65 yo with certain risk factors or if 65+ yo  If never received any pneumonia vaccine: recommend Prevnar 20 (PCV20)  Give PCV20 if previously received 1 dose of PCV13 or PPSV23   Hepatitis B Vaccine 3 dose series if at intermediate or high risk (ex: diabetes, end stage renal disease, liver disease)   Respiratory syncytial virus (RSV) Vaccine - COVERED BY MEDICARE PART D  * RSVPreF3 (Arexvy) CDC recommends that adults 60 years of age and older may receive a single dose of RSV vaccine using shared clinical decision-making (SCDM)   Tetanus (Td) Vaccine - COST NOT COVERED BY MEDICARE PART B Following completion of primary series, a booster dose should be given every 10 years to maintain immunity against tetanus. Td may also be given as tetanus wound prophylaxis.   Tdap Vaccine - COST NOT COVERED BY MEDICARE PART B Recommended at least once for all adults. For pregnant patients, recommended with each pregnancy.   Shingles Vaccine (Shingrix) - COST NOT COVERED BY MEDICARE PART B  2 shot series recommended in those 19 years and older who have or will have weakened immune systems or those 50 years and older     Health Maintenance Due:      Topic Date Due   • Lung Cancer Screening  Discontinued     Immunizations Due:      Topic Date Due   • Influenza Vaccine (1) 09/01/2023   • COVID-19 Vaccine (4 - 2023-24 season) 09/01/2023     Advance Directives   What are advance directives?  Advance directives are legal documents that state your wishes and plans for medical care. These plans are made ahead of time in case you lose your ability to  make decisions for yourself. Advance directives can apply to any medical decision, such as the treatments you want, and if you want to donate organs.   What are the types of advance directives?  There are many types of advance directives, and each state has rules about how to use them. You may choose a combination of any of the following:  Living will:  This is a written record of the treatment you want. You can also choose which treatments you do not want, which to limit, and which to stop at a certain time. This includes surgery, medicine, IV fluid, and tube feedings.   Durable power of  for healthcare (DPAHC):  This is a written record that states who you want to make healthcare choices for you when you are unable to make them for yourself. This person, called a proxy, is usually a family member or a friend. You may choose more than 1 proxy.  Do not resuscitate (DNR) order:  A DNR order is used in case your heart stops beating or you stop breathing. It is a request not to have certain forms of treatment, such as CPR. A DNR order may be included in other types of advance directives.  Medical directive:  This covers the care that you want if you are in a coma, near death, or unable to make decisions for yourself. You can list the treatments you want for each condition. Treatment may include pain medicine, surgery, blood transfusions, dialysis, IV or tube feedings, and a ventilator (breathing machine).  Values history:  This document has questions about your views, beliefs, and how you feel and think about life. This information can help others choose the care that you would choose.  Why are advance directives important?  An advance directive helps you control your care. Although spoken wishes may be used, it is better to have your wishes written down. Spoken wishes can be misunderstood, or not followed. Treatments may be given even if you do not want them. An advance directive may make it easier for your  family to make difficult choices about your care.   Fall Prevention    Fall prevention  includes ways to make your home and other areas safer. It also includes ways you can move more carefully to prevent a fall. Health conditions that cause changes in your blood pressure, vision, or muscle strength and coordination may increase your risk for falls. Medicines may also increase your risk for falls if they make you dizzy, weak, or sleepy.   Fall prevention tips:   Stand or sit up slowly.    Use assistive devices as directed.    Wear shoes that fit well and have soles that .    Wear a personal alarm.    Stay active.    Manage your medical conditions.    Home Safety Tips:  Add items to prevent falls in the bathroom.    Keep paths clear.    Install bright lights in your home.    Keep items you use often on shelves within reach.    Paint or place reflective tape on the edges of your stairs.    Weight Management   Why it is important to manage your weight:  Being overweight increases your risk of health conditions such as heart disease, high blood pressure, type 2 diabetes, and certain types of cancer. It can also increase your risk for osteoarthritis, sleep apnea, and other respiratory problems. Aim for a slow, steady weight loss. Even a small amount of weight loss can lower your risk of health problems.  How to lose weight safely:  A safe and healthy way to lose weight is to eat fewer calories and get regular exercise. You can lose up about 1 pound a week by decreasing the number of calories you eat by 500 calories each day.   Healthy meal plan for weight management:  A healthy meal plan includes a variety of foods, contains fewer calories, and helps you stay healthy. A healthy meal plan includes the following:  Eat whole-grain foods more often.  A healthy meal plan should contain fiber. Fiber is the part of grains, fruits, and vegetables that is not broken down by your body. Whole-grain foods are healthy and provide  extra fiber in your diet. Some examples of whole-grain foods are whole-wheat breads and pastas, oatmeal, brown rice, and bulgur.  Eat a variety of vegetables every day.  Include dark, leafy greens such as spinach, kale, juliet greens, and mustard greens. Eat yellow and orange vegetables such as carrots, sweet potatoes, and winter squash.   Eat a variety of fruits every day.  Choose fresh or canned fruit (canned in its own juice or light syrup) instead of juice. Fruit juice has very little or no fiber.  Eat low-fat dairy foods.  Drink fat-free (skim) milk or 1% milk. Eat fat-free yogurt and low-fat cottage cheese. Try low-fat cheeses such as mozzarella and other reduced-fat cheeses.  Choose meat and other protein foods that are low in fat.  Choose beans or other legumes such as split peas or lentils. Choose fish, skinless poultry (chicken or turkey), or lean cuts of red meat (beef or pork). Before you cook meat or poultry, cut off any visible fat.   Use less fat and oil.  Try baking foods instead of frying them. Add less fat, such as margarine, sour cream, regular salad dressing and mayonnaise to foods. Eat fewer high-fat foods. Some examples of high-fat foods include french fries, doughnuts, ice cream, and cakes.  Eat fewer sweets.  Limit foods and drinks that are high in sugar. This includes candy, cookies, regular soda, and sweetened drinks.  Exercise:  Exercise at least 30 minutes per day on most days of the week. Some examples of exercise include walking, biking, dancing, and swimming. You can also fit in more physical activity by taking the stairs instead of the elevator or parking farther away from stores. Ask your healthcare provider about the best exercise plan for you.      © Copyright [facility] 2018 Information is for End User's use only and may not be sold, redistributed or otherwise used for commercial purposes. All illustrations and images included in CareNotes® are the copyrighted property of  [employer]antha M[employer]. or [employer]

## 2024-02-19 DIAGNOSIS — J45.20 MILD INTERMITTENT ASTHMA, UNSPECIFIED WHETHER COMPLICATED: ICD-10-CM

## 2024-02-19 RX ORDER — ALBUTEROL SULFATE 90 UG/1
2 AEROSOL, METERED RESPIRATORY (INHALATION) 4 TIMES DAILY
Qty: 54 G | Refills: 1 | Status: SHIPPED | OUTPATIENT
Start: 2024-02-19

## 2024-02-29 ENCOUNTER — OFFICE VISIT (OUTPATIENT)
Dept: PODIATRY | Facility: CLINIC | Age: 84
End: 2024-02-29
Payer: COMMERCIAL

## 2024-02-29 VITALS
BODY MASS INDEX: 28.7 KG/M2 | HEIGHT: 61 IN | DIASTOLIC BLOOD PRESSURE: 74 MMHG | RESPIRATION RATE: 16 BRPM | HEART RATE: 109 BPM | WEIGHT: 152 LBS | SYSTOLIC BLOOD PRESSURE: 154 MMHG

## 2024-02-29 DIAGNOSIS — E11.65 TYPE 2 DIABETES MELLITUS WITH HYPERGLYCEMIA, WITHOUT LONG-TERM CURRENT USE OF INSULIN (HCC): ICD-10-CM

## 2024-02-29 PROCEDURE — 99203 OFFICE O/P NEW LOW 30 MIN: CPT | Performed by: PODIATRIST

## 2024-02-29 PROCEDURE — 11721 DEBRIDE NAIL 6 OR MORE: CPT | Performed by: PODIATRIST

## 2024-02-29 NOTE — PROGRESS NOTES
Diabetic Foot Exam    Patient's shoes and socks removed.    Right Foot/Ankle   Right Foot Inspection  Skin Exam: skin normal and skin intact. No dry skin, no warmth, no callus, no erythema, no maceration, no abnormal color, no pre-ulcer, no ulcer and no callus.     Sensory   Monofilament testing: intact    Vascular  The right DP pulse is 1+. The right PT pulse is 0.     Left Foot/Ankle  Left Foot Inspection  Skin Exam: skin normal and skin intact. No dry skin, no warmth, no erythema, no maceration, normal color, no pre-ulcer, no ulcer and no callus.     Sensory   Monofilament testing: intact    Vascular  The left DP pulse is 1+. The left PT pulse is 0.     Assign Risk Category  No deformity present  No loss of protective sensation  Weak pulses  Risk: 0            PATIENT:  Cr Pena    1940    ASSESSMENT:     1. Type 2 diabetes mellitus with hyperglycemia, without long-term current use of insulin ([facility])  Ambulatory Referral to Podiatry            PLAN:  1.  Patient was counseled on the condition and diagnosis.    2.  Educated disease prevention and risks related to diabetes.    3.  Educated proper daily foot care and exam.  Instructed proper skin care / protection and footwear.  Instructed to identify any signs of infection and related foot problem.    4.  The recent blood work was reviewed / discussed and the last HbA1c was 6.8.  Discussed proper blood glucose control with diet and exercise.    5.  The patient has *0* risk diabetic foot and will return in 12 months for diabetic foot exam.      Imaging: I have personally reviewed pertinent films in PACS  Labs, pathology, and Other Studies: I have personally reviewed pertinent reports.      High risk  foot precautions reviewed with patient including the need to wear protective shoegear at all times when walking including in the home, the need to check feet all surfaces daily with a mirror and report and skin breaks to podiatry, the need to apply an emollient  to skin of feet daily.  Diabetic Foot Ulcer Risks    - Between 10-15% of diabetic foot ulcers do not heal.[v]  - Of diabetic foot ulcers that do not heal, 25% will require amputation.[v]    iv ADAL Acuña, SCOTT Oliver, Nathalie RILEY, and Melissa LUA, Foot Ulcers in the Diabetic Patient, Prevention and Treatment. Vascular Health Risk Management. 2007 Feb; 3(1): 65-76  v EVAN Barr, ELMER Aviles, CHNAELL Singleton, EVA Peñaloza, Cuong, TDannieT., Risk factors for lower extremity amputation in patients with diabetic foot ulcers: a hospital-based case-control study. Diabetic Foot & Ankle, 2015. 6:10.3402      Procedure: All mycotic toenails were reduced and debrided in length, width, and girth using a nail nipper and dremel.  All hyperkeratotic skin lesion(s) were sharply pared with a scalpel with no bleeding or evidence of ulceration.  Patient tolerated procedure(s) well without complications.  69007, q8          Subjective:          HPI  The patient presents for diabetic foot evaluation.  The patient has diabetes for multiple years.  The blood glucose is under control and the last HbA1c was 6.8.  The patient denied any history of diabetic foot ulcer or related foot infection.  No significant numbness or paresthesia.  Denied weakness or significant functional deficit.  The patient denied any acute pedal disorder or injury.  Denied symptoms of arterial claudication in legs.  The patient presents with elongated painful toenai that she cannot bend down and cut herself.  They get caught in her socks and shoes.       The following portions of the patient's history were reviewed and updated as appropriate: allergies, current medications, past family history, past medical history, past social history, past surgical history and problem list.  All pertinent labs and images were reviewed.    Past Medical History  Past Medical History:   Diagnosis Date    Allergic     Allergic rhinitis     COPD (chronic obstructive pulmonary disease) (HCC)      Diabetes mellitus (HCC)     GERD (gastroesophageal reflux disease)     Hyperlipidemia     Hypertension     Left non-suppurative otitis media 11/27/2019    Pneumonia     Pneumonia of right lower lobe due to infectious organism 12/4/2018    Stage 3 chronic kidney disease, unspecified whether stage 3a or 3b CKD (HCC) 11/1/2022    Vitamin D deficiency        Past Surgical History  Past Surgical History:   Procedure Laterality Date    CARDIAC CATHETERIZATION      CHOLECYSTECTOMY  1984    COLONOSCOPY N/A 1/30/2019    Procedure: COLONOSCOPY with multiple  polypectomies;  Surgeon: Aissatou Lyles MD;  Location:  GI LAB;  Service: Gastroenterology    EYE SURGERY Bilateral     CATARACT        Allergies:  Bee venom and Other    Medications:  Current Outpatient Medications   Medication Sig Dispense Refill    albuterol (PROVENTIL HFA,VENTOLIN HFA) 90 mcg/act inhaler Inhale 2 puffs 4 (four) times a day 54 g 1    aspirin 81 mg chewable tablet Chew 81 mg daily      atorvastatin (LIPITOR) 20 mg tablet Take 1 tablet (20 mg total) by mouth daily 90 tablet 1    Budeson-Glycopyrrol-Formoterol (Breztri Aerosphere) 160-9-4.8 MCG/ACT AERO Inhale 2 puffs 2 (two) times a day Rinse mouth after use. 32.1 g 3    cetirizine (ZyrTEC) 10 mg tablet Take 10 mg by mouth daily      gabapentin (NEURONTIN) 300 mg capsule Take 1 capsule (300 mg total) by mouth daily 90 capsule 1    glucose blood (OneTouch Verio) test strip Use as instructed TEST TWO TIMES DAILY e11.65 300 each 5    lisinopril (ZESTRIL) 5 mg tablet Take 1 tablet (5 mg total) by mouth daily 90 tablet 1    metFORMIN (GLUCOPHAGE) 500 mg tablet TAKE ONE TABLET BY MOUTH TWICE A DAY WITH MEALS 180 tablet 1    Multiple Vitamin (MULTIVITAMINS PO) Take by mouth daily      omeprazole (PriLOSEC) 40 MG capsule Take 1 capsule (40 mg total) by mouth daily before breakfast 90 capsule 1    oxybutynin (DITROPAN-XL) 5 mg 24 hr tablet Take 1 tablet (5 mg total) by mouth daily 90 tablet 1     No current  facility-administered medications for this visit.       Social History:  Social History     Socioeconomic History    Marital status:      Spouse name: None    Number of children: None    Years of education: None    Highest education level: None   Occupational History    Occupation: RETIRED   Tobacco Use    Smoking status: Former     Current packs/day: 0.00     Average packs/day: 1 pack/day for 50.0 years (50.0 ttl pk-yrs)     Types: Cigarettes     Start date: 12/17/1963     Quit date: 12/17/2013     Years since quitting: 10.2     Passive exposure: Current    Smokeless tobacco: Never   Vaping Use    Vaping status: Never Used   Substance and Sexual Activity    Alcohol use: Never    Drug use: Never    Sexual activity: None   Other Topics Concern    None   Social History Narrative    Daily caffeine consumption     Social Determinants of Health     Financial Resource Strain: Low Risk  (11/1/2022)    Overall Financial Resource Strain (CARDIA)     Difficulty of Paying Living Expenses: Not hard at all   Food Insecurity: No Food Insecurity (4/26/2021)    Hunger Vital Sign     Worried About Running Out of Food in the Last Year: Never true     Ran Out of Food in the Last Year: Never true   Transportation Needs: No Transportation Needs (11/1/2022)    PRAPARE - Transportation     Lack of Transportation (Medical): No     Lack of Transportation (Non-Medical): No   Physical Activity: Not on file   Stress: Not on file   Social Connections: Not on file   Intimate Partner Violence: Not on file   Housing Stability: Not on file        Review of Systems   Constitutional:  Negative for chills and fever.   HENT:  Negative for ear pain and sore throat.    Eyes:  Negative for pain and visual disturbance.   Respiratory:  Negative for cough and shortness of breath.    Cardiovascular:  Negative for chest pain and palpitations.   Gastrointestinal:  Negative for abdominal pain and vomiting.   Genitourinary:  Negative for dysuria and  "hematuria.   Musculoskeletal:  Negative for arthralgias and back pain.   Skin:  Negative for color change and rash.   Neurological:  Negative for seizures and syncope.   All other systems reviewed and are negative.        Objective:      /74   Pulse (!) 109   Resp 16   Ht 5' 1\" (1.549 m)   Wt 68.9 kg (152 lb)   BMI 28.72 kg/m²          Physical Exam  Vitals reviewed.   Constitutional:       Appearance: Normal appearance.   HENT:      Head: Normocephalic and atraumatic.      Nose: Nose normal.      Mouth/Throat:      Mouth: Mucous membranes are moist.   Eyes:      Pupils: Pupils are equal, round, and reactive to light.   Cardiovascular:      Pulses: Pulses are weak.           Dorsalis pedis pulses are 1+ on the right side and 1+ on the left side.        Posterior tibial pulses are 0 on the right side and 0 on the left side.   Musculoskeletal:      Right lower leg: Edema present.      Left lower leg: Edema present.      Comments: Skin is shiny atrophic, warm to cool proximal to distal, nails 1 through 5 bilateral thick elongated with subungual debris's, no callosities.  Healed scratch on the posterior aspect left heel   Feet:      Right foot:      Skin integrity: No ulcer, skin breakdown, erythema, warmth, callus or dry skin.      Left foot:      Skin integrity: No ulcer, skin breakdown, erythema, warmth, callus or dry skin.   Skin:     Capillary Refill: Capillary refill takes more than 3 seconds.   Neurological:      General: No focal deficit present.      Mental Status: She is alert and oriented to person, place, and time. Mental status is at baseline.               "

## 2024-03-07 DIAGNOSIS — E11.9 TYPE 2 DIABETES MELLITUS WITHOUT COMPLICATION, WITHOUT LONG-TERM CURRENT USE OF INSULIN (HCC): ICD-10-CM

## 2024-04-07 DIAGNOSIS — E78.5 HYPERLIPIDEMIA, UNSPECIFIED HYPERLIPIDEMIA TYPE: ICD-10-CM

## 2024-04-08 RX ORDER — ATORVASTATIN CALCIUM 20 MG/1
20 TABLET, FILM COATED ORAL DAILY
Qty: 90 TABLET | Refills: 1 | Status: SHIPPED | OUTPATIENT
Start: 2024-04-08

## 2024-04-12 ENCOUNTER — TELEPHONE (OUTPATIENT)
Dept: OBGYN CLINIC | Facility: CLINIC | Age: 84
End: 2024-04-12

## 2024-04-12 NOTE — TELEPHONE ENCOUNTER
Detailed voicemail left for patient regarding her follow up appointment on 6/3/24 needing to be rescheduled due to unforseen scheduling changes but I was able to reschedule to 5/20/2024. I provided my direct number for return call at [phone] to confirm voicemail.

## 2024-05-20 ENCOUNTER — OFFICE VISIT (OUTPATIENT)
Dept: PODIATRY | Facility: CLINIC | Age: 84
End: 2024-05-20
Payer: COMMERCIAL

## 2024-05-20 VITALS
BODY MASS INDEX: 28.7 KG/M2 | WEIGHT: 152 LBS | HEART RATE: 108 BPM | DIASTOLIC BLOOD PRESSURE: 71 MMHG | HEIGHT: 61 IN | SYSTOLIC BLOOD PRESSURE: 110 MMHG

## 2024-05-20 DIAGNOSIS — E11.65 TYPE 2 DIABETES MELLITUS WITH HYPERGLYCEMIA, WITHOUT LONG-TERM CURRENT USE OF INSULIN (HCC): Primary | ICD-10-CM

## 2024-05-20 DIAGNOSIS — B35.1 ONYCHOMYCOSIS: ICD-10-CM

## 2024-05-20 PROCEDURE — 11720 DEBRIDE NAIL 1-5: CPT | Performed by: PODIATRIST

## 2024-05-20 NOTE — PROGRESS NOTES
Cr Pena  1940  AT RISK FOOT CARE    1. Type 2 diabetes mellitus with hyperglycemia, without long-term current use of insulin ([facility])        2. Onychomycosis            Patient presents for at-risk foot care.  Patient has no acute concerns today.  Patient has significant lower extremity risk due to diminished pulses in the feet and trophic skin changes to the lower extremity including thick toenail, atrophic skin, and decreased hair growth.      On exam patient has thickened, hypertrophic, discolored, brittle toenails with subungual debris and tenderness x4 1,5 b/l   Callus: 0  Patient has diminished pedal pulses and decreased perfusion to the lower extremities  Patient has significant trophic changes to the skin including thick toenails, decreased pedal hair and atrophic skin.     Today's treatment includes:  Debridement of toenails. Using nail nipper, hubert, and curette, nails were sharply debrided, reduced in thickness and length. Devitalized nail tissue and fungal debris excised and removed. Patient tolerated well.      Discussed proper shoe gear, daily inspections of feet, and general foot health with patient. Patient has Q8  findings and is recommended for at risk foot care every 9-10 weeks.        Procedure: All mycotic toenails were reduced and debrided in length, width, and girth using a nail nipper and dremel.  All hyperkeratotic skin lesion(s) were sharply pared with a scalpel with no bleeding or evidence of ulceration.  Patient tolerated procedure(s) well without complications.

## 2024-06-05 DIAGNOSIS — E11.9 TYPE 2 DIABETES MELLITUS WITHOUT COMPLICATION, WITHOUT LONG-TERM CURRENT USE OF INSULIN (HCC): ICD-10-CM

## 2024-06-05 NOTE — TELEPHONE ENCOUNTER
Reason for call:   [x] Refill   [] Prior Auth  [] Other:     Office:   [x] PCP/Provider -   [] Specialty/Provider -     Medication:  metFORMIN (GLUCOPHAGE) 500 mg tablet    Dose/Frequency: 500 mg, Oral, 2 times daily with meals     Quantity: 180    Pharmacy: [facility] - [place] [address] [phone]     Does the patient have enough for 3 days?   [] Yes   [x] No - Send as HP to POD

## 2024-06-06 ENCOUNTER — RA CDI HCC (OUTPATIENT)
Dept: OTHER | Facility: HOSPITAL | Age: 84
End: 2024-06-06

## 2024-06-08 ENCOUNTER — RA CDI HCC (OUTPATIENT)
Dept: OTHER | Facility: HOSPITAL | Age: 84
End: 2024-06-08

## 2024-06-09 NOTE — PROGRESS NOTES
HCC coding opportunities     E11.22, E11.40, I13.0     Chart Reviewed number of suggestions sent to Provider: 3   GR    Patients Insurance     Medicare Insurance: [employer]

## 2024-06-10 ENCOUNTER — RA CDI HCC (OUTPATIENT)
Dept: OTHER | Facility: HOSPITAL | Age: 84
End: 2024-06-10

## 2024-06-10 NOTE — PROGRESS NOTES
HCC coding opportunities     I13.0, E11.22, N18.31     Chart Reviewed number of suggestions sent to Provider: 3     GR    Patients Insurance     Medicare Insurance: [employer]

## 2024-06-17 ENCOUNTER — OFFICE VISIT (OUTPATIENT)
Dept: INTERNAL MEDICINE CLINIC | Facility: CLINIC | Age: 84
End: 2024-06-17
Payer: COMMERCIAL

## 2024-06-17 VITALS
TEMPERATURE: 97.8 F | OXYGEN SATURATION: 96 % | DIASTOLIC BLOOD PRESSURE: 64 MMHG | SYSTOLIC BLOOD PRESSURE: 110 MMHG | BODY MASS INDEX: 28.32 KG/M2 | WEIGHT: 150 LBS | HEIGHT: 61 IN | HEART RATE: 84 BPM

## 2024-06-17 DIAGNOSIS — I10 ESSENTIAL HYPERTENSION: Primary | ICD-10-CM

## 2024-06-17 DIAGNOSIS — E11.65 TYPE 2 DIABETES MELLITUS WITH HYPERGLYCEMIA, WITHOUT LONG-TERM CURRENT USE OF INSULIN (HCC): ICD-10-CM

## 2024-06-17 PROCEDURE — 99213 OFFICE O/P EST LOW 20 MIN: CPT | Performed by: PHYSICIAN ASSISTANT

## 2024-06-17 PROCEDURE — G2211 COMPLEX E/M VISIT ADD ON: HCPCS | Performed by: PHYSICIAN ASSISTANT

## 2024-06-17 NOTE — PROGRESS NOTES
Ambulatory Visit  Name: Cr Pena      : 1940      MRN: [id number]  Encounter Provider: Mary Kay Oliveira PA-C  Encounter Date: 2024   Encounter department: [facility]    Assessment & Plan   1. Essential hypertension  Assessment & Plan:  Pts symptoms are stable with current regime. No changes at present.     2. Type 2 diabetes mellitus with hyperglycemia, without long-term current use of insulin (HCC)  Assessment & Plan:    Lab Results   Component Value Date    HGBA1C 6.6 (A) 2024     Pts symptoms are stable with current regime. No changes at present.         Depression Screening and Follow-up Plan: Patient was screened for depression during today's encounter. They screened negative with a PHQ-2 score of 0.        History of Present Illness     Pt presents for routine visit. She is doing well overall. She is up to date with diabetic foot and diabetic eye exam. She is up to date with AWV. She is due for labs. BP is nicely controlled.       Review of Systems   Constitutional:  Negative for chills and fever.   HENT:  Negative for congestion, ear pain, hearing loss, postnasal drip, rhinorrhea, sinus pressure, sinus pain, sore throat and trouble swallowing.    Eyes:  Negative for pain and visual disturbance.   Respiratory:  Negative for cough, chest tightness, shortness of breath and wheezing.    Cardiovascular: Negative.  Negative for chest pain, palpitations and leg swelling.   Gastrointestinal:  Negative for abdominal pain, blood in stool, constipation, diarrhea, nausea and vomiting.   Endocrine: Negative for cold intolerance, heat intolerance, polydipsia, polyphagia and polyuria.   Genitourinary:  Negative for difficulty urinating, dysuria, flank pain and urgency.   Musculoskeletal:  Negative for arthralgias, back pain, gait problem and myalgias.   Skin:  Negative for rash.   Allergic/Immunologic: Negative.    Neurological:  Negative for dizziness, weakness, light-headedness  and headaches.   Hematological: Negative.    Psychiatric/Behavioral:  Negative for behavioral problems, dysphoric mood and sleep disturbance. The patient is not nervous/anxious.      Past Medical History:   Diagnosis Date    Allergic     Allergic rhinitis     COPD (chronic obstructive pulmonary disease) (HCC)     Diabetes mellitus (HCC)     GERD (gastroesophageal reflux disease)     Hyperlipidemia     Hypertension     Left non-suppurative otitis media 11/27/2019    Pneumonia     Pneumonia of right lower lobe due to infectious organism 12/4/2018    Stage 3 chronic kidney disease, unspecified whether stage 3a or 3b CKD (HCC) 11/1/2022    Vitamin D deficiency      Past Surgical History:   Procedure Laterality Date    CARDIAC CATHETERIZATION      CHOLECYSTECTOMY  1984    COLONOSCOPY N/A 1/30/2019    Procedure: COLONOSCOPY with multiple  polypectomies;  Surgeon: Aissatou Lyles MD;  Location:  GI LAB;  Service: Gastroenterology    EYE SURGERY Bilateral     CATARACT     Family History   Problem Relation Age of Onset    Lung cancer Father     Heart disease Mother     Lung cancer Sister     Lung cancer Brother      Social History     Tobacco Use    Smoking status: Former     Current packs/day: 0.00     Average packs/day: 1 pack/day for 50.0 years (50.0 ttl pk-yrs)     Types: Cigarettes     Start date: 12/17/1963     Quit date: 12/17/2013     Years since quitting: 10.5     Passive exposure: Past    Smokeless tobacco: Never   Vaping Use    Vaping status: Never Used   Substance and Sexual Activity    Alcohol use: Never    Drug use: Never    Sexual activity: Not on file     Current Outpatient Medications on File Prior to Visit   Medication Sig    albuterol (PROVENTIL HFA,VENTOLIN HFA) 90 mcg/act inhaler Inhale 2 puffs 4 (four) times a day    aspirin 81 mg chewable tablet Chew 81 mg daily    atorvastatin (LIPITOR) 20 mg tablet Take 1 tablet by mouth daily    Budeson-Glycopyrrol-Formoterol (Breztri Aerosphere) 160-9-4.8 MCG/ACT  "AERO Inhale 2 puffs 2 (two) times a day Rinse mouth after use.    cetirizine (ZyrTEC) 10 mg tablet Take 10 mg by mouth daily    gabapentin (NEURONTIN) 300 mg capsule Take 1 capsule (300 mg total) by mouth daily    glucose blood (OneTouch Verio) test strip Use as instructed TEST TWO TIMES DAILY e11.65    lisinopril (ZESTRIL) 5 mg tablet Take 1 tablet (5 mg total) by mouth daily    metFORMIN (GLUCOPHAGE) 500 mg tablet Take 1 tablet (500 mg total) by mouth 2 (two) times a day with meals    Multiple Vitamin (MULTIVITAMINS PO) Take by mouth daily    omeprazole (PriLOSEC) 40 MG capsule Take 1 capsule (40 mg total) by mouth daily before breakfast    oxybutynin (DITROPAN-XL) 5 mg 24 hr tablet Take 1 tablet (5 mg total) by mouth daily     Allergies   Allergen Reactions    Bee Venom Anaphylaxis    Other Shortness Of Breath     FLOWERS/GRASS     Immunization History   Administered Date(s) Administered    COVID-19 MODERNA VACC 0.5 ML IM 04/29/2021, 05/27/2021    COVID-19 Moderna Vac BIVALENT 12 Yr+ IM 0.5 ML 10/06/2022    INFLUENZA 12/05/2005, 10/15/2006, 10/15/2007, 10/05/2009, 12/06/2010, 11/21/2011, 12/03/2012, 10/07/2013, 09/10/2014, 09/17/2015, 10/12/2016    Influenza, high dose seasonal 0.7 mL 10/18/2018, 10/23/2019, 10/26/2021    Influenza, seasonal, injectable 11/30/2017    Pneumococcal Conjugate 13-Valent 09/11/2018    Pneumococcal Polysaccharide PPV23 11/21/2002, 10/15/2007    Tdap 09/17/2015, 06/24/2020     Objective     /64 (BP Location: Right arm, Patient Position: Sitting, Cuff Size: Adult)   Pulse 84   Temp 97.8 °F (36.6 °C)   Ht 5' 1\" (1.549 m)   Wt 68 kg (150 lb)   SpO2 96%   BMI 28.34 kg/m²     Physical Exam  Vitals and nursing note reviewed.   Constitutional:       General: She is not in acute distress.     Appearance: Normal appearance. She is well-developed. She is not diaphoretic.   HENT:      Head: Normocephalic and atraumatic.      Right Ear: External ear normal.      Left Ear: External ear " normal.      Nose: Nose normal.      Mouth/Throat:      Pharynx: No oropharyngeal exudate.   Eyes:      General: No scleral icterus.        Right eye: No discharge.         Left eye: No discharge.      Conjunctiva/sclera: Conjunctivae normal.      Pupils: Pupils are equal, round, and reactive to light.   Neck:      Thyroid: No thyromegaly.   Cardiovascular:      Rate and Rhythm: Normal rate and regular rhythm.      Heart sounds: Normal heart sounds. No murmur heard.     No friction rub. No gallop.   Pulmonary:      Effort: Pulmonary effort is normal. No respiratory distress.      Breath sounds: Normal breath sounds. No wheezing or rales.   Abdominal:      General: Bowel sounds are normal. There is no distension.      Palpations: Abdomen is soft.      Tenderness: There is no abdominal tenderness.   Musculoskeletal:         General: No tenderness or deformity. Normal range of motion.      Cervical back: Normal range of motion and neck supple.   Skin:     General: Skin is warm and dry.   Neurological:      Mental Status: She is alert and oriented to person, place, and time.      Cranial Nerves: No cranial nerve deficit.   Psychiatric:         Behavior: Behavior normal.         Thought Content: Thought content normal.         Judgment: Judgment normal.       Administrative Statements   I have spent a total time of 15 minutes on 06/17/24 In caring for this patient including Risks and benefits of tx options, Instructions for management, and Patient and family education.

## 2024-06-17 NOTE — ASSESSMENT & PLAN NOTE
Lab Results   Component Value Date    HGBA1C 6.6 (A) 02/16/2024     Pts symptoms are stable with current regime. No changes at present.

## 2024-07-03 DIAGNOSIS — M79.2 NEUROPATHIC PAIN: ICD-10-CM

## 2024-07-03 DIAGNOSIS — N32.81 OAB (OVERACTIVE BLADDER): ICD-10-CM

## 2024-07-03 DIAGNOSIS — K21.9 GASTROESOPHAGEAL REFLUX DISEASE WITHOUT ESOPHAGITIS: ICD-10-CM

## 2024-07-03 DIAGNOSIS — I10 HYPERTENSION, UNSPECIFIED TYPE: ICD-10-CM

## 2024-07-03 RX ORDER — OMEPRAZOLE 40 MG/1
40 CAPSULE, DELAYED RELEASE ORAL
Qty: 100 CAPSULE | Refills: 1 | Status: SHIPPED | OUTPATIENT
Start: 2024-07-03

## 2024-07-03 RX ORDER — GABAPENTIN 300 MG/1
300 CAPSULE ORAL DAILY
Qty: 100 CAPSULE | Refills: 1 | Status: SHIPPED | OUTPATIENT
Start: 2024-07-03

## 2024-07-03 RX ORDER — OXYBUTYNIN CHLORIDE 5 MG/1
5 TABLET, EXTENDED RELEASE ORAL DAILY
Qty: 100 TABLET | Refills: 1 | Status: SHIPPED | OUTPATIENT
Start: 2024-07-03

## 2024-07-03 RX ORDER — LISINOPRIL 5 MG/1
5 TABLET ORAL DAILY
Qty: 100 TABLET | Refills: 1 | Status: SHIPPED | OUTPATIENT
Start: 2024-07-03

## 2024-07-25 ENCOUNTER — TELEPHONE (OUTPATIENT)
Dept: PODIATRY | Facility: CLINIC | Age: 84
End: 2024-07-25

## 2024-07-30 ENCOUNTER — TELEPHONE (OUTPATIENT)
Dept: PODIATRY | Facility: CLINIC | Age: 84
End: 2024-07-30

## 2024-07-30 NOTE — TELEPHONE ENCOUNTER
Called and left a message for pt to call us back at [phone]. We need to schedule a follow up appt with Dr Davey for routine nail care in [place].

## 2024-08-23 ENCOUNTER — OFFICE VISIT (OUTPATIENT)
Dept: PODIATRY | Facility: CLINIC | Age: 84
End: 2024-08-23
Payer: COMMERCIAL

## 2024-08-23 VITALS
SYSTOLIC BLOOD PRESSURE: 105 MMHG | HEIGHT: 61 IN | OXYGEN SATURATION: 98 % | DIASTOLIC BLOOD PRESSURE: 66 MMHG | BODY MASS INDEX: 28.32 KG/M2 | RESPIRATION RATE: 16 BRPM | WEIGHT: 150 LBS | HEART RATE: 102 BPM | TEMPERATURE: 97.8 F

## 2024-08-23 DIAGNOSIS — B35.1 ONYCHOMYCOSIS: ICD-10-CM

## 2024-08-23 DIAGNOSIS — E11.41 CONTROLLED TYPE 2 DIABETES MELLITUS WITH DIABETIC MONONEUROPATHY, WITHOUT LONG-TERM CURRENT USE OF INSULIN (HCC): Primary | ICD-10-CM

## 2024-08-23 PROCEDURE — 99212 OFFICE O/P EST SF 10 MIN: CPT | Performed by: PODIATRIST

## 2024-08-23 RX ORDER — MULTIVIT-MINERALS/FA/LYCOPENE 400-370MCG
1 TABLET ORAL DAILY
COMMUNITY

## 2024-08-23 RX ORDER — BUTYROSPERMUM PARKII(SHEA BUTTER), SIMMONDSIA CHINENSIS (JOJOBA) SEED OIL, ALOE BARBADENSIS LEAF EXTRACT .01; 1; 3.5 G/100G; G/100G; G/100G
2000 LIQUID TOPICAL DAILY
COMMUNITY

## 2024-08-23 NOTE — PROGRESS NOTES
Ambulatory Visit  Name: Cr Pena      : 1940      MRN: [id number]  Encounter Provider: Parth Davey DPM  Encounter Date: 2024   Encounter department: [facility] [place]    Assessment & Plan   1. Controlled type 2 diabetes mellitus with diabetic mononeuropathy, without long-term current use of insulin ([facility])  2. Onychomycosis    Debride mycotic nails and thin the nail plates with the use of a nail nipper and the nails were smoothed and thinned with the use of an electric Dremel bur.  Patient tolerated the procedure well    Return in about 11 weeks (around 2024).     History of Present Illness     Cr Pena is a 84 y.o. female who presents with chief complaint of painful thick nails on both feet.  Patient relates that her sugars running around 110 when she checks it    Review of Systems  Medical History Reviewed by provider this encounter:  Tobacco  Allergies  Meds  Problems  Med Hx  Surg Hx  Fam Hx       Current Outpatient Medications on File Prior to Visit   Medication Sig Dispense Refill    albuterol (PROVENTIL HFA,VENTOLIN HFA) 90 mcg/act inhaler Inhale 2 puffs 4 (four) times a day 54 g 1    aspirin 81 mg chewable tablet Chew 81 mg daily      atorvastatin (LIPITOR) 20 mg tablet Take 1 tablet by mouth daily 90 tablet 1    Budeson-Glycopyrrol-Formoterol (Breztri Aerosphere) 160-9-4.8 MCG/ACT AERO Inhale 2 puffs 2 (two) times a day Rinse mouth after use. 32.1 g 3    cetirizine (ZyrTEC) 10 mg tablet Take 10 mg by mouth daily      Cholecalciferol (D3 ) 50 MCG (2000) CAPS Take 2,000 Units by mouth daily      gabapentin (NEURONTIN) 300 mg capsule Take 1 capsule (300 mg total) by mouth daily 100 capsule 1    glucose blood (OneTouch Verio) test strip Use as instructed TEST TWO TIMES DAILY e11.65 300 each 5    lisinopril (ZESTRIL) 5 mg tablet Take 1 tablet (5 mg total) by mouth daily 100 tablet 1    metFORMIN (GLUCOPHAGE) 500 mg tablet Take 1 tablet (500 mg total) by  "mouth 2 (two) times a day with meals 180 tablet 0    Multiple Vitamin (MULTIVITAMINS PO) Take by mouth daily      Multiple Vitamins-Minerals (One-A-Day Womens 50+) TABS Take 1 tablet by mouth daily      omeprazole (PriLOSEC) 40 MG capsule Take 1 capsule (40 mg total) by mouth daily before breakfast 100 capsule 1    oxybutynin (DITROPAN-XL) 5 mg 24 hr tablet Take 1 tablet (5 mg total) by mouth daily 100 tablet 1     No current facility-administered medications on file prior to visit.      Objective     /66   Pulse 102   Temp 97.8 °F (36.6 °C)   Resp 16   Ht 5' 1\" (1.549 m)   Wt 68 kg (150 lb)   SpO2 98%   BMI 28.34 kg/m²     Physical Exam  Feet:      Right foot:      Protective Sensation: 4 sites tested.  3 sites sensed.      Left foot:      Protective Sensation: 4 sites tested.  4 sites sensed.     Vascular status is 1/4 DP PT negative digital hair normal distal cooling immediate capillary refill of around 2 to 3 seconds Slight edema present on the posterior aspect of the ankles    Nails are brittle elongated yellow-white discoloration with subungual debris there is an increased thickness in the nails of approximately 1 to 3 mm with the hallux nails being the worst    Ortho hammertoe deformities are present on the fourth and fifth digits bilaterally    0.5 monofilament test was performed on the patient patient felt all 4 sites on the left foot the site.  The sites were the plantar aspect of the hallux second and third digits plantarly in the arch region on the right foot patient was able to feel the plantar aspect of the third and fourth digits and the arch but had no sensation on the hallux.  She is able to feel light touch and proprioception.    Administrative Statements   I have spent a total time of 15 minutes in caring for this patient on the day of the visit/encounter including Instructions for management, Documenting in the medical record, Reviewing / ordering tests, medicine, procedures  , and " Obtaining or reviewing history  .

## 2024-08-28 DIAGNOSIS — E11.9 TYPE 2 DIABETES MELLITUS WITHOUT COMPLICATION, WITHOUT LONG-TERM CURRENT USE OF INSULIN (HCC): ICD-10-CM

## 2024-09-28 ENCOUNTER — HOSPITAL ENCOUNTER (INPATIENT)
Facility: HOSPITAL | Age: 84
LOS: 3 days | Discharge: HOME HEALTH | DRG: 183 | End: 2024-10-02
Attending: EMERGENCY MEDICINE | Admitting: HOSPITALIST
Payer: COMMERCIAL

## 2024-09-28 ENCOUNTER — APPOINTMENT (EMERGENCY)
Dept: CT IMAGING | Facility: HOSPITAL | Age: 84
DRG: 183 | End: 2024-09-28
Payer: COMMERCIAL

## 2024-09-28 ENCOUNTER — APPOINTMENT (EMERGENCY)
Dept: RADIOLOGY | Facility: HOSPITAL | Age: 84
DRG: 183 | End: 2024-09-28
Payer: COMMERCIAL

## 2024-09-28 DIAGNOSIS — S22.42XA CLOSED FRACTURE OF MULTIPLE RIBS OF LEFT SIDE, INITIAL ENCOUNTER: ICD-10-CM
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
## 2024-09-28 DIAGNOSIS — K59.09 CHRONIC CONSTIPATION: ICD-10-CM

## 2024-09-28 DIAGNOSIS — R09.02 HYPOXIA: ICD-10-CM

## 2024-09-28 DIAGNOSIS — R91.8 MASS OF LEFT LUNG: ICD-10-CM

## 2024-09-28 DIAGNOSIS — S22.49XA MULTIPLE RIB FRACTURES: ICD-10-CM

## 2024-09-28 LAB
ALBUMIN SERPL BCG-MCNC: 4.1 G/DL (ref 3.5–5)
ALP SERPL-CCNC: 70 U/L (ref 34–104)
ALT SERPL W P-5'-P-CCNC: 14 U/L (ref 7–52)
ANION GAP SERPL CALCULATED.3IONS-SCNC: 10 MMOL/L (ref 4–13)
APTT PPP: 33 SECONDS (ref 23–34)
AST SERPL W P-5'-P-CCNC: 18 U/L (ref 13–39)
ATRIAL RATE: 72 BPM
BACTERIA UR QL AUTO: ABNORMAL /HPF
BASOPHILS # BLD AUTO: 0.08 THOUSANDS/ΜL (ref 0–0.1)
BASOPHILS NFR BLD AUTO: 1 % (ref 0–1)
BILIRUB SERPL-MCNC: 0.5 MG/DL (ref 0.2–1)
BILIRUB UR QL STRIP: NEGATIVE
BUN SERPL-MCNC: 14 MG/DL (ref 5–25)
CALCIUM SERPL-MCNC: 9.5 MG/DL (ref 8.4–10.2)
CHLORIDE SERPL-SCNC: 102 MMOL/L (ref 96–108)
CLARITY UR: CLEAR
CO2 SERPL-SCNC: 26 MMOL/L (ref 21–32)
COLOR UR: YELLOW
CREAT SERPL-MCNC: 1 MG/DL (ref 0.6–1.3)
EOSINOPHIL # BLD AUTO: 0.42 THOUSAND/ΜL (ref 0–0.61)
EOSINOPHIL NFR BLD AUTO: 4 % (ref 0–6)
ERYTHROCYTE [DISTWIDTH] IN BLOOD BY AUTOMATED COUNT: 12.9 % (ref 11.6–15.1)
FLUAV RNA RESP QL NAA+PROBE: NEGATIVE
FLUBV RNA RESP QL NAA+PROBE: NEGATIVE
GFR SERPL CREATININE-BSD FRML MDRD: 51 ML/MIN/1.73SQ M
GLUCOSE SERPL-MCNC: 111 MG/DL (ref 65–140)
GLUCOSE SERPL-MCNC: 161 MG/DL (ref 65–140)
GLUCOSE UR STRIP-MCNC: NEGATIVE MG/DL
HCT VFR BLD AUTO: 42.9 % (ref 34.8–46.1)
HGB BLD-MCNC: 13.5 G/DL (ref 11.5–15.4)
HGB UR QL STRIP.AUTO: ABNORMAL
IMM GRANULOCYTES # BLD AUTO: 0.05 THOUSAND/UL (ref 0–0.2)
IMM GRANULOCYTES NFR BLD AUTO: 1 % (ref 0–2)
INR PPP: 1.02 (ref 0.85–1.19)
KETONES UR STRIP-MCNC: NEGATIVE MG/DL
LEUKOCYTE ESTERASE UR QL STRIP: NEGATIVE
LIPASE SERPL-CCNC: 39 U/L (ref 11–82)
LYMPHOCYTES # BLD AUTO: 2.8 THOUSANDS/ΜL (ref 0.6–4.47)
LYMPHOCYTES NFR BLD AUTO: 29 % (ref 14–44)
MCH RBC QN AUTO: 28 PG (ref 26.8–34.3)
MCHC RBC AUTO-ENTMCNC: 31.5 G/DL (ref 31.4–37.4)
MCV RBC AUTO: 89 FL (ref 82–98)
MONOCYTES # BLD AUTO: 0.76 THOUSAND/ΜL (ref 0.17–1.22)
MONOCYTES NFR BLD AUTO: 8 % (ref 4–12)
NEUTROPHILS # BLD AUTO: 5.49 THOUSANDS/ΜL (ref 1.85–7.62)
NEUTS SEG NFR BLD AUTO: 57 % (ref 43–75)
NITRITE UR QL STRIP: NEGATIVE
NON-SQ EPI CELLS URNS QL MICRO: ABNORMAL /HPF
NRBC BLD AUTO-RTO: 0 /100 WBCS
P AXIS: 114 DEGREES
PH UR STRIP.AUTO: 5.5 [PH]
PLATELET # BLD AUTO: 289 THOUSANDS/UL (ref 149–390)
PMV BLD AUTO: 10.4 FL (ref 8.9–12.7)
POTASSIUM SERPL-SCNC: 3.9 MMOL/L (ref 3.5–5.3)
PR INTERVAL: 196 MS
PROCALCITONIN SERPL-MCNC: 0.08 NG/ML
PROT SERPL-MCNC: 6.7 G/DL (ref 6.4–8.4)
PROT UR STRIP-MCNC: ABNORMAL MG/DL
PROTHROMBIN TIME: 14 SECONDS (ref 12.3–15)
QRS AXIS: 17 DEGREES
QRSD INTERVAL: 86 MS
QT INTERVAL: 430 MS
QTC INTERVAL: 470 MS
RBC # BLD AUTO: 4.82 MILLION/UL (ref 3.81–5.12)
RBC #/AREA URNS AUTO: ABNORMAL /HPF
RSV RNA RESP QL NAA+PROBE: NEGATIVE
SARS-COV-2 RNA RESP QL NAA+PROBE: NEGATIVE
SODIUM SERPL-SCNC: 138 MMOL/L (ref 135–147)
SP GR UR STRIP.AUTO: 1.01 (ref 1–1.03)
T WAVE AXIS: 42 DEGREES
UROBILINOGEN UR STRIP-ACNC: <2 MG/DL
VENTRICULAR RATE: 72 BPM
WBC # BLD AUTO: 9.6 THOUSAND/UL (ref 4.31–10.16)
WBC #/AREA URNS AUTO: ABNORMAL /HPF

## 2024-09-28 PROCEDURE — 85610 PROTHROMBIN TIME: CPT | Performed by: EMERGENCY MEDICINE

## 2024-09-28 PROCEDURE — 96375 TX/PRO/DX INJ NEW DRUG ADDON: CPT

## 2024-09-28 PROCEDURE — 71260 CT THORAX DX C+: CPT

## 2024-09-28 PROCEDURE — 84443 ASSAY THYROID STIM HORMONE: CPT | Performed by: HOSPITALIST

## 2024-09-28 PROCEDURE — 74177 CT ABD & PELVIS W/CONTRAST: CPT

## 2024-09-28 PROCEDURE — 96374 THER/PROPH/DIAG INJ IV PUSH: CPT

## 2024-09-28 PROCEDURE — 71045 X-RAY EXAM CHEST 1 VIEW: CPT

## 2024-09-28 PROCEDURE — 81001 URINALYSIS AUTO W/SCOPE: CPT | Performed by: EMERGENCY MEDICINE

## 2024-09-28 PROCEDURE — 83036 HEMOGLOBIN GLYCOSYLATED A1C: CPT | Performed by: HOSPITALIST

## 2024-09-28 PROCEDURE — 94664 DEMO&/EVAL PT USE INHALER: CPT

## 2024-09-28 PROCEDURE — 93005 ELECTROCARDIOGRAM TRACING: CPT

## 2024-09-28 PROCEDURE — 80053 COMPREHEN METABOLIC PANEL: CPT | Performed by: EMERGENCY MEDICINE

## 2024-09-28 PROCEDURE — 72125 CT NECK SPINE W/O DYE: CPT

## 2024-09-28 PROCEDURE — 83690 ASSAY OF LIPASE: CPT | Performed by: EMERGENCY MEDICINE

## 2024-09-28 PROCEDURE — 99285 EMERGENCY DEPT VISIT HI MDM: CPT | Performed by: EMERGENCY MEDICINE

## 2024-09-28 PROCEDURE — 99223 1ST HOSP IP/OBS HIGH 75: CPT | Performed by: HOSPITALIST

## 2024-09-28 PROCEDURE — 94760 N-INVAS EAR/PLS OXIMETRY 1: CPT

## 2024-09-28 PROCEDURE — 82948 REAGENT STRIP/BLOOD GLUCOSE: CPT

## 2024-09-28 PROCEDURE — 99285 EMERGENCY DEPT VISIT HI MDM: CPT

## 2024-09-28 PROCEDURE — 85025 COMPLETE CBC W/AUTO DIFF WBC: CPT | Performed by: EMERGENCY MEDICINE

## 2024-09-28 PROCEDURE — 84145 PROCALCITONIN (PCT): CPT | Performed by: EMERGENCY MEDICINE

## 2024-09-28 PROCEDURE — 36415 COLL VENOUS BLD VENIPUNCTURE: CPT | Performed by: EMERGENCY MEDICINE

## 2024-09-28 PROCEDURE — 70450 CT HEAD/BRAIN W/O DYE: CPT

## 2024-09-28 PROCEDURE — 94640 AIRWAY INHALATION TREATMENT: CPT

## 2024-09-28 PROCEDURE — 85730 THROMBOPLASTIN TIME PARTIAL: CPT | Performed by: EMERGENCY MEDICINE

## 2024-09-28 PROCEDURE — 0241U HB NFCT DS VIR RESP RNA 4 TRGT: CPT | Performed by: EMERGENCY MEDICINE

## 2024-09-28 RX ORDER — ACETAMINOPHEN 325 MG/1
975 TABLET ORAL EVERY 8 HOURS SCHEDULED
Status: DISCONTINUED | OUTPATIENT
Start: 2024-09-28 | End: 2024-10-02 | Stop reason: HOSPADM

## 2024-09-28 RX ORDER — LIDOCAINE 50 MG/G
1 PATCH TOPICAL DAILY
Status: DISCONTINUED | OUTPATIENT
Start: 2024-09-29 | End: 2024-10-02 | Stop reason: HOSPADM

## 2024-09-28 RX ORDER — ONDANSETRON 2 MG/ML
4 INJECTION INTRAMUSCULAR; INTRAVENOUS ONCE
Status: COMPLETED | OUTPATIENT
Start: 2024-09-28 | End: 2024-09-28

## 2024-09-28 RX ORDER — OXYCODONE HYDROCHLORIDE 5 MG/1
5 TABLET ORAL EVERY 6 HOURS PRN
Status: DISCONTINUED | OUTPATIENT
Start: 2024-09-28 | End: 2024-10-01

## 2024-09-28 RX ORDER — BUDESONIDE AND FORMOTEROL FUMARATE DIHYDRATE 160; 4.5 UG/1; UG/1
2 AEROSOL RESPIRATORY (INHALATION) 2 TIMES DAILY
Status: DISCONTINUED | OUTPATIENT
Start: 2024-09-28 | End: 2024-10-02 | Stop reason: HOSPADM

## 2024-09-28 RX ORDER — ACETAMINOPHEN 10 MG/ML
1000 INJECTION, SOLUTION INTRAVENOUS ONCE
Status: COMPLETED | OUTPATIENT
Start: 2024-09-28 | End: 2024-09-28

## 2024-09-28 RX ORDER — ATORVASTATIN CALCIUM 10 MG/1
20 TABLET, FILM COATED ORAL DAILY
Status: DISCONTINUED | OUTPATIENT
Start: 2024-09-29 | End: 2024-10-02 | Stop reason: HOSPADM

## 2024-09-28 RX ORDER — LEVALBUTEROL INHALATION SOLUTION 1.25 MG/3ML
1.25 SOLUTION RESPIRATORY (INHALATION)
Status: DISCONTINUED | OUTPATIENT
Start: 2024-09-28 | End: 2024-10-02 | Stop reason: HOSPADM

## 2024-09-28 RX ORDER — INSULIN LISPRO 100 [IU]/ML
1-5 INJECTION, SOLUTION INTRAVENOUS; SUBCUTANEOUS
Status: DISCONTINUED | OUTPATIENT
Start: 2024-09-28 | End: 2024-10-02 | Stop reason: HOSPADM

## 2024-09-28 RX ORDER — LISINOPRIL 5 MG/1
5 TABLET ORAL DAILY
Status: DISCONTINUED | OUTPATIENT
Start: 2024-09-29 | End: 2024-10-02 | Stop reason: HOSPADM

## 2024-09-28 RX ORDER — GABAPENTIN 300 MG/1
300 CAPSULE ORAL 3 TIMES DAILY
Status: DISCONTINUED | OUTPATIENT
Start: 2024-09-28 | End: 2024-10-02 | Stop reason: HOSPADM

## 2024-09-28 RX ORDER — ONDANSETRON 2 MG/ML
4 INJECTION INTRAMUSCULAR; INTRAVENOUS EVERY 6 HOURS PRN
Status: DISCONTINUED | OUTPATIENT
Start: 2024-09-28 | End: 2024-10-02 | Stop reason: HOSPADM

## 2024-09-28 RX ORDER — LORATADINE 10 MG/1
10 TABLET ORAL DAILY
Status: DISCONTINUED | OUTPATIENT
Start: 2024-09-29 | End: 2024-10-02 | Stop reason: HOSPADM

## 2024-09-28 RX ORDER — ASPIRIN 81 MG/1
81 TABLET, CHEWABLE ORAL DAILY
Status: DISCONTINUED | OUTPATIENT
Start: 2024-09-29 | End: 2024-10-02 | Stop reason: HOSPADM

## 2024-09-28 RX ORDER — DOCUSATE SODIUM 100 MG/1
100 CAPSULE, LIQUID FILLED ORAL 2 TIMES DAILY
Status: DISCONTINUED | OUTPATIENT
Start: 2024-09-28 | End: 2024-10-02 | Stop reason: HOSPADM

## 2024-09-28 RX ORDER — FENTANYL CITRATE 50 UG/ML
25 INJECTION, SOLUTION INTRAMUSCULAR; INTRAVENOUS ONCE
Status: COMPLETED | OUTPATIENT
Start: 2024-09-28 | End: 2024-09-28

## 2024-09-28 RX ORDER — PANTOPRAZOLE SODIUM 40 MG/1
40 TABLET, DELAYED RELEASE ORAL
Status: DISCONTINUED | OUTPATIENT
Start: 2024-09-29 | End: 2024-10-02 | Stop reason: HOSPADM

## 2024-09-28 RX ORDER — LEVALBUTEROL INHALATION SOLUTION 1.25 MG/3ML
SOLUTION RESPIRATORY (INHALATION)
Status: COMPLETED
Start: 2024-09-28 | End: 2024-09-28

## 2024-09-28 RX ORDER — OXYBUTYNIN CHLORIDE 5 MG/1
5 TABLET, EXTENDED RELEASE ORAL DAILY
Status: DISCONTINUED | OUTPATIENT
Start: 2024-09-29 | End: 2024-10-02 | Stop reason: HOSPADM

## 2024-09-28 RX ORDER — SENNOSIDES 8.6 MG
1 TABLET ORAL DAILY
Status: DISCONTINUED | OUTPATIENT
Start: 2024-09-29 | End: 2024-10-02 | Stop reason: HOSPADM

## 2024-09-28 RX ORDER — MAGNESIUM HYDROXIDE/ALUMINUM HYDROXICE/SIMETHICONE 120; 1200; 1200 MG/30ML; MG/30ML; MG/30ML
30 SUSPENSION ORAL EVERY 6 HOURS PRN
Status: DISCONTINUED | OUTPATIENT
Start: 2024-09-28 | End: 2024-10-02 | Stop reason: HOSPADM

## 2024-09-28 RX ORDER — ENOXAPARIN SODIUM 100 MG/ML
40 INJECTION SUBCUTANEOUS DAILY
Status: DISCONTINUED | OUTPATIENT
Start: 2024-09-29 | End: 2024-09-30

## 2024-09-28 RX ADMIN — GABAPENTIN 300 MG: 300 CAPSULE ORAL at 17:26

## 2024-09-28 RX ADMIN — LEVALBUTEROL HYDROCHLORIDE 1.25 MG: 1.25 SOLUTION RESPIRATORY (INHALATION) at 17:59

## 2024-09-28 RX ADMIN — ACETAMINOPHEN 975 MG: 325 TABLET, FILM COATED ORAL at 21:01

## 2024-09-28 RX ADMIN — ACETAMINOPHEN 1000 MG: 1000 INJECTION, SOLUTION INTRAVENOUS at 13:43

## 2024-09-28 RX ADMIN — OXYCODONE HYDROCHLORIDE 5 MG: 5 TABLET ORAL at 23:09

## 2024-09-28 RX ADMIN — BUDESONIDE AND FORMOTEROL FUMARATE DIHYDRATE 2 PUFF: 160; 4.5 AEROSOL RESPIRATORY (INHALATION) at 20:48

## 2024-09-28 RX ADMIN — OXYCODONE HYDROCHLORIDE 5 MG: 5 TABLET ORAL at 17:26

## 2024-09-28 RX ADMIN — ONDANSETRON 4 MG: 2 INJECTION INTRAMUSCULAR; INTRAVENOUS at 13:42

## 2024-09-28 RX ADMIN — DOCUSATE SODIUM 100 MG: 100 CAPSULE, LIQUID FILLED ORAL at 17:26

## 2024-09-28 RX ADMIN — FENTANYL CITRATE 25 MCG: 50 INJECTION INTRAMUSCULAR; INTRAVENOUS at 13:42

## 2024-09-28 RX ADMIN — IOHEXOL 100 ML: 350 INJECTION, SOLUTION INTRAVENOUS at 14:04

## 2024-09-28 NOTE — ASSESSMENT & PLAN NOTE
Noted history, does not take any medications at home  Due to anticipated poor mobility will start bowel regiment  Docusate 100 mg twice daily, and Senokot nightly

## 2024-09-28 NOTE — RESPIRATORY THERAPY NOTE
09/28/24 1817   Incentive Spirometry Tx   IS level of assistance Assisted by respiratory care provider   Frequency q1hr W/A   Respiratory Effort Normal   Treatment Tolerance Tolerated well   Incentive Spirometry Goal (mL) 972 mL   Incentive Spirometry Achieved (mL) 750 mL

## 2024-09-28 NOTE — RESPIRATORY THERAPY NOTE
RT Protocol Note  Cr Pena 84 y.o. female MRN: [id number]  Unit/Bed#: 425-01 Encounter: [id number]    Assessment    Principal Problem:    Closed fracture of multiple ribs of left side  Active Problems:    Acid reflux disease    Chronic obstructive pulmonary disease (HCC)    Type 2 diabetes mellitus with hyperglycemia, without long-term current use of insulin (HCC)    Hyperlipidemia, unspecified    Essential hypertension    Neuropathy    Acute hypoxic respiratory failure (HCC)    OAB (overactive bladder)    Chronic constipation    Fall    Mass of left lung      Home Pulmonary Medications:Home Devices/Therapy: Other (Comment) (pt has nebulizer but doesn't use, pt does use ventolin inhaler prn, and breztri, pt denies oxygen or PAP therapy)    Past Medical History:   Diagnosis Date    Allergic     Allergic rhinitis     COPD (chronic obstructive pulmonary disease) ([facility])     Diabetes mellitus ([facility])     GERD (gastroesophageal reflux disease)     Hyperlipidemia     Hypertension     Left non-suppurative otitis media 11/27/2019    Pneumonia     Pneumonia of right lower lobe due to infectious organism 12/4/2018    Stage 3 chronic kidney disease, unspecified whether stage 3a or 3b CKD ([facility]) 11/1/2022    Vitamin D deficiency      Social History     Socioeconomic History    Marital status:      Spouse name: None    Number of children: None    Years of education: None    Highest education level: None   Occupational History    Occupation: RETIRED   Tobacco Use    Smoking status: Former     Current packs/day: 0.00     Average packs/day: 1 pack/day for 50.0 years (50.0 ttl pk-yrs)     Types: Cigarettes     Start date: 12/17/1963     Quit date: 12/17/2013     Years since quitting: 10.7     Passive exposure: Past    Smokeless tobacco: Never   Vaping Use    Vaping status: Never Used   Substance and Sexual Activity    Alcohol use: Never    Drug use: Never    Sexual activity: None   Other Topics Concern    None   Social  History Narrative    Daily caffeine consumption     Social Determinants of Health     Financial Resource Strain: Low Risk  (11/1/2022)    Overall Financial Resource Strain (CARDIA)     Difficulty of Paying Living Expenses: Not hard at all   Food Insecurity: No Food Insecurity (4/26/2021)    Hunger Vital Sign     Worried About Running Out of Food in the Last Year: Never true     Ran Out of Food in the Last Year: Never true   Transportation Needs: No Transportation Needs (11/1/2022)    PRAPARE - Transportation     Lack of Transportation (Medical): No     Lack of Transportation (Non-Medical): No   Physical Activity: Not on file   Stress: Not on file   Social Connections: Unknown (6/18/2024)    Received from [employer]     How often do you feel lonely or isolated from those around you? (Adult - for ages 18 years and over): Not on file   Intimate Partner Violence: Not on file   Housing Stability: Not on file       Subjective         Objective    Physical Exam:   Assessment Type: Assess only  General Appearance: Alert, Awake (pt is eating at present time)  Respiratory Pattern: Symmetrical, Spontaneous, Normal  Chest Assessment: Chest expansion symmetrical, Trachea midline  Cough: None  O2 Device: 1.5 l/m nc    Vitals:  Blood pressure 131/76, pulse 71, temperature 97.5 °F (36.4 °C), resp. rate 18, height 5' (1.524 m), weight 64.8 kg (142 lb 13.7 oz), SpO2 97%.          Imaging and other studies: Reviewed radiology reports from this admission including: chest xray and CT chest.    O2 Device: 1.5 l/m nc     Plan  Bronchodilator therapy with xopenex 1.25mg TID, Symbicort 16/4.5 BID, oxygen 1.5 l/m nc  with titration to room air as tolerated, RA is baseline. Rib fracture protocol with Incentive spirometry Q1 hr to prevent atelectasis and pneumonia.

## 2024-09-28 NOTE — ASSESSMENT & PLAN NOTE
"Lab Results   Component Value Date    HGBA1C 6.6 (A) 02/16/2024       No results for input(s): \"POCGLU\" in the last 72 hours.    Blood Sugar Average: Last 72 hrs:    Takes metformin only at home  Hold metformin while in hospital  Check hemoglobin A1c for update  Will do low-dose insulin sliding scale  Diabetic diet    "

## 2024-09-28 NOTE — ASSESSMENT & PLAN NOTE
Suspect due to acute rib fractures on top of underlying COPD.  Noted to become hypoxic after administration of fentanyl in the emergency department  Required up to 4 L of oxygen, was attempted to be weaned down but can only be weaned to 2 L nasal cannula in ED  Respiratory protocol  Currently on 2 L of oxygen, wean oxygen as able to maintain oxygen saturation 89%  Treat rib fractures and pain as above  Respiratory protocol  Incentive spirometer  Will continue patient's home inhaler as able per formulary  Patient uses rescue inhaler at home, will transition to Xopenex 3 times daily Resp

## 2024-09-28 NOTE — ED PROVIDER NOTES
Emergency Department Trauma Note  Cr Pena 84 y.o. female MRN: [id number]  Unit/Bed#: RM10/RM10 Encounter: [id number]      Trauma Alert: Trauma Acuity: Trauma Evaluation  Model of Arrival: Mode of Arrival: ALS via    Trauma Team: Current Providers  Attending Provider: Sana Holcomb MD  Attending Provider: Chuy Garcia DO  Registered Nurse: Jania Rouse, RN  Registered Nurse: Sol Pfeiffer RN  Consultants:     None      History of Present Illness     Chief Complaint:   Chief Complaint   Patient presents with    Fall     Patient reports that she went to hang picture on wall and climbed on sofa, lost her balance, and fell onto left side. No loc. No head strike. Takes asa daily     HPI:  Cr Pena is a 84 y.o. female who presents with see below.  Mechanism:Details of Incident: Patient was standing on her house hanging a picture when she fell onto her left side. Complaining of Left rib cage pain 10/10. No headstrike Injury Date: 09/28/24 Injury Time: 1254 Injury Occurence Location - Specify County: [facility] [place]    84-year-old female with history of COPD/asthma not on home oxygen hyperlipidemia chronic kidney disease type 2 diabetes mellitus presents via EMS for a fall.  Patient states she was standing on her sofa trying to hang pictures when she lost her balance due to the unevenness of the sofa cushions she falls to her left side impacting her left rib cage on the armrest of the sofa she denies hitting her head she was able to lower self to the ground and complained of rib pain and pleuritic pain. She denies anterior chest pain. Rashad SOB  She denies any headache neck pain upper or lower back pain the pain does wrap around.  She currently does not feel short of breath.  She has no abdominal pain no nausea or vomiting no numbness tingling or weakness no hip pain she typically ambulates with a walker.  She currently denies any lightheadedness or weakness.  Vitals were stable and route with  sats of 97%.  Patient does report last week she did have a cold with some congestion.  Last tetanus immunization 6/20/2020.  Past surgical history includes cholecystectomy cataract surgery and cardiac catheterization in 7 of 2021 which was negative.      Review of Systems   Constitutional:  Positive for activity change. Negative for chills and fever.   HENT:  Positive for congestion (last week). Negative for ear pain, rhinorrhea, sneezing, sore throat and voice change.    Eyes:  Negative for discharge and visual disturbance.   Respiratory:  Positive for cough (last week). Negative for shortness of breath.    Cardiovascular:  Negative for chest pain and leg swelling.   Gastrointestinal:  Negative for abdominal pain, blood in stool, diarrhea, nausea and vomiting.   Endocrine: Negative for polyuria.   Genitourinary:  Negative for difficulty urinating, dysuria, flank pain, frequency and urgency.   Musculoskeletal:  Negative for back pain, myalgias, neck pain and neck stiffness.   Skin:  Negative for rash and wound.   Neurological:  Negative for dizziness, speech difficulty, weakness, light-headedness, numbness and headaches.   Hematological:  Negative for adenopathy.   Psychiatric/Behavioral:  Negative for confusion.    All other systems reviewed and are negative.      Historical Information     Immunizations:   Immunization History   Administered Date(s) Administered    COVID-19 MODERNA VACC 0.5 ML IM 04/29/2021, 05/27/2021    COVID-19 Moderna Vac BIVALENT 12 Yr+ IM 0.5 ML 10/06/2022    INFLUENZA 12/05/2005, 10/15/2006, 10/15/2007, 10/05/2009, 12/06/2010, 11/21/2011, 12/03/2012, 10/07/2013, 09/10/2014, 09/17/2015, 10/12/2016    Influenza, high dose seasonal 0.7 mL 10/18/2018, 10/23/2019, 10/26/2021    Influenza, seasonal, injectable 11/30/2017    Pneumococcal Conjugate 13-Valent 09/11/2018    Pneumococcal Polysaccharide PPV23 11/21/2002, 10/15/2007    Tdap 09/17/2015, 06/24/2020       Past Medical History:   Diagnosis  Date    Allergic     Allergic rhinitis     COPD (chronic obstructive pulmonary disease) (HCC)     Diabetes mellitus (HCC)     GERD (gastroesophageal reflux disease)     Hyperlipidemia     Hypertension     Left non-suppurative otitis media 11/27/2019    Pneumonia     Pneumonia of right lower lobe due to infectious organism 12/4/2018    Stage 3 chronic kidney disease, unspecified whether stage 3a or 3b CKD (HCC) 11/1/2022    Vitamin D deficiency        Family History   Problem Relation Age of Onset    Lung cancer Father     Heart disease Mother     Lung cancer Sister     Lung cancer Brother      Past Surgical History:   Procedure Laterality Date    CARDIAC CATHETERIZATION      CHOLECYSTECTOMY  1984    COLONOSCOPY N/A 1/30/2019    Procedure: COLONOSCOPY with multiple  polypectomies;  Surgeon: Aissatou Lyles MD;  Location:  GI LAB;  Service: Gastroenterology    EYE SURGERY Bilateral     CATARACT     Social History     Tobacco Use    Smoking status: Former     Current packs/day: 0.00     Average packs/day: 1 pack/day for 50.0 years (50.0 ttl pk-yrs)     Types: Cigarettes     Start date: 12/17/1963     Quit date: 12/17/2013     Years since quitting: 10.7     Passive exposure: Past    Smokeless tobacco: Never   Vaping Use    Vaping status: Never Used   Substance Use Topics    Alcohol use: Never    Drug use: Never     E-Cigarette/Vaping    E-Cigarette Use Never User      E-Cigarette/Vaping Substances    Nicotine No     THC No     CBD No     Flavoring No     Other No     Unknown No        Family History: non-contributory    Meds/Allergies   Prior to Admission Medications   Prescriptions Last Dose Informant Patient Reported? Taking?   Budeson-Glycopyrrol-Formoterol (Breztri Aerosphere) 160-9-4.8 MCG/ACT AERO  Self No No   Sig: Inhale 2 puffs 2 (two) times a day Rinse mouth after use.   Cholecalciferol (D3 2000) 50 MCG (2000 UT) CAPS  Self Yes No   Sig: Take 2,000 Units by mouth daily   Multiple Vitamin (MULTIVITAMINS PO)   Self Yes No   Sig: Take by mouth daily   Multiple Vitamins-Minerals (One-A-Day Womens 50+) TABS  Self Yes No   Sig: Take 1 tablet by mouth daily   albuterol (PROVENTIL HFA,VENTOLIN HFA) 90 mcg/act inhaler  Self No No   Sig: Inhale 2 puffs 4 (four) times a day   aspirin 81 mg chewable tablet  Self Yes No   Sig: Chew 81 mg daily   atorvastatin (LIPITOR) 20 mg tablet  Self No No   Sig: Take 1 tablet by mouth daily   cetirizine (ZyrTEC) 10 mg tablet  Self Yes No   Sig: Take 10 mg by mouth daily   gabapentin (NEURONTIN) 300 mg capsule  Self No No   Sig: Take 1 capsule (300 mg total) by mouth daily   glucose blood (OneTouch Verio) test strip  Self No No   Sig: Use as instructed TEST TWO TIMES DAILY e11.65   lisinopril (ZESTRIL) 5 mg tablet  Self No No   Sig: Take 1 tablet (5 mg total) by mouth daily   metFORMIN (GLUCOPHAGE) 500 mg tablet   No No   Sig: Take 1 tablet (500 mg total) by mouth 2 (two) times a day with meals   omeprazole (PriLOSEC) 40 MG capsule  Self No No   Sig: Take 1 capsule (40 mg total) by mouth daily before breakfast   oxybutynin (DITROPAN-XL) 5 mg 24 hr tablet  Self No No   Sig: Take 1 tablet (5 mg total) by mouth daily      Facility-Administered Medications: None       Allergies   Allergen Reactions    Bee Venom Anaphylaxis    Other Shortness Of Breath     FLOWERS/GRASS       PHYSICAL EXAM    PE limited by: n/a    Objective   Vitals:   First set: Temperature: 98.1 °F (36.7 °C) (09/28/24 1325)  Pulse: 71 (09/28/24 1325)  Respirations: 16 (09/28/24 1325)  Blood Pressure: 157/63 (09/28/24 1328)  SpO2: 94 % (09/28/24 1325)    Primary Survey:   (A) Airway: speaks full sentences  (B) Breathing: breath sounds equal bilaterally  (C) Circulation: Pulses:   normal  (D) Disabliity:  GCS Total:  15  (E) Expose:  Completed    Secondary Survey: (Click on Physical Exam tab above)  Physical Exam  Vitals and nursing note reviewed.   Constitutional:       General: She is not in acute distress.     Appearance: She is  not ill-appearing, toxic-appearing or diaphoretic.      Comments: Articulate with good recall of events. At rest pain is 3/10;   HENT:      Head: Normocephalic.      Right Ear: Tympanic membrane and external ear normal.      Left Ear: Tympanic membrane and external ear normal.      Nose: Nose normal. No congestion or rhinorrhea.      Mouth/Throat:      Mouth: Mucous membranes are moist.      Pharynx: No oropharyngeal exudate or posterior oropharyngeal erythema.   Eyes:      General:         Right eye: No discharge.         Left eye: No discharge.      Extraocular Movements: Extraocular movements intact.      Conjunctiva/sclera: Conjunctivae normal.      Pupils: Pupils are equal, round, and reactive to light.      Comments: 3 mm equal   Cardiovascular:      Rate and Rhythm: Normal rate and regular rhythm.      Pulses: Normal pulses.   Pulmonary:      Effort: No respiratory distress.      Breath sounds: No stridor. No wheezing, rhonchi or rales.      Comments: Distant BS sats 95% on RA; no ecchymosis or contusions of the chest wall there is no breast contusions.  Patient has no crepitus.  She has tenderness along the left mid axial line.  Chest:      Chest wall: Tenderness present.   Abdominal:      General: Bowel sounds are normal. There is no distension.      Palpations: Abdomen is soft.      Tenderness: There is abdominal tenderness (Some scant tenderness to the left upper quadrant.). There is no right CVA tenderness, left CVA tenderness or guarding.      Comments: No ecchymosis or bruising. No T or L spine tenderness   Musculoskeletal:      Cervical back: Normal range of motion and neck supple. No rigidity or tenderness.      Right lower leg: No edema.      Left lower leg: No edema.      Comments: Pelvis stable; 2+ radial 2+ AT pulses; no tenderness to extremities.    Lymphadenopathy:      Cervical: No cervical adenopathy.   Skin:     General: Skin is warm and dry.      Findings: No rash.   Neurological:       General: No focal deficit present.      Mental Status: She is alert and oriented to person, place, and time.      Cranial Nerves: No cranial nerve deficit.      Sensory: No sensory deficit.      Motor: No weakness.      Coordination: Coordination normal.      Comments: GCS 15; equal hand .  To flex hips with the knee extended.   Psychiatric:         Mood and Affect: Mood normal.         Cervical spine cleared by clinical criteria? No (imaging required)      Invasive Devices       Peripheral Intravenous Line  Duration             Peripheral IV 09/28/24 Right;Ventral (anterior) Forearm <1 day                    Lab Results:   Results Reviewed       Procedure Component Value Units Date/Time    Procalcitonin [[id number]]  (Normal) Collected: 09/28/24 1339    Lab Status: Final result Specimen: Blood from Arm, Right Updated: 09/28/24 1518     Procalcitonin 0.08 ng/ml     FLU/RSV/COVID - if FLU/RSV clinically relevant (2hr TAT) [[id number]]  (Normal) Collected: 09/28/24 1403    Lab Status: Final result Specimen: Nares from Nose Updated: 09/28/24 1446     SARS-CoV-2 Negative     INFLUENZA A PCR Negative     INFLUENZA B PCR Negative     RSV PCR Negative    Narrative:      This test has been performed using the CoV-2/Flu/RSV plus assay on the [employer] GeneXpert platform. This test has been validated by the  and verified by the performing laboratory.     This test is designed to amplify and detect the following: nucleocapsid (N), envelope (E), and RNA-dependent RNA polymerase (RdRP) genes of the SARS-CoV-2 genome; matrix (M), basic polymerase (PB2), and acidic protein (PA) segments of the influenza A genome; matrix (M) and non-structural protein (NS) segments of the influenza B genome, and the nucleocapsid genes of RSV A and RSV B.     Positive results are indicative of the presence of Flu A, Flu B, RSV, and/or SARS-CoV-2 RNA. Positive results for SARS-CoV-2 or suspected novel influenza should be reported to  state, local, or federal health departments according to local reporting requirements.      All results should be assessed in conjunction with clinical presentation and other laboratory markers for clinical management.     FOR PEDIATRIC PATIENTS - copy/paste COVID Guidelines URL to browser: [email or web address]       Comprehensive metabolic panel [[id number]]  (Abnormal) Collected: 09/28/24 1339    Lab Status: Final result Specimen: Blood from Arm, Right Updated: 09/28/24 1359     Sodium 138 mmol/L      Potassium 3.9 mmol/L      Chloride 102 mmol/L      CO2 26 mmol/L      ANION GAP 10 mmol/L      BUN 14 mg/dL      Creatinine 1.00 mg/dL      Glucose 161 mg/dL      Calcium 9.5 mg/dL      AST 18 U/L      ALT 14 U/L      Alkaline Phosphatase 70 U/L      Total Protein 6.7 g/dL      Albumin 4.1 g/dL      Total Bilirubin 0.50 mg/dL      eGFR 51 ml/min/1.73sq m     Narrative:      National Kidney Disease Foundation guidelines for Chronic Kidney Disease (CKD):     Stage 1 with normal or high GFR (GFR > 90 mL/min/1.73 square meters)    Stage 2 Mild CKD (GFR = 60-89 mL/min/1.73 square meters)    Stage 3A Moderate CKD (GFR = 45-59 mL/min/1.73 square meters)    Stage 3B Moderate CKD (GFR = 30-44 mL/min/1.73 square meters)    Stage 4 Severe CKD (GFR = 15-29 mL/min/1.73 square meters)    Stage 5 End Stage CKD (GFR <15 mL/min/1.73 square meters)  Note: GFR calculation is accurate only with a steady state creatinine    Lipase [[id number]]  (Normal) Collected: 09/28/24 1339    Lab Status: Final result Specimen: Blood from Arm, Right Updated: 09/28/24 1359     Lipase 39 u/L     Protime-INR [[id number]]  (Normal) Collected: 09/28/24 1339    Lab Status: Final result Specimen: Blood from Arm, Right Updated: 09/28/24 1353     Protime 14.0 seconds      INR 1.02    Narrative:      INR Therapeutic Range    Indication                                             INR Range      Atrial  Fibrillation                                               2.0-3.0  Hypercoagulable State                                    2.0.2.3  Left Ventricular Asist Device                            2.0-3.0  Mechanical Heart Valve                                  -    Aortic(with afib, MI, embolism, HF, LA enlargement,    and/or coagulopathy)                                     2.0-3.0 (2.5-3.5)     Mitral                                                             2.5-3.5  Prosthetic/Bioprosthetic Heart Valve               2.0-3.0  Venous thromboembolism (VTE: VT, PE        2.0-3.0    APTT [[id number]]  (Normal) Collected: 09/28/24 1339    Lab Status: Final result Specimen: Blood from Arm, Right Updated: 09/28/24 1353     PTT 33 seconds     CBC and differential [[id number]] Collected: 09/28/24 1339    Lab Status: Final result Specimen: Blood from Arm, Right Updated: 09/28/24 1344     WBC 9.60 Thousand/uL      RBC 4.82 Million/uL      Hemoglobin 13.5 g/dL      Hematocrit 42.9 %      MCV 89 fL      MCH 28.0 pg      MCHC 31.5 g/dL      RDW 12.9 %      MPV 10.4 fL      Platelets 289 Thousands/uL      nRBC 0 /100 WBCs      Segmented % 57 %      Immature Grans % 1 %      Lymphocytes % 29 %      Monocytes % 8 %      Eosinophils Relative 4 %      Basophils Relative 1 %      Absolute Neutrophils 5.49 Thousands/µL      Absolute Immature Grans 0.05 Thousand/uL      Absolute Lymphocytes 2.80 Thousands/µL      Absolute Monocytes 0.76 Thousand/µL      Eosinophils Absolute 0.42 Thousand/µL      Basophils Absolute 0.08 Thousands/µL     UA w Reflex to Microscopic w Reflex to Culture [[id number]]     Lab Status: No result Specimen: Urine                    Imaging Studies:   Direct to CT: No  CT chest abdomen pelvis w contrast   Final Result by Taran Lemus MD (09/28 5492)      Acute minimally displaced left rib fractures involving the eighth, ninth and 10th ribs posterolaterally. No pneumothorax or pleural effusion.      Background  emphysema with spiculated left lower lobe superior segment mass measuring 4.5 x 4.1 cm concerning for neoplasm. Recommend PET/CT scan or tissue sampling for next step.      Stable large hiatal hernia.      Mild bladder wall thickening with a small diverticulum toward the dome. Correlate for possible cystitis.         I personally discussed this study with SANA HOLCOMB on 9/28/2024 2:29 PM.                     Workstation performed: [id number]         CT head without contrast   Final Result by Taran Lemus MD (09/28 1404)      No acute intracranial abnormality.                  Workstation performed: [id number]         CT cervical spine without contrast   Final Result by Taran Lemus MD (09/28 1411)      No cervical spine fracture or traumatic malalignment.                  Workstation performed: [id number]         XR chest 1 view portable   ED Interpretation by Sana Holcomb MD (09/28 5566)   Per my independent interpretation. Radiologist to provide formal read. No PTX left perihilar infiltrate ? Left 8th rib fx nondisplaced no effusion no cardiomegally no PTX      Final Result by Matthew Easton MD (09/28 6287)      New patchy opacity in left midlung zone, may present pneumonia. Recommend follow-up to resolution to exclude underlying pulmonary malignancy. Please see CT chest abdomen pelvis with contrast for further evaluation.      Nondisplaced left-sided rib fractures are better evaluated on same day CT chest abdomen pelvis with contrast..      The study was marked in EPIC for immediate notification.               Workstation performed: [id number]               Procedures  ECG 12 Lead Documentation Only    Date/Time: 9/28/2024 2:15 PM    Performed by: Sana Holcomb MD  Authorized by: Sana Holcomb MD    Indications / Diagnosis:  Left chest wall pain  ECG reviewed by me, the ED Provider: yes    Patient location:  ED  Previous ECG:     Previous ECG:  Compared to  current    Comparison ECG info:  7/4/21 13:24    Similarity:  Changes noted  Rate:     ECG rate:  72    ECG rate assessment: normal    Rhythm:     Rhythm: sinus rhythm    QRS:     QRS axis:  Normal  Comments:      ; nonspecific ST-T changes improved vs prev no acute ischemic changes           ED Course  ED Course as of 09/28/24 1608   Sat Sep 28, 2024   1408 With administration of fentanyl patient dropped sats to mid 70%; placed on 4l NC upon return from CT oxygen discontinued sats 95%; then sats drop to 88% placed on 2L NC sats 95%   1501 Dr. Still secure chat'ed reguarding findings of CT C/a/p of left rib fracture and lung CA   1506 CT findings of nondisplaced left 8/9/10 rib fracture were reviewed as well as the lung mass of 4.5 x 4.1 cm that is spiculated specifically rule reviewed that there is a high index of suspicion that this represents a lung cancer the neck step would be PET scan which can be completed as an outpatient there is urinary bladder thickening UA is pending to assess for possibility of UTI.  Pro-Malachi is pending as well.  Secondary to hypoxia as well as the being symptomatic with rib pain recommend patient be observed in the hospital will discuss with Artur. Incidental note was filled out   1523 Procal negative u/a ordered. Contacting [facility] for obs   1607 Dr. Garcia recommends obs his service u/a ordered not yet collected           Medical Decision Making  84 -year-old female anticoagulated on baby aspirin daily presents after mechanical fall while standing on a couch impacting her left ribs on the sofa arm.  Trauma eval was called secondary to mild tenderness to the left upper quadrant patient is otherwise hemodynamically stable currently satting normally on room air.  Will proceed with portable chest x-ray to evaluate for rib fractures pneumothorax, plan on CT of the head to exclude bleed, CT of the neck secondary to distracting injury as well as CT of the chest abdomen pelvis to assess  for hemothorax rib fractures splenic injury.  Patient will receive symptomatic management with IV Tylenol antiemetics and a dose of fentanyl.  Reevaluate.    Amount and/or Complexity of Data Reviewed  Labs: ordered.  Radiology: ordered and independent interpretation performed.    Risk  Prescription drug management.                Disposition  [facility] Transfer: No  Final diagnoses:   Fall   Multiple rib fractures - left 8/9/10 minimally displaced   Hypoxia   Mass of left lung - 4.5 x 4.1 cm spiculated concern for lung cancer requires PET scan     Time reflects when diagnosis was documented in both MDM as applicable and the Disposition within this note       Time User Action Codes Description Comment    9/28/2024  3:09 PM Sana Holcomb [W19.XXXA] Fall     9/28/2024  3:09 PM Sana Holcomb [S22.49XA] Multiple rib fractures     9/28/2024  3:09 PM Sana Holcomb Modify [S22.49XA] Multiple rib fractures left 8/9/10 minimally displaced    9/28/2024  3:09 PM Sana Holcomb [R09.02] Hypoxia     9/28/2024  3:09 PM Sana Holcomb Add [R91.8] Mass of left lung     9/28/2024  3:10 PM Sana Holcomb Modify [R91.8] Mass of left lung 4.5 x 4.1 cm spiculated concern for lung cancer requires PET scan          ED Disposition       ED Disposition   Admit    Condition   Stable    Date/Time   Sat Sep 28, 2024  3:52 PM    Comment   Case was discussed with Dr. Garcia  and the patient's admission status was agreed to be Admission Status: observation status to the service of Dr. Garcia .               Follow-up Information    None       Patient's Medications   Discharge Prescriptions    No medications on file     No discharge procedures on file.    PDMP Review       None            ED Provider  Electronically Signed by           Sana Holcomb MD  09/28/24 4253

## 2024-09-28 NOTE — ASSESSMENT & PLAN NOTE
Patient had a fall, while trying to hang a picture on the wall and stand on her couch, believes she stepped in between the cushions became unbalanced and fell to the ground.  Traumatic evaluation in the emergency department completed  CT chest revealed acute nondisplaced rib fractures on the left of the eighth ninth and 10th ribs posterolaterally  No associated pneumothorax, pleural effusion, or pulmonary contusion noted    Pain control: Scheduled Tylenol, increase her home gabapentin to 300 mg 3 times daily, PRN Oxycodone 2.5mg/5mg for mod/severe pain respectively. Apply ICE as able. Maximize non-opiate pain control    Rib Fracture Protocol Ordered  Respiratory protocol, airway clearance, incentive spirometer

## 2024-09-28 NOTE — INCIDENTAL FINDINGS
The following findings require follow up:  Radiographic finding   Finding: Left lung mass 4.5x4.1cm spiculated   Follow up required: PET scan   Follow up should be done within 1 week(s)    Please notify the following clinician to assist with the follow up:   Dr. Kimberlee Hickman PAC    Incidental finding results were discussed with the Patient by Sana Holcomb MD on 09/28/24.   They expressed understanding and all questions answered.

## 2024-09-28 NOTE — PLAN OF CARE
Problem: PAIN - ADULT  Goal: Verbalizes/displays adequate comfort level or baseline comfort level  Description: Interventions:  - Encourage patient to monitor pain and request assistance  - Assess pain using appropriate pain scale  - Administer analgesics based on type and severity of pain and evaluate response  - Implement non-pharmacological measures as appropriate and evaluate response  - Consider cultural and social influences on pain and pain management  - Notify physician/advanced practitioner if interventions unsuccessful or patient reports new pain  9/28/2024 1656 by Tiana Fuentes RN  Outcome: Progressing  9/28/2024 1656 by Tiana Fuentes RN  Outcome: Progressing     Problem: INFECTION - ADULT  Goal: Absence or prevention of progression during hospitalization  Description: INTERVENTIONS:  - Assess and monitor for signs and symptoms of infection  - Monitor lab/diagnostic results  - Monitor all insertion sites, i.e. indwelling lines, tubes, and drains  - Monitor endotracheal if appropriate and nasal secretions for changes in amount and color  - [place] appropriate cooling/warming therapies per order  - Administer medications as ordered  - Instruct and encourage patient and family to use good hand hygiene technique  - Identify and instruct in appropriate isolation precautions for identified infection/condition  9/28/2024 1656 by Tiana Fuentes RN  Outcome: Progressing  9/28/2024 1656 by Tiana Fuentes RN  Outcome: Progressing     Problem: SAFETY ADULT  Goal: Patient will remain free of falls  Description: INTERVENTIONS:  - Educate patient/family on patient safety including physical limitations  - Instruct patient to call for assistance with activity   - Consult OT/PT to assist with strengthening/mobility   - Keep Call bell within reach  - Keep bed low and locked with side rails adjusted as appropriate  - Keep care items and personal belongings within reach  - Initiate and maintain comfort  rounds  - Make Fall Risk Sign visible to staff  - Offer Toileting every 2 Hours, in advance of need  - Initiate/Maintain bed/chair alarm  - Obtain necessary fall risk management equipment: as needed  - Apply yellow socks and bracelet for high fall risk patients  - Consider moving patient to room near nurses station  9/28/2024 1656 by Tiana Fuentes RN  Outcome: Progressing  9/28/2024 1656 by Tiana Fuentes RN  Outcome: Progressing     Problem: DISCHARGE PLANNING  Goal: Discharge to home or other facility with appropriate resources  Description: INTERVENTIONS:  - Identify barriers to discharge w/patient and caregiver  - Arrange for needed discharge resources and transportation as appropriate  - Identify discharge learning needs (meds, wound care, etc.)  - Arrange for interpretive services to assist at discharge as needed  - Refer to Case Management Department for coordinating discharge planning if the patient needs post-hospital services based on physician/advanced practitioner order or complex needs related to functional status, cognitive ability, or social support system  9/28/2024 1656 by Tiana Fuentes RN  Outcome: Progressing  9/28/2024 1656 by Tiana Fuentes RN  Outcome: Progressing     Problem: Knowledge Deficit  Goal: Patient/family/caregiver demonstrates understanding of disease process, treatment plan, medications, and discharge instructions  Description: Complete learning assessment and assess knowledge base.  Interventions:  - Provide teaching at level of understanding  - Provide teaching via preferred learning methods  9/28/2024 1656 by Tiana Fuentes RN  Outcome: Progressing  9/28/2024 1656 by Tiana Fuentes RN  Outcome: Progressing     Problem: RESPIRATORY - ADULT  Goal: Achieves optimal ventilation and oxygenation  Description: INTERVENTIONS:  - Assess for changes in respiratory status  - Assess for changes in mentation and behavior  - Position to facilitate oxygenation and minimize  respiratory effort  - Oxygen administered by appropriate delivery if ordered  - Initiate smoking cessation education as indicated  - Encourage broncho-pulmonary hygiene including cough, deep breathe, Incentive Spirometry  - Assess the need for suctioning and aspirate as needed  - Assess and instruct to report SOB or any respiratory difficulty  - Respiratory Therapy support as indicated  9/28/2024 1656 by Tiana Fuentes RN  Outcome: Progressing  9/28/2024 1656 by Tiana Fuentes RN  Outcome: Progressing     Problem: METABOLIC, FLUID AND ELECTROLYTES - ADULT  Goal: Glucose maintained within target range  Description: INTERVENTIONS:  - Monitor Blood Glucose as ordered  - Assess for signs and symptoms of hyperglycemia and hypoglycemia  - Administer ordered medications to maintain glucose within target range  - Assess nutritional intake and initiate nutrition service referral as needed  9/28/2024 1656 by Tiana Fuentes RN  Outcome: Progressing  9/28/2024 1656 by Tiana Fuentes RN  Outcome: Progressing

## 2024-09-28 NOTE — ASSESSMENT & PLAN NOTE
Ultimately an incidental finding while workup for her traumatic fall  CT notes a spiculated left lower lobe superior segment mass measuring 4.5 x 4.1 cm, very concerning for neoplasm  Findings were discussed with the patient, she expresses understanding  Next step would be a PET/CT scan  Incidental finding report placement emergency department, appreciate  Patiently to follow-up with her primary care provider and an oncology

## 2024-09-28 NOTE — ASSESSMENT & PLAN NOTE
Likely due to diabetes, patient takes 300 mg gabapentin daily  Will increase gabapentin to 300 mg 3 times daily to aid with pain control of rib fracture

## 2024-09-28 NOTE — ASSESSMENT & PLAN NOTE
As above, patient had a fall at home while trying to hang a picture, and describes the incident as mechanical in nature  PT/OT consults

## 2024-09-28 NOTE — H&P
H&P - Hospitalist   Name: Cr Pena 84 y.o. female I MRN: [id number]  Unit/Bed#: 425-01 I Date of Admission: 9/28/2024   Date of Service: 9/28/2024 I Hospital Day: 0     Assessment & Plan  Closed fracture of multiple ribs of left side  Patient had a fall, while trying to hang a picture on the wall and stand on her couch, believes she stepped in between the cushions became unbalanced and fell to the ground.  Traumatic evaluation in the emergency department completed  CT chest revealed acute nondisplaced rib fractures on the left of the eighth ninth and 10th ribs posterolaterally  No associated pneumothorax, pleural effusion, or pulmonary contusion noted    Pain control: Scheduled Tylenol, increase her home gabapentin to 300 mg 3 times daily, PRN Oxycodone 2.5mg/5mg for mod/severe pain respectively. Apply ICE as able. Maximize non-opiate pain control    Rib Fracture Protocol Ordered  Respiratory protocol, airway clearance, incentive spirometer  Acute hypoxic respiratory failure (HCC)  Suspect due to acute rib fractures on top of underlying COPD.  Noted to become hypoxic after administration of fentanyl in the emergency department  Required up to 4 L of oxygen, was attempted to be weaned down but can only be weaned to 2 L nasal cannula in ED  Respiratory protocol  Currently on 2 L of oxygen, wean oxygen as able to maintain oxygen saturation 89%  Treat rib fractures and pain as above  Respiratory protocol  Incentive spirometer  Will continue patient's home inhaler as able per formulary  Patient uses rescue inhaler at home, will transition to Xopenex 3 times daily Resp  Fall  As above, patient had a fall at home while trying to hang a picture, and describes the incident as mechanical in nature  PT/OT consults  Chronic obstructive pulmonary disease (HCC)  See management under acute hypoxic respiratory failure  She is not in exacerbation  Mass of left lung  Ultimately an incidental finding while workup for her  "traumatic fall  CT notes a spiculated left lower lobe superior segment mass measuring 4.5 x 4.1 cm, very concerning for neoplasm  Findings were discussed with the patient, she expresses understanding  Next step would be a PET/CT scan  Incidental finding report placement emergency department, appreciate  Patiently to follow-up with her primary care provider and an oncology  Type 2 diabetes mellitus with hyperglycemia, without long-term current use of insulin ([facility])  Lab Results   Component Value Date    HGBA1C 6.6 (A) 02/16/2024       No results for input(s): \"POCGLU\" in the last 72 hours.    Blood Sugar Average: Last 72 hrs:    Takes metformin only at home  Hold metformin while in hospital  Check hemoglobin A1c for update  Will do low-dose insulin sliding scale  Diabetic diet    Acid reflux disease  Continue PPI  Hyperlipidemia, unspecified  Continue home statin  Essential hypertension  Continue home lisinopril  Neuropathy  Likely due to diabetes, patient takes 300 mg gabapentin daily  Will increase gabapentin to 300 mg 3 times daily to aid with pain control of rib fracture  OAB (overactive bladder)  Continue home oxybutynin 5 mg daily  Chronic constipation  Noted history, does not take any medications at home  Due to anticipated poor mobility will start bowel regiment  Docusate 100 mg twice daily, and Senokot nightly    VTE Pharmacologic Prophylaxis:   Moderate Risk (Score 3-4) - Pharmacological DVT Prophylaxis Ordered: enoxaparin (Lovenox).  Code Status: Level 3 - DNAR and DNI   Discussion with family: Patient declined call to .     Anticipated Length of Stay: Patient will be admitted on an observation basis with an anticipated length of stay of less than 2 midnights secondary to rib fractures, pain, hypoxia.    History of Present Illness   Chief Complaint: Fall and rib fracture    Cr Pena is a 84 y.o. female with a PMH of GERD, diabetes, asthma/COPD, overactive bladder, hypertension who " presents with a fall and traumatic rib fractures.  Patient reported that she was trying to hang a picture on her wall, she was standing on her couch, she feels like she stepped in the crack between the cushions lost her balance and fell on her left side.  Patient describes the incident as very mechanical in nature, she denies any presyncope, loss of consciousness.  Brought to the emergency department further evaluation.  Patient underwent traumatic workup in the emergency department, CT of the chest revealed posterior lateral rib fractures, minimally displaced, of ribs 8 through 10.  On my exam, the patient denies feeling significantly short of breath, her breathing feels stable, where she is having significant pain, trouble with deep breaths due to worsening pain.  Pain is worse with movement.  When at rest her pain seems controlled, she would rate the pain a 10 out of 10 with movement.    Review of Systems   Constitutional:  Negative for chills and fever.   HENT:  Negative for ear pain and sore throat.    Eyes:  Negative for pain and visual disturbance.   Respiratory:  Negative for cough, chest tightness and shortness of breath.    Cardiovascular:  Positive for chest pain. Negative for palpitations.   Gastrointestinal:  Negative for abdominal pain and vomiting.   Genitourinary:  Negative for dysuria and hematuria.   Musculoskeletal:  Negative for arthralgias and back pain.   Skin:  Negative for color change and rash.   Neurological:  Negative for seizures and syncope.   All other systems reviewed and are negative.      I have reviewed the patient's PMH, PSH, Social History, Family History, Meds, and Allergies  Social History:  Marital Status:    Occupation: n/a  Patient Pre-hospital Living Situation: Home  Patient Pre-hospital Level of Mobility: walks  Patient Pre-hospital Diet Restrictions: diabetic    Objective     Vitals:   Blood Pressure: 131/76 (09/28/24 1653)  Pulse: 70 (09/28/24 1653)  Temperature:  "97.5 °F (36.4 °C) (09/28/24 1653)  Temp Source: Temporal (09/28/24 1500)  Respirations: 17 (09/28/24 1653)  Height: 5' 1\" (154.9 cm) (09/28/24 1325)  Weight - Scale: 73 kg (160 lb 15 oz) (09/28/24 1325)  SpO2: 95 % (09/28/24 1653)    Physical Exam  Constitutional:       General: She is not in acute distress.     Appearance: She is not ill-appearing.   HENT:      Head: Normocephalic.      Mouth/Throat:      Mouth: Mucous membranes are dry.   Eyes:      Conjunctiva/sclera: Conjunctivae normal.      Pupils: Pupils are equal, round, and reactive to light.   Cardiovascular:      Rate and Rhythm: Normal rate and regular rhythm.      Heart sounds: No murmur heard.  Pulmonary:      Effort: Pulmonary effort is normal. No respiratory distress.      Comments: Patient has reduced breath sounds bilaterally, do not appreciate any wheezing, did not appreciate any significant rhonchorous or coarse breath sounds  Abdominal:      General: There is no distension.      Palpations: Abdomen is soft.      Tenderness: There is no abdominal tenderness.   Musculoskeletal:         General: Normal range of motion.      Right lower leg: No edema.      Left lower leg: No edema.   Skin:     General: Skin is warm and dry.      Capillary Refill: Capillary refill takes less than 2 seconds.   Neurological:      Mental Status: She is alert and oriented to person, place, and time.   Psychiatric:         Mood and Affect: Mood normal.         Lines/Drains:  Lines/Drains/Airways       Active Status       None                        Additional Data:   Lab Results: I have reviewed the following results: CBC/BMP:   .     09/28/24  1339   WBC 9.60   HGB 13.5   HCT 42.9      SODIUM 138   K 3.9      CO2 26   BUN 14   CREATININE 1.00   GLUC 161*      Results from last 7 days   Lab Units 09/28/24  1339   WBC Thousand/uL 9.60   HEMOGLOBIN g/dL 13.5   HEMATOCRIT % 42.9   PLATELETS Thousands/uL 289   SEGS PCT % 57   LYMPHO PCT % 29   MONO PCT % 8   EOS " PCT % 4     Results from last 7 days   Lab Units 09/28/24  1339   SODIUM mmol/L 138   POTASSIUM mmol/L 3.9   CHLORIDE mmol/L 102   CO2 mmol/L 26   BUN mg/dL 14   CREATININE mg/dL 1.00   ANION GAP mmol/L 10   CALCIUM mg/dL 9.5   ALBUMIN g/dL 4.1   TOTAL BILIRUBIN mg/dL 0.50   ALK PHOS U/L 70   ALT U/L 14   AST U/L 18   GLUCOSE RANDOM mg/dL 161*     Results from last 7 days   Lab Units 09/28/24  1339   INR  1.02         Lab Results   Component Value Date    HGBA1C 6.6 (A) 02/16/2024    HGBA1C 6.8 (A) 10/16/2023    HGBA1C 6.9 (A) 07/12/2023     Results from last 7 days   Lab Units 09/28/24  1339   PROCALCITONIN ng/ml 0.08       Imaging Review: Reviewed radiology reports from this admission including: CT chest, CT abdomen/pelvis, and CT head.  Other Studies: No additional pertinent studies reviewed.    Administrative Statements   I have spent a total time of 45 minutes in caring for this patient on the day of the visit/encounter including Diagnostic results, Prognosis, Patient and family education, Risk factor reductions, Impressions, Counseling / Coordination of care, Documenting in the medical record, and Reviewing / ordering tests, medicine, procedures  .    ** Please Note: This note has been constructed using a voice recognition system. **

## 2024-09-28 NOTE — RESPIRATORY THERAPY NOTE
09/28/24 1815   Inhalation Therapy Tx   $ Inhalation Therapy Performed Yes   $ Pulse Oximetry Spot Check Charge Completed   SpO2 96 %  (1.5)   Pre-Treatment Pulse 71   Pre-Treatment Respirations 16   Duration 15   Breath Sounds Pre-Treatment Bilateral Diminished   Breath Sounds Pre-Treatment Right Crackles;Diminished  (RLL)   Breath Sounds Pre-Treatment Left Expiratory wheezes;Diminished   Breath Sounds Post-Treatment Bilateral Diminished   Breath Sounds Post-Treatment Right Crackles   Post-Treatment Pulse 71   Post-Treatment Respirations 16   Delivery Source Air;UDN   Position Semi Singer's   Treatment Tolerance Tolerated well   Resp Comments started incentive spirometry

## 2024-09-29 LAB
ALBUMIN SERPL BCG-MCNC: 3.8 G/DL (ref 3.5–5)
ALP SERPL-CCNC: 62 U/L (ref 34–104)
ALT SERPL W P-5'-P-CCNC: 14 U/L (ref 7–52)
ANION GAP SERPL CALCULATED.3IONS-SCNC: 5 MMOL/L (ref 4–13)
AST SERPL W P-5'-P-CCNC: 15 U/L (ref 13–39)
BILIRUB SERPL-MCNC: 0.61 MG/DL (ref 0.2–1)
BUN SERPL-MCNC: 17 MG/DL (ref 5–25)
CALCIUM SERPL-MCNC: 9.4 MG/DL (ref 8.4–10.2)
CHLORIDE SERPL-SCNC: 101 MMOL/L (ref 96–108)
CO2 SERPL-SCNC: 32 MMOL/L (ref 21–32)
CREAT SERPL-MCNC: 1.27 MG/DL (ref 0.6–1.3)
ERYTHROCYTE [DISTWIDTH] IN BLOOD BY AUTOMATED COUNT: 13.1 % (ref 11.6–15.1)
EST. AVERAGE GLUCOSE BLD GHB EST-MCNC: 146 MG/DL
GFR SERPL CREATININE-BSD FRML MDRD: 38 ML/MIN/1.73SQ M
GLUCOSE SERPL-MCNC: 122 MG/DL (ref 65–140)
GLUCOSE SERPL-MCNC: 130 MG/DL (ref 65–140)
GLUCOSE SERPL-MCNC: 159 MG/DL (ref 65–140)
GLUCOSE SERPL-MCNC: 214 MG/DL (ref 65–140)
HBA1C MFR BLD: 6.7 %
HCT VFR BLD AUTO: 39.9 % (ref 34.8–46.1)
HGB BLD-MCNC: 12.6 G/DL (ref 11.5–15.4)
MCH RBC QN AUTO: 28.4 PG (ref 26.8–34.3)
MCHC RBC AUTO-ENTMCNC: 31.6 G/DL (ref 31.4–37.4)
MCV RBC AUTO: 90 FL (ref 82–98)
PLATELET # BLD AUTO: 248 THOUSANDS/UL (ref 149–390)
PMV BLD AUTO: 10.4 FL (ref 8.9–12.7)
POTASSIUM SERPL-SCNC: 4.4 MMOL/L (ref 3.5–5.3)
PROT SERPL-MCNC: 6.3 G/DL (ref 6.4–8.4)
RBC # BLD AUTO: 4.43 MILLION/UL (ref 3.81–5.12)
SODIUM SERPL-SCNC: 138 MMOL/L (ref 135–147)
TSH SERPL DL<=0.05 MIU/L-ACNC: 3.54 UIU/ML (ref 0.45–4.5)
WBC # BLD AUTO: 8.32 THOUSAND/UL (ref 4.31–10.16)

## 2024-09-29 PROCEDURE — 94640 AIRWAY INHALATION TREATMENT: CPT

## 2024-09-29 PROCEDURE — 97162 PT EVAL MOD COMPLEX 30 MIN: CPT | Performed by: PHYSICAL THERAPIST

## 2024-09-29 PROCEDURE — 94664 DEMO&/EVAL PT USE INHALER: CPT

## 2024-09-29 PROCEDURE — 80053 COMPREHEN METABOLIC PANEL: CPT | Performed by: HOSPITALIST

## 2024-09-29 PROCEDURE — 82948 REAGENT STRIP/BLOOD GLUCOSE: CPT

## 2024-09-29 PROCEDURE — 97166 OT EVAL MOD COMPLEX 45 MIN: CPT

## 2024-09-29 PROCEDURE — 94760 N-INVAS EAR/PLS OXIMETRY 1: CPT

## 2024-09-29 PROCEDURE — 99233 SBSQ HOSP IP/OBS HIGH 50: CPT | Performed by: HOSPITALIST

## 2024-09-29 PROCEDURE — 85027 COMPLETE CBC AUTOMATED: CPT | Performed by: HOSPITALIST

## 2024-09-29 RX ORDER — SODIUM CHLORIDE 9 MG/ML
75 INJECTION, SOLUTION INTRAVENOUS CONTINUOUS
Status: DISPENSED | OUTPATIENT
Start: 2024-09-29 | End: 2024-09-30

## 2024-09-29 RX ADMIN — LEVALBUTEROL HYDROCHLORIDE 1.25 MG: 1.25 SOLUTION RESPIRATORY (INHALATION) at 08:40

## 2024-09-29 RX ADMIN — OXYCODONE HYDROCHLORIDE 5 MG: 5 TABLET ORAL at 05:22

## 2024-09-29 RX ADMIN — ACETAMINOPHEN 975 MG: 325 TABLET, FILM COATED ORAL at 14:02

## 2024-09-29 RX ADMIN — PANTOPRAZOLE SODIUM 40 MG: 40 TABLET, DELAYED RELEASE ORAL at 05:22

## 2024-09-29 RX ADMIN — LEVALBUTEROL HYDROCHLORIDE 1.25 MG: 1.25 SOLUTION RESPIRATORY (INHALATION) at 14:22

## 2024-09-29 RX ADMIN — GABAPENTIN 300 MG: 300 CAPSULE ORAL at 08:21

## 2024-09-29 RX ADMIN — ACETAMINOPHEN 975 MG: 325 TABLET, FILM COATED ORAL at 05:22

## 2024-09-29 RX ADMIN — INSULIN LISPRO 2 UNITS: 100 INJECTION, SOLUTION INTRAVENOUS; SUBCUTANEOUS at 11:17

## 2024-09-29 RX ADMIN — GABAPENTIN 300 MG: 300 CAPSULE ORAL at 21:07

## 2024-09-29 RX ADMIN — LISINOPRIL 5 MG: 5 TABLET ORAL at 08:21

## 2024-09-29 RX ADMIN — ACETAMINOPHEN 975 MG: 325 TABLET, FILM COATED ORAL at 21:07

## 2024-09-29 RX ADMIN — SODIUM CHLORIDE 75 ML/HR: 0.9 INJECTION, SOLUTION INTRAVENOUS at 09:42

## 2024-09-29 RX ADMIN — B-COMPLEX W/ C & FOLIC ACID TAB 1 TABLET: TAB at 08:21

## 2024-09-29 RX ADMIN — LORATADINE 10 MG: 10 TABLET ORAL at 08:21

## 2024-09-29 RX ADMIN — ENOXAPARIN SODIUM 40 MG: 40 INJECTION SUBCUTANEOUS at 08:21

## 2024-09-29 RX ADMIN — DOCUSATE SODIUM 100 MG: 100 CAPSULE, LIQUID FILLED ORAL at 08:20

## 2024-09-29 RX ADMIN — BUDESONIDE AND FORMOTEROL FUMARATE DIHYDRATE 2 PUFF: 160; 4.5 AEROSOL RESPIRATORY (INHALATION) at 08:27

## 2024-09-29 RX ADMIN — GABAPENTIN 300 MG: 300 CAPSULE ORAL at 17:40

## 2024-09-29 RX ADMIN — LIDOCAINE 5% 1 PATCH: 700 PATCH TOPICAL at 08:21

## 2024-09-29 RX ADMIN — SENNOSIDES 8.6 MG: 8.6 TABLET, FILM COATED ORAL at 08:22

## 2024-09-29 RX ADMIN — LEVALBUTEROL HYDROCHLORIDE 1.25 MG: 1.25 SOLUTION RESPIRATORY (INHALATION) at 19:25

## 2024-09-29 RX ADMIN — DOCUSATE SODIUM 100 MG: 100 CAPSULE, LIQUID FILLED ORAL at 17:40

## 2024-09-29 RX ADMIN — ASPIRIN 81 MG 81 MG: 81 TABLET ORAL at 08:20

## 2024-09-29 RX ADMIN — ATORVASTATIN CALCIUM 20 MG: 10 TABLET, FILM COATED ORAL at 08:20

## 2024-09-29 RX ADMIN — SODIUM CHLORIDE 75 ML/HR: 0.9 INJECTION, SOLUTION INTRAVENOUS at 21:10

## 2024-09-29 RX ADMIN — OXYBUTYNIN CHLORIDE 5 MG: 5 TABLET, EXTENDED RELEASE ORAL at 08:22

## 2024-09-29 RX ADMIN — INSULIN LISPRO 1 UNITS: 100 INJECTION, SOLUTION INTRAVENOUS; SUBCUTANEOUS at 17:40

## 2024-09-29 RX ADMIN — BUDESONIDE AND FORMOTEROL FUMARATE DIHYDRATE 2 PUFF: 160; 4.5 AEROSOL RESPIRATORY (INHALATION) at 17:41

## 2024-09-29 NOTE — RESPIRATORY THERAPY NOTE
09/29/24 0843   Inhalation Therapy Tx   $ Inhalation Therapy Performed Yes   $ Pulse Oximetry Spot Check Charge Completed   SpO2 94 %  (1.5 after check decreased to 1 l/m nc, RA baseline)   Pre-Treatment Pulse 78   Pre-Treatment Respirations 16   Duration 15   Breath Sounds Pre-Treatment Bilateral Diminished   Breath Sounds Pre-Treatment Right Crackles  (RLL base posteriorly)   Breath Sounds Post-Treatment Bilateral Diminished   Breath Sounds Post-Treatment Right Crackles   Post-Treatment Pulse 71   Post-Treatment Respirations 16   Delivery Source Air;UDN   Position Up in chair   Treatment Tolerance Tolerated well

## 2024-09-29 NOTE — PLAN OF CARE
Problem: PHYSICAL THERAPY ADULT  Goal: Performs mobility at highest level of function for planned discharge setting.  See evaluation for individualized goals.  Description: Treatment/Interventions: Functional transfer training, LE strengthening/ROM, Therapeutic exercise, Endurance training, Bed mobility, Gait training          See flowsheet documentation for full assessment, interventions and recommendations.  Note:    Problem List: Decreased endurance, Impaired balance, Decreased mobility  Assessment: Patient is a 84 y.o. female evaluated by Physical Therapy s/p admit to [facility] on 9/28/2024 with admitting diagnosis of: Hypoxia, Multiple rib fractures, Rib pain on left side, Mass of left lung and principal problem of: Closed fracture of multiple ribs of left side. PT was consulted to assess patient's functional mobility and discharge needs. Ordered are PT Evaluation and treatment with activity level of: up and OOB as tolerated. Comorbidities affecting patient's physical performance at time of assessment include:  COPD, T2DM, HTN, fx ribs of L side . Personal factors affecting the patient at time of IE include: ambulating with assistive device and history of fall(s). Please locate objective findings from PT assessment regarding body systems outlined above. Pt performing transfers at supervision level without increase in pain. Ambulating with RW at supervision level, denies pain and demonstrates no LOB. Denies SOB, although SpO2 decling to 82% after ambulation on RA. Improving to 93% on 3L, reduced to 1L and able to maintain SpO2. The patient's AM-PAC Basic Mobility Inpatient Short Form Raw Score is 20. A Raw score of greater than 16 suggests the patient may benefit from discharge to home. Please also refer to the recommendation of the Physical Therapist for safe discharge planning. Pt will benefit from continued PT intervention during LOS to address current deficits, increase LOF, and facilitate  safe d/c to next level of care when medically appropriate. D/c recommendation at this time is home without services.        Rehab Resource Intensity Level, PT: No post-acute rehabilitation needs    See flowsheet documentation for full assessment.

## 2024-09-29 NOTE — RESPIRATORY THERAPY NOTE
09/29/24 0900   Incentive Spirometry Tx   IS level of assistance Assisted by respiratory care provider   Frequency q1hr W/A   Respiratory Effort Normal   Treatment Tolerance Tolerated well   Incentive Spirometry Goal (mL) 972 mL   Incentive Spirometry Achieved (mL) 750 mL

## 2024-09-29 NOTE — PHYSICAL THERAPY NOTE
Physical Therapy Evaluation    Patient Name: Cr Pena    Today's Date: 9/29/2024     Problem List  Principal Problem:    Closed fracture of multiple ribs of left side  Active Problems:    Acid reflux disease    Chronic obstructive pulmonary disease (HCC)    Type 2 diabetes mellitus with hyperglycemia, without long-term current use of insulin (HCC)    Hyperlipidemia, unspecified    Essential hypertension    Neuropathy    Acute hypoxic respiratory failure (HCC)    OAB (overactive bladder)    Chronic constipation    Fall    Mass of left lung       Past Medical History  Past Medical History:   Diagnosis Date    Allergic     Allergic rhinitis     COPD (chronic obstructive pulmonary disease) (HCC)     Diabetes mellitus (HCC)     GERD (gastroesophageal reflux disease)     Hyperlipidemia     Hypertension     Left non-suppurative otitis media 11/27/2019    Pneumonia     Pneumonia of right lower lobe due to infectious organism 12/4/2018    Stage 3 chronic kidney disease, unspecified whether stage 3a or 3b CKD ([facility]) 11/1/2022    Vitamin D deficiency         Past Surgical History  Past Surgical History:   Procedure Laterality Date    CARDIAC CATHETERIZATION      CHOLECYSTECTOMY  1984    COLONOSCOPY N/A 1/30/2019    Procedure: COLONOSCOPY with multiple  polypectomies;  Surgeon: Aissatou Lyles MD;  Location:  GI LAB;  Service: Gastroenterology    EYE SURGERY Bilateral     CATARACT           09/29/24 0931   PT Last Visit   PT Visit Date 09/29/24   Note Type   Note type Evaluation   Pain Assessment   Pain Assessment Tool 0-10   Pain Score No Pain   Restrictions/Precautions   Other Precautions Multiple lines;Telemetry;Fall Risk;O2   Home Living   Type of Home Apartment   Home Layout Elevator;Performs ADLs on one level;Able to live on main level with bedroom/bathroom   Bathroom Shower/Tub Tub/shower unit   Bathroom Toilet Standard   Bathroom Equipment Grab bars in  "shower;Shower chair;Grab bars around toilet   Home Equipment Walker;Cane;Other (Comment)  (4WW)   Additional Comments Pt resides in apartment with elevator entrance   Prior Function   Level of [place] Independent with ADLs;Independent with functional mobility;Needs assistance with IADLS   Lives With Alone   Receives Help From Family  (Son)   IADLs Family/Friend/Other provides transportation;Family/Friend/Other provides meals;Family/Friend/Other provides medication management   Falls in the last 6 months 1 to 4   Comments Patient receives help from son who drives her to appointments, otherwise uses bus services. Has history of at least 3 falls, notes that her legs \"get shake and give out\"   Cognition   Overall Cognitive Status WFL   Arousal/Participation Alert   Orientation Level Oriented X4   Subjective   Subjective \"I was just trying to hang a picture frame when I lost my balance\"   RLE Assessment   RLE Assessment WFL  (Gross MMT 4/5)   LLE Assessment   LLE Assessment WFL  (Gross MMT 4/5)   Bed Mobility   Additional Comments Seated OOB in recliner upon arrival   Transfers   Sit to Stand 5  Supervision   Additional items Increased time required;Verbal cues   Stand to Sit 5  Supervision   Additional items Increased time required;Verbal cues   Ambulation/Elevation   Gait pattern Forward Flexion;Excessively slow   Gait Assistance 5  Supervision   Additional items Assist x 1   Assistive Device Rolling walker   Distance 75'   Balance   Static Sitting Normal   Dynamic Sitting Good   Static Standing Fair +   Dynamic Standing Fair   Activity Tolerance   Activity Tolerance Patient limited by fatigue   Assessment   Problem List Decreased endurance;Impaired balance;Decreased mobility   Assessment Patient is a 84 y.o. female evaluated by Physical Therapy s/p admit to [facility] on 9/28/2024 with admitting diagnosis of: Hypoxia, Multiple rib fractures, Rib pain on left side, Mass of left lung and principal " problem of: Closed fracture of multiple ribs of left side. PT was consulted to assess patient's functional mobility and discharge needs. Ordered are PT Evaluation and treatment with activity level of: up and OOB as tolerated. Comorbidities affecting patient's physical performance at time of assessment include:  COPD, T2DM, HTN, fx ribs of L side . Personal factors affecting the patient at time of IE include: ambulating with assistive device and history of fall(s). Please locate objective findings from PT assessment regarding body systems outlined above. Pt performing transfers at supervision level without increase in pain. Ambulating with RW at supervision level, denies pain and demonstrates no LOB. Denies SOB, although SpO2 decling to 82% after ambulation on RA. Improving to 93% on 3L, reduced to 1L and able to maintain SpO2. The patient's AM-PAC Basic Mobility Inpatient Short Form Raw Score is 20. A Raw score of greater than 16 suggests the patient may benefit from discharge to home. Please also refer to the recommendation of the Physical Therapist for safe discharge planning. Pt will benefit from continued PT intervention during LOS to address current deficits, increase LOF, and facilitate safe d/c to next level of care when medically appropriate. D/c recommendation at this time is home without services.   Goals   Short Term Goal #1 Pt to participate in LE strengthening program in order to improve bed mobility and transfers when returning home   Short Term Goal #2 Pt to perform bed mobility and transfers I  in order to maximize independence when returning home   LTG Expiration Date 10/13/24   Long Term Goal #1 Pt to ambulate with RW at least 250' without SOB  in order to safely ambulate around home and in community   Long Term Goal #2 Pt to perform at least 1 step with HR at S level in order to navigate curb step in community   Plan   Treatment/Interventions Functional transfer training;LE  strengthening/ROM;Therapeutic exercise;Endurance training;Bed mobility;Gait training   PT Frequency 3-5x/wk   Discharge Recommendation   Rehab Resource Intensity Level, PT No post-acute rehabilitation needs   AM-PAC Basic Mobility Inpatient   Turning in Flat Bed Without Bedrails 4   Lying on Back to Sitting on Edge of Flat Bed Without Bedrails 4   Moving Bed to Chair 3   Standing Up From Chair Using Arms 3   Walk in Room 3   Climb 3-5 Stairs With Railing 3   Basic Mobility Inpatient Raw Score 20   Basic Mobility Standardized Score 43.99   [facility] Highest Level Of Mobility   -HLM Goal 6: Walk 10 steps or more   -HLM Achieved 7: Walk 25 feet or more     Madina Kamara, PT

## 2024-09-29 NOTE — UTILIZATION REVIEW
Initial Clinical Review    Admission: Date/Time/Statement: 9/28/24 AT 1607 OBSERVATION - CONVERTED TO INPATIENT 9/29/24 AT 1025 2ND FALL WITH MULTIPLE RIB FRACTURES + HYPOXIA - REQUIRES CONTINUED INPATIENT MANAGEMENT AFTER OBSERVATION + > 2 MIDNITES - RESPIRATORY MONITORING, SUPP O2, PAIN MANAGEMENT     09/29/24 1035  INPATIENT ADMISSION  Once        Transfer Service: Hospitalist   Question Answer Comment   Level of Care Med Surg    Estimated length of stay More than 2 Midnights    Certification I certify that inpatient services are medically necessary for this patient for a duration of greater than two midnights. See H&P and MD Progress Notes for additional information about the patient's course of treatment.        09/29/24 1035   09/28/24 1607  Place in Observation  (ED Bridging Orders Panel)  Once        Transfer Service: Hospitalist   Question: Level of Care Answer: Med Surg    09/28/24 1607     ED Arrival Information       Expected   -    Arrival   9/28/2024 13:22    Acuity   Urgent              Means of arrival   Ambulance    Escorted by   [facility]    Service   Hospitalist    Admission type   Emergency              Arrival complaint   fall L rib pain          Chief Complaint   Patient presents with    Fall     Patient reports that she went to hang picture on wall and climbed on sofa, lost her balance, and fell onto left side. No loc. No head strike. Takes asa daily     Initial Presentation:   83 y/o female with PMHx HTN, Asthma/COPD, DM, GERD, overactive bladder - presents via EMS to [facility] ED on 9/28/24 after a fall and trauma causing multiple rib fractures.  Patient reported  trying to hang a picture on her wall, standing on her couch, when she stepped in the crack between the cushions, lost her balance and fell on her left side.  Patient describes the incident as very mechanical in nature.  .  In ED - RR 16   Room Air SpO2 94 %  ROS/ Exam: reports difficulty taking deep breaths 2nd rib pain  that worsens with movement to 10/10  CT Chest revealed posterior lateral rib fractures, minimally displaced, of ribs 8 through 10.    Labs:    K+ 3.9  ED Tx: In ED - trauma evaluation,   Placed in Observation 9/28/24 at 1607 -     Closed fracture of multiple ribs of left side  Patient had a fall, while trying to hang a picture on the wall and stand on her couch, believes she stepped in between the cushions became unbalanced and fell to the ground.  Traumatic evaluation in the emergency department completed  CT chest revealed acute nondisplaced rib fractures on the left of the eighth ninth and 10th ribs posterolaterally  No associated pneumothorax, pleural effusion, or pulmonary contusion noted     Pain control: Scheduled Tylenol, increase her home gabapentin to 300 mg 3 times daily, PRN Oxycodone 2.5mg/5mg for mod/severe pain respectively. Apply ICE as able. Maximize non-opiate pain control     Rib Fracture Protocol Ordered  Respiratory protocol, airway clearance, incentive spirometer    Acute hypoxic respiratory failure (HCC)  Suspect due to acute rib fractures on top of underlying COPD.  Noted to become hypoxic after administration of fentanyl in the emergency department  Required up to 4 L of oxygen, was attempted to be weaned down but can only be weaned to 2 L nasal cannula in ED  Respiratory protocol  Currently on 2 L of oxygen, wean oxygen as able to maintain oxygen saturation 89%  Treat rib fractures and pain as above  Respiratory protocol  Incentive spirometer  Will continue patient's home inhaler as able per formulary  Patient uses rescue inhaler at home, will transition to Xopenex 3 times daily Resp    Anticipated Length of Stay/Certification Statement: Patient will be admitted on an observation basis with an anticipated length of stay of less than 2 midnights secondary to rib fractures, pain, hypoxia.     9/29/24 - DAY 2:  still has pain worse with movement, at rest is minimal but severe with  movement. deep breaths cause pain   SpO2 > 90 % with 2 L NC O2    Closed fracture of multiple ribs of left side  Pain control: Scheduled Tylenol, increase her home gabapentin to 300 mg 3 times daily, PRN Oxycodone 2.5mg/5mg for mod/severe pain respectively. Apply ICE as able. Maximize non-opiate pain control   Rib Fracture Protocol Ordered  Respiratory protocol, airway clearance, incentive spirometer    Acute hypoxic respiratory failure (HCC)  Suspect due to acute rib fractures on top of underlying COPD.  Noted to become hypoxic after administration of fentanyl in the emergency department  Required up to 4 L of oxygen, was attempted to be weaned down but can only be weaned to 2 L nasal cannula in ED  Respiratory protocol  Currently on 2 L of oxygen, wean oxygen as able to maintain oxygen saturation 89%  Treat rib fractures and pain as above  Respiratory protocol  Incentive spirometer  Will continue patient's home inhaler as able per formulary  Patient uses rescue inhaler at home, will transition to Xopenex 3 times daily Resp    MD Certification Statement: Continues to require additional inpatient hospital stay due to rib fractures, hypoxia, pain control     CONVERTED TO INPATIENT 9/29/24 AT 1025 2ND FALL WITH MULTIPLE RIB FRACTURES + HYPOXIA - REQUIRES CONTINUED INPATIENT MANAGEMENT AFTER OBSERVATION + > 2 MIDNITES - RESPIRATORY MONITORING, SUPP O2, PAIN     ED Treatment-Medication Administration from 09/28/2024 1322 to 09/28/2024 1621         Date/Time Order Dose Route Action     09/28/2024 1343 acetaminophen (Ofirmev) injection 1,000 mg 1,000 mg Intravenous New Bag     09/28/2024 1342 ondansetron (ZOFRAN) injection 4 mg 4 mg Intravenous Given     09/28/2024 1342 fentaNYL injection 25 mcg 25 mcg Intravenous Given     09/28/2024 1404 iohexol (OMNIPAQUE) 350 MG/ML injection (MULTI-DOSE) 100 mL 100 mL Intravenous Given       Scheduled Medications:  acetaminophen, 975 mg, Oral, Q8H NIEGL  aspirin, 81 mg, Oral,  Daily  atorvastatin, 20 mg, Oral, Daily  budesonide-formoterol, 2 puff, Inhalation, BID  docusate sodium, 100 mg, Oral, BID  enoxaparin, 40 mg, Subcutaneous, Daily  gabapentin, 300 mg, Oral, TID  insulin lispro, 1-5 Units, Subcutaneous, TID AC  levalbuterol, 1.25 mg, Nebulization, TID  lidocaine, 1 patch, Topical, Daily  lisinopril, 5 mg, Oral, Daily  loratadine, 10 mg, Oral, Daily  multivitamin stress formula, 1 tablet, Oral, Daily  oxybutynin, 5 mg, Oral, Daily  pantoprazole, 40 mg, Oral, Early Morning  senna, 1 tablet, Oral, Daily    Continuous IV Infusions:  sodium chloride, 75 mL/hr, Intravenous, Continuous    PRN Meds:  aluminum-magnesium hydroxide-simethicone, 30 mL, Oral, Q6H PRN  ondansetron, 4 mg, Intravenous, Q6H PRN  oxyCODONE, 5 mg, Oral, Q6H PRN - 9/28 X 1, 9/29 X 1  oxyCODONE, 2.5 mg, Oral, Q6H PRN    ED Triage Vitals   Temperature Pulse Respirations Blood Pressure SpO2 Pain Score   09/28/24 1325 09/28/24 1325 09/28/24 1325 09/28/24 1328 09/28/24 1325 09/28/24 1325   98.1 °F (36.7 °C) 71 16 157/63 94 % 7     Weight (last 2 days)       Date/Time Weight    09/28/24 16:53:20 64.8 (142.86)    09/28/24 1325 73 (160.94)     Vital Signs (last 3 days)       Date/Time Temp Pulse Resp BP MAP (mmHg) SpO2 Calculated FIO2 (%) - Nasal Cannula Nasal Cannula O2 Flow Rate (L/min) O2 Device Patient Position - Orthostatic VS Betty Coma Scale Score Pain    09/29/24 0729 -- -- -- -- -- -- 28 2 L/min Nasal cannula -- -- --    09/29/24 07:15:09 98.6 °F (37 °C) 72 15 136/70 92 90 % -- 2 L NC -- -- --    09/29/24 0522 -- -- -- -- -- -- -- -- -- -- -- 6 09/28/24 2309 -- -- -- -- -- -- -- -- -- -- -- 5 09/28/24 2125 97.8 °F (36.6 °C) -- -- 94/38 -- -- -- -- -- -- -- --    09/28/24 2101 -- -- -- -- -- -- -- -- -- -- -- 5 09/28/24 2100 -- -- -- -- -- -- 28 2 L/min Nasal cannula -- 15 --    09/28/24 1815 -- -- -- -- -- 96 % -- -- -- -- -- --    09/28/24 1744 -- 71 18 -- -- 97 % -- -- -- -- -- --    09/28/24 1726 -- --  -- -- -- -- -- -- -- -- -- 8 09/28/24 16:53:20 97.5 °F (36.4 °C) 70 17 131/76 94 95 % -- -- -- -- -- --    09/28/24 1651 -- -- -- -- -- -- 28 2 L/min Nasal cannula -- -- 8 09/28/24 1600 -- 71 20 149/103 -- 99 % -- -- Nasal cannula -- 15 --    09/28/24 1545 -- 68 20 110/56 -- 98 % -- -- Nasal cannula -- 15 --    09/28/24 1530 -- 68 20 145/61 -- 99 % -- -- Nasal cannula -- 15 --    09/28/24 1515 -- 67 20 101/54 -- 98 % -- -- Nasal cannula -- 15 --    09/28/24 1500 98 °F (36.7 °C) 69 20 107/52 -- 98 % -- -- Nasal cannula -- 15 4 09/28/24 1445 98 °F (36.7 °C) 68 21 108/60 -- 97 % -- -- -- -- 15 4    09/28/24 1430 98 °F (36.7 °C) 67 22 114/56 -- 98 % -- -- Nasal cannula -- 15 4 09/28/24 1415 98.1 °F (36.7 °C) 74 20 122/58 -- 98 % -- -- Nasal cannula -- 15 4 09/28/24 1400 98.1 °F (36.7 °C) 78 16 121/60 -- 93 % -- -- Nasal cannula -- 15 4 09/28/24 1345 98.1 °F (36.7 °C) 74 18 104/57 -- 95 % -- -- None (Room air) -- 15 7    09/28/24 1342 -- -- -- -- -- -- -- -- -- -- -- 7 09/28/24 13:34:06 -- -- -- -- -- 97 % -- -- -- -- -- --    09/28/24 1328 -- -- -- 157/63 -- -- -- -- -- -- -- --    09/28/24 1325 98.1 °F (36.7 °C) 71 16 -- -- 94 % -- -- None (Room air) Lying -- 7               Pertinent Labs/Diagnostic Test Results:   CT chest abdomen pelvis w contrast   Final Interpretation  (09/28 1442)      Acute minimally displaced left rib fractures involving the eighth, ninth and 10th ribs posterolaterally. No pneumothorax or pleural effusion.      Background emphysema with spiculated left lower lobe superior segment mass measuring 4.5 x 4.1 cm concerning for neoplasm. Recommend PET/CT scan or tissue sampling for next step.      Stable large hiatal hernia.      Mild bladder wall thickening with a small diverticulum toward the dome. Correlate for possible cystitis.      CT head without contrast   Final Interpretation  (09/28 1408)      No acute intracranial abnormality.      CT cervical spine without contrast    Final Interpretation  (09/28 1411)      No cervical spine fracture or traumatic malalignment.         XR chest 1 view portable   Final Interpretation  (09/28 1358)      New patchy opacity in left midlung zone, may present pneumonia. Recommend follow-up to resolution to exclude underlying pulmonary malignancy. Please see CT chest abdomen pelvis with contrast for further evaluation.      Nondisplaced left-sided rib fractures are better evaluated on same day CT chest abdomen pelvis with contrast..     Results from last 7 days   Lab Units 09/28/24  1403   SARS-COV-2  Negative     Results from last 7 days   Lab Units 09/29/24  0518 09/28/24  1339   WBC Thousand/uL 8.32 9.60   HEMOGLOBIN g/dL 12.6 13.5   HEMATOCRIT % 39.9 42.9   PLATELETS Thousands/uL 248 289   TOTAL NEUT ABS Thousands/µL  --  5.49     Results from last 7 days   Lab Units 09/29/24  0518 09/28/24  1339   SODIUM mmol/L 138 138   POTASSIUM mmol/L 4.4 3.9   CHLORIDE mmol/L 101 102   CO2 mmol/L 32 26   ANION GAP mmol/L 5 10   BUN mg/dL 17 14   CREATININE mg/dL 1.27 1.00   EGFR ml/min/1.73sq m 38 51   CALCIUM mg/dL 9.4 9.5     Results from last 7 days   Lab Units 09/29/24  0518 09/28/24  1339   AST U/L 15 18   ALT U/L 14 14   ALK PHOS U/L 62 70   TOTAL PROTEIN g/dL 6.3* 6.7   ALBUMIN g/dL 3.8 4.1   TOTAL BILIRUBIN mg/dL 0.61 0.50     Results from last 7 days   Lab Units 09/29/24  0714 09/28/24  1724   POC GLUCOSE mg/dl 122 111     Results from last 7 days   Lab Units 09/29/24  0518 09/28/24  1339   GLUCOSE RANDOM mg/dL 130 161*     Results from last 7 days   Lab Units 09/28/24  1339   PROTIME seconds 14.0   INR  1.02   PTT seconds 33     Results from last 7 days   Lab Units 09/28/24  1339   TSH 3RD GENERATON uIU/mL 3.537     Results from last 7 days   Lab Units 09/28/24  1339   PROCALCITONIN ng/ml 0.08     Results from last 7 days   Lab Units 09/28/24  1339   LIPASE u/L 39       Results from last 7 days   Lab Units 09/28/24  1644   CLARITY UA  Clear   COLOR  UA  Yellow   SPEC GRAV UA  1.010   PH UA  5.5   GLUCOSE UA mg/dl Negative   KETONES UA mg/dl Negative   BLOOD UA  Trace*   PROTEIN UA mg/dl Trace*   NITRITE UA  Negative   BILIRUBIN UA  Negative   UROBILINOGEN UA (BE) mg/dl <2.0   LEUKOCYTES UA  Negative   WBC UA /hpf 2-4   RBC UA /hpf 4-10*   BACTERIA UA /hpf Occasional   EPITHELIAL CELLS WET PREP /hpf Occasional     Results from last 7 days   Lab Units 09/28/24  1403   INFLUENZA A PCR  Negative   INFLUENZA B PCR  Negative   RSV PCR  Negative     Past Medical History:   Diagnosis Date    Allergic     Allergic rhinitis     COPD (chronic obstructive pulmonary disease) ([facility])     Diabetes mellitus ([facility])     GERD (gastroesophageal reflux disease)     Hyperlipidemia     Hypertension     Left non-suppurative otitis media 11/27/2019    Pneumonia     Pneumonia of right lower lobe due to infectious organism 12/4/2018    Stage 3 chronic kidney disease, unspecified whether stage 3a or 3b CKD ([facility]) 11/1/2022    Vitamin D deficiency      Present on Admission:   Closed fracture of multiple ribs of left side   Chronic obstructive pulmonary disease ([facility])   Type 2 diabetes mellitus with hyperglycemia, without long-term current use of insulin ([facility])   Hyperlipidemia, unspecified   Essential hypertension   Neuropathy   OAB (overactive bladder)   Chronic constipation   Acid reflux disease   Acute hypoxic respiratory failure ([facility])      Admitting Diagnosis: Hypoxia [R09.02]  Multiple rib fractures [S22.49XA]  Rib pain on left side [R07.81]  Mass of left lung [R91.8]  Age/Sex: 84 y.o. female    Network Utilization Review Department  ATTENTION: Please call with any questions or concerns to [phone] and carefully listen to the prompts so that you are directed to the right person. All voicemails are confidential.   For Discharge needs, contact Care Management DC Support Team at [phone] opt. [phone]  Send all requests for admission clinical reviews, approved or denied determinations and any  other requests to dedicated fax number below belonging to the campus where the patient is receiving treatment. List of dedicated fax numbers for the Facilities:  FACILITY NAME UR FAX NUMBER   ADMISSION DENIALS (Administrative/Medical Necessity) [phone]   DISCHARGE SUPPORT TEAM (NETWORK) [phone]   PARENT CHILD HEALTH (Maternity/NICU/Pediatrics) [phone]   [facility] [phone]   [facility] [phone]   [facility] [phone]   [facility] [phone]   [facility] [phone]   [facility] [phone]   [facility] [phone]   [facility] [phone]   [facility] [phone]   [facility] [phone]   [facility] [phone]   [facility] [phone]

## 2024-09-29 NOTE — PLAN OF CARE
Problem: PAIN - ADULT  Goal: Verbalizes/displays adequate comfort level or baseline comfort level  Description: Interventions:  - Encourage patient to monitor pain and request assistance  - Assess pain using appropriate pain scale  - Administer analgesics based on type and severity of pain and evaluate response  - Implement non-pharmacological measures as appropriate and evaluate response  - Consider cultural and social influences on pain and pain management  - Notify physician/advanced practitioner if interventions unsuccessful or patient reports new pain  Outcome: Progressing     Problem: INFECTION - ADULT  Goal: Absence or prevention of progression during hospitalization  Description: INTERVENTIONS:  - Assess and monitor for signs and symptoms of infection  - Monitor lab/diagnostic results  - Monitor all insertion sites, i.e. indwelling lines, tubes, and drains  - Monitor endotracheal if appropriate and nasal secretions for changes in amount and color  - [place] appropriate cooling/warming therapies per order  - Administer medications as ordered  - Instruct and encourage patient and family to use good hand hygiene technique  - Identify and instruct in appropriate isolation precautions for identified infection/condition  Outcome: Progressing     Problem: SAFETY ADULT  Goal: Patient will remain free of falls  Description: INTERVENTIONS:  - Educate patient/family on patient safety including physical limitations  - Instruct patient to call for assistance with activity   - Consult OT/PT to assist with strengthening/mobility   - Keep Call bell within reach  - Keep bed low and locked with side rails adjusted as appropriate  - Keep care items and personal belongings within reach  - Initiate and maintain comfort rounds  - Make Fall Risk Sign visible to staff  - Offer Toileting every 2 Hours, in advance of need  - Initiate/Maintain bed/chair alarm  - Obtain necessary fall risk management equipment: as needed  - Apply  yellow socks and bracelet for high fall risk patients  - Consider moving patient to room near nurses station  Outcome: Progressing     Problem: DISCHARGE PLANNING  Goal: Discharge to home or other facility with appropriate resources  Description: INTERVENTIONS:  - Identify barriers to discharge w/patient and caregiver  - Arrange for needed discharge resources and transportation as appropriate  - Identify discharge learning needs (meds, wound care, etc.)  - Arrange for interpretive services to assist at discharge as needed  - Refer to Case Management Department for coordinating discharge planning if the patient needs post-hospital services based on physician/advanced practitioner order or complex needs related to functional status, cognitive ability, or social support system  Outcome: Progressing     Problem: Knowledge Deficit  Goal: Patient/family/caregiver demonstrates understanding of disease process, treatment plan, medications, and discharge instructions  Description: Complete learning assessment and assess knowledge base.  Interventions:  - Provide teaching at level of understanding  - Provide teaching via preferred learning methods  Outcome: Progressing     Problem: RESPIRATORY - ADULT  Goal: Achieves optimal ventilation and oxygenation  Description: INTERVENTIONS:  - Assess for changes in respiratory status  - Assess for changes in mentation and behavior  - Position to facilitate oxygenation and minimize respiratory effort  - Oxygen administered by appropriate delivery if ordered  - Initiate smoking cessation education as indicated  - Encourage broncho-pulmonary hygiene including cough, deep breathe, Incentive Spirometry  - Assess the need for suctioning and aspirate as needed  - Assess and instruct to report SOB or any respiratory difficulty  - Respiratory Therapy support as indicated  Outcome: Progressing     Problem: METABOLIC, FLUID AND ELECTROLYTES - ADULT  Goal: Glucose maintained within target  range  Description: INTERVENTIONS:  - Monitor Blood Glucose as ordered  - Assess for signs and symptoms of hyperglycemia and hypoglycemia  - Administer ordered medications to maintain glucose within target range  - Assess nutritional intake and initiate nutrition service referral as needed  Outcome: Progressing

## 2024-09-29 NOTE — ASSESSMENT & PLAN NOTE
Lab Results   Component Value Date    HGBA1C 6.6 (A) 02/16/2024       Recent Labs     09/28/24  1724 09/29/24  0714   POCGLU 111 122       Blood Sugar Average: Last 72 hrs:  (P) 116.5  Takes metformin only at home  Hold metformin while in hospital  Check hemoglobin A1c for update  Will do low-dose insulin sliding scale  Diabetic diet

## 2024-09-29 NOTE — OCCUPATIONAL THERAPY NOTE
Occupational Therapy Evaluation     Patient Name: Cr Pena  Today's Date: 9/29/2024  Problem List  Principal Problem:    Closed fracture of multiple ribs of left side  Active Problems:    Acid reflux disease    Chronic obstructive pulmonary disease (HCC)    Type 2 diabetes mellitus with hyperglycemia, without long-term current use of insulin (HCC)    Hyperlipidemia, unspecified    Essential hypertension    Neuropathy    Acute hypoxic respiratory failure (HCC)    OAB (overactive bladder)    Chronic constipation    Fall    Mass of left lung    Past Medical History  Past Medical History:   Diagnosis Date    Allergic     Allergic rhinitis     COPD (chronic obstructive pulmonary disease) (HCC)     Diabetes mellitus (HCC)     GERD (gastroesophageal reflux disease)     Hyperlipidemia     Hypertension     Left non-suppurative otitis media 11/27/2019    Pneumonia     Pneumonia of right lower lobe due to infectious organism 12/4/2018    Stage 3 chronic kidney disease, unspecified whether stage 3a or 3b CKD (HCC) 11/1/2022    Vitamin D deficiency      Past Surgical History  Past Surgical History:   Procedure Laterality Date    CARDIAC CATHETERIZATION      CHOLECYSTECTOMY  1984    COLONOSCOPY N/A 1/30/2019    Procedure: COLONOSCOPY with multiple  polypectomies;  Surgeon: Aissatou Lyles MD;  Location:  GI LAB;  Service: Gastroenterology    EYE SURGERY Bilateral     CATARACT        09/29/24 0930   OT Last Visit   OT Visit Date 09/29/24   Note Type   Note type Evaluation   Pain Assessment   Pain Assessment Tool 0-10   Pain Score No Pain   Restrictions/Precautions   Other Precautions Multiple lines;Telemetry;Fall Risk;O2   Home Living   Type of Home Apartment  (Lives on 2nd floor w/ elevator)   Home Layout Performs ADLs on one level;Elevator   Bathroom Shower/Tub Tub/shower unit  (Cut out tub)   Bathroom Toilet Standard   Bathroom Equipment Grab bars in shower;Shower chair;Grab bars around toilet   Home Equipment Other  "(Comment);Cane  (2 Rollators)   Prior Function   Level of [place] Independent with ADLs;Independent with functional mobility;Independent with IADLS   Lives With Alone   Receives Help From Family;Neighbor  (Son, neighbors sometimes assist w transportation vs public transpo)   IADLs Independent with meal prep;Independent with medication management;Family/Friend/Other provides transportation  (Neighbors or son assist w/ transpo/groceries. (-) .)   Falls in the last 6 months 1 to 4  (per pt, 3-4)   Vocational Retired   Lifestyle   [employer] PTA, was (I) with ADLs and was (I) with IADLs. Patient lives in an apt complex, one 2nd floor w elevator access. Cut out tub w/ shower chair and grab bars. Standard toilet (able to reach grab bar from shower for transfers per pt). Has son and neighbors to assist w transpo/groceries otherwise takes public transpo. Reports 3-4 falls, most recent leading to admission. No home O2. Has 2 rollators and a cane. (-) . Lives alone.   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 Enjoys reading.   General   Additional Pertinent History Comorbidities affecting pt’s functional performance include a significant PMH of: COPD, DM2, HLD, HTN, neuropathy, spinal stenosis of lumbar region, CHF, CKD3, esophageal dysphagia, asthma.  Patient with active OT orders and activity orders for Up and OOB as tolerated .   Family/Caregiver Present No   Subjective   Subjective \"I know I shouldn't have gotten on the couch like that\" Agreeable to OT/PT co-eval.   ADL   Where Assessed Chair   Eating Assistance 7  Independent   Grooming Assistance 5  Supervision/Setup   UB Bathing Assistance 5  Supervision/Setup   LB Bathing Assistance 5  Supervision/Setup   UB Dressing Assistance 5  Supervision/Setup   LB Dressing Assistance 5  Supervision/Setup   Toileting Assistance  5  Supervision/Setup "   Transfers   Sit to Stand 5  Supervision   Additional items Increased time required;Verbal cues;Armrests;Other  (RW)   Stand to Sit 5  Supervision   Additional items Increased time required;Verbal cues;Armrests;Other  (RW)   Functional Mobility   Functional Mobility 5  Supervision   Additional Comments Trialed RA w/ functional mobility as pt sat 91-92% on 1 L. Unclear whether accurate reading given pt gripping RW, but O2 reading 79-80%. Pt denied SOB t/o entire session. Applied 1L once pt sitting in chair and pt remained in 80s, increased to 3 L and pt able to rebound to 91-92%. Nurse present/aware. Decreased to 1L at end of session w/ pt remaining at 91%.   Additional items Rolling walker   Balance   Static Sitting Normal   Dynamic Sitting Good   Static Standing Fair   Dynamic Standing Fair   Ambulatory Fair   Activity Tolerance   Activity Tolerance Patient limited by fatigue;Treatment limited secondary to medical complications (Comment)  (O2 desat)   Medical Staff Made Aware PT Emilie   Nurse Made Aware Yes; present in room during session. Aware of O2 requirements.   RUE Assessment   RUE Assessment WFL   LUE Assessment   LUE Assessment WFL   Hand Function   Gross Motor Coordination Functional   Fine Motor Coordination Functional   Sensation   Light Touch Partial deficits in the RLE;Partial deficits in the LLE   Proprioception   Proprioception No apparent deficits   Vision-Basic Assessment   Current Vision Wears glasses all the time   Vision - Complex Assessment   Ocular Range of Motion Intact   Acuity Able to read clock/calendar on wall without difficulty   Psychosocial   Patient Behaviors/Mood Cooperative;Calm   Perception   Inattention/Neglect Appears intact   Cognition   Overall Cognitive Status WFL   Arousal/Participation Alert;Responsive;Cooperative   Attention Within functional limits   Orientation Level Oriented X4   Memory Within functional limits   Following Commands Follows all commands and directions  without difficulty   Assessment   Limitation Decreased ADL status;Decreased UE strength;Decreased Safe judgement during ADL;Decreased endurance;Decreased self-care trans;Decreased high-level ADLs  (dec balance)   Prognosis Good   Assessment Patient is a 84 y.o. year old female seen for OT eval s/p admit to [facility] on 9/28/2024 with closed fx of multiple ribs of L side following a fall, currently experiencing acute hypoxic respiratory failure on 1-3 L O2.  OT consulted to assess ADLs/IADLs/functional mobility and assist w/ D/C planning. Patient demonstrates the following deficits impacting occupational performance: decreased strength , decreased balance, decreased activity tolerance, decreased safety awareness, SOB, [place], impaired coordination, decreased cardiovascular endurance, and decreased skin integrity . These impairments, as well at pt’s limited home support, difficulty performing ADLs, difficulty performing IADLs, difficulty performing transfers/mobility, fall risk , functional decline , new O2 requirements, new use of AD for functional transfers/mobility, and advanced age, limit pt’s ability to safely engage in all baseline areas of occupation. Pt currently functioning at S-(I) for ADLs and functional mobility w RW. Desats on room air (refer to flowsheet). Pt would benefit from continued skilled OT while in acute setting to address deficits as defined above and to maximize (I) w/ ADLs/functional mobility. Occupational performance areas to address include: grooming, bathing/shower, toilet hygiene, dressing, medication management, health maintenance, functional mobility, community mobility, clothing management, cleaning, meal prep, household maintenance, and job performance/volunteering. Based on the aforementioned evaluation, functional performance deficits, and assessments, pt has been identified as a moderate complexity evaluation. At this time, no rehab needs anticipated at time of d/c. Would rec continued  mobilization during inpatient stay w hospital staff to increase endurance. Educ pt on importance of pain mngment, continued mobility, and use of incentive spirometer w/ pt verbalizing understanding. OT will continue to follow pt 3-5x/wk to address the goals listed below to  w/in 10-14 days.   Goals   Patient Goals to return home   LTG Time Frame 10-   Plan   Treatment Interventions ADL retraining;UE strengthening/ROM;Functional transfer training;Endurance training;Patient/family training;Equipment evaluation/education;Neuromuscular reeducation;Compensatory technique education;Continued evaluation;Activityengagement;Cardiac education;Energy conservation   Goal Expiration Date 10/13/24   OT Treatment Day 0   OT Frequency 3-5x/wk   Discharge Recommendation   Rehab Resource Intensity Level, OT No post-acute rehabilitation needs   Additional Comments  Co treatment with PT secondary to complex medical condition of pt, possible A of 2 required to achieve and maintain transitional movements, requiring the need of skilled therapeutic intervention of 2 therapists to achieve delivery of services.   AM-PAC Daily Activity Inpatient   Lower Body Dressing 3   Bathing 3   Toileting 3   Upper Body Dressing 3   Grooming 4   Eating 4   Daily Activity Raw Score 20   Daily Activity Standardized Score (Calc for Raw Score >=11) 42.03   AM-PAC Applied Cognition Inpatient   Following a Speech/Presentation 4   Understanding Ordinary Conversation 4   Taking Medications 4   Remembering Where Things Are Placed or Put Away 4   Remembering List of 4-5 Errands 3   Taking Care of Complicated Tasks 3   Applied Cognition Raw Score 22   Applied Cognition Standardized Score 47.83     Occupational Therapy goals: In 7-14 days:     1- Patient will verbalize and demonstrate use of energy conservation/deep breathing technique and work simplification skills during functional activity with no verbal cues.   2- Patient will verbalize and demonstrate  good body mechanics and joint protection techniques during ADLs/IADLs with no verbal cues   3- Pt will complete bed mobility at a Mod I level w/ G balance/safety demonstrated to decrease caregiver assistance required   4- Patient will increase OOB/ sitting tolerance to 2-4 hours per day for increased participation in self care and leisure tasks with no s/s of exertion.   5-Patient will increase standing tolerance time to 5 minutes with unilateral UE support to complete sink level ADLs@ mod I level    6- Pt will improve functional transfers to Mod I on/off all surfaces using DME as needed w/ G balance/safety   7- Patient will complete UB ADLs with [place] utilizing appropriate DME/AE PRN   8- Patient will complete LB ADLs with Mamta utilizing appropriate DME/AE PRN   9- Patient will complete toileting tasks with Mamta with G hygiene/thoroughness utilizing appropriate DME/AE PRN   10- Pt will improve functional mobility during ADL/IADL/leisure tasks to Mod I using DME as needed w/ G balance/safety    11- Pt will be attentive 100% of the time during ongoing cognitive assessment w/ G participation to assist w/ safe d/c planning/recommendations   12- Pt will participate in simulated IADL management task to increase independence to Mod I w/ G safety and endurance   13- Pt will increase BUE strength by 1MM grade via AROM/AAROM/PROM exercises to increase independence in ADLs and transfers       Nikki Claros OTR/L

## 2024-09-29 NOTE — PROGRESS NOTES
Progress Note - Hospitalist   Name: Cr Pena 84 y.o. female I MRN: [id number]  Unit/Bed#: 425-01 I Date of Admission: 9/28/2024   Date of Service: 9/29/2024 I Hospital Day: 0    Assessment & Plan  Closed fracture of multiple ribs of left side  Patient had a fall, while trying to hang a picture on the wall and stand on her couch, believes she stepped in between the cushions became unbalanced and fell to the ground.  Traumatic evaluation in the emergency department completed  CT chest revealed acute nondisplaced rib fractures on the left of the eighth ninth and 10th ribs posterolaterally  No associated pneumothorax, pleural effusion, or pulmonary contusion noted    Pain control: Scheduled Tylenol, increase her home gabapentin to 300 mg 3 times daily, PRN Oxycodone 2.5mg/5mg for mod/severe pain respectively. Apply ICE as able. Maximize non-opiate pain control    Rib Fracture Protocol Ordered  Respiratory protocol, airway clearance, incentive spirometer  Acute hypoxic respiratory failure (HCC)  Suspect due to acute rib fractures on top of underlying COPD.  Noted to become hypoxic after administration of fentanyl in the emergency department  Required up to 4 L of oxygen, was attempted to be weaned down but can only be weaned to 2 L nasal cannula in ED  Respiratory protocol  Currently on 2 L of oxygen, wean oxygen as able to maintain oxygen saturation 89%  Treat rib fractures and pain as above  Respiratory protocol  Incentive spirometer  Will continue patient's home inhaler as able per formulary  Patient uses rescue inhaler at home, will transition to Xopenex 3 times daily Resp  Fall  As above, patient had a fall at home while trying to hang a picture, and describes the incident as mechanical in nature  PT/OT consults  Chronic obstructive pulmonary disease (HCC)  See management under acute hypoxic respiratory failure  She is not in exacerbation  Mass of left lung  Ultimately an incidental finding while workup for her  traumatic fall  CT notes a spiculated left lower lobe superior segment mass measuring 4.5 x 4.1 cm, very concerning for neoplasm  Findings were discussed with the patient, she expresses understanding  Next step would be a PET/CT scan  Incidental finding report placement emergency department, appreciate  Patiently to follow-up with her primary care provider and an oncology  Type 2 diabetes mellitus with hyperglycemia, without long-term current use of insulin ([facility])  Lab Results   Component Value Date    HGBA1C 6.6 (A) 02/16/2024       Recent Labs     09/28/24  1724 09/29/24  0714   POCGLU 111 122       Blood Sugar Average: Last 72 hrs:  (P) 116.5  Takes metformin only at home  Hold metformin while in hospital  Check hemoglobin A1c for update  Will do low-dose insulin sliding scale  Diabetic diet    Acid reflux disease  Continue PPI  Hyperlipidemia, unspecified  Continue home statin  Essential hypertension  Continue home lisinopril  Neuropathy  Likely due to diabetes, patient takes 300 mg gabapentin daily  Will increase gabapentin to 300 mg 3 times daily to aid with pain control of rib fracture  OAB (overactive bladder)  Continue home oxybutynin 5 mg daily  Chronic constipation  Noted history, does not take any medications at home  Due to anticipated poor mobility will start bowel regiment  Docusate 100 mg twice daily, and Senokot nightly    VTE Pharmacologic Prophylaxis:   Moderate Risk (Score 3-4) - Pharmacological DVT Prophylaxis Ordered: enoxaparin (Lovenox).    Mobility:   Basic Mobility Inpatient Raw Score: 20  JH-HLM Goal: 6: Walk 10 steps or more  JH-HLM Achieved: 7: Walk 25 feet or more  JH-HLM Goal achieved. Continue to encourage appropriate mobility.    Patient Centered Rounds: I performed bedside rounds with nursing staff today.   Discussions with Specialists or Other Care Team Provider: none    Education and Discussions with Family / Patient: Updated  (son) at bedside.    Current Length of  Stay: 0 day(s)  Current Patient Status: Inpatient   Certification Statement: The patient will continue to require additional inpatient hospital stay due to rib fractures, hypoxia, pain control  Discharge Plan: Anticipate discharge in 24-48 hrs to discharge location to be determined pending rehab evaluations.    Code Status: Level 3 - DNAR and DNI    Subjective   Patient still has pain worse with movement, at rest is minimal but severe with movement. Does not feel SoB, deep breaths cause pain    Objective     Vitals:   Temp (24hrs), Av °F (36.7 °C), Min:97.5 °F (36.4 °C), Max:98.6 °F (37 °C)    Temp:  [97.5 °F (36.4 °C)-98.6 °F (37 °C)] 98.6 °F (37 °C)  HR:  [67-78] 72  Resp:  [15-22] 15  BP: ()/() 136/70  SpO2:  [90 %-99 %] 94 %  Body mass index is 27.9 kg/m².     Input and Output Summary (last 24 hours):     Intake/Output Summary (Last 24 hours) at 2024 1035  Last data filed at 2024 0824  Gross per 24 hour   Intake 520 ml   Output 250 ml   Net 270 ml       Physical Exam  Constitutional:       General: She is not in acute distress.     Appearance: She is not ill-appearing.   HENT:      Head: Normocephalic.      Mouth/Throat:      Mouth: Mucous membranes are dry.   Eyes:      Conjunctiva/sclera: Conjunctivae normal.   Cardiovascular:      Rate and Rhythm: Normal rate and regular rhythm.      Heart sounds: No murmur heard.  Pulmonary:      Effort: Pulmonary effort is normal. No respiratory distress.      Comments: Patient has reduced breath sounds bilaterally, slightly worse in LLL  Abdominal:      General: There is no distension.      Palpations: Abdomen is soft.      Tenderness: There is no abdominal tenderness.   Musculoskeletal:         General: Normal range of motion.      Right lower leg: No edema.      Left lower leg: No edema.   Skin:     General: Skin is warm and dry.   Neurological:      Mental Status: She is alert and oriented to person, place, and time.   Psychiatric:         Mood  and Affect: Mood normal.          Lines/Drains:  Lines/Drains/Airways       Active Status       None                            Lab Results: I have reviewed the following results: CBC/BMP:   .     09/29/24  0518   WBC 8.32   HGB 12.6   HCT 39.9      SODIUM 138   K 4.4      CO2 32   BUN 17   CREATININE 1.27   GLUC 130       Results from last 7 days   Lab Units 09/29/24  0518 09/28/24  1339   WBC Thousand/uL 8.32 9.60   HEMOGLOBIN g/dL 12.6 13.5   HEMATOCRIT % 39.9 42.9   PLATELETS Thousands/uL 248 289   SEGS PCT %  --  57   LYMPHO PCT %  --  29   MONO PCT %  --  8   EOS PCT %  --  4     Results from last 7 days   Lab Units 09/29/24  0518   SODIUM mmol/L 138   POTASSIUM mmol/L 4.4   CHLORIDE mmol/L 101   CO2 mmol/L 32   BUN mg/dL 17   CREATININE mg/dL 1.27   ANION GAP mmol/L 5   CALCIUM mg/dL 9.4   ALBUMIN g/dL 3.8   TOTAL BILIRUBIN mg/dL 0.61   ALK PHOS U/L 62   ALT U/L 14   AST U/L 15   GLUCOSE RANDOM mg/dL 130     Results from last 7 days   Lab Units 09/28/24  1339   INR  1.02     Results from last 7 days   Lab Units 09/29/24  0714 09/28/24  1724   POC GLUCOSE mg/dl 122 111         Results from last 7 days   Lab Units 09/28/24  1339   PROCALCITONIN ng/ml 0.08       Recent Cultures (last 7 days):         Imaging Review: No pertinent imaging studies reviewed.  Other Studies: No additional pertinent studies reviewed.    Last 24 Hours Medication List:     Current Facility-Administered Medications:     acetaminophen (TYLENOL) tablet 975 mg, Q8H [facility]    aluminum-magnesium hydroxide-simethicone (MAALOX) oral suspension 30 mL, Q6H PRN    aspirin chewable tablet 81 mg, Daily    atorvastatin (LIPITOR) tablet 20 mg, Daily    budesonide-formoterol (SYMBICORT) 160-4.5 mcg/act inhaler 2 puff, BID    docusate sodium (COLACE) capsule 100 mg, BID    enoxaparin (LOVENOX) subcutaneous injection 40 mg, Daily    gabapentin (NEURONTIN) capsule 300 mg, TID    insulin lispro (HumALOG/ADMELOG) 100 units/mL subcutaneous  injection 1-5 Units, TID AC **AND** Fingerstick Glucose (POCT), TID AC    levalbuterol (XOPENEX) inhalation solution 1.25 mg, TID    lidocaine (LIDODERM) 5 % patch 1 patch, Daily    lisinopril (ZESTRIL) tablet 5 mg, Daily    loratadine (CLARITIN) tablet 10 mg, Daily    multivitamin stress formula tablet 1 tablet, Daily    ondansetron (ZOFRAN) injection 4 mg, Q6H PRN    oxybutynin (DITROPAN-XL) 24 hr tablet 5 mg, Daily    oxyCODONE (ROXICODONE) IR tablet 5 mg, Q6H PRN **OR** oxyCODONE (ROXICODONE) split tablet 2.5 mg, Q6H PRN    pantoprazole (PROTONIX) EC tablet 40 mg, Early Morning    senna (SENOKOT) tablet 8.6 mg, Daily    sodium chloride 0.9 % infusion, Continuous, Last Rate: 75 mL/hr (09/29/24 0942)    Administrative Statements   Today, Patient Was Seen By: Chuy Garcia DO      **Please Note: This note may have been constructed using a voice recognition system.**

## 2024-09-29 NOTE — PLAN OF CARE
Problem: OCCUPATIONAL THERAPY ADULT  Goal: Performs self-care activities at highest level of function for planned discharge setting.  See evaluation for individualized goals.  Description: Treatment Interventions: ADL retraining, UE strengthening/ROM, Functional transfer training, Endurance training, Patient/family training, Equipment evaluation/education, Neuromuscular reeducation, Compensatory technique education, Continued evaluation, Activityengagement, Cardiac education, Energy conservation          See flowsheet documentation for full assessment, interventions and recommendations.   Note: Limitation: Decreased ADL status, Decreased UE strength, Decreased Safe judgement during ADL, Decreased endurance, Decreased self-care trans, Decreased high-level ADLs (dec balance)  Prognosis: Good  Assessment: Patient is a 84 y.o. year old female seen for OT eval s/p admit to [facility] on 9/28/2024 with closed fx of multiple ribs of L side following a fall, currently experiencing acute hypoxic respiratory failure on 1-3 L O2.  OT consulted to assess ADLs/IADLs/functional mobility and assist w/ D/C planning. Patient demonstrates the following deficits impacting occupational performance: decreased strength , decreased balance, decreased activity tolerance, decreased safety awareness, SOB, [place], impaired coordination, decreased cardiovascular endurance, and decreased skin integrity . These impairments, as well at pt’s limited home support, difficulty performing ADLs, difficulty performing IADLs, difficulty performing transfers/mobility, fall risk , functional decline , new O2 requirements, new use of AD for functional transfers/mobility, and advanced age, limit pt’s ability to safely engage in all baseline areas of occupation. Pt currently functioning at S-(I) for ADLs and functional mobility w RW. Desats on room air (refer to flowsheet). Pt would benefit from continued skilled OT while in acute setting to address deficits as  defined above and to maximize (I) w/ ADLs/functional mobility. Occupational performance areas to address include: grooming, bathing/shower, toilet hygiene, dressing, medication management, health maintenance, functional mobility, community mobility, clothing management, cleaning, meal prep, household maintenance, and job performance/volunteering. Based on the aforementioned evaluation, functional performance deficits, and assessments, pt has been identified as a moderate complexity evaluation. At this time, no rehab needs anticipated at time of d/c. Would rec continued mobilization during inpatient stay w hospital staff to increase endurance. Educ pt on importance of pain mngment, continued mobility, and use of incentive spirometer w/ pt verbalizing understanding. OT will continue to follow pt 3-5x/wk to address the goals listed below to  w/in 10-14 days.     Rehab Resource Intensity Level, OT: No post-acute rehabilitation needs

## 2024-09-30 DIAGNOSIS — E78.5 HYPERLIPIDEMIA, UNSPECIFIED HYPERLIPIDEMIA TYPE: ICD-10-CM

## 2024-09-30 PROBLEM — N32.3 BLADDER DIVERTICULUM: Status: ACTIVE | Noted: 2024-09-30

## 2024-09-30 PROBLEM — N32.89 BLADDER WALL THICKENING: Status: ACTIVE | Noted: 2024-09-30

## 2024-09-30 LAB
ATRIAL RATE: 72 BPM
GLUCOSE SERPL-MCNC: 100 MG/DL (ref 65–140)
GLUCOSE SERPL-MCNC: 109 MG/DL (ref 65–140)
GLUCOSE SERPL-MCNC: 121 MG/DL (ref 65–140)
P AXIS: 114 DEGREES
PR INTERVAL: 196 MS
QRS AXIS: 17 DEGREES
QRSD INTERVAL: 86 MS
QT INTERVAL: 430 MS
QTC INTERVAL: 470 MS
T WAVE AXIS: 42 DEGREES
VENTRICULAR RATE: 72 BPM

## 2024-09-30 PROCEDURE — 82948 REAGENT STRIP/BLOOD GLUCOSE: CPT

## 2024-09-30 PROCEDURE — 94760 N-INVAS EAR/PLS OXIMETRY 1: CPT

## 2024-09-30 PROCEDURE — 97110 THERAPEUTIC EXERCISES: CPT

## 2024-09-30 PROCEDURE — 93010 ELECTROCARDIOGRAM REPORT: CPT | Performed by: INTERNAL MEDICINE

## 2024-09-30 PROCEDURE — 94640 AIRWAY INHALATION TREATMENT: CPT

## 2024-09-30 PROCEDURE — 94664 DEMO&/EVAL PT USE INHALER: CPT

## 2024-09-30 PROCEDURE — 97116 GAIT TRAINING THERAPY: CPT

## 2024-09-30 PROCEDURE — 87086 URINE CULTURE/COLONY COUNT: CPT | Performed by: INTERNAL MEDICINE

## 2024-09-30 PROCEDURE — 99232 SBSQ HOSP IP/OBS MODERATE 35: CPT

## 2024-09-30 RX ORDER — ENOXAPARIN SODIUM 100 MG/ML
30 INJECTION SUBCUTANEOUS DAILY
Status: DISCONTINUED | OUTPATIENT
Start: 2024-10-01 | End: 2024-10-01

## 2024-09-30 RX ORDER — ATORVASTATIN CALCIUM 20 MG/1
20 TABLET, FILM COATED ORAL DAILY
Qty: 90 TABLET | Refills: 1 | Status: SHIPPED | OUTPATIENT
Start: 2024-09-30

## 2024-09-30 RX ADMIN — ASPIRIN 81 MG 81 MG: 81 TABLET ORAL at 08:10

## 2024-09-30 RX ADMIN — SENNOSIDES 8.6 MG: 8.6 TABLET, FILM COATED ORAL at 08:10

## 2024-09-30 RX ADMIN — B-COMPLEX W/ C & FOLIC ACID TAB 1 TABLET: TAB at 08:10

## 2024-09-30 RX ADMIN — ENOXAPARIN SODIUM 40 MG: 40 INJECTION SUBCUTANEOUS at 08:08

## 2024-09-30 RX ADMIN — DOCUSATE SODIUM 100 MG: 100 CAPSULE, LIQUID FILLED ORAL at 18:30

## 2024-09-30 RX ADMIN — GABAPENTIN 300 MG: 300 CAPSULE ORAL at 16:26

## 2024-09-30 RX ADMIN — LEVALBUTEROL HYDROCHLORIDE 1.25 MG: 1.25 SOLUTION RESPIRATORY (INHALATION) at 07:14

## 2024-09-30 RX ADMIN — INSULIN LISPRO 1 UNITS: 100 INJECTION, SOLUTION INTRAVENOUS; SUBCUTANEOUS at 08:08

## 2024-09-30 RX ADMIN — LEVALBUTEROL HYDROCHLORIDE 1.25 MG: 1.25 SOLUTION RESPIRATORY (INHALATION) at 14:04

## 2024-09-30 RX ADMIN — ATORVASTATIN CALCIUM 20 MG: 10 TABLET, FILM COATED ORAL at 08:10

## 2024-09-30 RX ADMIN — ACETAMINOPHEN 975 MG: 325 TABLET, FILM COATED ORAL at 14:31

## 2024-09-30 RX ADMIN — ACETAMINOPHEN 975 MG: 325 TABLET, FILM COATED ORAL at 21:02

## 2024-09-30 RX ADMIN — ACETAMINOPHEN 975 MG: 325 TABLET, FILM COATED ORAL at 05:24

## 2024-09-30 RX ADMIN — DOCUSATE SODIUM 100 MG: 100 CAPSULE, LIQUID FILLED ORAL at 08:10

## 2024-09-30 RX ADMIN — OXYBUTYNIN CHLORIDE 5 MG: 5 TABLET, EXTENDED RELEASE ORAL at 08:10

## 2024-09-30 RX ADMIN — BUDESONIDE AND FORMOTEROL FUMARATE DIHYDRATE 2 PUFF: 160; 4.5 AEROSOL RESPIRATORY (INHALATION) at 18:28

## 2024-09-30 RX ADMIN — OXYCODONE HYDROCHLORIDE 5 MG: 5 TABLET ORAL at 05:23

## 2024-09-30 RX ADMIN — LEVALBUTEROL HYDROCHLORIDE 1.25 MG: 1.25 SOLUTION RESPIRATORY (INHALATION) at 19:21

## 2024-09-30 RX ADMIN — LIDOCAINE 5% 1 PATCH: 700 PATCH TOPICAL at 08:09

## 2024-09-30 RX ADMIN — GABAPENTIN 300 MG: 300 CAPSULE ORAL at 08:10

## 2024-09-30 RX ADMIN — BUDESONIDE AND FORMOTEROL FUMARATE DIHYDRATE 2 PUFF: 160; 4.5 AEROSOL RESPIRATORY (INHALATION) at 08:10

## 2024-09-30 RX ADMIN — PANTOPRAZOLE SODIUM 40 MG: 40 TABLET, DELAYED RELEASE ORAL at 05:23

## 2024-09-30 RX ADMIN — LORATADINE 10 MG: 10 TABLET ORAL at 08:10

## 2024-09-30 RX ADMIN — LISINOPRIL 5 MG: 5 TABLET ORAL at 08:10

## 2024-09-30 NOTE — PHYSICAL THERAPY NOTE
PHYSICAL THERAPY NOTE          Patient Name: Cr Pena  Today's Date: 9/30/2024 09/30/24 1058   PT Last Visit   PT Visit Date 09/30/24   Note Type   Note Type Treatment   Pain Assessment   Pain Assessment Tool FLACC   Pain Location/Orientation Orientation: Left;Location: Rib Cage   Pain Rating: FLACC (Rest) - Face 0   Pain Rating: FLACC (Rest) - Legs 0   Pain Rating: FLACC (Rest) - Activity 0   Pain Rating: FLACC (Rest) - Cry 0   Pain Rating: FLACC (Rest) - Consolability 0   Score: FLACC (Rest) 0   Pain Rating: FLACC (Activity) - Face 1   Pain Rating: FLACC (Activity) - Legs 1   Pain Rating: FLACC (Activity) - Activity 0   Pain Rating: FLACC (Activity) - Cry 0   Pain Rating: FLACC (Activity) - Consolability 0   Score: FLACC (Activity) 2   Restrictions/Precautions   Weight Bearing Precautions Per Order No   Other Precautions   (Multiple lines; Telemetry; Fall Risk; O2)   General   Chart Reviewed Yes   Cognition   Overall Cognitive Status WFL   Arousal/Participation Alert;Cooperative   Attention Within functional limits   Orientation Level Oriented X4   Memory Within functional limits   Following Commands Follows all commands and directions without difficulty   Subjective   Subjective Pt would like to ambulate   Bed Mobility   Additional Comments OOB in chair start/end PT session   Transfers   Sit to Stand 5  Supervision   Additional items Armrests;Increased time required;Verbal cues   Stand to Sit 5  Supervision   Additional items Armrests;Increased time required;Verbal cues   Additional Comments RW used   Ambulation/Elevation   Gait pattern Forward Flexion;Excessively slow   Gait Assistance 5  Supervision   Additional items Verbal cues   Assistive Device Rolling walker   Distance 160'   Balance   Static Sitting Good   Dynamic Sitting Good   Static Standing Fair +   Dynamic Standing Fair   Ambulatory Fair  (RW)   Endurance Deficit    Endurance Deficit Yes   Endurance Deficit Description SpO2 95-87% , mild SOB with 1 L O2   Activity Tolerance   Activity Tolerance Patient limited by fatigue   Exercises   Hip Flexion 20 reps   Hip Abduction 20 reps   Hip Adduction 20 reps   Knee AROM Long Arc Quad 20 reps   Ankle Pumps 20 reps   Assessment   Prognosis Good   Problem List   (Decreased endurance; Impaired balance; Decreased mobility)   Assessment Pt. seen for PT treatment session this date with interventions consisting of  therapeutic exercises,  transfers and  gait training w/ emphasis on improving pt's ability to ambulate. Pt.  In comparison to previous session, Pt. With improvements in activity tolerance.  From PT/mobility standpoint, recommendation at time of d/c would be no rehab needs. in order to promote return to PLOF and independence.   The patient's AM-PAC Basic Mobility Inpatient Short Form Raw Score is 21. A Raw score of greater than 16 suggests the patient may benefit from discharge to home.  Please also refer to physical therapy recommendation for safe DC planning.   Goals   LTG Expiration Date 10/13/24   PT Treatment Day 1   Plan   Treatment/Interventions Functional transfer training;LE strengthening/ROM;Therapeutic exercise;Endurance training;Bed mobility;Gait training;Spoke to nursing   Progress Progressing toward goals   PT Frequency 3-5x/wk   Discharge Recommendation   Rehab Resource Intensity Level, PT No post-acute rehabilitation needs   AM-PAC Basic Mobility Inpatient   Turning in Flat Bed Without Bedrails 4   Lying on Back to Sitting on Edge of Flat Bed Without Bedrails 3   Moving Bed to Chair 3   Standing Up From Chair Using Arms 4   Walk in Room 4   Climb 3-5 Stairs With Railing 3   Basic Mobility Inpatient Raw Score 21   Basic Mobility Standardized Score 45.55   [facility] Level Of Mobility   -HLM Goal 6: Walk 10 steps or more   -HLM Achieved 7: Walk 25 feet or more   Education   Education Provided Mobility  training   Patient Demonstrates verbal understanding   End of Consult   Patient Position at End of Consult Bedside chair;Bed/Chair alarm activated;All needs within reach   End of Consult Comments discussed POC with PT

## 2024-09-30 NOTE — PLAN OF CARE
Problem: PAIN - ADULT  Goal: Verbalizes/displays adequate comfort level or baseline comfort level  Description: Interventions:  - Encourage patient to monitor pain and request assistance  - Assess pain using appropriate pain scale  - Administer analgesics based on type and severity of pain and evaluate response  - Implement non-pharmacological measures as appropriate and evaluate response  - Consider cultural and social influences on pain and pain management  - Notify physician/advanced practitioner if interventions unsuccessful or patient reports new pain  Outcome: Progressing     Problem: INFECTION - ADULT  Goal: Absence or prevention of progression during hospitalization  Description: INTERVENTIONS:  - Assess and monitor for signs and symptoms of infection  - Monitor lab/diagnostic results  - Monitor all insertion sites, i.e. indwelling lines, tubes, and drains  - Monitor endotracheal if appropriate and nasal secretions for changes in amount and color  - [place] appropriate cooling/warming therapies per order  - Administer medications as ordered  - Instruct and encourage patient and family to use good hand hygiene technique  - Identify and instruct in appropriate isolation precautions for identified infection/condition  Outcome: Progressing     Problem: SAFETY ADULT  Goal: Patient will remain free of falls  Description: INTERVENTIONS:  - Educate patient/family on patient safety including physical limitations  - Instruct patient to call for assistance with activity   - Consult OT/PT to assist with strengthening/mobility   - Keep Call bell within reach  - Keep bed low and locked with side rails adjusted as appropriate  - Keep care items and personal belongings within reach  - Initiate and maintain comfort rounds  - Make Fall Risk Sign visible to staff  - Offer Toileting every 2 Hours, in advance of need  - Initiate/Maintain bed/chair alarm  - Obtain necessary fall risk management equipment: as needed  - Apply  yellow socks and bracelet for high fall risk patients  - Consider moving patient to room near nurses station  Outcome: Progressing     Problem: DISCHARGE PLANNING  Goal: Discharge to home or other facility with appropriate resources  Description: INTERVENTIONS:  - Identify barriers to discharge w/patient and caregiver  - Arrange for needed discharge resources and transportation as appropriate  - Identify discharge learning needs (meds, wound care, etc.)  - Arrange for interpretive services to assist at discharge as needed  - Refer to Case Management Department for coordinating discharge planning if the patient needs post-hospital services based on physician/advanced practitioner order or complex needs related to functional status, cognitive ability, or social support system  Outcome: Progressing     Problem: Knowledge Deficit  Goal: Patient/family/caregiver demonstrates understanding of disease process, treatment plan, medications, and discharge instructions  Description: Complete learning assessment and assess knowledge base.  Interventions:  - Provide teaching at level of understanding  - Provide teaching via preferred learning methods  Outcome: Progressing     Problem: RESPIRATORY - ADULT  Goal: Achieves optimal ventilation and oxygenation  Description: INTERVENTIONS:  - Assess for changes in respiratory status  - Assess for changes in mentation and behavior  - Position to facilitate oxygenation and minimize respiratory effort  - Oxygen administered by appropriate delivery if ordered  - Initiate smoking cessation education as indicated  - Encourage broncho-pulmonary hygiene including cough, deep breathe, Incentive Spirometry  - Assess the need for suctioning and aspirate as needed  - Assess and instruct to report SOB or any respiratory difficulty  - Respiratory Therapy support as indicated  Outcome: Progressing     Problem: METABOLIC, FLUID AND ELECTROLYTES - ADULT  Goal: Glucose maintained within target  range  Description: INTERVENTIONS:  - Monitor Blood Glucose as ordered  - Assess for signs and symptoms of hyperglycemia and hypoglycemia  - Administer ordered medications to maintain glucose within target range  - Assess nutritional intake and initiate nutrition service referral as needed  Outcome: Progressing     Problem: CARDIOVASCULAR - ADULT  Goal: Maintains optimal cardiac output and hemodynamic stability  Description: INTERVENTIONS:  - Monitor I/O, vital signs and rhythm  - Monitor for S/S and trends of decreased cardiac output  - Administer and titrate ordered vasoactive medications to optimize hemodynamic stability  - Assess quality of pulses, skin color and temperature  - Assess for signs of decreased coronary artery perfusion  - Instruct patient to report change in severity of symptoms  Outcome: Progressing

## 2024-09-30 NOTE — ASSESSMENT & PLAN NOTE
Likely due to diabetes, patient takes 300 mg gabapentin daily  C/w increased gabapentin to 300 mg 3 times daily to aid with pain control of rib fracture   Patient presents here for no discernible reason.  She has flight of ideas and does not make any sense with her request.  She talks about hernias, aneurysms, , and multiple doctors, she has no gynecologic concerns that we can discern.  25 minutes  were spent in complete care of this patient today in attempt to see what her concerns were.  I advised the patient to continue to follow-up with her primary care doctor and specialist.  It appears that she is being followed at [facility] for multiple myeloma.again she has no gynecologic concerns or anything that we deal with in this clinic.  The entire visit was spent in consultation with this patient.  She does not need to be seen in follow-up care.

## 2024-09-30 NOTE — ASSESSMENT & PLAN NOTE
Lab Results   Component Value Date    HGBA1C 6.7 (H) 09/28/2024       Recent Labs     09/29/24  0714 09/29/24  1109 09/29/24  1627 09/30/24  0757   POCGLU 122 214* 159* 109       Blood Sugar Average: Last 72 hrs:  (P) 143  Takes metformin only at home - hold  Continue SSI & diabetic diet

## 2024-09-30 NOTE — ASSESSMENT & PLAN NOTE
Noted history, does not take any medications at home  Continue Docusate 100 mg twice daily, and Senokot nightly

## 2024-09-30 NOTE — ASSESSMENT & PLAN NOTE
Patient had a fall, while trying to hang a picture on the wall and stand on her couch, believes she stepped in between the cushions became unbalanced and fell to the ground.  Traumatic evaluation in the emergency department completed  CT chest revealed acute nondisplaced rib fractures on the left of the eighth ninth and 10th ribs posterolaterally No associated pneumothorax, pleural effusion, or pulmonary contusion noted  Continue pain control:   Scheduled Tylenol, increase her home gabapentin to 300 mg 3 times daily, PRN Oxycodone 2.5mg/5mg for mod/severe pain respectively. Apply ICE as able. Maximize non-opiate pain control  Rib Fracture Protocol Ordered  Respiratory protocol, airway clearance, incentive spirometer

## 2024-09-30 NOTE — PLAN OF CARE
Problem: PHYSICAL THERAPY ADULT  Goal: Performs mobility at highest level of function for planned discharge setting.  See evaluation for individualized goals.  Description: Treatment/Interventions: Functional transfer training, LE strengthening/ROM, Therapeutic exercise, Endurance training, Bed mobility, Gait training          See flowsheet documentation for full assessment, interventions and recommendations.  Outcome: Progressing  Note: Prognosis: Good  Problem List:  (Decreased endurance; Impaired balance; Decreased mobility)  Assessment: Pt. seen for PT treatment session this date with interventions consisting of  therapeutic exercises,  transfers and  gait training w/ emphasis on improving pt's ability to ambulate. Pt.  In comparison to previous session, Pt. With improvements in activity tolerance.  From PT/mobility standpoint, recommendation at time of d/c would be no rehab needs. in order to promote return to PLOF and independence.   The patient's -[facility] Basic Mobility Inpatient Short Form Raw Score is 21. A Raw score of greater than 16 suggests the patient may benefit from discharge to home.  Please also refer to physical therapy recommendation for safe DC planning.        Rehab Resource Intensity Level, PT: No post-acute rehabilitation needs    See flowsheet documentation for full assessment.

## 2024-09-30 NOTE — ASSESSMENT & PLAN NOTE
As above, patient had a fall at home while trying to hang a picture, and describes the incident as mechanical in nature  PT/OT- no rehab needs

## 2024-09-30 NOTE — UTILIZATION REVIEW
Continued Stay Review    Date: 9-30-24                           Current Patient Class: Inpatient Current Level of Care: med surg  Certification Statement: The patient will continue to require additional inpatient hospital stay due to oxygen requirement, pain control with acute displaced rib fractures  Discharge Plan: Anticipate discharge tomorrow to home.      HPI:84 y.o. female initially admitted on 9-29-24    Closed fracture of multiple ribs of left side   Mass of left lung   Type 2 diabetes mellitus with hyperglycemia     Assessment/Plan:   Spo2 89% on 1L O2nc resting. Diminished breath sounds.   Continue scheduled po tylenol, po neurontin, lidoderm patch. Received prn oxycodone at 0523.    Continue xopenex tid, sq lovenox. Iv fluids completed today 0940.  PT evaluation completed.  No post acute rehab needs.          Scheduled Medications:    acetaminophen, 975 mg, Oral, Q8H NIGEL  aspirin, 81 mg, Oral, Daily  atorvastatin, 20 mg, Oral, Daily  budesonide-formoterol, 2 puff, Inhalation, BID  docusate sodium, 100 mg, Oral, BID  [START ON 10/1/2024] enoxaparin, 30 mg, Subcutaneous, Daily  gabapentin, 300 mg, Oral, TID  insulin lispro, 1-5 Units, Subcutaneous, TID AC  levalbuterol, 1.25 mg, Nebulization, TID  lidocaine, 1 patch, Topical, Daily  lisinopril, 5 mg, Oral, Daily  loratadine, 10 mg, Oral, Daily  multivitamin stress formula, 1 tablet, Oral, Daily  oxybutynin, 5 mg, Oral, Daily  pantoprazole, 40 mg, Oral, Early Morning  senna, 1 tablet, Oral, Daily      Continuous IV Infusions:     PRN Meds:  aluminum-magnesium hydroxide-simethicone, 30 mL, Oral, Q6H PRN  ondansetron, 4 mg, Intravenous, Q6H PRN  oxyCODONE, 5 mg, Oral, Q6H PRN   Or  oxyCODONE, 2.5 mg, Oral, Q6H PRN      Discharge Plan: anticipate 10-1 to home.    Vital Signs (last 3 days)       Date/Time Temp Pulse Resp BP MAP  SpO2 Nasal Cannula O2 Flow Rate (L/min) Betty Coma Scale Score Pain    09/30/24 14:38:52 98.8 °F (37.1 °C) 82 15 113/52 72 93 %  -- -- --    09/30/24 1407 -- -- -- -- -- 94 % 1 L/min -- --    09/30/24 07:57:22 99.6 °F (37.6 °C) 79 17 112/54 73 93 % -- -- --    09/30/24 0735 -- -- -- -- -- 89 % 1 L/min -- --    09/30/24 0718 -- -- -- -- -- 96 % -- -- --    09/30/24 0714 -- -- -- -- -- 96 % -- -- --    09/30/24 0523 -- -- -- -- -- -- -- -- 8    09/29/24 2107 -- -- -- -- -- -- -- -- 3    09/29/24 21:06:06 98.6 °F (37 °C) 69 -- 99/56 70 94 % -- -- --    09/29/24 2100 -- -- -- -- -- -- 1 L/min 15 --    09/29/24 1925 -- -- -- -- -- 95 % 1 L/min -- --    09/29/24 1630 -- -- -- -- -- -- -- -- No Pain    09/29/24 1423 -- -- -- -- -- 94 % 1 L/min -- --    09/29/24 14:09:58 98.1 °F (36.7 °C) 74 17 127/84 98 92 % -- -- --    09/29/24 0843 -- -- -- -- -- 94 % -- -- --    09/29/24 0730 -- -- -- -- -- -- -- -- No Pain    09/29/24 0729 -- -- -- -- -- -- 2 L/min -- --    09/29/24 07:15:09 98.6 °F (37 °C) 72 15 136/70 92 90 % -- -- --    09/29/24 0522 -- -- -- -- -- -- -- -- 6    09/28/24 2309 -- -- -- -- -- -- -- -- 5 09/28/24 2125 97.8 °F (36.6 °C) -- -- 94/38 -- -- -- -- --    09/28/24 2101 -- -- -- -- -- -- -- -- 5 09/28/24 2100 -- -- -- -- -- -- 2 L/min 15 --    09/28/24 1815 -- -- -- -- -- 96 % -- -- --    09/28/24 1744 -- 71 18 -- -- 97 % -- -- --    09/28/24 1726 -- -- -- -- -- -- -- -- 8 09/28/24 16:53:20 97.5 °F (36.4 °C) 70 17 131/76 94 95 % -- -- --    09/28/24 1651 -- -- -- -- -- -- 2 L/min -- 8 09/28/24 1600 -- 71 20 149/103 -- 99 % -- 15 --    09/28/24 1545 -- 68 20 110/56 -- 98 % -- 15 --    09/28/24 1530 -- 68 20 145/61 -- 99 % -- 15 --    09/28/24 1515 -- 67 20 101/54 -- 98 % -- 15 --    09/28/24 1500 98 °F (36.7 °C) 69 20 107/52 -- 98 % -- 15 4 09/28/24 1445 98 °F (36.7 °C) 68 21 108/60 -- 97 % -- 15 4    09/28/24 1430 98 °F (36.7 °C) 67 22 114/56 -- 98 % -- 15 4    09/28/24 1415 98.1 °F (36.7 °C) 74 20 122/58 -- 98 % -- 15 4    09/28/24 1400 98.1 °F (36.7 °C) 78 16 121/60 -- 93 % -- 15 4    09/28/24 1345 98.1 °F (36.7 °C) 74  18 104/57 -- 95 % -- 15 7    09/28/24 1342 -- -- -- -- -- -- -- -- 7    09/28/24 13:34:06 -- -- -- -- -- 97 % -- -- --    09/28/24 1328 -- -- -- 157/63 -- -- -- -- --    09/28/24 1325 98.1 °F (36.7 °C) 71 16 -- -- 94 % -- -- 7       Weight (last 2 days)       Date/Time Weight    09/28/24 16:53:20 64.8 (142.86)    09/28/24 1325 73 (160.94)            Pertinent Labs/Diagnostic Results:     Radiology:  CT chest abdomen pelvis w contrast   Final  (09/28 1442)      Acute minimally displaced left rib fractures involving the eighth, ninth and 10th ribs posterolaterally. No pneumothorax or pleural effusion.      Background emphysema with spiculated left lower lobe superior segment mass measuring 4.5 x 4.1 cm concerning for neoplasm. Recommend PET/CT scan or tissue sampling for next step.      Stable large hiatal hernia.      Mild bladder wall thickening with a small diverticulum toward the dome. Correlate for possible cystitis.      CT head without contrast   Final (09/28 1408)      No acute intracranial abnormality.         CT cervical spine without contrast   Final (09/28 1411)      No cervical spine fracture or traumatic malalignment.         XR chest 1 view portable   Final  (09/28 1358)      New patchy opacity in left midlung zone, may present pneumonia. Recommend follow-up to resolution to exclude underlying pulmonary malignancy. Please see CT chest abdomen pelvis with contrast for further evaluation.      Nondisplaced left-sided rib fractures are better evaluated on same day CT chest abdomen pelvis with contrast..        Cardiology:  ECG 12 lead   Final Result by Henri Liriano MD (09/30 9739)   Sinus rhythm with Premature atrial complexes   Otherwise normal ECG   When compared with ECG of 04-JUL-2021 13:24,   Vent. rate has decreased BY  37 BPM   Nonspecific T wave abnormality no longer evident in Lateral leads   Confirmed by Henri Liriano ([id number]) on 9/30/2024 7:58:43 AM        GI:  No orders to display        Results from last 7 days   Lab Units 09/28/24  1403   SARS-COV-2  Negative     Results from last 7 days   Lab Units 09/29/24  0518 09/28/24  1339   WBC Thousand/uL 8.32 9.60   HEMOGLOBIN g/dL 12.6 13.5   HEMATOCRIT % 39.9 42.9   PLATELETS Thousands/uL 248 289   TOTAL NEUT ABS Thousands/µL  --  5.49         Results from last 7 days   Lab Units 09/29/24  0518 09/28/24  1339   SODIUM mmol/L 138 138   POTASSIUM mmol/L 4.4 3.9   CHLORIDE mmol/L 101 102   CO2 mmol/L 32 26   ANION GAP mmol/L 5 10   BUN mg/dL 17 14   CREATININE mg/dL 1.27 1.00   EGFR ml/min/1.73sq m 38 51   CALCIUM mg/dL 9.4 9.5     Results from last 7 days   Lab Units 09/29/24  0518 09/28/24  1339   AST U/L 15 18   ALT U/L 14 14   ALK PHOS U/L 62 70   TOTAL PROTEIN g/dL 6.3* 6.7   ALBUMIN g/dL 3.8 4.1   TOTAL BILIRUBIN mg/dL 0.61 0.50     Results from last 7 days   Lab Units 09/30/24  1621 09/30/24  1129 09/30/24  0757 09/29/24  1627 09/29/24  1109 09/29/24  0714 09/28/24  1724   POC GLUCOSE mg/dl 100 121 109 159* 214* 122 111     Results from last 7 days   Lab Units 09/29/24  0518 09/28/24  1339   GLUCOSE RANDOM mg/dL 130 161*         Results from last 7 days   Lab Units 09/28/24  1339   HEMOGLOBIN A1C % 6.7*   EAG mg/dl 146       Results from last 7 days   Lab Units 09/28/24  1339   PROTIME seconds 14.0   INR  1.02   PTT seconds 33     Results from last 7 days   Lab Units 09/28/24  1339   TSH 3RD GENERATON uIU/mL 3.537     Results from last 7 days   Lab Units 09/28/24  1339   PROCALCITONIN ng/ml 0.08       Results from last 7 days   Lab Units 09/28/24  1339   LIPASE u/L 39     Results from last 7 days   Lab Units 09/28/24  1644   CLARITY UA  Clear   COLOR UA  Yellow   SPEC GRAV UA  1.010   PH UA  5.5   GLUCOSE UA mg/dl Negative   KETONES UA mg/dl Negative   BLOOD UA  Trace*   PROTEIN UA mg/dl Trace*   NITRITE UA  Negative   BILIRUBIN UA  Negative   UROBILINOGEN UA (BE) mg/dl <2.0   LEUKOCYTES UA  Negative   WBC UA /hpf 2-4   RBC UA /hpf 4-10*    BACTERIA UA /hpf Occasional   EPITHELIAL CELLS WET PREP /hpf Occasional     Results from last 7 days   Lab Units 09/28/24  1403   INFLUENZA A PCR  Negative   INFLUENZA B PCR  Negative   RSV PCR  Negative       Network Utilization Review Department  ATTENTION: Please call with any questions or concerns to [phone] and carefully listen to the prompts so that you are directed to the right person. All voicemails are confidential.   For Discharge needs, contact Care Management DC Support Team at [phone] opt. [phone]  Send all requests for admission clinical reviews, approved or denied determinations and any other requests to dedicated fax number below belonging to the campus where the patient is receiving treatment. List of dedicated fax numbers for the Facilities:  FACILITY NAME UR FAX NUMBER   ADMISSION DENIALS (Administrative/Medical Necessity) [phone]   DISCHARGE SUPPORT TEAM (NETWORK) [phone]   PARENT CHILD HEALTH (Maternity/NICU/Pediatrics) [phone]   [facility] [phone]   [facility] [phone]   [facility] [phone]   [facility] [phone]   [facility] [phone]   [facility] [phone]   [facility] [phone]   [facility] [phone]   [facility] [phone]   [facility] [phone]   [facility] [phone]   [facility] [phone]

## 2024-09-30 NOTE — PROGRESS NOTES
Progress Note - Hospitalist   Name: Cr Pena 84 y.o. female I MRN: [id number]  Unit/Bed#: 425-01 I Date of Admission: 9/28/2024   Date of Service: 9/30/2024 I Hospital Day: 1    Assessment & Plan  Closed fracture of multiple ribs of left side  Patient had a fall, while trying to hang a picture on the wall and stand on her couch, believes she stepped in between the cushions became unbalanced and fell to the ground.  Traumatic evaluation in the emergency department completed  CT chest revealed acute nondisplaced rib fractures on the left of the eighth ninth and 10th ribs posterolaterally No associated pneumothorax, pleural effusion, or pulmonary contusion noted  Continue pain control:   Scheduled Tylenol, increase her home gabapentin to 300 mg 3 times daily, PRN Oxycodone 2.5mg/5mg for mod/severe pain respectively. Apply ICE as able. Maximize non-opiate pain control  Rib Fracture Protocol Ordered  Respiratory protocol, airway clearance, incentive spirometer  Acute hypoxic respiratory failure (HCC)  Suspect due to acute rib fractures on top of underlying COPD.  Noted to become hypoxic after administration of fentanyl in the emergency department  Required up to 4 L of oxygen, was attempted to be weaned down but can only be weaned to 2 L nasal cannula in ED  Currently on 1L NC  Treat rib fractures and pain as above  Xopenex tid   Wean as able    Fall  As above, patient had a fall at home while trying to hang a picture, and describes the incident as mechanical in nature  PT/OT- no rehab needs  Chronic obstructive pulmonary disease (HCC)  See management under acute hypoxic respiratory failure  She is not in exacerbation  Mass of left lung  Ultimately an incidental finding while workup for her traumatic fall  CT notes a spiculated left lower lobe superior segment mass measuring 4.5 x 4.1 cm, very concerning for neoplasm  Findings were discussed with the patient, she expresses understanding  Next step would be a PET/CT  scan  Incidental finding report placement emergency department, appreciate  Patiently to follow-up with her primary care provider and an oncology  Type 2 diabetes mellitus with hyperglycemia, without long-term current use of insulin ([facility])  Lab Results   Component Value Date    HGBA1C 6.7 (H) 09/28/2024       Recent Labs     09/29/24  0714 09/29/24  1109 09/29/24  1627 09/30/24  0757   POCGLU 122 214* 159* 109       Blood Sugar Average: Last 72 hrs:  (P) 143  Takes metformin only at home - hold  Continue SSI & diabetic diet    Acid reflux disease  Continue PPI  Hyperlipidemia, unspecified  Continue home statin  Essential hypertension  Continue home lisinopril  Neuropathy  Likely due to diabetes, patient takes 300 mg gabapentin daily  C/w increased gabapentin to 300 mg 3 times daily to aid with pain control of rib fracture  OAB (overactive bladder)  Continue home oxybutynin 5 mg daily  Chronic constipation  Noted history, does not take any medications at home  Continue Docusate 100 mg twice daily, and Senokot nightly  Bladder wall thickening    Bladder diverticulum      VTE Pharmacologic Prophylaxis:   lovenox    Mobility:   Basic Mobility Inpatient Raw Score: 20  JH-HLM Goal: 6: Walk 10 steps or more  JH-HLM Achieved: 6: Walk 10 steps or more  JH-HLM Goal achieved. Continue to encourage appropriate mobility.    Patient Centered Rounds: I performed bedside rounds with nursing staff today.   Discussions with Specialists or Other Care Team Provider: case management    Education and Discussions with Family / Patient: Patient declined call to .     Current Length of Stay: 1 day(s)  Current Patient Status: Inpatient   Certification Statement: The patient will continue to require additional inpatient hospital stay due to oxygen requirement, pain control with acute displaced rib fractures  Discharge Plan: Anticipate discharge tomorrow to home.    Code Status: Level 3 - DNAR and DNI    Subjective   Patient seen  and examined. Ongoing left rib pain. Denies SOB or chest pain. Notes that she is not able to use the incentive spirometer as well as  yesterday. Seen initially on RA with desat to 86%, placed back on 1L NC with SpO2 at goal.     Objective     Vitals:   Temp (24hrs), Av.8 °F (37.1 °C), Min:98.1 °F (36.7 °C), Max:99.6 °F (37.6 °C)    Temp:  [98.1 °F (36.7 °C)-99.6 °F (37.6 °C)] 99.6 °F (37.6 °C)  HR:  [69-79] 79  Resp:  [17] 17  BP: ()/(54-84) 112/54  SpO2:  [89 %-96 %] 93 %  Body mass index is 27.9 kg/m².     Input and Output Summary (last 24 hours):     Intake/Output Summary (Last 24 hours) at 2024 1045  Last data filed at 2024 0827  Gross per 24 hour   Intake 505 ml   Output --   Net 505 ml       Physical Exam  HENT:      Head: Normocephalic and atraumatic.   Cardiovascular:      Rate and Rhythm: Normal rate and regular rhythm.   Pulmonary:      Effort: Pulmonary effort is normal.      Breath sounds: Normal breath sounds. No wheezing.   Skin:     General: Skin is warm and dry.   Neurological:      General: No focal deficit present.      Mental Status: She is alert and oriented to person, place, and time.          Lines/Drains:  Lines/Drains/Airways       Active Status       None                            Lab Results: I have reviewed the following results:    Results from last 7 days   Lab Units 24  0518 24  1339   WBC Thousand/uL 8.32 9.60   HEMOGLOBIN g/dL 12.6 13.5   HEMATOCRIT % 39.9 42.9   PLATELETS Thousands/uL 248 289   SEGS PCT %  --  57   LYMPHO PCT %  --  29   MONO PCT %  --  8   EOS PCT %  --  4     Results from last 7 days   Lab Units 24  0518   SODIUM mmol/L 138   POTASSIUM mmol/L 4.4   CHLORIDE mmol/L 101   CO2 mmol/L 32   BUN mg/dL 17   CREATININE mg/dL 1.27   ANION GAP mmol/L 5   CALCIUM mg/dL 9.4   ALBUMIN g/dL 3.8   TOTAL BILIRUBIN mg/dL 0.61   ALK PHOS U/L 62   ALT U/L 14   AST U/L 15   GLUCOSE RANDOM mg/dL 130     Results from last 7 days   Lab Units  09/28/24  1339   INR  1.02     Results from last 7 days   Lab Units 09/30/24  0757 09/29/24  1627 09/29/24  1109 09/29/24  0714 09/28/24  1724   POC GLUCOSE mg/dl 109 159* 214* 122 111     Results from last 7 days   Lab Units 09/28/24  1339   HEMOGLOBIN A1C % 6.7*     Results from last 7 days   Lab Units 09/28/24  1339   PROCALCITONIN ng/ml 0.08       Recent Cultures (last 7 days):         Imaging Review: Reviewed radiology reports from this admission including: chest xray, CT chest, CT abdomen/pelvis, CT head, and CT C-spine.  Other Studies: No additional pertinent studies reviewed.    Last 24 Hours Medication List:     Current Facility-Administered Medications:     acetaminophen (TYLENOL) tablet 975 mg, Q8H NIGEL    aluminum-magnesium hydroxide-simethicone (MAALOX) oral suspension 30 mL, Q6H PRN    aspirin chewable tablet 81 mg, Daily    atorvastatin (LIPITOR) tablet 20 mg, Daily    budesonide-formoterol (SYMBICORT) 160-4.5 mcg/act inhaler 2 puff, BID    docusate sodium (COLACE) capsule 100 mg, BID    [START ON 10/1/2024] enoxaparin (LOVENOX) subcutaneous injection 30 mg, Daily    gabapentin (NEURONTIN) capsule 300 mg, TID    insulin lispro (HumALOG/ADMELOG) 100 units/mL subcutaneous injection 1-5 Units, TID AC **AND** Fingerstick Glucose (POCT), TID AC    levalbuterol (XOPENEX) inhalation solution 1.25 mg, TID    lidocaine (LIDODERM) 5 % patch 1 patch, Daily    lisinopril (ZESTRIL) tablet 5 mg, Daily    loratadine (CLARITIN) tablet 10 mg, Daily    multivitamin stress formula tablet 1 tablet, Daily    ondansetron (ZOFRAN) injection 4 mg, Q6H PRN    oxybutynin (DITROPAN-XL) 24 hr tablet 5 mg, Daily    oxyCODONE (ROXICODONE) IR tablet 5 mg, Q6H PRN **OR** oxyCODONE (ROXICODONE) split tablet 2.5 mg, Q6H PRN    pantoprazole (PROTONIX) EC tablet 40 mg, Early Morning    senna (SENOKOT) tablet 8.6 mg, Daily    Administrative Statements   Today, Patient Was Seen By: Jessica Garza PA-C      **Please Note: This  note may have been constructed using a voice recognition system.**

## 2024-09-30 NOTE — RESPIRATORY THERAPY NOTE
09/30/24 0714   Inhalation Therapy Tx   $ Inhalation Therapy Performed Yes   $ Pulse Oximetry Spot Check Charge Completed   SpO2 96 %   Pre-Treatment Pulse 77   Pre-Treatment Respirations 20   Duration 20   Breath Sounds Pre-Treatment Bilateral Diminished   Delivery Source Air;UDN   Position Semi Singer's   Treatment Tolerance Tolerated well   Resp Comments Tried patient on room air, had to put her back on 1 lpm. IS performed with good return demonstration, patient did not do as well as she had, cough on command with splinting npc and dry

## 2024-09-30 NOTE — ASSESSMENT & PLAN NOTE
Suspect due to acute rib fractures on top of underlying COPD.  Noted to become hypoxic after administration of fentanyl in the emergency department  Required up to 4 L of oxygen, was attempted to be weaned down but can only be weaned to 2 L nasal cannula in ED  Currently on 1L NC  Treat rib fractures and pain as above  Xopenex tid   Wean as able

## 2024-10-01 LAB
ANION GAP SERPL CALCULATED.3IONS-SCNC: 9 MMOL/L (ref 4–13)
BASOPHILS # BLD AUTO: 0.07 THOUSANDS/ÂΜL (ref 0–0.1)
BASOPHILS NFR BLD AUTO: 1 % (ref 0–1)
BUN SERPL-MCNC: 17 MG/DL (ref 5–25)
CALCIUM SERPL-MCNC: 9.4 MG/DL (ref 8.4–10.2)
CHLORIDE SERPL-SCNC: 104 MMOL/L (ref 96–108)
CO2 SERPL-SCNC: 27 MMOL/L (ref 21–32)
CREAT SERPL-MCNC: 0.95 MG/DL (ref 0.6–1.3)
EOSINOPHIL # BLD AUTO: 0.53 THOUSAND/ÂΜL (ref 0–0.61)
EOSINOPHIL NFR BLD AUTO: 6 % (ref 0–6)
ERYTHROCYTE [DISTWIDTH] IN BLOOD BY AUTOMATED COUNT: 13.2 % (ref 11.6–15.1)
GFR SERPL CREATININE-BSD FRML MDRD: 55 ML/MIN/1.73SQ M
GLUCOSE SERPL-MCNC: 108 MG/DL (ref 65–140)
GLUCOSE SERPL-MCNC: 116 MG/DL (ref 65–140)
GLUCOSE SERPL-MCNC: 129 MG/DL (ref 65–140)
GLUCOSE SERPL-MCNC: 157 MG/DL (ref 65–140)
GLUCOSE SERPL-MCNC: 199 MG/DL (ref 65–140)
HCT VFR BLD AUTO: 43.3 % (ref 34.8–46.1)
HGB BLD-MCNC: 13.7 G/DL (ref 11.5–15.4)
IMM GRANULOCYTES # BLD AUTO: 0.03 THOUSAND/UL (ref 0–0.2)
IMM GRANULOCYTES NFR BLD AUTO: 0 % (ref 0–2)
LYMPHOCYTES # BLD AUTO: 2.06 THOUSANDS/ÂΜL (ref 0.6–4.47)
LYMPHOCYTES NFR BLD AUTO: 25 % (ref 14–44)
MCH RBC QN AUTO: 28.4 PG (ref 26.8–34.3)
MCHC RBC AUTO-ENTMCNC: 31.6 G/DL (ref 31.4–37.4)
MCV RBC AUTO: 90 FL (ref 82–98)
MONOCYTES # BLD AUTO: 0.74 THOUSAND/ÂΜL (ref 0.17–1.22)
MONOCYTES NFR BLD AUTO: 9 % (ref 4–12)
NEUTROPHILS # BLD AUTO: 4.87 THOUSANDS/ÂΜL (ref 1.85–7.62)
NEUTS SEG NFR BLD AUTO: 59 % (ref 43–75)
NRBC BLD AUTO-RTO: 0 /100 WBCS
PLATELET # BLD AUTO: 279 THOUSANDS/UL (ref 149–390)
PMV BLD AUTO: 10.4 FL (ref 8.9–12.7)
POTASSIUM SERPL-SCNC: 4 MMOL/L (ref 3.5–5.3)
RBC # BLD AUTO: 4.83 MILLION/UL (ref 3.81–5.12)
SODIUM SERPL-SCNC: 140 MMOL/L (ref 135–147)
WBC # BLD AUTO: 8.3 THOUSAND/UL (ref 4.31–10.16)

## 2024-10-01 PROCEDURE — 82948 REAGENT STRIP/BLOOD GLUCOSE: CPT

## 2024-10-01 PROCEDURE — 85025 COMPLETE CBC W/AUTO DIFF WBC: CPT

## 2024-10-01 PROCEDURE — 94640 AIRWAY INHALATION TREATMENT: CPT

## 2024-10-01 PROCEDURE — 99232 SBSQ HOSP IP/OBS MODERATE 35: CPT

## 2024-10-01 PROCEDURE — 97116 GAIT TRAINING THERAPY: CPT

## 2024-10-01 PROCEDURE — 80048 BASIC METABOLIC PNL TOTAL CA: CPT

## 2024-10-01 PROCEDURE — 97110 THERAPEUTIC EXERCISES: CPT

## 2024-10-01 PROCEDURE — 94760 N-INVAS EAR/PLS OXIMETRY 1: CPT

## 2024-10-01 PROCEDURE — 94664 DEMO&/EVAL PT USE INHALER: CPT

## 2024-10-01 RX ORDER — ENOXAPARIN SODIUM 100 MG/ML
40 INJECTION SUBCUTANEOUS DAILY
Status: DISCONTINUED | OUTPATIENT
Start: 2024-10-01 | End: 2024-10-02 | Stop reason: HOSPADM

## 2024-10-01 RX ADMIN — PANTOPRAZOLE SODIUM 40 MG: 40 TABLET, DELAYED RELEASE ORAL at 04:54

## 2024-10-01 RX ADMIN — DOCUSATE SODIUM 100 MG: 100 CAPSULE, LIQUID FILLED ORAL at 08:30

## 2024-10-01 RX ADMIN — B-COMPLEX W/ C & FOLIC ACID TAB 1 TABLET: TAB at 08:31

## 2024-10-01 RX ADMIN — GABAPENTIN 300 MG: 300 CAPSULE ORAL at 08:31

## 2024-10-01 RX ADMIN — ASPIRIN 81 MG 81 MG: 81 TABLET ORAL at 08:30

## 2024-10-01 RX ADMIN — LIDOCAINE 5% 1 PATCH: 700 PATCH TOPICAL at 08:31

## 2024-10-01 RX ADMIN — ENOXAPARIN SODIUM 40 MG: 40 INJECTION SUBCUTANEOUS at 08:31

## 2024-10-01 RX ADMIN — ATORVASTATIN CALCIUM 20 MG: 10 TABLET, FILM COATED ORAL at 08:30

## 2024-10-01 RX ADMIN — Medication 2.5 MG: at 11:34

## 2024-10-01 RX ADMIN — ACETAMINOPHEN 975 MG: 325 TABLET, FILM COATED ORAL at 22:01

## 2024-10-01 RX ADMIN — OXYBUTYNIN CHLORIDE 5 MG: 5 TABLET, EXTENDED RELEASE ORAL at 08:31

## 2024-10-01 RX ADMIN — INSULIN LISPRO 1 UNITS: 100 INJECTION, SOLUTION INTRAVENOUS; SUBCUTANEOUS at 11:34

## 2024-10-01 RX ADMIN — DOCUSATE SODIUM 100 MG: 100 CAPSULE, LIQUID FILLED ORAL at 17:34

## 2024-10-01 RX ADMIN — OXYCODONE HYDROCHLORIDE 5 MG: 5 TABLET ORAL at 04:54

## 2024-10-01 RX ADMIN — Medication 2.5 MG: at 22:01

## 2024-10-01 RX ADMIN — SENNOSIDES 8.6 MG: 8.6 TABLET, FILM COATED ORAL at 08:31

## 2024-10-01 RX ADMIN — LORATADINE 10 MG: 10 TABLET ORAL at 08:31

## 2024-10-01 RX ADMIN — ACETAMINOPHEN 975 MG: 325 TABLET, FILM COATED ORAL at 13:37

## 2024-10-01 RX ADMIN — BUDESONIDE AND FORMOTEROL FUMARATE DIHYDRATE 2 PUFF: 160; 4.5 AEROSOL RESPIRATORY (INHALATION) at 17:34

## 2024-10-01 RX ADMIN — LEVALBUTEROL HYDROCHLORIDE 1.25 MG: 1.25 SOLUTION RESPIRATORY (INHALATION) at 19:43

## 2024-10-01 RX ADMIN — GABAPENTIN 300 MG: 300 CAPSULE ORAL at 22:01

## 2024-10-01 RX ADMIN — GABAPENTIN 300 MG: 300 CAPSULE ORAL at 17:34

## 2024-10-01 RX ADMIN — LEVALBUTEROL HYDROCHLORIDE 1.25 MG: 1.25 SOLUTION RESPIRATORY (INHALATION) at 14:17

## 2024-10-01 RX ADMIN — LISINOPRIL 5 MG: 5 TABLET ORAL at 08:31

## 2024-10-01 RX ADMIN — ACETAMINOPHEN 975 MG: 325 TABLET, FILM COATED ORAL at 04:54

## 2024-10-01 RX ADMIN — BUDESONIDE AND FORMOTEROL FUMARATE DIHYDRATE 2 PUFF: 160; 4.5 AEROSOL RESPIRATORY (INHALATION) at 08:32

## 2024-10-01 RX ADMIN — LEVALBUTEROL HYDROCHLORIDE 1.25 MG: 1.25 SOLUTION RESPIRATORY (INHALATION) at 07:08

## 2024-10-01 NOTE — ASSESSMENT & PLAN NOTE
Lab Results   Component Value Date    HGBA1C 6.7 (H) 09/28/2024       Recent Labs     09/30/24  0757 09/30/24  1129 09/30/24  1621 10/01/24  0742   POCGLU 109 121 100 116       Blood Sugar Average: Last 72 hrs:  (P) 131.5  Takes metformin only at home - hold  Continue SSI & diabetic diet

## 2024-10-01 NOTE — ASSESSMENT & PLAN NOTE
Likely due to diabetes, patient takes 300 mg gabapentin daily  C/w increased gabapentin to 300 mg 3 times daily to aid with pain control of rib fracture

## 2024-10-01 NOTE — PLAN OF CARE
Problem: PHYSICAL THERAPY ADULT  Goal: Performs mobility at highest level of function for planned discharge setting.  See evaluation for individualized goals.  Description: Treatment/Interventions: Functional transfer training, LE strengthening/ROM, Therapeutic exercise, Endurance training, Bed mobility, Gait training          See flowsheet documentation for full assessment, interventions and recommendations.  Outcome: Progressing  Note: Prognosis: Good  Problem List:  (Decreased endurance; Impaired balance; Decreased mobility)  Assessment: Pt. seen for PT treatment session this date with interventions consisting of  therapeutic exercises, transfers and  gait training w/ emphasis on improving pt's ability to ambulate. In comparison to previous session, Pt. With no change in activity tolerance. SpO2  94-84% with 1 L O2, mild SOB with ambulation.  Pt is in need of continued activity in PT to improve strength balance endurance mobility transfers and ambulation with return to maximize LOF. From PT/mobility standpoint, recommendation at time of d/c would be level III: min resource intensity in order to promote return to PLOF and independence.   The patient's AM-PAC Basic Mobility Inpatient Short Form Raw Score is 20. A Raw score of greater than 16 suggests the patient may benefit from discharge to home.  Please also refer to physical therapy recommendation for safe DC planning.        Rehab Resource Intensity Level, PT: III (Minimum Resource Intensity)    See flowsheet documentation for full assessment.

## 2024-10-01 NOTE — PROGRESS NOTES
Progress Note - Hospitalist   Name: Cr Pena 84 y.o. female I MRN: [id number]  Unit/Bed#: 425-01 I Date of Admission: 9/28/2024   Date of Service: 10/1/2024 I Hospital Day: 2    Assessment & Plan  Closed fracture of multiple ribs of left side  Patient had a fall, while trying to hang a picture on the wall and stand on her couch, believes she stepped in between the cushions became unbalanced and fell to the ground.  Traumatic evaluation in the emergency department completed  CT chest revealed acute nondisplaced rib fractures on the left of the eighth ninth and 10th ribs posterolaterally No associated pneumothorax, pleural effusion, or pulmonary contusion noted  Continue pain control:   Scheduled Tylenol, increase her home gabapentin to 300 mg 3 times daily.  Start schedule oxycodone 2.m5 q8 to help achieve more adequate analgesia. Patient has not been requesting prn medications & notes worsening pain  Apply ICE as able. Maximize non-opiate pain control  Rib Fracture Protocol Ordered  Respiratory protocol, airway clearance, incentive spirometer  Acute hypoxic respiratory failure (HCC)  Suspect due to acute rib fractures on top of underlying COPD.  Noted to become hypoxic after administration of fentanyl in the emergency department  Required up to 4 L of oxygen, was attempted to be weaned down but can only be weaned to 2 L nasal cannula in ED  Currently on 1L NC  Treat rib fractures and pain as above  Xopenex tid   Wean as able    Fall  As above, patient had a fall at home while trying to hang a picture, and describes the incident as mechanical in nature  PT/OT- no rehab needs  Chronic obstructive pulmonary disease (HCC)  See management under acute hypoxic respiratory failure  She is not in exacerbation  Mass of left lung  Ultimately an incidental finding while workup for her traumatic fall  CT notes a spiculated left lower lobe superior segment mass measuring 4.5 x 4.1 cm, very concerning for neoplasm  Findings  were discussed with the patient, she expresses understanding  Next step would be a PET/CT scan  Incidental finding report placement emergency department, appreciate  Patiently to follow-up with her primary care provider and an oncology  Type 2 diabetes mellitus with hyperglycemia, without long-term current use of insulin ([facility])  Lab Results   Component Value Date    HGBA1C 6.7 (H) 09/28/2024       Recent Labs     09/30/24  0757 09/30/24  1129 09/30/24  1621 10/01/24  0742   POCGLU 109 121 100 116       Blood Sugar Average: Last 72 hrs:  (P) 131.5  Takes metformin only at home - hold  Continue SSI & diabetic diet    Acid reflux disease  Continue PPI  Hyperlipidemia, unspecified  Continue home statin  Essential hypertension  Continue home lisinopril  Neuropathy  Likely due to diabetes, patient takes 300 mg gabapentin daily  C/w increased gabapentin to 300 mg 3 times daily to aid with pain control of rib fracture  OAB (overactive bladder)  Continue home oxybutynin 5 mg daily  Chronic constipation  Noted history, does not take any medications at home  Continue Docusate 100 mg twice daily, and Senokot nightly  Bladder wall thickening    Bladder diverticulum      VTE Pharmacologic Prophylaxis:   lovenox    Mobility:   Basic Mobility Inpatient Raw Score: 21  JH-HLM Goal: 6: Walk 10 steps or more  JH-HLM Achieved: 7: Walk 25 feet or more  JH-HLM Goal achieved. Continue to encourage appropriate mobility.    Patient Centered Rounds: I performed bedside rounds with nursing staff today.   Discussions with Specialists or Other Care Team Provider: case management    Education and Discussions with Family / Patient: Patient declined call to .     Current Length of Stay: 2 day(s)  Current Patient Status: Inpatient   Certification Statement: The patient will continue to require additional inpatient hospital stay due to hypoxia, pain control with 3 broken displaced ribs  Discharge Plan: Anticipate discharge tomorrow to  home.    Code Status: Level 3 - DNAR and DNI    Subjective   Patient seen and examined. Respiratory  note reviewed. Patient complaining of ongoing pain at the left ribs, was bad this morning with coughing. Notes she took the oxycodone early this morning which did help     Objective     Vitals:   Temp (24hrs), Av.2 °F (36.8 °C), Min:97.4 °F (36.3 °C), Max:98.8 °F (37.1 °C)    Temp:  [97.4 °F (36.3 °C)-98.8 °F (37.1 °C)] 97.4 °F (36.3 °C)  HR:  [81-88] 88  Resp:  [15-20] 20  BP: (111-154)/(52-86) 154/86  SpO2:  [86 %-97 %] 94 %  Body mass index is 27.9 kg/m².     Input and Output Summary (last 24 hours):     Intake/Output Summary (Last 24 hours) at 10/1/2024 1020  Last data filed at 10/1/2024 0818  Gross per 24 hour   Intake 540 ml   Output --   Net 540 ml       Physical Exam  Constitutional:       General: She is not in acute distress.  HENT:      Head: Normocephalic and atraumatic.   Cardiovascular:      Rate and Rhythm: Normal rate and regular rhythm.   Pulmonary:      Effort: Pulmonary effort is normal.      Comments: Decreased breath sounds  Abdominal:      General: Abdomen is flat. Bowel sounds are normal. There is no distension.   Musculoskeletal:      Right lower leg: No edema.      Left lower leg: No edema.   Skin:     General: Skin is warm and dry.   Neurological:      General: No focal deficit present.      Mental Status: She is alert and oriented to person, place, and time.          Lines/Drains:  Lines/Drains/Airways       Active Status       None                            Lab Results: I have reviewed the following results:    Results from last 7 days   Lab Units 10/01/24  0447   WBC Thousand/uL 8.30   HEMOGLOBIN g/dL 13.7   HEMATOCRIT % 43.3   PLATELETS Thousands/uL 279   SEGS PCT % 59   LYMPHO PCT % 25   MONO PCT % 9   EOS PCT % 6     Results from last 7 days   Lab Units 10/01/24  0447 24  0518   SODIUM mmol/L 140 138   POTASSIUM mmol/L 4.0 4.4   CHLORIDE mmol/L 104 101   CO2 mmol/L 27 32    BUN mg/dL 17 17   CREATININE mg/dL 0.95 1.27   ANION GAP mmol/L 9 5   CALCIUM mg/dL 9.4 9.4   ALBUMIN g/dL  --  3.8   TOTAL BILIRUBIN mg/dL  --  0.61   ALK PHOS U/L  --  62   ALT U/L  --  14   AST U/L  --  15   GLUCOSE RANDOM mg/dL 108 130     Results from last 7 days   Lab Units 09/28/24  1339   INR  1.02     Results from last 7 days   Lab Units 10/01/24  0742 09/30/24  1621 09/30/24  1129 09/30/24  0757 09/29/24  1627 09/29/24  1109 09/29/24  0714 09/28/24  1724   POC GLUCOSE mg/dl 116 100 121 109 159* 214* 122 111     Results from last 7 days   Lab Units 09/28/24  1339   HEMOGLOBIN A1C % 6.7*     Results from last 7 days   Lab Units 09/28/24  1339   PROCALCITONIN ng/ml 0.08       Recent Cultures (last 7 days):         Imaging Review: Reviewed radiology reports from this admission including: CT chest and CT abdomen/pelvis.  Other Studies: No additional pertinent studies reviewed.    Last 24 Hours Medication List:     Current Facility-Administered Medications:     acetaminophen (TYLENOL) tablet 975 mg, Q8H NIGEL    aluminum-magnesium hydroxide-simethicone (MAALOX) oral suspension 30 mL, Q6H PRN    aspirin chewable tablet 81 mg, Daily    atorvastatin (LIPITOR) tablet 20 mg, Daily    budesonide-formoterol (SYMBICORT) 160-4.5 mcg/act inhaler 2 puff, BID    docusate sodium (COLACE) capsule 100 mg, BID    enoxaparin (LOVENOX) subcutaneous injection 40 mg, Daily    gabapentin (NEURONTIN) capsule 300 mg, TID    insulin lispro (HumALOG/ADMELOG) 100 units/mL subcutaneous injection 1-5 Units, TID AC **AND** Fingerstick Glucose (POCT), TID AC    levalbuterol (XOPENEX) inhalation solution 1.25 mg, TID    lidocaine (LIDODERM) 5 % patch 1 patch, Daily    lisinopril (ZESTRIL) tablet 5 mg, Daily    loratadine (CLARITIN) tablet 10 mg, Daily    multivitamin stress formula tablet 1 tablet, Daily    ondansetron (ZOFRAN) injection 4 mg, Q6H PRN    oxybutynin (DITROPAN-XL) 24 hr tablet 5 mg, Daily    oxyCODONE (ROXICODONE) split tablet  2.5 mg, Q8H NIGEL    pantoprazole (PROTONIX) EC tablet 40 mg, Early Morning    senna (SENOKOT) tablet 8.6 mg, Daily    Administrative Statements   Today, Patient Was Seen By: Jessica Garza PA-C      **Please Note: This note may have been constructed using a voice recognition system.**

## 2024-10-01 NOTE — RESPIRATORY THERAPY NOTE
10/01/24 0708   Inhalation Therapy Tx   $ Inhalation Therapy Performed Yes   $ Pulse Oximetry Spot Check Charge Completed   SpO2 97 %   Pre-Treatment Pulse 71   Pre-Treatment Respirations 20   Duration 20   Breath Sounds Pre-Treatment Bilateral Diminished   Delivery Source Air;UDN   Position Semi Singer's   Treatment Tolerance Tolerated well   Resp Comments Patient was on 1 lpm nasal cannula, I took patient off oxygen. Patient had to go to the bedside commode post tx, she was helped by me and the nurse. Her oxygen was 89% on room air, but had dropped down into the 70's after she was moving and after she had a loose congested cough. Had to work with her on splinting and cough, patient having a lot of pain on the left side. Placed her back on the 1 lpm oxygen at this time. I did infor the hospitalist about this

## 2024-10-01 NOTE — PHYSICAL THERAPY NOTE
PHYSICAL THERAPY NOTE          Patient Name: Cr Pena  Today's Date: 10/1/2024   10/01/24 1411   PT Last Visit   PT Visit Date 10/01/24   Note Type   Note Type Treatment   Pain Assessment   Pain Assessment Tool 0-10   Pain Score 3   Pain Location/Orientation Orientation: Left;Location: Rib Cage   Restrictions/Precautions   Weight Bearing Precautions Per Order No   Other Precautions   (Multiple lines; Telemetry; Fall Risk; O2)   General   Chart Reviewed Yes   Response to Previous Treatment Patient with no complaints from previous session.   Family/Caregiver Present No   Cognition   Overall Cognitive Status WFL   Arousal/Participation Alert;Cooperative   Attention Within functional limits   Orientation Level Oriented X4   Memory Within functional limits   Following Commands Follows all commands and directions without difficulty   Subjective   Subjective Reports she still has rib pain. Mild SOB with ambulation   Bed Mobility   Additional Comments OOB in chair start/end PT session   Transfers   Sit to Stand 5  Supervision   Additional items Armrests;Increased time required;Verbal cues   Stand to Sit 5  Supervision   Additional items Armrests;Increased time required;Verbal cues   Additional Comments RW used.  Provided pt with smaller chair for comfort.   Ambulation/Elevation   Gait pattern Forward Flexion  (decreased gait speed)   Gait Assistance 5  Supervision   Additional items Verbal cues   Assistive Device Rolling walker   Distance 175'   Balance   Static Sitting Good   Dynamic Sitting Good   Static Standing Fair +   Dynamic Standing Fair   Ambulatory Fair  (RW)   Endurance Deficit   Endurance Deficit Yes   Endurance Deficit Description SpO2 94-84% with 1 L O2, mild SOB  (RT aware of desaturation)   Activity Tolerance   Activity Tolerance Patient limited by fatigue   Exercises   Hip Flexion 20 reps   Hip Abduction 20 reps   Hip  Adduction 20 reps   Knee AROM Long Arc Quad 20 reps   Ankle Pumps 20 reps   Assessment   Prognosis Good   Problem List   (Decreased endurance; Impaired balance; Decreased mobility)   Assessment Pt. seen for PT treatment session this date with interventions consisting of  therapeutic exercises, transfers and  gait training w/ emphasis on improving pt's ability to ambulate. In comparison to previous session, Pt. With no change in activity tolerance. SpO2  94-84% with 1 L O2, mild SOB with ambulation.  Pt is in need of continued activity in PT to improve strength balance endurance mobility transfers and ambulation with return to maximize LOF. From PT/mobility standpoint, recommendation at time of d/c would be level III: min resource intensity in order to promote return to PLOF and independence.   The patient's AM-PAC Basic Mobility Inpatient Short Form Raw Score is 20. A Raw score of greater than 16 suggests the patient may benefit from discharge to home.  Please also refer to physical therapy recommendation for safe DC planning.   Goals   Patient Goals to return home   LTG Expiration Date 10/13/24   PT Treatment Day 2   Plan   Treatment/Interventions Functional transfer training;LE strengthening/ROM;Therapeutic exercise;Endurance training;Bed mobility;Gait training;Spoke to nursing  (spoke with RT)   Progress Progressing toward goals   PT Frequency 3-5x/wk   Discharge Recommendation   Rehab Resource Intensity Level, PT III (Minimum Resource Intensity)   AM-PAC Basic Mobility Inpatient   Turning in Flat Bed Without Bedrails 4   Lying on Back to Sitting on Edge of Flat Bed Without Bedrails 3   Moving Bed to Chair 3   Standing Up From Chair Using Arms 4   Walk in Room 3   Climb 3-5 Stairs With Railing 3   Basic Mobility Inpatient Raw Score 20   Basic Mobility Standardized Score 43.99   [facility] Highest Level Of Mobility   -HLM Goal 6: Walk 10 steps or more   -HLM Achieved 7: Walk 25 feet or more   Education    Education Provided Mobility training   Patient Demonstrates verbal understanding   End of Consult   Patient Position at End of Consult Bedside chair;Bed/Chair alarm activated;All needs within reach   End of Consult Comments discussed POC with PT

## 2024-10-01 NOTE — CASE MANAGEMENT
Case Management Discharge Planning Note    Patient name Cr Pena  Location /425-01 MRN [id number]  : 1940 Date 10/1/2024       Current Admission Date: 2024  Current Admission Diagnosis:Closed fracture of multiple ribs of left side   Patient Active Problem List    Diagnosis Date Noted Date Diagnosed    Bladder wall thickening 2024     Bladder diverticulum 2024     Closed fracture of multiple ribs of left side 2024     Fall 2024     Mass of left lung 2024     Esophageal dysphagia 2023     Neuropathic pain 2023     Stage 3 chronic kidney disease, unspecified whether stage 3a or 3b CKD (HCC) 2022     Chronic constipation 2021     OAB (overactive bladder) 10/23/2019     Chronic diastolic congestive heart failure (HCC) 2019     Acute hypoxic respiratory failure ([facility]) 2018     Vitamin D deficiency 2017     Asthma, mild intermittent 2016     Neuropathy 2016     Acid reflux disease 2016     Type 2 diabetes mellitus with hyperglycemia, without long-term current use of insulin ([facility]) 2016     Hyperlipidemia, unspecified 2016     Essential hypertension 2016     Spinal stenosis of lumbar region without neurogenic claudication 2016     Chronic obstructive pulmonary disease ([facility]) 2013       LOS (days): 2  Geometric Mean LOS (GMLOS) (days): 4.5  Days to GMLOS:2.4     OBJECTIVE:  Risk of Unplanned Readmission Score: 10.35         Current admission status: Inpatient   Preferred Pharmacy:   [id number] - [place] - [address]  [address] [place] [address]  Phone: [phone] Fax: [phone]    [facility] - [place] - [address]  [address]  [place] [address]  Phone: [phone] Fax: [phone]    [id number] - [place] [address]  Critical access hospital [address]  Phone: [phone] Fax: [phone]    Primary Care Provider: Mary Kay Oliveira PA-C    Primary Insurance: [employer] [facility]  Secondary Insurance:     DISCHARGE DETAILS:    Discharge planning discussed with:: patient  Freedom of Choice: Yes  Comments - Freedom of Choice: CM discussed FOC for HHA patient in agreement with blanket referrals. open in aidin  CM contacted family/caregiver?: No- see comments (discussed with patient)  Were Treatment Team discharge recommendations reviewed with patient/caregiver?: Yes  Did patient/caregiver verbalize understanding of patient care needs?: Yes  Were patient/caregiver advised of the risks associated with not following Treatment Team discharge recommendations?: Yes    Contacts  Patient Contacts: see face sheet    Requested Home Health Care         Is the patient interested in HHC at discharge?: Yes  Home Health Discipline requested:: Physical Therapy, Nursing, Occupational Therapy  Home Health Follow-Up Provider:: PCP  Home Health Services Needed:: Evaluate Functional Status and Safety, Strengthening/Theraputic Exercises to Improve Function, Gait/ADL Training, Other (comment) (SN ASSESSSMENT AND MEDICATION /PAIN MANAGEMENT)  Homebound Criteria Met:: Uses an Assist Device (i.e. cane, walker, etc), Requires the Assistance of Another Person for Safe Ambulation or to Leave the Home  Supporting Clincal Findings:: Fatigues Easliy in Short Distances, Limited Endurance      Treatment Team Recommendation: Home with Home Health Care  Discharge Destination Plan:: Home with Home Health Care         CM discussed  FOC with patient  at bedside/phone. Patient agreeable with blanket referrals in Aidin for local  agencies to determine bed availability according to your medical needs and insurance coverage. Patient  has no preference     Referrals placed in aidin for HHA. SN pulm  and pain management. Pt/OT for home evals due to pain with x ribs      Patient not  stable for discharge as congestion and pain increased.

## 2024-10-01 NOTE — ASSESSMENT & PLAN NOTE
Patient had a fall, while trying to hang a picture on the wall and stand on her couch, believes she stepped in between the cushions became unbalanced and fell to the ground.  Traumatic evaluation in the emergency department completed  CT chest revealed acute nondisplaced rib fractures on the left of the eighth ninth and 10th ribs posterolaterally No associated pneumothorax, pleural effusion, or pulmonary contusion noted  Continue pain control:   Scheduled Tylenol, increase her home gabapentin to 300 mg 3 times daily.  Start schedule oxycodone 2.m5 q8 to help achieve more adequate analgesia. Patient has not been requesting prn medications & notes worsening pain  Apply ICE as able. Maximize non-opiate pain control  Rib Fracture Protocol Ordered  Respiratory protocol, airway clearance, incentive spirometer

## 2024-10-01 NOTE — PLAN OF CARE
Problem: PAIN - ADULT  Goal: Verbalizes/displays adequate comfort level or baseline comfort level  Description: Interventions:  - Encourage patient to monitor pain and request assistance  - Assess pain using appropriate pain scale  - Administer analgesics based on type and severity of pain and evaluate response  - Implement non-pharmacological measures as appropriate and evaluate response  - Consider cultural and social influences on pain and pain management  - Notify physician/advanced practitioner if interventions unsuccessful or patient reports new pain  Outcome: Progressing     Problem: INFECTION - ADULT  Goal: Absence or prevention of progression during hospitalization  Description: INTERVENTIONS:  - Assess and monitor for signs and symptoms of infection  - Monitor lab/diagnostic results  - Monitor all insertion sites, i.e. indwelling lines, tubes, and drains  - Monitor endotracheal if appropriate and nasal secretions for changes in amount and color  - Sobieski appropriate cooling/warming therapies per order  - Administer medications as ordered  - Instruct and encourage patient and family to use good hand hygiene technique  - Identify and instruct in appropriate isolation precautions for identified infection/condition  Outcome: Progressing     Problem: SAFETY ADULT  Goal: Patient will remain free of falls  Description: INTERVENTIONS:  - Educate patient/family on patient safety including physical limitations  - Instruct patient to call for assistance with activity   - Consult OT/PT to assist with strengthening/mobility   - Keep Call bell within reach  - Keep bed low and locked with side rails adjusted as appropriate  - Keep care items and personal belongings within reach  - Initiate and maintain comfort rounds  - Make Fall Risk Sign visible to staff  - Offer Toileting every 2 Hours, in advance of need  - Initiate/Maintain bed/chair alarm  - Obtain necessary fall risk management equipment: as needed  - Apply  yellow socks and bracelet for high fall risk patients  - Consider moving patient to room near nurses station  Outcome: Progressing     Problem: DISCHARGE PLANNING  Goal: Discharge to home or other facility with appropriate resources  Description: INTERVENTIONS:  - Identify barriers to discharge w/patient and caregiver  - Arrange for needed discharge resources and transportation as appropriate  - Identify discharge learning needs (meds, wound care, etc.)  - Arrange for interpretive services to assist at discharge as needed  - Refer to Case Management Department for coordinating discharge planning if the patient needs post-hospital services based on physician/advanced practitioner order or complex needs related to functional status, cognitive ability, or social support system  Outcome: Progressing     Problem: Knowledge Deficit  Goal: Patient/family/caregiver demonstrates understanding of disease process, treatment plan, medications, and discharge instructions  Description: Complete learning assessment and assess knowledge base.  Interventions:  - Provide teaching at level of understanding  - Provide teaching via preferred learning methods  Outcome: Progressing     Problem: RESPIRATORY - ADULT  Goal: Achieves optimal ventilation and oxygenation  Description: INTERVENTIONS:  - Assess for changes in respiratory status  - Assess for changes in mentation and behavior  - Position to facilitate oxygenation and minimize respiratory effort  - Oxygen administered by appropriate delivery if ordered  - Initiate smoking cessation education as indicated  - Encourage broncho-pulmonary hygiene including cough, deep breathe, Incentive Spirometry  - Assess the need for suctioning and aspirate as needed  - Assess and instruct to report SOB or any respiratory difficulty  - Respiratory Therapy support as indicated  Outcome: Progressing     Problem: METABOLIC, FLUID AND ELECTROLYTES - ADULT  Goal: Glucose maintained within target  range  Description: INTERVENTIONS:  - Monitor Blood Glucose as ordered  - Assess for signs and symptoms of hyperglycemia and hypoglycemia  - Administer ordered medications to maintain glucose within target range  - Assess nutritional intake and initiate nutrition service referral as needed  Outcome: Progressing

## 2024-10-02 ENCOUNTER — TRANSITIONAL CARE MANAGEMENT (OUTPATIENT)
Dept: INTERNAL MEDICINE CLINIC | Facility: CLINIC | Age: 84
End: 2024-10-02

## 2024-10-02 ENCOUNTER — TELEPHONE (OUTPATIENT)
Age: 84
End: 2024-10-02

## 2024-10-02 VITALS
RESPIRATION RATE: 20 BRPM | HEART RATE: 90 BPM | TEMPERATURE: 98.2 F | HEIGHT: 60 IN | OXYGEN SATURATION: 90 % | DIASTOLIC BLOOD PRESSURE: 86 MMHG | SYSTOLIC BLOOD PRESSURE: 107 MMHG | BODY MASS INDEX: 28.05 KG/M2 | WEIGHT: 142.86 LBS

## 2024-10-02 LAB
BACTERIA UR CULT: ABNORMAL
BACTERIA UR CULT: ABNORMAL
GLUCOSE SERPL-MCNC: 118 MG/DL (ref 65–140)
GLUCOSE SERPL-MCNC: 167 MG/DL (ref 65–140)

## 2024-10-02 PROCEDURE — 99239 HOSP IP/OBS DSCHRG MGMT >30: CPT | Performed by: PHYSICIAN ASSISTANT

## 2024-10-02 PROCEDURE — 94664 DEMO&/EVAL PT USE INHALER: CPT

## 2024-10-02 PROCEDURE — 94761 N-INVAS EAR/PLS OXIMETRY MLT: CPT

## 2024-10-02 PROCEDURE — 94640 AIRWAY INHALATION TREATMENT: CPT

## 2024-10-02 PROCEDURE — 94760 N-INVAS EAR/PLS OXIMETRY 1: CPT

## 2024-10-02 PROCEDURE — 82948 REAGENT STRIP/BLOOD GLUCOSE: CPT

## 2024-10-02 RX ORDER — DOCUSATE SODIUM 100 MG/1
100 CAPSULE, LIQUID FILLED ORAL 2 TIMES DAILY
Qty: 60 CAPSULE | Refills: 0 | Status: SHIPPED | OUTPATIENT
Start: 2024-10-02

## 2024-10-02 RX ORDER — OXYCODONE HYDROCHLORIDE 5 MG/1
2.5 TABLET ORAL EVERY 8 HOURS SCHEDULED
Qty: 8 TABLET | Refills: 0 | Status: SHIPPED | OUTPATIENT
Start: 2024-10-02 | End: 2024-10-07

## 2024-10-02 RX ORDER — ACETAMINOPHEN 325 MG/1
975 TABLET ORAL EVERY 8 HOURS SCHEDULED
Qty: 45 TABLET | Refills: 0 | Status: SHIPPED | OUTPATIENT
Start: 2024-10-02 | End: 2024-10-08

## 2024-10-02 RX ORDER — LIDOCAINE 50 MG/G
1 PATCH TOPICAL DAILY
Qty: 14 PATCH | Refills: 0 | Status: SHIPPED | OUTPATIENT
Start: 2024-10-03

## 2024-10-02 RX ADMIN — LIDOCAINE 5% 1 PATCH: 700 PATCH TOPICAL at 08:29

## 2024-10-02 RX ADMIN — B-COMPLEX W/ C & FOLIC ACID TAB 1 TABLET: TAB at 08:29

## 2024-10-02 RX ADMIN — Medication 2.5 MG: at 05:23

## 2024-10-02 RX ADMIN — ATORVASTATIN CALCIUM 20 MG: 10 TABLET, FILM COATED ORAL at 08:29

## 2024-10-02 RX ADMIN — DOCUSATE SODIUM 100 MG: 100 CAPSULE, LIQUID FILLED ORAL at 08:29

## 2024-10-02 RX ADMIN — PANTOPRAZOLE SODIUM 40 MG: 40 TABLET, DELAYED RELEASE ORAL at 05:23

## 2024-10-02 RX ADMIN — SENNOSIDES 8.6 MG: 8.6 TABLET, FILM COATED ORAL at 08:29

## 2024-10-02 RX ADMIN — GABAPENTIN 300 MG: 300 CAPSULE ORAL at 08:29

## 2024-10-02 RX ADMIN — BUDESONIDE AND FORMOTEROL FUMARATE DIHYDRATE 2 PUFF: 160; 4.5 AEROSOL RESPIRATORY (INHALATION) at 08:35

## 2024-10-02 RX ADMIN — LEVALBUTEROL HYDROCHLORIDE 1.25 MG: 1.25 SOLUTION RESPIRATORY (INHALATION) at 14:03

## 2024-10-02 RX ADMIN — ASPIRIN 81 MG 81 MG: 81 TABLET ORAL at 08:29

## 2024-10-02 RX ADMIN — ACETAMINOPHEN 975 MG: 325 TABLET, FILM COATED ORAL at 05:23

## 2024-10-02 RX ADMIN — LEVALBUTEROL HYDROCHLORIDE 1.25 MG: 1.25 SOLUTION RESPIRATORY (INHALATION) at 07:19

## 2024-10-02 RX ADMIN — ENOXAPARIN SODIUM 40 MG: 40 INJECTION SUBCUTANEOUS at 08:29

## 2024-10-02 RX ADMIN — LORATADINE 10 MG: 10 TABLET ORAL at 08:30

## 2024-10-02 RX ADMIN — INSULIN LISPRO 1 UNITS: 100 INJECTION, SOLUTION INTRAVENOUS; SUBCUTANEOUS at 11:55

## 2024-10-02 RX ADMIN — OXYBUTYNIN CHLORIDE 5 MG: 5 TABLET, EXTENDED RELEASE ORAL at 08:30

## 2024-10-02 NOTE — ASSESSMENT & PLAN NOTE
Lab Results   Component Value Date    HGBA1C 6.7 (H) 09/28/2024       Recent Labs     10/01/24  1612 10/01/24  2155 10/02/24  0742 10/02/24  1102   POCGLU 129 157* 118 167*       Blood Sugar Average: Last 72 hrs:  (P) 142.8976996382179370  Takes metformin only at home - hold  Continue SSI & diabetic diet

## 2024-10-02 NOTE — ASSESSMENT & PLAN NOTE
Patient had a fall, while trying to hang a picture on the wall and stand on her couch, believes she stepped in between the cushions became unbalanced and fell to the ground.  Traumatic evaluation in the emergency department completed  CT chest revealed acute nondisplaced rib fractures on the left of the eighth ninth and 10th ribs posterolaterally No associated pneumothorax, pleural effusion, or pulmonary contusion noted  Continue pain control:   Scheduled Tylenol, increase her home gabapentin to 300 mg 3 times daily.  Start schedule oxycodone 2.m5 q8 to help achieve more adequate analgesia. Patient has not been requesting prn medications & notes worsening pain  Apply ICE as able. Maximize non-opiate pain control  Reports resolution of pain today  Rib Fracture Protocol Ordered  Respiratory protocol, airway clearance, incentive spirometer

## 2024-10-02 NOTE — RESPIRATORY THERAPY NOTE
10/02/24 0719   Inhalation Therapy Tx   $ Inhalation Therapy Performed Yes   $ Pulse Oximetry Spot Check Charge Completed   SpO2 95 %   Pre-Treatment Pulse 77   Pre-Treatment Respirations 20   Duration 20   Breath Sounds Pre-Treatment Bilateral Diminished   Delivery Source Air;UDN   Position Up in chair   Treatment Tolerance Tolerated well   Resp Comments Patient was on .5 lpm nasal cannula, I took patient off during her treatment, patient's oxygen dropped to 86%, 1/2 lpm placed back on. Patient did not do as well on the IS as she did yesterday, she is having a little more pain on the left, cough was not as strong either, did relay this to hospitalist

## 2024-10-02 NOTE — DISCHARGE SUMMARY
Discharge Summary - Hospitalist   Name: Cr Pena 84 y.o. female I MRN: [id number]  Unit/Bed#: 425-01 I Date of Admission: 9/28/2024   Date of Service: 10/2/2024 I Hospital Day: 3     Assessment & Plan  Closed fracture of multiple ribs of left side  Patient had a fall, while trying to hang a picture on the wall and stand on her couch, believes she stepped in between the cushions became unbalanced and fell to the ground.  Traumatic evaluation in the emergency department completed  CT chest revealed acute nondisplaced rib fractures on the left of the eighth ninth and 10th ribs posterolaterally No associated pneumothorax, pleural effusion, or pulmonary contusion noted  Continue pain control:   Scheduled Tylenol, increase her home gabapentin to 300 mg 3 times daily.  Start schedule oxycodone 2.m5 q8 to help achieve more adequate analgesia. Patient has not been requesting prn medications & notes worsening pain  Apply ICE as able. Maximize non-opiate pain control  Reports resolution of pain today  Rib Fracture Protocol Ordered  Respiratory protocol, airway clearance, incentive spirometer  Acute hypoxic respiratory failure (HCC)  Suspect due to acute rib fractures on top of underlying COPD.  Noted to become hypoxic after administration of fentanyl in the emergency department  Required up to 4 L of oxygen, was attempted to be weaned down but can only be weaned to 2 L nasal cannula in ED  Weaned to room air at rest  Performed home O2 eval, needs 2L O2. Patient elected to go home with oxygen supplementation  Treat rib fractures and pain as above  Xopenex tid   Wean as able    Fall  As above, patient had a fall at home while trying to hang a picture, and describes the incident as mechanical in nature  PT/OT- no rehab needs  Chronic obstructive pulmonary disease (HCC)  See management under acute hypoxic respiratory failure  She is not in exacerbation  Mass of left lung  Ultimately an incidental finding while workup for her  traumatic fall  CT notes a spiculated left lower lobe superior segment mass measuring 4.5 x 4.1 cm, very concerning for neoplasm  Findings were discussed with the patient, she expresses understanding  Next step would be a PET/CT scan  Incidental finding report placement emergency department, appreciate  Patiently to follow-up with her primary care provider and an oncology  Type 2 diabetes mellitus with hyperglycemia, without long-term current use of insulin (HCC)  Lab Results   Component Value Date    HGBA1C 6.7 (H) 09/28/2024       Recent Labs     10/01/24  1612 10/01/24  2155 10/02/24  0742 10/02/24  1102   POCGLU 129 157* 118 167*       Blood Sugar Average: Last 72 hrs:  (P) [id number]  Takes metformin only at home - hold  Continue SSI & diabetic diet    Acid reflux disease  Continue PPI  Hyperlipidemia, unspecified  Continue home statin  Essential hypertension  Continue home lisinopril  Neuropathy  Likely due to diabetes, patient takes 300 mg gabapentin daily  C/w increased gabapentin to 300 mg 3 times daily to aid with pain control of rib fracture  OAB (overactive bladder)  Continue home oxybutynin 5 mg daily  Chronic constipation  Noted history, does not take any medications at home  Continue Docusate 100 mg twice daily, and Senokot nightly     Medical Problems       Resolved Problems  Date Reviewed: 8/23/2024   None       Discharging Physician / Practitioner: Stacey Washington PA-C  PCP: Mary Kay Oliveira PA-C  Admission Date:   Admission Orders (From admission, onward)       Ordered        09/29/24 1035  INPATIENT ADMISSION  Once            09/28/24 1607  Place in Observation  Once                          Discharge Date: 10/02/24    Consultations During Hospital Stay:  none    Procedures Performed:   none    Significant Findings / Test Results:     CT chest abdomen pelvis w contrast   Final Result      Acute minimally displaced left rib fractures involving the eighth, ninth and 10th ribs  posterolaterally. No pneumothorax or pleural effusion.      Background emphysema with spiculated left lower lobe superior segment mass measuring 4.5 x 4.1 cm concerning for neoplasm. Recommend PET/CT scan or tissue sampling for next step.      Stable large hiatal hernia.      Mild bladder wall thickening with a small diverticulum toward the dome. Correlate for possible cystitis.         I personally discussed this study with VICTORIA ARAMBULA on 9/28/2024 2:29 PM.                     Workstation performed: [id number]         CT head without contrast   Final Result      No acute intracranial abnormality.                  Workstation performed: [id number]         CT cervical spine without contrast   Final Result      No cervical spine fracture or traumatic malalignment.                  Workstation performed: [id number]         XR chest 1 view portable   ED Interpretation   Per my independent interpretation. Radiologist to provide formal read. No PTX left perihilar infiltrate ? Left 8th rib fx nondisplaced no effusion no cardiomegally no PTX      Final Result      New patchy opacity in left midlung zone, may present pneumonia. Recommend follow-up to resolution to exclude underlying pulmonary malignancy. Please see CT chest abdomen pelvis with contrast for further evaluation.      Nondisplaced left-sided rib fractures are better evaluated on same day CT chest abdomen pelvis with contrast..      The study was marked in EPIC for immediate notification.               Workstation performed: [id number]               Incidental Findings:   Lung mass    Test Results Pending at Discharge (will require follow up):   none     Outpatient Tests Requested:  none    Complications:  none    Reason for Admission: rib fracture    Hospital Course:   Cr Pena is a 84 y.o. female patient who originally presented to the hospital on 9/28/2024 due to fall and traumatic rib fractures.  Patient reported that she was trying to hang a  picture on her wall, she was standing on her couch, she feels like she stepped in the crack between the cushions lost her balance and fell on her left side.  Patient describes the incident as very mechanical in nature, she denies any presyncope, loss of consciousness.  Brought to the emergency department further evaluation.  Patient underwent traumatic workup in the emergency department, CT of the chest revealed posterior lateral rib fractures, minimally displaced, of ribs 8 through 10.  On my exam, the patient denies feeling significantly short of breath, her breathing feels stable, where she is having significant pain, trouble with deep breaths due to worsening pain.  Pain is worse with movement.  When at rest her pain seems controlled, she would rate the pain a 10 out of 10 with movement.     Please see above list of diagnoses and related plan for additional information.     Condition at Discharge: good    Discharge Day Visit / Exam:   Subjective:  patient repports she had a large sneeze and felt significantly improved thereafter and had no further pain  Vitals: Blood Pressure: 107/86 (10/02/24 0828)  Pulse: 90 (10/02/24 0908)  Temperature: 98.2 °F (36.8 °C) (10/02/24 0908)  Temp Source: Oral (10/01/24 1437)  Respirations: 20 (10/01/24 1437)  Height: 5' (152.4 cm) (09/28/24 1653)  Weight - Scale: 64.8 kg (142 lb 13.7 oz) (09/28/24 1653)  SpO2: 90 % (10/02/24 1405)  Physical Exam  Vitals and nursing note reviewed.   Constitutional:       General: She is not in acute distress.     Appearance: She is obese. She is not ill-appearing.   Cardiovascular:      Rate and Rhythm: Normal rate and regular rhythm.      Pulses: Normal pulses.      Heart sounds: Normal heart sounds. No murmur heard.     No gallop.   Pulmonary:      Effort: Pulmonary effort is normal.      Breath sounds: No wheezing, rhonchi or rales.   Abdominal:      General: Bowel sounds are normal.      Palpations: Abdomen is soft.   Musculoskeletal:       Right lower leg: No edema.      Left lower leg: No edema.   Neurological:      Mental Status: She is alert and oriented to person, place, and time. Mental status is at baseline.   Psychiatric:         Mood and Affect: Mood normal.         Behavior: Behavior normal.          Discussion with Family: Patient declined call to .     Discharge instructions/Information to patient and family:   See after visit summary for information provided to patient and family.      Provisions for Follow-Up Care:  See after visit summary for information related to follow-up care and any pertinent home health orders.      Mobility at time of Discharge:   Basic Mobility Inpatient Raw Score: 22  JH-HLM Goal: 7: Walk 25 feet or more  JH-HLM Achieved: 7: Walk 25 feet or more  HLM Goal achieved. Continue to encourage appropriate mobility.     Disposition:   Home    Planned Readmission: none    Discharge Medications:  See after visit summary for reconciled discharge medications provided to patient and/or family.      Administrative Statements   Discharge Statement:  I have spent a total time of 45 minutes in caring for this patient on the day of the visit/encounter. >30 minutes of time was spent on: Risk factor reductions, Impressions, Counseling / Coordination of care, Documenting in the medical record, Reviewing / ordering tests, medicine, procedures  , and Communicating with other healthcare professionals .    **Please Note: This note may have been constructed using a voice recognition system**

## 2024-10-02 NOTE — CASE MANAGEMENT
Case Management Discharge Planning Note    Patient name Cr Pena  Location /425-01 MRN [id number]  : 1940 Date 10/2/2024       Current Admission Date: 2024  Current Admission Diagnosis:Closed fracture of multiple ribs of left side   Patient Active Problem List    Diagnosis Date Noted Date Diagnosed    Bladder wall thickening 2024     Bladder diverticulum 2024     Closed fracture of multiple ribs of left side 2024     Fall 2024     Mass of left lung 2024     Esophageal dysphagia 2023     Neuropathic pain 2023     Stage 3 chronic kidney disease, unspecified whether stage 3a or 3b CKD (HCC) 2022     Chronic constipation 2021     OAB (overactive bladder) 10/23/2019     Chronic diastolic congestive heart failure (HCC) 2019     Acute hypoxic respiratory failure ([facility]) 2018     Vitamin D deficiency 2017     Asthma, mild intermittent 2016     Neuropathy 2016     Acid reflux disease 2016     Type 2 diabetes mellitus with hyperglycemia, without long-term current use of insulin ([facility]) 2016     Hyperlipidemia, unspecified 2016     Essential hypertension 2016     Spinal stenosis of lumbar region without neurogenic claudication 2016     Chronic obstructive pulmonary disease ([facility]) 2013       LOS (days): 3  Geometric Mean LOS (GMLOS) (days): 4.5  Days to GMLOS:1.5     OBJECTIVE:  Risk of Unplanned Readmission Score: 10.49         Current admission status: Inpatient   Preferred Pharmacy:   [id number] - [place] - [address]  [address] [place] [address]  Phone: [phone] Fax: [phone]    [facility] - [place] - [address]  [address]  [place] [address]  Phone: [phone] Fax: [phone]    [id number] - [place] [address]  [facility] [address]  Phone: [phone] Fax: [phone]    Primary Care Provider: Mary Kay Oliveira PA-C    [facility]  Secondary Insurance:     DISCHARGE DETAILS:    Discharge planning discussed with:: patient  Freedom of Choice: Yes  Comments - Freedom of Choice: CM provided patient with HHA options her preference is  [facility] secured in aidin  CM contacted family/caregiver?: No- see comments (discussed with patient)  Were Treatment Team discharge recommendations reviewed with patient/caregiver?: Yes  Did patient/caregiver verbalize understanding of patient care needs?: Yes  Were patient/caregiver advised of the risks associated with not following Treatment Team discharge recommendations?: Yes      Requested Home Health Care         [facility] Health [facility]     Discharge Destination Plan:: Home with Home Health Care ([facility])

## 2024-10-02 NOTE — CASE MANAGEMENT
Case Management Discharge Planning Note    Patient name Cr Pena  Location /425-01 MRN [id number]  : 1940 Date 10/2/2024       Current Admission Date: 2024  Current Admission Diagnosis:Closed fracture of multiple ribs of left side   Patient Active Problem List    Diagnosis Date Noted Date Diagnosed    Bladder wall thickening 2024     Bladder diverticulum 2024     Closed fracture of multiple ribs of left side 2024     Fall 2024     Mass of left lung 2024     Esophageal dysphagia 2023     Neuropathic pain 2023     Stage 3 chronic kidney disease, unspecified whether stage 3a or 3b CKD (HCC) 2022     Chronic constipation 2021     OAB (overactive bladder) 10/23/2019     Chronic diastolic congestive heart failure (HCC) 2019     Acute hypoxic respiratory failure ([facility]) 2018     Vitamin D deficiency 2017     Asthma, mild intermittent 2016     Neuropathy 2016     Acid reflux disease 2016     Type 2 diabetes mellitus with hyperglycemia, without long-term current use of insulin ([facility]) 2016     Hyperlipidemia, unspecified 2016     Essential hypertension 2016     Spinal stenosis of lumbar region without neurogenic claudication 2016     Chronic obstructive pulmonary disease ([facility]) 2013       LOS (days): 3  Geometric Mean LOS (GMLOS) (days): 4.5  Days to GMLOS:1.4     OBJECTIVE:  Risk of Unplanned Readmission Score: 10.51         Current admission status: Inpatient   Preferred Pharmacy:   [id number] - [place] - [address]  [address] [place] [address]  Phone: [phone] Fax: [phone]    [facility] - [place] - [address]  [address]  [place] [address]  Phone: [phone] Fax: [phone]    [id number] - [place] [address]  [facility] [address]  Phone: [phone] Fax: [phone]    Primary Care Provider: Mary Kay Oliveira PA-C    Primary Insurance: [employer] [facility]  Secondary Insurance:     DISCHARGE DETAILS:      Patient provided HHA options and her preference is  [employer]. Secured.     Freedom of choice was provided in regards to needing a home medical equipment company, pt does not have preference. Pt is aware that we frequently utilized [employer] as we have a working relationship with this company. Patients choice is to use [employer]..    Order placed in aidin for Oxygen with portability.    Patient provided O2 portable condenser at bedside. Delivery ticket signed by patient and placed in [employer] folder in CM office.  Patient instructed on portable o2 device and O2 safety no open flames.     Follow up providers listed on AVS.  Friend to transport patient home.

## 2024-10-02 NOTE — PLAN OF CARE
Problem: PAIN - ADULT  Goal: Verbalizes/displays adequate comfort level or baseline comfort level  Description: Interventions:  - Encourage patient to monitor pain and request assistance  - Assess pain using appropriate pain scale  - Administer analgesics based on type and severity of pain and evaluate response  - Implement non-pharmacological measures as appropriate and evaluate response  - Consider cultural and social influences on pain and pain management  - Notify physician/advanced practitioner if interventions unsuccessful or patient reports new pain  Outcome: Progressing     Problem: INFECTION - ADULT  Goal: Absence or prevention of progression during hospitalization  Description: INTERVENTIONS:  - Assess and monitor for signs and symptoms of infection  - Monitor lab/diagnostic results  - Monitor all insertion sites, i.e. indwelling lines, tubes, and drains  - Monitor endotracheal if appropriate and nasal secretions for changes in amount and color  - Lorida appropriate cooling/warming therapies per order  - Administer medications as ordered  - Instruct and encourage patient and family to use good hand hygiene technique  - Identify and instruct in appropriate isolation precautions for identified infection/condition  Outcome: Progressing     Problem: SAFETY ADULT  Goal: Patient will remain free of falls  Description: INTERVENTIONS:  - Educate patient/family on patient safety including physical limitations  - Instruct patient to call for assistance with activity   - Consult OT/PT to assist with strengthening/mobility   - Keep Call bell within reach  - Keep bed low and locked with side rails adjusted as appropriate  - Keep care items and personal belongings within reach  - Initiate and maintain comfort rounds  - Make Fall Risk Sign visible to staff  - Offer Toileting every 2 Hours, in advance of need  - Initiate/Maintain bed/chair alarm  - Obtain necessary fall risk management equipment: as needed  - Apply  yellow socks and bracelet for high fall risk patients  - Consider moving patient to room near nurses station  Outcome: Progressing     Problem: Knowledge Deficit  Goal: Patient/family/caregiver demonstrates understanding of disease process, treatment plan, medications, and discharge instructions  Description: Complete learning assessment and assess knowledge base.  Interventions:  - Provide teaching at level of understanding  - Provide teaching via preferred learning methods  Outcome: Progressing

## 2024-10-02 NOTE — ASSESSMENT & PLAN NOTE
Suspect due to acute rib fractures on top of underlying COPD.  Noted to become hypoxic after administration of fentanyl in the emergency department  Required up to 4 L of oxygen, was attempted to be weaned down but can only be weaned to 2 L nasal cannula in ED  Weaned to room air at rest  Performed home O2 eval, needs 2L O2. Patient elected to go home with oxygen supplementation  Treat rib fractures and pain as above  Xopenex tid   Wean as able

## 2024-10-02 NOTE — RESPIRATORY THERAPY NOTE
Home Oxygen Qualifying Test     Patient name: Cr Pena        : 1940   Date of Test:  2024  Diagnosis:    Home Oxygen Test:    **Medicare Guidelines require item(s) 1-5 on all ambulatory patients or 1 and 2 on non-ambulatory patients.    1. Baseline SPO2 on Room Air at rest 91 %   If <= 88% on Room Air add O2 via NC to obtain SpO2 >=88%. If LPM needed, document LPM NA needed to reach =>88%    SPO2 during exertion on Room Air 84  %  During exertion monitor SPO2. If SPO2 increases >=89%, do not add supplemental oxygen    SPO2 on Oxygen at Rest NA % at NA LPM    SPO2 during exertion on Oxygen 90 % at 2 LPM    Test performed during exertion activity.      [x]  Supplemental Home Oxygen is indicated.    []  Client does not qualify for home oxygen.    Respiratory Additional Notes- Patient sitting on room air, oxygen saturations 91%, ambulated patient 140 feet, she needed 2 lpm oxygen via nasal cannula    Dior Mendez, RT

## 2024-10-02 NOTE — PLAN OF CARE
Problem: PAIN - ADULT  Goal: Verbalizes/displays adequate comfort level or baseline comfort level  Description: Interventions:  - Encourage patient to monitor pain and request assistance  - Assess pain using appropriate pain scale  - Administer analgesics based on type and severity of pain and evaluate response  - Implement non-pharmacological measures as appropriate and evaluate response  - Consider cultural and social influences on pain and pain management  - Notify physician/advanced practitioner if interventions unsuccessful or patient reports new pain  Outcome: Progressing     Problem: INFECTION - ADULT  Goal: Absence or prevention of progression during hospitalization  Description: INTERVENTIONS:  - Assess and monitor for signs and symptoms of infection  - Monitor lab/diagnostic results  - Monitor all insertion sites, i.e. indwelling lines, tubes, and drains  - Monitor endotracheal if appropriate and nasal secretions for changes in amount and color  - [place] appropriate cooling/warming therapies per order  - Administer medications as ordered  - Instruct and encourage patient and family to use good hand hygiene technique  - Identify and instruct in appropriate isolation precautions for identified infection/condition  Outcome: Progressing     Problem: SAFETY ADULT  Goal: Patient will remain free of falls  Description: INTERVENTIONS:  - Educate patient/family on patient safety including physical limitations  - Instruct patient to call for assistance with activity   - Consult OT/PT to assist with strengthening/mobility   - Keep Call bell within reach  - Keep bed low and locked with side rails adjusted as appropriate  - Keep care items and personal belongings within reach  - Initiate and maintain comfort rounds  - Make Fall Risk Sign visible to staff  - Offer Toileting every 2 Hours, in advance of need  - Initiate/Maintain bed/chair alarm  - Obtain necessary fall risk management equipment: as needed  - Apply  yellow socks and bracelet for high fall risk patients  - Consider moving patient to room near nurses station  Outcome: Progressing     Problem: DISCHARGE PLANNING  Goal: Discharge to home or other facility with appropriate resources  Description: INTERVENTIONS:  - Identify barriers to discharge w/patient and caregiver  - Arrange for needed discharge resources and transportation as appropriate  - Identify discharge learning needs (meds, wound care, etc.)  - Arrange for interpretive services to assist at discharge as needed  - Refer to Case Management Department for coordinating discharge planning if the patient needs post-hospital services based on physician/advanced practitioner order or complex needs related to functional status, cognitive ability, or social support system  Outcome: Progressing     Problem: Knowledge Deficit  Goal: Patient/family/caregiver demonstrates understanding of disease process, treatment plan, medications, and discharge instructions  Description: Complete learning assessment and assess knowledge base.  Interventions:  - Provide teaching at level of understanding  - Provide teaching via preferred learning methods  Outcome: Progressing     Problem: RESPIRATORY - ADULT  Goal: Achieves optimal ventilation and oxygenation  Description: INTERVENTIONS:  - Assess for changes in respiratory status  - Assess for changes in mentation and behavior  - Position to facilitate oxygenation and minimize respiratory effort  - Oxygen administered by appropriate delivery if ordered  - Initiate smoking cessation education as indicated  - Encourage broncho-pulmonary hygiene including cough, deep breathe, Incentive Spirometry  - Assess the need for suctioning and aspirate as needed  - Assess and instruct to report SOB or any respiratory difficulty  - Respiratory Therapy support as indicated  Outcome: Progressing     Problem: METABOLIC, FLUID AND ELECTROLYTES - ADULT  Goal: Glucose maintained within target  range  Description: INTERVENTIONS:  - Monitor Blood Glucose as ordered  - Assess for signs and symptoms of hyperglycemia and hypoglycemia  - Administer ordered medications to maintain glucose within target range  - Assess nutritional intake and initiate nutrition service referral as needed  Outcome: Progressing     Problem: CARDIOVASCULAR - ADULT  Goal: Maintains optimal cardiac output and hemodynamic stability  Description: INTERVENTIONS:  - Monitor I/O, vital signs and rhythm  - Monitor for S/S and trends of decreased cardiac output  - Administer and titrate ordered vasoactive medications to optimize hemodynamic stability  - Assess quality of pulses, skin color and temperature  - Assess for signs of decreased coronary artery perfusion  - Instruct patient to report change in severity of symptoms  Outcome: Progressing

## 2024-10-02 NOTE — DISCHARGE INSTR - OTHER ORDERS
Your goal pulse oximetry is 88 - 92%.  If your pulse ox is <88 - rest for 5 minutes and take deep breaths through your nose with the oxygen in place.  Make sure your finger is warm (cold fingers will give falsely low readings).  After 5 minutes, recheck your pulse ox.  If your pulse ox continues to be below < 88% and you feel short of breath, rest, breathe through your nose and call your primary care physician or pulmonologist 24/7 (if after office hours the answering service will contact the on call physician who will call you back).  If you continue to feel excessively short of breath (much more than your normal breathing pattern), consider further evaluation in the emergency department - remember that a mask must be worn to enter any [facility] Emergency Department.  If your pulse ox continues to be below < 88% and you do not feel short of breath increase your oxygen flow by 1 liter at a time to achieve a reading between 88 and 92%.  If you are needing more than 2 liters higher than your normal flow for more than a few hours call your primary care physician or pulmonologist, even if you are not feeling short of breath.  If your pulse ox is >92% it is safe to turn down oxygen by 1 liter at a time to achieve a reading of 88-92%.

## 2024-10-02 NOTE — PLAN OF CARE
Problem: PAIN - ADULT  Goal: Verbalizes/displays adequate comfort level or baseline comfort level  Description: Interventions:  - Encourage patient to monitor pain and request assistance  - Assess pain using appropriate pain scale  - Administer analgesics based on type and severity of pain and evaluate response  - Implement non-pharmacological measures as appropriate and evaluate response  - Consider cultural and social influences on pain and pain management  - Notify physician/advanced practitioner if interventions unsuccessful or patient reports new pain  10/2/2024 1357 by Lesly Couch LPN  Outcome: Adequate for Discharge  10/2/2024 0840 by Lesly Couch LPN  Outcome: Progressing     Problem: INFECTION - ADULT  Goal: Absence or prevention of progression during hospitalization  Description: INTERVENTIONS:  - Assess and monitor for signs and symptoms of infection  - Monitor lab/diagnostic results  - Monitor all insertion sites, i.e. indwelling lines, tubes, and drains  - Monitor endotracheal if appropriate and nasal secretions for changes in amount and color  - [place] appropriate cooling/warming therapies per order  - Administer medications as ordered  - Instruct and encourage patient and family to use good hand hygiene technique  - Identify and instruct in appropriate isolation precautions for identified infection/condition  10/2/2024 1357 by Lesly Couch LPN  Outcome: Adequate for Discharge  10/2/2024 0840 by Lesly Couch LPN  Outcome: Progressing     Problem: SAFETY ADULT  Goal: Patient will remain free of falls  Description: INTERVENTIONS:  - Educate patient/family on patient safety including physical limitations  - Instruct patient to call for assistance with activity   - Consult OT/PT to assist with strengthening/mobility   - Keep Call bell within reach  - Keep bed low and locked with side rails adjusted as appropriate  - Keep care items and personal belongings within reach  - Initiate and  maintain comfort rounds  - Make Fall Risk Sign visible to staff  - Offer Toileting every 2 Hours, in advance of need  - Initiate/Maintain bed/chair alarm  - Obtain necessary fall risk management equipment: as needed  - Apply yellow socks and bracelet for high fall risk patients  - Consider moving patient to room near nurses station  10/2/2024 1357 by Lesly Couch LPN  Outcome: Adequate for Discharge  10/2/2024 0840 by Lesly Couch LPN  Outcome: Progressing     Problem: DISCHARGE PLANNING  Goal: Discharge to home or other facility with appropriate resources  Description: INTERVENTIONS:  - Identify barriers to discharge w/patient and caregiver  - Arrange for needed discharge resources and transportation as appropriate  - Identify discharge learning needs (meds, wound care, etc.)  - Arrange for interpretive services to assist at discharge as needed  - Refer to Case Management Department for coordinating discharge planning if the patient needs post-hospital services based on physician/advanced practitioner order or complex needs related to functional status, cognitive ability, or social support system  10/2/2024 1357 by Lesly Couch LPN  Outcome: Adequate for Discharge  10/2/2024 0840 by Lesly Couch LPN  Outcome: Progressing     Problem: Knowledge Deficit  Goal: Patient/family/caregiver demonstrates understanding of disease process, treatment plan, medications, and discharge instructions  Description: Complete learning assessment and assess knowledge base.  Interventions:  - Provide teaching at level of understanding  - Provide teaching via preferred learning methods  10/2/2024 1357 by Lesyl Couch LPN  Outcome: Adequate for Discharge  10/2/2024 0840 by Lesly Couch LPN  Outcome: Progressing     Problem: RESPIRATORY - ADULT  Goal: Achieves optimal ventilation and oxygenation  Description: INTERVENTIONS:  - Assess for changes in respiratory status  - Assess for changes in mentation and behavior  -  Position to facilitate oxygenation and minimize respiratory effort  - Oxygen administered by appropriate delivery if ordered  - Initiate smoking cessation education as indicated  - Encourage broncho-pulmonary hygiene including cough, deep breathe, Incentive Spirometry  - Assess the need for suctioning and aspirate as needed  - Assess and instruct to report SOB or any respiratory difficulty  - Respiratory Therapy support as indicated  10/2/2024 1357 by Lesly Couch LPN  Outcome: Adequate for Discharge  10/2/2024 0840 by Lesly Couch LPN  Outcome: Progressing     Problem: METABOLIC, FLUID AND ELECTROLYTES - ADULT  Goal: Glucose maintained within target range  Description: INTERVENTIONS:  - Monitor Blood Glucose as ordered  - Assess for signs and symptoms of hyperglycemia and hypoglycemia  - Administer ordered medications to maintain glucose within target range  - Assess nutritional intake and initiate nutrition service referral as needed  10/2/2024 1357 by Lesly Couch LPN  Outcome: Adequate for Discharge  10/2/2024 0840 by Lesly Couch LPN  Outcome: Progressing     Problem: CARDIOVASCULAR - ADULT  Goal: Maintains optimal cardiac output and hemodynamic stability  Description: INTERVENTIONS:  - Monitor I/O, vital signs and rhythm  - Monitor for S/S and trends of decreased cardiac output  - Administer and titrate ordered vasoactive medications to optimize hemodynamic stability  - Assess quality of pulses, skin color and temperature  - Assess for signs of decreased coronary artery perfusion  - Instruct patient to report change in severity of symptoms  10/2/2024 1357 by Lesly Couch LPN  Outcome: Adequate for Discharge  10/2/2024 0840 by Lesly Couch LPN  Outcome: Progressing

## 2024-10-02 NOTE — TELEPHONE ENCOUNTER
Zuleima from [facility] called to request a verbal home health order for skilled nursing PT and OT       Zuleima states a verbal is ok, once they receive start of care they will fax the orders     Zuleima can be reached at [phone]

## 2024-10-03 ENCOUNTER — TRANSITIONAL CARE MANAGEMENT (OUTPATIENT)
Dept: INTERNAL MEDICINE CLINIC | Facility: CLINIC | Age: 84
End: 2024-10-03

## 2024-10-03 LAB

## 2024-10-03 NOTE — UTILIZATION REVIEW
NOTIFICATION OF ADMISSION DISCHARGE   This is a Notification of Discharge from [facility]. Please be advised that this patient has been discharge from our facility. Below you will find the admission and discharge date and time including the patient’s disposition.   UTILIZATION REVIEW CONTACT:  Simone Pittman  Utilization   Network Utilization Review Department  Phone: [phone] x carefully listen to the prompts. All voicemails are confidential.  Email: [email or web address]     ADMISSION INFORMATION  PRESENTATION DATE: 9/28/2024  1:22 PM  OBERVATION ADMISSION DATE: 09/28/2024 1607  INPATIENT ADMISSION DATE: 9/29/24 10:35 AM   DISCHARGE DATE: 10/2/2024  3:00 PM   DISPOSITION:Home with Home Health Care    Network Utilization Review Department  ATTENTION: Please call with any questions or concerns to [phone] and carefully listen to the prompts so that you are directed to the right person. All voicemails are confidential.   For Discharge needs, contact Care Management DC Support Team at [phone] opt. [phone]  Send all requests for admission clinical reviews, approved or denied determinations and any other requests to dedicated fax number below belonging to the campus where the patient is receiving treatment. List of dedicated fax numbers for the Facilities:  FACILITY NAME UR FAX NUMBER   ADMISSION DENIALS (Administrative/Medical Necessity) [phone]   DISCHARGE SUPPORT TEAM ([facility]) [phone]   PARENT CHILD HEALTH (Maternity/NICU/Pediatrics) [phone]   [facility] [phone]   [facility] [phone]   [facility] [phone]   [facility] [phone]   [facility] [phone]   [facility] [phone]   [facility] [phone]   [facility]  [place] [phone]   [facility] [phone]   [facility] [phone]   [facility] [phone]   [facility] [phone]

## 2024-10-07 ENCOUNTER — TELEPHONE (OUTPATIENT)
Dept: INTERNAL MEDICINE CLINIC | Facility: CLINIC | Age: 84
End: 2024-10-07

## 2024-10-07 ENCOUNTER — TRANSITIONAL CARE MANAGEMENT (OUTPATIENT)
Dept: INTERNAL MEDICINE CLINIC | Facility: CLINIC | Age: 84
End: 2024-10-07

## 2024-10-07 ENCOUNTER — RA CDI HCC (OUTPATIENT)
Dept: OTHER | Facility: HOSPITAL | Age: 84
End: 2024-10-07

## 2024-10-07 NOTE — PROGRESS NOTES
HCC coding opportunities          Chart Reviewed number of suggestions sent to Provider: 2  I13.0, E11.22     Patients Insurance     Medicare Insurance: [employer] Advantage

## 2024-10-08 ENCOUNTER — OFFICE VISIT (OUTPATIENT)
Dept: INTERNAL MEDICINE CLINIC | Facility: CLINIC | Age: 84
End: 2024-10-08
Payer: COMMERCIAL

## 2024-10-08 VITALS
HEART RATE: 94 BPM | HEIGHT: 60 IN | BODY MASS INDEX: 30.55 KG/M2 | TEMPERATURE: 98.6 F | SYSTOLIC BLOOD PRESSURE: 120 MMHG | WEIGHT: 155.6 LBS | OXYGEN SATURATION: 93 % | DIASTOLIC BLOOD PRESSURE: 66 MMHG

## 2024-10-08 DIAGNOSIS — S22.42XA CLOSED FRACTURE OF MULTIPLE RIBS OF LEFT SIDE, INITIAL ENCOUNTER: ICD-10-CM

## 2024-10-08 DIAGNOSIS — N18.30 STAGE 3 CHRONIC KIDNEY DISEASE, UNSPECIFIED WHETHER STAGE 3A OR 3B CKD (HCC): ICD-10-CM

## 2024-10-08 DIAGNOSIS — Z23 NEED FOR COVID-19 VACCINE: ICD-10-CM

## 2024-10-08 DIAGNOSIS — R91.8 LUNG MASS: Primary | ICD-10-CM

## 2024-10-08 DIAGNOSIS — Z23 ENCOUNTER FOR IMMUNIZATION: ICD-10-CM

## 2024-10-08 PROCEDURE — 99496 TRANSJ CARE MGMT HIGH F2F 7D: CPT | Performed by: PHYSICIAN ASSISTANT

## 2024-10-08 PROCEDURE — 90662 IIV NO PRSV INCREASED AG IM: CPT

## 2024-10-08 PROCEDURE — 90480 ADMN SARSCOV2 VAC 1/ONLY CMP: CPT

## 2024-10-08 PROCEDURE — G0008 ADMIN INFLUENZA VIRUS VAC: HCPCS

## 2024-10-08 PROCEDURE — 91320 SARSCV2 VAC 30MCG TRS-SUC IM: CPT

## 2024-10-09 PROBLEM — J96.01 ACUTE HYPOXIC RESPIRATORY FAILURE (HCC): Status: RESOLVED | Noted: 2018-12-03 | Resolved: 2024-10-09

## 2024-10-09 NOTE — ASSESSMENT & PLAN NOTE
Lab Results   Component Value Date    EGFR 55 10/01/2024    EGFR 38 09/29/2024    EGFR 51 09/28/2024    CREATININE 0.95 10/01/2024    CREATININE 1.27 09/29/2024    CREATININE 1.00 09/28/2024

## 2024-10-09 NOTE — ASSESSMENT & PLAN NOTE
Will get PET scan. Will also place oncology referral to have if needed.     Orders:    NM PET CT tumor imaging whole body; Future    Ambulatory Referral to Hematology / Oncology; Future

## 2024-10-09 NOTE — PROGRESS NOTES
Transition of Care Visit  Name: Cr Pena      : 1940      MRN: [id number]  Encounter Provider: Mary Kay Oliveira PA-C  Encounter Date: 10/8/2024   Encounter department: LTAC, located within [facility]    Assessment & Plan  Encounter for immunization    Orders:    influenza vaccine, high-dose, PF 0.5 mL (Fluzone High Dose)    Need for COVID-19 vaccine    Orders:    COVID-19 Pfizer mRNA vaccine 12 yr and older (Comirnaty pre-filled syringe)    Lung mass  Will get PET scan. Will also place oncology referral to have if needed.     Orders:    NM PET CT tumor imaging whole body; Future    Ambulatory Referral to Hematology / Oncology; Future    Stage 3 chronic kidney disease, unspecified whether stage 3a or 3b CKD (HCC)  Lab Results   Component Value Date    EGFR 55 10/01/2024    EGFR 38 2024    EGFR 51 2024    CREATININE 0.95 10/01/2024    CREATININE 1.27 2024    CREATININE 1.00 2024            Closed fracture of multiple ribs of left side, initial encounter  Pain has resolved. Pt is healing nicely, no further intervention needed.               History of Present Illness     Transitional Care Management Review:   Cr Pena is a 84 y.o. female here for TCM follow up.     During the TCM phone call patient stated:  TCM Call       Date and time call was made  10/2/2024  3:48 PM    Hospital care reviewed  Records reviewed    Patient was hospitialized at  [facility]    Date of Admission  24    Date of discharge  10/02/24    Diagnosis  Closed fracture of multiple ribs of left side    Disposition  Home    Were the patients medications reviewed and updated  Yes    Current Symptoms  None          TCM Call       Post hospital issues  None    Should patient be enrolled in anticoag monitoring?  No    Scheduled for follow up?  Yes    Referrals needed  pulmonologist     Did you obtain your prescribed medications  Yes    Do you need help managing your prescriptions or medications  No     Is transportation to your appointment needed  No    I have advised the patient to call PCP with any new or worsening symptoms  sharee harris    Are you recieving any outpatient services  No    Are you recieving home care services  No    Are you using any community resources  No    Current waiver services  No    Have you fallen in the last 12 months  No    Interperter language line needed  No    Counseling  Patient    Counseling topics  Diagnostic results; instructions for management; Risk factor reduction; patient and family education; Prognosis; Activities of daily living; Importance of RX compliance          Pt presents for TCM. She was attempting to hang a picture above her sofa. She stepped between the cushions and lost her footing causing a mechanical fall to the ground. She was evaluated and admitted to [facility] on 9/28. She underwent CT head, CT C/A/P, and Ct C-spine. She was found to have sustained closed fractures of her left 8th,9th, and 10th ribs. 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 She is a former 50 pack year smoker. She was started on oxygen and this has helped her breathing. Pain is improving and she no longer needs pain medication. She feels well otherwise. We discussed need for PET scan and she would like to proceed with this.       Review of Systems   Constitutional:  Negative for chills and fever.   HENT:  Negative for congestion, ear pain, hearing loss, postnasal drip, rhinorrhea, sinus pressure, sinus pain, sore throat and trouble swallowing.    Eyes:  Negative for pain and visual disturbance.   Respiratory:  Negative for cough, chest tightness, shortness of breath and wheezing.    Cardiovascular: Negative.  Negative for chest pain, palpitations and leg swelling.   Gastrointestinal:  Negative for abdominal pain, blood in stool, constipation, diarrhea, nausea and vomiting.   Endocrine: Negative for cold intolerance, heat intolerance, polydipsia, polyphagia and  polyuria.   Genitourinary:  Negative for difficulty urinating, dysuria, flank pain and urgency.   Musculoskeletal:  Negative for arthralgias, back pain, gait problem and myalgias.   Skin:  Negative for rash.   Allergic/Immunologic: Negative.    Neurological:  Negative for dizziness, weakness, light-headedness and headaches.   Hematological: Negative.    Psychiatric/Behavioral:  Negative for behavioral problems, dysphoric mood and sleep disturbance. The patient is not nervous/anxious.      Objective     /66   Pulse 94   Temp 98.6 °F (37 °C) (Tympanic)   Ht 5' (1.524 m)   Wt 70.6 kg (155 lb 9.6 oz)   SpO2 93%   BMI 30.39 kg/m²     Physical Exam  Vitals and nursing note reviewed.   Constitutional:       General: She is not in acute distress.     Appearance: Normal appearance. She is well-developed. She is not diaphoretic.   HENT:      Head: Normocephalic and atraumatic.      Right Ear: External ear normal.      Left Ear: External ear normal.      Nose: Nose normal.      Mouth/Throat:      Pharynx: No oropharyngeal exudate.   Eyes:      General: No scleral icterus.        Right eye: No discharge.         Left eye: No discharge.      Conjunctiva/sclera: Conjunctivae normal.      Pupils: Pupils are equal, round, and reactive to light.   Neck:      Thyroid: No thyromegaly.   Cardiovascular:      Rate and Rhythm: Normal rate and regular rhythm.      Heart sounds: Normal heart sounds. No murmur heard.     No friction rub. No gallop.   Pulmonary:      Effort: Pulmonary effort is normal. No respiratory distress.      Breath sounds: Normal breath sounds. No wheezing or rales.   Abdominal:      General: Bowel sounds are normal. There is no distension.      Palpations: Abdomen is soft.      Tenderness: There is no abdominal tenderness.   Musculoskeletal:         General: No tenderness or deformity. Normal range of motion.      Cervical back: Normal range of motion and neck supple.   Skin:     General: Skin is warm and  dry.   Neurological:      Mental Status: She is alert and oriented to person, place, and time.      Cranial Nerves: No cranial nerve deficit.   Psychiatric:         Behavior: Behavior normal.         Thought Content: Thought content normal.         Judgment: Judgment normal.       Medications have been reviewed by provider in current encounter    Administrative Statements   I have spent a total time of 15 minutes in caring for this patient on the day of the visit/encounter including Instructions for management, Patient and family education, Importance of tx compliance, Risk factor reductions, Impressions, Counseling / Coordination of care, Documenting in the medical record, Reviewing / ordering tests, medicine, procedures  , and Obtaining or reviewing history  .

## 2024-10-10 ENCOUNTER — DOCUMENTATION (OUTPATIENT)
Dept: HEMATOLOGY ONCOLOGY | Facility: CLINIC | Age: 84
End: 2024-10-10

## 2024-10-10 NOTE — PROGRESS NOTES
Chart was clinically reviewed by Oncology Nurse Navigator.  At this time the referral to hematology-oncology will be closed, as there is limited work-up, including biopsy, for the specified referral diagnosis. In order for Hematology Oncology to devise plan, confirmed tissue diagnosis is needed prior to consult. Please place a referral to Thoracic Surgery or Pulmonary as they will complete work-up and refer to medical oncology once there is a definitive diagnosis.  Please notify patient.     Thank you!

## 2024-10-10 NOTE — PROGRESS NOTES
Please let pt know we will await her pet scan results before having her see any specialists so we know which is most appropriate

## 2024-11-06 ENCOUNTER — HOSPITAL ENCOUNTER (OUTPATIENT)
Dept: RADIOLOGY | Age: 84
Discharge: HOME | End: 2024-11-06
Payer: COMMERCIAL

## 2024-11-06 DIAGNOSIS — R91.8 LUNG MASS: ICD-10-CM

## 2024-11-06 DIAGNOSIS — D38.1 NEOPLASM OF UNCERTAIN BEHAVIOR OF LEFT LOWER LOBE OF LUNG: ICD-10-CM

## 2024-11-06 LAB — GLUCOSE SERPL-MCNC: 85 MG/DL (ref 65–140)

## 2024-11-06 PROCEDURE — A9552 F18 FDG: HCPCS

## 2024-11-06 PROCEDURE — 78815 PET IMAGE W/CT SKULL-THIGH: CPT

## 2024-11-06 PROCEDURE — 82948 REAGENT STRIP/BLOOD GLUCOSE: CPT

## 2024-11-06 NOTE — ASSESSMENT & PLAN NOTE
Suspect due to acute rib fractures on top of underlying COPD.  Noted to become hypoxic after administration of fentanyl in the emergency department  Required up to 4 L of oxygen, was attempted to be weaned down but can only be weaned to 2 L nasal cannula in ED  Respiratory protocol  Currently on 2 L of oxygen, wean oxygen as able to maintain oxygen saturation 89%  Treat rib fractures and pain as above  Respiratory protocol  Incentive spirometer  Will continue patient's home inhaler as able per formulary  Patient uses rescue inhaler at home, will transition to Xopenex 3 times daily Resp   Today is hospital day 3d. This morning patient was seen and examined at bedside, resting comfortably in bed. Patient was noted to have sat 70s and tachypneic to 40-50 on the HFNC. She was placed back on BIPAP 100%. She was Offered ventilatory support which she emphatically declined. At this time, she was mentating well, but more lethargic than her usual. The risk of failure was explained to her, however she expressed that she wanted a chance to breath before intubating her. Meanwhile family was notified, and the family requested everything be done to keep her alive. Because she was only tachypneic without any accessory muscle use, she was monitored on NIV, on which she began to show improvement.   11/6- Patient today morning was stating in 80s on the BiPAP. positional changes were made and she was stating well. But later after a while she was noticed to have high RR with accessory muscles for respiration. Patient was explained the need of intubation. Patient family approached to obtain consent for intubation. Family agrees. Patient intubated. NGT suction draining brown fluid - possible fecal matter. Vascular surgery consulted in suspicion of mesentric ischemia.    HOSPITAL COURSE  71-year-old female PMH A-fib on xarelto,, HTN, s/p cholecystectomy, congenital solitary kidney presents from NH to the ED for evaluation of weakness, decreased PO intake and abdominal distension. Pt's son Everton says pt has not been feeling well the past few days, and has had poor appetite which is unlike her. She's been more tired and weak, unable to get out of bed. Has not had a bowel movement in 3 days, pt unsure if she's been passing gas otherwise. She was noted to be hypotensive and have an elevated wbc count at the NH as well. No dysuria, chest pain, SOB, cough or fever per pt otherwise.  No other complaints currently.     In the ED, BP 66/45 S/P LR 2800cc bolus with no improvement in BP, started on peripheral levo. Labs with wbc 19.29, vbg lactate 2.0-->2.3, pH 7.2, pCO2 45, Na 133, AG 21, Cr 4.3 (baseline 1.1), UA contaminated. CTAP with severe gastric distension, multiple foci of intraperitoneal free air and urinary bladder thickening with free air. Surgery consulted, no intervention for now, NGT placed for gastric decompression with 3.1L feculent output, recommend GI consult.    MEDICATIONS  (STANDING):  chlorhexidine 2% Cloths 1 Application(s) Topical <User Schedule>  dexMEDEtomidine Infusion 0.05 MICROgram(s)/kG/Hr (1.15 mL/Hr) IV Continuous <Continuous>  fentaNYL   Infusion 0.5 MICROgram(s)/kG/Hr (4.58 mL/Hr) IV Continuous <Continuous>  fluconAZOLE IVPB 200 milliGRAM(s) IV Intermittent every 24 hours  lactated ringers. 1000 milliLiter(s) (100 mL/Hr) IV Continuous <Continuous>  meropenem  IVPB      meropenem  IVPB 500 milliGRAM(s) IV Intermittent every 12 hours  norepinephrine Infusion 0.05 MICROgram(s)/kG/Min (8.48 mL/Hr) IV Continuous <Continuous>  pantoprazole  Injectable 40 milliGRAM(s) IV Push daily  potassium chloride  20 mEq/100 mL IVPB 20 milliEquivalent(s) IV Intermittent every 2 hours    MEDICATIONS  (PRN):      Vital Signs Last 24 Hrs  T(C): 37.3 (06 Nov 2024 08:00), Max: 37.9 (06 Nov 2024 01:30)  T(F): 99.1 (06 Nov 2024 08:00), Max: 100.3 (06 Nov 2024 01:30)  HR: 98 (06 Nov 2024 10:00) (86 - 104)  BP: 137/67 (06 Nov 2024 10:00) (104/55 - 149/60)  BP(mean): 96 (06 Nov 2024 10:00) (75 - 97)  RR: 31 (06 Nov 2024 10:00) (15 - 51)  SpO2: 100% (06 Nov 2024 10:00) (78% - 100%)    Parameters below as of 06 Nov 2024 08:00  Patient On (Oxygen Delivery Method): BiPAP/CPAP    O2 Concentration (%): 60      Physical Exam:   GENERAL: elderly appearing, on bipap>> switched to HFNC  HEENT: Normocephalic, atraumatic  PULMONARY: Clear to auscultation bilaterally. No rales, ronchi, or wheezing.   CARDIOVASCULAR: Regular rate and rhythm, S1-S2, no murmurs   GASTROINTESTINAL: Soft, non-tender, non-distended, no guarding.   SKIN/EXTREMITIES: No LE edema b/l  NEUROLOGIC: AAOX3          LABS:                          9.1    23.17 )-----------( 181      ( 06 Nov 2024 06:10 )             27.3     11-06    152[H]  |  113[H]  |  63[HH]  ----------------------------<  91  3.3[L]   |  24  |  1.8[H]    Ca    11.6[H]      06 Nov 2024 06:10  Mg     1.8     11-06    TPro  4.8[L]  /  Alb  2.8[L]  /  TBili  0.9  /  DBili  x   /  AST  11  /  ALT  23  /  AlkPhos  126[H]  11-06    LIVER FUNCTIONS - ( 06 Nov 2024 06:10 )  Alb: 2.8 g/dL / Pro: 4.8 g/dL / ALK PHOS: 126 U/L / ALT: 23 U/L / AST: 11 U/L / GGT: x             Urinalysis Basic - ( 06 Nov 2024 06:10 )    Color: x / Appearance: x / SG: x / pH: x  Gluc: 91 mg/dL / Ketone: x  / Bili: x / Urobili: x   Blood: x / Protein: x / Nitrite: x   Leuk Esterase: x / RBC: x / WBC x   Sq Epi: x / Non Sq Epi: x / Bacteria: x        Urinalysis with Rflx Culture (collected 03 Nov 2024 13:51)    Culture - Stool (collected 03 Nov 2024 13:51)  Source: .Stool  Preliminary Report (04 Nov 2024 21:38):    No enteric pathogens to date: Final culture pending    Culture - Blood (collected 03 Nov 2024 11:10)  Source: .Blood BLOOD  Preliminary Report (04 Nov 2024 18:01):    No growth at 24 hours    Culture - Blood (collected 03 Nov 2024 11:10)  Source: .Blood BLOOD  Preliminary Report (04 Nov 2024 18:01):    No growth at 24 hours      IMAGING    Prior EGD/ Colonoscopy:  < from: EGD w/ PEG Placement (02.16.23 @ 10:30) >  Impressions:    Grade D esophagitis compatible with erosive esophagitis.    Erythema in the stomach compatible with non-erosive gastritis.    Hiatal Hernia.    Normal mucosa in the whole examined duodenum.    < end of copied text >    US ABDOMEN RT UPR QUADRANT  IMPRESSION:  Status post cholecystectomy. Heterogeneous poorly visualized liver.   Previously noted portal venous air on prior CAT scan not well appreciated   on this limited exam.    XR CHEST PORTABLE ROUTINE 1V  IMPRESSION:    1. Unchanged bilateral opacities/pleural effusions.    ECHO- 02/23  Summary:   1. Left ventricular ejection fraction, by visual estimation, is >70%.   2. Hyperdynamic global left ventricular systolic function.   3. The left ventricular diastolic function could not be assessed in this   study.   4. Left atrial enlargement.   5. Calcified aortic valve with normal opening.   6. Mild tricuspid regurgitation.   7. Estimated pulmonary artery systolic pressure is 39.5 mmHg assuming a   right atrial pressure of 3 mmHg, which is consistent with borderline   pulmonary hypertension.    ECHO  Summary:   1. Technically difficult study.   2. Hyperdynamic global left ventricular systolic function with a biplane   EF of 71%. Indeterminate diastolic function. Suboptimal endocardial   visualization; patient refused echocontrast.   3. Grossly normal right ventricular size and function.   4. Left atrial enlargement by visual assessment.   5. Fibrocalcific aortic valve with mild to moderate aortic valve   stenosis (Vmax 2.99m/s, mean PG 21mmHg, DI 0.59, DONNA 1.76cm2). Note that   gradients may be increased by hyperdynamic left ventricular function.   6. Adequate TR velocity was not obtained to accurately assess RVSP.   7. Trivial pericardial effusion.      CT ABDOMEN AND PELVIS    IMPRESSION:    1. Severe gastric distention extending to the level of the first/second   portion of duodenum, with new pneumatosis along the posterior gastric   wall, suspicious for ischemia; multiple adjacent foci of free   intraperitoneal air are noted.  2. New small linear foci of air within the peripheral liver, suspicious   for portal venous gas.  3. Thick-walled urinary bladder containing multiple foci of air; findings   may be related to recent intervention and/or underlying cystitis, however   however please note colovesicular fistula can have this appearance.   Clinical correlation is suggested.  4. Small bilateral pleural effusions with small bibasilar   opacities/atelectasis.

## 2024-11-08 ENCOUNTER — OFFICE VISIT (OUTPATIENT)
Dept: PODIATRY | Facility: CLINIC | Age: 84
End: 2024-11-08
Payer: COMMERCIAL

## 2024-11-08 VITALS
WEIGHT: 155 LBS | TEMPERATURE: 73 F | OXYGEN SATURATION: 98 % | BODY MASS INDEX: 30.43 KG/M2 | HEIGHT: 60 IN | HEART RATE: 82 BPM | DIASTOLIC BLOOD PRESSURE: 62 MMHG | RESPIRATION RATE: 16 BRPM | SYSTOLIC BLOOD PRESSURE: 118 MMHG

## 2024-11-08 DIAGNOSIS — B35.1 ONYCHOMYCOSIS: ICD-10-CM

## 2024-11-08 DIAGNOSIS — E11.41 CONTROLLED TYPE 2 DIABETES MELLITUS WITH DIABETIC MONONEUROPATHY, WITHOUT LONG-TERM CURRENT USE OF INSULIN (HCC): Primary | ICD-10-CM

## 2024-11-08 PROCEDURE — RECHECK: Performed by: PODIATRIST

## 2024-11-08 PROCEDURE — 11720 DEBRIDE NAIL 1-5: CPT | Performed by: PODIATRIST

## 2024-11-08 NOTE — PROGRESS NOTES
Ambulatory Visit  Name: Cr Pena      : 1940      MRN: [id number]  Encounter Provider: Parth Davey DPM  Encounter Date: 2024   Encounter department: [facility] PODI[facility] [place]    Assessment & Plan  Controlled type 2 diabetes mellitus with diabetic mononeuropathy, without long-term current use of insulin ([facility])    Lab Results   Component Value Date    HGBA1C 6.7 (H) 2024            Onychomycosis       Debride mycotic nails and thin the nail plates x 4 with the use of a nail nipper manually and an electric Dremel bur was used to reduce the thickness of the nail beds and smoothed the distal aspect of the nails.     Discussed proper shoe gear, daily inspections of feet, and general foot health with patient. Patient has Q9  findings and is recommended for at risk foot care every 9-10 weeks.     Return in about 10 weeks (around 2025).     History of Present Illness     Cr Pena is a 84 y.o. female who presents with chief complaint of painful thick nails on both feet.  She is a diabetic who also has peripheral neuropathy.  Patient presents for at-risk foot care.  Patient has no acute concerns today.  Patient has significant lower extremity risk due to neuropathy, parasthesia, edema, and trophic skin changes to the lower extremity.     History obtained from : patient  Review of Systems  Medical History Reviewed by provider this encounter:       Current Outpatient Medications on File Prior to Visit   Medication Sig Dispense Refill    albuterol (PROVENTIL HFA,VENTOLIN HFA) 90 mcg/act inhaler Inhale 2 puffs 4 (four) times a day 54 g 1    aspirin 81 mg chewable tablet Chew 81 mg daily      atorvastatin (LIPITOR) 20 mg tablet Take 1 tablet by mouth daily 90 tablet 1    Budeson-Glycopyrrol-Formoterol (Breztri Aerosphere) 160-9-4.8 MCG/ACT AERO Inhale 2 puffs 2 (two) times a day Rinse mouth after use. 32.1 g 3    cetirizine (ZyrTEC) 10 mg tablet Take 10 mg by mouth daily       Cholecalciferol (D3 2000) 50 MCG (2000 UT) CAPS Take 2,000 Units by mouth daily      docusate sodium (COLACE) 100 mg capsule Take 1 capsule (100 mg total) by mouth 2 (two) times a day 60 capsule 0    gabapentin (NEURONTIN) 300 mg capsule Take 1 capsule (300 mg total) by mouth daily 100 capsule 1    glucose blood (OneTouch Verio) test strip Use as instructed TEST TWO TIMES DAILY e11.65 300 each 5    lidocaine (LIDODERM) 5 % Apply 1 patch topically over 12 hours daily Remove & Discard patch within 12 hours or as directed by MD 14 patch 0    lisinopril (ZESTRIL) 5 mg tablet Take 1 tablet (5 mg total) by mouth daily 100 tablet 1    metFORMIN (GLUCOPHAGE) 500 mg tablet Take 1 tablet (500 mg total) by mouth 2 (two) times a day with meals 180 tablet 0    Multiple Vitamin (MULTIVITAMINS PO) Take by mouth daily      Multiple Vitamins-Minerals (One-A-Day Womens 50+) TABS Take 1 tablet by mouth daily      omeprazole (PriLOSEC) 40 MG capsule Take 1 capsule (40 mg total) by mouth daily before breakfast 100 capsule 1    oxybutynin (DITROPAN-XL) 5 mg 24 hr tablet Take 1 tablet (5 mg total) by mouth daily 100 tablet 1     No current facility-administered medications on file prior to visit.      Social History     Tobacco Use    Smoking status: Former     Current packs/day: 0.00     Average packs/day: 1 pack/day for 50.0 years (50.0 ttl pk-yrs)     Types: Cigarettes     Start date: 12/17/1963     Quit date: 12/17/2013     Years since quitting: 10.9     Passive exposure: Past    Smokeless tobacco: Never   Vaping Use    Vaping status: Never Used   Substance and Sexual Activity    Alcohol use: Never    Drug use: Never    Sexual activity: Not on file         Objective     /62   Pulse 82   Temp (!) 73 °F (22.8 °C)   Resp 16   Ht 5' (1.524 m)   Wt 70.3 kg (155 lb)   SpO2 98%   BMI 30.27 kg/m²     Physical Exam  Vascular status is a faint 1/4 DP PT negative digital hair normal distal cooling slightly delayed capillary refill  bilaterally.  Capillary refill is approximately 2 to 3 seconds and there is slight edema present around the ankles.    Derm nails are brittle elongated hypertrophic white-yellow discoloration with subungual debris x 4.  There is an increased thickness and the nails are approximately 2 mm.  The skin has lost its turgor and there is thinning of the skin also.    Ortho and neuro are unchanged from last visit.  Administrative Statements   I have spent a total time of 15 minutes in caring for this patient on the day of the visit/encounter including Risks and benefits of tx options, Instructions for management, Patient and family education, Importance of tx compliance, Counseling / Coordination of care, and Documenting in the medical record.

## 2024-11-11 ENCOUNTER — TELEPHONE (OUTPATIENT)
Age: 84
End: 2024-11-11

## 2024-11-11 NOTE — TELEPHONE ENCOUNTER
Patient called for results   per:   Mary Kay Oliveira PA-C  11/11/2024 12:19 PM EST       Please offer pt a same day appt this week to discuss pet scan results, thanks     Needs a someday appointment,  Please call pt back

## 2024-11-14 ENCOUNTER — OFFICE VISIT (OUTPATIENT)
Dept: INTERNAL MEDICINE CLINIC | Facility: CLINIC | Age: 84
End: 2024-11-14
Payer: COMMERCIAL

## 2024-11-14 VITALS
HEIGHT: 60 IN | BODY MASS INDEX: 29.45 KG/M2 | OXYGEN SATURATION: 96 % | WEIGHT: 150 LBS | DIASTOLIC BLOOD PRESSURE: 82 MMHG | TEMPERATURE: 99.1 F | HEART RATE: 84 BPM | SYSTOLIC BLOOD PRESSURE: 140 MMHG

## 2024-11-14 DIAGNOSIS — F33.41 RECURRENT MAJOR DEPRESSIVE DISORDER, IN PARTIAL REMISSION (HCC): ICD-10-CM

## 2024-11-14 DIAGNOSIS — R94.2 ABNORMAL PET SCAN OF LUNG: Primary | ICD-10-CM

## 2024-11-14 PROCEDURE — 99213 OFFICE O/P EST LOW 20 MIN: CPT | Performed by: PHYSICIAN ASSISTANT

## 2024-11-14 PROCEDURE — G2211 COMPLEX E/M VISIT ADD ON: HCPCS | Performed by: PHYSICIAN ASSISTANT

## 2024-11-18 PROBLEM — R94.2 ABNORMAL PET SCAN OF LUNG: Status: ACTIVE | Noted: 2024-11-18

## 2024-11-18 PROBLEM — F33.41 RECURRENT MAJOR DEPRESSIVE DISORDER, IN PARTIAL REMISSION (HCC): Status: ACTIVE | Noted: 2024-11-18

## 2024-11-18 NOTE — ASSESSMENT & PLAN NOTE
Findings discussed with pt and her son. She would like an oncology consult to discuss options so she can make a more informed choice on if she would like to pursue any treatment strategies at her age. All questions answered to the best of my ability.    Orders:    Ambulatory Referral to Hematology / Oncology; Future

## 2024-11-18 NOTE — PROGRESS NOTES
Name: Cr Pena      : 1940      MRN: [id number]  Encounter Provider: Mary Kay Oliveira PA-C  Encounter Date: 2024   Encounter department: [facility]  :  Assessment & Plan  Abnormal PET scan of lung  Findings discussed with pt and her son. She would like an oncology consult to discuss options so she can make a more informed choice on if she would like to pursue any treatment strategies at her age. All questions answered to the best of my ability.    Orders:    Ambulatory Referral to Hematology / Oncology; Future    Recurrent major depressive disorder, in partial remission (HCC)  Depression Screening Follow-up Plan: Patient's depression screening was positive with a PHQ-2 score of 3. Their PHQ-9 score was 6. Patient advised to follow-up with PCP for further management.               Depression Screening and Follow-up Plan: Patient's depression screening was positive with a PHQ-2 score of 3. Their PHQ-9 score was 6. Continue regular follow-up with their mental health provider who is managing their mental health condition(s).       History of Present Illness     Pt presents per our request to discuss recent PET scan results. She is accompanied by her son. She was in the ER for a fall on  and imaging to evaluate her injuries noted an incidental finding of a lung mass. She underwent PET scan to better evaluate. This revealed mass of LLL of lung very concerning for malignancy. Pt aware we cannot say with certainty due to not having biopsy but results are quite consistent with this. Pt, her son and I discussed options to pursue due to her age. She is agreeable to hematology consult as she would be open to some non aggressive treatment options.       Review of Systems   Constitutional:  Negative for chills and fever.   HENT:  Negative for congestion, ear pain, hearing loss, postnasal drip, rhinorrhea, sinus pressure, sinus pain, sore throat and trouble swallowing.    Eyes:  Negative  for pain and visual disturbance.   Respiratory:  Negative for cough, chest tightness, shortness of breath and wheezing.    Cardiovascular: Negative.  Negative for chest pain, palpitations and leg swelling.   Gastrointestinal:  Negative for abdominal pain, blood in stool, constipation, diarrhea, nausea and vomiting.   Endocrine: Negative for cold intolerance, heat intolerance, polydipsia, polyphagia and polyuria.   Genitourinary:  Negative for difficulty urinating, dysuria, flank pain and urgency.   Musculoskeletal:  Negative for arthralgias, back pain, gait problem and myalgias.   Skin:  Negative for rash.   Allergic/Immunologic: Negative.    Neurological:  Negative for dizziness, weakness, light-headedness and headaches.   Hematological: Negative.    Psychiatric/Behavioral:  Negative for behavioral problems, dysphoric mood and sleep disturbance. The patient is not nervous/anxious.      Current Outpatient Medications on File Prior to Visit   Medication Sig Dispense Refill    albuterol (PROVENTIL HFA,VENTOLIN HFA) 90 mcg/act inhaler Inhale 2 puffs 4 (four) times a day 54 g 1    aspirin 81 mg chewable tablet Chew 81 mg daily      atorvastatin (LIPITOR) 20 mg tablet Take 1 tablet by mouth daily 90 tablet 1    Budeson-Glycopyrrol-Formoterol (Breztri Aerosphere) 160-9-4.8 MCG/ACT AERO Inhale 2 puffs 2 (two) times a day Rinse mouth after use. 32.1 g 3    cetirizine (ZyrTEC) 10 mg tablet Take 10 mg by mouth daily      Cholecalciferol (D3 2000) 50 MCG (2000 UT) CAPS Take 2,000 Units by mouth daily      docusate sodium (COLACE) 100 mg capsule Take 1 capsule (100 mg total) by mouth 2 (two) times a day 60 capsule 0    gabapentin (NEURONTIN) 300 mg capsule Take 1 capsule (300 mg total) by mouth daily 100 capsule 1    glucose blood (OneTouch Verio) test strip Use as instructed TEST TWO TIMES DAILY e11.65 300 each 5    lisinopril (ZESTRIL) 5 mg tablet Take 1 tablet (5 mg total) by mouth daily 100 tablet 1    metFORMIN  (GLUCOPHAGE) 500 mg tablet Take 1 tablet (500 mg total) by mouth 2 (two) times a day with meals 180 tablet 0    Multiple Vitamin (MULTIVITAMINS PO) Take by mouth daily      Multiple Vitamins-Minerals (One-A-Day Womens 50+) TABS Take 1 tablet by mouth daily      omeprazole (PriLOSEC) 40 MG capsule Take 1 capsule (40 mg total) by mouth daily before breakfast 100 capsule 1    oxybutynin (DITROPAN-XL) 5 mg 24 hr tablet Take 1 tablet (5 mg total) by mouth daily 100 tablet 1    lidocaine (LIDODERM) 5 % Apply 1 patch topically over 12 hours daily Remove & Discard patch within 12 hours or as directed by MD (Patient not taking: Reported on 11/14/2024) 14 patch 0     No current facility-administered medications on file prior to visit.      Social History     Tobacco Use    Smoking status: Former     Current packs/day: 0.00     Average packs/day: 1 pack/day for 50.0 years (50.0 ttl pk-yrs)     Types: Cigarettes     Start date: 12/17/1963     Quit date: 12/17/2013     Years since quitting: 10.9     Passive exposure: Past    Smokeless tobacco: Never   Vaping Use    Vaping status: Never Used   Substance and Sexual Activity    Alcohol use: Never    Drug use: Never    Sexual activity: Not on file        Objective   /82 (Patient Position: Sitting, Cuff Size: Large)   Pulse 84   Temp 99.1 °F (37.3 °C) (Tympanic)   Ht 5' (1.524 m)   Wt 68 kg (150 lb)   SpO2 96%   BMI 29.29 kg/m²      Physical Exam  Vitals and nursing note reviewed.   Constitutional:       General: She is not in acute distress.     Appearance: Normal appearance. She is well-developed. She is not diaphoretic.   HENT:      Head: Normocephalic and atraumatic.      Right Ear: External ear normal.      Left Ear: External ear normal.      Nose: Nose normal.      Mouth/Throat:      Pharynx: No oropharyngeal exudate.   Eyes:      General: No scleral icterus.        Right eye: No discharge.         Left eye: No discharge.      Conjunctiva/sclera: Conjunctivae  normal.      Pupils: Pupils are equal, round, and reactive to light.   Neck:      Thyroid: No thyromegaly.   Cardiovascular:      Rate and Rhythm: Normal rate and regular rhythm.      Heart sounds: Normal heart sounds. No murmur heard.     No friction rub. No gallop.   Pulmonary:      Effort: Pulmonary effort is normal. No respiratory distress.      Breath sounds: Normal breath sounds. No wheezing or rales.   Abdominal:      General: Bowel sounds are normal. There is no distension.      Palpations: Abdomen is soft.      Tenderness: There is no abdominal tenderness.   Musculoskeletal:         General: No tenderness or deformity. Normal range of motion.      Cervical back: Normal range of motion and neck supple.   Skin:     General: Skin is warm and dry.   Neurological:      Mental Status: She is alert and oriented to person, place, and time.      Cranial Nerves: No cranial nerve deficit.   Psychiatric:         Behavior: Behavior normal.         Thought Content: Thought content normal.         Judgment: Judgment normal.       Administrative Statements   I have spent a total time of 15 minutes in caring for this patient on the day of the visit/encounter including Instructions for management, Patient and family education, Importance of tx compliance, Risk factor reductions, Impressions, Counseling / Coordination of care, Documenting in the medical record, Reviewing / ordering tests, medicine, procedures  , and Obtaining or reviewing history  .

## 2024-11-18 NOTE — ASSESSMENT & PLAN NOTE
Depression Screening Follow-up Plan: Patient's depression screening was positive with a PHQ-2 score of 3. Their PHQ-9 score was 6. Patient advised to follow-up with PCP for further management.

## 2024-11-20 ENCOUNTER — DOCUMENTATION (OUTPATIENT)
Dept: HEMATOLOGY ONCOLOGY | Facility: CLINIC | Age: 84
End: 2024-11-20

## 2024-11-20 DIAGNOSIS — R91.8 LUNG MASS: Primary | ICD-10-CM

## 2024-11-20 NOTE — PROGRESS NOTES
Chart was clinically reviewed by Oncology Nurse Navigator.  At this time the referral to hematology-oncology will be closed, as there is limited work-up, including biopsy, for the specified referral diagnosis. In order for Hematology Oncology to devise plan, confirmed tissue diagnosis is needed prior to consult. Please place a referral to Thoracic Surgery as can review PET CT, discuss possible treatment options based off PET CT results and review need for biopsy.  They will refer to medical oncology once there is a definitive diagnosis.  Please notify patient.     Thank you!

## 2024-11-21 ENCOUNTER — DOCUMENTATION (OUTPATIENT)
Dept: HEMATOLOGY ONCOLOGY | Facility: CLINIC | Age: 84
End: 2024-11-21

## 2024-11-21 NOTE — PROGRESS NOTES
All records needed are in patients chart. No records retrieval needed at this time.     Referral received/ Chart reviewed for work up completed     Imaging completed:    [x] PET/CT 11/06/24   [] MRI   [x] CT spine cervical wo contrast, CT head wo contrast, CT CAP w contrast 09/28/24   [] US   [] Mammo   [] Bone scan   [] N/A    Pathology completed:    Date:   Location:   [x]N/A    Additional records :   [] Genomic report   [] Genetic testing results   [] Office Note   [x] Procedure 10/26/2022   [] Lab results   [] N/A      [] Radiation Oncology records retrieval needed (PN to route to rad/onc clerical pool once scheduled)  Date:  Location:

## 2024-11-22 ENCOUNTER — TELEPHONE (OUTPATIENT)
Age: 84
End: 2024-11-22

## 2024-11-22 NOTE — TELEPHONE ENCOUNTER
Please assist the patient with a sooner appointment as per the clinical review patient should be seen with in a 5 day timeframe. The patient has been schedule at the preferred location of [facility] and placed on the wait list .     Patient best contact number [phone]

## 2024-11-22 NOTE — TELEPHONE ENCOUNTER
Tried to call the patient back three separate times, the phone is not ringing and a message comes on that there is no voicemail set up. Phone disconnects. If patient calls back, Dr. Estrada has a 2:20 pm slot open on 11/25 that the patient can have.

## 2024-11-22 NOTE — TELEPHONE ENCOUNTER
Called and spoke to patient. Offered her an appointment with Dr. Estrada on 11/25. Patient was unsure about weather or if she would have a ride for that date so she said she would rather stick with her 12/2 appointment for now.

## 2024-11-22 NOTE — TELEPHONE ENCOUNTER
Pt called back stating that she would like to take the 11/25 appt that was offered. Pt prefers morning but if she has to do afternoon that's fine as well.

## 2024-11-26 DIAGNOSIS — E11.9 TYPE 2 DIABETES MELLITUS WITHOUT COMPLICATION, WITHOUT LONG-TERM CURRENT USE OF INSULIN (HCC): ICD-10-CM

## 2024-11-27 DIAGNOSIS — J44.9 CHRONIC OBSTRUCTIVE PULMONARY DISEASE, UNSPECIFIED COPD TYPE (HCC): ICD-10-CM

## 2024-11-27 RX ORDER — BUDESONIDE, GLYCOPYRROLATE, AND FORMOTEROL FUMARATE 160; 9; 4.8 UG/1; UG/1; UG/1
2 AEROSOL, METERED RESPIRATORY (INHALATION) 2 TIMES DAILY
Qty: 32.1 G | Refills: 3 | Status: SHIPPED | OUTPATIENT
Start: 2024-11-27

## 2024-12-01 NOTE — ASSESSMENT & PLAN NOTE
84yF w/ 5.5cm PET avid left lower lobe lung mass concerning for clinical Stage IIB [id number] lung cancer. She does not have a biopsy yet. PFTs are from 2022.     Assuming this is NSCLC her treatment options include induction chemo and immunotherapy followed by surgery vs definitive chemo-radiation followed by immunotherapy.     Given her age, functional status and comorbidities I would recommend a non-surgical approach if she was to pursue treatment. She is interested in considering treatment. We will start with an IR biopsy. If a cancer diagnosis is established I will then refer her to medical and radiation oncology.     Orders:    Ambulatory Referral to Interventional Radiology; Future

## 2024-12-01 NOTE — PROGRESS NOTES
Name: Cr Pena      : 1940      MRN: [id number]  Encounter Provider: Franco Estrada DO  Encounter Date: 2024   Encounter department: [facility] [place]     :  Assessment & Plan  Mass of lower lobe of left lung  84yF w/ 5.5cm PET avid left lower lobe lung mass concerning for clinical Stage IIB [id number] lung cancer. She does not have a biopsy yet. PFTs are from .     Assuming this is NSCLC her treatment options include induction chemo and immunotherapy followed by surgery vs definitive chemo-radiation followed by immunotherapy.     Given her age, functional status and comorbidities I would recommend a non-surgical approach if she was to pursue treatment. She is interested in considering treatment. We will start with an IR biopsy. If a cancer diagnosis is established I will then refer her to medical and radiation oncology.     Orders:    Ambulatory Referral to Interventional Radiology; Future    Lung mass    Orders:    Ambulatory Referral to Thoracic Oncology        Thoracic History   Problem   Mass of Lower Lobe of Left Lung      History of Present Illness   HPI  Cr Pena is a 84 y.o. female referred for a lung mass. Her medical history includes COPD, DM and HTN. She was found to have an incidental left lower lobe superior segmentectomy lung mass after a fall. PET was complete 24 with avidity just in the mass.    She states that she has oxygen at home but she is not currently using it because a part is not working. Oxygen saturations are fine in the office. She uses a rolling walker. She admits to shortness of breath using her rolling walker and moving slowly. No chest pain. Chronic cough without hemoptysis.         Data:  The following results contain my personal interpretation and summarization.   CTCAP (24): 4.5cm LLL superior segment mass. L 8/9/10 rib fractures.   CTH (24): No evidence of mets.  PET (24): 5.5cm LLL mass with SUV 23.  No hypermetabolism seen in mediastinum or distant disease.   PFTs (10/26/22): FEV1 59%, DLCO 60%    Review of Systems   Constitutional:  Positive for fatigue. Negative for chills, diaphoresis and fever.   HENT:  Negative for trouble swallowing and voice change.    Eyes:  Negative for photophobia and visual disturbance.   Cardiovascular:  Negative for chest pain and palpitations.   Gastrointestinal:  Negative for nausea and vomiting.   Musculoskeletal:  Positive for arthralgias and gait problem.   Skin:  Negative for rash and wound.   Neurological:  Negative for dizziness, weakness and headaches.   All other systems reviewed and are negative.     Past Medical History   Past Medical History:   Diagnosis Date    Allergic     Allergic rhinitis     COPD (chronic obstructive pulmonary disease) (HCC)     Diabetes mellitus (HCC)     GERD (gastroesophageal reflux disease)     Hyperlipidemia     Hypertension     Left non-suppurative otitis media 11/27/2019    Pneumonia     Pneumonia of right lower lobe due to infectious organism 12/4/2018    Stage 3 chronic kidney disease, unspecified whether stage 3a or 3b CKD (HCC) 11/1/2022    Vitamin D deficiency      Past Surgical History:   Procedure Laterality Date    CARDIAC CATHETERIZATION      CHOLECYSTECTOMY  1984    COLONOSCOPY N/A 1/30/2019    Procedure: COLONOSCOPY with multiple  polypectomies;  Surgeon: Aissatou Lyles MD;  Location:  GI LAB;  Service: Gastroenterology    EYE SURGERY Bilateral     CATARACT     Family History   Problem Relation Age of Onset    Lung cancer Father     Heart disease Mother     Lung cancer Sister     Lung cancer Brother       reports that she quit smoking about 10 years ago. Her smoking use included cigarettes. She started smoking about 61 years ago. She has a 50 pack-year smoking history. She has been exposed to tobacco smoke. She has never used smokeless tobacco. She reports that she does not drink alcohol and does not use drugs.  Current  Outpatient Medications on File Prior to Visit   Medication Sig Dispense Refill    albuterol (PROVENTIL HFA,VENTOLIN HFA) 90 mcg/act inhaler Inhale 2 puffs 4 (four) times a day 54 g 1    aspirin 81 mg chewable tablet Chew 81 mg daily      atorvastatin (LIPITOR) 20 mg tablet Take 1 tablet by mouth daily 90 tablet 1    Breztri Aerosphere 160-9-4.8 MCG/ACT AERO Inhale 2 puffs 2 (two) times a day Rinse mouth after use. 32.1 g 3    cetirizine (ZyrTEC) 10 mg tablet Take 10 mg by mouth daily      Cholecalciferol (D3 2000) 50 MCG (2000 UT) CAPS Take 2,000 Units by mouth daily      gabapentin (NEURONTIN) 300 mg capsule Take 1 capsule (300 mg total) by mouth daily 100 capsule 1    glucose blood (OneTouch Verio) test strip Use as instructed TEST TWO TIMES DAILY e11.65 300 each 5    lisinopril (ZESTRIL) 5 mg tablet Take 1 tablet (5 mg total) by mouth daily 100 tablet 1    metFORMIN (GLUCOPHAGE) 500 mg tablet Take 1 tablet (500 mg total) by mouth 2 (two) times a day with meals 180 tablet 1    Multiple Vitamin (MULTIVITAMINS PO) Take by mouth daily      Multiple Vitamins-Minerals (One-A-Day Womens 50+) TABS Take 1 tablet by mouth daily      omeprazole (PriLOSEC) 40 MG capsule Take 1 capsule (40 mg total) by mouth daily before breakfast 100 capsule 1    oxybutynin (DITROPAN-XL) 5 mg 24 hr tablet Take 1 tablet (5 mg total) by mouth daily 100 tablet 1    docusate sodium (COLACE) 100 mg capsule Take 1 capsule (100 mg total) by mouth 2 (two) times a day (Patient not taking: Reported on 12/2/2024) 60 capsule 0    lidocaine (LIDODERM) 5 % Apply 1 patch topically over 12 hours daily Remove & Discard patch within 12 hours or as directed by MD (Patient not taking: Reported on 11/14/2024) 14 patch 0     No current facility-administered medications on file prior to visit.     Allergies   Allergen Reactions    Bee Venom Anaphylaxis    Other Shortness Of Breath     FLOWERS/GRASS           Objective   /92 (BP Location: Right arm, Patient  Position: Sitting, Cuff Size: Standard)   Pulse 89   Temp 97.7 °F (36.5 °C) (Temporal)   Resp 15   Ht 5' (1.524 m)   Wt 68.4 kg (150 lb 12.7 oz)   SpO2 95%   BMI 29.45 kg/m²     Blood pressure 142/92, pulse 89, temperature 97.7 °F (36.5 °C), temperature source Temporal, resp. rate 15, height 5' (1.524 m), weight 68.4 kg (150 lb 12.7 oz), SpO2 95%.  ECOG    Physical Exam  Vitals reviewed.   Constitutional:       General: She is not in acute distress.     Appearance: Normal appearance. She is obese. She is not toxic-appearing.   HENT:      Head: Normocephalic and atraumatic.   Cardiovascular:      Rate and Rhythm: Normal rate and regular rhythm.      Heart sounds: Normal heart sounds. No murmur heard.  Pulmonary:      Effort: Pulmonary effort is normal. No respiratory distress.      Breath sounds: Normal breath sounds.   Abdominal:      General: Abdomen is flat. There is no distension.      Palpations: Abdomen is soft.      Tenderness: There is no abdominal tenderness.   Musculoskeletal:      Cervical back: Normal range of motion.      Right lower leg: No edema.      Left lower leg: No edema.   Lymphadenopathy:      Cervical: No cervical adenopathy.   Neurological:      General: No focal deficit present.      Mental Status: She is alert and oriented to person, place, and time. Mental status is at baseline.   Psychiatric:         Mood and Affect: Mood normal.         Behavior: Behavior normal.         Thought Content: Thought content normal.         Judgment: Judgment normal.         Labs:         Pathology: I have reviewed pathology reports described above.    Administrative Statements   I have spent a total time of 41 minutes in caring for this patient on the day of the visit/encounter including Diagnostic results, Prognosis, Risks and benefits of tx options, Patient and family education, Impressions, Counseling / Coordination of care, Documenting in the medical record, Reviewing / ordering tests, medicine,  procedures  , Obtaining or reviewing history  , and Communicating with other healthcare professionals .

## 2024-12-02 ENCOUNTER — PREP FOR PROCEDURE (OUTPATIENT)
Dept: INTERVENTIONAL RADIOLOGY/VASCULAR | Facility: CLINIC | Age: 84
End: 2024-12-02

## 2024-12-02 ENCOUNTER — CONSULT (OUTPATIENT)
Dept: CARDIAC SURGERY | Facility: CLINIC | Age: 84
End: 2024-12-02
Payer: COMMERCIAL

## 2024-12-02 ENCOUNTER — DOCUMENTATION (OUTPATIENT)
Dept: CARDIAC SURGERY | Facility: CLINIC | Age: 84
End: 2024-12-02

## 2024-12-02 VITALS
HEART RATE: 89 BPM | RESPIRATION RATE: 15 BRPM | BODY MASS INDEX: 29.61 KG/M2 | TEMPERATURE: 97.7 F | HEIGHT: 60 IN | SYSTOLIC BLOOD PRESSURE: 142 MMHG | OXYGEN SATURATION: 95 % | DIASTOLIC BLOOD PRESSURE: 92 MMHG | WEIGHT: 150.79 LBS

## 2024-12-02 DIAGNOSIS — R91.8 MASS OF LOWER LOBE OF LEFT LUNG: Primary | ICD-10-CM

## 2024-12-02 DIAGNOSIS — R91.8 LUNG MASS: ICD-10-CM

## 2024-12-02 DIAGNOSIS — R94.2 ABNORMAL PET SCAN OF LUNG: Primary | ICD-10-CM

## 2024-12-02 PROCEDURE — 99204 OFFICE O/P NEW MOD 45 MIN: CPT | Performed by: SURGERY

## 2024-12-02 NOTE — PROGRESS NOTES
I met with Cr at her visit with Dr. Estrada today. I introduced myself to her and explained my role. Questions answered, support provided.

## 2024-12-03 NOTE — PROGRESS NOTES
Spoke with Cr and confirmed appointment at the [facility] on 12/9. Pt instructed to arrive at 0730, have nothing to eat or drink after midnight, and have a ride to and from. Pt also instructed to hold ASA until after procedure and to hold glucophage the morning of the procedure. Pt verbalized understanding. Consult complete.

## 2024-12-09 ENCOUNTER — HOSPITAL ENCOUNTER (OUTPATIENT)
Dept: CT IMAGING | Facility: HOSPITAL | Age: 84
Discharge: HOME | End: 2024-12-09
Attending: STUDENT IN AN ORGANIZED HEALTH CARE EDUCATION/TRAINING PROGRAM
Payer: COMMERCIAL

## 2024-12-09 VITALS
TEMPERATURE: 97.9 F | WEIGHT: 150 LBS | HEIGHT: 60 IN | OXYGEN SATURATION: 94 % | SYSTOLIC BLOOD PRESSURE: 119 MMHG | RESPIRATION RATE: 18 BRPM | DIASTOLIC BLOOD PRESSURE: 64 MMHG | BODY MASS INDEX: 29.45 KG/M2 | HEART RATE: 82 BPM

## 2024-12-09 DIAGNOSIS — R94.2 ABNORMAL PET SCAN OF LUNG: ICD-10-CM

## 2024-12-09 LAB
ERYTHROCYTE [DISTWIDTH] IN BLOOD BY AUTOMATED COUNT: 13 % (ref 11.6–15.1)
GLUCOSE SERPL-MCNC: 102 MG/DL (ref 65–140)
HCT VFR BLD AUTO: 39.9 % (ref 34.8–46.1)
HGB BLD-MCNC: 12.8 G/DL (ref 11.5–15.4)
INR PPP: 1.09 (ref 0.85–1.19)
MCH RBC QN AUTO: 28.4 PG (ref 26.8–34.3)
MCHC RBC AUTO-ENTMCNC: 32.1 G/DL (ref 31.4–37.4)
MCV RBC AUTO: 89 FL (ref 82–98)
PLATELET # BLD AUTO: 254 THOUSANDS/UL (ref 149–390)
PMV BLD AUTO: 10.5 FL (ref 8.9–12.7)
PROTHROMBIN TIME: 14.6 SECONDS (ref 12.3–15)
RBC # BLD AUTO: 4.51 MILLION/UL (ref 3.81–5.12)
WBC # BLD AUTO: 7.78 THOUSAND/UL (ref 4.31–10.16)

## 2024-12-09 PROCEDURE — 85027 COMPLETE CBC AUTOMATED: CPT | Performed by: RADIOLOGY

## 2024-12-09 PROCEDURE — 32408 CORE NDL BX LNG/MED PERQ: CPT | Performed by: RADIOLOGY

## 2024-12-09 PROCEDURE — 99152 MOD SED SAME PHYS/QHP 5/>YRS: CPT | Performed by: RADIOLOGY

## 2024-12-09 PROCEDURE — 88305 TISSUE EXAM BY PATHOLOGIST: CPT | Performed by: PATHOLOGY

## 2024-12-09 PROCEDURE — 99153 MOD SED SAME PHYS/QHP EA: CPT

## 2024-12-09 PROCEDURE — 82948 REAGENT STRIP/BLOOD GLUCOSE: CPT

## 2024-12-09 PROCEDURE — 99152 MOD SED SAME PHYS/QHP 5/>YRS: CPT

## 2024-12-09 PROCEDURE — 85610 PROTHROMBIN TIME: CPT | Performed by: RADIOLOGY

## 2024-12-09 PROCEDURE — 32408 CORE NDL BX LNG/MED PERQ: CPT

## 2024-12-09 RX ORDER — FENTANYL CITRATE 50 UG/ML
INJECTION, SOLUTION INTRAMUSCULAR; INTRAVENOUS AS NEEDED
Status: COMPLETED | OUTPATIENT
Start: 2024-12-09 | End: 2024-12-09

## 2024-12-09 RX ORDER — LIDOCAINE WITH 8.4% SOD BICARB 0.9%(10ML)
SYRINGE (ML) INJECTION AS NEEDED
Status: COMPLETED | OUTPATIENT
Start: 2024-12-09 | End: 2024-12-09

## 2024-12-09 RX ORDER — MIDAZOLAM HYDROCHLORIDE 2 MG/2ML
INJECTION, SOLUTION INTRAMUSCULAR; INTRAVENOUS AS NEEDED
Status: COMPLETED | OUTPATIENT
Start: 2024-12-09 | End: 2024-12-09

## 2024-12-09 RX ORDER — SODIUM CHLORIDE 9 MG/ML
30 INJECTION, SOLUTION INTRAVENOUS CONTINUOUS
Status: DISCONTINUED | OUTPATIENT
Start: 2024-12-09 | End: 2024-12-13 | Stop reason: HOSPADM

## 2024-12-09 RX ADMIN — SODIUM CHLORIDE 30 ML/HR: 0.9 INJECTION, SOLUTION INTRAVENOUS at 08:29

## 2024-12-09 RX ADMIN — Medication 10 ML: at 09:13

## 2024-12-09 RX ADMIN — MIDAZOLAM 0.5 MG: 1 INJECTION INTRAMUSCULAR; INTRAVENOUS at 09:07

## 2024-12-09 RX ADMIN — FENTANYL CITRATE 50 MCG: 50 INJECTION INTRAMUSCULAR; INTRAVENOUS at 09:27

## 2024-12-09 NOTE — PROCEDURES
Interventional Radiology Procedure Note    PATIENT NAME: Cr Pena  : 1940  MRN: [id number]     Pre-op Diagnosis:   1. Abnormal PET scan of lung      2.    Mass of left lung    Post-op Diagnosis:   1. Abnormal PET scan of lung      2.   Same    Procedure: CT Guided Biopsy of the mass  Surgeon: Greg Salazar MD  Assistants: No qualified resident was available, Resident is only observing  Estimated Blood Loss: 10 cc  Findings: Postbiopsy scan shows trace left pneumothorax.  Stable in time.  Asymptomatic.  Specimens: Core biopsy x 3.  18-gauge by 33 mm  Complications: None  Anesthesia: conscious sedation and local  Prep: 2% Chlorhexidine and alcohol, allowed to dry prior to sterile draping  Timeout: Performed  Fluoro time: None  Radiation dose: Please see full report in PACS  Contrast dose: 0  Contrast type: None  Contrast strength: Not applicable  Contrast route of administration: Not applicable  Antibiotics: None        Greg Salazar MD     Date: 2024  Time: 9:39 AM

## 2024-12-09 NOTE — DISCHARGE INSTRUCTIONS
[facility]  Interventional Radiology  [phone]    Procedural Sedation   WHAT YOU NEED TO KNOW:   Procedural sedation is medicine used during procedures to help you feel relaxed and calm. You will remember little to none of the procedure. After sedation you may feel tired, weak, or unsteady on your feet. You may also have trouble concentrating or short-term memory loss. These symptoms should go away in 24 hours or less.   DISCHARGE INSTRUCTIONS:   Call 911 or have someone else call for any of the following:   You have sudden trouble breathing.     You cannot be woken.     Contact Interventional Radiology at [phone]   [place] PATIENTS: Contact Interventional Radiology at [phone] [place] PATIENTS: Contact Interventional Radiology at [phone]) if any of the following occur:      You have a severe headache or dizziness.     Your heart is beating faster than usual.    You have a fever or chills.     Your skin is itchy, swollen, or you have a rash.     You have nausea or are vomiting for more than 8 hours after the procedure.      You have questions or concerns about your condition or care.  Self-care:   Have someone stay with you for 24 hours. This person can drive you to errands and help you do things around the house. This person can also watch for problems.      Rest and do quiet activities for 24 hours. Do not exercise, ride a bike, or play sports. Stand up slowly to prevent dizziness and falls. Take short walks around the house with another person. Slowly return to your usual activities the next day.      Do not drive or use dangerous machines or tools for 24 hours. You may injure yourself or others. Examples include a lawnmower, saw, or drill. Do not return to work for 24 hours if you use dangerous machines or tools for work.      Do not make important decisions for 24 hours. For example, do not sign important papers or invest money.      Drink liquids as directed. Liquids  help flush the sedation medicine out of your body. Ask how much liquid to drink each day and which liquids are best for you.      Eat small, frequent meals to prevent nausea and vomiting. Start with clear liquids such as juice or broth. If you do not vomit after clear liquids, you can eat your usual foods.      Do not drink alcohol or take medicines that make you drowsy. This includes medicines that help you sleep and anxiety medicines. Ask your healthcare provider if it is safe for you to take pain medicine.  Follow up with your healthcare provider as directed: Write down your questions so you remember to ask them during your visits.                 Needle Biopsy of the Lung    WHAT YOU NEED TO KNOW:  A needle biopsy of the lung is a procedure to remove cells or tissue from your lung. You may have a fine needle aspiration biopsy (FNAB), or a core needle biopsy (CNB). A FNAB is used to remove cells through a thin needle. CNB uses a thicker needle to remove lung tissue. The samples are collected and tested for inflammation, infection, or cancer.     DISCHARGE INSTRUCTIONS:   Resume your normal diet. Small sips of flat soda will help with nausea. Limit your activity for 24 hours.    Wound Care:      - Remove band aid in 24 hours.    Contact Interventional Radiology at [phone] ([place] PATIENTS: Contact Interventional Radiology at [phone]) ([place] PATIENTS: Contact Interventional Radiology at [phone]) if any of the following occur:    - You have a fever greater than 101*    - You cough up large amounts of bright red blood     - You have chest pain with breathing    - You have shortness of breath    -You have persistent nausea and vomiting    - You have pus, redness or swelling around your biopsy site    - You have questions or concerns about your condition or care

## 2024-12-09 NOTE — H&P
Interventional Radiology  History and Physical 12/9/2024     Cr Pena   1940   [id number]    Assessment/Plan:  Spongiform mass in left lung, found during trauma workup  Plan is biopsy.  CT guidance.  PRATHER positioning.  Try to traverse the pleural thickening for core biopsy.    Problem List Items Addressed This Visit          Other    Abnormal PET scan of lung    Relevant Orders    IR biopsy lung          Subjective:     Patient ID: Cr Pena is a 84 y.o. female.    History of Present Illness  Fall with rib fractures.  Lung mass noted.  Now being biopsy    Review of Systems      Past Medical History:   Diagnosis Date    Allergic     Allergic rhinitis     COPD (chronic obstructive pulmonary disease) (HCC)     Diabetes mellitus (HCC)     GERD (gastroesophageal reflux disease)     Hyperlipidemia     Hypertension     Left non-suppurative otitis media 11/27/2019    Pneumonia     Pneumonia of right lower lobe due to infectious organism 12/4/2018    Stage 3 chronic kidney disease, unspecified whether stage 3a or 3b CKD (HCC) 11/1/2022    Vitamin D deficiency         Past Surgical History:   Procedure Laterality Date    CARDIAC CATHETERIZATION      CHOLECYSTECTOMY  1984    COLONOSCOPY N/A 1/30/2019    Procedure: COLONOSCOPY with multiple  polypectomies;  Surgeon: Aissatou Lyles MD;  Location:  GI LAB;  Service: Gastroenterology    EYE SURGERY Bilateral     CATARACT        Social History     Tobacco Use   Smoking Status Former    Current packs/day: 0.00    Average packs/day: 1 pack/day for 50.0 years (50.0 ttl pk-yrs)    Types: Cigarettes    Start date: 12/17/1963    Quit date: 12/17/2013    Years since quitting: 10.9    Passive exposure: Past   Smokeless Tobacco Never        Social History     Substance and Sexual Activity   Alcohol Use Never        Social History     Substance and Sexual Activity   Drug Use Never        Allergies   Allergen Reactions    Bee Venom Anaphylaxis    Other Shortness Of Breath      FLOWERS/GRASS       Current Outpatient Medications   Medication Sig Dispense Refill    albuterol (PROVENTIL HFA,VENTOLIN HFA) 90 mcg/act inhaler Inhale 2 puffs 4 (four) times a day 54 g 1    atorvastatin (LIPITOR) 20 mg tablet Take 1 tablet by mouth daily 90 tablet 1    Breztri Aerosphere 160-9-4.8 MCG/ACT AERO Inhale 2 puffs 2 (two) times a day Rinse mouth after use. 32.1 g 3    cetirizine (ZyrTEC) 10 mg tablet Take 10 mg by mouth daily      Cholecalciferol (D3 2000) 50 MCG (2000 UT) CAPS Take 2,000 Units by mouth daily      gabapentin (NEURONTIN) 300 mg capsule Take 1 capsule (300 mg total) by mouth daily 100 capsule 1    lisinopril (ZESTRIL) 5 mg tablet Take 1 tablet (5 mg total) by mouth daily 100 tablet 1    metFORMIN (GLUCOPHAGE) 500 mg tablet Take 1 tablet (500 mg total) by mouth 2 (two) times a day with meals 180 tablet 1    Multiple Vitamin (MULTIVITAMINS PO) Take by mouth daily      Multiple Vitamins-Minerals (One-A-Day Womens 50+) TABS Take 1 tablet by mouth daily      omeprazole (PriLOSEC) 40 MG capsule Take 1 capsule (40 mg total) by mouth daily before breakfast 100 capsule 1    oxybutynin (DITROPAN-XL) 5 mg 24 hr tablet Take 1 tablet (5 mg total) by mouth daily 100 tablet 1    aspirin 81 mg chewable tablet Chew 81 mg daily      docusate sodium (COLACE) 100 mg capsule Take 1 capsule (100 mg total) by mouth 2 (two) times a day (Patient not taking: Reported on 12/2/2024) 60 capsule 0    glucose blood (OneTouch Verio) test strip Use as instructed TEST TWO TIMES DAILY e11.65 300 each 5    lidocaine (LIDODERM) 5 % Apply 1 patch topically over 12 hours daily Remove & Discard patch within 12 hours or as directed by MD (Patient not taking: Reported on 11/14/2024) 14 patch 0     Current Facility-Administered Medications   Medication Dose Route Frequency Provider Last Rate Last Admin    sodium chloride 0.9 % infusion  30 mL/hr Intravenous Continuous Greg Salazar MD 30 mL/hr at 12/09/24 0829 30 mL/hr at  12/09/24 0829          Objective:    Vitals:    12/09/24 0822   BP: 133/64   Pulse: 80   Resp: 18   Temp: 97.6 °F (36.4 °C)   TempSrc: Temporal   SpO2: 97%   Weight: 68 kg (150 lb)   Height: 5' (1.524 m)        Physical Exam      Awake alert and oriented.  Seems to have a good understanding of what is going on  Regular rate and rhythm  Some tenderness at the fractures.  Otherwise normal respiratory excursion  The skin over the left posterior axillary line is clean, dry, and intact.    Lab Results   Component Value Date     (H) 12/03/2018      Lab Results   Component Value Date    WBC 7.78 12/09/2024    HGB 12.8 12/09/2024    HCT 39.9 12/09/2024    MCV 89 12/09/2024     12/09/2024     Lab Results   Component Value Date    INR 1.02 09/28/2024    INR 1.07 07/23/2021    INR 1.07 07/04/2021    PROTIME 14.0 09/28/2024    PROTIME 13.7 07/23/2021    PROTIME 13.9 07/04/2021     Lab Results   Component Value Date    PTT 33 09/28/2024         I have personally reviewed pertinent imaging and laboratory results.     Code Status: Prior  Advance Directive and Living Will:      Power of :    POLST:      This text is generated with voice recognition software. There may be translation, syntax,  or grammatical errors. If you have any questions, please contact the dictating provider.

## 2024-12-10 DIAGNOSIS — C34.32 PRIMARY SQUAMOUS CELL CARCINOMA OF LOWER LOBE OF LEFT LUNG (HCC): Primary | ICD-10-CM

## 2024-12-10 PROCEDURE — 88305 TISSUE EXAM BY PATHOLOGIST: CPT | Performed by: PATHOLOGY

## 2024-12-11 ENCOUNTER — TELEPHONE (OUTPATIENT)
Dept: CARDIAC SURGERY | Facility: CLINIC | Age: 84
End: 2024-12-11

## 2024-12-11 DIAGNOSIS — C34.32 PRIMARY SQUAMOUS CELL CARCINOMA OF LOWER LOBE OF LEFT LUNG (HCC): Primary | ICD-10-CM

## 2024-12-11 NOTE — TELEPHONE ENCOUNTER
Discussed pathology with the patient. We again discussed how a non-surgical approach to her cancer is right for her. Now that she is biopsy proven I will refer her to medical oncology.     Franco Estrada, DO  Thoracic Surgery

## 2024-12-12 ENCOUNTER — TELEPHONE (OUTPATIENT)
Age: 84
End: 2024-12-12

## 2024-12-12 ENCOUNTER — DOCUMENTATION (OUTPATIENT)
Dept: HEMATOLOGY ONCOLOGY | Facility: CLINIC | Age: 84
End: 2024-12-12

## 2024-12-12 ENCOUNTER — PATIENT OUTREACH (OUTPATIENT)
Dept: HEMATOLOGY ONCOLOGY | Facility: CLINIC | Age: 84
End: 2024-12-12

## 2024-12-12 DIAGNOSIS — C34.32 PRIMARY SQUAMOUS CELL CARCINOMA OF LOWER LOBE OF LEFT LUNG (HCC): Primary | ICD-10-CM

## 2024-12-12 NOTE — PROGRESS NOTES
Initial outreach. Spoke to patient. Introduced myself and explained the role of an Oncology Nurse Navigator to assist with coordination of cancer care, preparation for upcoming appointment, be a point of contact prior to oncology consult, provide support and connect her with available resources. Discussed medical oncology referral placed by Dr Estrada for lung cancer. Need for MRI brain. Explained I will be their main contact until they are established with their team and a treatment plan is established.     Introduced Agueda, and explained she will be her patient navigator, who will reach out after the first consult to introduce themselves. The PN will provide support, make sure she has a good understanding of the plan and schedule and to connect with additional resources if/when needed.     Assessed for barriers of care. My direct contact information provided. Patient is aware that she can call me should she need any further assistance. Patient was appreciative of assistance.     Lung Symptoms:   Smoking: Hx of smoking 1 ppd for 50 years quit in 2013  Cough: Yes, describe: productive cough for rust phlegm since biopsy, prior to biopsy  green phlegm   SOB: Yes, describe: only when moving around and not wearing oxygen  - currently on O2 at 2L   Chest pain:No  Pain(back,pain,bone): No   Headache No  Dizziness No  Appetite: No change in appetite or weight loss  Fatigue/Weakness Yes, describe: fatigued   Difficulty ambulating: Yes, describe: uses roller walker       PCP: Mary Kay Oliveira PA-C

## 2024-12-12 NOTE — PROGRESS NOTES
All records needed are in patients chart. No records retrieval needed at this time.     Referral received/ Chart reviewed for work up completed for med/onc referral     Imaging completed: [facility]   [x] PET/CT 11/06/24    [] MRI   [x] CT head wo contrast 09/28/24, CT CAP w contrast    [] US   [] Mammo   [] Bone scan   [] N/A    MRI brain ordered    Pathology completed: [facility]   Date: 12/09/24   Location: IR biopsy lung   []N/A    Additional records:    [x] Genomic report in process    [] Genetic testing results   [] Office Note   [x] Procedure 10/26/22   [] Lab results   [] N/A      [] Radiation Oncology records retrieval needed (PN to route to rad/onc clerical pool once scheduled)  Date:  Location:

## 2024-12-12 NOTE — TELEPHONE ENCOUNTER
I called Cr in response to a referral that was received for patient to establish care with Medical Oncology    Outreach was made to schedule a consultation.    A consultation was scheduled for patient during this call. Patient is scheduled on 1/6/25 at 9:00 AM with Dr Jones at the [facility]    MRI schedule for 12/23/24     Schedule within: 7 days    Pt requested to be seen at [facility].   Please advise if this appt can be moved sooner to meet scheduling guidelines per NN review.

## 2024-12-18 LAB
CARIS GENOMIC LOH - EXOME: NORMAL
CARIS HER2/NEU: NEGATIVE
CARIS HLA-A: NORMAL
CARIS HLA-B: NORMAL
CARIS HLA-C: NORMAL
CARIS MSI - EXOME: NORMAL
CARIS PD-L1 (22C3): POSITIVE
CARIS PD-L1 (SP263): POSITIVE
CARIS PD-L1 FDA (28-8): POSITIVE
CARIS PD-L1 FDA(SP142): NEGATIVE
CARIS TMB - EXOME: NORMAL

## 2024-12-23 ENCOUNTER — HOSPITAL ENCOUNTER (OUTPATIENT)
Dept: MRI IMAGING | Facility: HOSPITAL | Age: 84
Discharge: HOME | End: 2024-12-23
Attending: SURGERY
Payer: COMMERCIAL

## 2024-12-23 DIAGNOSIS — C34.32 PRIMARY SQUAMOUS CELL CARCINOMA OF LOWER LOBE OF LEFT LUNG (HCC): ICD-10-CM

## 2024-12-23 PROCEDURE — A9585 GADOBUTROL INJECTION: HCPCS | Performed by: SURGERY

## 2024-12-23 PROCEDURE — 70553 MRI BRAIN STEM W/O & W/DYE: CPT

## 2024-12-23 RX ORDER — GADOBUTROL 604.72 MG/ML
6 INJECTION INTRAVENOUS
Status: COMPLETED | OUTPATIENT
Start: 2024-12-23 | End: 2024-12-23

## 2024-12-23 RX ADMIN — GADOBUTROL 6 ML: 604.72 INJECTION INTRAVENOUS at 16:33

## 2025-01-06 ENCOUNTER — CONSULT (OUTPATIENT)
Dept: HEMATOLOGY ONCOLOGY | Facility: CLINIC | Age: 85
End: 2025-01-06
Payer: COMMERCIAL

## 2025-01-06 VITALS
DIASTOLIC BLOOD PRESSURE: 77 MMHG | TEMPERATURE: 98.2 F | HEIGHT: 60 IN | BODY MASS INDEX: 29.64 KG/M2 | OXYGEN SATURATION: 92 % | SYSTOLIC BLOOD PRESSURE: 118 MMHG | WEIGHT: 151 LBS | HEART RATE: 98 BPM

## 2025-01-06 DIAGNOSIS — C34.32 PRIMARY SQUAMOUS CELL CARCINOMA OF LOWER LOBE OF LEFT LUNG (HCC): ICD-10-CM

## 2025-01-06 PROCEDURE — 99205 OFFICE O/P NEW HI 60 MIN: CPT | Performed by: INTERNAL MEDICINE

## 2025-01-06 NOTE — PROGRESS NOTES
Cr Pena  1940  HEM ONC [facility] HEMATOLOGY ONCOLOGY SPECIALISTS [place]  [address] [facility] [address] [phone]    Chief Complaint   Patient presents with    Consult           Oncology History    No history exists.       History of Present Illness:  September 28, 2024 patient had chest x-ray to investigate left rib pain.  Patchy opacity in the left midlung, nondisplaced left sided rib fracture.  CT CAP showed spiculated mass, 4.5 cm in the left lower lobe.  Minimally displaced left rib fractures, 8, 9, 10, posterolaterally.  No adenopathy or effusion.  No evident metastatic disease.  November 6, 2024 PET/CT showed 5.5 cm left lower lobe mass extending to the pleura, SUV 23.  Mediastinal nodes subcentimeter, max SUV 2.1.  Left rib fractures noted including 5, 6.  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  Caris showed PD-L1 TPS 1%, TMB 11.  P53 mutation, otherwise wild-type.  Brain MRI showed no metastatic disease.    Review of Systems   Constitutional:  Negative for appetite change, diaphoresis, fatigue and fever.   HENT:  Negative for sinus pain.    Eyes:  Negative for discharge.   Respiratory:  Negative for cough and shortness of breath.    Cardiovascular:  Negative for chest pain.   Gastrointestinal:  Negative for abdominal pain, constipation and diarrhea.   Endocrine: Negative for cold intolerance.   Genitourinary:  Negative for difficulty urinating and hematuria.   Musculoskeletal:  Negative for joint swelling.   Skin:  Negative for rash.   Allergic/Immunologic: Negative for environmental allergies.   Neurological:  Negative for dizziness and headaches.   Hematological:  Negative for adenopathy.   Psychiatric/Behavioral:  Negative for agitation.        Patient Active Problem List   Diagnosis    Acid reflux disease    Asthma, mild intermittent    Chronic obstructive pulmonary disease (HCC)    Type 2 diabetes  mellitus with hyperglycemia, without long-term current use of insulin (HCC)    Hyperlipidemia, unspecified    Essential hypertension    Neuropathy    Spinal stenosis of lumbar region without neurogenic claudication    Vitamin D deficiency    Chronic diastolic congestive heart failure (HCC)    OAB (overactive bladder)    Chronic constipation    Stage 3 chronic kidney disease, unspecified whether stage 3a or 3b CKD ([facility])    Esophageal dysphagia    Neuropathic pain    Closed fracture of multiple ribs of left side    Fall    Mass of lower lobe of left lung    Bladder wall thickening    Recurrent major depressive disorder, in partial remission ([facility])    Abnormal PET scan of lung     Past Medical History:   Diagnosis Date    Allergic     Allergic rhinitis     COPD (chronic obstructive pulmonary disease) (HCC)     Diabetes mellitus (HCC)     GERD (gastroesophageal reflux disease)     Hyperlipidemia     Hypertension     Left non-suppurative otitis media 11/27/2019    Pneumonia     Pneumonia of right lower lobe due to infectious organism 12/4/2018    Stage 3 chronic kidney disease, unspecified whether stage 3a or 3b CKD ([facility]) 11/1/2022    Vitamin D deficiency      Past Surgical History:   Procedure Laterality Date    CARDIAC CATHETERIZATION      CHOLECYSTECTOMY  1984    COLONOSCOPY N/A 1/30/2019    Procedure: COLONOSCOPY with multiple  polypectomies;  Surgeon: Aissatou Lyles MD;  Location:  GI LAB;  Service: Gastroenterology    EYE SURGERY Bilateral     CATARACT    IR BIOPSY LUNG  12/9/2024     Family History   Problem Relation Age of Onset    Lung cancer Father     Heart disease Mother     Lung cancer Sister     Lung cancer Brother      Social History     Socioeconomic History    Marital status:      Spouse name: Not on file    Number of children: Not on file    Years of education: Not on file    Highest education level: Not on file   Occupational History    Occupation: RETIRED   Tobacco Use    Smoking status: Former      Current packs/day: 0.00     Average packs/day: 1 pack/day for 50.0 years (50.0 ttl pk-yrs)     Types: Cigarettes     Start date: 1963     Quit date: 2013     Years since quittin.0     Passive exposure: Past    Smokeless tobacco: Never   Vaping Use    Vaping status: Never Used   Substance and Sexual Activity    Alcohol use: Never    Drug use: Never    Sexual activity: Not on file   Other Topics Concern    Not on file   Social History Narrative    Daily caffeine consumption     Social Drivers of Health     Financial Resource Strain: Low Risk  (2022)    Overall Financial Resource Strain (CARDIA)     Difficulty of Paying Living Expenses: Not hard at all   Food Insecurity: No Food Insecurity (2021)    Hunger Vital Sign     Worried About Running Out of Food in the Last Year: Never true     Ran Out of Food in the Last Year: Never true   Transportation Needs: No Transportation Needs (2022)    PRAPARE - Transportation     Lack of Transportation (Medical): No     Lack of Transportation (Non-Medical): No   Physical Activity: Not on file   Stress: Not on file   Social Connections: Unknown (2024)    Received from [email or web address]    Social [email or web address]     How often do you feel lonely or isolated from those around you? (Adult - for ages 18 years and over): Not on file   Intimate Partner Violence: Not on file   Housing Stability: Not on file       Current Outpatient Medications:     albuterol (PROVENTIL HFA,VENTOLIN HFA) 90 mcg/act inhaler, Inhale 2 puffs 4 (four) times a day, Disp: 54 g, Rfl: 1    aspirin 81 mg chewable tablet, Chew 81 mg daily, Disp: , Rfl:     atorvastatin (LIPITOR) 20 mg tablet, Take 1 tablet by mouth daily, Disp: 90 tablet, Rfl: 1    Breztri Aerosphere 160-9-4.8 MCG/ACT AERO, Inhale 2 puffs 2 (two) times a day Rinse mouth after use., Disp: 32.1 g, Rfl: 3    cetirizine (ZyrTEC) 10 mg tablet, Take 10 mg by mouth daily, Disp: , Rfl:     Cholecalciferol (D3 ) 50 MCG (2000)  CAPS, Take 2,000 Units by mouth daily, Disp: , Rfl:     gabapentin (NEURONTIN) 300 mg capsule, Take 1 capsule (300 mg total) by mouth daily, Disp: 100 capsule, Rfl: 1    glucose blood (OneTouch Verio) test strip, Use as instructed TEST TWO TIMES DAILY e11.65, Disp: 300 each, Rfl: 5    lisinopril (ZESTRIL) 5 mg tablet, Take 1 tablet (5 mg total) by mouth daily, Disp: 100 tablet, Rfl: 1    metFORMIN (GLUCOPHAGE) 500 mg tablet, Take 1 tablet (500 mg total) by mouth 2 (two) times a day with meals, Disp: 180 tablet, Rfl: 1    Multiple Vitamin (MULTIVITAMINS PO), Take by mouth daily, Disp: , Rfl:     Multiple Vitamins-Minerals (One-A-Day Womens 50+) TABS, Take 1 tablet by mouth daily, Disp: , Rfl:     omeprazole (PriLOSEC) 40 MG capsule, Take 1 capsule (40 mg total) by mouth daily before breakfast, Disp: 100 capsule, Rfl: 1    oxybutynin (DITROPAN-XL) 5 mg 24 hr tablet, Take 1 tablet (5 mg total) by mouth daily, Disp: 100 tablet, Rfl: 1    docusate sodium (COLACE) 100 mg capsule, Take 1 capsule (100 mg total) by mouth 2 (two) times a day (Patient not taking: Reported on 12/2/2024), Disp: 60 capsule, Rfl: 0    lidocaine (LIDODERM) 5 %, Apply 1 patch topically over 12 hours daily Remove & Discard patch within 12 hours or as directed by MD (Patient not taking: Reported on 11/14/2024), Disp: 14 patch, Rfl: 0  Allergies   Allergen Reactions    Bee Venom Anaphylaxis    Other Shortness Of Breath     FLOWERS/GRASS     Vitals:    01/06/25 0920   BP: 118/77   Pulse: 98   Temp: 98.2 °F (36.8 °C)   SpO2: 92%       Physical Exam  Constitutional:       Appearance: She is well-developed.   HENT:      Head: Normocephalic and atraumatic.   Eyes:      Pupils: Pupils are equal, round, and reactive to light.   Cardiovascular:      Rate and Rhythm: Normal rate.      Heart sounds: No murmur heard.  Pulmonary:      Effort: No respiratory distress.      Breath sounds: No wheezing or rales.   Abdominal:      General: There is no distension.       Palpations: Abdomen is soft.      Tenderness: There is no abdominal tenderness. There is no rebound.   Musculoskeletal:         General: No tenderness.      Cervical back: Neck supple.   Lymphadenopathy:      Cervical: No cervical adenopathy.   Skin:     General: Skin is warm.      Findings: No rash.   Neurological:      Mental Status: She is alert and oriented to person, place, and time.      Deep Tendon Reflexes: Reflexes normal.   Psychiatric:         Thought Content: Thought content normal.           Labs:  CBC, Coags, BMP, Mg, Phos     Imaging see above.    Discussion/Summary: In summary, this is an 84-year-old female with history of recently diagnosed T3, N0 squamous cell carcinoma left lung.  TPS 1%, TMB 11, otherwise wild-type.  We reviewed that if PFTs were acceptable standard therapy would include neoadjuvant chemoimmunotherapy followed by resection.  For a number of reasons patient declined surgical intervention.  Radiation therapy is reasonable to consider as alternative.  IMRT versus SBRT could be considered.  If IMRT were used, concomitant weekly carbo paclitaxel would be recommended.  Additionally, geographic considerations were reviewed.  After discussing this, we agreed to get preliminary opinion by radiation therapy.  Treatment considerations to follow as outlined above.  The patient and her daughter voiced understanding and agreement.

## 2025-01-07 ENCOUNTER — PATIENT OUTREACH (OUTPATIENT)
Dept: HEMATOLOGY ONCOLOGY | Facility: CLINIC | Age: 85
End: 2025-01-07

## 2025-01-07 DIAGNOSIS — C34.32 PRIMARY SQUAMOUS CELL CARCINOMA OF LOWER LOBE OF LEFT LUNG (HCC): Primary | ICD-10-CM

## 2025-01-07 NOTE — PROGRESS NOTES
I reached out and spoke with Cr now that consults have been completed with the oncology teams to review for any barriers to care and offer supportive services as needed. Distress Thermometer completed at this time. Patient scored 2/10. Based on responses to DT, no indication for referral to SW needed at this time. .     I reviewed and updated the members assigned to the care team in [facility]. She is still going to see radiation oncology, and then we will go over all the members of her care team once she decides on which treatment. I am going to message Dr Jones's nurse to see if he put in a referral for radiation.    She knows the members of the care team as well as how and when to contact them with any needs.     She verbalizes managing the schedules well.     She is currently unable to drive but is supported by family or friends and denies transportation needs.  She has a few friends that drive, and her daughter, but we talked about the cancer society which takes patients to their treatments, this may be something we can look into for Cr.    She denies any uncontrolled symptoms. Discussed role of Palliative Care in symptom and side effect management. Declined referral at this time.    She states that she is eating and drinking as per usual with no unintentional weight loss.   She just said that she can't do meat anymore. She is ok at time when others cook it, but it makes her stomach turn sometimes when she cooks it.    Patient does not smoke.     She states she is well supported by family and friends.  Community support groups discussed including the Cancer Support Community of the [facility]. Patient declined information at this time.     She feels she has adequate insurance coverage and denies any financial concerns at this time. I told her to let me know, if that changes we have financial advocates that can work with her.    Based on individual needs I will follow up in about 2 weeks. I have  provided my direct contact information and welcome them to contact me if needs as discussed above change. She was appreciative for the call.

## 2025-01-15 DIAGNOSIS — N32.81 OAB (OVERACTIVE BLADDER): ICD-10-CM

## 2025-01-15 DIAGNOSIS — K21.9 GASTROESOPHAGEAL REFLUX DISEASE WITHOUT ESOPHAGITIS: ICD-10-CM

## 2025-01-15 RX ORDER — OMEPRAZOLE 40 MG/1
40 CAPSULE, DELAYED RELEASE ORAL
Qty: 100 CAPSULE | Refills: 1 | Status: SHIPPED | OUTPATIENT
Start: 2025-01-15

## 2025-01-15 RX ORDER — OXYBUTYNIN CHLORIDE 5 MG/1
5 TABLET, EXTENDED RELEASE ORAL DAILY
Qty: 100 TABLET | Refills: 1 | Status: SHIPPED | OUTPATIENT
Start: 2025-01-15

## 2025-01-17 DIAGNOSIS — M79.2 NEUROPATHIC PAIN: ICD-10-CM

## 2025-01-17 DIAGNOSIS — I10 HYPERTENSION, UNSPECIFIED TYPE: ICD-10-CM

## 2025-01-17 RX ORDER — LISINOPRIL 5 MG/1
5 TABLET ORAL DAILY
Qty: 100 TABLET | Refills: 1 | Status: SHIPPED | OUTPATIENT
Start: 2025-01-17

## 2025-01-17 RX ORDER — GABAPENTIN 300 MG/1
300 CAPSULE ORAL DAILY
Qty: 100 CAPSULE | Refills: 1 | Status: SHIPPED | OUTPATIENT
Start: 2025-01-17

## 2025-01-24 ENCOUNTER — TELEPHONE (OUTPATIENT)
Age: 85
End: 2025-01-24

## 2025-01-24 NOTE — TELEPHONE ENCOUNTER
Dr Bahena from rad/onc in [facility] would like for Dr. Jones.  Please have him call him back on his cell at [phone]

## 2025-01-27 NOTE — TELEPHONE ENCOUNTER
Dr. Bahena calling in again to speak with Dr. Jones regarding this mutual patient. Please call him [phone]

## 2025-01-29 ENCOUNTER — TELEPHONE (OUTPATIENT)
Dept: GASTROENTEROLOGY | Facility: CLINIC | Age: 85
End: 2025-01-29

## 2025-01-29 ENCOUNTER — DOCUMENTATION (OUTPATIENT)
Dept: HEMATOLOGY ONCOLOGY | Facility: CLINIC | Age: 85
End: 2025-01-29

## 2025-01-29 NOTE — PROGRESS NOTES
In-basket message received from Katherine Sarmiento RN to add patient to the thoracic MDCC on 2/3/2025. Chart reviewed and prep completed.

## 2025-01-29 NOTE — TELEPHONE ENCOUNTER
Cr states she spoke to you, Pao Jean, in January, but lost your number, as her phone was acting up. Can you please reach out to her again. Phone # is confirmed at [phone]

## 2025-01-29 NOTE — TELEPHONE ENCOUNTER
Cr states was sent to [facility] for consultation. She received call yesterday that it moved into her lymph nodes and Dr Bahena is recommending a surgery and that it be done by [facility]. Please call her to discuss.   Cr said if she needs another biopsy, she wants to go to Dr. Marie Foster.

## 2025-01-29 NOTE — TELEPHONE ENCOUNTER
Returned call to Cr and advised her that Dr Jones plans on presenting her case at [facility] on 2/3 and will have an update for her after that. She voiced understanding.

## 2025-01-30 ENCOUNTER — PATIENT OUTREACH (OUTPATIENT)
Dept: HEMATOLOGY ONCOLOGY | Facility: CLINIC | Age: 85
End: 2025-01-30

## 2025-01-30 ENCOUNTER — DOCUMENTATION (OUTPATIENT)
Dept: HEMATOLOGY ONCOLOGY | Facility: CLINIC | Age: 85
End: 2025-01-30

## 2025-01-30 NOTE — PROGRESS NOTES
Reached out to Cr, she called yesterday stating she lost my number. We talked about her being presented by Dr Jones to tumor board, and how he will call her after the meeting. Cr feels that she needs answers. She feels that to much time has gone by. She is hoping for another appointment with Dr Jones, when she will take her son along. I told Rc I will call her back next week after she hears from Dr Jones. She was appreciative of the call back today.

## 2025-01-30 NOTE — PROGRESS NOTES
Chart reviewed in preparation of Thoracic Tumor Conference presentation by Dr. Jones on 02/03/25.  She presents in September after a fall with left rib pain chest x-ray shows new  patchy opacity in left midlung zone. Nondisplaced left-sided rib fractures. 09/28/24 CT chest abdomen pelvis shows acute  minimally displaced left rib fractures involving the eighth, ninth and 10th ribs posterolaterally.  4.5 x 4.1 cm  spiculated left lower lobe superior segment mass. 11/06/24 PET CT demonstrates large hypermetabolic left lower lung mass with SUV of 23. Multiple healing left rib fractures, with new lesions involving the fifth, sixth and seventh ribs since the prior CT. On 12/09/24 she underwent IR biopsy of left lower lobe pathology positive for squamous cell carcinoma.

## 2025-02-01 ENCOUNTER — RA CDI HCC (OUTPATIENT)
Dept: OTHER | Facility: HOSPITAL | Age: 85
End: 2025-02-01

## 2025-02-02 NOTE — PROGRESS NOTES
HCC coding opportunities          Chart Reviewed number of suggestions sent to Provider: 3  E11.42, I13.0, E11.22    Patients Insurance     Medicare Insurance: [employer] Advantage

## 2025-02-03 ENCOUNTER — DOCUMENTATION (OUTPATIENT)
Dept: HEMATOLOGY ONCOLOGY | Facility: CLINIC | Age: 85
End: 2025-02-03

## 2025-02-03 NOTE — TELEPHONE ENCOUNTER
Reviewed with Dr. Jones.  Patient to have bronchoscopy with EBUS to guide if treatment will be chemotherapy + RT or RT only.    Returned call to patient.  Per patient, she is feeling frustrated with the length of time it is taking to start treatment.  Patient states she is willing to move forward with bronchoscopy with EBUS, but if RT is needed, she is requesting to have completed at [facility], as the provider she saw out of network suggested she stay with in the network.  Patient is open and agreeable for [facility] for her appts.  Patient aware she will be scheduled for procedure and office visit with Dr. Jones to review treatment moving forward.  Patient agreeable.

## 2025-02-03 NOTE — PROGRESS NOTES
THORACIC ONCOLOGY MULTIDISCIPLINARY CASE REVIEW    DATE:  2/3/2025    PRESENTING DOCTOR:  Dr. Jones    DIAGNOSIS:  Squamous Cell Carcinoma Left Lung  STAGING: T3N0    Cr Pena is a 84 y.o. female who was presented at the Thoracic Oncology Multidisciplinary Tumor Conference today.  She presents in September after a fall with  complaints of left rib pain. Chest x-ray shows new  patchy opacity in left midlung zone. Nondisplaced left-sided rib fractures. 09/28/24 CT chest abdomen pelvis shows acute  minimally displaced left rib fractures involving the eighth, ninth and 10th ribs posterolaterally.  4.5 x 4.1 cm  spiculated left lower lobe superior segment mass. 11/06/24 PET CT demonstrates large hypermetabolic left lower lung mass with SUV of 23. Multiple healing left rib fractures, with new lesions involving the fifth, sixth and seventh ribs since the prior CT. On 12/09/24 she underwent IR biopsy of left lower lobe pathology positive for squamous cell carcinoma.      PHYSICIAN RECOMMENDED PLAN: EBUS      Imaging reviewed:   11/06/24 PET CT  09/28/24 CT chest abdomen pelvis w contrast     Pathology reviewed: No    PFT's reviewed: No    Future imaging: No    Referrals: No    Clinical Trials Reviewed:  No  Clinical Trial Eligibility:     Team agreed to plan.  NCCN guidelines were readily available for review at this discussion    The final treatment plan will be left to the discretion of the patient and the treating physician.     DISCLAIMERS:  TO THE TREATING PHYSICIAN:  This conference is a meeting of clinicians from various specialty areas who evaluate and discuss patients for whom a multidisciplinary treatment approach is being considered. Please note that the above opinion was a consensus of the conference attendees and is intended only to assist in quality care of the discussed patient.  The responsibility for follow up on the input given during the conference, along with any final decisions regarding plan of  care, is that of the patient and the patient's provider. Accordingly, appointments have only been recommended based on this information and have NOT been scheduled unless otherwise noted.      TO THE PATIENT:  This summary is a brief record of major aspects of your cancer treatment. You may choose to share a copy with any of your doctors or nurses. However, this is not a detailed or comprehensive record of your care.

## 2025-02-05 ENCOUNTER — PREP FOR PROCEDURE (OUTPATIENT)
Dept: PULMONOLOGY | Facility: CLINIC | Age: 85
End: 2025-02-05

## 2025-02-05 DIAGNOSIS — J44.9 CHRONIC OBSTRUCTIVE PULMONARY DISEASE, UNSPECIFIED COPD TYPE (HCC): Primary | ICD-10-CM

## 2025-02-06 ENCOUNTER — PATIENT OUTREACH (OUTPATIENT)
Dept: HEMATOLOGY ONCOLOGY | Facility: CLINIC | Age: 85
End: 2025-02-06

## 2025-02-06 NOTE — PROGRESS NOTES
Reached out to Cr, she said she still hasn't heard about the surgery. She was told she would hear something by Friday. She still feels this is all taking so long, and feel treatment should have been started already. I explained its a process and everything being done is so she has the best care and treatment options.  She is eating and drinking but just all around feeling a little anxious to get all of this started. I told her to call me if she needs anything in meantime, and I will call her after her bronchoscopy. Cr does not want to pursue [facility] at this time, said her son will take her to appointments, he just needs to know in advance. I told her we can revisit it when treatment is confirmed. She was appreciative of today call.

## 2025-02-12 ENCOUNTER — OFFICE VISIT (OUTPATIENT)
Dept: PULMONOLOGY | Facility: CLINIC | Age: 85
End: 2025-02-12
Payer: COMMERCIAL

## 2025-02-12 ENCOUNTER — TELEPHONE (OUTPATIENT)
Dept: PULMONOLOGY | Facility: CLINIC | Age: 85
End: 2025-02-12

## 2025-02-12 VITALS
HEIGHT: 60 IN | BODY MASS INDEX: 28.66 KG/M2 | WEIGHT: 146 LBS | DIASTOLIC BLOOD PRESSURE: 78 MMHG | SYSTOLIC BLOOD PRESSURE: 159 MMHG | TEMPERATURE: 97.9 F | OXYGEN SATURATION: 93 % | HEART RATE: 69 BPM

## 2025-02-12 DIAGNOSIS — J44.9 COPD, MODERATE (HCC): Primary | ICD-10-CM

## 2025-02-12 DIAGNOSIS — J96.11 CHRONIC RESPIRATORY FAILURE WITH HYPOXIA (HCC): ICD-10-CM

## 2025-02-12 DIAGNOSIS — C34.32 SQUAMOUS CELL CARCINOMA OF LOWER LOBE OF LEFT LUNG (HCC): ICD-10-CM

## 2025-02-12 PROCEDURE — 99214 OFFICE O/P EST MOD 30 MIN: CPT | Performed by: PHYSICIAN ASSISTANT

## 2025-02-12 NOTE — TELEPHONE ENCOUNTER
Dr. Harrington will be performing a EBUS on Cr Pena on 02/24/2025     LOCATION: [facility]    ANTICOAGULATION INSTRUCTIONS: N/A    OTHER MEDICATION INSTRUCTIONS:     LABS: (CBC/PT/PTT in last 90 days): 12/09/2025   (If no, labs ordered / to be done at least one day prior to procedure)    LAST OFFICE NOTE/H&P within 30 days of procedure: 02/12/2025    INSTRUCTIONS GIVEN: Spoke with patient advised of procedure date and location. Patient aware she is to be NPO after midnight the night prior and will need a . Patient advised she will receive a call the day before with time of arrival.    Thank You   Selam Martinez

## 2025-02-12 NOTE — PROGRESS NOTES
Name: Cr Pena      : 1940      MRN: [id number]  Encounter Provider: Edward Strange PA-C  Encounter Date: 2025   Encounter department: [facility] PULMONARY [facility]  :  Assessment & Plan  COPD, moderate (HCC)  Breztri 2 puffs twice daily.  Rinse mouth after each use.  Continue albuterol HFA every 6 hours as needed.  No need for systemic steroids at this time as there is no evidence of acute exacerbation.       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 Case was presented at thoracic oncology multidisciplinary tumor board 2/3 where recommendations were to proceed with EBUS to complete staging. I reviewed the procedure with patient including risks including but not limited to infection, bleeding, pneumothorax, respiratory failure requiring NIV postoperatively. Following our discussion, there are no complete contraindications to proceed with EBUS 25 with Dr. Harrington. E-consent obtained.        Chronic respiratory failure with hypoxia (HCC)  Continue 2L NC with activities to maintain saturations greater than or equal to 88%.            Pre-op clearance considerations  - ARISCAT - low risk, 1.6% risk of in-hospital post-op pulmonary complications  - Asencio Post-Op Respiratory Failure Risk - 1.2% risk of mechanical ventilation for > 48hrs after surgery or unplanned intubation <= 30d of surgery  - Asencio Post-Op Pneumonia Risk - 0.4%       History of Present Illness   Cr Pena is a 84 y.o. female who presents for routine follow-up/preprocedure clearance.  Patient has a past medical is positive for moderate COPD, chronic hypoxic respiratory failure, and new diagnosis of left lower lobe squamous cell carcinoma.  She has not been seen in the office since .  She reports stability in her symptoms until she sustained a fall in Oct 2024 resulting in several broken ribs requiring hospitalization. She went home on oxygen at  that time.  Incidentally, a LLL mass was found that was PET avid in November. IR bx proved cell carcinoma of the left lower lobe 12/9/2024. She has been seen by heme-onc and rad-onc with plans to start IMRT. EBUS was recommended to evaluate lymphadenopathy/compete staging.     Patient states that her breathing has declined since last time she was in the office but overall feels that they are stable.  She uses oxygen when she leaves the house now but is still able to go grocery shopping, go to social events and hang out with friends and family.  She still able to do most tasks at home as well.  She reports compliance on Breztri and only uses her rescue inhaler on occasion.  She denies any recent illnesses requiring systemic steroids or antibiotics.  Denies any new cough or sputum production.  Denies hemoptysis.  She does have a 10 pound weight loss since October.  She denies fevers or chills.    Review of Systems   All other systems reviewed and are negative.    Past Medical History   Past Medical History:   Diagnosis Date    Allergic     Allergic rhinitis     COPD (chronic obstructive pulmonary disease) (HCC)     Diabetes mellitus (HCC)     GERD (gastroesophageal reflux disease)     Hyperlipidemia     Hypertension     Left non-suppurative otitis media 11/27/2019    Pneumonia     Pneumonia of right lower lobe due to infectious organism 12/4/2018    Stage 3 chronic kidney disease, unspecified whether stage 3a or 3b CKD (HCC) 11/1/2022    Vitamin D deficiency      Past Surgical History:   Procedure Laterality Date    CARDIAC CATHETERIZATION      CHOLECYSTECTOMY  1984    COLONOSCOPY N/A 1/30/2019    Procedure: COLONOSCOPY with multiple  polypectomies;  Surgeon: Aissatou Lyles MD;  Location:  GI LAB;  Service: Gastroenterology    EYE SURGERY Bilateral     CATARACT    IR BIOPSY LUNG  12/9/2024     Family History   Problem Relation Age of Onset    Lung cancer Father     Heart disease Mother     Lung cancer Sister     Lung  cancer Brother       reports that she quit smoking about 11 years ago. Her smoking use included cigarettes. She started smoking about 61 years ago. She has a 50 pack-year smoking history. She has been exposed to tobacco smoke. She has never used smokeless tobacco. She reports that she does not drink alcohol and does not use drugs.  Current Outpatient Medications on File Prior to Visit   Medication Sig Dispense Refill    albuterol (PROVENTIL HFA,VENTOLIN HFA) 90 mcg/act inhaler Inhale 2 puffs 4 (four) times a day 54 g 1    aspirin 81 mg chewable tablet Chew 81 mg daily      atorvastatin (LIPITOR) 20 mg tablet Take 1 tablet by mouth daily 90 tablet 1    Breztri Aerosphere 160-9-4.8 MCG/ACT AERO Inhale 2 puffs 2 (two) times a day Rinse mouth after use. 32.1 g 3    Cholecalciferol (D3 2000) 50 MCG (2000 UT) CAPS Take 2,000 Units by mouth daily      gabapentin (NEURONTIN) 300 mg capsule Take 1 capsule (300 mg total) by mouth daily 100 capsule 1    glucose blood (OneTouch Verio) test strip Use as instructed TEST TWO TIMES DAILY e11.65 300 each 5    lisinopril (ZESTRIL) 5 mg tablet Take 1 tablet (5 mg total) by mouth daily 100 tablet 1    metFORMIN (GLUCOPHAGE) 500 mg tablet Take 1 tablet (500 mg total) by mouth 2 (two) times a day with meals 180 tablet 1    Multiple Vitamin (MULTIVITAMINS PO) Take by mouth daily      Multiple Vitamins-Minerals (One-A-Day Womens 50+) TABS Take 1 tablet by mouth daily      omeprazole (PriLOSEC) 40 MG capsule Take 1 capsule (40 mg total) by mouth daily before breakfast 100 capsule 1    oxybutynin (DITROPAN-XL) 5 mg 24 hr tablet Take 1 tablet (5 mg total) by mouth daily 100 tablet 1    cetirizine (ZyrTEC) 10 mg tablet Take 10 mg by mouth daily      docusate sodium (COLACE) 100 mg capsule Take 1 capsule (100 mg total) by mouth 2 (two) times a day (Patient not taking: Reported on 12/2/2024) 60 capsule 0    lidocaine (LIDODERM) 5 % Apply 1 patch topically over 12 hours daily Remove & Discard  patch within 12 hours or as directed by MD (Patient not taking: Reported on 11/14/2024) 14 patch 0     No current facility-administered medications on file prior to visit.     Allergies   Allergen Reactions    Bee Venom Anaphylaxis    Other Shortness Of Breath     FLOWERS/GRASS         Historical Information   Tobacco History: quit smoking 11 years ago  Occupational History: na    Objective   /78 (BP Location: Left arm, Patient Position: Sitting, Cuff Size: Standard)   Pulse 69   Temp 97.9 °F (36.6 °C) (Temporal)   Ht 5' (1.524 m)   Wt 66.2 kg (146 lb)   SpO2 93%   BMI 28.51 kg/m²      Physical Exam  Vitals reviewed.   Constitutional:       Appearance: Normal appearance. She is well-developed.   HENT:      Head: Normocephalic and atraumatic.      Nose: Nose normal.      Mouth/Throat:      Mouth: Mucous membranes are moist.   Eyes:      Extraocular Movements: Extraocular movements intact.   Cardiovascular:      Rate and Rhythm: Normal rate and regular rhythm.      Heart sounds: Normal heart sounds.   Pulmonary:      Effort: Pulmonary effort is normal. No respiratory distress.      Breath sounds: No wheezing, rhonchi or rales.   Musculoskeletal:         General: No swelling.   Skin:     General: Skin is warm and dry.   Neurological:      Mental Status: She is alert. Mental status is at baseline.   Psychiatric:         Mood and Affect: Mood normal.         Behavior: Behavior normal.         Lab Results: I have reviewed pertinent labs.    Radiology Results Review: I personally reviewed the following image studies in PACS and associated radiology reports: PET 11/6/24. My interpretation of the radiology images/reports is: as below.    IMPRESSION:  1. Large hypermetabolic left lower lung mass most concerning for malignancy. Recommend tissue diagnosis.  2. Multiple healing left rib fractures, with new lesions involving the fifth, sixth and seventh ribs since the prior CT, as above. Attention on follow-up to  confirm healing.  3. Otherwise no hypermetabolic metastases visualized.    PFT Results Reviewed: reviewed and interpreted  Function test 10/26/2022  FEV1/FVC ratio 54%  FEV1 59% predicted  FVC 83% predicted  No change with bronchodilator  % predicted  % predicted  DLCO corrected for patient's hemoglobin 60% predicted  Interpretation: Bladder obstructive airflow defect with reduced vital capacity.  No change with bronchodilators.  Lung volumes indicate mild to moderate air trapping.  Reduced diffusion capacity.

## 2025-02-12 NOTE — H&P (VIEW-ONLY)
Name: Cr Pena      : 1940      MRN: [id number]  Encounter Provider: Edward Strange PA-C  Encounter Date: 2025   Encounter department: [facility] PULMONARY [facility]  :  Assessment & Plan  COPD, moderate (HCC)  Breztri 2 puffs twice daily.  Rinse mouth after each use.  Continue albuterol HFA every 6 hours as needed.  No need for systemic steroids at this time as there is no evidence of acute exacerbation.       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 Case was presented at thoracic oncology multidisciplinary tumor board 2/3 where recommendations were to proceed with EBUS to complete staging. I reviewed the procedure with patient including risks including but not limited to infection, bleeding, pneumothorax, respiratory failure requiring NIV postoperatively. Following our discussion, there are no complete contraindications to proceed with EBUS 25 with Dr. Harrington. E-consent obtained.        Chronic respiratory failure with hypoxia (HCC)  Continue 2L NC with activities to maintain saturations greater than or equal to 88%.            Pre-op clearance considerations  - ARISCAT - low risk, 1.6% risk of in-hospital post-op pulmonary complications  - Asencio Post-Op Respiratory Failure Risk - 1.2% risk of mechanical ventilation for > 48hrs after surgery or unplanned intubation <= 30d of surgery  - Asencio Post-Op Pneumonia Risk - 0.4%       History of Present Illness   Cr Pena is a 84 y.o. female who presents for routine follow-up/preprocedure clearance.  Patient has a past medical is positive for moderate COPD, chronic hypoxic respiratory failure, and new diagnosis of left lower lobe squamous cell carcinoma.  She has not been seen in the office since .  She reports stability in her symptoms until she sustained a fall in Oct 2024 resulting in several broken ribs requiring hospitalization. She went home on oxygen at  that time.  Incidentally, a LLL mass was found that was PET avid in November. IR bx proved cell carcinoma of the left lower lobe 12/9/2024. She has been seen by heme-onc and rad-onc with plans to start IMRT. EBUS was recommended to evaluate lymphadenopathy/compete staging.     Patient states that her breathing has declined since last time she was in the office but overall feels that they are stable.  She uses oxygen when she leaves the house now but is still able to go grocery shopping, go to social events and hang out with friends and family.  She still able to do most tasks at home as well.  She reports compliance on Breztri and only uses her rescue inhaler on occasion.  She denies any recent illnesses requiring systemic steroids or antibiotics.  Denies any new cough or sputum production.  Denies hemoptysis.  She does have a 10 pound weight loss since October.  She denies fevers or chills.    Review of Systems   All other systems reviewed and are negative.    Past Medical History   Past Medical History:   Diagnosis Date    Allergic     Allergic rhinitis     COPD (chronic obstructive pulmonary disease) (HCC)     Diabetes mellitus (HCC)     GERD (gastroesophageal reflux disease)     Hyperlipidemia     Hypertension     Left non-suppurative otitis media 11/27/2019    Pneumonia     Pneumonia of right lower lobe due to infectious organism 12/4/2018    Stage 3 chronic kidney disease, unspecified whether stage 3a or 3b CKD (HCC) 11/1/2022    Vitamin D deficiency      Past Surgical History:   Procedure Laterality Date    CARDIAC CATHETERIZATION      CHOLECYSTECTOMY  1984    COLONOSCOPY N/A 1/30/2019    Procedure: COLONOSCOPY with multiple  polypectomies;  Surgeon: Aissatou Lyles MD;  Location:  GI LAB;  Service: Gastroenterology    EYE SURGERY Bilateral     CATARACT    IR BIOPSY LUNG  12/9/2024     Family History   Problem Relation Age of Onset    Lung cancer Father     Heart disease Mother     Lung cancer Sister     Lung  cancer Brother       reports that she quit smoking about 11 years ago. Her smoking use included cigarettes. She started smoking about 61 years ago. She has a 50 pack-year smoking history. She has been exposed to tobacco smoke. She has never used smokeless tobacco. She reports that she does not drink alcohol and does not use drugs.  Current Outpatient Medications on File Prior to Visit   Medication Sig Dispense Refill    albuterol (PROVENTIL HFA,VENTOLIN HFA) 90 mcg/act inhaler Inhale 2 puffs 4 (four) times a day 54 g 1    aspirin 81 mg chewable tablet Chew 81 mg daily      atorvastatin (LIPITOR) 20 mg tablet Take 1 tablet by mouth daily 90 tablet 1    Breztri Aerosphere 160-9-4.8 MCG/ACT AERO Inhale 2 puffs 2 (two) times a day Rinse mouth after use. 32.1 g 3    Cholecalciferol (D3 2000) 50 MCG (2000 UT) CAPS Take 2,000 Units by mouth daily      gabapentin (NEURONTIN) 300 mg capsule Take 1 capsule (300 mg total) by mouth daily 100 capsule 1    glucose blood (OneTouch Verio) test strip Use as instructed TEST TWO TIMES DAILY e11.65 300 each 5    lisinopril (ZESTRIL) 5 mg tablet Take 1 tablet (5 mg total) by mouth daily 100 tablet 1    metFORMIN (GLUCOPHAGE) 500 mg tablet Take 1 tablet (500 mg total) by mouth 2 (two) times a day with meals 180 tablet 1    Multiple Vitamin (MULTIVITAMINS PO) Take by mouth daily      Multiple Vitamins-Minerals (One-A-Day Womens 50+) TABS Take 1 tablet by mouth daily      omeprazole (PriLOSEC) 40 MG capsule Take 1 capsule (40 mg total) by mouth daily before breakfast 100 capsule 1    oxybutynin (DITROPAN-XL) 5 mg 24 hr tablet Take 1 tablet (5 mg total) by mouth daily 100 tablet 1    cetirizine (ZyrTEC) 10 mg tablet Take 10 mg by mouth daily      docusate sodium (COLACE) 100 mg capsule Take 1 capsule (100 mg total) by mouth 2 (two) times a day (Patient not taking: Reported on 12/2/2024) 60 capsule 0    lidocaine (LIDODERM) 5 % Apply 1 patch topically over 12 hours daily Remove & Discard  patch within 12 hours or as directed by MD (Patient not taking: Reported on 11/14/2024) 14 patch 0     No current facility-administered medications on file prior to visit.     Allergies   Allergen Reactions    Bee Venom Anaphylaxis    Other Shortness Of Breath     FLOWERS/GRASS         Historical Information   Tobacco History: quit smoking 11 years ago  Occupational History: na    Objective   /78 (BP Location: Left arm, Patient Position: Sitting, Cuff Size: Standard)   Pulse 69   Temp 97.9 °F (36.6 °C) (Temporal)   Ht 5' (1.524 m)   Wt 66.2 kg (146 lb)   SpO2 93%   BMI 28.51 kg/m²      Physical Exam  Vitals reviewed.   Constitutional:       Appearance: Normal appearance. She is well-developed.   HENT:      Head: Normocephalic and atraumatic.      Nose: Nose normal.      Mouth/Throat:      Mouth: Mucous membranes are moist.   Eyes:      Extraocular Movements: Extraocular movements intact.   Cardiovascular:      Rate and Rhythm: Normal rate and regular rhythm.      Heart sounds: Normal heart sounds.   Pulmonary:      Effort: Pulmonary effort is normal. No respiratory distress.      Breath sounds: No wheezing, rhonchi or rales.   Musculoskeletal:         General: No swelling.   Skin:     General: Skin is warm and dry.   Neurological:      Mental Status: She is alert. Mental status is at baseline.   Psychiatric:         Mood and Affect: Mood normal.         Behavior: Behavior normal.         Lab Results: I have reviewed pertinent labs.    Radiology Results Review: I personally reviewed the following image studies in PACS and associated radiology reports: PET 11/6/24. My interpretation of the radiology images/reports is: as below.    IMPRESSION:  1. Large hypermetabolic left lower lung mass most concerning for malignancy. Recommend tissue diagnosis.  2. Multiple healing left rib fractures, with new lesions involving the fifth, sixth and seventh ribs since the prior CT, as above. Attention on follow-up to  confirm healing.  3. Otherwise no hypermetabolic metastases visualized.    PFT Results Reviewed: reviewed and interpreted  Function test 10/26/2022  FEV1/FVC ratio 54%  FEV1 59% predicted  FVC 83% predicted  No change with bronchodilator  % predicted  % predicted  DLCO corrected for patient's hemoglobin 60% predicted  Interpretation: Bladder obstructive airflow defect with reduced vital capacity.  No change with bronchodilators.  Lung volumes indicate mild to moderate air trapping.  Reduced diffusion capacity.

## 2025-02-12 NOTE — ASSESSMENT & PLAN NOTE
Status post CT guided biopsy of the LLL mass. Case was presented at thoracic oncology multidisciplinary tumor board 2/3 where recommendations were to proceed with EBUS to complete staging. I reviewed the procedure with patient including risks including but not limited to infection, bleeding, pneumothorax, respiratory failure requiring NIV postoperatively. Following our discussion, there are no complete contraindications to proceed with EBUS 2/24/25 with Dr. Harrington. E-consent obtained.

## 2025-02-12 NOTE — ASSESSMENT & PLAN NOTE
Breztri 2 puffs twice daily.  Rinse mouth after each use.  Continue albuterol HFA every 6 hours as needed.  No need for systemic steroids at this time as there is no evidence of acute exacerbation.

## 2025-02-21 NOTE — ASSESSMENT & PLAN NOTE
Lab Results   Component Value Date    HGBA1C 7 6% 09/11/2018       No results for input(s): POCGLU in the last 72 hours  Blood Sugar Average: Last 72 hrs: Will start metformin to improve A1C   Continue ace and statin
Likely secondary to allergies   Will have pt continue flonase and add claritin daily x 2 weeks
Pts symptoms are stable with current regime  No changes at present 
Will give diflucan and lotrisone   The lotrisone will help treat the fungus as well as soothe the affected area
Quality 226: Preventive Care And Screening: Tobacco Use: Screening And Cessation Intervention: Tobacco Screening not Performed
Detail Level: Detailed
Quality 431: Preventive Care And Screening: Unhealthy Alcohol Use - Screening: Patient not identified as an unhealthy alcohol user when screened for unhealthy alcohol use using a systematic screening method
Quality 130: Documentation Of Current Medications In The Medical Record: Current Medications Documented

## 2025-02-24 ENCOUNTER — ANESTHESIA (OUTPATIENT)
Dept: PERIOP | Facility: HOSPITAL | Age: 85
End: 2025-02-24
Payer: COMMERCIAL

## 2025-02-24 ENCOUNTER — ANESTHESIA EVENT (OUTPATIENT)
Dept: PERIOP | Facility: HOSPITAL | Age: 85
End: 2025-02-24
Payer: COMMERCIAL

## 2025-02-24 ENCOUNTER — HOSPITAL ENCOUNTER (OUTPATIENT)
Dept: PERIOP | Facility: HOSPITAL | Age: 85
Setting detail: OUTPATIENT SURGERY
Discharge: HOME | End: 2025-02-24
Attending: INTERNAL MEDICINE
Payer: COMMERCIAL

## 2025-02-24 VITALS
WEIGHT: 143 LBS | BODY MASS INDEX: 28.07 KG/M2 | TEMPERATURE: 97.6 F | DIASTOLIC BLOOD PRESSURE: 68 MMHG | RESPIRATION RATE: 16 BRPM | SYSTOLIC BLOOD PRESSURE: 142 MMHG | OXYGEN SATURATION: 90 % | HEART RATE: 85 BPM | HEIGHT: 60 IN

## 2025-02-24 DIAGNOSIS — J44.9 CHRONIC OBSTRUCTIVE PULMONARY DISEASE, UNSPECIFIED COPD TYPE (HCC): ICD-10-CM

## 2025-02-24 LAB
GLUCOSE SERPL-MCNC: 101 MG/DL (ref 65–140)
GLUCOSE SERPL-MCNC: 117 MG/DL (ref 65–140)

## 2025-02-24 PROCEDURE — 31652 BRONCH EBUS SAMPLNG 1/2 NODE: CPT | Performed by: INTERNAL MEDICINE

## 2025-02-24 PROCEDURE — 88172 CYTP DX EVAL FNA 1ST EA SITE: CPT | Performed by: PATHOLOGY

## 2025-02-24 PROCEDURE — 88173 CYTOPATH EVAL FNA REPORT: CPT | Performed by: PATHOLOGY

## 2025-02-24 PROCEDURE — 82948 REAGENT STRIP/BLOOD GLUCOSE: CPT

## 2025-02-24 PROCEDURE — 88305 TISSUE EXAM BY PATHOLOGIST: CPT | Performed by: PATHOLOGY

## 2025-02-24 RX ORDER — LIDOCAINE HYDROCHLORIDE 10 MG/ML
0.5 INJECTION, SOLUTION EPIDURAL; INFILTRATION; INTRACAUDAL; PERINEURAL ONCE AS NEEDED
Status: DISCONTINUED | OUTPATIENT
Start: 2025-02-24 | End: 2025-02-28 | Stop reason: HOSPADM

## 2025-02-24 RX ORDER — SODIUM CHLORIDE, SODIUM LACTATE, POTASSIUM CHLORIDE, CALCIUM CHLORIDE 600; 310; 30; 20 MG/100ML; MG/100ML; MG/100ML; MG/100ML
125 INJECTION, SOLUTION INTRAVENOUS CONTINUOUS
Status: DISCONTINUED | OUTPATIENT
Start: 2025-02-24 | End: 2025-02-28 | Stop reason: HOSPADM

## 2025-02-24 RX ORDER — DEXAMETHASONE SODIUM PHOSPHATE 10 MG/ML
INJECTION, SOLUTION INTRAMUSCULAR; INTRAVENOUS AS NEEDED
Status: DISCONTINUED | OUTPATIENT
Start: 2025-02-24 | End: 2025-02-24

## 2025-02-24 RX ORDER — PHENYLEPHRINE HCL IN 0.9% NACL 1 MG/10 ML
SYRINGE (ML) INTRAVENOUS AS NEEDED
Status: DISCONTINUED | OUTPATIENT
Start: 2025-02-24 | End: 2025-02-24

## 2025-02-24 RX ORDER — HYDROMORPHONE HCL IN WATER/PF 6 MG/30 ML
0.2 PATIENT CONTROLLED ANALGESIA SYRINGE INTRAVENOUS
Status: DISCONTINUED | OUTPATIENT
Start: 2025-02-24 | End: 2025-02-24 | Stop reason: HOSPADM

## 2025-02-24 RX ORDER — PROPOFOL 10 MG/ML
INJECTION, EMULSION INTRAVENOUS AS NEEDED
Status: DISCONTINUED | OUTPATIENT
Start: 2025-02-24 | End: 2025-02-24

## 2025-02-24 RX ORDER — LIDOCAINE HYDROCHLORIDE 10 MG/ML
INJECTION, SOLUTION EPIDURAL; INFILTRATION; INTRACAUDAL; PERINEURAL AS NEEDED
Status: DISCONTINUED | OUTPATIENT
Start: 2025-02-24 | End: 2025-02-24

## 2025-02-24 RX ORDER — ROCURONIUM BROMIDE 10 MG/ML
INJECTION, SOLUTION INTRAVENOUS AS NEEDED
Status: DISCONTINUED | OUTPATIENT
Start: 2025-02-24 | End: 2025-02-24

## 2025-02-24 RX ORDER — FENTANYL CITRATE 50 UG/ML
INJECTION, SOLUTION INTRAMUSCULAR; INTRAVENOUS AS NEEDED
Status: DISCONTINUED | OUTPATIENT
Start: 2025-02-24 | End: 2025-02-24

## 2025-02-24 RX ORDER — PROPOFOL 10 MG/ML
INJECTION, EMULSION INTRAVENOUS CONTINUOUS PRN
Status: DISCONTINUED | OUTPATIENT
Start: 2025-02-24 | End: 2025-02-24

## 2025-02-24 RX ORDER — FENTANYL CITRATE/PF 50 MCG/ML
25 SYRINGE (ML) INJECTION
Status: DISCONTINUED | OUTPATIENT
Start: 2025-02-24 | End: 2025-02-24 | Stop reason: HOSPADM

## 2025-02-24 RX ORDER — ONDANSETRON 2 MG/ML
INJECTION INTRAMUSCULAR; INTRAVENOUS AS NEEDED
Status: DISCONTINUED | OUTPATIENT
Start: 2025-02-24 | End: 2025-02-24

## 2025-02-24 RX ORDER — ONDANSETRON 2 MG/ML
4 INJECTION INTRAMUSCULAR; INTRAVENOUS ONCE AS NEEDED
Status: DISCONTINUED | OUTPATIENT
Start: 2025-02-24 | End: 2025-02-24 | Stop reason: HOSPADM

## 2025-02-24 RX ADMIN — PROPOFOL 110 MG: 10 INJECTION, EMULSION INTRAVENOUS at 09:41

## 2025-02-24 RX ADMIN — Medication 100 MCG: at 10:34

## 2025-02-24 RX ADMIN — Medication 100 MCG: at 10:39

## 2025-02-24 RX ADMIN — FENTANYL CITRATE 50 MCG: 50 INJECTION INTRAMUSCULAR; INTRAVENOUS at 09:40

## 2025-02-24 RX ADMIN — Medication 100 MCG: at 10:00

## 2025-02-24 RX ADMIN — Medication 100 MCG: at 10:37

## 2025-02-24 RX ADMIN — ROCURONIUM BROMIDE 40 MG: 10 INJECTION, SOLUTION INTRAVENOUS at 09:42

## 2025-02-24 RX ADMIN — Medication 100 MCG: at 10:46

## 2025-02-24 RX ADMIN — PROPOFOL 100 MCG/KG/MIN: 10 INJECTION, EMULSION INTRAVENOUS at 09:45

## 2025-02-24 RX ADMIN — Medication 100 MCG: at 10:06

## 2025-02-24 RX ADMIN — LIDOCAINE HYDROCHLORIDE 50 MG: 10 INJECTION, SOLUTION EPIDURAL; INFILTRATION; INTRACAUDAL; PERINEURAL at 09:40

## 2025-02-24 RX ADMIN — DEXAMETHASONE SODIUM PHOSPHATE 10 MG: 10 INJECTION, SOLUTION INTRAMUSCULAR; INTRAVENOUS at 09:44

## 2025-02-24 RX ADMIN — SODIUM CHLORIDE, SODIUM LACTATE, POTASSIUM CHLORIDE, AND CALCIUM CHLORIDE 125 ML/HR: .6; .31; .03; .02 INJECTION, SOLUTION INTRAVENOUS at 09:15

## 2025-02-24 RX ADMIN — Medication 100 MCG: at 10:18

## 2025-02-24 RX ADMIN — SUGAMMADEX 150 MG: 100 INJECTION, SOLUTION INTRAVENOUS at 10:43

## 2025-02-24 RX ADMIN — Medication 200 MCG: at 10:23

## 2025-02-24 RX ADMIN — ONDANSETRON 4 MG: 2 INJECTION INTRAMUSCULAR; INTRAVENOUS at 09:49

## 2025-02-24 NOTE — ANESTHESIA POSTPROCEDURE EVALUATION
Post-Op Assessment Note    CV Status:  Stable    Pain management: adequate       Mental Status:  Sleepy and arousable   Hydration Status:  Euvolemic   PONV Controlled:  Controlled   Airway Patency:  Patent     Post Op Vitals Reviewed: Yes    No anethesia notable event occurred.    Staff: CRNA           Last Filed PACU Vitals:  Vitals Value Taken Time   Temp 98.1    Pulse 80 02/24/25 1057   /54    Resp 16 02/24/25 1057   SpO2 96 % 02/24/25 1057   Vitals shown include unfiled device data.

## 2025-02-24 NOTE — ANESTHESIA PREPROCEDURE EVALUATION
Procedure:  ENDOBRONCHIAL ULTRASOUND (EBUS)    Relevant Problems   ANESTHESIA   (-) History of anesthesia complications      CARDIO   (+) Chronic diastolic congestive heart failure (HCC)   (+) Essential hypertension   (+) Hyperlipidemia, unspecified   (-) Chest pain      ENDO   (+) Type 2 diabetes mellitus with hyperglycemia, without long-term current use of insulin (HCC)      GI/HEPATIC   (+) Acid reflux disease   (+) Esophageal dysphagia      /RENAL   (+) Stage 3 chronic kidney disease, unspecified whether stage 3a or 3b CKD (HCC)      NEURO/PSYCH   (+) Recurrent major depressive disorder, in partial remission (HCC)      PULMONARY   (+) Asthma, mild intermittent   (+) COPD, moderate (HCC)   (+) Chronic respiratory failure with hypoxia (HCC) (2L with exertion)         CORONARY CIRCULATION:  Left main: Normal.  LAD: Normal.  Circumflex: Normal.  RCA: Normal.     IMPRESSIONS:  The coronary arteries are normal.    LEFT VENTRICLE:  Systolic function was normal. Ejection fraction was estimated to be 60 %.  Although no diagnostic regional wall motion abnormality was identified, this possibility cannot be completely excluded on the basis of this study.  Wall thickness was increased.  Doppler parameters were consistent with abnormal left ventricular relaxation (grade 1 diastolic dysfunction).     MITRAL VALVE:  There was mild to moderate annular calcification.  There was trace regurgitation.     TRICUSPID VALVE:  There was mild regurgitation.  Estimated peak PA pressure was 45 mmHg.  Physical Exam    Airway    Mallampati score: II  TM Distance: >3 FB  Neck ROM: full     Dental   Comment: Poor dentition, denies loose     Cardiovascular      Pulmonary      Other Findings  post-pubertal.      Anesthesia Plan  ASA Score- 3     Anesthesia Type- general with ASA Monitors.         Additional Monitors:     Airway Plan: ETT.           Plan Factors-Exercise tolerance (METS): >4 METS.    Chart reviewed. EKG reviewed.  Existing labs  reviewed. Patient summary reviewed.                  Induction- intravenous.    Postoperative Plan-         Informed Consent- Anesthetic plan and risks discussed with patient.  I personally reviewed this patient with the CRNA. Discussed and agreed on the Anesthesia Plan with the CRNA..      NPO Status:  Vitals Value Taken Time   Date of last liquid 02/23/25 02/24/25 0841   Time of last liquid 2130 02/24/25 0841   Date of last solid 02/23/25 02/24/25 0841   Time of last solid 2130 02/24/25 0841

## 2025-02-24 NOTE — INTERVAL H&P NOTE
H&P reviewed. After examining the patient I find no changes in the patients condition since the H&P had been written.    Vitals:    02/24/25 0800   BP: 150/87   Pulse: 90   Resp: 16   Temp: 97.5 °F (36.4 °C)   SpO2: 94%     Patient has no complaints.  Lungs are clear.    Assessment/plan:    Left lower lobe non-small cell lung cancer    Given size of lesion, we will proceed with endobronchial ultrasound for mediastinal survey and staging.  Risks and benefits discussed.  Consent signed.

## 2025-02-26 ENCOUNTER — RESULTS FOLLOW-UP (OUTPATIENT)
Dept: PULMONOLOGY | Facility: CLINIC | Age: 85
End: 2025-02-26

## 2025-02-26 PROCEDURE — 88305 TISSUE EXAM BY PATHOLOGIST: CPT | Performed by: PATHOLOGY

## 2025-02-26 PROCEDURE — 88173 CYTOPATH EVAL FNA REPORT: CPT | Performed by: PATHOLOGY

## 2025-02-26 PROCEDURE — 88172 CYTP DX EVAL FNA 1ST EA SITE: CPT | Performed by: PATHOLOGY

## 2025-02-27 ENCOUNTER — TELEPHONE (OUTPATIENT)
Dept: PULMONOLOGY | Facility: CLINIC | Age: 85
End: 2025-02-27

## 2025-02-27 NOTE — TELEPHONE ENCOUNTER
Called patient to review results of EBUS but no answer and VM not set up. Called son and left message to have his mother call our office back. Of note, lymph node bx were negative. Patient will follow-up with heme-onc for further recommendations.

## 2025-03-07 ENCOUNTER — PATIENT OUTREACH (OUTPATIENT)
Dept: HEMATOLOGY ONCOLOGY | Facility: CLINIC | Age: 85
End: 2025-03-07

## 2025-03-07 NOTE — PROGRESS NOTES
Spoke with Cr, she had her Ebus done, and now on Monday she goes to see Dr Jones for her treatment plan. Cr is worried if she needs radiation, how will she get down to [place]. I told Cr not to worry about transportation, that we will work on that if she needs it. I will reach back out to Cr after her appointment with Dr Jones.

## 2025-03-10 ENCOUNTER — TELEPHONE (OUTPATIENT)
Dept: GASTROENTEROLOGY | Facility: CLINIC | Age: 85
End: 2025-03-10

## 2025-03-10 ENCOUNTER — OFFICE VISIT (OUTPATIENT)
Dept: HEMATOLOGY ONCOLOGY | Facility: CLINIC | Age: 85
End: 2025-03-10
Payer: COMMERCIAL

## 2025-03-10 VITALS
HEIGHT: 60 IN | SYSTOLIC BLOOD PRESSURE: 111 MMHG | HEART RATE: 103 BPM | OXYGEN SATURATION: 94 % | BODY MASS INDEX: 28.66 KG/M2 | DIASTOLIC BLOOD PRESSURE: 73 MMHG | TEMPERATURE: 98.7 F | WEIGHT: 146 LBS

## 2025-03-10 DIAGNOSIS — C34.32 SQUAMOUS CELL CARCINOMA OF LOWER LOBE OF LEFT LUNG (HCC): Primary | ICD-10-CM

## 2025-03-10 PROCEDURE — G2211 COMPLEX E/M VISIT ADD ON: HCPCS | Performed by: INTERNAL MEDICINE

## 2025-03-10 PROCEDURE — 99215 OFFICE O/P EST HI 40 MIN: CPT | Performed by: INTERNAL MEDICINE

## 2025-03-10 RX ORDER — ONDANSETRON 8 MG/1
8 TABLET, FILM COATED ORAL EVERY 8 HOURS PRN
Qty: 30 TABLET | Refills: 3 | Status: SHIPPED | OUTPATIENT
Start: 2025-03-10

## 2025-03-10 NOTE — PROGRESS NOTES
Name: Cr Pena      : 1940      MRN: [id number]  Encounter Provider: Alexsander Jones DO  Encounter Date: 3/10/2025   Encounter department: 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  Patient declined surgical resection.  Likewise, for the moment, she defers radiation therapy.  Medical therapy is favored, therefore.  I would use nab-paclitaxel, carboplatin, pembrolizumab.  We reviewed potential toxicities including but not limited to nausea, vomiting, cytopenias, peripheral neuropathy, alopecia, fatigue, diarrhea, rash, allergic reaction.  We reviewed prophylactic measures taken routinely as well as monitoring to allow for early intervention as appropriate.  Our chemotherapy nurse reviewed and provided written materials regarding these medications.  Radiation therapy could play a consolidative role, presuming stable or responding disease after 3+ cycles.   Given most recent imaging is PET scan in November, re- baseline requested.  Orders:  •  CT chest abdomen pelvis w contrast; Future  •  ondansetron (ZOFRAN) 8 mg tablet; Take 1 tablet (8 mg total) by mouth every 8 (eight) hours as needed for nausea        Return in about 4 weeks (around 2025) for Infusion - See Treatment Plan, Imaging - See orders, Labs - See Treatment Plan.    History of Present Illness   No chief complaint on file.      Pertinent Medical History        Review of Systems   Constitutional:  Negative for appetite change, diaphoresis, fatigue and fever.   HENT:  Negative for sinus pain.    Eyes:  Negative for discharge.   Respiratory:  Negative for cough and shortness of breath.    Cardiovascular:  Negative for chest pain.   Gastrointestinal:  Negative for abdominal pain, constipation and diarrhea.   Endocrine: Negative for cold intolerance.   Genitourinary:  Negative for difficulty urinating and hematuria.    Musculoskeletal:  Negative for joint swelling.   Skin:  Negative for rash.   Allergic/Immunologic: Negative for environmental allergies.   Neurological:  Negative for dizziness and headaches.   Hematological:  Negative for adenopathy.   Psychiatric/Behavioral:  Negative for agitation.            Objective   /73   Pulse 103   Temp 98.7 °F (37.1 °C) (Temporal)   Ht 5' (1.524 m)   Wt 66.2 kg (146 lb)   SpO2 94% Comment: 2 liters oxygen  BMI 28.51 kg/m²     Pain Screening:     ECOG     Physical Exam  Constitutional:       Appearance: She is well-developed.   HENT:      Head: Normocephalic and atraumatic.   Eyes:      Pupils: Pupils are equal, round, and reactive to light.   Cardiovascular:      Rate and Rhythm: Normal rate.      Heart sounds: No murmur heard.  Pulmonary:      Effort: No respiratory distress.      Breath sounds: No wheezing or rales.   Abdominal:      General: There is no distension.      Palpations: Abdomen is soft.      Tenderness: There is no abdominal tenderness. There is no rebound.   Musculoskeletal:         General: No tenderness.      Cervical back: Neck supple.   Lymphadenopathy:      Cervical: No cervical adenopathy.   Skin:     General: Skin is warm.      Findings: No rash.   Neurological:      Mental Status: She is alert and oriented to person, place, and time.      Deep Tendon Reflexes: Reflexes normal.   Psychiatric:         Thought Content: Thought content normal.         Labs: I have reviewed the following labs:  Lab Results   Component Value Date/Time    WBC 7.78 12/09/2024 08:15 AM    RBC 4.51 12/09/2024 08:15 AM    Hemoglobin 12.8 12/09/2024 08:15 AM    Hematocrit 39.9 12/09/2024 08:15 AM    MCV 89 12/09/2024 08:15 AM    MCH 28.4 12/09/2024 08:15 AM    RDW 13.0 12/09/2024 08:15 AM    Platelets 254 12/09/2024 08:15 AM    Segmented % 59 10/01/2024 04:47 AM    Lymphocytes % 25 10/01/2024 04:47 AM    Monocytes % 9 10/01/2024 04:47 AM    Eosinophils Relative 6 10/01/2024 04:47  AM    Basophils Relative 1 10/01/2024 04:47 AM    Immature Grans % 0 10/01/2024 04:47 AM    Absolute Neutrophils 4.87 10/01/2024 04:47 AM     Lab Results   Component Value Date/Time    Potassium 4.0 10/01/2024 04:47 AM    Chloride 104 10/01/2024 04:47 AM    CO2 27 10/01/2024 04:47 AM    BUN 17 10/01/2024 04:47 AM    Creatinine 0.95 10/01/2024 04:47 AM    Calcium 9.4 10/01/2024 04:47 AM    AST 15 09/29/2024 05:18 AM    ALT 14 09/29/2024 05:18 AM    Alkaline Phosphatase 62 09/29/2024 05:18 AM    Total Protein 6.3 (L) 09/29/2024 05:18 AM    Albumin 3.8 09/29/2024 05:18 AM    Total Bilirubin 0.61 09/29/2024 05:18 AM    eGFR 55 10/01/2024 04:47 AM

## 2025-03-10 NOTE — PROGRESS NOTES
Oncology Teach:   Review of Plan:  Treatment Plan Reviewed    Notes:  Keytruda, Carboplatin, Abraxane Q21 days  Carboplatin and Abraxane D1 & D8 Q21 days.     Review of Pre-Treatment Needs:  Lab work frequency reviewed    PICC or Port Placement Needs:  Not needed    Expectations at First Appointment Reviewed:  Yes    Patient Medication Education Reviewed:  Yes    Supportive Medication Reviewed:  Rx meds reviewed    Review when to call office vs. present to ED:  Yes    Provided Oncology Access Center Number:  Yes    Assessment of patient understanding:  Pt demonstrates understanding    Chemo consent obtained?:  Yes

## 2025-03-10 NOTE — TELEPHONE ENCOUNTER
Please reach out to patient to schedule OP PACLitaxel protein - bound + CARBOplatin (AUC5) + Pembrolizumab Q21 Days    Thank you

## 2025-03-10 NOTE — ASSESSMENT & PLAN NOTE
T3 N0 squamous cell carcinoma.  Patient declined surgical resection.  Likewise, for the moment, she defers radiation therapy.  Medical therapy is favored, therefore.  I would use nab-paclitaxel, carboplatin, pembrolizumab.  We reviewed potential toxicities including but not limited to nausea, vomiting, cytopenias, peripheral neuropathy, alopecia, fatigue, diarrhea, rash, allergic reaction.  We reviewed prophylactic measures taken routinely as well as monitoring to allow for early intervention as appropriate.  Our chemotherapy nurse reviewed and provided written materials regarding these medications.  Radiation therapy could play a consolidative role, presuming stable or responding disease after 3+ cycles.   Given most recent imaging is PET scan in November, re- baseline requested.  Orders:  •  CT chest abdomen pelvis w contrast; Future  •  ondansetron (ZOFRAN) 8 mg tablet; Take 1 tablet (8 mg total) by mouth every 8 (eight) hours as needed for nausea

## 2025-03-11 DIAGNOSIS — C34.32 SQUAMOUS CELL CARCINOMA OF LOWER LOBE OF LEFT LUNG (HCC): Primary | ICD-10-CM

## 2025-03-12 ENCOUNTER — APPOINTMENT (OUTPATIENT)
Dept: LAB | Facility: HOSPITAL | Age: 85
End: 2025-03-12
Payer: COMMERCIAL

## 2025-03-12 DIAGNOSIS — C34.32 SQUAMOUS CELL CARCINOMA OF LOWER LOBE OF LEFT LUNG (HCC): ICD-10-CM

## 2025-03-12 LAB
ALBUMIN SERPL BCG-MCNC: 4.3 G/DL (ref 3.5–5)
ALP SERPL-CCNC: 68 U/L (ref 34–104)
ALT SERPL W P-5'-P-CCNC: 13 U/L (ref 7–52)
ANION GAP SERPL CALCULATED.3IONS-SCNC: 9 MMOL/L (ref 4–13)
AST SERPL W P-5'-P-CCNC: 16 U/L (ref 13–39)
BILIRUB SERPL-MCNC: 0.6 MG/DL (ref 0.2–1)
BUN SERPL-MCNC: 12 MG/DL (ref 5–25)
CALCIUM SERPL-MCNC: 9.7 MG/DL (ref 8.4–10.2)
CHLORIDE SERPL-SCNC: 103 MMOL/L (ref 96–108)
CO2 SERPL-SCNC: 28 MMOL/L (ref 21–32)
CREAT SERPL-MCNC: 0.89 MG/DL (ref 0.6–1.3)
GFR SERPL CREATININE-BSD FRML MDRD: 59 ML/MIN/1.73SQ M
GLUCOSE SERPL-MCNC: 151 MG/DL (ref 65–140)
POTASSIUM SERPL-SCNC: 4 MMOL/L (ref 3.5–5.3)
PROT SERPL-MCNC: 6.9 G/DL (ref 6.4–8.4)
SODIUM SERPL-SCNC: 140 MMOL/L (ref 135–147)

## 2025-03-12 PROCEDURE — 36415 COLL VENOUS BLD VENIPUNCTURE: CPT

## 2025-03-12 PROCEDURE — 80053 COMPREHEN METABOLIC PANEL: CPT

## 2025-03-13 ENCOUNTER — TELEPHONE (OUTPATIENT)
Dept: HEMATOLOGY ONCOLOGY | Facility: CLINIC | Age: 85
End: 2025-03-13

## 2025-03-13 RX ORDER — SODIUM CHLORIDE 9 MG/ML
20 INJECTION, SOLUTION INTRAVENOUS ONCE
Status: CANCELLED | OUTPATIENT
Start: 2025-03-20

## 2025-03-13 RX ORDER — SODIUM CHLORIDE 9 MG/ML
20 INJECTION, SOLUTION INTRAVENOUS ONCE
OUTPATIENT
Start: 2025-03-27

## 2025-03-13 NOTE — TELEPHONE ENCOUNTER
[employer] called regarding a prior authorization that was submitted for Dexamethasone. Does not require review and can be sent to the pharmacy to process prescription.

## 2025-03-14 ENCOUNTER — HOSPITAL ENCOUNTER (OUTPATIENT)
Dept: CT IMAGING | Facility: HOSPITAL | Age: 85
Discharge: HOME | End: 2025-03-14
Attending: INTERNAL MEDICINE
Payer: COMMERCIAL

## 2025-03-14 DIAGNOSIS — C34.32 SQUAMOUS CELL CARCINOMA OF LOWER LOBE OF LEFT LUNG (HCC): ICD-10-CM

## 2025-03-14 PROCEDURE — 71260 CT THORAX DX C+: CPT

## 2025-03-14 PROCEDURE — 74177 CT ABD & PELVIS W/CONTRAST: CPT

## 2025-03-14 RX ADMIN — IOHEXOL 100 ML: 350 INJECTION, SOLUTION INTRAVENOUS at 10:53

## 2025-03-16 ENCOUNTER — APPOINTMENT (OUTPATIENT)
Dept: LAB | Facility: HOSPITAL | Age: 85
End: 2025-03-16
Payer: COMMERCIAL

## 2025-03-16 DIAGNOSIS — C34.32 SQUAMOUS CELL CARCINOMA OF LOWER LOBE OF LEFT LUNG (HCC): ICD-10-CM

## 2025-03-16 LAB
ALBUMIN SERPL BCG-MCNC: 4.3 G/DL (ref 3.5–5)
ALP SERPL-CCNC: 69 U/L (ref 34–104)
ALT SERPL W P-5'-P-CCNC: 12 U/L (ref 7–52)
ANION GAP SERPL CALCULATED.3IONS-SCNC: 9 MMOL/L (ref 4–13)
AST SERPL W P-5'-P-CCNC: 14 U/L (ref 13–39)
BASOPHILS # BLD AUTO: 0.09 THOUSANDS/ÂΜL (ref 0–0.1)
BASOPHILS NFR BLD AUTO: 1 % (ref 0–1)
BILIRUB SERPL-MCNC: 0.78 MG/DL (ref 0.2–1)
BUN SERPL-MCNC: 9 MG/DL (ref 5–25)
CALCIUM SERPL-MCNC: 10.6 MG/DL (ref 8.4–10.2)
CHLORIDE SERPL-SCNC: 101 MMOL/L (ref 96–108)
CO2 SERPL-SCNC: 33 MMOL/L (ref 21–32)
CREAT SERPL-MCNC: 0.93 MG/DL (ref 0.6–1.3)
EOSINOPHIL # BLD AUTO: 0.52 THOUSAND/ÂΜL (ref 0–0.61)
EOSINOPHIL NFR BLD AUTO: 6 % (ref 0–6)
ERYTHROCYTE [DISTWIDTH] IN BLOOD BY AUTOMATED COUNT: 13.2 % (ref 11.6–15.1)
GFR SERPL CREATININE-BSD FRML MDRD: 56 ML/MIN/1.73SQ M
GLUCOSE SERPL-MCNC: 114 MG/DL (ref 65–140)
HCT VFR BLD AUTO: 44.6 % (ref 34.8–46.1)
HGB BLD-MCNC: 13.9 G/DL (ref 11.5–15.4)
IMM GRANULOCYTES # BLD AUTO: 0.01 THOUSAND/UL (ref 0–0.2)
IMM GRANULOCYTES NFR BLD AUTO: 0 % (ref 0–2)
LYMPHOCYTES # BLD AUTO: 2.54 THOUSANDS/ÂΜL (ref 0.6–4.47)
LYMPHOCYTES NFR BLD AUTO: 31 % (ref 14–44)
MCH RBC QN AUTO: 27 PG (ref 26.8–34.3)
MCHC RBC AUTO-ENTMCNC: 31.2 G/DL (ref 31.4–37.4)
MCV RBC AUTO: 87 FL (ref 82–98)
MONOCYTES # BLD AUTO: 0.59 THOUSAND/ÂΜL (ref 0.17–1.22)
MONOCYTES NFR BLD AUTO: 7 % (ref 4–12)
NEUTROPHILS # BLD AUTO: 4.53 THOUSANDS/ÂΜL (ref 1.85–7.62)
NEUTS SEG NFR BLD AUTO: 55 % (ref 43–75)
NRBC BLD AUTO-RTO: 0 /100 WBCS
PLATELET # BLD AUTO: 303 THOUSANDS/UL (ref 149–390)
PMV BLD AUTO: 10.4 FL (ref 8.9–12.7)
POTASSIUM SERPL-SCNC: 3.8 MMOL/L (ref 3.5–5.3)
PROT SERPL-MCNC: 7.3 G/DL (ref 6.4–8.4)
RBC # BLD AUTO: 5.15 MILLION/UL (ref 3.81–5.12)
SODIUM SERPL-SCNC: 143 MMOL/L (ref 135–147)
WBC # BLD AUTO: 8.28 THOUSAND/UL (ref 4.31–10.16)

## 2025-03-16 PROCEDURE — 85025 COMPLETE CBC W/AUTO DIFF WBC: CPT

## 2025-03-16 PROCEDURE — 36415 COLL VENOUS BLD VENIPUNCTURE: CPT

## 2025-03-16 PROCEDURE — 80053 COMPREHEN METABOLIC PANEL: CPT

## 2025-03-19 ENCOUNTER — PATIENT OUTREACH (OUTPATIENT)
Dept: HEMATOLOGY ONCOLOGY | Facility: CLINIC | Age: 85
End: 2025-03-19

## 2025-03-19 DIAGNOSIS — C34.32 SQUAMOUS CELL CARCINOMA OF LOWER LOBE OF LEFT LUNG (HCC): Primary | ICD-10-CM

## 2025-03-19 NOTE — PROGRESS NOTES
I reached out to Cr now that she is on Tx to reassess for any barriers to care and offer any supportive services that may be needed. I left  with reason for my call including my direct phone number [phone].

## 2025-03-20 ENCOUNTER — HOSPITAL ENCOUNTER (OUTPATIENT)
Dept: INFUSION CENTER | Facility: HOSPITAL | Age: 85
Discharge: HOME | End: 2025-03-20
Attending: INTERNAL MEDICINE
Payer: COMMERCIAL

## 2025-03-20 VITALS
RESPIRATION RATE: 16 BRPM | TEMPERATURE: 97.5 F | SYSTOLIC BLOOD PRESSURE: 105 MMHG | HEART RATE: 87 BPM | OXYGEN SATURATION: 96 % | BODY MASS INDEX: 28.65 KG/M2 | WEIGHT: 145.94 LBS | HEIGHT: 60 IN | DIASTOLIC BLOOD PRESSURE: 58 MMHG

## 2025-03-20 DIAGNOSIS — C34.32 SQUAMOUS CELL CARCINOMA OF LOWER LOBE OF LEFT LUNG (HCC): Primary | ICD-10-CM

## 2025-03-20 LAB
T3FREE SERPL-MCNC: 2.54 PG/ML (ref 2.5–3.9)
TSH SERPL DL<=0.05 MIU/L-ACNC: 2.28 UIU/ML (ref 0.45–4.5)

## 2025-03-20 PROCEDURE — 96367 TX/PROPH/DG ADDL SEQ IV INF: CPT

## 2025-03-20 PROCEDURE — 96413 CHEMO IV INFUSION 1 HR: CPT

## 2025-03-20 PROCEDURE — 96417 CHEMO IV INFUS EACH ADDL SEQ: CPT

## 2025-03-20 PROCEDURE — 84443 ASSAY THYROID STIM HORMONE: CPT | Performed by: INTERNAL MEDICINE

## 2025-03-20 PROCEDURE — 84481 FREE ASSAY (FT-3): CPT | Performed by: INTERNAL MEDICINE

## 2025-03-20 RX ORDER — SODIUM CHLORIDE 9 MG/ML
20 INJECTION, SOLUTION INTRAVENOUS ONCE
Status: COMPLETED | OUTPATIENT
Start: 2025-03-20 | End: 2025-03-20

## 2025-03-20 RX ADMIN — CARBOPLATIN 252.8 MG: 10 INJECTION INTRAVENOUS at 11:49

## 2025-03-20 RX ADMIN — FOSAPREPITANT 150 MG: 150 INJECTION, POWDER, LYOPHILIZED, FOR SOLUTION INTRAVENOUS at 09:17

## 2025-03-20 RX ADMIN — SODIUM CHLORIDE 20 ML/HR: 0.9 INJECTION, SOLUTION INTRAVENOUS at 08:55

## 2025-03-20 RX ADMIN — Medication 130 MG: at 10:42

## 2025-03-20 RX ADMIN — DEXAMETHASONE SODIUM PHOSPHATE: 10 INJECTION, SOLUTION INTRAMUSCULAR; INTRAVENOUS at 08:55

## 2025-03-20 RX ADMIN — SODIUM CHLORIDE 200 MG: 9 INJECTION, SOLUTION INTRAVENOUS at 09:51

## 2025-03-20 NOTE — PROGRESS NOTES
Cr Pena  tolerated Keytruda, Abraxane, and Carboplatin treatment well with no complications.      Cr Pena is aware of future appt on 3/27 at 9AM.     AVS printed and given to rC Pena.

## 2025-03-24 DIAGNOSIS — E78.5 HYPERLIPIDEMIA, UNSPECIFIED HYPERLIPIDEMIA TYPE: ICD-10-CM

## 2025-03-25 ENCOUNTER — APPOINTMENT (OUTPATIENT)
Dept: LAB | Facility: HOSPITAL | Age: 85
End: 2025-03-25
Payer: COMMERCIAL

## 2025-03-25 DIAGNOSIS — C34.32 SQUAMOUS CELL CARCINOMA OF LOWER LOBE OF LEFT LUNG (HCC): ICD-10-CM

## 2025-03-25 LAB
ALBUMIN SERPL BCG-MCNC: 4.1 G/DL (ref 3.5–5)
ALP SERPL-CCNC: 62 U/L (ref 34–104)
ALT SERPL W P-5'-P-CCNC: 10 U/L (ref 7–52)
ANION GAP SERPL CALCULATED.3IONS-SCNC: 9 MMOL/L (ref 4–13)
AST SERPL W P-5'-P-CCNC: 13 U/L (ref 13–39)
BASOPHILS # BLD AUTO: 0.04 THOUSANDS/ÂΜL (ref 0–0.1)
BASOPHILS NFR BLD AUTO: 1 % (ref 0–1)
BILIRUB SERPL-MCNC: 0.81 MG/DL (ref 0.2–1)
BUN SERPL-MCNC: 21 MG/DL (ref 5–25)
CALCIUM SERPL-MCNC: 9.4 MG/DL (ref 8.4–10.2)
CHLORIDE SERPL-SCNC: 101 MMOL/L (ref 96–108)
CO2 SERPL-SCNC: 30 MMOL/L (ref 21–32)
CREAT SERPL-MCNC: 1.22 MG/DL (ref 0.6–1.3)
EOSINOPHIL # BLD AUTO: 0.5 THOUSAND/ÂΜL (ref 0–0.61)
EOSINOPHIL NFR BLD AUTO: 6 % (ref 0–6)
ERYTHROCYTE [DISTWIDTH] IN BLOOD BY AUTOMATED COUNT: 13.4 % (ref 11.6–15.1)
GFR SERPL CREATININE-BSD FRML MDRD: 40 ML/MIN/1.73SQ M
GLUCOSE P FAST SERPL-MCNC: 118 MG/DL (ref 65–99)
HCT VFR BLD AUTO: 40.2 % (ref 34.8–46.1)
HGB BLD-MCNC: 12.7 G/DL (ref 11.5–15.4)
IMM GRANULOCYTES # BLD AUTO: 0.04 THOUSAND/UL (ref 0–0.2)
IMM GRANULOCYTES NFR BLD AUTO: 1 % (ref 0–2)
LYMPHOCYTES # BLD AUTO: 1.44 THOUSANDS/ÂΜL (ref 0.6–4.47)
LYMPHOCYTES NFR BLD AUTO: 17 % (ref 14–44)
MCH RBC QN AUTO: 27.1 PG (ref 26.8–34.3)
MCHC RBC AUTO-ENTMCNC: 31.6 G/DL (ref 31.4–37.4)
MCV RBC AUTO: 86 FL (ref 82–98)
MONOCYTES # BLD AUTO: 0.31 THOUSAND/ÂΜL (ref 0.17–1.22)
MONOCYTES NFR BLD AUTO: 4 % (ref 4–12)
NEUTROPHILS # BLD AUTO: 6.13 THOUSANDS/ÂΜL (ref 1.85–7.62)
NEUTS SEG NFR BLD AUTO: 71 % (ref 43–75)
NRBC BLD AUTO-RTO: 0 /100 WBCS
PLATELET # BLD AUTO: 278 THOUSANDS/UL (ref 149–390)
PMV BLD AUTO: 10.9 FL (ref 8.9–12.7)
POTASSIUM SERPL-SCNC: 3.8 MMOL/L (ref 3.5–5.3)
PROT SERPL-MCNC: 6.5 G/DL (ref 6.4–8.4)
RBC # BLD AUTO: 4.68 MILLION/UL (ref 3.81–5.12)
SODIUM SERPL-SCNC: 140 MMOL/L (ref 135–147)
WBC # BLD AUTO: 8.46 THOUSAND/UL (ref 4.31–10.16)

## 2025-03-25 PROCEDURE — 36415 COLL VENOUS BLD VENIPUNCTURE: CPT

## 2025-03-25 PROCEDURE — 80053 COMPREHEN METABOLIC PANEL: CPT

## 2025-03-25 PROCEDURE — 85025 COMPLETE CBC W/AUTO DIFF WBC: CPT

## 2025-03-25 RX ORDER — ATORVASTATIN CALCIUM 20 MG/1
20 TABLET, FILM COATED ORAL DAILY
Qty: 90 TABLET | Refills: 0 | Status: SHIPPED | OUTPATIENT
Start: 2025-03-25

## 2025-03-25 NOTE — TELEPHONE ENCOUNTER
Patient needs updated blood work. Please place orders. A courtesy refill was provided.     Pt needs Lipids done

## 2025-03-26 ENCOUNTER — PATIENT OUTREACH (OUTPATIENT)
Dept: HEMATOLOGY ONCOLOGY | Facility: CLINIC | Age: 85
End: 2025-03-26

## 2025-03-26 NOTE — PROGRESS NOTES
I reached out to Cr now that she is on Tx to reassess for any barriers to care and offer any supportive services that may be needed. I was unable to leave message as voice mailbox was not set up.

## 2025-03-26 NOTE — PROGRESS NOTES
Cr called back, we went over [employer], she said she had to beg, borrow and steal to get a ride for tomorrow since they canceled her ride. Paper work will be submitted.    I  Are you having any side effects from your treatment?No    Are you eating and drinking normally? yes    Have you been experiencing any uncontrolled pain related to your cancer diagnosis? No      Do you have a good support system? Yes     Are you interested in any support groups? No    How are you doing with transportation to your appointments? [facility]    Do you have any questions or concerns regarding your treatment plan? No    Any new financial concerns for your household or medical bills? No    Do you know when your upcoming appointments are? yes  Future Appointments   Date Time Provider Department Center   3/27/2025  9:00 AM [facility]F BED 6 [facility]   4/7/2025  1:00 PM Alexsander Jones DO HEM ONC [facility]   4/10/2025 10:00 AM [facility] CHAIR [facility]   4/17/2025 10:00 AM [facility]   5/1/2025  9:00 AM [facility] CHAIR [facility]   5/8/2025  9:00 AM [facility]AIR [facility]   5/14/2025  2:00 PM [facility]   5/22/2025  9:00 AM [facility]AIR [facility]   5/29/2025  9:00 AM [facility]AIR [facility]          Based on individual needs I will follow up with Cr in 4 weeks. I have provided my direct contact information and welcome them to contact me if their needs as discussed above change. She was appreciative for the call.

## 2025-03-27 ENCOUNTER — HOSPITAL ENCOUNTER (OUTPATIENT)
Dept: INFUSION CENTER | Facility: HOSPITAL | Age: 85
End: 2025-03-27
Attending: INTERNAL MEDICINE
Payer: COMMERCIAL

## 2025-03-27 VITALS
OXYGEN SATURATION: 99 % | WEIGHT: 142.2 LBS | SYSTOLIC BLOOD PRESSURE: 120 MMHG | RESPIRATION RATE: 16 BRPM | HEIGHT: 60 IN | HEART RATE: 92 BPM | DIASTOLIC BLOOD PRESSURE: 62 MMHG | BODY MASS INDEX: 27.92 KG/M2 | TEMPERATURE: 97.9 F

## 2025-03-27 DIAGNOSIS — C34.32 SQUAMOUS CELL CARCINOMA OF LOWER LOBE OF LEFT LUNG (HCC): Primary | ICD-10-CM

## 2025-03-27 PROCEDURE — 96413 CHEMO IV INFUSION 1 HR: CPT

## 2025-03-27 PROCEDURE — 96367 TX/PROPH/DG ADDL SEQ IV INF: CPT

## 2025-03-27 RX ORDER — SODIUM CHLORIDE 9 MG/ML
20 INJECTION, SOLUTION INTRAVENOUS ONCE
Status: COMPLETED | OUTPATIENT
Start: 2025-03-27 | End: 2025-03-27

## 2025-03-27 RX ADMIN — SODIUM CHLORIDE 20 ML/HR: 0.9 INJECTION, SOLUTION INTRAVENOUS at 09:08

## 2025-03-27 RX ADMIN — Medication 130 MG: at 10:26

## 2025-03-27 RX ADMIN — DEXAMETHASONE SODIUM PHOSPHATE: 10 INJECTION, SOLUTION INTRAMUSCULAR; INTRAVENOUS at 09:10

## 2025-03-27 NOTE — PROGRESS NOTES
Cr Pena  tolerated Abraxane treatment well with no complications.      Cr Pena is aware of future appt on 4/10 at 10AM.     AVS printed and given to Cr Pena: No (Declined by Cr Pena).

## 2025-04-04 DIAGNOSIS — C34.32 SQUAMOUS CELL CARCINOMA OF LOWER LOBE OF LEFT LUNG (HCC): Primary | ICD-10-CM

## 2025-04-04 RX ORDER — SODIUM CHLORIDE 9 MG/ML
20 INJECTION, SOLUTION INTRAVENOUS ONCE
Status: CANCELLED | OUTPATIENT
Start: 2025-04-10

## 2025-04-04 RX ORDER — SODIUM CHLORIDE 9 MG/ML
20 INJECTION, SOLUTION INTRAVENOUS ONCE
OUTPATIENT
Start: 2025-04-17

## 2025-04-07 ENCOUNTER — OFFICE VISIT (OUTPATIENT)
Dept: HEMATOLOGY ONCOLOGY | Facility: CLINIC | Age: 85
End: 2025-04-07
Payer: COMMERCIAL

## 2025-04-07 VITALS
HEIGHT: 60 IN | HEART RATE: 80 BPM | DIASTOLIC BLOOD PRESSURE: 73 MMHG | BODY MASS INDEX: 28.47 KG/M2 | OXYGEN SATURATION: 95 % | TEMPERATURE: 97.9 F | WEIGHT: 145 LBS | SYSTOLIC BLOOD PRESSURE: 155 MMHG

## 2025-04-07 DIAGNOSIS — G62.9 NEUROPATHY: ICD-10-CM

## 2025-04-07 DIAGNOSIS — C34.32 SQUAMOUS CELL CARCINOMA OF LOWER LOBE OF LEFT LUNG (HCC): Primary | ICD-10-CM

## 2025-04-07 PROCEDURE — G2211 COMPLEX E/M VISIT ADD ON: HCPCS | Performed by: INTERNAL MEDICINE

## 2025-04-07 PROCEDURE — 99214 OFFICE O/P EST MOD 30 MIN: CPT | Performed by: INTERNAL MEDICINE

## 2025-04-07 NOTE — ASSESSMENT & PLAN NOTE
Likely secondary to diabetes.  Comes and goes throughout the day.  No more than it had been prior to chemotherapy.  Continue to monitor.

## 2025-04-07 NOTE — PROGRESS NOTES
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  Stage IIb squamous cell carcinoma left lung.    She seems to be tolerating this reasonably well.  Performance status remains good.  She is limited by dyspnea on exertion, O2 24/7.  States her energy level is better than it had been prior to starting chemotherapy.  Presuming blood counts remain acceptable will reimage just prior to her next visit in 6 weeks.  Orders:  •  CT chest abdomen pelvis w contrast; Future    Neuropathy  Likely secondary to diabetes.  Comes and goes throughout the day.  No more than it had been prior to chemotherapy.  Continue to monitor.           No follow-ups on file.    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  Patchy opacity in the left midlung, nondisplaced left sided rib fracture.  CT CAP showed spiculated mass, 4.5 cm in the left lower lobe.  Minimally displaced left rib fractures, 8, 9, 10, posterolaterally.  No adenopathy or effusion.  No evident metastatic disease.  2024 PET/CT showed 5.5 cm left lower lobe mass extending to the pleura, SUV 23.   Mediastinal nodes subcentimeter, max SUV 2.1.  Left rib fractures noted including 5, 6.  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  Caris showed PD-L1 TPS 1%, TMB 11.  P53 mutation, otherwise wild-type.  Brain MRI showed no metastatic disease.     3/10/2025 -  Cancer Staged    Staging form: Lung, AJCC V9  - Pathologic: Stage IIB (pT3, pN0(f), cM0) - Signed by Alexsander Jones DO on 3/10/2025  Histopathologic type: Squamous cell carcinoma, large cell, nonkeratinizing, NOS  Stage prefix: Initial diagnosis  Method of lymph node assessment: Fine needle aspiration       3/20/2025 -  Chemotherapy    paclitaxel protein-bound (ABRAXANE) IVPB, 80 mg/m2 = 130 mg (80 % of original dose 100 mg/m2), 1 of 6 cycles  Dose modification: 80 mg/m2 (original dose 100 mg/m2, Cycle 1, Reason: Anticipated Tolerance)  Administration: 130 mg (3/20/2025), 130 mg (3/27/2025)  CARBOplatin (PARAPLATIN) IVPB (GOG AUC DOSING), 252.8 mg, 1 of 6 cycles  Administration: 252.8 mg (3/20/2025)  pembrolizumab (KEYTRUDA) IVPB, 200 mg, 1 of 6 cycles  Administration: 200 mg (3/20/2025)        Pertinent Medical History   T3, N0 squamous cell carcinoma of the left lung.  Declined surgical resection.  Radiation therapy was also considered.  Patient defers.  3/20/2025 started nab-paclitaxel, carbo, pembrolizumab.     Review of Systems   Constitutional:  Negative for appetite change, diaphoresis, fatigue and fever.   HENT:  Negative for sinus pain.    Eyes:  Negative for discharge.   Respiratory:  Negative for cough and shortness of breath.    Cardiovascular:  Negative for chest pain.   Gastrointestinal:  Negative for abdominal pain, constipation and diarrhea.   Endocrine: Negative for cold intolerance.   Genitourinary:  Negative for difficulty urinating and hematuria.   Musculoskeletal:  Negative for joint swelling.   Skin:  Negative for rash.   Allergic/Immunologic: Negative for environmental  "allergies.   Neurological:  Negative for dizziness and headaches.   Hematological:  Negative for adenopathy.   Psychiatric/Behavioral:  Negative for agitation.            Objective   /73 (BP Location: Left arm, Patient Position: Sitting, Cuff Size: Standard)   Pulse 80   Temp 97.9 °F (36.6 °C) (Temporal)   Ht 5' 0.24\" (1.53 m)   Wt 65.8 kg (145 lb)   SpO2 95%   BMI 28.09 kg/m²     Pain Screening:     ECOG ECOG Performance Status: 1 - Restricted in physically strenuous activity but ambulatory and able to carry out work of a light or sedentary nature, e.g., light house work, office work   Physical Exam  Constitutional:       Appearance: She is well-developed.   HENT:      Head: Normocephalic and atraumatic.   Eyes:      Pupils: Pupils are equal, round, and reactive to light.   Cardiovascular:      Rate and Rhythm: Normal rate.      Heart sounds: No murmur heard.  Pulmonary:      Effort: No respiratory distress.      Breath sounds: No wheezing or rales.   Abdominal:      General: There is no distension.      Palpations: Abdomen is soft.      Tenderness: There is no abdominal tenderness. There is no rebound.   Musculoskeletal:         General: No tenderness.      Cervical back: Neck supple.   Lymphadenopathy:      Cervical: No cervical adenopathy.   Skin:     General: Skin is warm.      Findings: No rash.   Neurological:      Mental Status: She is alert and oriented to person, place, and time.      Deep Tendon Reflexes: Reflexes normal.   Psychiatric:         Thought Content: Thought content normal.         Labs: I have reviewed the following labs:  Lab Results   Component Value Date/Time    WBC 8.46 03/25/2025 09:26 AM    RBC 4.68 03/25/2025 09:26 AM    Hemoglobin 12.7 03/25/2025 09:26 AM    Hematocrit 40.2 03/25/2025 09:26 AM    MCV 86 03/25/2025 09:26 AM    MCH 27.1 03/25/2025 09:26 AM    RDW 13.4 03/25/2025 09:26 AM    Platelets 278 03/25/2025 09:26 AM    Segmented % 71 03/25/2025 09:26 AM    Lymphocytes " % 17 03/25/2025 09:26 AM    Monocytes % 4 03/25/2025 09:26 AM    Eosinophils Relative 6 03/25/2025 09:26 AM    Basophils Relative 1 03/25/2025 09:26 AM    Immature Grans % 1 03/25/2025 09:26 AM    Absolute Neutrophils 6.13 03/25/2025 09:26 AM     Lab Results   Component Value Date/Time    Potassium 3.8 03/25/2025 09:26 AM    Chloride 101 03/25/2025 09:26 AM    CO2 30 03/25/2025 09:26 AM    BUN 21 03/25/2025 09:26 AM    Creatinine 1.22 03/25/2025 09:26 AM    Glucose, Fasting 118 (H) 03/25/2025 09:26 AM    Calcium 9.4 03/25/2025 09:26 AM    AST 13 03/25/2025 09:26 AM    ALT 10 03/25/2025 09:26 AM    Alkaline Phosphatase 62 03/25/2025 09:26 AM    Total Protein 6.5 03/25/2025 09:26 AM    Albumin 4.1 03/25/2025 09:26 AM    Total Bilirubin 0.81 03/25/2025 09:26 AM    eGFR 40 03/25/2025 09:26 AM

## 2025-04-07 NOTE — ASSESSMENT & PLAN NOTE
Nab-paclitaxel, carbo, pembrolizumab.  Stage IIb squamous cell carcinoma left lung.    She seems to be tolerating this reasonably well.  Performance status remains good.  She is limited by dyspnea on exertion, O2 24/7.  States her energy level is better than it had been prior to starting chemotherapy.  Presuming blood counts remain acceptable will reimage just prior to her next visit in 6 weeks.  Orders:  •  CT chest abdomen pelvis w contrast; Future

## 2025-04-10 ENCOUNTER — HOSPITAL ENCOUNTER (OUTPATIENT)
Dept: INFUSION CENTER | Facility: HOSPITAL | Age: 85
Discharge: HOME | End: 2025-04-10
Attending: INTERNAL MEDICINE
Payer: COMMERCIAL

## 2025-04-10 ENCOUNTER — APPOINTMENT (OUTPATIENT)
Dept: LAB | Facility: HOSPITAL | Age: 85
End: 2025-04-10
Payer: COMMERCIAL

## 2025-04-10 VITALS
RESPIRATION RATE: 17 BRPM | DIASTOLIC BLOOD PRESSURE: 81 MMHG | WEIGHT: 142.86 LBS | OXYGEN SATURATION: 98 % | BODY MASS INDEX: 28.05 KG/M2 | HEIGHT: 60 IN | SYSTOLIC BLOOD PRESSURE: 120 MMHG | TEMPERATURE: 97.2 F | HEART RATE: 70 BPM

## 2025-04-10 DIAGNOSIS — C34.32 SQUAMOUS CELL CARCINOMA OF LOWER LOBE OF LEFT LUNG (HCC): Primary | ICD-10-CM

## 2025-04-10 DIAGNOSIS — C34.32 SQUAMOUS CELL CARCINOMA OF LOWER LOBE OF LEFT LUNG (HCC): ICD-10-CM

## 2025-04-10 LAB
ALBUMIN SERPL BCG-MCNC: 4 G/DL (ref 3.5–5)
ALP SERPL-CCNC: 61 U/L (ref 34–104)
ALT SERPL W P-5'-P-CCNC: 13 U/L (ref 7–52)
ANION GAP SERPL CALCULATED.3IONS-SCNC: 11 MMOL/L (ref 4–13)
AST SERPL W P-5'-P-CCNC: 15 U/L (ref 13–39)
BASOPHILS # BLD AUTO: 0.11 THOUSANDS/ÂΜL (ref 0–0.1)
BASOPHILS NFR BLD AUTO: 2 % (ref 0–1)
BILIRUB SERPL-MCNC: 0.48 MG/DL (ref 0.2–1)
BUN SERPL-MCNC: 9 MG/DL (ref 5–25)
CALCIUM SERPL-MCNC: 9.5 MG/DL (ref 8.4–10.2)
CHLORIDE SERPL-SCNC: 103 MMOL/L (ref 96–108)
CO2 SERPL-SCNC: 27 MMOL/L (ref 21–32)
CREAT SERPL-MCNC: 0.86 MG/DL (ref 0.6–1.3)
EOSINOPHIL # BLD AUTO: 0.2 THOUSAND/ÂΜL (ref 0–0.61)
EOSINOPHIL NFR BLD AUTO: 3 % (ref 0–6)
ERYTHROCYTE [DISTWIDTH] IN BLOOD BY AUTOMATED COUNT: 13.7 % (ref 11.6–15.1)
GFR SERPL CREATININE-BSD FRML MDRD: 61 ML/MIN/1.73SQ M
GLUCOSE SERPL-MCNC: 107 MG/DL (ref 65–140)
HCT VFR BLD AUTO: 38.5 % (ref 34.8–46.1)
HGB BLD-MCNC: 12.3 G/DL (ref 11.5–15.4)
IMM GRANULOCYTES # BLD AUTO: 0.01 THOUSAND/UL (ref 0–0.2)
IMM GRANULOCYTES NFR BLD AUTO: 0 % (ref 0–2)
LYMPHOCYTES # BLD AUTO: 2.31 THOUSANDS/ÂΜL (ref 0.6–4.47)
LYMPHOCYTES NFR BLD AUTO: 36 % (ref 14–44)
MCH RBC QN AUTO: 27.8 PG (ref 26.8–34.3)
MCHC RBC AUTO-ENTMCNC: 31.9 G/DL (ref 31.4–37.4)
MCV RBC AUTO: 87 FL (ref 82–98)
MONOCYTES # BLD AUTO: 0.66 THOUSAND/ÂΜL (ref 0.17–1.22)
MONOCYTES NFR BLD AUTO: 10 % (ref 4–12)
NEUTROPHILS # BLD AUTO: 3.1 THOUSANDS/ÂΜL (ref 1.85–7.62)
NEUTS SEG NFR BLD AUTO: 49 % (ref 43–75)
NRBC BLD AUTO-RTO: 0 /100 WBCS
PLATELET # BLD AUTO: 264 THOUSANDS/UL (ref 149–390)
PMV BLD AUTO: 10 FL (ref 8.9–12.7)
POTASSIUM SERPL-SCNC: 4 MMOL/L (ref 3.5–5.3)
PROT SERPL-MCNC: 6.4 G/DL (ref 6.4–8.4)
RBC # BLD AUTO: 4.43 MILLION/UL (ref 3.81–5.12)
SODIUM SERPL-SCNC: 141 MMOL/L (ref 135–147)
T3FREE SERPL-MCNC: 2.85 PG/ML (ref 2.5–3.9)
TSH SERPL DL<=0.05 MIU/L-ACNC: 2.05 UIU/ML (ref 0.45–4.5)
WBC # BLD AUTO: 6.39 THOUSAND/UL (ref 4.31–10.16)

## 2025-04-10 PROCEDURE — 80053 COMPREHEN METABOLIC PANEL: CPT

## 2025-04-10 PROCEDURE — 84443 ASSAY THYROID STIM HORMONE: CPT

## 2025-04-10 PROCEDURE — 96367 TX/PROPH/DG ADDL SEQ IV INF: CPT

## 2025-04-10 PROCEDURE — 84481 FREE ASSAY (FT-3): CPT

## 2025-04-10 PROCEDURE — 36415 COLL VENOUS BLD VENIPUNCTURE: CPT

## 2025-04-10 PROCEDURE — 96417 CHEMO IV INFUS EACH ADDL SEQ: CPT

## 2025-04-10 PROCEDURE — 96413 CHEMO IV INFUSION 1 HR: CPT

## 2025-04-10 PROCEDURE — 85025 COMPLETE CBC W/AUTO DIFF WBC: CPT

## 2025-04-10 RX ORDER — SODIUM CHLORIDE 9 MG/ML
20 INJECTION, SOLUTION INTRAVENOUS ONCE
Status: COMPLETED | OUTPATIENT
Start: 2025-04-10 | End: 2025-04-10

## 2025-04-10 RX ADMIN — SODIUM CHLORIDE 20 ML/HR: 0.9 INJECTION, SOLUTION INTRAVENOUS at 10:58

## 2025-04-10 RX ADMIN — SODIUM CHLORIDE 200 MG: 9 INJECTION, SOLUTION INTRAVENOUS at 12:19

## 2025-04-10 RX ADMIN — FOSAPREPITANT 150 MG: 150 INJECTION, POWDER, LYOPHILIZED, FOR SOLUTION INTRAVENOUS at 11:25

## 2025-04-10 RX ADMIN — DEXAMETHASONE SODIUM PHOSPHATE: 100 INJECTION INTRAMUSCULAR; INTRAVENOUS at 10:58

## 2025-04-10 RX ADMIN — CARBOPLATIN 262.4 MG: 10 INJECTION, SOLUTION INTRAVENOUS at 14:32

## 2025-04-10 RX ADMIN — Medication 130 MG: at 13:15

## 2025-04-10 NOTE — PLAN OF CARE
Problem: Potential for Falls  Goal: Patient will remain free of falls  Description: INTERVENTIONS:- Educate patient/family on patient safety including physical limitations- Instruct patient to call for assistance with activity - Consult OT/PT to assist with strengthening/mobility - Keep Call bell within reach- Keep bed low and locked with side rails adjusted as appropriate- Keep care items and personal belongings within reach- Initiate and maintain comfort rounds- Consider moving patient to room near nurses station  Outcome: Progressing     Problem: INFECTION - ADULT  Goal: Absence or prevention of progression during hospitalization  Description: INTERVENTIONS:- Assess and monitor for signs and symptoms of infection- Monitor lab/diagnostic results- Monitor all insertion sites, i.e. indwelling lines, tubes, and drains- Monitor endotracheal if appropriate and nasal secretions for changes in amount and color- [place] appropriate cooling/warming therapies per order- Administer medications as ordered- Instruct and encourage patient and family to use good hand hygiene technique- Identify and instruct in appropriate isolation precautions for identified infection/condition  Outcome: Progressing     Problem: Knowledge Deficit  Goal: Patient/family/caregiver demonstrates understanding of disease process, treatment plan, medications, and discharge instructions  Description: Complete learning assessment and assess knowledge base.Interventions:- Provide teaching at level of understanding- Provide teaching via preferred learning methods  Outcome: Progressing

## 2025-04-10 NOTE — PROGRESS NOTES
Recent labs reviewed. Okay to proceed without T3 free per Renee Oconnor, RN covering for Dr. Jones. Pt tolerated Keytruda, Abraxane, & Carboplatin well without any complications. PIV removed without incident.     Cr Pean is aware of future appt on 4/17/25 at 10AM. [facility] scheduled.    AVS printed and given to Cr Pena.    Pt discharged off unit in stable condition with all personal belongings accompanied by [facility] transport.

## 2025-04-16 ENCOUNTER — APPOINTMENT (OUTPATIENT)
Dept: LAB | Facility: HOSPITAL | Age: 85
End: 2025-04-16
Payer: COMMERCIAL

## 2025-04-16 DIAGNOSIS — C34.32 SQUAMOUS CELL CARCINOMA OF LOWER LOBE OF LEFT LUNG (HCC): ICD-10-CM

## 2025-04-16 LAB
BASOPHILS # BLD AUTO: 0.05 THOUSANDS/ÂΜL (ref 0–0.1)
BASOPHILS NFR BLD AUTO: 1 % (ref 0–1)
EOSINOPHIL # BLD AUTO: 0.3 THOUSAND/ÂΜL (ref 0–0.61)
EOSINOPHIL NFR BLD AUTO: 5 % (ref 0–6)
ERYTHROCYTE [DISTWIDTH] IN BLOOD BY AUTOMATED COUNT: 13.7 % (ref 11.6–15.1)
HCT VFR BLD AUTO: 41.1 % (ref 34.8–46.1)
HGB BLD-MCNC: 12.9 G/DL (ref 11.5–15.4)
IMM GRANULOCYTES # BLD AUTO: 0.03 THOUSAND/UL (ref 0–0.2)
IMM GRANULOCYTES NFR BLD AUTO: 1 % (ref 0–2)
LYMPHOCYTES # BLD AUTO: 2.36 THOUSANDS/ÂΜL (ref 0.6–4.47)
LYMPHOCYTES NFR BLD AUTO: 38 % (ref 14–44)
MCH RBC QN AUTO: 27.4 PG (ref 26.8–34.3)
MCHC RBC AUTO-ENTMCNC: 31.4 G/DL (ref 31.4–37.4)
MCV RBC AUTO: 87 FL (ref 82–98)
MONOCYTES # BLD AUTO: 0.27 THOUSAND/ÂΜL (ref 0.17–1.22)
MONOCYTES NFR BLD AUTO: 4 % (ref 4–12)
NEUTROPHILS # BLD AUTO: 3.14 THOUSANDS/ÂΜL (ref 1.85–7.62)
NEUTS SEG NFR BLD AUTO: 51 % (ref 43–75)
NRBC BLD AUTO-RTO: 0 /100 WBCS
PLATELET # BLD AUTO: 227 THOUSANDS/UL (ref 149–390)
PMV BLD AUTO: 10 FL (ref 8.9–12.7)
RBC # BLD AUTO: 4.71 MILLION/UL (ref 3.81–5.12)
WBC # BLD AUTO: 6.15 THOUSAND/UL (ref 4.31–10.16)

## 2025-04-16 PROCEDURE — 36415 COLL VENOUS BLD VENIPUNCTURE: CPT

## 2025-04-16 PROCEDURE — 85025 COMPLETE CBC W/AUTO DIFF WBC: CPT

## 2025-04-17 ENCOUNTER — HOSPITAL ENCOUNTER (OUTPATIENT)
Dept: INFUSION CENTER | Facility: HOSPITAL | Age: 85
Discharge: HOME | End: 2025-04-17
Attending: INTERNAL MEDICINE
Payer: COMMERCIAL

## 2025-04-17 VITALS
RESPIRATION RATE: 18 BRPM | TEMPERATURE: 97.1 F | HEART RATE: 103 BPM | HEIGHT: 60 IN | OXYGEN SATURATION: 100 % | DIASTOLIC BLOOD PRESSURE: 57 MMHG | SYSTOLIC BLOOD PRESSURE: 102 MMHG | WEIGHT: 142.2 LBS | BODY MASS INDEX: 27.92 KG/M2

## 2025-04-17 DIAGNOSIS — C34.32 SQUAMOUS CELL CARCINOMA OF LOWER LOBE OF LEFT LUNG (HCC): Primary | ICD-10-CM

## 2025-04-17 PROCEDURE — 96413 CHEMO IV INFUSION 1 HR: CPT

## 2025-04-17 PROCEDURE — 96367 TX/PROPH/DG ADDL SEQ IV INF: CPT

## 2025-04-17 RX ORDER — SODIUM CHLORIDE 9 MG/ML
20 INJECTION, SOLUTION INTRAVENOUS ONCE
Status: COMPLETED | OUTPATIENT
Start: 2025-04-17 | End: 2025-04-17

## 2025-04-17 RX ADMIN — DEXAMETHASONE SODIUM PHOSPHATE: 100 INJECTION INTRAMUSCULAR; INTRAVENOUS at 10:23

## 2025-04-17 RX ADMIN — SODIUM CHLORIDE 20 ML/HR: 0.9 INJECTION, SOLUTION INTRAVENOUS at 10:22

## 2025-04-17 RX ADMIN — Medication 130 MG: at 11:15

## 2025-04-17 NOTE — PROGRESS NOTES
Recent labs reviewed. Pt tolerated Abraxane chemo tx well without any complications. PIV removed without incident.    Cr Pena is aware of future appt on 5/1/25 at 9AM. [facility] scheduled.     AVS declined by Cr Pena.     Pt discharged off unit in stable condition with all personal belongings accompanied by [facility] transport.

## 2025-04-17 NOTE — PLAN OF CARE
Problem: Potential for Falls  Goal: Patient will remain free of falls  Description: INTERVENTIONS:- Educate patient/family on patient safety including physical limitations- Instruct patient to call for assistance with activity - Consult OT/PT to assist with strengthening/mobility - Keep Call bell within reach- Keep bed low and locked with side rails adjusted as appropriate- Keep care items and personal belongings within reach- Initiate and maintain comfort rounds- Consider moving patient to room near nurses station  Outcome: Progressing     Problem: INFECTION - ADULT  Goal: Absence or prevention of progression during hospitalization  Description: INTERVENTIONS:- Assess and monitor for signs and symptoms of infection- Monitor lab/diagnostic results- Monitor all insertion sites, i.e. indwelling lines, tubes, and drains- Monitor endotracheal if appropriate and nasal secretions for changes in amount and color- [place] appropriate cooling/warming therapies per order- Administer medications as ordered- Instruct and encourage patient and family to use good hand hygiene technique- Identify and instruct in appropriate isolation precautions for identified infection/condition  Outcome: Progressing     Problem: Knowledge Deficit  Goal: Patient/family/caregiver demonstrates understanding of disease process, treatment plan, medications, and discharge instructions  Description: Complete learning assessment and assess knowledge base.Interventions:- Provide teaching at level of understanding- Provide teaching via preferred learning methods  Outcome: Progressing

## 2025-04-23 ENCOUNTER — PATIENT OUTREACH (OUTPATIENT)
Dept: HEMATOLOGY ONCOLOGY | Facility: CLINIC | Age: 85
End: 2025-04-23

## 2025-04-23 NOTE — PROGRESS NOTES
I reached out and spoke with Cr to follow up and to review for any new changes in barriers to care and offer supportive services as needed.       Reviewed for any new barriers as noted below.    Are you having any side effects from your treatment?No    Are you eating and drinking normally? yes    Have you been experiencing any uncontrolled pain related to your cancer diagnosis? No    Do you have a good support system? Yes     Are you interested in any support groups? No    How are you doing with transportation to your appointments? Ok    Do you have any questions or concerns regarding your treatment plan? No    Any new financial concerns for your household or medical bills? No    Do you know when your upcoming appointments are? yes  Future Appointments   Date Time Provider Department Center   5/1/2025  9:00 AM [facility] CHAIR [facility]   5/8/2025  9:00 AM [facility] INF CHAIR 4 [facility]   5/12/2025  7:30 AM MI CT  1 [facility]   5/14/2025  2:00 PM Edward Strange PA-C PULM [facility]   5/20/2025 12:40 PM Alexsander Jones, DO HEM ONC [facility]   5/22/2025  9:00 AM [facility] CHAIR 6 [facility]   5/29/2025  9:00 AM [facility] INF CHAIR 6 [facility]   7/23/2025  2:15 PM [facility]          Based on individual needs I will follow up with them in 5-6 weeks. I have provided my direct contact information and welcome them to contact me if their needs as discussed above change. She was appreciative for the call.

## 2025-04-24 DIAGNOSIS — C34.32 SQUAMOUS CELL CARCINOMA OF LOWER LOBE OF LEFT LUNG (HCC): Primary | ICD-10-CM

## 2025-04-24 RX ORDER — SODIUM CHLORIDE 9 MG/ML
20 INJECTION, SOLUTION INTRAVENOUS ONCE
OUTPATIENT
Start: 2025-05-08

## 2025-04-24 RX ORDER — SODIUM CHLORIDE 9 MG/ML
20 INJECTION, SOLUTION INTRAVENOUS ONCE
Status: CANCELLED | OUTPATIENT
Start: 2025-05-01

## 2025-04-27 ENCOUNTER — APPOINTMENT (OUTPATIENT)
Dept: LAB | Facility: HOSPITAL | Age: 85
End: 2025-04-27
Attending: INTERNAL MEDICINE
Payer: COMMERCIAL

## 2025-04-27 DIAGNOSIS — C34.32 SQUAMOUS CELL CARCINOMA OF LOWER LOBE OF LEFT LUNG (HCC): ICD-10-CM

## 2025-04-27 LAB
ALBUMIN SERPL BCG-MCNC: 4 G/DL (ref 3.5–5)
ALP SERPL-CCNC: 61 U/L (ref 34–104)
ALT SERPL W P-5'-P-CCNC: 15 U/L (ref 7–52)
ANION GAP SERPL CALCULATED.3IONS-SCNC: 9 MMOL/L (ref 4–13)
AST SERPL W P-5'-P-CCNC: 16 U/L (ref 13–39)
BASOPHILS # BLD AUTO: 0.04 THOUSANDS/ÂΜL (ref 0–0.1)
BASOPHILS NFR BLD AUTO: 1 % (ref 0–1)
BILIRUB SERPL-MCNC: 0.54 MG/DL (ref 0.2–1)
BUN SERPL-MCNC: 13 MG/DL (ref 5–25)
CALCIUM SERPL-MCNC: 7.9 MG/DL (ref 8.4–10.2)
CHLORIDE SERPL-SCNC: 105 MMOL/L (ref 96–108)
CO2 SERPL-SCNC: 28 MMOL/L (ref 21–32)
CREAT SERPL-MCNC: 0.92 MG/DL (ref 0.6–1.3)
EOSINOPHIL # BLD AUTO: 0.07 THOUSAND/ÂΜL (ref 0–0.61)
EOSINOPHIL NFR BLD AUTO: 2 % (ref 0–6)
ERYTHROCYTE [DISTWIDTH] IN BLOOD BY AUTOMATED COUNT: 14.6 % (ref 11.6–15.1)
GFR SERPL CREATININE-BSD FRML MDRD: 56 ML/MIN/1.73SQ M
GLUCOSE P FAST SERPL-MCNC: 94 MG/DL (ref 65–99)
HCT VFR BLD AUTO: 34.6 % (ref 34.8–46.1)
HGB BLD-MCNC: 11 G/DL (ref 11.5–15.4)
IMM GRANULOCYTES # BLD AUTO: 0.01 THOUSAND/UL (ref 0–0.2)
IMM GRANULOCYTES NFR BLD AUTO: 0 % (ref 0–2)
LYMPHOCYTES # BLD AUTO: 1.78 THOUSANDS/ÂΜL (ref 0.6–4.47)
LYMPHOCYTES NFR BLD AUTO: 45 % (ref 14–44)
MCH RBC QN AUTO: 27.9 PG (ref 26.8–34.3)
MCHC RBC AUTO-ENTMCNC: 31.8 G/DL (ref 31.4–37.4)
MCV RBC AUTO: 88 FL (ref 82–98)
MONOCYTES # BLD AUTO: 0.48 THOUSAND/ÂΜL (ref 0.17–1.22)
MONOCYTES NFR BLD AUTO: 12 % (ref 4–12)
NEUTROPHILS # BLD AUTO: 1.57 THOUSANDS/ÂΜL (ref 1.85–7.62)
NEUTS SEG NFR BLD AUTO: 40 % (ref 43–75)
NRBC BLD AUTO-RTO: 0 /100 WBCS
PLATELET # BLD AUTO: 200 THOUSANDS/UL (ref 149–390)
PMV BLD AUTO: 9.9 FL (ref 8.9–12.7)
POTASSIUM SERPL-SCNC: 4.1 MMOL/L (ref 3.5–5.3)
PROT SERPL-MCNC: 6.2 G/DL (ref 6.4–8.4)
RBC # BLD AUTO: 3.94 MILLION/UL (ref 3.81–5.12)
SODIUM SERPL-SCNC: 142 MMOL/L (ref 135–147)
TSH SERPL DL<=0.05 MIU/L-ACNC: 1.62 UIU/ML (ref 0.45–4.5)
WBC # BLD AUTO: 3.95 THOUSAND/UL (ref 4.31–10.16)

## 2025-04-27 PROCEDURE — 80053 COMPREHEN METABOLIC PANEL: CPT

## 2025-04-27 PROCEDURE — 84443 ASSAY THYROID STIM HORMONE: CPT

## 2025-04-27 PROCEDURE — 36415 COLL VENOUS BLD VENIPUNCTURE: CPT

## 2025-04-27 PROCEDURE — 85025 COMPLETE CBC W/AUTO DIFF WBC: CPT

## 2025-04-27 PROCEDURE — 84481 FREE ASSAY (FT-3): CPT

## 2025-04-29 LAB — T3FREE SERPL-MCNC: 3.02 PG/ML (ref 2.5–3.9)

## 2025-05-01 ENCOUNTER — HOSPITAL ENCOUNTER (OUTPATIENT)
Dept: INFUSION CENTER | Facility: HOSPITAL | Age: 85
Discharge: HOME | End: 2025-05-01
Attending: INTERNAL MEDICINE
Payer: COMMERCIAL

## 2025-05-01 VITALS
RESPIRATION RATE: 18 BRPM | WEIGHT: 144.84 LBS | TEMPERATURE: 97.6 F | HEIGHT: 60 IN | SYSTOLIC BLOOD PRESSURE: 142 MMHG | BODY MASS INDEX: 28.44 KG/M2 | HEART RATE: 98 BPM | DIASTOLIC BLOOD PRESSURE: 80 MMHG | OXYGEN SATURATION: 97 %

## 2025-05-01 DIAGNOSIS — C34.32 SQUAMOUS CELL CARCINOMA OF LOWER LOBE OF LEFT LUNG (HCC): Primary | ICD-10-CM

## 2025-05-01 PROCEDURE — 96367 TX/PROPH/DG ADDL SEQ IV INF: CPT

## 2025-05-01 PROCEDURE — 96413 CHEMO IV INFUSION 1 HR: CPT

## 2025-05-01 PROCEDURE — 96417 CHEMO IV INFUS EACH ADDL SEQ: CPT

## 2025-05-01 RX ORDER — SODIUM CHLORIDE 9 MG/ML
20 INJECTION, SOLUTION INTRAVENOUS ONCE
Status: COMPLETED | OUTPATIENT
Start: 2025-05-01 | End: 2025-05-01

## 2025-05-01 RX ADMIN — SODIUM CHLORIDE 200 MG: 9 INJECTION, SOLUTION INTRAVENOUS at 10:19

## 2025-05-01 RX ADMIN — Medication 130 MG: at 11:09

## 2025-05-01 RX ADMIN — SODIUM CHLORIDE 20 ML/HR: 0.9 INJECTION, SOLUTION INTRAVENOUS at 09:01

## 2025-05-01 RX ADMIN — DEXAMETHASONE SODIUM PHOSPHATE: 100 INJECTION INTRAMUSCULAR; INTRAVENOUS at 09:11

## 2025-05-01 RX ADMIN — CARBOPLATIN 262.4 MG: 10 INJECTION, SOLUTION INTRAVENOUS at 12:21

## 2025-05-01 RX ADMIN — FOSAPREPITANT 150 MG: 150 INJECTION, POWDER, LYOPHILIZED, FOR SOLUTION INTRAVENOUS at 09:34

## 2025-05-01 NOTE — PLAN OF CARE
Problem: Potential for Falls  Goal: Patient will remain free of falls  Description: INTERVENTIONS:- Educate patient/family on patient safety including physical limitations- Instruct patient to call for assistance with activity - Consult OT/PT to assist with strengthening/mobility - Keep Call bell within reach- Keep bed low and locked with side rails adjusted as appropriate- Keep care items and personal belongings within reach- Initiate and maintain comfort rounds- Consider moving patient to room near nurses station  Outcome: Progressing     Problem: INFECTION - ADULT  Goal: Absence or prevention of progression during hospitalization  Description: INTERVENTIONS:- Assess and monitor for signs and symptoms of infection- Monitor lab/diagnostic results- Monitor all insertion sites, i.e. indwelling lines, tubes, and drains- Monitor endotracheal if appropriate and nasal secretions for changes in amount and color- [place] appropriate cooling/warming therapies per order- Administer medications as ordered- Instruct and encourage patient and family to use good hand hygiene technique- Identify and instruct in appropriate isolation precautions for identified infection/condition  Outcome: Progressing  Goal: Absence of fever/infection during neutropenic period  Description: INTERVENTIONS:- Monitor WBC  Outcome: Progressing     Problem: Knowledge Deficit  Goal: Patient/family/caregiver demonstrates understanding of disease process, treatment plan, medications, and discharge instructions  Description: Complete learning assessment and assess knowledge base.Interventions:- Provide teaching at level of understanding- Provide teaching via preferred learning methods  Outcome: Progressing

## 2025-05-01 NOTE — PROGRESS NOTES
Cr Pena  tolerated Keyrtuda/abraxane/carboplatin treatment well with no complications.      Cr Pena is aware of future appt on 5/8/25 at 0900.     AVS printed and given to Cr Pena:  Yes

## 2025-05-06 ENCOUNTER — APPOINTMENT (OUTPATIENT)
Dept: LAB | Facility: HOSPITAL | Age: 85
End: 2025-05-06
Payer: COMMERCIAL

## 2025-05-06 DIAGNOSIS — C34.32 SQUAMOUS CELL CARCINOMA OF LOWER LOBE OF LEFT LUNG (HCC): ICD-10-CM

## 2025-05-06 LAB
ANISOCYTOSIS BLD QL SMEAR: PRESENT
BASOPHILS # BLD MANUAL: 0 THOUSAND/UL (ref 0–0.1)
BASOPHILS NFR MAR MANUAL: 0 % (ref 0–1)
EOSINOPHIL # BLD MANUAL: 0.25 THOUSAND/UL (ref 0–0.4)
EOSINOPHIL NFR BLD MANUAL: 4 % (ref 0–6)
ERYTHROCYTE [DISTWIDTH] IN BLOOD BY AUTOMATED COUNT: 15.1 % (ref 11.6–15.1)
HCT VFR BLD AUTO: 32.5 % (ref 34.8–46.1)
HGB BLD-MCNC: 10.7 G/DL (ref 11.5–15.4)
LYMPHOCYTES # BLD AUTO: 2.36 THOUSAND/UL (ref 0.6–4.47)
LYMPHOCYTES # BLD AUTO: 37 % (ref 14–44)
MCH RBC QN AUTO: 28.5 PG (ref 26.8–34.3)
MCHC RBC AUTO-ENTMCNC: 32.9 G/DL (ref 31.4–37.4)
MCV RBC AUTO: 86 FL (ref 82–98)
MICROCYTES BLD QL AUTO: PRESENT
MONOCYTES # BLD AUTO: 0.25 THOUSAND/UL (ref 0–1.22)
MONOCYTES NFR BLD: 4 % (ref 4–12)
NEUTROPHILS # BLD MANUAL: 3.5 THOUSAND/UL (ref 1.85–7.62)
NEUTS SEG NFR BLD AUTO: 55 % (ref 43–75)
PLATELET # BLD AUTO: 176 THOUSANDS/UL (ref 149–390)
PLATELET BLD QL SMEAR: ADEQUATE
PMV BLD AUTO: 10.3 FL (ref 8.9–12.7)
RBC # BLD AUTO: 3.76 MILLION/UL (ref 3.81–5.12)
RBC MORPH BLD: PRESENT
WBC # BLD AUTO: 6.37 THOUSAND/UL (ref 4.31–10.16)

## 2025-05-06 PROCEDURE — 36415 COLL VENOUS BLD VENIPUNCTURE: CPT

## 2025-05-06 PROCEDURE — 85007 BL SMEAR W/DIFF WBC COUNT: CPT

## 2025-05-06 PROCEDURE — 85027 COMPLETE CBC AUTOMATED: CPT

## 2025-05-08 ENCOUNTER — HOSPITAL ENCOUNTER (OUTPATIENT)
Dept: INFUSION CENTER | Facility: HOSPITAL | Age: 85
Discharge: HOME | End: 2025-05-08
Attending: INTERNAL MEDICINE
Payer: COMMERCIAL

## 2025-05-08 VITALS
DIASTOLIC BLOOD PRESSURE: 77 MMHG | HEIGHT: 60 IN | OXYGEN SATURATION: 10 % | BODY MASS INDEX: 27.79 KG/M2 | SYSTOLIC BLOOD PRESSURE: 120 MMHG | RESPIRATION RATE: 16 BRPM | TEMPERATURE: 96.5 F | HEART RATE: 97 BPM | WEIGHT: 141.54 LBS

## 2025-05-08 DIAGNOSIS — C34.32 SQUAMOUS CELL CARCINOMA OF LOWER LOBE OF LEFT LUNG (HCC): Primary | ICD-10-CM

## 2025-05-08 PROCEDURE — 96367 TX/PROPH/DG ADDL SEQ IV INF: CPT

## 2025-05-08 PROCEDURE — 96413 CHEMO IV INFUSION 1 HR: CPT

## 2025-05-08 RX ORDER — SODIUM CHLORIDE 9 MG/ML
20 INJECTION, SOLUTION INTRAVENOUS ONCE
Status: COMPLETED | OUTPATIENT
Start: 2025-05-08 | End: 2025-05-08

## 2025-05-08 RX ADMIN — Medication 130 MG: at 10:04

## 2025-05-08 RX ADMIN — DEXAMETHASONE SODIUM PHOSPHATE: 100 INJECTION INTRAMUSCULAR; INTRAVENOUS at 09:07

## 2025-05-08 RX ADMIN — SODIUM CHLORIDE 20 ML/HR: 0.9 INJECTION, SOLUTION INTRAVENOUS at 09:08

## 2025-05-08 NOTE — PROGRESS NOTES
Cr Pena tolerated Abraxane treatment well with no complications.       Cr Pena is aware of future appt on 5/22/25 at 0900.      AVS printed and given to Cr Pena.

## 2025-05-12 ENCOUNTER — HOSPITAL ENCOUNTER (OUTPATIENT)
Dept: CT IMAGING | Facility: HOSPITAL | Age: 85
Discharge: HOME | End: 2025-05-12
Attending: INTERNAL MEDICINE
Payer: COMMERCIAL

## 2025-05-12 DIAGNOSIS — C34.32 SQUAMOUS CELL CARCINOMA OF LOWER LOBE OF LEFT LUNG (HCC): ICD-10-CM

## 2025-05-12 PROCEDURE — 74177 CT ABD & PELVIS W/CONTRAST: CPT

## 2025-05-12 PROCEDURE — 71260 CT THORAX DX C+: CPT

## 2025-05-12 RX ADMIN — IOHEXOL 100 ML: 350 INJECTION, SOLUTION INTRAVENOUS at 07:35

## 2025-05-13 ENCOUNTER — TELEPHONE (OUTPATIENT)
Age: 85
End: 2025-05-13

## 2025-05-13 NOTE — TELEPHONE ENCOUNTER
Called and explained that [facility] is for cancer appointments only and verified she was asking for transportation to her Pulmonary visit tomorrow. She said yes and I told her she could have up to 5 rides per year via [employer] and we would have her sign a Waiver for it when she gets here tomorrow. I set up [employer] ride.

## 2025-05-13 NOTE — TELEPHONE ENCOUNTER
Pt called in and would like to know if she can be set up with [facility] . Patient was going to get a ride from her friend but her friend is having car problems , patient called [facility] to set up transportation and was told to call the doctors to have it set up for . Please advise

## 2025-05-14 NOTE — TELEPHONE ENCOUNTER
Patient called stating that [employer] did not contact until after her appointment start time and was told by her [facility]  for her to meet on a street she was not aware of. Patient missed her appointment and is rescheduled for 5/22 at 8am in [place]. Thank You

## 2025-05-16 ENCOUNTER — TELEPHONE (OUTPATIENT)
Dept: HEMATOLOGY ONCOLOGY | Facility: CLINIC | Age: 85
End: 2025-05-16

## 2025-05-16 DIAGNOSIS — C34.32 SQUAMOUS CELL CARCINOMA OF LOWER LOBE OF LEFT LUNG (HCC): Primary | ICD-10-CM

## 2025-05-16 RX ORDER — SODIUM CHLORIDE 9 MG/ML
20 INJECTION, SOLUTION INTRAVENOUS ONCE
Status: CANCELLED | OUTPATIENT
Start: 2025-05-22

## 2025-05-16 RX ORDER — SODIUM CHLORIDE 9 MG/ML
20 INJECTION, SOLUTION INTRAVENOUS ONCE
OUTPATIENT
Start: 2025-05-29

## 2025-05-17 ENCOUNTER — TELEPHONE (OUTPATIENT)
Dept: OTHER | Facility: OTHER | Age: 85
End: 2025-05-17

## 2025-05-17 DIAGNOSIS — C34.32 SQUAMOUS CELL CARCINOMA OF LOWER LOBE OF LEFT LUNG (HCC): Primary | ICD-10-CM

## 2025-05-17 RX ORDER — AZITHROMYCIN 250 MG/1
TABLET, FILM COATED ORAL
Qty: 6 TABLET | Refills: 0 | Status: SHIPPED | OUTPATIENT
Start: 2025-05-17 | End: 2025-05-22

## 2025-05-17 NOTE — TELEPHONE ENCOUNTER
"On call provider stated, \" I spoke to the patient, URI symptoms (headaches, congestion, runny nose), no respiratory issues, no terribly elevated temperatures. Patient denies any allergies to any antibiotics. Farhana called in. I asked the patient to call Dr. Jones's team on Monday to make sure that she is doing okay. \"  "

## 2025-05-17 NOTE — TELEPHONE ENCOUNTER
"Pt stated, \" I have a terrible cold and I am coughing up green mucus. I would like to have a script called in.\"      Paged on call VIA [facility]  "

## 2025-05-20 ENCOUNTER — TELEPHONE (OUTPATIENT)
Dept: HEMATOLOGY ONCOLOGY | Facility: CLINIC | Age: 85
End: 2025-05-20

## 2025-05-20 ENCOUNTER — APPOINTMENT (OUTPATIENT)
Dept: LAB | Facility: HOSPITAL | Age: 85
End: 2025-05-20
Payer: COMMERCIAL

## 2025-05-20 DIAGNOSIS — C34.32 SQUAMOUS CELL CARCINOMA OF LOWER LOBE OF LEFT LUNG (HCC): ICD-10-CM

## 2025-05-20 LAB
T3FREE SERPL-MCNC: 2.64 PG/ML (ref 2.5–3.9)
TSH SERPL DL<=0.05 MIU/L-ACNC: 2.55 UIU/ML (ref 0.45–4.5)

## 2025-05-20 PROCEDURE — 36415 COLL VENOUS BLD VENIPUNCTURE: CPT

## 2025-05-20 PROCEDURE — 84481 FREE ASSAY (FT-3): CPT

## 2025-05-20 PROCEDURE — 84443 ASSAY THYROID STIM HORMONE: CPT

## 2025-05-20 NOTE — TELEPHONE ENCOUNTER
Called and spoke to Cr. She thought her appointment with Dr Jones was at 1:40. I rescheduled for her to June 2nd at 1:40. She was happy with that.

## 2025-05-21 ENCOUNTER — TELEPHONE (OUTPATIENT)
Dept: INFUSION CENTER | Facility: HOSPITAL | Age: 85
End: 2025-05-21

## 2025-05-21 DIAGNOSIS — C34.32 SQUAMOUS CELL CARCINOMA OF LOWER LOBE OF LEFT LUNG (HCC): Primary | ICD-10-CM

## 2025-05-21 NOTE — TELEPHONE ENCOUNTER
Attempted to call patient to let her know that further labs are needed for her treatment tomorrow. Unable to leave message due to voicemail not being setup.

## 2025-05-22 ENCOUNTER — HOSPITAL ENCOUNTER (OUTPATIENT)
Dept: INFUSION CENTER | Facility: HOSPITAL | Age: 85
Discharge: HOME | End: 2025-05-22
Attending: INTERNAL MEDICINE
Payer: COMMERCIAL

## 2025-05-22 ENCOUNTER — OFFICE VISIT (OUTPATIENT)
Dept: PULMONOLOGY | Facility: CLINIC | Age: 85
End: 2025-05-22

## 2025-05-22 VITALS
WEIGHT: 141.09 LBS | SYSTOLIC BLOOD PRESSURE: 108 MMHG | RESPIRATION RATE: 16 BRPM | BODY MASS INDEX: 27.7 KG/M2 | OXYGEN SATURATION: 95 % | HEIGHT: 60 IN | DIASTOLIC BLOOD PRESSURE: 72 MMHG | TEMPERATURE: 97.2 F | HEART RATE: 93 BPM

## 2025-05-22 VITALS
SYSTOLIC BLOOD PRESSURE: 113 MMHG | WEIGHT: 141 LBS | DIASTOLIC BLOOD PRESSURE: 71 MMHG | TEMPERATURE: 97.7 F | HEART RATE: 100 BPM | BODY MASS INDEX: 27.68 KG/M2 | HEIGHT: 60 IN | OXYGEN SATURATION: 92 %

## 2025-05-22 DIAGNOSIS — J44.9 COPD, MODERATE (HCC): Primary | ICD-10-CM

## 2025-05-22 DIAGNOSIS — C34.32 SQUAMOUS CELL CARCINOMA OF LOWER LOBE OF LEFT LUNG (HCC): ICD-10-CM

## 2025-05-22 DIAGNOSIS — J96.11 CHRONIC RESPIRATORY FAILURE WITH HYPOXIA (HCC): ICD-10-CM

## 2025-05-22 DIAGNOSIS — C34.32 SQUAMOUS CELL CARCINOMA OF LOWER LOBE OF LEFT LUNG (HCC): Primary | ICD-10-CM

## 2025-05-22 LAB
ALBUMIN SERPL BCG-MCNC: 4 G/DL (ref 3.5–5)
ALP SERPL-CCNC: 70 U/L (ref 34–104)
ALT SERPL W P-5'-P-CCNC: 12 U/L (ref 7–52)
ANION GAP SERPL CALCULATED.3IONS-SCNC: 9 MMOL/L (ref 4–13)
ANISOCYTOSIS BLD QL SMEAR: PRESENT
AST SERPL W P-5'-P-CCNC: 12 U/L (ref 13–39)
BASOPHILS # BLD MANUAL: 0.07 THOUSAND/UL (ref 0–0.1)
BASOPHILS NFR MAR MANUAL: 1 % (ref 0–1)
BILIRUB SERPL-MCNC: 0.34 MG/DL (ref 0.2–1)
BUN SERPL-MCNC: 9 MG/DL (ref 5–25)
CALCIUM SERPL-MCNC: 8.9 MG/DL (ref 8.4–10.2)
CHLORIDE SERPL-SCNC: 101 MMOL/L (ref 96–108)
CO2 SERPL-SCNC: 31 MMOL/L (ref 21–32)
CREAT SERPL-MCNC: 0.81 MG/DL (ref 0.6–1.3)
EOSINOPHIL # BLD MANUAL: 0.14 THOUSAND/UL (ref 0–0.4)
EOSINOPHIL NFR BLD MANUAL: 2 % (ref 0–6)
ERYTHROCYTE [DISTWIDTH] IN BLOOD BY AUTOMATED COUNT: 15.6 % (ref 11.6–15.1)
GFR SERPL CREATININE-BSD FRML MDRD: 66 ML/MIN/1.73SQ M
GLUCOSE P FAST SERPL-MCNC: 169 MG/DL (ref 65–99)
GLUCOSE SERPL-MCNC: 169 MG/DL (ref 65–140)
HCT VFR BLD AUTO: 33.1 % (ref 34.8–46.1)
HGB BLD-MCNC: 10.6 G/DL (ref 11.5–15.4)
LYMPHOCYTES # BLD AUTO: 1.38 THOUSAND/UL (ref 0.6–4.47)
LYMPHOCYTES # BLD AUTO: 18 % (ref 14–44)
MCH RBC QN AUTO: 29.1 PG (ref 26.8–34.3)
MCHC RBC AUTO-ENTMCNC: 32 G/DL (ref 31.4–37.4)
MCV RBC AUTO: 91 FL (ref 82–98)
METAMYELOCYTE ABSOLUTE CT: 0.07 THOUSAND/UL (ref 0–0.1)
METAMYELOCYTES NFR BLD MANUAL: 1 % (ref 0–1)
MICROCYTES BLD QL AUTO: PRESENT
MONOCYTES # BLD AUTO: 0.35 THOUSAND/UL (ref 0–1.22)
MONOCYTES NFR BLD: 5 % (ref 4–12)
MYELOCYTE ABSOLUTE CT: 0.14 THOUSAND/UL (ref 0–0.1)
MYELOCYTES NFR BLD MANUAL: 2 % (ref 0–1)
NEUTROPHILS # BLD MANUAL: 4.77 THOUSAND/UL (ref 1.85–7.62)
NEUTS SEG NFR BLD AUTO: 69 % (ref 43–75)
PLATELET # BLD AUTO: 270 THOUSANDS/UL (ref 149–390)
PLATELET BLD QL SMEAR: ADEQUATE
PMV BLD AUTO: 9.6 FL (ref 8.9–12.7)
POIKILOCYTOSIS BLD QL SMEAR: PRESENT
POTASSIUM SERPL-SCNC: 4.1 MMOL/L (ref 3.5–5.3)
PROT SERPL-MCNC: 6.4 G/DL (ref 6.4–8.4)
RBC # BLD AUTO: 3.64 MILLION/UL (ref 3.81–5.12)
RBC MORPH BLD: PRESENT
SMUDGE CELLS BLD QL SMEAR: PRESENT
SODIUM SERPL-SCNC: 141 MMOL/L (ref 135–147)
VARIANT LYMPHS # BLD AUTO: 2 %
WBC # BLD AUTO: 6.91 THOUSAND/UL (ref 4.31–10.16)

## 2025-05-22 PROCEDURE — 80053 COMPREHEN METABOLIC PANEL: CPT | Performed by: INTERNAL MEDICINE

## 2025-05-22 PROCEDURE — 85007 BL SMEAR W/DIFF WBC COUNT: CPT | Performed by: INTERNAL MEDICINE

## 2025-05-22 PROCEDURE — 85027 COMPLETE CBC AUTOMATED: CPT | Performed by: INTERNAL MEDICINE

## 2025-05-22 PROCEDURE — 96417 CHEMO IV INFUS EACH ADDL SEQ: CPT

## 2025-05-22 PROCEDURE — 96413 CHEMO IV INFUSION 1 HR: CPT

## 2025-05-22 PROCEDURE — 96367 TX/PROPH/DG ADDL SEQ IV INF: CPT

## 2025-05-22 RX ORDER — ALBUTEROL SULFATE 1.25 MG/3ML
1.25 SOLUTION RESPIRATORY (INHALATION) EVERY 6 HOURS PRN
COMMUNITY

## 2025-05-22 RX ORDER — SODIUM CHLORIDE 9 MG/ML
20 INJECTION, SOLUTION INTRAVENOUS ONCE
Status: COMPLETED | OUTPATIENT
Start: 2025-05-22 | End: 2025-05-22

## 2025-05-22 RX ADMIN — CARBOPLATIN 252 MG: 10 INJECTION, SOLUTION INTRAVENOUS at 13:12

## 2025-05-22 RX ADMIN — SODIUM CHLORIDE 20 ML/HR: 0.9 INJECTION, SOLUTION INTRAVENOUS at 09:40

## 2025-05-22 RX ADMIN — SODIUM CHLORIDE 200 MG: 9 INJECTION, SOLUTION INTRAVENOUS at 10:47

## 2025-05-22 RX ADMIN — DEXAMETHASONE SODIUM PHOSPHATE: 100 INJECTION INTRAMUSCULAR; INTRAVENOUS at 09:40

## 2025-05-22 RX ADMIN — Medication 130 MG: at 11:47

## 2025-05-22 RX ADMIN — FOSAPREPITANT 150 MG: 150 INJECTION, POWDER, LYOPHILIZED, FOR SOLUTION INTRAVENOUS at 10:05

## 2025-05-22 NOTE — PROGRESS NOTES
Ordered tx labs drawn peripherally. Recent labs reviewed. Pt tolerated Keytruda, Abraxane, & Carboplatin well without any complications. PIV removed without incident.      Cr Pena is aware of future appt on 5/29/25 at 9AM. [facility] scheduled.     AVS declined by Cr Pena.     Pt discharged off unit in stable condition with all personal belongings accompanied by [facility] transport.

## 2025-05-22 NOTE — ASSESSMENT & PLAN NOTE
- Her POC broke this morning.  Encouraged patient to contact DME  -Continue 2 L supplemental oxygen to maintain SpO2 above 88%

## 2025-05-22 NOTE — PLAN OF CARE
Problem: Potential for Falls  Goal: Patient will remain free of falls  Description: INTERVENTIONS:  - Educate patient/family on patient safety including physical limitations  - Instruct patient to call for assistance with activity   - Consider consulting OT/PT to assist with strengthening/mobility based on AM PAC & JH-HLM score  - Consult OT/PT to assist with strengthening/mobility   - Keep Call bell within reach  - Keep bed low and locked with side rails adjusted as appropriate  - Keep care items and personal belongings within reach  - Initiate and maintain comfort rounds  - Consider moving patient to room near nurses station  Outcome: Progressing     Problem: INFECTION - ADULT  Goal: Absence or prevention of progression during hospitalization  Description: INTERVENTIONS:  - Assess and monitor for signs and symptoms of infection  - Monitor lab/diagnostic results  - Monitor all insertion sites, i.e. indwelling lines, tubes, and drains  - Monitor endotracheal if appropriate and nasal secretions for changes in amount and color  - [place] appropriate cooling/warming therapies per order  - Administer medications as ordered  - Instruct and encourage patient and family to use good hand hygiene technique  - Identify and instruct in appropriate isolation precautions for identified infection/condition  Outcome: Progressing     Problem: Knowledge Deficit  Goal: Patient/family/caregiver demonstrates understanding of disease process, treatment plan, medications, and discharge instructions  Description: Complete learning assessment and assess knowledge base.  Interventions:  - Provide teaching at level of understanding  - Provide teaching via preferred learning methods  Outcome: Progressing

## 2025-05-22 NOTE — ASSESSMENT & PLAN NOTE
- Currently receiving chemotherapy weekly, has infusion today.  Seems to be tolerating well.  Her recent CT CAP from 5/12/2025 showed decrease in the LLL mass from 4.8 x 4.7 cm to 4.1 x 3.5 cm  -Continue to follow with medical oncology

## 2025-05-22 NOTE — ASSESSMENT & PLAN NOTE
- PFTs in 2022 with moderate obstructive airflow defect with FEV1 59%  - Patient had a URI last week treated with azithromycin. Symptoms improving, but still with residual cough and shortness of breath above her baseline.  Lungs sound clear on exam today  -Patient will continue Breztri 2 puffs twice daily and albuterol HFA/nebs every 6 hours as needed.  Hold off on OCS given no wheezing on exam today and symptoms are improving.  -Follow-up in 3 months or sooner if needed

## 2025-05-22 NOTE — PROGRESS NOTES
Follow-up  Visit - Pulmonary Medicine   Name: Cr Pena      : 1940      MRN: [id number]  Encounter Provider: AMARA Stockton  Encounter Date: 2025   Encounter department: [facility] PULMONARY [facility] [place]  :  Assessment & Plan  COPD, moderate (HCC)  - PFTs in  with moderate obstructive airflow defect with FEV1 59%  - Patient had a URI last week treated with azithromycin. Symptoms improving, but still with residual cough and shortness of breath above her baseline.  Lungs sound clear on exam today  -Patient will continue Breztri 2 puffs twice daily and albuterol HFA/nebs every 6 hours as needed.  Hold off on OCS given no wheezing on exam today and symptoms are improving.  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  Seems to be tolerating well.  Her recent CT CAP from 2025 showed decrease in the LLL mass from 4.8 x 4.7 cm to 4.1 x 3.5 cm  -Continue to follow with medical oncology       Chronic respiratory failure with hypoxia (HCC)  - Her POC broke this morning.  Encouraged patient to contact DME  -Continue 2 L supplemental oxygen to maintain SpO2 above 88%         No follow-ups on file.    History of Present Illness   Cr Pena is a 85 y.o. female with a past medical history of COPD, chronic hypoxemic respiratory failure, and recently diagnosed left lower lobe squamous cell carcinoma.  She recently underwent EBUS to evaluate lymphadenopathy and complete staging. Biopsy results were negative.  She currently follows with medical oncology and is on chemotherapy weekly, and has an infusion today.  At her last office visit 3 months ago, she was maintained on Breztri, albuterol, and 2 L supplemental oxygen.    Patient had a URI last week, experienced increased cough with green productive mucus.  She was treated with azithromycin.  Patient notes  symptoms are improving, however not yet back to baseline.  Still with shortness of breath and cough above her baseline.  She is using albuterol HFA 3-4 times per day in addition to her Breztri twice daily.  She denies any fevers or chills.  She is wearing her 2 L oxygen as needed.  She denies any chest pain or lower extremity swelling.      Review of Systems    Aside from what is mentioned in the HPI, ROS is otherwise negative         Medical History Reviewed by provider this encounter:     .    Objective   There were no vitals taken for this visit.    Physical Exam  Vitals and nursing note reviewed.   Constitutional:       General: She is not in acute distress.     Appearance: Normal appearance. She is well-developed.     Cardiovascular:      Rate and Rhythm: Normal rate and regular rhythm.      Heart sounds: Normal heart sounds, S1 normal and S2 normal. No murmur heard.  Pulmonary:      Effort: Pulmonary effort is normal.      Breath sounds: Normal breath sounds. No decreased breath sounds, wheezing, rhonchi or rales.     Musculoskeletal:         General: No swelling.      Right lower leg: No edema.      Left lower leg: No edema.     Neurological:      Mental Status: She is alert.     Psychiatric:         Mood and Affect: Mood and affect normal.         Behavior: Behavior normal. Behavior is cooperative.         Diagnostic Data:  Labs: I personally reviewed the most recent laboratory data pertinent to today's visit.      Radiology results:  Radiology Results Review: I have reviewed radiology reports from 5/12/2025 including: CT chest.      PFT/spirometry results: Reviewed study from 10/26/2022      Oximetry testing:      Other studies:      AMARA Stockton

## 2025-05-23 DIAGNOSIS — E11.9 TYPE 2 DIABETES MELLITUS WITHOUT COMPLICATION, WITHOUT LONG-TERM CURRENT USE OF INSULIN (HCC): ICD-10-CM

## 2025-05-27 ENCOUNTER — APPOINTMENT (OUTPATIENT)
Dept: LAB | Facility: HOSPITAL | Age: 85
End: 2025-05-27
Payer: COMMERCIAL

## 2025-05-27 DIAGNOSIS — C34.32 SQUAMOUS CELL CARCINOMA OF LOWER LOBE OF LEFT LUNG (HCC): ICD-10-CM

## 2025-05-27 LAB
ALBUMIN SERPL BCG-MCNC: 3.9 G/DL (ref 3.5–5)
ALP SERPL-CCNC: 58 U/L (ref 34–104)
ALT SERPL W P-5'-P-CCNC: 13 U/L (ref 7–52)
ANION GAP SERPL CALCULATED.3IONS-SCNC: 11 MMOL/L (ref 4–13)
AST SERPL W P-5'-P-CCNC: 16 U/L (ref 13–39)
BASOPHILS # BLD AUTO: 0.05 THOUSANDS/ÂΜL (ref 0–0.1)
BASOPHILS NFR BLD AUTO: 1 % (ref 0–1)
BILIRUB SERPL-MCNC: 0.58 MG/DL (ref 0.2–1)
BUN SERPL-MCNC: 24 MG/DL (ref 5–25)
CALCIUM SERPL-MCNC: 9.1 MG/DL (ref 8.4–10.2)
CHLORIDE SERPL-SCNC: 100 MMOL/L (ref 96–108)
CO2 SERPL-SCNC: 29 MMOL/L (ref 21–32)
CREAT SERPL-MCNC: 1 MG/DL (ref 0.6–1.3)
EOSINOPHIL # BLD AUTO: 0.14 THOUSAND/ÂΜL (ref 0–0.61)
EOSINOPHIL NFR BLD AUTO: 3 % (ref 0–6)
ERYTHROCYTE [DISTWIDTH] IN BLOOD BY AUTOMATED COUNT: 15.8 % (ref 11.6–15.1)
GFR SERPL CREATININE-BSD FRML MDRD: 51 ML/MIN/1.73SQ M
GLUCOSE P FAST SERPL-MCNC: 109 MG/DL (ref 65–99)
HCT VFR BLD AUTO: 31.9 % (ref 34.8–46.1)
HGB BLD-MCNC: 10.2 G/DL (ref 11.5–15.4)
IMM GRANULOCYTES # BLD AUTO: 0.01 THOUSAND/UL (ref 0–0.2)
IMM GRANULOCYTES NFR BLD AUTO: 0 % (ref 0–2)
LYMPHOCYTES # BLD AUTO: 1.81 THOUSANDS/ÂΜL (ref 0.6–4.47)
LYMPHOCYTES NFR BLD AUTO: 37 % (ref 14–44)
MCH RBC QN AUTO: 28.9 PG (ref 26.8–34.3)
MCHC RBC AUTO-ENTMCNC: 32 G/DL (ref 31.4–37.4)
MCV RBC AUTO: 90 FL (ref 82–98)
MONOCYTES # BLD AUTO: 0.27 THOUSAND/ÂΜL (ref 0.17–1.22)
MONOCYTES NFR BLD AUTO: 6 % (ref 4–12)
NEUTROPHILS # BLD AUTO: 2.65 THOUSANDS/ÂΜL (ref 1.85–7.62)
NEUTS SEG NFR BLD AUTO: 53 % (ref 43–75)
NRBC BLD AUTO-RTO: 0 /100 WBCS
PLATELET # BLD AUTO: 291 THOUSANDS/UL (ref 149–390)
PMV BLD AUTO: 10.4 FL (ref 8.9–12.7)
POTASSIUM SERPL-SCNC: 4.3 MMOL/L (ref 3.5–5.3)
PROT SERPL-MCNC: 6.2 G/DL (ref 6.4–8.4)
RBC # BLD AUTO: 3.53 MILLION/UL (ref 3.81–5.12)
SODIUM SERPL-SCNC: 140 MMOL/L (ref 135–147)
T3FREE SERPL-MCNC: 3.01 PG/ML (ref 2.5–3.9)
TSH SERPL DL<=0.05 MIU/L-ACNC: 2.55 UIU/ML (ref 0.45–4.5)
WBC # BLD AUTO: 4.93 THOUSAND/UL (ref 4.31–10.16)

## 2025-05-27 PROCEDURE — 84481 FREE ASSAY (FT-3): CPT

## 2025-05-27 PROCEDURE — 85025 COMPLETE CBC W/AUTO DIFF WBC: CPT

## 2025-05-27 PROCEDURE — 80053 COMPREHEN METABOLIC PANEL: CPT

## 2025-05-27 PROCEDURE — 36415 COLL VENOUS BLD VENIPUNCTURE: CPT

## 2025-05-27 PROCEDURE — 84443 ASSAY THYROID STIM HORMONE: CPT

## 2025-05-28 ENCOUNTER — PATIENT MESSAGE (OUTPATIENT)
Dept: HEMATOLOGY ONCOLOGY | Facility: CLINIC | Age: 85
End: 2025-05-28

## 2025-05-28 ENCOUNTER — PATIENT OUTREACH (OUTPATIENT)
Dept: HEMATOLOGY ONCOLOGY | Facility: CLINIC | Age: 85
End: 2025-05-28

## 2025-05-28 NOTE — PROGRESS NOTES
Truong Hankins,     I'm checking in to see how things are going. I also wanted to ask if you are in need of any supportive services or are experiencing any barriers to your care. If you need anything, please feel free to reach out to me. You can message me through My Chart or call me at [phone].      All the best,   Agueda      Patient Navigation

## 2025-05-29 ENCOUNTER — HOSPITAL ENCOUNTER (OUTPATIENT)
Dept: INFUSION CENTER | Facility: HOSPITAL | Age: 85
Discharge: HOME | End: 2025-05-29
Attending: INTERNAL MEDICINE
Payer: COMMERCIAL

## 2025-05-29 VITALS
DIASTOLIC BLOOD PRESSURE: 68 MMHG | OXYGEN SATURATION: 93 % | HEIGHT: 60 IN | SYSTOLIC BLOOD PRESSURE: 123 MMHG | TEMPERATURE: 96.2 F | WEIGHT: 139.11 LBS | BODY MASS INDEX: 27.31 KG/M2 | RESPIRATION RATE: 16 BRPM | HEART RATE: 65 BPM

## 2025-05-29 DIAGNOSIS — C34.32 SQUAMOUS CELL CARCINOMA OF LOWER LOBE OF LEFT LUNG (HCC): Primary | ICD-10-CM

## 2025-05-29 PROCEDURE — 96413 CHEMO IV INFUSION 1 HR: CPT

## 2025-05-29 PROCEDURE — 96367 TX/PROPH/DG ADDL SEQ IV INF: CPT

## 2025-05-29 RX ORDER — SODIUM CHLORIDE 9 MG/ML
20 INJECTION, SOLUTION INTRAVENOUS ONCE
Status: COMPLETED | OUTPATIENT
Start: 2025-05-29 | End: 2025-05-29

## 2025-05-29 RX ADMIN — Medication 130 MG: at 10:01

## 2025-05-29 RX ADMIN — DEXAMETHASONE SODIUM PHOSPHATE: 100 INJECTION INTRAMUSCULAR; INTRAVENOUS at 09:00

## 2025-05-29 RX ADMIN — SODIUM CHLORIDE 20 ML/HR: 0.9 INJECTION, SOLUTION INTRAVENOUS at 09:01

## 2025-05-29 NOTE — PROGRESS NOTES
Cr Pena tolerated Abraxane treatment well with no complications.       Cr NACHO Jahairagera is aware of future appt on 6/12/25 at 0830.      AVS declined.

## 2025-06-02 ENCOUNTER — OFFICE VISIT (OUTPATIENT)
Dept: HEMATOLOGY ONCOLOGY | Facility: CLINIC | Age: 85
End: 2025-06-02
Payer: COMMERCIAL

## 2025-06-02 ENCOUNTER — TELEPHONE (OUTPATIENT)
Dept: PULMONOLOGY | Facility: CLINIC | Age: 85
End: 2025-06-02

## 2025-06-02 VITALS
BODY MASS INDEX: 27.29 KG/M2 | SYSTOLIC BLOOD PRESSURE: 107 MMHG | HEART RATE: 70 BPM | TEMPERATURE: 98.4 F | OXYGEN SATURATION: 98 % | WEIGHT: 139 LBS | HEIGHT: 60 IN | DIASTOLIC BLOOD PRESSURE: 72 MMHG

## 2025-06-02 DIAGNOSIS — C34.32 SQUAMOUS CELL CARCINOMA OF LOWER LOBE OF LEFT LUNG (HCC): Primary | ICD-10-CM

## 2025-06-02 PROCEDURE — G2211 COMPLEX E/M VISIT ADD ON: HCPCS | Performed by: INTERNAL MEDICINE

## 2025-06-02 PROCEDURE — 99214 OFFICE O/P EST MOD 30 MIN: CPT | Performed by: INTERNAL MEDICINE

## 2025-06-02 NOTE — PROGRESS NOTES
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  Recent CT CAP shows moderate improvement in the size of left lower lung squamous cell carcinoma.  She seems to be tolerating treatment without much difficulty.  Recent CBC shows hemoglobin down to 10.2, likely chemo induced.  We reviewed that with continued carboplatin likelihood of hematologic toxicities rises.  Carboplatin is removed at this time.  Denies neuropathy.  CMP, TSH normal.  Dyspnea is stable, O2 24/7.  Presuming all is well, follow-up in 2 months.  Orders:  •  CT chest abdomen pelvis w contrast; Future        Return in about 2 months (around 2025) for Imaging - See orders, Infusion - See Treatment Plan, Labs - See Treatment Plan.    History of Present Illness   No chief complaint on file.    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  Patchy opacity in the left midlung, nondisplaced left sided rib fracture.  CT CAP showed spiculated mass, 4.5 cm in the left lower lobe.  Minimally displaced left rib fractures, 8, 9, 10, posterolaterally.  No adenopathy or effusion.  No evident metastatic disease.  2024  PET/CT showed 5.5 cm left lower lobe mass extending to the pleura, SUV 23.  Mediastinal nodes subcentimeter, max SUV 2.1.  Left rib fractures noted including 5, 6.  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  Caris showed PD-L1 TPS 1%, TMB 11.  P53 mutation, otherwise wild-type.  Brain MRI showed no metastatic disease.     3/10/2025 -  Cancer Staged    Staging form: Lung, AJCC V9  - Pathologic: Stage IIB (pT3, pN0(f), cM0) - Signed by Alexsander Jones DO on 3/10/2025  Histopathologic type: Squamous cell carcinoma, large cell, nonkeratinizing, NOS  Stage prefix: Initial diagnosis  Method of lymph node assessment: Fine needle aspiration       3/20/2025 -  Chemotherapy    paclitaxel protein-bound (ABRAXANE) IVPB, 80 mg/m2 = 130 mg (80 % of original dose 100 mg/m2), 4 of 10 cycles  Dose modification: 80 mg/m2 (original dose 100 mg/m2, Cycle 1, Reason: Anticipated Tolerance)  Administration: 130 mg (3/20/2025), 130 mg (3/27/2025), 130 mg (4/10/2025), 130 mg (4/17/2025), 130 mg (5/1/2025), 130 mg (5/8/2025), 130 mg (5/22/2025), 130 mg (5/29/2025)  CARBOplatin (PARAPLATIN) IVPB (GO AUC DOSING), 252.8 mg, 4 of 4 cycles  Administration: 252.8 mg (3/20/2025), 262.4 mg (4/10/2025), 262.4 mg (5/1/2025), 252 mg (5/22/2025)  pembrolizumab (KEYTRUDA) IVPB, 200 mg, 4 of 10 cycles  Administration: 200 mg (3/20/2025), 200 mg (4/10/2025), 200 mg (5/1/2025), 200 mg (5/22/2025)        Pertinent Medical History   T3, N0 squamous cell carcinoma of the left lung.  Declined surgical resection.  Radiation therapy was also considered.  Patient defers.  3/20/2025 started nab-paclitaxel, carbo, pembrolizumab.     Review of Systems   Constitutional:  Negative for appetite change, diaphoresis, fatigue and fever.   HENT:  Negative for sinus pain.    Eyes:  Negative for discharge.   Respiratory:  Negative for cough and shortness of breath.    Cardiovascular:  Negative for chest pain.  "  Gastrointestinal:  Negative for abdominal pain, constipation and diarrhea.   Endocrine: Negative for cold intolerance.   Genitourinary:  Negative for difficulty urinating and hematuria.   Musculoskeletal:  Negative for joint swelling.   Skin:  Negative for rash.   Allergic/Immunologic: Negative for environmental allergies.   Neurological:  Negative for dizziness and headaches.   Hematological:  Negative for adenopathy.   Psychiatric/Behavioral:  Negative for agitation.            Objective   /72 (BP Location: Left arm, Patient Position: Sitting, Cuff Size: Adult)   Pulse 70   Temp 98.4 °F (36.9 °C) (Temporal)   Ht 5' 0.24\" (1.53 m)   Wt 63 kg (139 lb)   SpO2 98%   BMI 26.93 kg/m²     Pain Screening:     ECOG     Physical Exam  Constitutional:       Appearance: She is well-developed.   HENT:      Head: Normocephalic and atraumatic.     Eyes:      Pupils: Pupils are equal, round, and reactive to light.       Cardiovascular:      Rate and Rhythm: Normal rate.      Heart sounds: No murmur heard.  Pulmonary:      Effort: No respiratory distress.      Breath sounds: No wheezing or rales.   Abdominal:      General: There is no distension.      Palpations: Abdomen is soft.      Tenderness: There is no abdominal tenderness. There is no rebound.     Musculoskeletal:         General: No tenderness.      Cervical back: Neck supple.   Lymphadenopathy:      Cervical: No cervical adenopathy.     Skin:     General: Skin is warm.      Findings: No rash.     Neurological:      Mental Status: She is alert and oriented to person, place, and time.      Deep Tendon Reflexes: Reflexes normal.     Psychiatric:         Thought Content: Thought content normal.         Labs: I have reviewed the following labs:  Lab Results   Component Value Date/Time    WBC 4.93 05/27/2025 07:53 AM    RBC 3.53 (L) 05/27/2025 07:53 AM    Hemoglobin 10.2 (L) 05/27/2025 07:53 AM    Hematocrit 31.9 (L) 05/27/2025 07:53 AM    MCV 90 05/27/2025 07:53 " AM    MCH 28.9 05/27/2025 07:53 AM    RDW 15.8 (H) 05/27/2025 07:53 AM    Platelets 291 05/27/2025 07:53 AM    Segmented % 53 05/27/2025 07:53 AM    Lymphocytes % 37 05/27/2025 07:53 AM    Monocytes % 6 05/27/2025 07:53 AM    Eosinophils Relative 3 05/27/2025 07:53 AM    Basophils Relative 1 05/27/2025 07:53 AM    Immature Grans % 0 05/27/2025 07:53 AM    Absolute Neutrophils 2.65 05/27/2025 07:53 AM     Lab Results   Component Value Date/Time    Potassium 4.3 05/27/2025 07:53 AM    Chloride 100 05/27/2025 07:53 AM    CO2 29 05/27/2025 07:53 AM    BUN 24 05/27/2025 07:53 AM    Creatinine 1.00 05/27/2025 07:53 AM    Glucose, Fasting 109 (H) 05/27/2025 07:53 AM    Calcium 9.1 05/27/2025 07:53 AM    AST 16 05/27/2025 07:53 AM    ALT 13 05/27/2025 07:53 AM    Alkaline Phosphatase 58 05/27/2025 07:53 AM    Total Protein 6.2 (L) 05/27/2025 07:53 AM    Albumin 3.9 05/27/2025 07:53 AM    Total Bilirubin 0.58 05/27/2025 07:53 AM    eGFR 51 05/27/2025 07:53 AM

## 2025-06-02 NOTE — ASSESSMENT & PLAN NOTE
Nab-paclitaxel, carbo, pembrolizumab for stage IIb squamous cell carcinoma left lung.  Recent CT CAP shows moderate improvement in the size of left lower lung squamous cell carcinoma.  She seems to be tolerating treatment without much difficulty.  Recent CBC shows hemoglobin down to 10.2, likely chemo induced.  We reviewed that with continued carboplatin likelihood of hematologic toxicities rises.  Carboplatin is removed at this time.  Denies neuropathy.  CMP, TSH normal.  Dyspnea is stable, O2 24/7.  Presuming all is well, follow-up in 2 months.  Orders:  •  CT chest abdomen pelvis w contrast; Future

## 2025-06-05 LAB
DME PARACHUTE DELIVERY DATE ACTUAL: NORMAL
DME PARACHUTE DELIVERY DATE REQUESTED: NORMAL
DME PARACHUTE ITEM DESCRIPTION: NORMAL
DME PARACHUTE ORDER STATUS: NORMAL
DME PARACHUTE SUPPLIER NAME: NORMAL
DME PARACHUTE SUPPLIER PHONE: NORMAL

## 2025-06-05 RX ORDER — SODIUM CHLORIDE 9 MG/ML
20 INJECTION, SOLUTION INTRAVENOUS ONCE
Status: CANCELLED | OUTPATIENT
Start: 2025-06-12

## 2025-06-05 RX ORDER — SODIUM CHLORIDE 9 MG/ML
20 INJECTION, SOLUTION INTRAVENOUS ONCE
OUTPATIENT
Start: 2025-06-19

## 2025-06-08 ENCOUNTER — APPOINTMENT (OUTPATIENT)
Dept: LAB | Facility: HOSPITAL | Age: 85
End: 2025-06-08
Payer: COMMERCIAL

## 2025-06-08 DIAGNOSIS — C34.32 SQUAMOUS CELL CARCINOMA OF LOWER LOBE OF LEFT LUNG (HCC): ICD-10-CM

## 2025-06-08 LAB
ALBUMIN SERPL BCG-MCNC: 4.1 G/DL (ref 3.5–5)
ALP SERPL-CCNC: 57 U/L (ref 34–104)
ALT SERPL W P-5'-P-CCNC: 15 U/L (ref 7–52)
ANION GAP SERPL CALCULATED.3IONS-SCNC: 10 MMOL/L (ref 4–13)
AST SERPL W P-5'-P-CCNC: 17 U/L (ref 13–39)
BASOPHILS # BLD AUTO: 0.04 THOUSANDS/ÂΜL (ref 0–0.1)
BASOPHILS NFR BLD AUTO: 1 % (ref 0–1)
BILIRUB SERPL-MCNC: 0.58 MG/DL (ref 0.2–1)
BUN SERPL-MCNC: 12 MG/DL (ref 5–25)
CALCIUM SERPL-MCNC: 8.2 MG/DL (ref 8.4–10.2)
CHLORIDE SERPL-SCNC: 103 MMOL/L (ref 96–108)
CO2 SERPL-SCNC: 26 MMOL/L (ref 21–32)
CREAT SERPL-MCNC: 0.94 MG/DL (ref 0.6–1.3)
EOSINOPHIL # BLD AUTO: 0.1 THOUSAND/ÂΜL (ref 0–0.61)
EOSINOPHIL NFR BLD AUTO: 3 % (ref 0–6)
ERYTHROCYTE [DISTWIDTH] IN BLOOD BY AUTOMATED COUNT: 16.7 % (ref 11.6–15.1)
GFR SERPL CREATININE-BSD FRML MDRD: 55 ML/MIN/1.73SQ M
GLUCOSE P FAST SERPL-MCNC: 99 MG/DL (ref 65–99)
HCT VFR BLD AUTO: 29.4 % (ref 34.8–46.1)
HGB BLD-MCNC: 9.7 G/DL (ref 11.5–15.4)
IMM GRANULOCYTES # BLD AUTO: 0.01 THOUSAND/UL (ref 0–0.2)
IMM GRANULOCYTES NFR BLD AUTO: 0 % (ref 0–2)
LYMPHOCYTES # BLD AUTO: 1.77 THOUSANDS/ÂΜL (ref 0.6–4.47)
LYMPHOCYTES NFR BLD AUTO: 54 % (ref 14–44)
MCH RBC QN AUTO: 29.8 PG (ref 26.8–34.3)
MCHC RBC AUTO-ENTMCNC: 33 G/DL (ref 31.4–37.4)
MCV RBC AUTO: 91 FL (ref 82–98)
MONOCYTES # BLD AUTO: 0.49 THOUSAND/ÂΜL (ref 0.17–1.22)
MONOCYTES NFR BLD AUTO: 15 % (ref 4–12)
NEUTROPHILS # BLD AUTO: 0.9 THOUSANDS/ÂΜL (ref 1.85–7.62)
NEUTS SEG NFR BLD AUTO: 27 % (ref 43–75)
NRBC BLD AUTO-RTO: 0 /100 WBCS
PLATELET # BLD AUTO: 155 THOUSANDS/UL (ref 149–390)
PMV BLD AUTO: 9.5 FL (ref 8.9–12.7)
POTASSIUM SERPL-SCNC: 4.1 MMOL/L (ref 3.5–5.3)
PROT SERPL-MCNC: 6.4 G/DL (ref 6.4–8.4)
RBC # BLD AUTO: 3.25 MILLION/UL (ref 3.81–5.12)
SODIUM SERPL-SCNC: 139 MMOL/L (ref 135–147)
WBC # BLD AUTO: 3.31 THOUSAND/UL (ref 4.31–10.16)

## 2025-06-08 PROCEDURE — 36415 COLL VENOUS BLD VENIPUNCTURE: CPT

## 2025-06-08 PROCEDURE — 80053 COMPREHEN METABOLIC PANEL: CPT

## 2025-06-08 PROCEDURE — 85025 COMPLETE CBC W/AUTO DIFF WBC: CPT

## 2025-06-10 ENCOUNTER — TELEPHONE (OUTPATIENT)
Dept: HEMATOLOGY ONCOLOGY | Facility: CLINIC | Age: 85
End: 2025-06-10

## 2025-06-10 NOTE — TELEPHONE ENCOUNTER
Phoned patient to review Dr. Jones's recommendation for Neulasta on pro to day 8.  Unable to leave voice message.

## 2025-06-12 ENCOUNTER — HOSPITAL ENCOUNTER (OUTPATIENT)
Dept: INFUSION CENTER | Facility: HOSPITAL | Age: 85
Discharge: HOME | End: 2025-06-12
Attending: INTERNAL MEDICINE
Payer: COMMERCIAL

## 2025-06-12 VITALS
TEMPERATURE: 97.6 F | BODY MASS INDEX: 27.61 KG/M2 | HEIGHT: 60 IN | WEIGHT: 140.65 LBS | OXYGEN SATURATION: 99 % | DIASTOLIC BLOOD PRESSURE: 71 MMHG | RESPIRATION RATE: 20 BRPM | HEART RATE: 87 BPM | SYSTOLIC BLOOD PRESSURE: 111 MMHG

## 2025-06-12 DIAGNOSIS — C34.32 SQUAMOUS CELL CARCINOMA OF LOWER LOBE OF LEFT LUNG (HCC): Primary | ICD-10-CM

## 2025-06-12 PROCEDURE — 96413 CHEMO IV INFUSION 1 HR: CPT

## 2025-06-12 PROCEDURE — 96367 TX/PROPH/DG ADDL SEQ IV INF: CPT

## 2025-06-12 PROCEDURE — 96417 CHEMO IV INFUS EACH ADDL SEQ: CPT

## 2025-06-12 RX ORDER — SODIUM CHLORIDE 9 MG/ML
20 INJECTION, SOLUTION INTRAVENOUS ONCE
Status: COMPLETED | OUTPATIENT
Start: 2025-06-12 | End: 2025-06-12

## 2025-06-12 RX ADMIN — SODIUM CHLORIDE 20 ML/HR: 0.9 INJECTION, SOLUTION INTRAVENOUS at 08:13

## 2025-06-12 RX ADMIN — Medication 130 MG: at 10:22

## 2025-06-12 RX ADMIN — FOSAPREPITANT 150 MG: 150 INJECTION, POWDER, LYOPHILIZED, FOR SOLUTION INTRAVENOUS at 08:36

## 2025-06-12 RX ADMIN — SODIUM CHLORIDE 200 MG: 9 INJECTION, SOLUTION INTRAVENOUS at 09:12

## 2025-06-12 RX ADMIN — DEXAMETHASONE SODIUM PHOSPHATE: 10 INJECTION, SOLUTION INTRAMUSCULAR; INTRAVENOUS at 08:15

## 2025-06-12 NOTE — PROGRESS NOTES
Cr Pena  tolerated treatment well with no complications.      Cr Pena is aware of future appt on 6/19 at 8:30 am.     AVS declined by Cr Pena.  [facility] contacted for pt .

## 2025-06-15 ENCOUNTER — APPOINTMENT (OUTPATIENT)
Dept: LAB | Facility: HOSPITAL | Age: 85
End: 2025-06-15
Payer: COMMERCIAL

## 2025-06-15 DIAGNOSIS — C34.32 SQUAMOUS CELL CARCINOMA OF LOWER LOBE OF LEFT LUNG (HCC): ICD-10-CM

## 2025-06-15 LAB
BASOPHILS # BLD AUTO: 0.05 THOUSANDS/ÂΜL (ref 0–0.1)
BASOPHILS NFR BLD AUTO: 1 % (ref 0–1)
EOSINOPHIL # BLD AUTO: 0.16 THOUSAND/ÂΜL (ref 0–0.61)
EOSINOPHIL NFR BLD AUTO: 3 % (ref 0–6)
ERYTHROCYTE [DISTWIDTH] IN BLOOD BY AUTOMATED COUNT: 18.6 % (ref 11.6–15.1)
HCT VFR BLD AUTO: 30.5 % (ref 34.8–46.1)
HGB BLD-MCNC: 9.7 G/DL (ref 11.5–15.4)
IMM GRANULOCYTES # BLD AUTO: 0.01 THOUSAND/UL (ref 0–0.2)
IMM GRANULOCYTES NFR BLD AUTO: 0 % (ref 0–2)
LYMPHOCYTES # BLD AUTO: 2.84 THOUSANDS/ÂΜL (ref 0.6–4.47)
LYMPHOCYTES NFR BLD AUTO: 47 % (ref 14–44)
MCH RBC QN AUTO: 29.5 PG (ref 26.8–34.3)
MCHC RBC AUTO-ENTMCNC: 31.8 G/DL (ref 31.4–37.4)
MCV RBC AUTO: 93 FL (ref 82–98)
MONOCYTES # BLD AUTO: 0.28 THOUSAND/ÂΜL (ref 0.17–1.22)
MONOCYTES NFR BLD AUTO: 5 % (ref 4–12)
NEUTROPHILS # BLD AUTO: 2.65 THOUSANDS/ÂΜL (ref 1.85–7.62)
NEUTS SEG NFR BLD AUTO: 44 % (ref 43–75)
NRBC BLD AUTO-RTO: 0 /100 WBCS
PLATELET # BLD AUTO: 150 THOUSANDS/UL (ref 149–390)
PMV BLD AUTO: 10.5 FL (ref 8.9–12.7)
RBC # BLD AUTO: 3.29 MILLION/UL (ref 3.81–5.12)
WBC # BLD AUTO: 5.99 THOUSAND/UL (ref 4.31–10.16)

## 2025-06-15 PROCEDURE — 36415 COLL VENOUS BLD VENIPUNCTURE: CPT

## 2025-06-15 PROCEDURE — 85025 COMPLETE CBC W/AUTO DIFF WBC: CPT

## 2025-06-19 ENCOUNTER — HOSPITAL ENCOUNTER (OUTPATIENT)
Dept: INFUSION CENTER | Facility: HOSPITAL | Age: 85
Discharge: HOME | End: 2025-06-19
Attending: INTERNAL MEDICINE
Payer: COMMERCIAL

## 2025-06-19 VITALS
TEMPERATURE: 97.2 F | BODY MASS INDEX: 28.26 KG/M2 | HEIGHT: 60 IN | WEIGHT: 143.96 LBS | RESPIRATION RATE: 17 BRPM | DIASTOLIC BLOOD PRESSURE: 73 MMHG | HEART RATE: 99 BPM | OXYGEN SATURATION: 99 % | SYSTOLIC BLOOD PRESSURE: 124 MMHG

## 2025-06-19 DIAGNOSIS — C34.32 SQUAMOUS CELL CARCINOMA OF LOWER LOBE OF LEFT LUNG (HCC): Primary | ICD-10-CM

## 2025-06-19 PROCEDURE — 96377 APPLICATON ON-BODY INJECTOR: CPT

## 2025-06-19 PROCEDURE — 96413 CHEMO IV INFUSION 1 HR: CPT

## 2025-06-19 PROCEDURE — 96367 TX/PROPH/DG ADDL SEQ IV INF: CPT

## 2025-06-19 RX ORDER — SODIUM CHLORIDE 9 MG/ML
20 INJECTION, SOLUTION INTRAVENOUS ONCE
Status: COMPLETED | OUTPATIENT
Start: 2025-06-19 | End: 2025-06-19

## 2025-06-19 RX ADMIN — DEXAMETHASONE SODIUM PHOSPHATE: 10 INJECTION, SOLUTION INTRAMUSCULAR; INTRAVENOUS at 08:35

## 2025-06-19 RX ADMIN — SODIUM CHLORIDE 20 ML/HR: 0.9 INJECTION, SOLUTION INTRAVENOUS at 08:33

## 2025-06-19 RX ADMIN — Medication 130 MG: at 09:18

## 2025-06-19 RX ADMIN — PEGFILGRASTIM 6 MG: KIT SUBCUTANEOUS at 10:37

## 2025-06-19 NOTE — PLAN OF CARE
Problem: Potential for Falls  Goal: Patient will remain free of falls  Description: INTERVENTIONS:  - Educate patient/family on patient safety including physical limitations  - Instruct patient to call for assistance with activity   - Consider consulting OT/PT to assist with strengthening/mobility based on AM PAC & JH-HLM score  - Consult OT/PT to assist with strengthening/mobility   - Keep Call bell within reach  - Keep bed low and locked with side rails adjusted as appropriate  - Keep care items and personal belongings within reach  - Initiate and maintain comfort rounds  - Consider moving patient to room near nurses station  Outcome: Progressing     Problem: INFECTION - ADULT  Goal: Absence or prevention of progression during hospitalization  Description: INTERVENTIONS:  - Assess and monitor for signs and symptoms of infection  - Monitor lab/diagnostic results  - Monitor all insertion sites, i.e. indwelling lines, tubes, and drains  - Monitor endotracheal if appropriate and nasal secretions for changes in amount and color  - [place] appropriate cooling/warming therapies per order  - Administer medications as ordered  - Instruct and encourage patient and family to use good hand hygiene technique  - Identify and instruct in appropriate isolation precautions for identified infection/condition  Outcome: Progressing     Problem: Knowledge Deficit  Goal: Patient/family/caregiver demonstrates understanding of disease process, treatment plan, medications, and discharge instructions  Description: Complete learning assessment and assess knowledge base.  Interventions:  - Provide teaching at level of understanding  - Provide teaching via preferred learning methods  Outcome: Progressing

## 2025-06-19 NOTE — PROGRESS NOTES
Recent labs reviewed. Pt tolerated Abraxane chemo tx well without any complications. PIV removed without incident. Neulasta Onpro applied to pts SCOTT. Green light blinking appropriately. Pt educated on when to remove.     Cr Pena is aware of future appt on 7/2/25 at 8:30AM. [facility] scheduled.     AVS printed and given to Cr Pena.     Pt discharged off unit in stable condition with all personal belongings accompanied by [facility] transport.     patient called and [facility]   [facility]  not [facility] ,   Last ovs 03/31/2022   Next ovs 09/07/2022

## 2025-06-21 DIAGNOSIS — E78.5 HYPERLIPIDEMIA, UNSPECIFIED HYPERLIPIDEMIA TYPE: ICD-10-CM

## 2025-06-23 RX ORDER — ATORVASTATIN CALCIUM 20 MG/1
20 TABLET, FILM COATED ORAL DAILY
Qty: 90 TABLET | Refills: 0 | Status: SHIPPED | OUTPATIENT
Start: 2025-06-23

## 2025-06-26 DIAGNOSIS — T45.1X5A CHEMOTHERAPY INDUCED NEUTROPENIA (HCC): Primary | ICD-10-CM

## 2025-06-26 DIAGNOSIS — D70.1 CHEMOTHERAPY INDUCED NEUTROPENIA (HCC): Primary | ICD-10-CM

## 2025-06-26 RX ORDER — SODIUM CHLORIDE 9 MG/ML
20 INJECTION, SOLUTION INTRAVENOUS ONCE
OUTPATIENT
Start: 2025-07-10

## 2025-06-26 RX ORDER — SODIUM CHLORIDE 9 MG/ML
20 INJECTION, SOLUTION INTRAVENOUS ONCE
OUTPATIENT
Start: 2025-07-02

## 2025-06-29 ENCOUNTER — APPOINTMENT (OUTPATIENT)
Dept: LAB | Facility: HOSPITAL | Age: 85
End: 2025-06-29
Payer: COMMERCIAL

## 2025-06-29 DIAGNOSIS — D70.1 CHEMOTHERAPY INDUCED NEUTROPENIA (HCC): ICD-10-CM

## 2025-06-29 DIAGNOSIS — T45.1X5A CHEMOTHERAPY INDUCED NEUTROPENIA (HCC): ICD-10-CM

## 2025-06-29 DIAGNOSIS — C34.32 SQUAMOUS CELL CARCINOMA OF LOWER LOBE OF LEFT LUNG (HCC): ICD-10-CM

## 2025-06-29 LAB
ALBUMIN SERPL BCG-MCNC: 4 G/DL (ref 3.5–5)
ALP SERPL-CCNC: 106 U/L (ref 34–104)
ALT SERPL W P-5'-P-CCNC: 10 U/L (ref 7–52)
ANION GAP SERPL CALCULATED.3IONS-SCNC: 10 MMOL/L (ref 4–13)
ANISOCYTOSIS BLD QL SMEAR: PRESENT
AST SERPL W P-5'-P-CCNC: 16 U/L (ref 13–39)
BASO STIPL BLD QL SMEAR: PRESENT
BASOPHILS # BLD MANUAL: 0 THOUSAND/UL (ref 0–0.1)
BASOPHILS NFR MAR MANUAL: 0 % (ref 0–1)
BILIRUB SERPL-MCNC: 0.66 MG/DL (ref 0.2–1)
BUN SERPL-MCNC: 11 MG/DL (ref 5–25)
CALCIUM SERPL-MCNC: 7.4 MG/DL (ref 8.4–10.2)
CHLORIDE SERPL-SCNC: 102 MMOL/L (ref 96–108)
CO2 SERPL-SCNC: 29 MMOL/L (ref 21–32)
CREAT SERPL-MCNC: 0.97 MG/DL (ref 0.6–1.3)
EOSINOPHIL # BLD MANUAL: 0.18 THOUSAND/UL (ref 0–0.4)
EOSINOPHIL NFR BLD MANUAL: 1 % (ref 0–6)
ERYTHROCYTE [DISTWIDTH] IN BLOOD BY AUTOMATED COUNT: 18.8 % (ref 11.6–15.1)
GFR SERPL CREATININE-BSD FRML MDRD: 53 ML/MIN/1.73SQ M
GLUCOSE P FAST SERPL-MCNC: 103 MG/DL (ref 65–99)
HCT VFR BLD AUTO: 28.7 % (ref 34.8–46.1)
HGB BLD-MCNC: 9.1 G/DL (ref 11.5–15.4)
LYMPHOCYTES # BLD AUTO: 1.75 THOUSAND/UL (ref 0.6–4.47)
LYMPHOCYTES # BLD AUTO: 10 % (ref 14–44)
MCH RBC QN AUTO: 30.7 PG (ref 26.8–34.3)
MCHC RBC AUTO-ENTMCNC: 31.7 G/DL (ref 31.4–37.4)
MCV RBC AUTO: 97 FL (ref 82–98)
METAMYELOCYTE ABSOLUTE CT: 0.18 THOUSAND/UL (ref 0–0.1)
METAMYELOCYTES NFR BLD MANUAL: 1 % (ref 0–1)
MONOCYTES # BLD AUTO: 0.88 THOUSAND/UL (ref 0–1.22)
MONOCYTES NFR BLD: 5 % (ref 4–12)
NEUTROPHILS # BLD MANUAL: 14.56 THOUSAND/UL (ref 1.85–7.62)
NEUTS SEG NFR BLD AUTO: 83 % (ref 43–75)
OVALOCYTES BLD QL SMEAR: PRESENT
PLATELET # BLD AUTO: 175 THOUSANDS/UL (ref 149–390)
PLATELET BLD QL SMEAR: ADEQUATE
PMV BLD AUTO: 10.1 FL (ref 8.9–12.7)
POIKILOCYTOSIS BLD QL SMEAR: PRESENT
POLYCHROMASIA BLD QL SMEAR: PRESENT
POTASSIUM SERPL-SCNC: 3.3 MMOL/L (ref 3.5–5.3)
PROT SERPL-MCNC: 6.1 G/DL (ref 6.4–8.4)
RBC # BLD AUTO: 2.96 MILLION/UL (ref 3.81–5.12)
RBC MORPH BLD: PRESENT
SODIUM SERPL-SCNC: 141 MMOL/L (ref 135–147)
TOXIC GRANULES BLD QL SMEAR: PRESENT
WBC # BLD AUTO: 17.54 THOUSAND/UL (ref 4.31–10.16)

## 2025-06-29 PROCEDURE — 85027 COMPLETE CBC AUTOMATED: CPT

## 2025-06-29 PROCEDURE — 80053 COMPREHEN METABOLIC PANEL: CPT

## 2025-06-29 PROCEDURE — 36415 COLL VENOUS BLD VENIPUNCTURE: CPT

## 2025-06-29 PROCEDURE — 85007 BL SMEAR W/DIFF WBC COUNT: CPT

## 2025-07-02 ENCOUNTER — HOSPITAL ENCOUNTER (OUTPATIENT)
Dept: INFUSION CENTER | Facility: HOSPITAL | Age: 85
Discharge: HOME | End: 2025-07-02
Attending: INTERNAL MEDICINE
Payer: COMMERCIAL

## 2025-07-02 VITALS
RESPIRATION RATE: 18 BRPM | SYSTOLIC BLOOD PRESSURE: 105 MMHG | DIASTOLIC BLOOD PRESSURE: 57 MMHG | OXYGEN SATURATION: 100 % | WEIGHT: 143.08 LBS | BODY MASS INDEX: 28.09 KG/M2 | TEMPERATURE: 97.2 F | HEIGHT: 60 IN | HEART RATE: 86 BPM

## 2025-07-02 DIAGNOSIS — T45.1X5A CHEMOTHERAPY INDUCED NEUTROPENIA (HCC): Primary | ICD-10-CM

## 2025-07-02 DIAGNOSIS — D70.1 CHEMOTHERAPY INDUCED NEUTROPENIA (HCC): Primary | ICD-10-CM

## 2025-07-02 DIAGNOSIS — C34.32 SQUAMOUS CELL CARCINOMA OF LOWER LOBE OF LEFT LUNG (HCC): ICD-10-CM

## 2025-07-02 LAB — TSH SERPL DL<=0.05 MIU/L-ACNC: 2.33 UIU/ML (ref 0.45–4.5)

## 2025-07-02 PROCEDURE — 96367 TX/PROPH/DG ADDL SEQ IV INF: CPT

## 2025-07-02 PROCEDURE — 96417 CHEMO IV INFUS EACH ADDL SEQ: CPT

## 2025-07-02 PROCEDURE — 96413 CHEMO IV INFUSION 1 HR: CPT

## 2025-07-02 PROCEDURE — 84443 ASSAY THYROID STIM HORMONE: CPT | Performed by: INTERNAL MEDICINE

## 2025-07-02 RX ORDER — SODIUM CHLORIDE 9 MG/ML
20 INJECTION, SOLUTION INTRAVENOUS ONCE
Status: COMPLETED | OUTPATIENT
Start: 2025-07-02 | End: 2025-07-02

## 2025-07-02 RX ADMIN — Medication 130 MG: at 10:31

## 2025-07-02 RX ADMIN — FOSAPREPITANT 150 MG: 150 INJECTION, POWDER, LYOPHILIZED, FOR SOLUTION INTRAVENOUS at 08:57

## 2025-07-02 RX ADMIN — DEXAMETHASONE SODIUM PHOSPHATE: 10 INJECTION, SOLUTION INTRAMUSCULAR; INTRAVENOUS at 08:29

## 2025-07-02 RX ADMIN — SODIUM CHLORIDE 20 ML/HR: 0.9 INJECTION, SOLUTION INTRAVENOUS at 08:29

## 2025-07-02 RX ADMIN — SODIUM CHLORIDE 200 MG: 9 INJECTION, SOLUTION INTRAVENOUS at 09:37

## 2025-07-02 NOTE — PROGRESS NOTES
Cr Pena  tolerated Abraxane/Keytruda treatment well with no complications.      Cr Pena is aware of future appt on 7/9/25  at 0830.     AVS printed and given to Cr Pena:  [place]

## 2025-07-03 ENCOUNTER — PATIENT OUTREACH (OUTPATIENT)
Dept: HEMATOLOGY ONCOLOGY | Facility: CLINIC | Age: 85
End: 2025-07-03

## 2025-07-03 NOTE — PROGRESS NOTES
I reached out to Cr now that she is on Tx to reassess for any barriers to care and offer any supportive services that may be needed. She has a VM that has not been set up yet.

## 2025-07-06 ENCOUNTER — APPOINTMENT (OUTPATIENT)
Dept: LAB | Facility: HOSPITAL | Age: 85
End: 2025-07-06
Payer: COMMERCIAL

## 2025-07-06 DIAGNOSIS — D70.1 CHEMOTHERAPY INDUCED NEUTROPENIA (HCC): ICD-10-CM

## 2025-07-06 DIAGNOSIS — T45.1X5A CHEMOTHERAPY INDUCED NEUTROPENIA (HCC): ICD-10-CM

## 2025-07-06 DIAGNOSIS — C34.32 SQUAMOUS CELL CARCINOMA OF LOWER LOBE OF LEFT LUNG (HCC): ICD-10-CM

## 2025-07-06 LAB
BASOPHILS # BLD AUTO: 0.04 THOUSANDS/ÂΜL (ref 0–0.1)
BASOPHILS NFR BLD AUTO: 1 % (ref 0–1)
EOSINOPHIL # BLD AUTO: 0.04 THOUSAND/ÂΜL (ref 0–0.61)
EOSINOPHIL NFR BLD AUTO: 1 % (ref 0–6)
ERYTHROCYTE [DISTWIDTH] IN BLOOD BY AUTOMATED COUNT: 17.6 % (ref 11.6–15.1)
HCT VFR BLD AUTO: 27.2 % (ref 34.8–46.1)
HGB BLD-MCNC: 8.8 G/DL (ref 11.5–15.4)
IMM GRANULOCYTES # BLD AUTO: 0.05 THOUSAND/UL (ref 0–0.2)
IMM GRANULOCYTES NFR BLD AUTO: 1 % (ref 0–2)
LYMPHOCYTES # BLD AUTO: 1.19 THOUSANDS/ÂΜL (ref 0.6–4.47)
LYMPHOCYTES NFR BLD AUTO: 16 % (ref 14–44)
MCH RBC QN AUTO: 31.7 PG (ref 26.8–34.3)
MCHC RBC AUTO-ENTMCNC: 32.4 G/DL (ref 31.4–37.4)
MCV RBC AUTO: 98 FL (ref 82–98)
MONOCYTES # BLD AUTO: 0.27 THOUSAND/ÂΜL (ref 0.17–1.22)
MONOCYTES NFR BLD AUTO: 4 % (ref 4–12)
NEUTROPHILS # BLD AUTO: 5.98 THOUSANDS/ÂΜL (ref 1.85–7.62)
NEUTS SEG NFR BLD AUTO: 77 % (ref 43–75)
NRBC BLD AUTO-RTO: 0 /100 WBCS
PLATELET # BLD AUTO: 235 THOUSANDS/UL (ref 149–390)
PMV BLD AUTO: 9.8 FL (ref 8.9–12.7)
RBC # BLD AUTO: 2.78 MILLION/UL (ref 3.81–5.12)
WBC # BLD AUTO: 7.57 THOUSAND/UL (ref 4.31–10.16)

## 2025-07-06 PROCEDURE — 85025 COMPLETE CBC W/AUTO DIFF WBC: CPT

## 2025-07-06 PROCEDURE — 36415 COLL VENOUS BLD VENIPUNCTURE: CPT

## 2025-07-09 ENCOUNTER — HOSPITAL ENCOUNTER (OUTPATIENT)
Dept: INFUSION CENTER | Facility: HOSPITAL | Age: 85
Discharge: HOME | End: 2025-07-09
Attending: INTERNAL MEDICINE
Payer: COMMERCIAL

## 2025-07-09 VITALS
RESPIRATION RATE: 20 BRPM | HEIGHT: 60 IN | BODY MASS INDEX: 28.48 KG/M2 | WEIGHT: 145.06 LBS | SYSTOLIC BLOOD PRESSURE: 114 MMHG | DIASTOLIC BLOOD PRESSURE: 59 MMHG | TEMPERATURE: 97.9 F | HEART RATE: 100 BPM | OXYGEN SATURATION: 98 %

## 2025-07-09 DIAGNOSIS — C34.32 SQUAMOUS CELL CARCINOMA OF LOWER LOBE OF LEFT LUNG (HCC): ICD-10-CM

## 2025-07-09 DIAGNOSIS — T45.1X5A CHEMOTHERAPY INDUCED NEUTROPENIA (HCC): Primary | ICD-10-CM

## 2025-07-09 DIAGNOSIS — D70.1 CHEMOTHERAPY INDUCED NEUTROPENIA (HCC): Primary | ICD-10-CM

## 2025-07-09 PROCEDURE — 96413 CHEMO IV INFUSION 1 HR: CPT

## 2025-07-09 PROCEDURE — 96367 TX/PROPH/DG ADDL SEQ IV INF: CPT

## 2025-07-09 PROCEDURE — 96377 APPLICATON ON-BODY INJECTOR: CPT

## 2025-07-09 RX ORDER — SODIUM CHLORIDE 9 MG/ML
20 INJECTION, SOLUTION INTRAVENOUS ONCE
Status: COMPLETED | OUTPATIENT
Start: 2025-07-09 | End: 2025-07-09

## 2025-07-09 RX ADMIN — SODIUM CHLORIDE 20 ML/HR: 0.9 INJECTION, SOLUTION INTRAVENOUS at 08:36

## 2025-07-09 RX ADMIN — DEXAMETHASONE SODIUM PHOSPHATE: 10 INJECTION, SOLUTION INTRAMUSCULAR; INTRAVENOUS at 08:35

## 2025-07-09 RX ADMIN — PEGFILGRASTIM 6 MG: KIT SUBCUTANEOUS at 10:34

## 2025-07-09 RX ADMIN — Medication 130 MG: at 09:25

## 2025-07-09 NOTE — PROGRESS NOTES
Cr GRIFFITH Tayodebbie tolerated Abraxane treatment well with no complications. Neulasta Onpro applied to patients SCOTT, light flashing green. Patient aware to remove device tomorrow at 1440, when light is solid green.     Cr NACHO Jahairagera is aware of future appt on 7/23/25 at 0800.     AVS declined.

## 2025-07-16 ENCOUNTER — RA CDI HCC (OUTPATIENT)
Dept: OTHER | Facility: HOSPITAL | Age: 85
End: 2025-07-16

## 2025-07-16 RX ORDER — SODIUM CHLORIDE 9 MG/ML
20 INJECTION, SOLUTION INTRAVENOUS ONCE
Status: CANCELLED | OUTPATIENT
Start: 2025-07-30

## 2025-07-16 RX ORDER — SODIUM CHLORIDE 9 MG/ML
20 INJECTION, SOLUTION INTRAVENOUS ONCE
Status: CANCELLED | OUTPATIENT
Start: 2025-07-23

## 2025-07-16 NOTE — PROGRESS NOTES
HCC coding opportunities    E11.22, E11.42, I13.0, N18.31  GR     Chart Reviewed number of suggestions sent to Provider: 4     Patients Insurance     Medicare Insurance: [employer]

## 2025-07-20 ENCOUNTER — APPOINTMENT (OUTPATIENT)
Dept: LAB | Facility: HOSPITAL | Age: 85
End: 2025-07-20
Payer: COMMERCIAL

## 2025-07-20 DIAGNOSIS — D70.1 CHEMOTHERAPY INDUCED NEUTROPENIA (HCC): ICD-10-CM

## 2025-07-20 DIAGNOSIS — T45.1X5A CHEMOTHERAPY INDUCED NEUTROPENIA (HCC): ICD-10-CM

## 2025-07-20 DIAGNOSIS — C34.32 SQUAMOUS CELL CARCINOMA OF LOWER LOBE OF LEFT LUNG (HCC): ICD-10-CM

## 2025-07-20 LAB
ACANTHOCYTES BLD QL SMEAR: PRESENT
ALBUMIN SERPL BCG-MCNC: 3.9 G/DL (ref 3.5–5)
ALP SERPL-CCNC: 118 U/L (ref 34–104)
ALT SERPL W P-5'-P-CCNC: 10 U/L (ref 7–52)
ANION GAP SERPL CALCULATED.3IONS-SCNC: 10 MMOL/L (ref 4–13)
ANISOCYTOSIS BLD QL SMEAR: PRESENT
AST SERPL W P-5'-P-CCNC: 15 U/L (ref 13–39)
BASOPHILS # BLD MANUAL: 0.26 THOUSAND/UL (ref 0–0.1)
BASOPHILS NFR MAR MANUAL: 1 % (ref 0–1)
BILIRUB SERPL-MCNC: 0.59 MG/DL (ref 0.2–1)
BUN SERPL-MCNC: 13 MG/DL (ref 5–25)
CALCIUM SERPL-MCNC: 7.7 MG/DL (ref 8.4–10.2)
CHLORIDE SERPL-SCNC: 102 MMOL/L (ref 96–108)
CO2 SERPL-SCNC: 29 MMOL/L (ref 21–32)
CREAT SERPL-MCNC: 1.03 MG/DL (ref 0.6–1.3)
EOSINOPHIL # BLD MANUAL: 0.51 THOUSAND/UL (ref 0–0.4)
EOSINOPHIL NFR BLD MANUAL: 2 % (ref 0–6)
ERYTHROCYTE [DISTWIDTH] IN BLOOD BY AUTOMATED COUNT: 17 % (ref 11.6–15.1)
GFR SERPL CREATININE-BSD FRML MDRD: 49 ML/MIN/1.73SQ M
GLUCOSE P FAST SERPL-MCNC: 144 MG/DL (ref 65–99)
HCT VFR BLD AUTO: 27.5 % (ref 34.8–46.1)
HGB BLD-MCNC: 8.6 G/DL (ref 11.5–15.4)
LYMPHOCYTES # BLD AUTO: 10 % (ref 14–44)
LYMPHOCYTES # BLD AUTO: 2.56 THOUSAND/UL (ref 0.6–4.47)
MACROCYTES BLD QL AUTO: PRESENT
MCH RBC QN AUTO: 32.1 PG (ref 26.8–34.3)
MCHC RBC AUTO-ENTMCNC: 31.3 G/DL (ref 31.4–37.4)
MCV RBC AUTO: 103 FL (ref 82–98)
MONOCYTES # BLD AUTO: 1.02 THOUSAND/UL (ref 0–1.22)
MONOCYTES NFR BLD: 4 % (ref 4–12)
NEUTROPHILS # BLD MANUAL: 21.21 THOUSAND/UL (ref 1.85–7.62)
NEUTS BAND NFR BLD MANUAL: 3 % (ref 0–8)
NEUTS SEG NFR BLD AUTO: 80 % (ref 43–75)
OVALOCYTES BLD QL SMEAR: PRESENT
PLATELET # BLD AUTO: 246 THOUSANDS/UL (ref 149–390)
PLATELET BLD QL SMEAR: ADEQUATE
PLATELET CLUMP BLD QL SMEAR: PRESENT
PMV BLD AUTO: 9.9 FL (ref 8.9–12.7)
POIKILOCYTOSIS BLD QL SMEAR: PRESENT
POLYCHROMASIA BLD QL SMEAR: PRESENT
POTASSIUM SERPL-SCNC: 3.6 MMOL/L (ref 3.5–5.3)
PROT SERPL-MCNC: 6 G/DL (ref 6.4–8.4)
RBC # BLD AUTO: 2.68 MILLION/UL (ref 3.81–5.12)
RBC MORPH BLD: PRESENT
SODIUM SERPL-SCNC: 141 MMOL/L (ref 135–147)
WBC # BLD AUTO: 25.56 THOUSAND/UL (ref 4.31–10.16)

## 2025-07-20 PROCEDURE — 80053 COMPREHEN METABOLIC PANEL: CPT

## 2025-07-20 PROCEDURE — 85027 COMPLETE CBC AUTOMATED: CPT

## 2025-07-20 PROCEDURE — 85007 BL SMEAR W/DIFF WBC COUNT: CPT

## 2025-07-20 PROCEDURE — 36415 COLL VENOUS BLD VENIPUNCTURE: CPT

## 2025-07-23 ENCOUNTER — OFFICE VISIT (OUTPATIENT)
Dept: INTERNAL MEDICINE CLINIC | Facility: CLINIC | Age: 85
End: 2025-07-23
Payer: COMMERCIAL

## 2025-07-23 ENCOUNTER — HOSPITAL ENCOUNTER (OUTPATIENT)
Dept: INFUSION CENTER | Facility: HOSPITAL | Age: 85
Discharge: HOME | End: 2025-07-23
Attending: INTERNAL MEDICINE
Payer: COMMERCIAL

## 2025-07-23 VITALS
WEIGHT: 142.86 LBS | BODY MASS INDEX: 28.05 KG/M2 | HEIGHT: 60 IN | TEMPERATURE: 97.1 F | DIASTOLIC BLOOD PRESSURE: 73 MMHG | RESPIRATION RATE: 20 BRPM | HEART RATE: 102 BPM | SYSTOLIC BLOOD PRESSURE: 118 MMHG

## 2025-07-23 VITALS
OXYGEN SATURATION: 84 % | BODY MASS INDEX: 28 KG/M2 | WEIGHT: 142.6 LBS | TEMPERATURE: 98.2 F | HEIGHT: 60 IN | SYSTOLIC BLOOD PRESSURE: 98 MMHG | HEART RATE: 70 BPM | DIASTOLIC BLOOD PRESSURE: 50 MMHG

## 2025-07-23 DIAGNOSIS — C34.32 SQUAMOUS CELL CARCINOMA OF LOWER LOBE OF LEFT LUNG (HCC): ICD-10-CM

## 2025-07-23 DIAGNOSIS — D70.1 CHEMOTHERAPY INDUCED NEUTROPENIA (HCC): Primary | ICD-10-CM

## 2025-07-23 DIAGNOSIS — T45.1X5A CHEMOTHERAPY INDUCED NEUTROPENIA (HCC): Primary | ICD-10-CM

## 2025-07-23 DIAGNOSIS — E11.9 TYPE 2 DIABETES MELLITUS WITHOUT COMPLICATION, WITHOUT LONG-TERM CURRENT USE OF INSULIN (HCC): ICD-10-CM

## 2025-07-23 DIAGNOSIS — N18.30 STAGE 3 CHRONIC KIDNEY DISEASE, UNSPECIFIED WHETHER STAGE 3A OR 3B CKD (HCC): ICD-10-CM

## 2025-07-23 DIAGNOSIS — J44.9 CHRONIC OBSTRUCTIVE PULMONARY DISEASE, UNSPECIFIED COPD TYPE (HCC): ICD-10-CM

## 2025-07-23 DIAGNOSIS — E11.41 CONTROLLED TYPE 2 DIABETES MELLITUS WITH DIABETIC MONONEUROPATHY, WITHOUT LONG-TERM CURRENT USE OF INSULIN (HCC): ICD-10-CM

## 2025-07-23 DIAGNOSIS — M79.89 SWELLING OF LOWER EXTREMITY: ICD-10-CM

## 2025-07-23 DIAGNOSIS — Z00.00 MEDICARE ANNUAL WELLNESS VISIT, SUBSEQUENT: Primary | ICD-10-CM

## 2025-07-23 DIAGNOSIS — M79.2 NEUROPATHIC PAIN: ICD-10-CM

## 2025-07-23 LAB — SL AMB POCT HEMOGLOBIN AIC: 5.9 (ref ?–6.5)

## 2025-07-23 PROCEDURE — G0439 PPPS, SUBSEQ VISIT: HCPCS | Performed by: PHYSICIAN ASSISTANT

## 2025-07-23 PROCEDURE — 2028F FOOT EXAM PERFORMED: CPT | Performed by: PHYSICIAN ASSISTANT

## 2025-07-23 PROCEDURE — 96367 TX/PROPH/DG ADDL SEQ IV INF: CPT

## 2025-07-23 PROCEDURE — 83036 HEMOGLOBIN GLYCOSYLATED A1C: CPT | Performed by: PHYSICIAN ASSISTANT

## 2025-07-23 PROCEDURE — 96413 CHEMO IV INFUSION 1 HR: CPT

## 2025-07-23 PROCEDURE — 96417 CHEMO IV INFUS EACH ADDL SEQ: CPT

## 2025-07-23 RX ORDER — BUDESONIDE, GLYCOPYRROLATE, AND FORMOTEROL FUMARATE 160; 9; 4.8 UG/1; UG/1; UG/1
2 AEROSOL, METERED RESPIRATORY (INHALATION) 2 TIMES DAILY
Qty: 32.1 G | Refills: 3 | Status: SHIPPED | OUTPATIENT
Start: 2025-07-23

## 2025-07-23 RX ORDER — SODIUM CHLORIDE 9 MG/ML
20 INJECTION, SOLUTION INTRAVENOUS ONCE
Status: COMPLETED | OUTPATIENT
Start: 2025-07-23 | End: 2025-07-23

## 2025-07-23 RX ORDER — GABAPENTIN 300 MG/1
300 CAPSULE ORAL DAILY
Qty: 100 CAPSULE | Refills: 1 | Status: SHIPPED | OUTPATIENT
Start: 2025-07-23

## 2025-07-23 RX ORDER — HYDROCHLOROTHIAZIDE 12.5 MG/1
12.5 TABLET ORAL DAILY PRN
Qty: 90 TABLET | Refills: 1 | Status: SHIPPED | OUTPATIENT
Start: 2025-07-23 | End: 2026-01-19

## 2025-07-23 RX ADMIN — FOSAPREPITANT 150 MG: 150 INJECTION, POWDER, LYOPHILIZED, FOR SOLUTION INTRAVENOUS at 08:53

## 2025-07-23 RX ADMIN — SODIUM CHLORIDE 200 MG: 9 INJECTION, SOLUTION INTRAVENOUS at 09:39

## 2025-07-23 RX ADMIN — Medication 130 MG: at 10:49

## 2025-07-23 RX ADMIN — SODIUM CHLORIDE 20 ML/HR: 0.9 INJECTION, SOLUTION INTRAVENOUS at 08:17

## 2025-07-23 RX ADMIN — DEXAMETHASONE SODIUM PHOSPHATE: 10 INJECTION, SOLUTION INTRAMUSCULAR; INTRAVENOUS at 08:17

## 2025-07-23 NOTE — PROGRESS NOTES
Recent labs reviewed. Cr Pena tolerated Keytruda/Abraxane treatment well with no complications.      Cr Pena is aware of future appt on 7/30/25 at 0800.     AVS printed and given to Cr Pena.

## 2025-07-23 NOTE — PROGRESS NOTES
Name: Cr Pena      : 1940      MRN: [id number]  Encounter Provider: Mary Kay Oliveira PA-C  Encounter Date: 2025   Encounter department: [facility]  :  Assessment & Plan  Type 2 diabetes mellitus without complication, without long-term current use of insulin (Formerly [facility])    Lab Results   Component Value Date    HGBA1C 5.9 2025       Orders:  •  IRIS Diabetic eye exam  •  Diabetic foot exam; Future  •  POCT hemoglobin A1c  •  metFORMIN (GLUCOPHAGE) 500 mg tablet; Take 1 tablet (500 mg total) by mouth 2 (two) times a day with meals    Chronic obstructive pulmonary disease, unspecified COPD type (Formerly [facility])    Orders:  •  Budeson-Glycopyrrol-Formoterol (Breztri Aerosphere) 160-9-4.8 MCG/ACT AERO; Inhale 2 puffs in the morning and 2 puffs before bedtime. Rinse mouth after use.    Neuropathic pain    Orders:  •  gabapentin (NEURONTIN) 300 mg capsule; Take 1 capsule (300 mg total) by mouth daily    Controlled type 2 diabetes mellitus with diabetic mononeuropathy, without long-term current use of insulin (Formerly [facility])    Lab Results   Component Value Date    HGBA1C 5.9 2025            Stage 3 chronic kidney disease, unspecified whether stage 3a or 3b CKD (Formerly [facility])  Lab Results   Component Value Date    EGFR 49 2025    EGFR 53 2025    EGFR 55 2025    CREATININE 1.03 2025    CREATININE 0.97 2025    CREATININE 0.94 2025            Swelling of lower extremity    Orders:  •  hydroCHLOROthiazide 12.5 mg tablet; Take 1 tablet (12.5 mg total) by mouth daily as needed (swelling)    Medicare annual wellness visit, subsequent           Depression Screening and Follow-up Plan: Patient was screened for depression during today's encounter. They screened negative with a PHQ-9 score of 0.      Urinary Incontinence Plan of Care: counseling topics discussed: practice Kegel (pelvic floor strengthening) exercises and use restroom every 2 hours.       Preventive health issues were  discussed with patient, and age appropriate screening tests were ordered as noted in patient's After Visit Summary. Personalized health advice and appropriate referrals for health education or preventive services given if needed, as noted in patient's After Visit Summary.    History of Present Illness     Pt presents for routine visit. Subsequent AWV performed. Her cancer treatment is going well, she tolerates it well and has positive response in her disease. She is up to date with labs. She is due for eye exam. Foot exam performed. A1C is improved and nicely controlled at 5.9.        Patient Care Team:  Mary Kay Oliveira PA-C as PCP - General (Internal Medicine)  Aissatou Lyles MD as Endoscopist  Franco Estrada DO (Thoracic Surgery)  Pao Jean MA as Care Coordinator (Hematology and Oncology)  Meng Gonzalez as  (Oncology)    Review of Systems   Constitutional:  Negative for chills and fever.   HENT:  Negative for congestion, ear pain, hearing loss, postnasal drip, rhinorrhea, sinus pressure, sinus pain, sore throat and trouble swallowing.    Eyes:  Negative for pain and visual disturbance.   Respiratory:  Negative for cough, chest tightness, shortness of breath and wheezing.    Cardiovascular: Negative.  Negative for chest pain, palpitations and leg swelling.   Gastrointestinal:  Negative for abdominal pain, blood in stool, constipation, diarrhea, nausea and vomiting.   Endocrine: Negative for cold intolerance, heat intolerance, polydipsia, polyphagia and polyuria.   Genitourinary:  Negative for difficulty urinating, dysuria, flank pain and urgency.   Musculoskeletal:  Negative for arthralgias, back pain, gait problem and myalgias.   Skin:  Negative for rash.   Allergic/Immunologic: Negative.    Neurological:  Negative for dizziness, weakness, light-headedness and headaches.   Hematological: Negative.    Psychiatric/Behavioral:  Negative for behavioral problems,  dysphoric mood and sleep disturbance. The patient is not nervous/anxious.      Medical History Reviewed by provider this encounter:  Tobacco  Allergies  Meds  Problems  Med Hx  Surg Hx  Fam Hx       Annual Wellness Visit Questionnaire   Cr is here for her Subsequent Wellness visit.     Health Risk Assessment:   Patient rates overall health as good. Patient feels that their physical health rating is slightly worse. Patient is satisfied with their life. Eyesight was rated as slightly worse. Hearing was rated as same. Patient feels that their emotional and mental health rating is slightly better. Patients states they are sometimes angry. Patient states they are always unusually tired/fatigued. Pain experienced in the last 7 days has been none. Patient states that she has experienced no weight loss or gain in last 6 months.     Depression Screening:   PHQ-9 Score: 0      Fall Risk Screening:   In the past year, patient has experienced: no history of falling in past year      Urinary Incontinence Screening:   Patient has leaked urine accidently in the last six months.     Home Safety:  Patient has trouble with stairs inside or outside of their home. Patient has working smoke alarms and has working carbon monoxide detector. Home safety hazards include: none.     Nutrition:   Current diet is Regular.     Medications:   Patient is currently taking over-the-counter supplements. OTC medications include: see medication list. Patient is able to manage medications.     Activities of Daily Living (ADLs)/Instrumental Activities of Daily Living (IADLs):   Walk and transfer into and out of bed and chair?: Yes  Dress and groom yourself?: Yes    Bathe or shower yourself?: Yes    Feed yourself? Yes  Do your laundry/housekeeping?: Yes  Manage your money, pay your bills and track your expenses?: Yes  Make your own meals?: Yes    Do your own shopping?: Yes    Previous Hospitalizations:   Any hospitalizations or ED visits within  the last 12 months?: No      Advance Care Planning:   Living will: No    Durable POA for healthcare: No    Advanced directive: No    Five wishes given: No      Cognitive Screening:   Provider or family/friend/caregiver concerned regarding cognition?: No    Preventive Screenings      Cardiovascular Screening:    General: Screening Not Indicated and History Lipid Disorder      Diabetes Screening:     General: Screening Not Indicated and History Diabetes      Colorectal Cancer Screening:     General: Screening Not Indicated      Breast Cancer Screening:     General: Screening Not Indicated      Cervical Cancer Screening:    General: Screening Not Indicated      Osteoporosis Screening:    General: Screening Not Indicated      Lung Cancer Screening:     General: Screening Not Indicated and History Lung Cancer      Hepatitis C Screening:    General: Screening Not Indicated    Immunizations:  - Immunizations due: Zoster (Shingrix)    Screening, Brief Intervention, and Referral to Treatment (SBIRT)     Screening  Typical number of drinks in a day: 0  Typical number of drinks in a week: 0  Interpretation: Low risk drinking behavior.    Single Item Drug Screening:  How often have you used an illegal drug (including marijuana) or a prescription medication for non-medical reasons in the past year? never    Single Item Drug Screen Score: 0  Interpretation: Negative screen for possible drug use disorder    Social Drivers of Health     Financial Resource Strain: Not At Risk (1/22/2025)    Received from [facility]    Financial Insecurity    • In the last 12 months did you skip medications to save money?: No    • In the last 12 months was there a time when you needed to see a doctor but could not because of cost?: No   Food Insecurity: No Food Insecurity (7/23/2025)    Nursing - Inadequate Food Risk Classification    • Worried About Running Out of Food in the Last Year: Never true    • Ran Out of Food in the Last  "Year: Never true   Transportation Needs: No Transportation Needs (7/23/2025)    PRAPARE - Transportation    • Lack of Transportation (Medical): No    • Lack of Transportation (Non-Medical): No   Housing Stability: Low Risk  (7/23/2025)    Housing Stability Vital Sign    • Unable to Pay for Housing in the Last Year: No    • Number of Times Moved in the Last Year: 0    • Homeless in the Last Year: No   Utilities: Not At Risk (7/23/2025)    [facility] Utilities    • Threatened with loss of utilities: No     No results found.    Objective   BP 98/50 (BP Location: Right arm, Patient Position: Sitting, Cuff Size: Large)   Pulse 70   Temp 98.2 °F (36.8 °C) (Tympanic)   Ht 5' 0.24\" (1.53 m)   Wt 64.7 kg (142 lb 9.6 oz)   SpO2 (!) 84%   BMI 27.63 kg/m²     Physical Exam  Vitals and nursing note reviewed.   Constitutional:       General: She is not in acute distress.     Appearance: Normal appearance. She is well-developed. She is not diaphoretic.   HENT:      Head: Normocephalic and atraumatic.      Right Ear: External ear normal.      Left Ear: External ear normal.      Nose: Nose normal.      Mouth/Throat:      Pharynx: No oropharyngeal exudate.     Eyes:      General: No scleral icterus.        Right eye: No discharge.         Left eye: No discharge.      Conjunctiva/sclera: Conjunctivae normal.      Pupils: Pupils are equal, round, and reactive to light.     Neck:      Thyroid: No thyromegaly.     Cardiovascular:      Rate and Rhythm: Normal rate and regular rhythm.      Heart sounds: Normal heart sounds. No murmur heard.     No friction rub. No gallop.   Pulmonary:      Effort: Pulmonary effort is normal. No respiratory distress.      Breath sounds: Normal breath sounds. No wheezing or rales.   Abdominal:      General: Bowel sounds are normal. There is no distension.      Palpations: Abdomen is soft.      Tenderness: There is no abdominal tenderness.     Musculoskeletal:         General: No tenderness or deformity. " Normal range of motion.      Cervical back: Normal range of motion and neck supple.     Skin:     General: Skin is warm and dry.     Neurological:      Mental Status: She is alert and oriented to person, place, and time.      Cranial Nerves: No cranial nerve deficit.     Psychiatric:         Behavior: Behavior normal.         Thought Content: Thought content normal.         Judgment: Judgment normal.

## 2025-07-23 NOTE — PROGRESS NOTES
Diabetic Foot Exam    Patient's shoes and socks removed.    Right Foot/Ankle   Right Foot Inspection  Skin Exam: skin normal and skin intact. No dry skin, no warmth, no callus, no erythema, no maceration, no abnormal color, no pre-ulcer, no ulcer and no callus.     Toe Exam: ROM and strength within normal limits.     Sensory   Vibration: intact  Proprioception: intact  Monofilament testing: diminished    Vascular  Capillary refills: < 3 seconds  The right DP pulse is 2+. The right PT pulse is 2+.     Left Foot/Ankle  Left Foot Inspection  Skin Exam: skin normal and skin intact. No dry skin, no warmth, no erythema, no maceration, normal color, no pre-ulcer, no ulcer and no callus.     Toe Exam: ROM and strength within normal limits.     Sensory   Vibration: intact  Proprioception: intact  Monofilament testing: intact    Vascular  Capillary refills: < 3 seconds  The left DP pulse is 2+. The left PT pulse is 2+.     Assign Risk Category  No deformity present  No loss of protective sensation  No weak pulses  Risk: 0

## 2025-07-24 NOTE — ASSESSMENT & PLAN NOTE
Lab Results   Component Value Date    EGFR 49 07/20/2025    EGFR 53 06/29/2025    EGFR 55 06/08/2025    CREATININE 1.03 07/20/2025    CREATININE 0.97 06/29/2025    CREATININE 0.94 06/08/2025

## 2025-07-24 NOTE — PATIENT INSTRUCTIONS
Medicare Preventive Visit Patient Instructions  Thank you for completing your Welcome to Medicare Visit or Medicare Annual Wellness Visit today. Your next wellness visit will be due in one year (7/25/2026).  The screening/preventive services that you may require over the next 5-10 years are detailed below. Some tests may not apply to you based off risk factors and/or age. Screening tests ordered at today's visit but not completed yet may show as past due. Also, please note that scanned in results may not display below.  Preventive Screenings:  Service Recommendations Previous Testing/Comments   Colorectal Cancer Screening  * Colonoscopy    * Fecal Occult Blood Test (FOBT)/Fecal Immunochemical Test (FIT)  * Fecal DNA/Cologuard Test  * Flexible Sigmoidoscopy Age: 45-75 years old   Colonoscopy: every 10 years (may be performed more frequently if at higher risk)  OR  FOBT/FIT: every 1 year  OR  Cologuard: every 3 years  OR  Sigmoidoscopy: every 5 years  Screening may be recommended earlier than age 45 if at higher risk for colorectal cancer. Also, an individualized decision between you and your healthcare provider will decide whether screening between the ages of 76-85 would be appropriate. Colonoscopy: Not on file  FOBT/FIT: Not on file  Cologuard: Not on file  Sigmoidoscopy: Not on file    Screening Not Indicated     Breast Cancer Screening Age: 40+ years old  Frequency: every 1-2 years  Not required if history of left and right mastectomy Mammogram: 05/07/2018        Cervical Cancer Screening Between the ages of 21-29, pap smear recommended once every 3 years.   Between the ages of 30-65, can perform pap smear with HPV co-testing every 5 years.   Recommendations may differ for women with a history of total hysterectomy, cervical cancer, or abnormal pap smears in past. Pap Smear: Not on file    Screening Not Indicated   Hepatitis C Screening Once for adults born between 1945 and 1965  More frequently in patients at  high risk for Hepatitis C Hep C Antibody: Not on file        Diabetes Screening 1-2 times per year if you're at risk for diabetes or have pre-diabetes Fasting glucose: 144 mg/dL (7/20/2025)  A1C: 5.9 (7/23/2025)  Screening Not Indicated  History Diabetes   Cholesterol Screening Once every 5 years if you don't have a lipid disorder. May order more often based on risk factors. Lipid panel: 07/27/2022    Screening Not Indicated  History Lipid Disorder     Other Preventive Screenings Covered by Medicare:  Abdominal Aortic Aneurysm (AAA) Screening: covered once if your at risk. You're considered to be at risk if you have a family history of AAA.  Lung Cancer Screening: covers low dose CT scan once per year if you meet all of the following conditions: (1) Age 55-77; (2) No signs or symptoms of lung cancer; (3) Current smoker or have quit smoking within the last 15 years; (4) You have a tobacco smoking history of at least 20 pack years (packs per day multiplied by number of years you smoked); (5) You get a written order from a healthcare provider.  Glaucoma Screening: covered annually if you're considered high risk: (1) You have diabetes OR (2) Family history of glaucoma OR (3)  aged 50 and older OR (4)  American aged 65 and older  Osteoporosis Screening: covered every 2 years if you meet one of the following conditions: (1) You're estrogen deficient and at risk for osteoporosis based off medical history and other findings; (2) Have a vertebral abnormality; (3) On glucocorticoid therapy for more than 3 months; (4) Have primary hyperparathyroidism; (5) On osteoporosis medications and need to assess response to drug therapy.   Last bone density test (DXA Scan): 07/27/2022.  HIV Screening: covered annually if you're between the age of 15-65. Also covered annually if you are younger than 15 and older than 65 with risk factors for HIV infection. For pregnant patients, it is covered up to 3 times per  pregnancy.    Immunizations:  Immunization Recommendations   Influenza Vaccine Annual influenza vaccination during flu season is recommended for all persons aged >= 6 months who do not have contraindications   Pneumococcal Vaccine   * Pneumococcal conjugate vaccine = PCV13 (Prevnar 13), PCV15 (Vaxneuvance), PCV20 (Prevnar 20)  * Pneumococcal polysaccharide vaccine = PPSV23 (Pneumovax) Adults 19-65 yo with certain risk factors or if 65+ yo  If never received any pneumonia vaccine: recommend Prevnar 20 (PCV20)  Give PCV20 if previously received 1 dose of PCV13 or PPSV23   Hepatitis B Vaccine 3 dose series if at intermediate or high risk (ex: diabetes, end stage renal disease, liver disease)   Respiratory syncytial virus (RSV) Vaccine - COVERED BY MEDICARE PART D  * RSVPreF3 (Arexvy) CDC recommends that adults 60 years of age and older may receive a single dose of RSV vaccine using shared clinical decision-making (SCDM)   Tetanus (Td) Vaccine - COST NOT COVERED BY MEDICARE PART B Following completion of primary series, a booster dose should be given every 10 years to maintain immunity against tetanus. Td may also be given as tetanus wound prophylaxis.   Tdap Vaccine - COST NOT COVERED BY MEDICARE PART B Recommended at least once for all adults. For pregnant patients, recommended with each pregnancy.   Shingles Vaccine (Shingrix) - COST NOT COVERED BY MEDICARE PART B  2 shot series recommended in those 19 years and older who have or will have weakened immune systems or those 50 years and older     Health Maintenance Due:      Topic Date Due   • Lung Cancer Screening  Discontinued     Immunizations Due:      Topic Date Due   • COVID-19 Vaccine (5 - Moderna risk 2024-25 season) 04/08/2025   • Influenza Vaccine (1) 09/01/2025     Advance Directives   What are advance directives?  Advance directives are legal documents that state your wishes and plans for medical care. These plans are made ahead of time in case you lose  your ability to make decisions for yourself. Advance directives can apply to any medical decision, such as the treatments you want, and if you want to donate organs.   What are the types of advance directives?  There are many types of advance directives, and each state has rules about how to use them. You may choose a combination of any of the following:  Living will:  This is a written record of the treatment you want. You can also choose which treatments you do not want, which to limit, and which to stop at a certain time. This includes surgery, medicine, IV fluid, and tube feedings.   Durable power of  for healthcare (DPAHC):  This is a written record that states who you want to make healthcare choices for you when you are unable to make them for yourself. This person, called a proxy, is usually a family member or a friend. You may choose more than 1 proxy.  Do not resuscitate (DNR) order:  A DNR order is used in case your heart stops beating or you stop breathing. It is a request not to have certain forms of treatment, such as CPR. A DNR order may be included in other types of advance directives.  Medical directive:  This covers the care that you want if you are in a coma, near death, or unable to make decisions for yourself. You can list the treatments you want for each condition. Treatment may include pain medicine, surgery, blood transfusions, dialysis, IV or tube feedings, and a ventilator (breathing machine).  Values history:  This document has questions about your views, beliefs, and how you feel and think about life. This information can help others choose the care that you would choose.  Why are advance directives important?  An advance directive helps you control your care. Although spoken wishes may be used, it is better to have your wishes written down. Spoken wishes can be misunderstood, or not followed. Treatments may be given even if you do not want them. An advance directive may make it  easier for your family to make difficult choices about your care.   Urinary Incontinence   Urinary incontinence (UI)  is when you lose control of your bladder. UI develops because your bladder cannot store or empty urine properly. The 3 most common types of UI are stress incontinence, urge incontinence, or both.  Medicines:   May be given to help strengthen your bladder control. Report any side effects of medication to your healthcare provider.  Do pelvic muscle exercises often:  Your pelvic muscles help you stop urinating. Squeeze these muscles tight for 5 seconds, then relax for 5 seconds. Gradually work up to squeezing for 10 seconds. Do 3 sets of 15 repetitions a day, or as directed. This will help strengthen your pelvic muscles and improve bladder control.  Train your bladder:  Go to the bathroom at set times, such as every 2 hours, even if you do not feel the urge to go. You can also try to hold your urine when you feel the urge to go. For example, hold your urine for 5 minutes when you feel the urge to go. As that becomes easier, hold your urine for 10 minutes.   Self-care:   Keep a UI record.  Write down how often you leak urine and how much you leak. Make a note of what you were doing when you leaked urine.  Drink liquids as directed. You may need to limit the amount of liquid you drink to help control your urine leakage. Do not drink any liquid right before you go to bed. Limit or do not have drinks that contain caffeine or alcohol.   Prevent constipation.  Eat a variety of high-fiber foods. Good examples are high-fiber cereals, beans, vegetables, and whole-grain breads. Walking is the best way to trigger your intestines to have a bowel movement.  Exercise regularly and maintain a healthy weight.  Weight loss and exercise will decrease pressure on your bladder and help you control your leakage.   Use a catheter as directed  to help empty your bladder. A catheter is a tiny, plastic tube that is put into  your bladder to drain your urine.   Go to behavior therapy as directed.  Behavior therapy may be used to help you learn to control your urge to urinate.    Weight Management   Why it is important to manage your weight:  Being overweight increases your risk of health conditions such as heart disease, high blood pressure, type 2 diabetes, and certain types of cancer. It can also increase your risk for osteoarthritis, sleep apnea, and other respiratory problems. Aim for a slow, steady weight loss. Even a small amount of weight loss can lower your risk of health problems.  How to lose weight safely:  A safe and healthy way to lose weight is to eat fewer calories and get regular exercise. You can lose up about 1 pound a week by decreasing the number of calories you eat by 500 calories each day.   Healthy meal plan for weight management:  A healthy meal plan includes a variety of foods, contains fewer calories, and helps you stay healthy. A healthy meal plan includes the following:  Eat whole-grain foods more often.  A healthy meal plan should contain fiber. Fiber is the part of grains, fruits, and vegetables that is not broken down by your body. Whole-grain foods are healthy and provide extra fiber in your diet. Some examples of whole-grain foods are whole-wheat breads and pastas, oatmeal, brown rice, and bulgur.  Eat a variety of vegetables every day.  Include dark, leafy greens such as spinach, kale, juliet greens, and mustard greens. Eat yellow and orange vegetables such as carrots, sweet potatoes, and winter squash.   Eat a variety of fruits every day.  Choose fresh or canned fruit (canned in its own juice or light syrup) instead of juice. Fruit juice has very little or no fiber.  Eat low-fat dairy foods.  Drink fat-free (skim) milk or 1% milk. Eat fat-free yogurt and low-fat cottage cheese. Try low-fat cheeses such as mozzarella and other reduced-fat cheeses.  Choose meat and other protein foods that are low in  fat.  Choose beans or other legumes such as split peas or lentils. Choose fish, skinless poultry (chicken or turkey), or lean cuts of red meat (beef or pork). Before you cook meat or poultry, cut off any visible fat.   Use less fat and oil.  Try baking foods instead of frying them. Add less fat, such as margarine, sour cream, regular salad dressing and mayonnaise to foods. Eat fewer high-fat foods. Some examples of high-fat foods include french fries, doughnuts, ice cream, and cakes.  Eat fewer sweets.  Limit foods and drinks that are high in sugar. This includes candy, cookies, regular soda, and sweetened drinks.  Exercise:  Exercise at least 30 minutes per day on most days of the week. Some examples of exercise include walking, biking, dancing, and swimming. You can also fit in more physical activity by taking the stairs instead of the elevator or parking farther away from stores. Ask your healthcare provider about the best exercise plan for you.    © Copyright [employer] 2018 Information is for End User's use only and may not be sold, redistributed or otherwise used for commercial purposes. All illustrations and images included in CareNotes® are the copyrighted property of [employer] or [employer]

## 2025-07-27 ENCOUNTER — APPOINTMENT (OUTPATIENT)
Dept: LAB | Facility: HOSPITAL | Age: 85
End: 2025-07-27
Payer: COMMERCIAL

## 2025-07-27 DIAGNOSIS — C34.32 SQUAMOUS CELL CARCINOMA OF LOWER LOBE OF LEFT LUNG (HCC): ICD-10-CM

## 2025-07-27 DIAGNOSIS — D70.1 CHEMOTHERAPY INDUCED NEUTROPENIA (HCC): ICD-10-CM

## 2025-07-27 DIAGNOSIS — T45.1X5A CHEMOTHERAPY INDUCED NEUTROPENIA (HCC): ICD-10-CM

## 2025-07-27 LAB
ALBUMIN SERPL BCG-MCNC: 3.9 G/DL (ref 3.5–5)
ALP SERPL-CCNC: 74 U/L (ref 34–104)
ALT SERPL W P-5'-P-CCNC: 11 U/L (ref 7–52)
ANION GAP SERPL CALCULATED.3IONS-SCNC: 9 MMOL/L (ref 4–13)
AST SERPL W P-5'-P-CCNC: 13 U/L (ref 13–39)
BASOPHILS # BLD AUTO: 0.03 THOUSANDS/ÂΜL (ref 0–0.1)
BASOPHILS NFR BLD AUTO: 0 % (ref 0–1)
BILIRUB SERPL-MCNC: 0.8 MG/DL (ref 0.2–1)
BUN SERPL-MCNC: 13 MG/DL (ref 5–25)
CALCIUM SERPL-MCNC: 8.2 MG/DL (ref 8.4–10.2)
CHLORIDE SERPL-SCNC: 102 MMOL/L (ref 96–108)
CO2 SERPL-SCNC: 30 MMOL/L (ref 21–32)
CREAT SERPL-MCNC: 0.76 MG/DL (ref 0.6–1.3)
EOSINOPHIL # BLD AUTO: 0.03 THOUSAND/ÂΜL (ref 0–0.61)
EOSINOPHIL NFR BLD AUTO: 0 % (ref 0–6)
ERYTHROCYTE [DISTWIDTH] IN BLOOD BY AUTOMATED COUNT: 15 % (ref 11.6–15.1)
GFR SERPL CREATININE-BSD FRML MDRD: 71 ML/MIN/1.73SQ M
GLUCOSE P FAST SERPL-MCNC: 152 MG/DL (ref 65–99)
HCT VFR BLD AUTO: 26.7 % (ref 34.8–46.1)
HGB BLD-MCNC: 8.2 G/DL (ref 11.5–15.4)
IMM GRANULOCYTES # BLD AUTO: 0.05 THOUSAND/UL (ref 0–0.2)
IMM GRANULOCYTES NFR BLD AUTO: 1 % (ref 0–2)
LYMPHOCYTES # BLD AUTO: 0.72 THOUSANDS/ÂΜL (ref 0.6–4.47)
LYMPHOCYTES NFR BLD AUTO: 9 % (ref 14–44)
MCH RBC QN AUTO: 31.3 PG (ref 26.8–34.3)
MCHC RBC AUTO-ENTMCNC: 30.7 G/DL (ref 31.4–37.4)
MCV RBC AUTO: 102 FL (ref 82–98)
MONOCYTES # BLD AUTO: 0.22 THOUSAND/ÂΜL (ref 0.17–1.22)
MONOCYTES NFR BLD AUTO: 3 % (ref 4–12)
NEUTROPHILS # BLD AUTO: 7.06 THOUSANDS/ÂΜL (ref 1.85–7.62)
NEUTS SEG NFR BLD AUTO: 87 % (ref 43–75)
NRBC BLD AUTO-RTO: 0 /100 WBCS
PLATELET # BLD AUTO: 258 THOUSANDS/UL (ref 149–390)
PMV BLD AUTO: 9.8 FL (ref 8.9–12.7)
POTASSIUM SERPL-SCNC: 3.9 MMOL/L (ref 3.5–5.3)
PROT SERPL-MCNC: 6.3 G/DL (ref 6.4–8.4)
RBC # BLD AUTO: 2.62 MILLION/UL (ref 3.81–5.12)
SODIUM SERPL-SCNC: 141 MMOL/L (ref 135–147)
TSH SERPL DL<=0.05 MIU/L-ACNC: 1.46 UIU/ML (ref 0.45–4.5)
WBC # BLD AUTO: 8.11 THOUSAND/UL (ref 4.31–10.16)

## 2025-07-27 PROCEDURE — 85025 COMPLETE CBC W/AUTO DIFF WBC: CPT

## 2025-07-27 PROCEDURE — 80053 COMPREHEN METABOLIC PANEL: CPT

## 2025-07-27 PROCEDURE — 36415 COLL VENOUS BLD VENIPUNCTURE: CPT

## 2025-07-27 PROCEDURE — 84443 ASSAY THYROID STIM HORMONE: CPT

## 2025-07-28 ENCOUNTER — HOSPITAL ENCOUNTER (OUTPATIENT)
Dept: CT IMAGING | Facility: HOSPITAL | Age: 85
Discharge: HOME | End: 2025-07-28
Attending: INTERNAL MEDICINE
Payer: COMMERCIAL

## 2025-07-28 DIAGNOSIS — C34.32 SQUAMOUS CELL CARCINOMA OF LOWER LOBE OF LEFT LUNG (HCC): ICD-10-CM

## 2025-07-28 PROCEDURE — 71260 CT THORAX DX C+: CPT

## 2025-07-28 PROCEDURE — 74177 CT ABD & PELVIS W/CONTRAST: CPT

## 2025-07-28 RX ADMIN — IOHEXOL 100 ML: 350 INJECTION, SOLUTION INTRAVENOUS at 08:58

## 2025-07-30 ENCOUNTER — HOSPITAL ENCOUNTER (OUTPATIENT)
Dept: INFUSION CENTER | Facility: HOSPITAL | Age: 85
Discharge: HOME | End: 2025-07-30
Attending: INTERNAL MEDICINE
Payer: COMMERCIAL

## 2025-07-30 ENCOUNTER — TELEPHONE (OUTPATIENT)
Dept: HEMATOLOGY ONCOLOGY | Facility: CLINIC | Age: 85
End: 2025-07-30

## 2025-07-30 VITALS
WEIGHT: 141.76 LBS | DIASTOLIC BLOOD PRESSURE: 63 MMHG | BODY MASS INDEX: 27.83 KG/M2 | HEIGHT: 60 IN | OXYGEN SATURATION: 96 % | TEMPERATURE: 97.6 F | RESPIRATION RATE: 20 BRPM | HEART RATE: 120 BPM | SYSTOLIC BLOOD PRESSURE: 144 MMHG

## 2025-07-30 DIAGNOSIS — D70.1 CHEMOTHERAPY INDUCED NEUTROPENIA (HCC): Primary | ICD-10-CM

## 2025-07-30 DIAGNOSIS — C34.32 SQUAMOUS CELL CARCINOMA OF LOWER LOBE OF LEFT LUNG (HCC): ICD-10-CM

## 2025-07-30 DIAGNOSIS — T45.1X5A CHEMOTHERAPY INDUCED NEUTROPENIA (HCC): Primary | ICD-10-CM

## 2025-07-30 PROCEDURE — 96367 TX/PROPH/DG ADDL SEQ IV INF: CPT

## 2025-07-30 PROCEDURE — 96413 CHEMO IV INFUSION 1 HR: CPT

## 2025-07-30 PROCEDURE — 96377 APPLICATON ON-BODY INJECTOR: CPT

## 2025-07-30 RX ORDER — SODIUM CHLORIDE 9 MG/ML
20 INJECTION, SOLUTION INTRAVENOUS ONCE
Status: COMPLETED | OUTPATIENT
Start: 2025-07-30 | End: 2025-07-30

## 2025-07-30 RX ADMIN — PEGFILGRASTIM 6 MG: KIT SUBCUTANEOUS at 10:45

## 2025-07-30 RX ADMIN — DEXAMETHASONE SODIUM PHOSPHATE: 10 INJECTION, SOLUTION INTRAMUSCULAR; INTRAVENOUS at 08:25

## 2025-07-30 RX ADMIN — SODIUM CHLORIDE 20 ML/HR: 0.9 INJECTION, SOLUTION INTRAVENOUS at 08:30

## 2025-07-30 RX ADMIN — Medication 130 MG: at 09:32

## 2025-08-05 ENCOUNTER — TELEPHONE (OUTPATIENT)
Dept: HEMATOLOGY ONCOLOGY | Facility: CLINIC | Age: 85
End: 2025-08-05

## 2025-08-05 ENCOUNTER — PATIENT OUTREACH (OUTPATIENT)
Dept: HEMATOLOGY ONCOLOGY | Facility: CLINIC | Age: 85
End: 2025-08-05

## 2025-08-05 ENCOUNTER — OFFICE VISIT (OUTPATIENT)
Dept: HEMATOLOGY ONCOLOGY | Facility: CLINIC | Age: 85
End: 2025-08-05
Payer: COMMERCIAL

## 2025-08-05 VITALS
BODY MASS INDEX: 26.9 KG/M2 | SYSTOLIC BLOOD PRESSURE: 112 MMHG | HEART RATE: 61 BPM | OXYGEN SATURATION: 93 % | DIASTOLIC BLOOD PRESSURE: 64 MMHG | WEIGHT: 137 LBS | HEIGHT: 60 IN | TEMPERATURE: 98.4 F

## 2025-08-05 DIAGNOSIS — D70.1 CHEMOTHERAPY INDUCED NEUTROPENIA (HCC): Primary | ICD-10-CM

## 2025-08-05 DIAGNOSIS — D52.0 DIETARY FOLATE DEFICIENCY ANEMIA: ICD-10-CM

## 2025-08-05 DIAGNOSIS — C34.32 SQUAMOUS CELL CARCINOMA OF LOWER LOBE OF LEFT LUNG (HCC): Primary | ICD-10-CM

## 2025-08-05 DIAGNOSIS — T45.1X5A CHEMOTHERAPY INDUCED NEUTROPENIA (HCC): Primary | ICD-10-CM

## 2025-08-05 DIAGNOSIS — T45.1X5A ANTINEOPLASTIC CHEMOTHERAPY INDUCED ANEMIA: ICD-10-CM

## 2025-08-05 DIAGNOSIS — D64.81 ANTINEOPLASTIC CHEMOTHERAPY INDUCED ANEMIA: ICD-10-CM

## 2025-08-05 DIAGNOSIS — C34.32 SQUAMOUS CELL CARCINOMA OF LOWER LOBE OF LEFT LUNG (HCC): ICD-10-CM

## 2025-08-05 PROCEDURE — G2211 COMPLEX E/M VISIT ADD ON: HCPCS | Performed by: INTERNAL MEDICINE

## 2025-08-05 PROCEDURE — 99215 OFFICE O/P EST HI 40 MIN: CPT | Performed by: INTERNAL MEDICINE

## 2025-08-07 RX ORDER — SODIUM CHLORIDE 9 MG/ML
20 INJECTION, SOLUTION INTRAVENOUS ONCE
Status: CANCELLED | OUTPATIENT
Start: 2025-08-14

## 2025-08-10 ENCOUNTER — APPOINTMENT (OUTPATIENT)
Dept: LAB | Facility: HOSPITAL | Age: 85
End: 2025-08-10
Attending: INTERNAL MEDICINE
Payer: COMMERCIAL

## 2025-08-14 ENCOUNTER — APPOINTMENT (OUTPATIENT)
Dept: LAB | Facility: HOSPITAL | Age: 85
End: 2025-08-14
Payer: COMMERCIAL

## 2025-08-14 ENCOUNTER — HOSPITAL ENCOUNTER (OUTPATIENT)
Dept: INFUSION CENTER | Facility: HOSPITAL | Age: 85
Discharge: HOME | End: 2025-08-14
Attending: INTERNAL MEDICINE
Payer: COMMERCIAL

## 2025-08-14 VITALS
SYSTOLIC BLOOD PRESSURE: 98 MMHG | TEMPERATURE: 97.2 F | RESPIRATION RATE: 18 BRPM | HEART RATE: 79 BPM | OXYGEN SATURATION: 98 % | DIASTOLIC BLOOD PRESSURE: 70 MMHG

## 2025-08-14 DIAGNOSIS — T45.1X5A CHEMOTHERAPY INDUCED NEUTROPENIA (HCC): Primary | ICD-10-CM

## 2025-08-14 DIAGNOSIS — C34.32 SQUAMOUS CELL CARCINOMA OF LOWER LOBE OF LEFT LUNG (HCC): ICD-10-CM

## 2025-08-14 DIAGNOSIS — D70.1 CHEMOTHERAPY INDUCED NEUTROPENIA (HCC): Primary | ICD-10-CM

## 2025-08-14 PROCEDURE — 96413 CHEMO IV INFUSION 1 HR: CPT

## 2025-08-14 RX ORDER — SODIUM CHLORIDE 9 MG/ML
20 INJECTION, SOLUTION INTRAVENOUS ONCE
Status: COMPLETED | OUTPATIENT
Start: 2025-08-14 | End: 2025-08-14

## 2025-08-14 RX ADMIN — SODIUM CHLORIDE 20 ML/HR: 0.9 INJECTION, SOLUTION INTRAVENOUS at 11:29

## 2025-08-14 RX ADMIN — SODIUM CHLORIDE 200 MG: 9 INJECTION, SOLUTION INTRAVENOUS at 11:56

## 2025-08-21 DIAGNOSIS — K21.9 GASTROESOPHAGEAL REFLUX DISEASE WITHOUT ESOPHAGITIS: ICD-10-CM

## 2025-08-21 DIAGNOSIS — N32.81 OAB (OVERACTIVE BLADDER): ICD-10-CM

## 2025-08-21 DIAGNOSIS — I10 HYPERTENSION, UNSPECIFIED TYPE: ICD-10-CM

## 2025-08-21 RX ORDER — OXYBUTYNIN CHLORIDE 5 MG/1
5 TABLET, EXTENDED RELEASE ORAL DAILY
Qty: 100 TABLET | Refills: 1 | Status: SHIPPED | OUTPATIENT
Start: 2025-08-21

## 2025-08-21 RX ORDER — LISINOPRIL 5 MG/1
5 TABLET ORAL DAILY
Qty: 100 TABLET | Refills: 1 | Status: SHIPPED | OUTPATIENT
Start: 2025-08-21

## 2025-08-21 RX ORDER — OMEPRAZOLE 40 MG/1
40 CAPSULE, DELAYED RELEASE ORAL
Qty: 100 CAPSULE | Refills: 1 | Status: SHIPPED | OUTPATIENT
Start: 2025-08-21

## (undated) DEVICE — THE BIOSHIELD BIOPSY VALVE - STERILE IS USED TO COVER THE OPENING TO THE BIOPSY/SUCTION CHANNEL INLET OF A GASTROINTESTINAL ENDOSCOPE. IT PROVIDES ACCESS FOR ENDOSCOPIC DEVICE PASSAGE AND EXCHANGE, HELPS MAINTAIN INSUFFLATION AND MINIMIZES LEAKAGE OF BIOMATERIAL FROM THE BIOPSY PORT THROUGHOUT THE GASTROINTESTINAL ENDOSCOPIC PROCEDURE.: Brand: BIOSHIELD

## (undated) DEVICE — TRANSPOSAL ULTRAFLEX DUO/QUAD ULTRA CART MANIFOLD

## (undated) DEVICE — GROUNDING PAD UNIVERSAL SLW

## (undated) DEVICE — TRAP POLY

## (undated) DEVICE — DISPOSABLE HOT BIOPSY FORCEPS: Brand: DISPOSABLE HOT BIOPSY FORCEPS

## (undated) DEVICE — THE TORRENT IRRIGATION SCOPE CONNECTOR IS USED WITH THE TORRENT IRRIGATION TUBING TO PROVIDE IRRIGATION FLUIDS SUCH AS STERILE WATER DURING GASTROINTESTINAL ENDOSCOPIC PROCEDURES WHEN USED IN CONJUNCTION WITH AN IRRIGATION PUMP (OR ELECTROSURGICAL UNIT).: Brand: TORRENT

## (undated) DEVICE — THE AQUASHIELD SYSTEM IS INTENDED TO BE USED WITH AN AIR SOURCE FROM AN ENDOSCOPE WITH THE PURPOSE OF SUPPLYING STERILE WATER TO THE ENDOSCOPE DURING ENDOSCOPIC PROCEDURES.: Brand: AQUASHIELD

## (undated) DEVICE — SINGLE-USE POLYPECTOMY SNARE: Brand: ROTATABLE SNARE

## (undated) DEVICE — THE SINGLE USE BIOGUARD AIR WATER VALVE, OLYMPUS IS USED TO CONTROL THE AIR WATER FUNCTION OF AN ENDOSCOPE DURING GI ENDOSCOPIC PROCEDURES.: Brand: BIOGUARD

## (undated) DEVICE — KIT ENDOSCOPY BASIC

## (undated) DEVICE — THE TORRENT IRRIGATION TUBING IS INTENDED TO PROVIDE IRRIGATION VIA IRRIGATION FLUIDS, SUCH AS STERILE WATER, DURING GASTROINTESTINAL ENDOSCOPIC PROCEDURES WHEN USED IN CONJUNCTION WITH AN IRRIGATION PUMP OR ELECTROSURGICAL UNIT.: Brand: TORRENT